# Patient Record
Sex: FEMALE | Race: BLACK OR AFRICAN AMERICAN | NOT HISPANIC OR LATINO | Employment: OTHER | URBAN - METROPOLITAN AREA
[De-identification: names, ages, dates, MRNs, and addresses within clinical notes are randomized per-mention and may not be internally consistent; named-entity substitution may affect disease eponyms.]

---

## 2017-02-08 ENCOUNTER — INITIAL CONSULT (OUTPATIENT)
Dept: OPHTHALMOLOGY | Facility: CLINIC | Age: 67
End: 2017-02-08
Payer: MEDICARE

## 2017-02-08 DIAGNOSIS — I10 ESSENTIAL HYPERTENSION: ICD-10-CM

## 2017-02-08 DIAGNOSIS — H52.7 REFRACTIVE ERROR: ICD-10-CM

## 2017-02-08 DIAGNOSIS — H26.9 CORTICAL CATARACT OF BOTH EYES: Primary | ICD-10-CM

## 2017-02-08 PROCEDURE — 99999 PR PBB SHADOW E&M-EST. PATIENT-LVL II: CPT | Mod: PBBFAC,,, | Performed by: OPHTHALMOLOGY

## 2017-02-08 PROCEDURE — 92004 COMPRE OPH EXAM NEW PT 1/>: CPT | Mod: S$GLB,,, | Performed by: OPHTHALMOLOGY

## 2017-02-08 PROCEDURE — 92136 OPHTHALMIC BIOMETRY: CPT | Mod: LT,S$GLB,, | Performed by: OPHTHALMOLOGY

## 2017-02-08 RX ORDER — GABAPENTIN 800 MG/1
800 TABLET ORAL 3 TIMES DAILY
COMMUNITY
End: 2017-07-12

## 2017-02-08 RX ORDER — HYDROXYCHLOROQUINE SULFATE 200 MG/1
200 TABLET, FILM COATED ORAL 2 TIMES DAILY
COMMUNITY
End: 2019-02-13

## 2017-02-08 RX ORDER — LOSARTAN POTASSIUM 25 MG/1
50 TABLET ORAL DAILY
Refills: 1 | COMMUNITY
Start: 2016-12-08 | End: 2017-02-20 | Stop reason: DRUGHIGH

## 2017-02-08 RX ORDER — CETIRIZINE HYDROCHLORIDE 10 MG/1
10 TABLET ORAL DAILY
COMMUNITY
End: 2017-02-20

## 2017-02-08 RX ORDER — DICYCLOMINE HYDROCHLORIDE 10 MG/1
10 CAPSULE ORAL 2 TIMES DAILY
COMMUNITY
End: 2019-01-09

## 2017-02-08 RX ORDER — ASPIRIN 81 MG/1
81 TABLET ORAL
COMMUNITY
End: 2017-02-20

## 2017-02-08 RX ORDER — CHOLECALCIFEROL (VITAMIN D3) 25 MCG
185 TABLET ORAL
COMMUNITY
End: 2017-02-20

## 2017-02-08 RX ORDER — AMITRIPTYLINE HYDROCHLORIDE 75 MG/1
75 TABLET ORAL NIGHTLY
COMMUNITY
End: 2017-07-12

## 2017-02-08 RX ORDER — DIFLUPREDNATE OPHTHALMIC 0.5 MG/ML
1 EMULSION OPHTHALMIC 4 TIMES DAILY
Qty: 5 ML | Refills: 1 | Status: SHIPPED | OUTPATIENT
Start: 2017-02-21 | End: 2017-03-23

## 2017-02-08 RX ORDER — PROMETHAZINE HYDROCHLORIDE 12.5 MG/1
12.5 TABLET ORAL
Status: ON HOLD | COMMUNITY
End: 2017-02-21 | Stop reason: CLARIF

## 2017-02-08 RX ORDER — POTASSIUM CHLORIDE 750 MG/1
10 CAPSULE, EXTENDED RELEASE ORAL EVERY MORNING
COMMUNITY
End: 2017-07-12

## 2017-02-08 RX ORDER — TRAMADOL HYDROCHLORIDE 50 MG/1
50 TABLET ORAL
COMMUNITY
End: 2017-02-20 | Stop reason: ALTCHOICE

## 2017-02-08 RX ORDER — DIPHENOXYLATE HYDROCHLORIDE AND ATROPINE SULFATE 2.5; .025 MG/1; MG/1
1 TABLET ORAL 4 TIMES DAILY PRN
Status: ON HOLD | COMMUNITY
End: 2022-03-17 | Stop reason: HOSPADM

## 2017-02-08 RX ORDER — NEPAFENAC 3 MG/ML
1 SUSPENSION/ DROPS OPHTHALMIC DAILY
Qty: 3 ML | Refills: 1 | Status: SHIPPED | OUTPATIENT
Start: 2017-02-18 | End: 2017-03-20

## 2017-02-08 RX ORDER — OMEPRAZOLE 40 MG/1
20 CAPSULE, DELAYED RELEASE ORAL EVERY MORNING
COMMUNITY
End: 2019-02-13

## 2017-02-08 RX ORDER — VENLAFAXINE 75 MG/1
75 TABLET ORAL 2 TIMES DAILY
COMMUNITY
End: 2017-07-12

## 2017-02-08 RX ORDER — FOLIC ACID 1 MG/1
1 TABLET ORAL EVERY MORNING
COMMUNITY
End: 2019-01-09

## 2017-02-08 RX ORDER — CITALOPRAM 20 MG/1
20 TABLET, FILM COATED ORAL
COMMUNITY
End: 2017-02-20 | Stop reason: ALTCHOICE

## 2017-02-08 RX ORDER — OFLOXACIN 3 MG/ML
1 SOLUTION/ DROPS OPHTHALMIC 4 TIMES DAILY
Qty: 5 ML | Refills: 1 | Status: SHIPPED | OUTPATIENT
Start: 2017-02-18 | End: 2017-02-28

## 2017-02-08 RX ORDER — IBUPROFEN 800 MG/1
800 TABLET ORAL 2 TIMES DAILY PRN
Refills: 2 | COMMUNITY
Start: 2017-01-04 | End: 2019-02-13

## 2017-02-08 NOTE — MR AVS SNAPSHOT
[place] [facility]   [facility]  [place] [address]  Phone: [phone]  Fax: [phone]                  Celine Sinclair   2017 2:30 PM   Initial consult    Description:  Female : 1950   Provider:  Saul Christianson MD   Department:  [place] [facility]           Reason for Visit     Cataract           Diagnoses this Visit        Comments    Cortical cataract of both eyes    -  Primary     Essential hypertension         Refractive error                To Do List           Future Appointments        Provider Department Dept Phone    2017 3:00 PM Saul Christianson MD [place] [facility] [phone]    3/1/2017 9:00 AM Saul Christianson MD [place] [facility] [phone]    3/8/2017 3:00 PM Saul Christianson MD [place] [facility] [phone]    3/15/2017 1:00 PM Saul Christianson MD [place] [facility] [phone]    2017 9:30 AM Saul Christianson MD [place] [facility] [phone]      Goals (5 Years of Data)     None      Follow-Up and Disposition     Return in about 2 weeks (around 2017) for CE OS..       These Medications        Disp Refills Start End    ILEVRO 0.3 % DrpS 3 mL 1 2017 3/20/2017    Place 1 drop into both eyes once daily. For 30 days - Both Eyes    Pharmacy: [facility]/pharmacy #[id number] - [place], [place] - [address] Ph #: [phone]       ofloxacin (OCUFLOX) 0.3 % ophthalmic solution 5 mL 1 2017    Place 1 drop into the left eye 4 (four) times daily. - Left Eye    Pharmacy: [facility]/pharmacy #[id number] - [place], [place] - [address] Ph #: [phone]       difluprednate (DUREZOL) 0.05 % Drop ophthalmic solution 5 mL 1 2017 3/23/2017    Place 1 drop into the left eye 4 (four) times daily. For 30 days - Left Eye    Pharmacy: [facility]/pharmacy #[id number] - [place], [place] - [address] Ph #: [phone]         [facility]      [facility] - 24/7 Assistance  Registered nurses in the [facility] provide clinical advisement, health education, appointment booking, and other advisory services.  Call for this free service at [phone].             Medications           Message regarding Medications     Verify the changes and/or additions to your medication regime listed below are the same as discussed with your clinician today.  If any of these changes or additions are incorrect, please notify your healthcare provider.        START taking these NEW medications        Refills    ILEVRO 0.3 % DrpS 1    Starting on: 2/18/2017    Sig: Place 1 drop into both eyes once daily. For 30 days    Class: Normal    Route: Both Eyes    ofloxacin (OCUFLOX) 0.3 % ophthalmic solution 1    Starting on: 2/18/2017    Sig: Place 1 drop into the left eye 4 (four) times daily.    Class: Normal    Route: Left Eye    difluprednate (DUREZOL) 0.05 % Drop ophthalmic solution 1    Starting on: 2/21/2017    Sig: Place 1 drop into the left eye 4 (four) times daily. For 30 days    Class: Normal    Route: Left Eye      STOP taking these medications     lisinopril 10 MG tablet Take 10 mg by mouth once daily.           Verify that the below list of medications is an accurate representation of the medications you are currently taking.  If none reported, the list may be blank. If incorrect, please contact your healthcare provider. Carry this list with you in case of emergency.           Current Medications     ALBUTEROL SULFATE (VENTOLIN INHL) Inhale into the lungs.    amitriptyline (ELAVIL) 75 MG tablet Take 75 mg by mouth every evening.    aspirin (ECOTRIN) 81 MG EC tablet Take 81 mg by mouth.    cetirizine (ZYRTEC) 10 MG tablet Take 10 mg by mouth once daily.    citalopram (CELEXA) 20 MG tablet Take 20 mg by mouth.    dicyclomine (BENTYL) 10 MG capsule Take 10 mg by mouth.    diphenoxylate-atropine 2.5-0.025 mg (LOMOTIL) 2.5-0.025 mg per  tablet Take 1 tablet by mouth.    folic acid (FOLVITE) 1 MG tablet Take 1 mg by mouth.    gabapentin (NEURONTIN) 800 MG tablet Take 800 mg by mouth 3 (three) times daily.    hydroxychloroquine (PLAQUENIL) 200 mg tablet Take by mouth.    ibuprofen (ADVIL,MOTRIN) 800 MG tablet Take 800 mg by mouth 2 (two) times daily.    losartan (COZAAR) 25 MG tablet Take 50 mg by mouth once daily.     methotrexate sodium 2.5 mg DsPk Take 2.5 mg by mouth.    potassium chloride (MICRO-K) 10 MEQ CpSR Take 10 mEq by mouth once daily.    psyllium (METAMUCIL) powder Take 1 packet by mouth 3 (three) times daily.    topiramate (TOPAMAX) 50 MG tablet Take 50 mg by mouth 2 (two) times daily.    venlafaxine (EFFEXOR) 75 MG tablet Take 75 mg by mouth 2 (two) times daily.    vitamin D 1000 units Tab Take 185 mg by mouth.    difluprednate (DUREZOL) 0.05 % Drop ophthalmic solution Starting on Feb 21, 2017. Place 1 drop into the left eye 4 (four) times daily. For 30 days    ILEVRO 0.3 % DrpS Starting on Feb 18, 2017. Place 1 drop into both eyes once daily. For 30 days    ofloxacin (OCUFLOX) 0.3 % ophthalmic solution Starting on Feb 18, 2017. Place 1 drop into the left eye 4 (four) times daily.    omeprazole (PRILOSEC) 40 MG capsule Take 40 mg by mouth.    promethazine (PHENERGAN) 12.5 MG Tab Take 12.5 mg by mouth.    tramadol (ULTRAM) 50 mg tablet Take 50 mg by mouth.           Clinical Reference Information           Allergies as of 2/8/2017     Sandostatin [Octreotide Acetate]    Codeine      Immunizations Administered on Date of Encounter - 2/8/2017     None      Orders Placed During Today's Visit      Normal Orders This Visit    Biometry       [email or web address] [employer]" Sign-Up     Activating your [facility] account is as easy as 1-2-3!     1) Visit [email or web address], select Sign Up Now, enter this activation code and your date of birth, then select Next.  [email or web address]  Expires: 3/25/2017  6:06 PM      2) Create a username and password to use when you visit  [facility] in the future and select a security question in case you lose your password and select Next.    3) Enter your e-mail address and click Sign Up!    Additional Information  If you have questions, please e-mail [email or web address] or call [phone] to talk to our [facility] staff. Remember, [facility] is NOT to be used for urgent needs. For medical emergencies, dial 911.         Language Assistance Services     ATTENTION: Language assistance services are available, free of charge. Please call [phone].      ATENCIÓN: Si habla español, tiene a fermin disposición servicios gratuitos de asistencia lingüística. Llame al [phone].     CHÚ Ý: N?u b?n nói Ti?ng Vi?t, có các d?ch v? h? tr? ngôn ng? mi?n phí dành cho b?n. G?i s? [phone].         [facility] [place] [facility] complies with applicable Federal civil rights laws and does not discriminate on the basis of race, color, national origin, age, disability, or sex.

## 2017-02-08 NOTE — LETTER
February 8, 2017      Cara Cordero, OD  [address]  [facility] [address]           [place] [facility]  [address] [facility]  [place] [address]  Phone: [phone]  Fax: [phone]          Patient: Celine Sinclair   MR Number: [id number]   YOB: 1950   Date of Visit: 2/8/2017       Dear Dr. Cara Cordero:    Thank you for referring Celine Sinclair to me for evaluation. Attached you will find relevant portions of my assessment and plan of care.    If you have questions, please do not hesitate to call me. I look forward to following Celine Sinclair along with you.    Sincerely,    Saul Christianson MD    Enclosure  CC:  No Recipients    If you would like to receive this communication electronically, please contact [email or web address] or [phone] to request more information on [employer] Link access.    For providers and/or their staff who would like to refer a patient to [facility], please contact us through our one-stop-shop provider referral line, [facility], at [phone].    If you feel you have received this communication in error or would no longer like to receive these types of communications, please e-mail [email or web address]

## 2017-02-09 NOTE — PROGRESS NOTES
Subjective:       Patient ID: Celine Sinclair is a 66 y.o. female.    Chief Complaint: Cataract (Cataract Eval )    HPI  Review of Systems    Objective:      Physical Exam    Assessment:       1. Cortical cataract of both eyes    2. Essential hypertension    3. Refractive error        Plan:       Visually significant cataract OU -Pt. Wants Sx.     HTN-No retinopathy OU.  RE        CE OS 2/21/17 SN60WF 20.0,        OD 3/7/17 SN60WF 20.5.  Control HTN.

## 2017-02-14 ENCOUNTER — TELEPHONE (OUTPATIENT)
Dept: OPTOMETRY | Facility: CLINIC | Age: 67
End: 2017-02-14

## 2017-02-14 DIAGNOSIS — H26.9 CORTICAL CATARACT OF LEFT EYE: Primary | ICD-10-CM

## 2017-02-16 ENCOUNTER — TELEPHONE (OUTPATIENT)
Dept: OPHTHALMOLOGY | Facility: CLINIC | Age: 67
End: 2017-02-16

## 2017-02-18 NOTE — H&P
[facility]  History & Physical    Subjective:      Chief Complaint/Reason for Admission:     Celine Sinclair is a 66 y.o. female.    Past Medical History   Diagnosis Date    Aortic atherosclerosis     Benign essential hypertension     Cataract      Senile    CHF (congestive heart failure)     Constipation     COPD (chronic obstructive pulmonary disease)     Crohn's colitis     Depression, major, recurrent, moderate     Fibromyalgia     GERD (gastroesophageal reflux disease)     Hypertensive cardiomyopathy     IBS (irritable bowel syndrome)     Migraine     Opioid abuse, in remission     Osteopenia     Paget disease of bone     Port-a-cath in place      right chest    Prolonged QT interval     RA (rheumatoid arthritis)     Respiratory infection      Lower    Sedative hypnotic or anxiolytic dependence      in remission     UTI (urinary tract infection)      Past Surgical History   Procedure Laterality Date    Sbo      Hysterectomy      Cholecystectomy      Tonsillectomy      Hemorrhoid surgery      Colostomy       x3    Colostomy reversal      Colonoscopy N/A 3/16/2016     Procedure: COLONOSCOPY;  Surgeon: Dale Trejo MD;  Location: [facility] ([address] [place] [address]);  Service: Endoscopy;  Laterality: N/A;     Family History   Problem Relation Age of Onset    Glaucoma Paternal Grandmother     Other Daughter      Diabetic Retinopathy    Retinal detachment Grandchild     Amblyopia Neg Hx     Blindness Neg Hx     Strabismus Neg Hx     Macular degeneration Neg Hx     Cataracts Neg Hx      Social History   Substance Use Topics    Smoking status: Never Smoker    Smokeless tobacco: Not on file    Alcohol use No       No prescriptions prior to admission.     Review of patient's allergies indicates:   Allergen Reactions    Sandostatin [octreotide acetate] Nausea And Vomiting    Codeine Nausea Only, Itching and Nausea And Vomiting     Pill form only, IV ok.        Review of Systems    Eyes: Positive for blurred vision.   All other systems reviewed and are negative.      Objective:      Vital Signs (Most Recent)       Vital Signs Range (Last 24H):  BP: ()/()   Arterial Line BP: ()/()     Physical Exam   Constitutional: She is oriented to person, place, and time. She appears well-developed and well-nourished.   HENT:   Head: Normocephalic.   Eyes: Conjunctivae and EOM are normal. Pupils are equal, round, and reactive to light.   Neck: Normal range of motion. Neck supple.   Cardiovascular: Normal rate, regular rhythm and normal heart sounds.    Pulmonary/Chest: Effort normal and breath sounds normal.   Abdominal: Soft. Bowel sounds are normal.   Musculoskeletal: Normal range of motion.   Neurological: She is alert and oriented to person, place, and time.   Skin: Skin is warm.   Psychiatric: She has a normal mood and affect.       Data Review:    ECG:     Assessment:      Cataract OS.    Plan:    CE OS.

## 2017-02-20 RX ORDER — LOSARTAN POTASSIUM 50 MG/1
100 TABLET ORAL EVERY MORNING
COMMUNITY
End: 2019-08-13

## 2017-02-21 ENCOUNTER — ANESTHESIA (OUTPATIENT)
Dept: SURGERY | Facility: HOSPITAL | Age: 67
End: 2017-02-21
Payer: MEDICARE

## 2017-02-21 ENCOUNTER — ANESTHESIA EVENT (OUTPATIENT)
Dept: SURGERY | Facility: HOSPITAL | Age: 67
End: 2017-02-21
Payer: MEDICARE

## 2017-02-21 ENCOUNTER — HOSPITAL ENCOUNTER (OUTPATIENT)
Facility: HOSPITAL | Age: 67
Discharge: HOME | End: 2017-02-21
Attending: OPHTHALMOLOGY | Admitting: OPHTHALMOLOGY
Payer: MEDICARE

## 2017-02-21 VITALS
TEMPERATURE: 98 F | DIASTOLIC BLOOD PRESSURE: 82 MMHG | WEIGHT: 197 LBS | RESPIRATION RATE: 22 BRPM | SYSTOLIC BLOOD PRESSURE: 131 MMHG | HEART RATE: 75 BPM | OXYGEN SATURATION: 100 % | HEIGHT: 66 IN | BODY MASS INDEX: 31.66 KG/M2

## 2017-02-21 DIAGNOSIS — H25.10 SENILE NUCLEAR SCLEROSIS: ICD-10-CM

## 2017-02-21 PROCEDURE — V2632 POST CHMBR INTRAOCULAR LENS: HCPCS | Performed by: OPHTHALMOLOGY

## 2017-02-21 PROCEDURE — 63600175 PHARM REV CODE 636 W HCPCS: Performed by: NURSE ANESTHETIST, CERTIFIED REGISTERED

## 2017-02-21 PROCEDURE — 71000015 HC POSTOP RECOV 1ST HR: Performed by: OPHTHALMOLOGY

## 2017-02-21 PROCEDURE — 63600175 PHARM REV CODE 636 W HCPCS: Performed by: OPHTHALMOLOGY

## 2017-02-21 PROCEDURE — 25000003 PHARM REV CODE 250: Performed by: ANESTHESIOLOGY

## 2017-02-21 PROCEDURE — 36000707: Performed by: OPHTHALMOLOGY

## 2017-02-21 PROCEDURE — 36000706: Performed by: OPHTHALMOLOGY

## 2017-02-21 PROCEDURE — 25000003 PHARM REV CODE 250

## 2017-02-21 PROCEDURE — 37000009 HC ANESTHESIA EA ADD 15 MINS: Performed by: OPHTHALMOLOGY

## 2017-02-21 PROCEDURE — D9220A PRA ANESTHESIA: Mod: CRNA,,, | Performed by: NURSE ANESTHETIST, CERTIFIED REGISTERED

## 2017-02-21 PROCEDURE — 25000003 PHARM REV CODE 250: Performed by: OPHTHALMOLOGY

## 2017-02-21 PROCEDURE — 71000033 HC RECOVERY, INTIAL HOUR: Performed by: OPHTHALMOLOGY

## 2017-02-21 PROCEDURE — 66984 XCAPSL CTRC RMVL W/O ECP: CPT | Mod: LT,,, | Performed by: OPHTHALMOLOGY

## 2017-02-21 PROCEDURE — D9220A PRA ANESTHESIA: Mod: ANES,,, | Performed by: ANESTHESIOLOGY

## 2017-02-21 PROCEDURE — 27201423 OPTIME MED/SURG SUP & DEVICES STERILE SUPPLY: Performed by: OPHTHALMOLOGY

## 2017-02-21 PROCEDURE — C9447 INJ, PHENYLEPHRINE KETOROLAC: HCPCS | Performed by: OPHTHALMOLOGY

## 2017-02-21 PROCEDURE — 37000008 HC ANESTHESIA 1ST 15 MINUTES: Performed by: OPHTHALMOLOGY

## 2017-02-21 DEVICE — LENS 20.5 ACRYSOF: Type: IMPLANTABLE DEVICE | Site: EYE | Status: FUNCTIONAL

## 2017-02-21 RX ORDER — FENTANYL CITRATE 50 UG/ML
INJECTION, SOLUTION INTRAMUSCULAR; INTRAVENOUS
Status: DISCONTINUED | OUTPATIENT
Start: 2017-02-21 | End: 2017-02-21

## 2017-02-21 RX ORDER — SODIUM CHLORIDE 9 MG/ML
INJECTION, SOLUTION INTRAVENOUS CONTINUOUS
Status: DISCONTINUED | OUTPATIENT
Start: 2017-02-21 | End: 2017-02-21 | Stop reason: HOSPADM

## 2017-02-21 RX ORDER — TROPICAMIDE 10 MG/ML
SOLUTION/ DROPS OPHTHALMIC
Status: COMPLETED
Start: 2017-02-21 | End: 2017-02-21

## 2017-02-21 RX ORDER — PHENYLEPHRINE HYDROCHLORIDE 25 MG/ML
SOLUTION/ DROPS OPHTHALMIC
Status: COMPLETED
Start: 2017-02-21 | End: 2017-02-21

## 2017-02-21 RX ORDER — OFLOXACIN 3 MG/ML
SOLUTION/ DROPS OPHTHALMIC
Status: COMPLETED
Start: 2017-02-21 | End: 2017-02-21

## 2017-02-21 RX ORDER — LIDOCAINE HYDROCHLORIDE 40 MG/ML
INJECTION, SOLUTION RETROBULBAR
Status: DISCONTINUED
Start: 2017-02-21 | End: 2017-02-21 | Stop reason: HOSPADM

## 2017-02-21 RX ORDER — ACETAMINOPHEN 325 MG/1
TABLET ORAL
Status: DISCONTINUED
Start: 2017-02-21 | End: 2017-02-21 | Stop reason: HOSPADM

## 2017-02-21 RX ORDER — LIDOCAINE HYDROCHLORIDE 10 MG/ML
1 INJECTION, SOLUTION EPIDURAL; INFILTRATION; INTRACAUDAL; PERINEURAL ONCE
Status: DISCONTINUED | OUTPATIENT
Start: 2017-02-21 | End: 2017-02-21 | Stop reason: HOSPADM

## 2017-02-21 RX ORDER — MIDAZOLAM HYDROCHLORIDE 1 MG/ML
INJECTION, SOLUTION INTRAMUSCULAR; INTRAVENOUS
Status: DISCONTINUED | OUTPATIENT
Start: 2017-02-21 | End: 2017-02-21

## 2017-02-21 RX ORDER — PREDNISOLONE ACETATE 10 MG/ML
SUSPENSION/ DROPS OPHTHALMIC
Status: DISCONTINUED | OUTPATIENT
Start: 2017-02-21 | End: 2017-02-21 | Stop reason: HOSPADM

## 2017-02-21 RX ORDER — LIDOCAINE HYDROCHLORIDE 10 MG/ML
1 INJECTION, SOLUTION EPIDURAL; INFILTRATION; INTRACAUDAL; PERINEURAL ONCE
Status: COMPLETED | OUTPATIENT
Start: 2017-02-21 | End: 2017-02-21

## 2017-02-21 RX ORDER — HYDROCODONE BITARTRATE AND ACETAMINOPHEN 5; 325 MG/1; MG/1
1 TABLET ORAL EVERY 4 HOURS PRN
Status: DISCONTINUED | OUTPATIENT
Start: 2017-02-21 | End: 2017-02-21 | Stop reason: HOSPADM

## 2017-02-21 RX ORDER — LIDOCAINE HYDROCHLORIDE 40 MG/ML
INJECTION, SOLUTION RETROBULBAR
Status: DISCONTINUED | OUTPATIENT
Start: 2017-02-21 | End: 2017-02-21 | Stop reason: HOSPADM

## 2017-02-21 RX ORDER — CYCLOPENTOLATE HYDROCHLORIDE 10 MG/ML
SOLUTION/ DROPS OPHTHALMIC
Status: COMPLETED
Start: 2017-02-21 | End: 2017-02-21

## 2017-02-21 RX ORDER — PROPARACAINE HYDROCHLORIDE 5 MG/ML
1 SOLUTION/ DROPS OPHTHALMIC
Status: DISCONTINUED | OUTPATIENT
Start: 2017-02-21 | End: 2017-02-21 | Stop reason: HOSPADM

## 2017-02-21 RX ORDER — OFLOXACIN 3 MG/ML
1 SOLUTION/ DROPS OPHTHALMIC
Status: COMPLETED | OUTPATIENT
Start: 2017-02-21 | End: 2017-02-21

## 2017-02-21 RX ORDER — CYCLOPENTOLATE HYDROCHLORIDE 10 MG/ML
1 SOLUTION/ DROPS OPHTHALMIC
Status: DISCONTINUED | OUTPATIENT
Start: 2017-02-21 | End: 2017-02-21 | Stop reason: HOSPADM

## 2017-02-21 RX ORDER — PREDNISOLONE ACETATE 10 MG/ML
SUSPENSION/ DROPS OPHTHALMIC
Status: DISCONTINUED
Start: 2017-02-21 | End: 2017-02-21 | Stop reason: HOSPADM

## 2017-02-21 RX ORDER — OFLOXACIN 3 MG/ML
SOLUTION/ DROPS OPHTHALMIC
Status: DISCONTINUED | OUTPATIENT
Start: 2017-02-21 | End: 2017-02-21 | Stop reason: HOSPADM

## 2017-02-21 RX ORDER — PROPARACAINE HYDROCHLORIDE 5 MG/ML
SOLUTION/ DROPS OPHTHALMIC
Status: COMPLETED
Start: 2017-02-21 | End: 2017-02-21

## 2017-02-21 RX ORDER — TROPICAMIDE 10 MG/ML
1 SOLUTION/ DROPS OPHTHALMIC
Status: DISCONTINUED | OUTPATIENT
Start: 2017-02-21 | End: 2017-02-21 | Stop reason: HOSPADM

## 2017-02-21 RX ORDER — ACETAMINOPHEN 325 MG/1
650 TABLET ORAL EVERY 4 HOURS PRN
Status: DISCONTINUED | OUTPATIENT
Start: 2017-02-21 | End: 2017-02-21 | Stop reason: HOSPADM

## 2017-02-21 RX ORDER — PHENYLEPHRINE HYDROCHLORIDE 25 MG/ML
1 SOLUTION/ DROPS OPHTHALMIC
Status: DISCONTINUED | OUTPATIENT
Start: 2017-02-21 | End: 2017-02-21 | Stop reason: HOSPADM

## 2017-02-21 RX ADMIN — TROPICAMIDE 1 DROP: 10 SOLUTION/ DROPS OPHTHALMIC at 09:02

## 2017-02-21 RX ADMIN — ACETAMINOPHEN 650 MG: 325 TABLET ORAL at 12:02

## 2017-02-21 RX ADMIN — TROPICAMIDE 1 DROP: 10 SOLUTION/ DROPS OPHTHALMIC at 10:02

## 2017-02-21 RX ADMIN — MIDAZOLAM HYDROCHLORIDE 1 MG: 1 INJECTION, SOLUTION INTRAMUSCULAR; INTRAVENOUS at 11:02

## 2017-02-21 RX ADMIN — PROPARACAINE HYDROCHLORIDE 1 DROP: 5 SOLUTION/ DROPS OPHTHALMIC at 09:02

## 2017-02-21 RX ADMIN — MIDAZOLAM HYDROCHLORIDE 2 MG: 1 INJECTION, SOLUTION INTRAMUSCULAR; INTRAVENOUS at 11:02

## 2017-02-21 RX ADMIN — CYCLOPENTOLATE HYDROCHLORIDE 1 DROP: 10 SOLUTION/ DROPS OPHTHALMIC at 10:02

## 2017-02-21 RX ADMIN — LIDOCAINE HYDROCHLORIDE 1 MG: 10 INJECTION, SOLUTION EPIDURAL; INFILTRATION; INTRACAUDAL; PERINEURAL at 10:02

## 2017-02-21 RX ADMIN — PHENYLEPHRINE HYDROCHLORIDE 1 DROP: 25 SOLUTION/ DROPS OPHTHALMIC at 10:02

## 2017-02-21 RX ADMIN — FENTANYL CITRATE 50 MCG: 50 INJECTION, SOLUTION INTRAMUSCULAR; INTRAVENOUS at 11:02

## 2017-02-21 RX ADMIN — OFLOXACIN 1 DROP: 3 SOLUTION/ DROPS OPHTHALMIC at 10:02

## 2017-02-21 RX ADMIN — PHENYLEPHRINE HYDROCHLORIDE 1 DROP: 25 SOLUTION/ DROPS OPHTHALMIC at 09:02

## 2017-02-21 RX ADMIN — PHENYLEPHRINE HYDROCHLORIDE 1 DROP: 2.5 SOLUTION/ DROPS OPHTHALMIC at 09:02

## 2017-02-21 RX ADMIN — CYCLOPENTOLATE HYDROCHLORIDE 1 DROP: 10 SOLUTION/ DROPS OPHTHALMIC at 09:02

## 2017-02-21 RX ADMIN — OFLOXACIN 1 DROP: 3 SOLUTION OPHTHALMIC at 09:02

## 2017-02-21 RX ADMIN — OFLOXACIN 1 DROP: 3 SOLUTION/ DROPS OPHTHALMIC at 09:02

## 2017-02-21 RX ADMIN — SODIUM CHLORIDE: 0.9 INJECTION, SOLUTION INTRAVENOUS at 10:02

## 2017-02-21 NOTE — DISCHARGE INSTRUCTIONS
Discharge Instructions for Cataract Surgery  A surgeon removed the cloudy lens in your eye and replaced it with a clear man-made lens. Be sure to have an adult family member or friend drive you home after surgery. Heres what you can expect following surgery and tips for a healthy recovery.  It is normal to have the following:  · Bruised or bloodshot eye for 7 days  · Itching and mild discomfort for several days  · Some fluid discharge  · Sensitivity to light  · Scratchy, sandlike feeling in the eye for 2 weeks  · Feeling tired, especially during the first 24 hours  Activity level  · Do not drive for 2 days or as instructed by your doctor.  · Do not drink alcohol for at least 24 hours.  · Avoid bending at the waist to  objects or lifting anything heavy for 2 days.  · Relax for the first 24 hours after surgery. Watching TV and reading are OK and wont harm your eye.  Eye protection  · Do not rub or press on your eye.  · Sleep on your back or on your unoperated side for 2 nights.  · If instructed, wear a bandage over your eye for 2 days and 2 nights.  · If instructed, wear a shield to protect your eye for 2 days and 2 nights.  Using eyedrops  You may be given special eyedrops or ointment. Here is one way to use eyedrops:  · Tilt your head back.  · Pull your bottom eyelid down.  · Squeeze one drop into your eye. Do not touch your eye with the bottle tip.  · Close your eyes for a few seconds.  · If you need more than one drop, wait a few minutes before adding the next one.   Call your doctor right away if you have any of the following:  · Bleeding or discharge from the eye  · Your vision suddenly becomes worse  · Pain medicine you are told to take does not ease your pain  · Nausea or vomiting  · Chills or fever over 100.4°F (39.1°C)   Date Last Reviewed: 5/30/2015  © 0465-1235 [employer]. [address] [place] [address], [place] [address]. All rights reserved. This information is not intended as a  substitute for professional medical care. Always follow your healthcare professional's instructions.

## 2017-02-21 NOTE — BRIEF OP NOTE
Operative Note     SUMMARY     Surgery Date: 2/21/2017    Surgeon(s) and Role:      Saul Christianson MD - Primary    Pre-op Diagnosis:  Nuclear sclerosis     Post-op/ Diagnosis:  Same    Final Diagnosis: Cataract    Procedure(s) (LRB):  PHACOEMULSIFICATION-ASPIRATION-CATARACT   INSERTION-INTRAOCULAR LENS (IOL)     Anesthesia: Monitored Anesthesia Care    Findings/Key Components:  Cataract    Outcome: Tolerated procedure well    Estimated Blood Loss: None         Specimens     None          Follow-up:  Tomorrow in clinic      Discharge Note      SUMMARY     Admit Date: 2/21/2017    Attending Physician: Saul Christianson MD    Discharge Physician: Saul Christianson MD    Discharge Date: 2/21/2017    Final Diagnosis: Cataract    Outcome: Tolerated procedure well    Disposition: Discharge to Home.    Medications:       Celine Sinclair   Home Medication Instructions PAULA:[id number]    Printed on:02/21/17 1203   Medication Information                      ALBUTEROL SULFATE (VENTOLIN INHL)  Inhale 1 puff into the lungs every 4 to 6 hours as needed.              amitriptyline (ELAVIL) 75 MG tablet  Take 75 mg by mouth nightly.              dicyclomine (BENTYL) 10 MG capsule  Take 10 mg by mouth 2 (two) times daily.              difluprednate (DUREZOL) 0.05 % Drop ophthalmic solution  Place 1 drop into the left eye 4 (four) times daily. For 30 days             diphenoxylate-atropine 2.5-0.025 mg (LOMOTIL) 2.5-0.025 mg per tablet  Take 1 tablet by mouth 4 (four) times daily as needed for Diarrhea.              folic acid (FOLVITE) 1 MG tablet  Take 1 mg by mouth every morning.              gabapentin (NEURONTIN) 800 MG tablet  Take 800 mg by mouth 3 (three) times daily.             hydroxychloroquine (PLAQUENIL) 200 mg tablet  Take 200 mg by mouth 2 (two) times daily.              ibuprofen (ADVIL,MOTRIN) 800 MG tablet  Take 800 mg by mouth 2 (two) times daily as needed.              ILEVRO 0.3 % DrpS  Place 1  drop into both eyes once daily. For 30 days             losartan (COZAAR) 50 MG tablet  Take 50 mg by mouth every morning.             methotrexate sodium 2.5 mg DsPk  Take 2.5 mg by mouth every morning.              ofloxacin (OCUFLOX) 0.3 % ophthalmic solution  Place 1 drop into the left eye 4 (four) times daily.             omeprazole (PRILOSEC) 40 MG capsule  Take 40 mg by mouth every morning.              potassium chloride (MICRO-K) 10 MEQ CpSR  Take 10 mEq by mouth every morning.              venlafaxine (EFFEXOR) 75 MG tablet  Take 75 mg by mouth 2 (two) times daily.                   Patient Discharge Instructions:     Keep Bob Shield over operated eye when not using drops.     DIET:  Regular    Activity: No heavy lifting or bending X 1 week.    Follow-up:  Tomorrow in clinic

## 2017-02-21 NOTE — ANESTHESIA POSTPROCEDURE EVALUATION
"Anesthesia Post Evaluation    Patient: Celine Sinclair    Procedure(s) Performed: Procedure(s) (LRB):  PHACOEMULSIFICATION-ASPIRATION-CATARACT (Left)  INSERTION-INTRAOCULAR LENS (IOL) (Left)    Final Anesthesia Type: MAC  Patient location during evaluation: PACU  Patient participation: Yes- Able to Participate  Level of consciousness: awake and alert  Post-procedure vital signs: reviewed and stable  Pain management: adequate  Airway patency: patent  PONV status at discharge: No PONV  Anesthetic complications: no      Cardiovascular status: blood pressure returned to baseline  Respiratory status: unassisted  Hydration status: euvolemic  Follow-up not needed.        Visit Vitals    /82 (BP Location: Right arm, Patient Position: Sitting, BP Method: Automatic)    Pulse 75    Temp 36.4 °C (97.5 °F) (Oral)    Resp (!) 22    Ht 5' 5.5" (1.664 m)    Wt 89.4 kg (197 lb)    SpO2 100%    Breastfeeding No    BMI 32.28 kg/m2       Pain/Yoana Score: Pain Assessment Performed: Yes (2/21/2017 12:11 PM)  Presence of Pain: complains of pain/discomfort (2/21/2017 12:11 PM)  Pain Rating Prior to Med Admin: 7 (2/21/2017 12:10 PM)  Yoana Score: 10 (2/21/2017 12:11 PM)      "

## 2017-02-21 NOTE — ANESTHESIA PREPROCEDURE EVALUATION
02/21/2017  Celine Sinclair is a 66 y.o., female.    [facility] Anesthesia Evaluation    I have reviewed the Patient Summary Reports.    I have reviewed the Nursing Notes.      Review of Systems  Anesthesia Hx:  No problems with previous Anesthesia    Hematology/Oncology:  Hematology Normal   Oncology Normal     EENT/Dental:EENT/Dental Normal   Cardiovascular:   Hypertension CHF    Pulmonary:   COPD    Renal/:  Renal/ Normal     Hepatic/GI:   GERD    Musculoskeletal:  Musculoskeletal Normal    Neurological:   Headaches    Endocrine:  Endocrine Normal    Dermatological:  Skin Normal    Psych:   Psychiatric History          Physical Exam  General:  Obesity    Airway/Jaw/Neck:  Airway Findings: Mouth Opening: Normal Tongue: Normal  General Airway Assessment: Adult  Mallampati: II  TM Distance: Normal, at least 6 cm        Eyes/Ears/Nose:  EYES/EARS/NOSE FINDINGS: Normal   Dental:  Dental Findings: Upper Dentures, Lower Dentures   Chest/Lungs:  Chest/Lungs Clear    Heart/Vascular:  Heart Findings: Normal Heart murmur: negative Vascular Findings: Normal    Abdomen:  Abdomen Findings: Normal    Musculoskeletal:  Musculoskeletal Findings: Normal   Skin:  Skin Findings: Normal    Mental Status:  Mental Status Findings: Normal        Anesthesia Plan  Type of Anesthesia, risks & benefits discussed:  Anesthesia Type:  MAC  Patient's Preference:   Intra-op Monitoring Plan:   Intra-op Monitoring Plan Comments:   Post Op Pain Control Plan:   Post Op Pain Control Plan Comments:   Induction:   IV  Beta Blocker:  Patient is not currently on a Beta-Blocker (No further documentation required).       Informed Consent: Patient understands risks and agrees with Anesthesia plan.  Questions answered. Anesthesia consent signed with patient.  ASA Score: 3     Day of Surgery Review of History & Physical:    H&P update referred to the  surgeon.         Ready For Surgery From Anesthesia Perspective.

## 2017-02-21 NOTE — IP AVS SNAPSHOT
[facility]  [address]  [facility] [address]  Phone: [phone]           Patient Discharge Instructions     Our goal is to set you up for success. This packet includes information on your condition, medications, and your home care. It will help you to care for yourself so you don't get sicker and need to go back to the hospital.     Please ask your nurse if you have any questions.        There are many details to remember when preparing to leave the hospital. Here is what you will need to do:    1. Take your medicine. If you are prescribed medications, review your Medication List in the following pages. You may have new medications to  at the pharmacy and others that you'll need to stop taking. Review the instructions for how and when to take your medications. Talk with your doctor or nurses if you are unsure of what to do.     2. Go to your follow-up appointments. Specific follow-up information is listed in the following pages. Your may be contacted by a transition nurse or clinical provider about future appointments. Be sure we have all of the phone numbers to reach you, if needed. Please contact your provider's office if you are unable to make an appointment.     3. Watch for warning signs. Your doctor or nurse will give you detailed warning signs to watch for and when to call for assistance. These instructions may also include educational information about your condition. If you experience any of warning signs to your health, call your doctor.               [facility]  Unless otherwise directed by your provider, please contact [facility], our nurse care line that is available for 24/7 assistance.     [phone] (toll-free)    Registered nurses in the [facility] provide clinical advisement, health education, appointment booking, and other advisory services.                    ** Verify the list of medication(s) below is accurate and up  to date. Carry this with you in case of emergency. If your medications have changed, please notify your healthcare provider.             Medication List      CONTINUE taking these medications        Additional Info                      amitriptyline 75 MG tablet   Commonly known as:  ELAVIL   Refills:  0   Dose:  75 mg    Instructions:  Take 75 mg by mouth nightly.     Begin Date    AM    Noon    PM    Bedtime       dicyclomine 10 MG capsule   Commonly known as:  BENTYL   Refills:  0   Dose:  10 mg    Instructions:  Take 10 mg by mouth 2 (two) times daily.     Begin Date    AM    Noon    PM    Bedtime       difluprednate 0.05 % Drop ophthalmic solution   Commonly known as:  DUREZOL   Quantity:  5 mL   Refills:  1   Dose:  1 drop    Instructions:  Place 1 drop into the left eye 4 (four) times daily. For 30 days     Begin Date    AM    Noon    PM    Bedtime       diphenoxylate-atropine 2.5-0.025 mg 2.5-0.025 mg per tablet   Commonly known as:  LOMOTIL   Refills:  0   Dose:  1 tablet    Instructions:  Take 1 tablet by mouth 4 (four) times daily as needed for Diarrhea.     Begin Date    AM    Noon    PM    Bedtime       folic acid 1 MG tablet   Commonly known as:  FOLVITE   Refills:  0   Dose:  1 mg    Instructions:  Take 1 mg by mouth every morning.     Begin Date    AM    Noon    PM    Bedtime       gabapentin 800 MG tablet   Commonly known as:  NEURONTIN   Refills:  0   Dose:  800 mg    Instructions:  Take 800 mg by mouth 3 (three) times daily.     Begin Date    AM    Noon    PM    Bedtime       hydroxychloroquine 200 mg tablet   Commonly known as:  PLAQUENIL   Refills:  0   Dose:  200 mg    Instructions:  Take 200 mg by mouth 2 (two) times daily.     Begin Date    AM    Noon    PM    Bedtime       ibuprofen 800 MG tablet   Commonly known as:  ADVIL,MOTRIN   Refills:  2   Dose:  800 mg    Instructions:  Take 800 mg by mouth 2 (two) times daily as needed.     Begin Date    AM    Noon    PM    Bedtime       ILEVRO 0.3  % Drps   Quantity:  3 mL   Refills:  1   Dose:  1 drop   Generic drug:  nepafenac    Instructions:  Place 1 drop into both eyes once daily. For 30 days     Begin Date    AM    Noon    PM    Bedtime       losartan 50 MG tablet   Commonly known as:  COZAAR   Refills:  0   Dose:  50 mg    Instructions:  Take 50 mg by mouth every morning.     Begin Date    AM    Noon    PM    Bedtime       methotrexate sodium 2.5 mg Dspk   Refills:  0   Dose:  2.5 mg    Instructions:  Take 2.5 mg by mouth every morning.     Begin Date    AM    Noon    PM    Bedtime       ofloxacin 0.3 % ophthalmic solution   Commonly known as:  OCUFLOX   Quantity:  5 mL   Refills:  1   Dose:  1 drop    Last time this was given:  1 drop on 2/21/2017 11:43 AM   Instructions:  Place 1 drop into the left eye 4 (four) times daily.     Begin Date    AM    Noon    PM    Bedtime       omeprazole 40 MG capsule   Commonly known as:  PRILOSEC   Refills:  0   Dose:  40 mg    Instructions:  Take 40 mg by mouth every morning.     Begin Date    AM    Noon    PM    Bedtime       potassium chloride 10 MEQ Cpsr   Commonly known as:  MICRO-K   Refills:  0   Dose:  10 mEq    Instructions:  Take 10 mEq by mouth every morning.     Begin Date    AM    Noon    PM    Bedtime       venlafaxine 75 MG tablet   Commonly known as:  EFFEXOR   Refills:  0   Dose:  75 mg    Instructions:  Take 75 mg by mouth 2 (two) times daily.     Begin Date    AM    Noon    PM    Bedtime       VENTOLIN INHL   Refills:  0   Dose:  1 puff    Instructions:  Inhale 1 puff into the lungs every 4 to 6 hours as needed.     Begin Date    AM    Noon    PM    Bedtime                  Please bring to all follow up appointments:    1. A copy of your discharge instructions.  2. All medicines you are currently taking in their original bottles.  3. Identification and insurance card.    Please arrive 15 minutes ahead of scheduled appointment time.    Please call 24 hours in advance if you must reschedule your  appointment and/or time.        Your Scheduled Appointments     Feb 22, 2017  3:00 PM CST   Post OP with Saul Christianson MD   [place] [facility]    [address] [facility]  [place] [id number] [phone]            Mar 01, 2017  9:00 AM CST   Post OP with Saul Christianson MD   [place] [facility]    [address] [facility]  [place] [address] [phone]            Mar 08, 2017  3:00 PM CST   Post OP with Saul Christianson MD   [place] [facility]    [address] [facility]  [place] [id number] [phone]            Mar 15, 2017  1:00 PM CDT   Post OP with Saul Christianson MD   [place] [facility]    [address] [facility]  [place] [id number] [phone]            Apr 05, 2017  9:30 AM CDT   Post OP with Saul Christianson MD   [place] [facility] ([place]    [address] [facility]  [place] [id number] [phone]              Your Future Surgeries/Procedures     Mar 07, 2017   Surgery with Saul Christianson MD   [facility] ([facility])    [address] [place] [address] [phone]                Discharge Instructions     Future Orders    Other restrictions (specify):     Comments:    No heavy lifting or bending for 1 week.        Discharge Instructions         Discharge Instructions for Cataract Surgery  A surgeon removed the cloudy lens in your eye and replaced it with a clear man-made lens. Be sure to have an adult family member or friend drive you home after surgery. Heres what you can expect following surgery and tips for a healthy recovery.  It is normal to have the following:  · Bruised or bloodshot eye for 7 days  · Itching and mild discomfort for several days  · Some fluid discharge  · Sensitivity to light  · Scratchy, sandlike feeling in the eye for 2 weeks  · Feeling tired, especially during the  first 24 hours  Activity level  · Do not drive for 2 days or as instructed by your doctor.  · Do not drink alcohol for at least 24 hours.  · Avoid bending at the waist to  objects or lifting anything heavy for 2 days.  · Relax for the first 24 hours after surgery. Watching TV and reading are OK and wont harm your eye.  Eye protection  · Do not rub or press on your eye.  · Sleep on your back or on your unoperated side for 2 nights.  · If instructed, wear a bandage over your eye for 2 days and 2 nights.  · If instructed, wear a shield to protect your eye for 2 days and 2 nights.  Using eyedrops  You may be given special eyedrops or ointment. Here is one way to use eyedrops:  · Tilt your head back.  · Pull your bottom eyelid down.  · Squeeze one drop into your eye. Do not touch your eye with the bottle tip.  · Close your eyes for a few seconds.  · If you need more than one drop, wait a few minutes before adding the next one.   Call your doctor right away if you have any of the following:  · Bleeding or discharge from the eye  · Your vision suddenly becomes worse  · Pain medicine you are told to take does not ease your pain  · Nausea or vomiting  · Chills or fever over 100.4°F (39.1°C)   Date Last Reviewed: 5/30/2015  © 1161-5109 [employer]. [address] [place] [address]. All rights reserved. This information is not intended as a substitute for professional medical care. Always follow your healthcare professional's instructions.            Primary Diagnosis     Your primary diagnosis was:  Cataract      Admission Information     Date & Time Provider Department CSN    2/21/2017  9:23 AM Saul Christianson MD [facility] [id number]      Care Providers     Provider Role Specialty Primary office phone    Saul Christianson MD Attending Provider Ophthalmology [phone]    Saul Christianson MD Surgeon  Ophthalmology [phone]      Your Vitals Were     BP  "Pulse Temp Resp Height Weight    132/84 (BP Location: Right arm, Patient Position: Sitting, BP Method: Automatic) 80 97.5 °F (36.4 °C) (Oral) 20 5' 5.5" (1.664 m) 89.4 kg (197 lb)    SpO2 BMI             100% 32.28 kg/m2         Recent Lab Values     No lab values to display.      Allergies as of 2/21/2017        Reactions    Sandostatin [Octreotide Acetate] Nausea And Vomiting    Had symptoms when she took Sandostatin with Codeine    Codeine Itching, Nausea And Vomiting    Pill form only, IV ok.,  Had symptoms when she took Codeine with Sandostatin.      Advance Directives     An advance directive is a document which, in the event you are no longer able to make decisions for yourself, tells your healthcare team what kind of treatment you do or do not want to receive, or who you would like to make those decisions for you.  If you do not currently have an advance directive, [facility] encourages you to create one.  For more information call:  [phone]), [phone] ([phone]),  or log on to [email or web address].        Language Assistance Services     ATTENTION: Language assistance services are available, free of charge. Please call [phone].      ATENCIÓN: Si habla español, tiene a fermin disposición servicios gratuitos de asistencia lingüística. Llame al [phone].     CHÚ Ý: N?u b?n nói Ti?ng Vi?t, có các d?ch v? h? tr? ngôn ng? mi?n phí dành cho b?n. G?i s? [phone].        [facility] Sign[facility]Up     Activating your [facility] account is as easy as 1-2-3!     1) Visit [email or web address], select Sign Up Now, enter this activation code and your date of birth, then select Next.  [email or web address]  Expires: 3/25/2017  6:06 PM      2) Create a username and password to use when you visit [facility] in the future and select a security question in case you lose your password and select Next.    3) Enter your e-mail address and click Sign Up!    Additional Information  If you have questions, " please e-mail [email or web address] or call [phone] to talk to our [facility] staff. Remember, [facility] is NOT to be used for urgent needs. For medical emergencies, dial 911.          [facility] complies with applicable Federal civil rights laws and does not discriminate on the basis of race, color, national origin, age, disability, or sex.

## 2017-02-21 NOTE — ANESTHESIA RELEASE NOTE
Anesthesia Release from PACU note     Patient: Celine Sinclair  Procedure(s) Performed: Procedure(s) (LRB):  PHACOEMULSIFICATION-ASPIRATION-CATARACT (Left)  INSERTION-INTRAOCULAR LENS (IOL) (Left)  Anesthesia type: MAC  Post pain: Adequate analgesia  Post assessment: no apparent anesthetic complications, tolerated procedure well and no evidence of recall  Last Vitals:   Vitals:    02/21/17 1230   BP: 131/82   Pulse: 75   Resp: (!) 22   Temp:    SpO2: 100%     Post vital signs: stable  Level of consciousness: awake, alert  and oriented  Nausea/Vomiting: no nausea/no vomiting  Complications: none  Airway Patency: patent  Respiratory: unassisted  Cardiovascular: stable and blood pressure at baseline  Hydration: euvolemic

## 2017-02-21 NOTE — PROGRESS NOTES
Called Dr. Salas for an anesthesia release. Patient stable.  Patient and  given discharge instructions.  All questions answered. Patient and  verbalized understanding of instructions.  Patient tolerated clear liquid diet well. IV discontinued. Patient getting dressed and ready for discharge.

## 2017-02-21 NOTE — PRE-PROCEDURE INSTRUCTIONS
Spoke with Patient.  NPO, medication, and pre-op instructions reviewed.  Denies previous problems with Anesthesia.  Verbalized understanding of instructions.

## 2017-02-21 NOTE — INTERVAL H&P NOTE
The patient has been examined and the H&P has been reviewed:        Anesthesia/Surgery risks, benefits and alternative options discussed and understood by patient/family.          There are no hospital problems to display for this patient.

## 2017-02-21 NOTE — TRANSFER OF CARE
"Anesthesia Transfer of Care Note    Patient: Celine Sinclair    Procedure(s) Performed: Procedure(s) (LRB):  PHACOEMULSIFICATION-ASPIRATION-CATARACT (Left)  INSERTION-INTRAOCULAR LENS (IOL) (Left)    Patient location: PACU    Anesthesia Type: general    Transport from OR: Transported from OR on room air with adequate spontaneous ventilation    Post pain: adequate analgesia    Post assessment: no apparent anesthetic complications    Post vital signs: stable    Level of consciousness: awake, alert and oriented    Nausea/Vomiting: no nausea/vomiting    Complications: none          Last vitals:   Visit Vitals    BP (!) 146/99 (BP Location: Right arm, Patient Position: Lying, BP Method: Automatic)    Pulse 78    Temp 36.9 °C (98.4 °F) (Oral)    Resp 20    Ht 5' 5.5" (1.664 m)    Wt 89.4 kg (197 lb)    SpO2 98%    Breastfeeding No    BMI 32.28 kg/m2     "

## 2017-02-22 ENCOUNTER — OFFICE VISIT (OUTPATIENT)
Dept: OPHTHALMOLOGY | Facility: CLINIC | Age: 67
End: 2017-02-22
Payer: MEDICARE

## 2017-02-22 VITALS — HEART RATE: 56 BPM | SYSTOLIC BLOOD PRESSURE: 134 MMHG | DIASTOLIC BLOOD PRESSURE: 87 MMHG

## 2017-02-22 DIAGNOSIS — Z98.890 POST-OPERATIVE STATE: Primary | ICD-10-CM

## 2017-02-22 PROCEDURE — 99024 POSTOP FOLLOW-UP VISIT: CPT | Mod: S$GLB,,, | Performed by: OPHTHALMOLOGY

## 2017-02-22 PROCEDURE — 99999 PR PBB SHADOW E&M-EST. PATIENT-LVL III: CPT | Mod: PBBFAC,,, | Performed by: OPHTHALMOLOGY

## 2017-02-22 NOTE — PROGRESS NOTES
Subjective:       Patient ID: Celine Sinclair is a 66 y.o. female.    Chief Complaint: Post-op Evaluation (1 day PO OS. CE IOL 2/21/2017 OS)    HPI  Review of Systems    Objective:      Physical Exam    Assessment:       1. Post-operative state        Plan:       S/p CE OS- Doing well.           CPM OS.  RTC 1 wk.

## 2017-02-22 NOTE — OP NOTE
DATE OF PROCEDURE:  02/21/2017    SURGEON:  Saul Christianson M.D.    PREOPERATIVE DIAGNOSIS:  Mixed senile cataract, left eye.    POSTOPERATIVE DIAGNOSIS:  Mixed senile cataract, left eye.    PROCEDURE:  Clear corneal phacoemulsification with posterior chamber intraocular   lens implant, left eye.    ANESTHESIA:  Monitored anesthesia care with 2% Xylocaine jelly topically, 1%   free lidocaine topically and intrachamberly.    PROCEDURE IN DETAIL:  After the appropriate preoperative consent was obtained,   the patient had the 2% Xylocaine jelly applied to the cornea.  The patient was   then brought to the operating room and placed into the supine position.  The   left periorbit was then prepped and draped in the usual sterile ophthalmic   fashion.  A lid speculum was then inserted into the left eye.  Several drops of   the 1% lidocaine were placed onto the left cornea.  The 1% lidocaine was also   applied to the perilimbal region with the Weck-claire sponge.  Using the 0.12-mm   forceps and the Super Sharp blade, a paracentesis site was made at the 6 o'clock   position.  Approximately 0.5 mL of the 1% lidocaine was injected into the   anterior chamber.  Next, Viscoat was injected into the anterior chamber through   the paracentesis site.  The 2.75-mm keratome and the cyclodialysis spatula were   used to create a 2.75-mm clear corneal temporal groove.  The cystitomy needle   and Utrata forceps were then used to create a continuous tear 360-degree   capsulorrhexis.  BSS in a cannula was then used for hydrodissection.    Phacoemulsification then proceeded in a stop-and-chop fashion without any   complications.  Another 0.5 mL of the 1% lidocaine was injected into the   anterior chamber.  The curved tip irrigation aspiration handpiece was then used   to remove the residual cortical material from the capsular bag.  Again, the 1%   lidocaine was applied to the perilimbal region with the Weck-claire sponge.  Parmjit MARAVILLA was  then injected into the anterior chamber and capsular bag.  An [employer] intraocular lens model SN60WF, 20.5 diopters in power and serial   #[id number] was injected into the capsular bag with the lens injector.  The   Sinskey hook was used to position the lens into its proper place.  The residual   viscoelastic material was removed from the anterior chamber and capsular bag   with the curved tip irrigation aspiration handpiece.  Both wounds were hydrated   with BSS on a cannula.  Both wounds were checked and found to be watertight.    The lid speculum was then removed from the left eye.  The patient then had 1   drop of Vigamox ophthalmic drop and 1 drop of Econopred +1% ophthalmic drop   instilled onto the left cornea.  The eye was then shielded.  The patient   tolerated the procedure well and was then brought to the recovery room in good   condition.      MAGDALENA  dd: 02/21/2017 16:52:51 (CST)  td: 02/21/2017 22:54:46 (CST)  Doc ID   #[id number]  Job ID #[id number]    CC:

## 2017-02-22 NOTE — MR AVS SNAPSHOT
[place] [facility]   [facility]  [place] [address]  Phone: [phone]  Fax: [phone]                  Celine Sinclair   2017 3:00 PM   Office Visit    Description:  Female : 1950   Provider:  Saul Christianson MD   Department:  [place] [facility]           Reason for Visit     Post-op Evaluation           Diagnoses this Visit        Comments    Post-operative state    -  Primary            To Do List           Future Appointments        Provider Department Dept Phone    3/1/2017 9:00 AM Saul Christianson MD [place] [facility] [phone]    3/8/2017 3:00 PM Saul Christianson MD [place] [facility] [phone]    3/15/2017 1:00 PM Saul Christianson MD [place] [facility] [phone]    2017 9:30 AM Saul Christianson MD [place] [facility] [phone]      Your Future Surgeries/Procedures     Mar 07, 2017   Surgery with Saul Christianson MD   [facility] ([facility])    [address] [place] [address] [phone]              Goals (5 Years of Data)     None      Follow-Up and Disposition     Return in about 1 week (around 3/1/2017) for Post-op Left eye.      [facility]     [facility] Nurse Care Line - 24/ Assistance  Registered nurses in the [facility] provide clinical advisement, health education, appointment booking, and other advisory services.  Call for this free service at [phone].             Medications           Message regarding Medications     Verify the changes and/or additions to your medication regime listed below are the same as discussed with your clinician today.  If any of these changes or additions are incorrect, please notify your healthcare provider.             Verify that the below list of medications is an accurate representation of the medications you are currently taking.  If none reported, the list may  be blank. If incorrect, please contact your healthcare provider. Carry this list with you in case of emergency.           Current Medications     ALBUTEROL SULFATE (VENTOLIN INHL) Inhale 1 puff into the lungs every 4 to 6 hours as needed.     amitriptyline (ELAVIL) 75 MG tablet Take 75 mg by mouth nightly.     dicyclomine (BENTYL) 10 MG capsule Take 10 mg by mouth 2 (two) times daily.     difluprednate (DUREZOL) 0.05 % Drop ophthalmic solution Place 1 drop into the left eye 4 (four) times daily. For 30 days    diphenoxylate-atropine 2.5-0.025 mg (LOMOTIL) 2.5-0.025 mg per tablet Take 1 tablet by mouth 4 (four) times daily as needed for Diarrhea.     folic acid (FOLVITE) 1 MG tablet Take 1 mg by mouth every morning.     gabapentin (NEURONTIN) 800 MG tablet Take 800 mg by mouth 3 (three) times daily.    hydroxychloroquine (PLAQUENIL) 200 mg tablet Take 200 mg by mouth 2 (two) times daily.     ibuprofen (ADVIL,MOTRIN) 800 MG tablet Take 800 mg by mouth 2 (two) times daily as needed.     ILEVRO 0.3 % DrpS Place 1 drop into both eyes once daily. For 30 days    losartan (COZAAR) 50 MG tablet Take 50 mg by mouth every morning.    methotrexate sodium 2.5 mg DsPk Take 2.5 mg by mouth every morning.     ofloxacin (OCUFLOX) 0.3 % ophthalmic solution Place 1 drop into the left eye 4 (four) times daily.    omeprazole (PRILOSEC) 40 MG capsule Take 40 mg by mouth every morning.     potassium chloride (MICRO-K) 10 MEQ CpSR Take 10 mEq by mouth every morning.     venlafaxine (EFFEXOR) 75 MG tablet Take 75 mg by mouth 2 (two) times daily.           Clinical Reference Information           Your Vitals Were     BP Pulse                134/87 (BP Location: Right arm, Patient Position: Sitting, BP Method: Automatic) 56          Blood Pressure          Most Recent Value    BP  134/87      Allergies as of 2/22/2017     Sandostatin [Octreotide Acetate]    Codeine      Immunizations Administered on Date of Encounter - 2/22/2017     None       [facility] Sign-Up     Activating your [facility] account is as easy as 1-2-3!     1) Visit [email or web address], select Sign Up Now, enter this activation code and your date of birth, then select Next.  [email or web address]  Expires: 3/25/2017  6:06 PM      2) Create a username and password to use when you visit [facility] in the future and select a security question in case you lose your password and select Next.    3) Enter your e-mail address and click Sign Up!    Additional Information  If you have questions, please e-mail [email or web address] or call [phone] to talk to our [facility] staff. Remember, [facility] is NOT to be used for urgent needs. For medical emergencies, dial 911.         Language Assistance Services     ATTENTION: Language assistance services are available, free of charge. Please call [phone].      ATENCIÓN: Si habla joni, tiene a fermin disposición servicios gratuitos de asistencia lingüística. Llame al [phone].     JAMAL Ý: N?u b?n nói Ti?ng Vi?t, có các d?ch v? h? tr? ngôn ng? mi?n phí dành cho b?n. G?i s? [phone].         [facility] complies with applicable Federal civil rights laws and does not discriminate on the basis of race, color, national origin, age, disability, or sex.

## 2017-02-25 NOTE — H&P
[facility]  History & Physical    Subjective:      Chief Complaint/Reason for Admission:     Celine Sinclair is a 66 y.o. female.    Past Medical History:   Diagnosis Date    Aortic atherosclerosis     Benign essential hypertension     Cataract     Senile    CHF (congestive heart failure)     Constipation     COPD (chronic obstructive pulmonary disease)     Crohn's colitis     Depression, major, recurrent, moderate     Fibromyalgia     GERD (gastroesophageal reflux disease)     Hypertensive cardiomyopathy     IBS (irritable bowel syndrome)     Migraine     Opioid abuse, in remission     Osteopenia     Paget disease of bone     Port-a-cath in place     right chest    Prolonged QT interval     RA (rheumatoid arthritis)     Respiratory infection     Lower    Sedative hypnotic or anxiolytic dependence     in remission     UTI (urinary tract infection)      Past Surgical History:   Procedure Laterality Date    CATARACT EXTRACTION W/  INTRAOCULAR LENS IMPLANT Left 02/21/2017    Dr. Christianson    CHOLECYSTECTOMY      COLONOSCOPY N/A 3/16/2016    Procedure: COLONOSCOPY;  Surgeon: Dale Trejo MD;  Location: [address]);  Service: Endoscopy;  Laterality: N/A;    COLOSTOMY      x3    Colostomy reversal      HEMORRHOID SURGERY      HYSTERECTOMY      NECK SURGERY      SBO      SMALL INTESTINE SURGERY      TONSILLECTOMY       Family History   Problem Relation Age of Onset    Glaucoma Paternal Grandmother     Other Daughter      Diabetic Retinopathy    Retinal detachment Grandchild     Amblyopia Neg Hx     Blindness Neg Hx     Strabismus Neg Hx     Macular degeneration Neg Hx     Cataracts Neg Hx      Social History   Substance Use Topics    Smoking status: Never Smoker    Smokeless tobacco: Not on file    Alcohol use No       No prescriptions prior to admission.     Review of patient's allergies indicates:   Allergen Reactions    Sandostatin [octreotide  acetate] Nausea And Vomiting     Had symptoms when she took Sandostatin with Codeine    Codeine Itching and Nausea And Vomiting     Pill form only, IV ok.,  Had symptoms when she took Codeine with Sandostatin.        Review of Systems   Eyes: Positive for blurred vision.   All other systems reviewed and are negative.      Objective:      Vital Signs (Most Recent)       Vital Signs Range (Last 24H):  BP: ()/()   Arterial Line BP: ()/()     Physical Exam   Constitutional: She is oriented to person, place, and time. She appears well-developed and well-nourished.   HENT:   Head: Normocephalic.   Eyes: Conjunctivae and EOM are normal. Pupils are equal, round, and reactive to light.   Neck: Normal range of motion. Neck supple.   Cardiovascular: Normal rate, regular rhythm and normal heart sounds.    Pulmonary/Chest: Effort normal and breath sounds normal.   Abdominal: Soft. Bowel sounds are normal.   Musculoskeletal: Normal range of motion.   Neurological: She is alert and oriented to person, place, and time.   Skin: Skin is warm.   Psychiatric: She has a normal mood and affect.       Data Review:    ECG:      Assessment:      Cataract OD.    Plan:    CE OD.

## 2017-03-01 ENCOUNTER — TELEPHONE (OUTPATIENT)
Dept: OPHTHALMOLOGY | Facility: CLINIC | Age: 67
End: 2017-03-01

## 2017-03-01 ENCOUNTER — OFFICE VISIT (OUTPATIENT)
Dept: OPHTHALMOLOGY | Facility: CLINIC | Age: 67
End: 2017-03-01
Payer: MEDICARE

## 2017-03-01 DIAGNOSIS — Z98.890 POST-OPERATIVE STATE: Primary | ICD-10-CM

## 2017-03-01 PROCEDURE — 92136 OPHTHALMIC BIOMETRY: CPT | Mod: 26,RT,S$GLB, | Performed by: OPHTHALMOLOGY

## 2017-03-01 PROCEDURE — 99999 PR PBB SHADOW E&M-EST. PATIENT-LVL II: CPT | Mod: PBBFAC,,, | Performed by: OPHTHALMOLOGY

## 2017-03-01 PROCEDURE — 99024 POSTOP FOLLOW-UP VISIT: CPT | Mod: S$GLB,,, | Performed by: OPHTHALMOLOGY

## 2017-03-01 NOTE — PROGRESS NOTES
Subjective:       Patient ID: Celine Sinclair is a 66 y.o. female.    Chief Complaint: Post-op Evaluation (1 week PO OS)    HPI  Review of Systems    Objective:      Physical Exam    Assessment:       1. Post-operative state    2. PSC (posterior subcapsular cataract), right        Plan:       S/p CE OS-Doing well.     Visually significant cataract OD-Pt. Wants Sx.        Taper gtts OS.  CE OD 3/7/17.

## 2017-03-01 NOTE — MR AVS SNAPSHOT
[place] [facility]   [facility]  [place] [address]  Phone: [phone]  Fax: [phone]                  Celine Sinclair   3/1/2017 9:00 AM   Office Visit    Description:  Female : 1950   Provider:  Saul Christianson MD   Department:  [place] [facility]           Reason for Visit     Post-op Evaluation           Diagnoses this Visit        Comments    Post-operative state    -  Primary     PSC (posterior subcapsular cataract), right                To Do List           Future Appointments        Provider Department Dept Phone    3/8/2017 3:00 PM Saul Christianson MD [place] [facility] [phone]    3/15/2017 1:00 PM Saul Christianson MD [place] [facility] [phone]    2017 9:30 AM Saul Christianson MD [place] [facility] [phone]      Your Future Surgeries/Procedures     Mar 07, 2017   Surgery with Saul Christianson MD   [facility]    [address] [place] [address] [phone]              Goals (5 Years of Data)     None      Follow-Up and Disposition     Return in about 6 days (around 3/7/2017) for CE OD..      [facility]     [facility] Nurse Care Line -  Assistance  Registered nurses in the [facility] provide clinical advisement, health education, appointment booking, and other advisory services.  Call for this free service at [phone].             Medications           Message regarding Medications     Verify the changes and/or additions to your medication regime listed below are the same as discussed with your clinician today.  If any of these changes or additions are incorrect, please notify your healthcare provider.             Verify that the below list of medications is an accurate representation of the medications you are currently taking.  If none reported, the list may be blank. If incorrect, please contact your  healthcare provider. Carry this list with you in case of emergency.           Current Medications     ALBUTEROL SULFATE (VENTOLIN INHL) Inhale 1 puff into the lungs every 4 to 6 hours as needed.     amitriptyline (ELAVIL) 75 MG tablet Take 75 mg by mouth nightly.     dicyclomine (BENTYL) 10 MG capsule Take 10 mg by mouth 2 (two) times daily.     difluprednate (DUREZOL) 0.05 % Drop ophthalmic solution Place 1 drop into the left eye 4 (four) times daily. For 30 days    diphenoxylate-atropine 2.5-0.025 mg (LOMOTIL) 2.5-0.025 mg per tablet Take 1 tablet by mouth 4 (four) times daily as needed for Diarrhea.     folic acid (FOLVITE) 1 MG tablet Take 1 mg by mouth every morning.     gabapentin (NEURONTIN) 800 MG tablet Take 800 mg by mouth 3 (three) times daily.    hydroxychloroquine (PLAQUENIL) 200 mg tablet Take 200 mg by mouth 2 (two) times daily.     ibuprofen (ADVIL,MOTRIN) 800 MG tablet Take 800 mg by mouth 2 (two) times daily as needed.     ILEVRO 0.3 % DrpS Place 1 drop into both eyes once daily. For 30 days    losartan (COZAAR) 50 MG tablet Take 50 mg by mouth every morning.    methotrexate sodium 2.5 mg DsPk Take 2.5 mg by mouth every morning.     omeprazole (PRILOSEC) 40 MG capsule Take 40 mg by mouth every morning.     potassium chloride (MICRO-K) 10 MEQ CpSR Take 10 mEq by mouth every morning.     venlafaxine (EFFEXOR) 75 MG tablet Take 75 mg by mouth 2 (two) times daily.           Clinical Reference Information           Allergies as of 3/1/2017     Sandostatin [Octreotide Acetate]    Codeine      Immunizations Administered on Date of Encounter - 3/1/2017     None      [facility] Sign-Up     Activating your [facility] account is as easy as 1-2-3!     1) Visit [email or web address], select Sign Up Now, enter this activation code and your date of birth, then select Next.  [email or web address]  Expires: 3/25/2017  6:06 PM      2) Create a username and password to use when you visit [facility] in the future and select a  security question in case you lose your password and select Next.    3) Enter your e-mail address and click Sign Up!    Additional Information  If you have questions, please e-mail [email or web address] or call [phone] to talk to our [facility] staff. Remember, [facility] is NOT to be used for urgent needs. For medical emergencies, dial 911.         Language Assistance Services     ATTENTION: Language assistance services are available, free of charge. Please call [phone].      ATENCIÓN: Si habla español, tiene a fermin disposición servicios gratuitos de asistencia lingüística. Llame al [phone].     CHÚ Ý: N?u b?n nói Ti?ng Vi?t, có các d?ch v? h? tr? ngôn ng? mi?n phí dành cho b?n. G?i s? [phone].         [facility] complies with applicable Federal civil rights laws and does not discriminate on the basis of race, color, national origin, age, disability, or sex.

## 2017-03-07 ENCOUNTER — HOSPITAL ENCOUNTER (OUTPATIENT)
Facility: HOSPITAL | Age: 67
Discharge: HOME | End: 2017-03-07
Attending: OPHTHALMOLOGY | Admitting: OPHTHALMOLOGY
Payer: MEDICARE

## 2017-03-07 ENCOUNTER — ANESTHESIA (OUTPATIENT)
Dept: SURGERY | Facility: HOSPITAL | Age: 67
End: 2017-03-07
Payer: MEDICARE

## 2017-03-07 ENCOUNTER — SURGERY (OUTPATIENT)
Age: 67
End: 2017-03-07

## 2017-03-07 ENCOUNTER — ANESTHESIA EVENT (OUTPATIENT)
Dept: SURGERY | Facility: HOSPITAL | Age: 67
End: 2017-03-07
Payer: MEDICARE

## 2017-03-07 VITALS
RESPIRATION RATE: 16 BRPM | TEMPERATURE: 98 F | WEIGHT: 197 LBS | BODY MASS INDEX: 37.19 KG/M2 | OXYGEN SATURATION: 99 % | SYSTOLIC BLOOD PRESSURE: 126 MMHG | HEIGHT: 61 IN | DIASTOLIC BLOOD PRESSURE: 73 MMHG | HEART RATE: 79 BPM

## 2017-03-07 DIAGNOSIS — H25.10 SENILE NUCLEAR SCLEROSIS: ICD-10-CM

## 2017-03-07 PROCEDURE — 66984 XCAPSL CTRC RMVL W/O ECP: CPT | Mod: 79,RT,, | Performed by: OPHTHALMOLOGY

## 2017-03-07 PROCEDURE — D9220A PRA ANESTHESIA: Mod: ANES,,, | Performed by: ANESTHESIOLOGY

## 2017-03-07 PROCEDURE — D9220A PRA ANESTHESIA: Mod: CRNA,,, | Performed by: NURSE ANESTHETIST, CERTIFIED REGISTERED

## 2017-03-07 PROCEDURE — 37000009 HC ANESTHESIA EA ADD 15 MINS: Performed by: OPHTHALMOLOGY

## 2017-03-07 PROCEDURE — 27201423 OPTIME MED/SURG SUP & DEVICES STERILE SUPPLY: Performed by: OPHTHALMOLOGY

## 2017-03-07 PROCEDURE — 37000008 HC ANESTHESIA 1ST 15 MINUTES: Performed by: OPHTHALMOLOGY

## 2017-03-07 PROCEDURE — 25000003 PHARM REV CODE 250

## 2017-03-07 PROCEDURE — C9447 INJ, PHENYLEPHRINE KETOROLAC: HCPCS | Performed by: OPHTHALMOLOGY

## 2017-03-07 PROCEDURE — V2632 POST CHMBR INTRAOCULAR LENS: HCPCS | Performed by: OPHTHALMOLOGY

## 2017-03-07 PROCEDURE — 36000706: Performed by: OPHTHALMOLOGY

## 2017-03-07 PROCEDURE — 25000003 PHARM REV CODE 250: Performed by: ANESTHESIOLOGY

## 2017-03-07 PROCEDURE — 25000003 PHARM REV CODE 250: Performed by: OPHTHALMOLOGY

## 2017-03-07 PROCEDURE — 71000015 HC POSTOP RECOV 1ST HR: Performed by: OPHTHALMOLOGY

## 2017-03-07 PROCEDURE — 63600175 PHARM REV CODE 636 W HCPCS: Performed by: OPHTHALMOLOGY

## 2017-03-07 PROCEDURE — 63600175 PHARM REV CODE 636 W HCPCS: Performed by: NURSE ANESTHETIST, CERTIFIED REGISTERED

## 2017-03-07 PROCEDURE — 36000707: Performed by: OPHTHALMOLOGY

## 2017-03-07 DEVICE — LENS 20.5 ACRYSOF: Type: IMPLANTABLE DEVICE | Site: EYE | Status: FUNCTIONAL

## 2017-03-07 RX ORDER — PREDNISOLONE ACETATE 10 MG/ML
SUSPENSION/ DROPS OPHTHALMIC
Status: DISCONTINUED
Start: 2017-03-07 | End: 2017-03-07 | Stop reason: HOSPADM

## 2017-03-07 RX ORDER — SODIUM CHLORIDE 9 MG/ML
INJECTION, SOLUTION INTRAVENOUS CONTINUOUS
Status: DISCONTINUED | OUTPATIENT
Start: 2017-03-07 | End: 2017-03-07 | Stop reason: HOSPADM

## 2017-03-07 RX ORDER — LIDOCAINE HYDROCHLORIDE 10 MG/ML
INJECTION, SOLUTION EPIDURAL; INFILTRATION; INTRACAUDAL; PERINEURAL
Status: COMPLETED
Start: 2017-03-07 | End: 2017-03-07

## 2017-03-07 RX ORDER — OFLOXACIN 3 MG/ML
1 SOLUTION/ DROPS OPHTHALMIC
Status: COMPLETED | OUTPATIENT
Start: 2017-03-07 | End: 2017-03-07

## 2017-03-07 RX ORDER — ACETAMINOPHEN 325 MG/1
650 TABLET ORAL EVERY 4 HOURS PRN
Status: DISCONTINUED | OUTPATIENT
Start: 2017-03-07 | End: 2017-03-07 | Stop reason: HOSPADM

## 2017-03-07 RX ORDER — FENTANYL CITRATE 50 UG/ML
INJECTION, SOLUTION INTRAMUSCULAR; INTRAVENOUS
Status: DISCONTINUED | OUTPATIENT
Start: 2017-03-07 | End: 2017-03-07

## 2017-03-07 RX ORDER — PREDNISOLONE ACETATE 10 MG/ML
SUSPENSION/ DROPS OPHTHALMIC
Status: DISCONTINUED | OUTPATIENT
Start: 2017-03-07 | End: 2017-03-07 | Stop reason: HOSPADM

## 2017-03-07 RX ORDER — LIDOCAINE HYDROCHLORIDE 10 MG/ML
1 INJECTION, SOLUTION EPIDURAL; INFILTRATION; INTRACAUDAL; PERINEURAL ONCE
Status: COMPLETED | OUTPATIENT
Start: 2017-03-07 | End: 2017-03-07

## 2017-03-07 RX ORDER — MIDAZOLAM HYDROCHLORIDE 1 MG/ML
INJECTION, SOLUTION INTRAMUSCULAR; INTRAVENOUS
Status: DISCONTINUED | OUTPATIENT
Start: 2017-03-07 | End: 2017-03-07

## 2017-03-07 RX ORDER — CYCLOPENTOLATE HYDROCHLORIDE 10 MG/ML
1 SOLUTION/ DROPS OPHTHALMIC
Status: COMPLETED | OUTPATIENT
Start: 2017-03-07 | End: 2017-03-07

## 2017-03-07 RX ORDER — PHENYLEPHRINE HYDROCHLORIDE 25 MG/ML
1 SOLUTION/ DROPS OPHTHALMIC
Status: DISCONTINUED | OUTPATIENT
Start: 2017-03-07 | End: 2017-03-07 | Stop reason: HOSPADM

## 2017-03-07 RX ORDER — PROPARACAINE HYDROCHLORIDE 5 MG/ML
1 SOLUTION/ DROPS OPHTHALMIC
Status: DISCONTINUED | OUTPATIENT
Start: 2017-03-07 | End: 2017-03-07 | Stop reason: HOSPADM

## 2017-03-07 RX ORDER — OFLOXACIN 3 MG/ML
SOLUTION/ DROPS OPHTHALMIC
Status: DISCONTINUED | OUTPATIENT
Start: 2017-03-07 | End: 2017-03-07 | Stop reason: HOSPADM

## 2017-03-07 RX ORDER — LIDOCAINE HYDROCHLORIDE 40 MG/ML
INJECTION, SOLUTION RETROBULBAR
Status: DISCONTINUED
Start: 2017-03-07 | End: 2017-03-07 | Stop reason: HOSPADM

## 2017-03-07 RX ORDER — HYDROCODONE BITARTRATE AND ACETAMINOPHEN 5; 325 MG/1; MG/1
TABLET ORAL
Status: DISCONTINUED
Start: 2017-03-07 | End: 2017-03-07 | Stop reason: HOSPADM

## 2017-03-07 RX ORDER — LIDOCAINE HYDROCHLORIDE 40 MG/ML
INJECTION, SOLUTION RETROBULBAR
Status: DISCONTINUED | OUTPATIENT
Start: 2017-03-07 | End: 2017-03-07 | Stop reason: HOSPADM

## 2017-03-07 RX ORDER — HYDROCODONE BITARTRATE AND ACETAMINOPHEN 5; 325 MG/1; MG/1
1 TABLET ORAL EVERY 4 HOURS PRN
Status: DISCONTINUED | OUTPATIENT
Start: 2017-03-07 | End: 2017-03-07 | Stop reason: HOSPADM

## 2017-03-07 RX ORDER — TROPICAMIDE 10 MG/ML
1 SOLUTION/ DROPS OPHTHALMIC
Status: DISCONTINUED | OUTPATIENT
Start: 2017-03-07 | End: 2017-03-07 | Stop reason: HOSPADM

## 2017-03-07 RX ADMIN — TROPICAMIDE 1 DROP: 10 SOLUTION/ DROPS OPHTHALMIC at 06:03

## 2017-03-07 RX ADMIN — LIDOCAINE HYDROCHLORIDE 1 MG: 10 INJECTION, SOLUTION EPIDURAL; INFILTRATION; INTRACAUDAL; PERINEURAL at 06:03

## 2017-03-07 RX ADMIN — OFLOXACIN 1 DROP: 3 SOLUTION/ DROPS OPHTHALMIC at 07:03

## 2017-03-07 RX ADMIN — PREDNISOLONE ACETATE 1 DROP: 10 SUSPENSION/ DROPS OPHTHALMIC at 07:03

## 2017-03-07 RX ADMIN — CYCLOPENTOLATE HYDROCHLORIDE 1 DROP: 10 SOLUTION/ DROPS OPHTHALMIC at 05:03

## 2017-03-07 RX ADMIN — PHENYLEPHRINE HYDROCHLORIDE 1 DROP: 2.5 SOLUTION/ DROPS OPHTHALMIC at 05:03

## 2017-03-07 RX ADMIN — PHENYLEPHRINE HYDROCHLORIDE 1 DROP: 2.5 SOLUTION/ DROPS OPHTHALMIC at 06:03

## 2017-03-07 RX ADMIN — OFLOXACIN 1 DROP: 3 SOLUTION OPHTHALMIC at 05:03

## 2017-03-07 RX ADMIN — SODIUM CHONDROITIN SULFATE / SODIUM HYALURONATE 1.05 ML: 0.55-0.5 INJECTION INTRAOCULAR at 07:03

## 2017-03-07 RX ADMIN — MIDAZOLAM HYDROCHLORIDE 2 MG: 1 INJECTION, SOLUTION INTRAMUSCULAR; INTRAVENOUS at 06:03

## 2017-03-07 RX ADMIN — CYCLOPENTOLATE HYDROCHLORIDE 1 DROP: 10 SOLUTION/ DROPS OPHTHALMIC at 06:03

## 2017-03-07 RX ADMIN — HYDROCODONE BITARTRATE AND ACETAMINOPHEN 1 TABLET: 5; 325 TABLET ORAL at 07:03

## 2017-03-07 RX ADMIN — TROPICAMIDE 1 DROP: 10 SOLUTION/ DROPS OPHTHALMIC at 05:03

## 2017-03-07 RX ADMIN — PHENYLEPHRINE AND KETOROLAC 1 EACH: 10.16; 2.88 INJECTION, SOLUTION, CONCENTRATE INTRAOCULAR at 07:03

## 2017-03-07 RX ADMIN — LIDOCAINE HYDROCHLORIDE 2 ML: 40 INJECTION, SOLUTION RETROBULBAR; TOPICAL at 07:03

## 2017-03-07 RX ADMIN — OFLOXACIN 1 DROP: 3 SOLUTION OPHTHALMIC at 06:03

## 2017-03-07 RX ADMIN — FENTANYL CITRATE 50 MCG: 50 INJECTION, SOLUTION INTRAMUSCULAR; INTRAVENOUS at 07:03

## 2017-03-07 RX ADMIN — SODIUM CHLORIDE: 0.9 INJECTION, SOLUTION INTRAVENOUS at 06:03

## 2017-03-07 RX ADMIN — PROPARACAINE HYDROCHLORIDE 1 DROP: 5 SOLUTION/ DROPS OPHTHALMIC at 05:03

## 2017-03-07 NOTE — ANESTHESIA PREPROCEDURE EVALUATION
03/07/2017  Celine Sinclair is a 66 y.o., female.    [facility] Anesthesia Evaluation    I have reviewed the Patient Summary Reports.    I have reviewed the Nursing Notes.   I have reviewed the Medications.     Review of Systems  Anesthesia Hx:  No problems with previous Anesthesia  History of prior surgery of interest to airway management or planning: Previous anesthesia: General Denies Family Hx of Anesthesia complications.   Denies Personal Hx of Anesthesia complications.   Social:  Non-Smoker, No Alcohol Use    Cardiovascular:   Exercise tolerance: good Hypertension CHF    Pulmonary:   COPD    Hepatic/GI:   GERD    Musculoskeletal:   Paget's disease, RA   Neurological:   Headaches    Psych:   depression          Physical Exam  General:  Well nourished    Airway/Jaw/Neck:  Airway Findings: Mouth Opening: Normal Tongue: Normal  General Airway Assessment: Adult  Mallampati: II  Improves to II with phonation.  TM Distance: Normal, at least 6 cm  Jaw/Neck Findings:  Neck ROM: Normal ROM      Dental:  Dental Findings: Upper Dentures   Chest/Lungs:  Chest/Lungs Findings: Clear to auscultation, Normal Respiratory Rate     Heart/Vascular:  Heart Findings: Rate: Normal  Rhythm: Regular Rhythm  Sounds: Normal        Mental Status:  Mental Status Findings:  Cooperative, Alert and Oriented         Anesthesia Plan  Type of Anesthesia, risks & benefits discussed:  Anesthesia Type:  general, MAC  Patient's Preference:   Intra-op Monitoring Plan: standard ASA monitors  Intra-op Monitoring Plan Comments:   Post Op Pain Control Plan:   Post Op Pain Control Plan Comments:   Induction:   IV  Beta Blocker:  Patient is not currently on a Beta-Blocker (No further documentation required).       Informed Consent: Patient understands risks and agrees with Anesthesia plan.  Questions answered. Anesthesia consent signed with patient.  ASA  Score: 3     Day of Surgery Review of History & Physical:    H&P update referred to the surgeon.         Ready For Surgery From Anesthesia Perspective.

## 2017-03-07 NOTE — TRANSFER OF CARE
"Anesthesia Transfer of Care Note    Patient: Celine Sinclair    Procedure(s) Performed: Procedure(s) (LRB):  PHACOEMULSIFICATION-ASPIRATION-CATARACT (Right)  INSERTION-INTRAOCULAR LENS (IOL) (Right)    Patient location: PACU    Anesthesia Type: MAC    Transport from OR: Transported from OR on room air with adequate spontaneous ventilation    Post pain: adequate analgesia    Post assessment: no apparent anesthetic complications    Post vital signs: stable    Level of consciousness: awake and alert    Nausea/Vomiting: no nausea/vomiting    Complications: none          Last vitals:   Visit Vitals    /64    Pulse 81    Temp 36.4 °C (97.5 °F) (Skin)    Resp 20    Ht 5' 0.5" (1.537 m)    Wt 89.4 kg (197 lb)    SpO2 100%    Breastfeeding No    BMI 37.84 kg/m2     "

## 2017-03-07 NOTE — IP AVS SNAPSHOT
[facility]  [address]  [facility] [address]  Phone: [phone]           Patient Discharge Instructions     Our goal is to set you up for success. This packet includes information on your condition, medications, and your home care. It will help you to care for yourself so you don't get sicker and need to go back to the hospital.     Please ask your nurse if you have any questions.        There are many details to remember when preparing to leave the hospital. Here is what you will need to do:    1. Take your medicine. If you are prescribed medications, review your Medication List in the following pages. You may have new medications to  at the pharmacy and others that you'll need to stop taking. Review the instructions for how and when to take your medications. Talk with your doctor or nurses if you are unsure of what to do.     2. Go to your follow-up appointments. Specific follow-up information is listed in the following pages. Your may be contacted by a transition nurse or clinical provider about future appointments. Be sure we have all of the phone numbers to reach you, if needed. Please contact your provider's office if you are unable to make an appointment.     3. Watch for warning signs. Your doctor or nurse will give you detailed warning signs to watch for and when to call for assistance. These instructions may also include educational information about your condition. If you experience any of warning signs to your health, call your doctor.               [facility]  Unless otherwise directed by your provider, please contact [facility], our nurse care line that is available for 24/7 assistance.     [phone] (toll-free)    Registered nurses in the [facility] provide clinical advisement, health education, appointment booking, and other advisory services.                    ** Verify the list of medication(s) below is accurate and up  to date. Carry this with you in case of emergency. If your medications have changed, please notify your healthcare provider.             Medication List      CONTINUE taking these medications        Additional Info                      amitriptyline 75 MG tablet   Commonly known as:  ELAVIL   Refills:  0   Dose:  75 mg    Instructions:  Take 75 mg by mouth nightly.     Begin Date    AM    Noon    PM    Bedtime       dicyclomine 10 MG capsule   Commonly known as:  BENTYL   Refills:  0   Dose:  10 mg    Instructions:  Take 10 mg by mouth 2 (two) times daily.     Begin Date    AM    Noon    PM    Bedtime       difluprednate 0.05 % Drop ophthalmic solution   Commonly known as:  DUREZOL   Quantity:  5 mL   Refills:  1   Dose:  1 drop    Instructions:  Place 1 drop into the left eye 4 (four) times daily. For 30 days     Begin Date    AM    Noon    PM    Bedtime       diphenoxylate-atropine 2.5-0.025 mg 2.5-0.025 mg per tablet   Commonly known as:  LOMOTIL   Refills:  0   Dose:  1 tablet    Instructions:  Take 1 tablet by mouth 4 (four) times daily as needed for Diarrhea.     Begin Date    AM    Noon    PM    Bedtime       folic acid 1 MG tablet   Commonly known as:  FOLVITE   Refills:  0   Dose:  1 mg    Instructions:  Take 1 mg by mouth every morning.     Begin Date    AM    Noon    PM    Bedtime       gabapentin 800 MG tablet   Commonly known as:  NEURONTIN   Refills:  0   Dose:  800 mg    Instructions:  Take 800 mg by mouth 3 (three) times daily.     Begin Date    AM    Noon    PM    Bedtime       hydroxychloroquine 200 mg tablet   Commonly known as:  PLAQUENIL   Refills:  0   Dose:  200 mg    Instructions:  Take 200 mg by mouth 2 (two) times daily.     Begin Date    AM    Noon    PM    Bedtime       ibuprofen 800 MG tablet   Commonly known as:  ADVIL,MOTRIN   Refills:  2   Dose:  800 mg    Instructions:  Take 800 mg by mouth 2 (two) times daily as needed.     Begin Date    AM    Noon    PM    Bedtime       ILEVRO 0.3  % Drps   Quantity:  3 mL   Refills:  1   Dose:  1 drop   Generic drug:  nepafenac    Instructions:  Place 1 drop into both eyes once daily. For 30 days     Begin Date    AM    Noon    PM    Bedtime       losartan 50 MG tablet   Commonly known as:  COZAAR   Refills:  0   Dose:  50 mg    Instructions:  Take 50 mg by mouth every morning.     Begin Date    AM    Noon    PM    Bedtime       methotrexate sodium 2.5 mg Dspk   Refills:  0   Dose:  2.5 mg    Instructions:  Take 2.5 mg by mouth every morning.     Begin Date    AM    Noon    PM    Bedtime       omeprazole 40 MG capsule   Commonly known as:  PRILOSEC   Refills:  0   Dose:  40 mg    Instructions:  Take 40 mg by mouth every morning.     Begin Date    AM    Noon    PM    Bedtime       potassium chloride 10 MEQ Cpsr   Commonly known as:  MICRO-K   Refills:  0   Dose:  10 mEq    Instructions:  Take 10 mEq by mouth every morning.     Begin Date    AM    Noon    PM    Bedtime       venlafaxine 75 MG tablet   Commonly known as:  EFFEXOR   Refills:  0   Dose:  75 mg    Instructions:  Take 75 mg by mouth 2 (two) times daily.     Begin Date    AM    Noon    PM    Bedtime       VENTOLIN INHL   Refills:  0   Dose:  1 puff    Instructions:  Inhale 1 puff into the lungs every 4 to 6 hours as needed.     Begin Date    AM    Noon    PM    Bedtime                  Please bring to all follow up appointments:    1. A copy of your discharge instructions.  2. All medicines you are currently taking in their original bottles.  3. Identification and insurance card.    Please arrive 15 minutes ahead of scheduled appointment time.    Please call 24 hours in advance if you must reschedule your appointment and/or time.        Your Scheduled Appointments     Mar 08, 2017  3:00 PM CST   Post OP with Saul Christianson MD   [place] [facility]    [address] [facility]  [place] [address]   [phone]            Mar 15, 2017  1:00 PM CDT   Post OP with Saul  CALVIN Christianson MD   [place] [facility]    [address] [facility]  [place] [address]   [phone]            Apr 05, 2017  9:30 AM CDT   Post OP with Saul Christianson MD   [place] [facility]    [address] [facility]  [place] [address]   [phone]                Discharge Instructions     Future Orders    Other restrictions (specify):     Comments:    No heavy lifting or bending for 1 week.        Discharge Instructions         Discharge Instructions for Cataract Surgery  A surgeon removed the cloudy lens in your eye and replaced it with a clear man-made lens. Be sure to have an adult family member or friend drive you home after surgery. Heres what you can expect following surgery and tips for a healthy recovery.  It is normal to have the following:  · Bruised or bloodshot eye for 7 days  · Itching and mild discomfort for several days  · Some fluid discharge  · Sensitivity to light  · Scratchy, sandlike feeling in the eye for 2 weeks  · Feeling tired, especially during the first 24 hours  Activity level  · Do not drive for 2 days or as instructed by your doctor.  · Do not drink alcohol for at least 24 hours.  · Avoid bending at the waist to  objects or lifting anything heavy for 2 days.  · Relax for the first 24 hours after surgery. Watching TV and reading are OK and wont harm your eye.  Eye protection  · Do not rub or press on your eye.  · Sleep on your back or on your unoperated side for 2 nights.  · If instructed, wear a bandage over your eye for 2 days and 2 nights.  · If instructed, wear a shield to protect your eye for 2 days and 2 nights.  Using eyedrops  You may be given special eyedrops or ointment. Here is one way to use eyedrops:  · Tilt your head back.  · Pull your bottom eyelid down.  · Squeeze one drop into your eye. Do not touch your eye with the bottle tip.  · Close your eyes for a few seconds.  · If you need more than one drop,  "wait a few minutes before adding the next one.   Call your doctor right away if you have any of the following:  · Bleeding or discharge from the eye  · Your vision suddenly becomes worse  · Pain medicine you are told to take does not ease your pain  · Nausea or vomiting  · Chills or fever over 100.4°F (39.1°C)   Date Last Reviewed: 5/30/2015  © 7909-6183 [employer]. [address] [place] [address], [place] [address]. All rights reserved. This information is not intended as a substitute for professional medical care. Always follow your healthcare professional's instructions.            Primary Diagnosis     Your primary diagnosis was:  Cataract      Admission Information     Date & Time Provider Department CSN    3/7/2017  5:14 AM Saul Christianson MD [facility] [id number]      Care Providers     Provider Role Specialty Primary office phone    Saul Christianson MD Attending Provider Ophthalmology [phone]    Saul Christianson MD Surgeon  Ophthalmology [phone]      Your Vitals Were     BP Pulse Temp Resp Height Weight    120/64 81 97.5 °F (36.4 °C) (Skin) 20 5' 0.5" (1.537 m) 89.4 kg (197 lb)    SpO2 BMI             100% 37.84 kg/m2         Recent Lab Values     No lab values to display.      Allergies as of 3/7/2017        Reactions    Sandostatin [Octreotide Acetate] Nausea And Vomiting    Had symptoms when she took Sandostatin with Codeine    Codeine Itching, Nausea And Vomiting    Pill form only, IV ok.,  Had symptoms when she took Codeine with Sandostatin.      Advance Directives     An advance directive is a document which, in the event you are no longer able to make decisions for yourself, tells your healthcare team what kind of treatment you do or do not want to receive, or who you would like to make those decisions for you.  If you do not currently have an advance directive, [facility] encourages you to create one.  For more information call:  [phone] " [phone]), [phone] ([phone]),  or log on to [email or web address].        Language Assistance Services     ATTENTION: Language assistance services are available, free of charge. Please call [phone].      ATENCIÓN: Si habla joni, tiene a fermin disposición servicios gratuitos de asistencia lingüística. Llame al [phone].     CHÚ Ý: N?u b?n nói Ti?ng Vi?t, có các d?ch v? h? tr? ngôn ng? mi?n phí dành cho b?n. G?i s? [phone].        [facility] Sign-Up     Activating your [facility] account is as easy as 1-2-3!     1) Visit [email or web address], select Sign Up Now, enter this activation code and your date of birth, then select Next.  [email or web address]  Expires: 3/25/2017  6:06 PM      2) Create a username and password to use when you visit [facility] in the future and select a security question in case you lose your password and select Next.    3) Enter your e-mail address and click Sign Up!    Additional Information  If you have questions, please e-mail [email or web address] or call [phone] to talk to our [facility] staff. Remember, [facility] is NOT to be used for urgent needs. For medical emergencies, dial 911.          [facility] complies with applicable Federal civil rights laws and does not discriminate on the basis of race, color, national origin, age, disability, or sex.

## 2017-03-07 NOTE — INTERVAL H&P NOTE
The patient has been examined and the H&P has been reviewed:        Anesthesia/Surgery risks, benefits and alternative options discussed and understood by patient/family.          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

## 2017-03-07 NOTE — PLAN OF CARE
Discharge instructions reviewed w/ pt and spouse, verbalized understanding. Pt in NADN. Discomfort tolerable at this time. Tolerated liquids w/ no issues. To be d/c'd home w/ family. Eye kit given.

## 2017-03-07 NOTE — BRIEF OP NOTE
Operative Note     SUMMARY     Surgery Date: 3/7/2017    Surgeon(s) and Role:      Saul Christianson MD - Primary    Pre-op Diagnosis:  Nuclear sclerosis     Post-op/ Diagnosis:  Same    Final Diagnosis: Cataract    Procedure(s) (LRB):  PHACOEMULSIFICATION-ASPIRATION-CATARACT   INSERTION-INTRAOCULAR LENS (IOL)     Anesthesia: Monitored Anesthesia Care    Findings/Key Components:  Cataract    Outcome: Tolerated procedure well    Estimated Blood Loss: None         Specimens     None          Follow-up:  Tomorrow in clinic      Discharge Note      SUMMARY     Admit Date: 3/7/2017    Attending Physician: Saul Christianson MD    Discharge Physician: Saul Christianson MD    Discharge Date: 3/7/2017    Final Diagnosis: Cataract    Outcome: Tolerated procedure well    Disposition: Discharge to Home.    Medications:       Celine Sinclair   Home Medication Instructions PAULA:[id number]    Printed on:03/07/17 0746   Medication Information                      ALBUTEROL SULFATE (VENTOLIN INHL)  Inhale 1 puff into the lungs every 4 to 6 hours as needed.              amitriptyline (ELAVIL) 75 MG tablet  Take 75 mg by mouth nightly.              dicyclomine (BENTYL) 10 MG capsule  Take 10 mg by mouth 2 (two) times daily.              difluprednate (DUREZOL) 0.05 % Drop ophthalmic solution  Place 1 drop into the left eye 4 (four) times daily. For 30 days             diphenoxylate-atropine 2.5-0.025 mg (LOMOTIL) 2.5-0.025 mg per tablet  Take 1 tablet by mouth 4 (four) times daily as needed for Diarrhea.              folic acid (FOLVITE) 1 MG tablet  Take 1 mg by mouth every morning.              gabapentin (NEURONTIN) 800 MG tablet  Take 800 mg by mouth 3 (three) times daily.             hydroxychloroquine (PLAQUENIL) 200 mg tablet  Take 200 mg by mouth 2 (two) times daily.              ibuprofen (ADVIL,MOTRIN) 800 MG tablet  Take 800 mg by mouth 2 (two) times daily as needed.              ILEVRO 0.3 % DrpS  Place 1  drop into both eyes once daily. For 30 days             losartan (COZAAR) 50 MG tablet  Take 50 mg by mouth every morning.             methotrexate sodium 2.5 mg DsPk  Take 2.5 mg by mouth every morning.              omeprazole (PRILOSEC) 40 MG capsule  Take 40 mg by mouth every morning.              potassium chloride (MICRO-K) 10 MEQ CpSR  Take 10 mEq by mouth every morning.              venlafaxine (EFFEXOR) 75 MG tablet  Take 75 mg by mouth 2 (two) times daily.                   Patient Discharge Instructions:     Keep Bob Shield over operated eye when not using drops.     DIET:  Regular    Activity: No heavy lifting or bending X 1 week.    Follow-up:  Tomorrow in clinic

## 2017-03-07 NOTE — ANESTHESIA RELEASE NOTE
"Anesthesia Release from PACU Note    Patient: Celine Sinclair    Procedure(s) Performed: Procedure(s) (LRB):  PHACOEMULSIFICATION-ASPIRATION-CATARACT (Right)  INSERTION-INTRAOCULAR LENS (IOL) (Right)    Anesthesia type: MAC    Post pain: Adequate analgesia    Post assessment: no apparent anesthetic complications, tolerated procedure well and no evidence of recall    Last Vitals:   Visit Vitals    /84    Pulse 79    Temp 36.4 °C (97.5 °F) (Skin)    Resp 16    Ht 5' 0.5" (1.537 m)    Wt 89.4 kg (197 lb)    SpO2 98%    Breastfeeding No    BMI 37.84 kg/m2       Post vital signs: stable    Level of consciousness: awake, alert  and oriented    Nausea/Vomiting: no nausea/no vomiting    Complications: none    Airway Patency: patent    Respiratory: unassisted    Cardiovascular: stable and blood pressure at baseline    Hydration: euvolemic  "

## 2017-03-07 NOTE — ANESTHESIA POSTPROCEDURE EVALUATION
"Anesthesia Post Evaluation    Patient: Celine Sinclair    Procedure(s) Performed: Procedure(s) (LRB):  PHACOEMULSIFICATION-ASPIRATION-CATARACT (Right)  INSERTION-INTRAOCULAR LENS (IOL) (Right)    Final Anesthesia Type: MAC  Patient location during evaluation: PACU  Patient participation: Yes- Able to Participate  Level of consciousness: awake and alert  Post-procedure vital signs: reviewed and stable  Pain management: adequate  Airway patency: patent  PONV status at discharge: No PONV  Anesthetic complications: no      Cardiovascular status: blood pressure returned to baseline  Respiratory status: unassisted  Hydration status: euvolemic  Follow-up not needed.        Visit Vitals    /84    Pulse 79    Temp 36.4 °C (97.5 °F) (Skin)    Resp 16    Ht 5' 0.5" (1.537 m)    Wt 89.4 kg (197 lb)    SpO2 98%    Breastfeeding No    BMI 37.84 kg/m2       Pain/Yoana Score: Pain Assessment Performed: Yes (3/7/2017  7:45 AM)  Presence of Pain: denies (3/7/2017  7:45 AM)  Pain Rating Prior to Med Admin: 5 (3/7/2017  7:54 AM)      "

## 2017-03-07 NOTE — DISCHARGE INSTRUCTIONS
Discharge Instructions for Cataract Surgery  A surgeon removed the cloudy lens in your eye and replaced it with a clear man-made lens. Be sure to have an adult family member or friend drive you home after surgery. Heres what you can expect following surgery and tips for a healthy recovery.  It is normal to have the following:  · Bruised or bloodshot eye for 7 days  · Itching and mild discomfort for several days  · Some fluid discharge  · Sensitivity to light  · Scratchy, sandlike feeling in the eye for 2 weeks  · Feeling tired, especially during the first 24 hours  Activity level  · Do not drive for 2 days or as instructed by your doctor.  · Do not drink alcohol for at least 24 hours.  · Avoid bending at the waist to  objects or lifting anything heavy for 2 days.  · Relax for the first 24 hours after surgery. Watching TV and reading are OK and wont harm your eye.  Eye protection  · Do not rub or press on your eye.  · Sleep on your back or on your unoperated side for 2 nights.  · If instructed, wear a bandage over your eye for 2 days and 2 nights.  · If instructed, wear a shield to protect your eye for 2 days and 2 nights.  Using eyedrops  You may be given special eyedrops or ointment. Here is one way to use eyedrops:  · Tilt your head back.  · Pull your bottom eyelid down.  · Squeeze one drop into your eye. Do not touch your eye with the bottle tip.  · Close your eyes for a few seconds.  · If you need more than one drop, wait a few minutes before adding the next one.   Call your doctor right away if you have any of the following:  · Bleeding or discharge from the eye  · Your vision suddenly becomes worse  · Pain medicine you are told to take does not ease your pain  · Nausea or vomiting  · Chills or fever over 100.4°F (39.1°C)   Date Last Reviewed: 5/30/2015  © 4829-2432 [employer]. [address] [place] [address], [place] [address]. All rights reserved. This information is not intended as a  substitute for professional medical care. Always follow your healthcare professional's instructions.

## 2017-03-08 ENCOUNTER — OFFICE VISIT (OUTPATIENT)
Dept: OPHTHALMOLOGY | Facility: CLINIC | Age: 67
End: 2017-03-08
Payer: MEDICARE

## 2017-03-08 VITALS — DIASTOLIC BLOOD PRESSURE: 93 MMHG | SYSTOLIC BLOOD PRESSURE: 138 MMHG | HEART RATE: 86 BPM

## 2017-03-08 DIAGNOSIS — Z98.890 POST-OPERATIVE STATE: Primary | ICD-10-CM

## 2017-03-08 PROCEDURE — 99024 POSTOP FOLLOW-UP VISIT: CPT | Mod: S$GLB,,, | Performed by: OPHTHALMOLOGY

## 2017-03-08 PROCEDURE — 99999 PR PBB SHADOW E&M-EST. PATIENT-LVL III: CPT | Mod: PBBFAC,,, | Performed by: OPHTHALMOLOGY

## 2017-03-08 NOTE — PROGRESS NOTES
Subjective:       Patient ID: Celine Sinclair is a 66 y.o. female.    Chief Complaint: Post-op Evaluation (1 day po od )    HPI  Review of Systems    Objective:      Physical Exam    Assessment:       1. Post-operative state        Plan:       S/p CE OU- Doing well.          CPM OD.  Taper gtts OS.  RTC 1 wk.

## 2017-03-08 NOTE — OP NOTE
DATE OF PROCEDURE:  03/07/2017    SURGEON:  Saul Christianson M.D.    PREOPERATIVE DIAGNOSIS:  Mixed senile cataract, right eye.    POSTOPERATIVE DIAGNOSIS:  Mixed senile cataract, right eye.    PROCEDURE:  Clear corneal phacoemulsification with posterior chamber intraocular   lens implant, right eye.    ANESTHESIA:  Monitored anesthesia care with 2% Xylocaine jelly topically, 1%   free lidocaine topically and intrachamberly.    PROCEDURE IN DETAIL:  After the appropriate preoperative consent was obtained,   the patient had the 2% Xylocaine jelly applied to the cornea.  The patient was   then brought to the operating room and placed into the supine position.  The   right periorbit was then prepped and draped in the usual sterile ophthalmic   fashion.  A lid speculum was then inserted into the right eye.  Several drops of   the 1% lidocaine were placed onto the right cornea.  The 1% lidocaine was also   applied to the perilimbal region with the Weck-claire sponge.  Using the 0.12-mm   forceps and the Super Sharp blade, a paracentesis site was made at the 12   o'clock position.  Approximately 0.5 mL of the 1% lidocaine was injected into   the anterior chamber.  Next, Viscoat was injected into the anterior chamber   through the paracentesis site.  The 2.75-mm keratome and the cyclodialysis   spatula were used to create a 2.75-mm clear corneal temporal groove.  The   cystitomy needle and Utrata forceps were then used to create a continuous tear   360-degree capsulorrhexis.  BSS in a cannula was then used for hydrodissection.    Phacoemulsification then proceeded in a stop-and-chop fashion without any   complications.  Another 0.5 mL of the 1% lidocaine was injected into the   anterior chamber.  The curved tip irrigation aspiration handpiece was then used   to remove the residual cortical material from the capsular bag.  Again, the 1%   lidocaine was applied to the perilimbal region with the Weck-claire sponge.  Parmjit    GV was then injected into the anterior chamber and capsular bag.  An [employer] intraocular lens model SN60WF, 20.5 diopters in power and serial   #[id number] was injected into the capsular bag with the lens injector.  The   Sinskey hook was used to position the lens into its proper place.  The residual   viscoelastic material was removed from the anterior chamber and capsular bag   with the curved tip irrigation aspiration handpiece.  Both wounds were hydrated   with BSS on a cannula.  Both wounds were checked and found to be watertight.    The lid speculum was then removed from the right eye.  The patient then had 1   drop of Vigamox ophthalmic drop and 1 drop of Econopred +1% ophthalmic drop   instilled onto the right cornea.  The eye was then shielded.  The patient   tolerated the procedure well and was then brought to the recovery room in good   condition.      MAGDALENA  dd: 03/07/2017 20:08:33 (CST)  td: 03/07/2017 23:05:04 (CST)  Doc ID   #[id number]  Job ID #[id number]    CC:

## 2017-03-08 NOTE — MR AVS SNAPSHOT
[place] [facility]   [facility]  [place] [address]  Phone: [phone]  Fax: [phone]                  Celine Sinclair   3/8/2017 3:00 PM   Office Visit    Description:  Female : 1950   Provider:  Saul Christianson MD   Department:  [place] [facility]           Reason for Visit     Post-op Evaluation           Diagnoses this Visit        Comments    Post-operative state    -  Primary            To Do List           Future Appointments        Provider Department Dept Phone    3/8/2017 3:00 PM Saul Christianson MD [place] [facility] [phone]    3/15/2017 1:00 PM Saul Christianson MD [place] [facility] [phone]    2017 9:30 AM Saul Christianson MD [place] [facility] [phone]      Goals (5 Years of Data)     None      Follow-Up and Disposition     Return in about 1 week (around 3/15/2017) for Post-op Both eyes.      [facility] On Call     [facility] On Call Nurse [facility] Line - / Assistance  Registered nurses in the [facility] On Call Center provide clinical advisement, health education, appointment booking, and other advisory services.  Call for this free service at [phone].             Medications           Message regarding Medications     Verify the changes and/or additions to your medication regime listed below are the same as discussed with your clinician today.  If any of these changes or additions are incorrect, please notify your healthcare provider.             Verify that the below list of medications is an accurate representation of the medications you are currently taking.  If none reported, the list may be blank. If incorrect, please contact your healthcare provider. Carry this list with you in case of emergency.           Current Medications     ALBUTEROL SULFATE (VENTOLIN INHL) Inhale 1 puff into the lungs every 4 to 6 hours as needed.     amitriptyline (ELAVIL) 75 MG tablet Take 75 mg by mouth nightly.      dicyclomine (BENTYL) 10 MG capsule Take 10 mg by mouth 2 (two) times daily.     difluprednate (DUREZOL) 0.05 % Drop ophthalmic solution Place 1 drop into the left eye 4 (four) times daily. For 30 days    diphenoxylate-atropine 2.5-0.025 mg (LOMOTIL) 2.5-0.025 mg per tablet Take 1 tablet by mouth 4 (four) times daily as needed for Diarrhea.     folic acid (FOLVITE) 1 MG tablet Take 1 mg by mouth every morning.     gabapentin (NEURONTIN) 800 MG tablet Take 800 mg by mouth 3 (three) times daily.    hydroxychloroquine (PLAQUENIL) 200 mg tablet Take 200 mg by mouth 2 (two) times daily.     ibuprofen (ADVIL,MOTRIN) 800 MG tablet Take 800 mg by mouth 2 (two) times daily as needed.     ILEVRO 0.3 % DrpS Place 1 drop into both eyes once daily. For 30 days    losartan (COZAAR) 50 MG tablet Take 50 mg by mouth every morning.    methotrexate sodium 2.5 mg DsPk Take 2.5 mg by mouth every morning.     omeprazole (PRILOSEC) 40 MG capsule Take 40 mg by mouth every morning.     potassium chloride (MICRO-K) 10 MEQ CpSR Take 10 mEq by mouth every morning.     venlafaxine (EFFEXOR) 75 MG tablet Take 75 mg by mouth 2 (two) times daily.           Clinical Reference Information           Your Vitals Were     BP Pulse                138/93 (BP Location: Right arm, Patient Position: Sitting, BP Method: Automatic) 86          Blood Pressure          Most Recent Value    BP  (!)  138/93      Allergies as of 3/8/2017     Sandostatin [Octreotide Acetate]    Codeine      Immunizations Administered on Date of Encounter - 3/8/2017     None      [facility] Sign-Up     Activating your [facility] account is as easy as 1-2-3!     1) Visit [email or web address], select Sign Up Now, enter this activation code and your date of birth, then select Next.  [email or web address]  Expires: 3/25/2017  6:06 PM      2) Create a username and password to use when you visit [facility] in the future and select a security question in case you lose your password and select  Next.    3) Enter your e-mail address and click Sign Up!    Additional Information  If you have questions, please e-mail [email or web address] or call [phone] to talk to our [facility] staff. Remember, [facility] is NOT to be used for urgent needs. For medical emergencies, dial 911.         Language Assistance Services     ATTENTION: Language assistance services are available, free of charge. Please call [phone].      ATENCIÓN: Si habla joni, tiene a fermin disposición servicios gratuitos de asistencia lingüística. Llame al [phone].     CHÚ Ý: N?u b?n nói Ti?ng Vi?t, có các d?ch v? h? tr? ngôn ng? mi?n phí dành cho b?n. G?i s? [phone].         [place] [facility] complies with applicable Federal civil rights laws and does not discriminate on the basis of race, color, national origin, age, disability, or sex.

## 2017-03-15 ENCOUNTER — OFFICE VISIT (OUTPATIENT)
Dept: OPHTHALMOLOGY | Facility: CLINIC | Age: 67
End: 2017-03-15
Payer: MEDICARE

## 2017-03-15 DIAGNOSIS — Z98.890 POST-OPERATIVE STATE: Primary | ICD-10-CM

## 2017-03-15 PROCEDURE — 99024 POSTOP FOLLOW-UP VISIT: CPT | Mod: S$GLB,,, | Performed by: OPHTHALMOLOGY

## 2017-03-15 PROCEDURE — 99999 PR PBB SHADOW E&M-EST. PATIENT-LVL II: CPT | Mod: PBBFAC,,, | Performed by: OPHTHALMOLOGY

## 2017-03-15 NOTE — MR AVS SNAPSHOT
[place] [facility]   [facility]  [place] [address]  Phone: [phone]  Fax: [phone]                  Celine Sinclair   3/15/2017 1:00 PM   Office Visit    Description:  Female : 1950   Provider:  Saul Christianson MD   Department:  [place] [facility]           Reason for Visit     Post-op Evaluation           Diagnoses this Visit        Comments    Post-operative state    -  Primary            To Do List           Future Appointments        Provider Department Dept Phone    2017 9:30 AM Saul Christianson MD [place] [facility] [phone]      Goals (5 Years of Data)     None      Follow-Up and Disposition     Return in about 3 weeks (around 2017) for Post-op Both eyes.      [facility] On Call     [facility] On Call Nurse [facility] Line -  Assistance  Registered nurses in the [facility] On Call Center provide clinical advisement, health education, appointment booking, and other advisory services.  Call for this free service at [phone].             Medications           Message regarding Medications     Verify the changes and/or additions to your medication regime listed below are the same as discussed with your clinician today.  If any of these changes or additions are incorrect, please notify your healthcare provider.             Verify that the below list of medications is an accurate representation of the medications you are currently taking.  If none reported, the list may be blank. If incorrect, please contact your healthcare provider. Carry this list with you in case of emergency.           Current Medications     ALBUTEROL SULFATE (VENTOLIN INHL) Inhale 1 puff into the lungs every 4 to 6 hours as needed.     amitriptyline (ELAVIL) 75 MG tablet Take 75 mg by mouth nightly.     dicyclomine (BENTYL) 10 MG capsule Take 10 mg by mouth 2 (two) times daily.     difluprednate (DUREZOL) 0.05 % Drop ophthalmic solution Place 1 drop into the left  eye 4 (four) times daily. For 30 days    diphenoxylate-atropine 2.5-0.025 mg (LOMOTIL) 2.5-0.025 mg per tablet Take 1 tablet by mouth 4 (four) times daily as needed for Diarrhea.     folic acid (FOLVITE) 1 MG tablet Take 1 mg by mouth every morning.     gabapentin (NEURONTIN) 800 MG tablet Take 800 mg by mouth 3 (three) times daily.    hydroxychloroquine (PLAQUENIL) 200 mg tablet Take 200 mg by mouth 2 (two) times daily.     ibuprofen (ADVIL,MOTRIN) 800 MG tablet Take 800 mg by mouth 2 (two) times daily as needed.     ILEVRO 0.3 % DrpS Place 1 drop into both eyes once daily. For 30 days    losartan (COZAAR) 50 MG tablet Take 50 mg by mouth every morning.    methotrexate sodium 2.5 mg DsPk Take 2.5 mg by mouth every morning.     omeprazole (PRILOSEC) 40 MG capsule Take 40 mg by mouth every morning.     potassium chloride (MICRO-K) 10 MEQ CpSR Take 10 mEq by mouth every morning.     venlafaxine (EFFEXOR) 75 MG tablet Take 75 mg by mouth 2 (two) times daily.           Clinical Reference Information           Allergies as of 3/15/2017     Sandostatin [Octreotide Acetate]    Codeine      Immunizations Administered on Date of Encounter - 3/15/2017     None      [facility] Sign-Up     Activating your [facility] account is as easy as 1-2-3!     1) Visit [email or web address], select Sign Up Now, enter this activation code and your date of birth, then select Next.  [email or web address]  Expires: 3/25/2017  7:06 PM      2) Create a username and password to use when you visit [facility] in the future and select a security question in case you lose your password and select Next.    3) Enter your e-mail address and click Sign Up!    Additional Information  If you have questions, please e-mail [email or web address] or call [phone] to talk to our [facility] staff. Remember, [facility] is NOT to be used for urgent needs. For medical emergencies, dial 911.         Language Assistance Services     ATTENTION: Language assistance services are  available, free of charge. Please call [phone].      ATENCIÓN: Si habla joni, tiene a fermin disposición servicios gratuitos de asistencia lingüística. Llame al [phone].     CHÚ Ý: N?u b?n nói Ti?ng Vi?t, có các d?ch v? h? tr? ngôn ng? mi?n phí dành cho b?n. G?i s? [phone].         [place] [facility] complies with applicable Federal civil rights laws and does not discriminate on the basis of race, color, national origin, age, disability, or sex.

## 2017-03-15 NOTE — PROGRESS NOTES
Subjective:       Patient ID: Celine Sinclair is a 66 y.o. female.    Chief Complaint: Post-op Evaluation (1 week po od)    HPI  Review of Systems    Objective:      Physical Exam    Assessment:       1. Post-operative state        Plan:       S/p CE OU- Doing well.          Taper gtts OU.  AT's.  RTC 3 wks.

## 2017-05-08 DIAGNOSIS — A88.1 EPIDEMIC VERTIGO: Primary | ICD-10-CM

## 2017-06-14 DIAGNOSIS — R42 DIZZINESS AND GIDDINESS: Primary | ICD-10-CM

## 2017-06-14 DIAGNOSIS — R06.00 DYSPNEA: Primary | ICD-10-CM

## 2017-06-21 ENCOUNTER — HOSPITAL ENCOUNTER (OUTPATIENT)
Dept: RADIOLOGY | Facility: HOSPITAL | Age: 67
Discharge: HOME | End: 2017-06-21
Attending: INTERNAL MEDICINE
Payer: MEDICARE

## 2017-06-21 DIAGNOSIS — R42 DIZZINESS AND GIDDINESS: ICD-10-CM

## 2017-06-21 DIAGNOSIS — R06.00 DYSPNEA: ICD-10-CM

## 2017-06-21 LAB
CREAT SERPL-MCNC: 0.7 MG/DL (ref 0.5–1.4)
SAMPLE: NORMAL

## 2017-06-21 PROCEDURE — 70470 CT HEAD/BRAIN W/O & W/DYE: CPT | Mod: 26,,, | Performed by: RADIOLOGY

## 2017-06-21 PROCEDURE — 71250 CT THORAX DX C-: CPT | Mod: 26,,, | Performed by: RADIOLOGY

## 2017-06-21 PROCEDURE — 70470 CT HEAD/BRAIN W/O & W/DYE: CPT | Mod: TC

## 2017-06-21 PROCEDURE — 25500020 PHARM REV CODE 255: Performed by: OPHTHALMOLOGY

## 2017-06-21 PROCEDURE — 71250 CT THORAX DX C-: CPT | Mod: TC

## 2017-06-21 RX ADMIN — IOHEXOL 75 ML: 350 INJECTION, SOLUTION INTRAVENOUS at 04:06

## 2017-06-30 ENCOUNTER — TELEPHONE (OUTPATIENT)
Dept: OPHTHALMOLOGY | Facility: CLINIC | Age: 67
End: 2017-06-30

## 2017-07-10 ENCOUNTER — OFFICE VISIT (OUTPATIENT)
Dept: OPHTHALMOLOGY | Facility: CLINIC | Age: 67
End: 2017-07-10
Payer: MEDICARE

## 2017-07-10 DIAGNOSIS — H52.7 REFRACTIVE ERROR: ICD-10-CM

## 2017-07-10 DIAGNOSIS — Z98.890 POST-OPERATIVE STATE: Primary | ICD-10-CM

## 2017-07-10 PROCEDURE — 99999 PR PBB SHADOW E&M-EST. PATIENT-LVL II: CPT | Mod: PBBFAC,,, | Performed by: OPHTHALMOLOGY

## 2017-07-10 PROCEDURE — 99024 POSTOP FOLLOW-UP VISIT: CPT | Mod: S$GLB,,, | Performed by: OPHTHALMOLOGY

## 2017-07-10 NOTE — PROGRESS NOTES
Subjective:       Patient ID: Celine Sinclair is a 66 y.o. female.    Chief Complaint: Post-op Evaluation (3 weeks po ou)    HPI  Review of Systems    Objective:      Physical Exam    Assessment:       1. Post-operative state    2. Refractive error        Plan:       S/p CE OU- Doing well.  RE-Pt wants MRx & SCL eval.      Give MRx.  Refer for SCL eval OU.

## 2017-07-12 ENCOUNTER — OFFICE VISIT (OUTPATIENT)
Dept: OPTOMETRY | Facility: CLINIC | Age: 67
End: 2017-07-12
Payer: MEDICARE

## 2017-07-12 DIAGNOSIS — Z46.0 ENCOUNTER FOR FITTING OR ADJUSTMENT OF SPECTACLES OR CONTACT LENSES: Primary | ICD-10-CM

## 2017-07-12 PROCEDURE — 99499 UNLISTED E&M SERVICE: CPT | Mod: S$GLB,,, | Performed by: OPTOMETRIST

## 2017-07-12 PROCEDURE — 92310 CONTACT LENS FITTING OU: CPT | Mod: ,,, | Performed by: OPTOMETRIST

## 2017-07-12 RX ORDER — TRAZODONE HYDROCHLORIDE 50 MG/1
TABLET ORAL NIGHTLY
COMMUNITY
Start: 2017-06-05 | End: 2019-02-13

## 2017-07-12 RX ORDER — AZITHROMYCIN 250 MG/1
TABLET, FILM COATED ORAL
COMMUNITY
Start: 2017-07-06 | End: 2019-01-09 | Stop reason: ALTCHOICE

## 2017-07-12 RX ORDER — FUROSEMIDE 20 MG/1
40 TABLET ORAL DAILY
Status: ON HOLD | COMMUNITY
Start: 2017-06-06 | End: 2019-02-13 | Stop reason: CLARIF

## 2017-07-12 RX ORDER — METOPROLOL TARTRATE 25 MG/1
TABLET, FILM COATED ORAL 2 TIMES DAILY
Status: ON HOLD | COMMUNITY
Start: 2017-05-03 | End: 2019-02-13 | Stop reason: CLARIF

## 2017-07-12 RX ORDER — POTASSIUM CHLORIDE 750 MG/1
20 TABLET, EXTENDED RELEASE ORAL DAILY
Status: ON HOLD | COMMUNITY
Start: 2017-05-18 | End: 2019-02-13 | Stop reason: CLARIF

## 2017-07-12 RX ORDER — TRAMADOL HYDROCHLORIDE 50 MG/1
TABLET ORAL
COMMUNITY
Start: 2017-05-08 | End: 2019-01-09

## 2017-07-12 RX ORDER — MECLIZINE HYDROCHLORIDE 25 MG/1
TABLET ORAL
Refills: 0 | COMMUNITY
Start: 2017-04-24 | End: 2019-02-13

## 2017-07-12 RX ORDER — AMIODARONE HYDROCHLORIDE 200 MG/1
200 TABLET ORAL DAILY
Refills: 0 | Status: ON HOLD | COMMUNITY
Start: 2017-05-14 | End: 2019-02-17 | Stop reason: HOSPADM

## 2017-07-12 RX ORDER — GABAPENTIN 400 MG/1
CAPSULE ORAL
COMMUNITY
Start: 2017-06-05 | End: 2019-02-13

## 2017-07-12 RX ORDER — METOPROLOL TARTRATE 100 MG/1
TABLET ORAL
COMMUNITY
Start: 2017-05-15 | End: 2017-07-12

## 2017-07-12 NOTE — LETTER
July 12, 2017      Saul Christianson MD  [address] [facility] [address]           [place] [facility]  [address] [facility] [place] [address]  Phone: [phone]  Fax: [phone]          Patient: Celine Sinclair   MR Number: [id number]   YOB: 1950   Date of Visit: 7/12/2017       Dear Dr. Saul Christianson:    Thank you for referring Celine Sinclair to me for evaluation. Attached you will find relevant portions of my assessment and plan of care.    If you have questions, please do not hesitate to call me. I look forward to following Celine Sinclair along with you.    Sincerely,    Lewis Esquivel, OD    Enclosure  CC:  No Recipients    If you would like to receive this communication electronically, please contact [email or web address] or [phone] to request more information on [employer] access.    For providers and/or their staff who would like to refer a patient to [facility], please contact us through our one-stop-shop provider referral line, [facility] , at [phone].    If you feel you have received this communication in error or would no longer like to receive these types of communications, please e-mail [email or web address]

## 2017-07-12 NOTE — PROGRESS NOTES
"HPI     DLS: 7/10/2017 with Dr. Christianson  Pt here today for CLFIT  Pt states she has not worn CL in years. States va is stable no noticeable   changes.   Pt states she is interested in wear mono va CL. Pt was given a MRx for   glasses 7/10/2017.  Denies f/f    No gtts     Last edited by Janna Sandoval on 7/12/2017  9:22 AM. (History)            Assessment /Plan     For exam results, see Encounter Report.    Encounter for fitting or adjustment of spectacles or contact lenses      Sp cat surgery OU w Dr Su--he did full exam 2 days ago.  Pt wishes to try monovision (wore CLs in the past--years ago).  Just needs "near" CL OS--good fit/VA w [facility]    PLAN:    DISP TRIAL CL  Reviewed insertion/removal and cleaning  Build up wearing time-4 hours first day, add one hour per day, not to exceed 12 hours per day wear  DW only, clean and disinfect nightly, exchange monthly  Pt advised to use refresh ATs prn for dryness  Discussed adaptation w monovision--cautioned about driving  rtc 1 week CLFU                 "

## 2017-07-19 ENCOUNTER — OFFICE VISIT (OUTPATIENT)
Dept: OPTOMETRY | Facility: CLINIC | Age: 67
End: 2017-07-19
Payer: MEDICARE

## 2017-07-19 DIAGNOSIS — Z46.0 ENCOUNTER FOR FITTING OR ADJUSTMENT OF SPECTACLES OR CONTACT LENSES: Primary | ICD-10-CM

## 2017-07-19 PROCEDURE — 92310 CONTACT LENS FITTING OU: CPT | Mod: ,,, | Performed by: OPTOMETRIST

## 2017-07-19 PROCEDURE — 99499 UNLISTED E&M SERVICE: CPT | Mod: S$GLB,,, | Performed by: OPTOMETRIST

## 2017-07-19 RX ORDER — ALBUTEROL SULFATE 90 UG/1
2 AEROSOL, METERED RESPIRATORY (INHALATION) EVERY 4 HOURS PRN
COMMUNITY
Start: 2017-07-13 | End: 2019-02-13

## 2017-07-19 RX ORDER — SPIRONOLACTONE 25 MG/1
TABLET ORAL
COMMUNITY
Start: 2017-07-13 | End: 2019-02-13

## 2017-07-19 NOTE — PROGRESS NOTES
HPI     DLS: 7/12/2017  Pt states CL are comfortable. States near va is great with CL.  [place] CL--OS only for near      Last edited by Lewis Esquivel, OD on 7/19/2017 10:44 AM. (History)        ROS     Negative for: Constitutional, Gastrointestinal, Neurological, Skin,   Genitourinary, Musculoskeletal, HENT, Endocrine, Cardiovascular, Eyes (cat   surgery OU), Respiratory, Psychiatric, Allergic/Imm, Heme/Lymph    Last edited by Lewis Esquivel, OD on 7/19/2017 10:44 AM. (History)        Assessment /Plan     For exam results, see Encounter Report.    Encounter for fitting or adjustment of spectacles or contact lenses      1. Good fit/VA w [employer] CL OS for near (no CL OD for dist).   Pt happy  2. Sp pciol OU    PLAN:    1. Wrote CLRx  2. Continue Daily Wear schedule.  NO SLEEPING IN CONTACT LENSES. Clean and disinfect nightly.  exchange monthly.    3. rtc 1 yr

## 2018-07-12 ENCOUNTER — OFFICE VISIT (OUTPATIENT)
Dept: OPTOMETRY | Facility: CLINIC | Age: 68
End: 2018-07-12
Payer: MEDICARE

## 2018-07-12 DIAGNOSIS — Z13.5 GLAUCOMA SCREENING: ICD-10-CM

## 2018-07-12 DIAGNOSIS — Z46.0 FITTING AND ADJUSTMENT OF SPECTACLES AND CONTACT LENSES: Primary | ICD-10-CM

## 2018-07-12 DIAGNOSIS — H52.4 ASTIGMATISM OF BOTH EYES WITH PRESBYOPIA: ICD-10-CM

## 2018-07-12 DIAGNOSIS — H04.123 DRY EYES, BILATERAL: Primary | ICD-10-CM

## 2018-07-12 DIAGNOSIS — Z96.1 PSEUDOPHAKIA OF BOTH EYES: ICD-10-CM

## 2018-07-12 DIAGNOSIS — H52.203 ASTIGMATISM OF BOTH EYES WITH PRESBYOPIA: ICD-10-CM

## 2018-07-12 PROCEDURE — 92310 CONTACT LENS FITTING OU: CPT | Mod: ,,, | Performed by: OPTOMETRIST

## 2018-07-12 PROCEDURE — 92014 COMPRE OPH EXAM EST PT 1/>: CPT | Mod: S$GLB,,, | Performed by: OPTOMETRIST

## 2018-07-12 PROCEDURE — 92015 DETERMINE REFRACTIVE STATE: CPT | Mod: S$GLB,,, | Performed by: OPTOMETRIST

## 2018-07-12 PROCEDURE — 99999 PR PBB SHADOW E&M-EST. PATIENT-LVL II: CPT | Mod: PBBFAC,,, | Performed by: OPTOMETRIST

## 2018-07-12 RX ORDER — MELOXICAM 15 MG/1
7.5 TABLET ORAL EVERY 12 HOURS PRN
COMMUNITY
Start: 2018-06-04 | End: 2019-02-13

## 2018-07-12 RX ORDER — DILTIAZEM HYDROCHLORIDE 120 MG/1
CAPSULE, COATED, EXTENDED RELEASE ORAL
Refills: 3 | Status: ON HOLD | COMMUNITY
Start: 2018-05-01 | End: 2019-02-13

## 2018-07-12 RX ORDER — FLUTICASONE PROPIONATE 50 MCG
2 SPRAY, SUSPENSION (ML) NASAL DAILY
COMMUNITY
Start: 2018-06-04 | End: 2019-02-13

## 2018-07-12 NOTE — PROGRESS NOTES
HPI     DLS: 7/19/2017  Pt states with and without CL vision is blurry. States glasses Rx is older   and va is also blurry. Pt states she has not been able to wear CL due to   red irritated eyes.   +Eye allergies   +Floaters  Denies flashes       No gtts     Sp PC IOL OU       Last edited by Janna Sandoval on 7/12/2018  9:50 AM. (History)        ROS     Positive for: Eyes (cat surgery OU)    Negative for: Constitutional, Gastrointestinal, Neurological, Skin,   Genitourinary, Musculoskeletal, HENT, Endocrine, Cardiovascular,   Respiratory, Psychiatric, Allergic/Imm, Heme/Lymph    Last edited by Lewis Esquivel, OD on 7/12/2018 10:08 AM. (History)        Assessment /Plan     For exam results, see Encounter Report.    Dry eyes, bilateral    Pseudophakia of both eyes    Glaucoma screening    Astigmatism of both eyes with presbyopia      1. Mild pco sp pciol OU--not ready for YAG.  Pt wishes new Rx  2. Pt wore OASYS CL OS for near, but gave up due to dryness issues. w as NOT using ATs.  Will try switching brands to see if helps--good fit/VA w AIR OPTIX HYDRAGLIDE OS (near)    PLAN:    1. DISP trial CL  2. Continue Daily Wear schedule.  NO SLEEPING IN CONTACT LENSES. Clean and disinfect nightly.  exchange monthly.    3. REFRESH PLUS ATs QID  4. Pt just wearing OS CL for near--if she feels OD distance VA not improved w ATs she will call and we will try dist CL OD.  Otherwise if no problems will call for CLRx, rtc 1 yr

## 2019-01-02 ENCOUNTER — TELEPHONE (OUTPATIENT)
Dept: OPHTHALMOLOGY | Facility: CLINIC | Age: 69
End: 2019-01-02

## 2019-01-02 NOTE — TELEPHONE ENCOUNTER
----- Message from Iraida Montgomery sent at 1/2/2019  2:23 PM CST -----  Contact: Pt  Patient Requesting Appointment.     Reason for appt.: Eval appt for implant  Communication Preference: # [phone]  Additional Information:

## 2019-01-08 ENCOUNTER — OFFICE VISIT (OUTPATIENT)
Dept: OPTOMETRY | Facility: CLINIC | Age: 69
End: 2019-01-08
Payer: MEDICARE

## 2019-01-08 DIAGNOSIS — H04.123 DRY EYES, BILATERAL: Primary | ICD-10-CM

## 2019-01-08 PROCEDURE — 99999 PR PBB SHADOW E&M-EST. PATIENT-LVL II: ICD-10-PCS | Mod: PBBFAC,,, | Performed by: OPTOMETRIST

## 2019-01-08 PROCEDURE — 99499 UNLISTED E&M SERVICE: CPT | Mod: S$GLB,,, | Performed by: OPTOMETRIST

## 2019-01-08 PROCEDURE — 99499 NO LOS: ICD-10-PCS | Mod: S$GLB,,, | Performed by: OPTOMETRIST

## 2019-01-08 PROCEDURE — 99999 PR PBB SHADOW E&M-EST. PATIENT-LVL II: CPT | Mod: PBBFAC,,, | Performed by: OPTOMETRIST

## 2019-01-08 NOTE — PROGRESS NOTES
HPI     DLs:7/12/18  PC IOL OD- 3/7/17  CE IOL 2/21/2017 OS    Pt states she is having blurry VA in both eyes but mainly the left eye.   States even with contacts in. States she would like to talk about getting   the implant in her left eye. Pt rip she is out of contacts.  Flouters in left eye sometimes both, no flashes. States she is feeling   pressure in both eys  Clear eye gtts     Last edited by Rupinder Robison MA on 1/8/2019  7:36 AM. (History)        ROS     Positive for: Eyes (cat surgery OU)    Negative for: Constitutional, Gastrointestinal, Neurological, Skin,   Genitourinary, Musculoskeletal, HENT, Endocrine, Cardiovascular,   Respiratory, Psychiatric, Allergic/Imm, Heme/Lymph    Last edited by Lewis Esquivel, OD on 1/8/2019  9:07 AM. (History)        Assessment /Plan     For exam results, see Encounter Report.    Dry eyes, bilateral      See notes from 6 mos ago:  1. Mild pco sp pciol OU--not ready for YAG.  Pt wishes new Rx  2. Pt wore OASYS CL OS for near, but gave up due to dryness issues. w as NOT using ATs.  Will try switching brands to see if helps--good fit/VA w AIR OPTIX HYDRAGLIDE OS (near)    TODAY presents because she states she wants her cataract removed and an implant put in OS.  I explained to patient that she ALREADY had cat surgery/implant in the left eye---but she is certain that Dr Su told her otherwise.  She is sure he told her she needed surgery OS.  Maybe he offered IOL exchange for a reading correction OS????    PLAN:    1. I set pt up to see Dr Su tomorrow to discuss.  MAUREEN charge for my exam today  2. Pt ALSO having dry eye sx, even w frequent ATs.  This is what is causing her redness/CL intolerance.  Discussed w pt Dr PINTO may offer RESTASIS or XIIDRA if she feels otc ATs not helping

## 2019-01-09 ENCOUNTER — OFFICE VISIT (OUTPATIENT)
Dept: OPHTHALMOLOGY | Facility: CLINIC | Age: 69
End: 2019-01-09
Payer: MEDICARE

## 2019-01-09 DIAGNOSIS — H10.13 ALLERGIC CONJUNCTIVITIS OF BOTH EYES: ICD-10-CM

## 2019-01-09 DIAGNOSIS — H04.123 DRY EYE SYNDROME OF BOTH EYES: Primary | ICD-10-CM

## 2019-01-09 DIAGNOSIS — Z96.1 PSEUDOPHAKIA: ICD-10-CM

## 2019-01-09 DIAGNOSIS — H52.7 REFRACTIVE ERROR: ICD-10-CM

## 2019-01-09 PROCEDURE — 92012 INTRM OPH EXAM EST PATIENT: CPT | Mod: S$GLB,,, | Performed by: OPHTHALMOLOGY

## 2019-01-09 PROCEDURE — 92012 PR EYE EXAM, EST PATIENT,INTERMED: ICD-10-PCS | Mod: S$GLB,,, | Performed by: OPHTHALMOLOGY

## 2019-01-09 PROCEDURE — 99999 PR PBB SHADOW E&M-EST. PATIENT-LVL II: ICD-10-PCS | Mod: PBBFAC,,, | Performed by: OPHTHALMOLOGY

## 2019-01-09 PROCEDURE — 99999 PR PBB SHADOW E&M-EST. PATIENT-LVL II: CPT | Mod: PBBFAC,,, | Performed by: OPHTHALMOLOGY

## 2019-01-09 RX ORDER — CHOLECALCIFEROL (VITAMIN D3) 25 MCG
185 TABLET ORAL
COMMUNITY
End: 2019-06-19

## 2019-01-09 RX ORDER — AMITRIPTYLINE HYDROCHLORIDE 25 MG/1
25 TABLET, FILM COATED ORAL
COMMUNITY
End: 2019-02-13

## 2019-01-09 RX ORDER — FLECAINIDE ACETATE 50 MG/1
TABLET ORAL
Refills: 3 | Status: ON HOLD | COMMUNITY
Start: 2018-12-17 | End: 2019-02-13 | Stop reason: CLARIF

## 2019-01-09 RX ORDER — ESTRADIOL 0.1 MG/G
CREAM VAGINAL
Refills: 11 | COMMUNITY
Start: 2018-10-10 | End: 2019-01-09 | Stop reason: SDUPTHER

## 2019-01-09 RX ORDER — AMLODIPINE BESYLATE 10 MG/1
5 TABLET ORAL DAILY
Status: ON HOLD | COMMUNITY
End: 2019-02-17 | Stop reason: HOSPADM

## 2019-01-09 RX ORDER — SOTALOL HYDROCHLORIDE 80 MG/1
TABLET ORAL
Refills: 3 | COMMUNITY
Start: 2019-01-02 | End: 2019-02-13

## 2019-01-09 RX ORDER — BUSPIRONE HYDROCHLORIDE 5 MG/1
5 TABLET ORAL EVERY 8 HOURS PRN
Refills: 0 | Status: ON HOLD | COMMUNITY
Start: 2018-12-02 | End: 2019-02-13

## 2019-01-09 RX ORDER — LANOLIN ALCOHOL/MO/W.PET/CERES
1 CREAM (GRAM) TOPICAL DAILY
Refills: 11 | Status: ON HOLD | COMMUNITY
Start: 2018-12-01 | End: 2019-02-13 | Stop reason: CLARIF

## 2019-01-09 RX ORDER — TIZANIDINE 2 MG/1
TABLET ORAL
Refills: 1 | COMMUNITY
Start: 2018-12-21 | End: 2019-02-13

## 2019-01-09 RX ORDER — PROMETHAZINE HYDROCHLORIDE 12.5 MG/1
12.5 TABLET ORAL EVERY 8 HOURS PRN
COMMUNITY
End: 2019-02-13

## 2019-01-09 RX ORDER — LORATADINE 10 MG/1
10 TABLET ORAL DAILY PRN
COMMUNITY
End: 2019-02-13

## 2019-01-09 RX ORDER — LOPERAMIDE HYDROCHLORIDE 2 MG/1
CAPSULE ORAL
Refills: 11 | COMMUNITY
Start: 2018-10-09 | End: 2019-06-19

## 2019-01-09 RX ORDER — CITALOPRAM 20 MG/1
20 TABLET, FILM COATED ORAL DAILY
COMMUNITY
End: 2019-02-13

## 2019-01-09 RX ORDER — PROPRANOLOL HYDROCHLORIDE 80 MG/1
80 TABLET ORAL EVERY 12 HOURS
Refills: 0 | Status: ON HOLD | COMMUNITY
Start: 2018-11-02 | End: 2019-02-13

## 2019-01-09 RX ORDER — BENAZEPRIL HYDROCHLORIDE 10 MG/1
10 TABLET ORAL DAILY
COMMUNITY
End: 2019-02-13

## 2019-01-09 RX ORDER — ACETAMINOPHEN 500 MG
500 TABLET ORAL EVERY 6 HOURS PRN
Status: ON HOLD | COMMUNITY
End: 2019-02-13

## 2019-01-09 RX ORDER — VENLAFAXINE HYDROCHLORIDE 225 MG/1
112.5 TABLET, EXTENDED RELEASE ORAL DAILY
Status: ON HOLD | COMMUNITY
Start: 2019-02-08 | End: 2019-02-17 | Stop reason: HOSPADM

## 2019-01-09 RX ORDER — PREDNISONE 10 MG/1
10 TABLET ORAL
COMMUNITY
End: 2019-02-13

## 2019-01-09 NOTE — PROGRESS NOTES
Subjective:       Patient ID: Celine Sinclair is a 68 y.o. female.    Chief Complaint: After Cataract and Dry Eye    HPI     After Cataract, bilateral. Pt only recall having Cataract Sx right eye.   Blurred vision left eye more than right eye with glasses and CTL. Eyes   itch, burn and tear when putting CTL and w/o. Do have trouble with glare.   Denies eye pain on today. When eyes become really red and irritated pt c/o   eye pain. Pt states that 4 months ago she past out and hit her head, she   did not seek any medical attention.     Eye Meds: No gtt      Last edited by CLEVELAND Bone on 1/9/2019 10:12 AM. (History)             Assessment:       1. Dry eye syndrome of both eyes    2. Allergic conjunctivitis of both eyes    3. Refractive error    4. Pseudophakia        Plan:       SONIA-Needs AT's.  Allergic conj OU-Pt wants to try Pazeo.  RE-Pt wants MRx.        Start Systane Complete OU.  Start Pazeo prn.  Give MRx.  RTC Dr Esquivel in 1 yr.

## 2019-02-13 ENCOUNTER — HOSPITAL ENCOUNTER (INPATIENT)
Facility: OTHER | Age: 69
LOS: 4 days | Discharge: HOME HEALTH | DRG: 287 | End: 2019-02-17
Attending: EMERGENCY MEDICINE | Admitting: HOSPITALIST
Payer: MEDICARE

## 2019-02-13 DIAGNOSIS — I10 ESSENTIAL HYPERTENSION: ICD-10-CM

## 2019-02-13 DIAGNOSIS — R26.81 GAIT INSTABILITY: ICD-10-CM

## 2019-02-13 DIAGNOSIS — I47.20 VENTRICULAR TACHYCARDIA: ICD-10-CM

## 2019-02-13 DIAGNOSIS — R07.9 CHEST PAIN: ICD-10-CM

## 2019-02-13 DIAGNOSIS — I49.1 PREMATURE ATRIAL CONTRACTIONS: ICD-10-CM

## 2019-02-13 DIAGNOSIS — R00.2 PALPITATIONS: ICD-10-CM

## 2019-02-13 DIAGNOSIS — I47.29 POLYMORPHIC VENTRICULAR TACHYCARDIA: Primary | ICD-10-CM

## 2019-02-13 DIAGNOSIS — I49.3 FREQUENT PVCS: ICD-10-CM

## 2019-02-13 LAB
ABO + RH BLD: NORMAL
ALBUMIN SERPL BCP-MCNC: 4.1 G/DL
ALP SERPL-CCNC: 102 U/L
ALT SERPL W/O P-5'-P-CCNC: 7 U/L
ANION GAP SERPL CALC-SCNC: 7 MMOL/L
AST SERPL-CCNC: 14 U/L
BASOPHILS # BLD AUTO: 0.04 K/UL
BASOPHILS NFR BLD: 0.5 %
BILIRUB SERPL-MCNC: 2.2 MG/DL
BLD GP AB SCN CELLS X3 SERPL QL: NORMAL
BNP SERPL-MCNC: 90 PG/ML
BUN SERPL-MCNC: 10 MG/DL
CALCIUM SERPL-MCNC: 9.4 MG/DL
CHLORIDE SERPL-SCNC: 107 MMOL/L
CO2 SERPL-SCNC: 28 MMOL/L
CREAT SERPL-MCNC: 0.6 MG/DL
DIFFERENTIAL METHOD: NORMAL
EOSINOPHIL # BLD AUTO: 0.1 K/UL
EOSINOPHIL NFR BLD: 1.1 %
ERYTHROCYTE [DISTWIDTH] IN BLOOD BY AUTOMATED COUNT: 13.1 %
EST. GFR  (AFRICAN AMERICAN): >60 ML/MIN/1.73 M^2
EST. GFR  (NON AFRICAN AMERICAN): >60 ML/MIN/1.73 M^2
GLUCOSE SERPL-MCNC: 85 MG/DL
HCT VFR BLD AUTO: 40.8 %
HGB BLD-MCNC: 13.4 G/DL
INR PPP: 1
LYMPHOCYTES # BLD AUTO: 2.4 K/UL
LYMPHOCYTES NFR BLD: 27.4 %
MAGNESIUM SERPL-MCNC: 2.1 MG/DL
MCH RBC QN AUTO: 27.2 PG
MCHC RBC AUTO-ENTMCNC: 32.8 G/DL
MCV RBC AUTO: 83 FL
MONOCYTES # BLD AUTO: 0.7 K/UL
MONOCYTES NFR BLD: 8.4 %
NEUTROPHILS # BLD AUTO: 5.5 K/UL
NEUTROPHILS NFR BLD: 62.4 %
PLATELET # BLD AUTO: 237 K/UL
PMV BLD AUTO: 11.1 FL
POTASSIUM SERPL-SCNC: 3.6 MMOL/L
PROT SERPL-MCNC: 7.6 G/DL
PROTHROMBIN TIME: 10.7 SEC
RBC # BLD AUTO: 4.92 M/UL
SODIUM SERPL-SCNC: 142 MMOL/L
TROPONIN I SERPL DL<=0.01 NG/ML-MCNC: <0.006 NG/ML
TROPONIN I SERPL DL<=0.01 NG/ML-MCNC: <0.006 NG/ML
TSH SERPL DL<=0.005 MIU/L-ACNC: 1.37 UIU/ML
WBC # BLD AUTO: 8.73 K/UL

## 2019-02-13 PROCEDURE — 93010 EKG 12-LEAD: ICD-10-PCS | Mod: ,,, | Performed by: INTERNAL MEDICINE

## 2019-02-13 PROCEDURE — 85025 COMPLETE CBC W/AUTO DIFF WBC: CPT

## 2019-02-13 PROCEDURE — 84443 ASSAY THYROID STIM HORMONE: CPT

## 2019-02-13 PROCEDURE — 99223 1ST HOSP IP/OBS HIGH 75: CPT | Mod: AI,,, | Performed by: HOSPITALIST

## 2019-02-13 PROCEDURE — 99223 PR INITIAL HOSPITAL CARE,LEVL III: ICD-10-PCS | Mod: AI,,, | Performed by: HOSPITALIST

## 2019-02-13 PROCEDURE — 85610 PROTHROMBIN TIME: CPT

## 2019-02-13 PROCEDURE — 84484 ASSAY OF TROPONIN QUANT: CPT | Mod: 91

## 2019-02-13 PROCEDURE — 83735 ASSAY OF MAGNESIUM: CPT

## 2019-02-13 PROCEDURE — 99285 EMERGENCY DEPT VISIT HI MDM: CPT | Mod: 25

## 2019-02-13 PROCEDURE — 25000003 PHARM REV CODE 250: Performed by: EMERGENCY MEDICINE

## 2019-02-13 PROCEDURE — 11000001 HC ACUTE MED/SURG PRIVATE ROOM

## 2019-02-13 PROCEDURE — 25000003 PHARM REV CODE 250: Performed by: HOSPITALIST

## 2019-02-13 PROCEDURE — 80053 COMPREHEN METABOLIC PANEL: CPT

## 2019-02-13 PROCEDURE — 94761 N-INVAS EAR/PLS OXIMETRY MLT: CPT

## 2019-02-13 PROCEDURE — 86850 RBC ANTIBODY SCREEN: CPT

## 2019-02-13 PROCEDURE — 93010 ELECTROCARDIOGRAM REPORT: CPT | Mod: ,,, | Performed by: INTERNAL MEDICINE

## 2019-02-13 PROCEDURE — 93005 ELECTROCARDIOGRAM TRACING: CPT

## 2019-02-13 PROCEDURE — 36415 COLL VENOUS BLD VENIPUNCTURE: CPT

## 2019-02-13 PROCEDURE — 63600175 PHARM REV CODE 636 W HCPCS: Performed by: HOSPITALIST

## 2019-02-13 PROCEDURE — 83880 ASSAY OF NATRIURETIC PEPTIDE: CPT

## 2019-02-13 RX ORDER — AMLODIPINE BESYLATE 5 MG/1
10 TABLET ORAL
Status: COMPLETED | OUTPATIENT
Start: 2019-02-13 | End: 2019-02-13

## 2019-02-13 RX ORDER — ASPIRIN 325 MG
325 TABLET ORAL
Status: DISCONTINUED | OUTPATIENT
Start: 2019-02-13 | End: 2019-02-13

## 2019-02-13 RX ORDER — LOSARTAN POTASSIUM 50 MG/1
50 TABLET ORAL ONCE
Status: COMPLETED | OUTPATIENT
Start: 2019-02-13 | End: 2019-02-13

## 2019-02-13 RX ORDER — POLYETHYLENE GLYCOL 3350 17 G/17G
17 POWDER, FOR SOLUTION ORAL 2 TIMES DAILY PRN
Status: DISCONTINUED | OUTPATIENT
Start: 2019-02-13 | End: 2019-02-17 | Stop reason: HOSPADM

## 2019-02-13 RX ORDER — LOPERAMIDE HYDROCHLORIDE 2 MG/1
2 CAPSULE ORAL 4 TIMES DAILY PRN
Status: DISCONTINUED | OUTPATIENT
Start: 2019-02-13 | End: 2019-02-17 | Stop reason: HOSPADM

## 2019-02-13 RX ORDER — SODIUM CHLORIDE 0.9 % (FLUSH) 0.9 %
5 SYRINGE (ML) INJECTION
Status: DISCONTINUED | OUTPATIENT
Start: 2019-02-13 | End: 2019-02-17 | Stop reason: HOSPADM

## 2019-02-13 RX ORDER — RAMELTEON 8 MG/1
8 TABLET ORAL NIGHTLY PRN
Status: DISCONTINUED | OUTPATIENT
Start: 2019-02-13 | End: 2019-02-17 | Stop reason: HOSPADM

## 2019-02-13 RX ORDER — ENOXAPARIN SODIUM 100 MG/ML
40 INJECTION SUBCUTANEOUS EVERY 24 HOURS
Status: COMPLETED | OUTPATIENT
Start: 2019-02-13 | End: 2019-02-14

## 2019-02-13 RX ORDER — AMLODIPINE BESYLATE 5 MG/1
5 TABLET ORAL DAILY
Status: DISCONTINUED | OUTPATIENT
Start: 2019-02-14 | End: 2019-02-17

## 2019-02-13 RX ORDER — SOTALOL HYDROCHLORIDE 80 MG/1
80 TABLET ORAL EVERY 12 HOURS
Status: DISCONTINUED | OUTPATIENT
Start: 2019-02-13 | End: 2019-02-13

## 2019-02-13 RX ORDER — LOSARTAN POTASSIUM 50 MG/1
100 TABLET ORAL EVERY MORNING
Status: DISCONTINUED | OUTPATIENT
Start: 2019-02-14 | End: 2019-02-17 | Stop reason: HOSPADM

## 2019-02-13 RX ORDER — SOTALOL HYDROCHLORIDE 120 MG/1
80 TABLET ORAL EVERY 12 HOURS
Status: ON HOLD | COMMUNITY
End: 2019-02-17 | Stop reason: HOSPADM

## 2019-02-13 RX ORDER — ACETAMINOPHEN 325 MG/1
650 TABLET ORAL EVERY 4 HOURS PRN
Status: DISCONTINUED | OUTPATIENT
Start: 2019-02-13 | End: 2019-02-17 | Stop reason: HOSPADM

## 2019-02-13 RX ORDER — ONDANSETRON 2 MG/ML
4 INJECTION INTRAMUSCULAR; INTRAVENOUS EVERY 8 HOURS PRN
Status: DISCONTINUED | OUTPATIENT
Start: 2019-02-13 | End: 2019-02-17 | Stop reason: HOSPADM

## 2019-02-13 RX ORDER — VENLAFAXINE HYDROCHLORIDE 75 MG/1
75 CAPSULE, EXTENDED RELEASE ORAL DAILY
Status: DISCONTINUED | OUTPATIENT
Start: 2019-02-14 | End: 2019-02-13

## 2019-02-13 RX ADMIN — SODIUM CHLORIDE 1000 ML: 0.9 INJECTION, SOLUTION INTRAVENOUS at 03:02

## 2019-02-13 RX ADMIN — SOTALOL HYDROCHLORIDE 80 MG: 80 TABLET ORAL at 08:02

## 2019-02-13 RX ADMIN — ENOXAPARIN SODIUM 40 MG: 100 INJECTION SUBCUTANEOUS at 08:02

## 2019-02-13 RX ADMIN — AMLODIPINE BESYLATE 10 MG: 5 TABLET ORAL at 03:02

## 2019-02-13 RX ADMIN — ACETAMINOPHEN 650 MG: 325 TABLET, FILM COATED ORAL at 08:02

## 2019-02-13 RX ADMIN — LOSARTAN POTASSIUM 50 MG: 50 TABLET, FILM COATED ORAL at 03:02

## 2019-02-13 NOTE — ED PROVIDER NOTES
"Encounter Date: 2/13/2019    SCRIBE #1 NOTE: I, Chepe Norman, am scribing for, and in the presence of, Dr. Walker.       History     Chief Complaint   Patient presents with    Abnormal ECG     Pt reports sent from [employer] w/ note stating "polymorphic ventricular tachycardia" on recorder. Pt denies chest pains currently, + increased SOB and WOb x 1 week upon exertion. + intermittent dizziness      Time seen by provider: 1:45 PM    This is a 68 y.o. female who presents with complaint of abnormal EKG that occurred prior to arrival. The patient reports that he was at her primary care's office and was giving a urine sample and her cardiologist recommended that she come to the ED. The patient reports that she has been experiencing worsening palpitations "for a while" and has an implanted loop defibrillator.  Previously on flecainide and currently on sotalol , plan was to start taking amiodarone, but hasn't picked it up yet. The patient reports that for the last few days she had "food poisoning" and was experiencing diarrhea with abdominal pain and nausea. This happened She reports that her symptoms have since resolved, she believes that she it caused by eating [employer]. She denies fever, sore throat, chest pain, shortness of breath, nausea, and dysuria. The patient does note some dyspnea on exertion and increased work of breathing at times.  Recent loop recorder down months are shown several episodes of PVCs and PACs.  The patient has also had an episode of polymorphic ventricular tachycardia which was approximately 30 beats in length.  The patient was asymptomatic at that time.  The patient's most recent echocardiogram showed EF 61% in April 2018 at [facility].  Nuclear cardiac scan in January 2018 negative for ischemia.       The history is provided by the patient.     Review of patient's allergies indicates:   Allergen Reactions    Sandostatin [octreotide acetate] Nausea And Vomiting     Had symptoms when she took " Sandostatin with Codeine    Codeine Itching and Nausea And Vomiting     Pill form only, IV ok.,  Had symptoms when she took Codeine with Sandostatin.     Past Medical History:   Diagnosis Date    Aortic atherosclerosis     Benign essential hypertension     Cataract     Senile    Crohn's colitis     Depression, major, recurrent, moderate     Fibromyalgia     GERD (gastroesophageal reflux disease)     Hypertensive cardiomyopathy     IBS (irritable bowel syndrome)     Migraine     Opioid abuse, in remission     Osteopenia     Paget disease of bone     Port-A-Cath in place     right chest    Prolonged QT interval     RA (rheumatoid arthritis)     Sedative hypnotic or anxiolytic dependence     in remission      Past Surgical History:   Procedure Laterality Date    CATARACT EXTRACTION W/  INTRAOCULAR LENS IMPLANT Left 02/21/2017    Dr. Christianson    CATARACT EXTRACTION W/  INTRAOCULAR LENS IMPLANT Right 03/07/2017    Dr. Christianson    CHOLECYSTECTOMY      COLON SURGERY      COLONOSCOPY N/A 3/16/2016    Performed by Dale [facility] at [facility] ENDO (4TH FLR)    COLOSTOMY      x3    Colostomy reversal      HEMORRHOID SURGERY      HYSTERECTOMY      INSERTION-INTRAOCULAR LENS (IOL) Right 3/7/2017    Performed by Saul [facility]on [facility] at [facility] OR 1ST FLR    INSERTION-INTRAOCULAR LENS (IOL) Left 2/21/2017    [facility] Saul [facility] at [facility] OR 1ST FLR    NECK SURGERY      PHACOEMULSIFICATION-ASPIRATION-CATARACT Right 3/7/2017    [facility]formed [facility] Saul [facility] at [facility] OR 1ST FLR    PHACOEMULSIFICATION-ASPIRATION-CATARACT Left 2/21/2017    [facility] at [facility] OR 1ST FLR    SBO      SMALL INTESTINE SURGERY      TONSILLECTOMY       Family History   Problem Relation Age of Onset    Glaucoma Paternal Grandmother     Other Daughter         Diabetic Retinopathy    Breast cancer Daughter 40    Retinal detachment Grandchild     Colon cancer Maternal  Grandmother     Cancer Mother         Unknown type    Emphysema Father 67    Lung cancer Sister     Breast cancer Daughter 47    Breast cancer Sister     Amblyopia Neg Hx     Blindness Neg Hx     Strabismus Neg Hx     Macular degeneration Neg Hx     Cataracts Neg Hx      Social History     Tobacco Use    Smoking status: Never Smoker   Substance Use Topics    Alcohol use: No    Drug use: No     Review of Systems   Constitutional: Negative for fever.   HENT: Negative for sore throat.    Respiratory: Positive for shortness of breath.    Cardiovascular: Positive for palpitations. Negative for chest pain.   Gastrointestinal: Positive for diarrhea. Negative for nausea.   Genitourinary: Negative for dysuria.   Musculoskeletal: Negative for back pain.   Skin: Negative for rash.   Neurological: Negative for weakness.   Hematological: Does not bruise/bleed easily.   All other systems reviewed and are negative.      Physical Exam     Initial Vitals [02/13/19 1330]   BP Pulse Resp Temp SpO2   (!) 187/94 64 18 97.6 °F (36.4 °C) 99 %      MAP       --         Physical Exam    Nursing note and vitals reviewed.  Constitutional: She appears well-developed and well-nourished. She is not diaphoretic. No distress.   HENT:   Head: Normocephalic and atraumatic.   Right Ear: External ear normal.   Left Ear: External ear normal.   Mouth/Throat: Mucous membranes are dry.   Eyes: EOM are normal. Pupils are equal, round, and reactive to light. Right eye exhibits no discharge. Left eye exhibits no discharge.   Neck: Normal range of motion.   Cardiovascular: Normal rate and normal heart sounds. An irregular rhythm present.  Exam reveals no gallop and no friction rub.    No murmur heard.  Frequent irregular beats.    Pulmonary/Chest: Breath sounds normal. No respiratory distress. She has no wheezes. She has no rhonchi. She has no rales.   Abdominal: Soft. There is no tenderness. There is no rebound and no guarding.    Musculoskeletal: Normal range of motion. She exhibits no edema or tenderness.   Neurological: She is alert and oriented to person, place, and time.   Skin: Skin is warm and dry. No rash and no abscess noted. No erythema. No pallor.   Psychiatric: She has a normal mood and affect. Her behavior is normal. Judgment and thought content normal.         ED Course   Procedures  Labs Reviewed   COMPREHENSIVE METABOLIC PANEL - Abnormal; Notable for the following components:       Result Value    Total Bilirubin 2.2 (*)     ALT 7 (*)     Anion Gap 7 (*)     All other components within normal limits   CBC W/ AUTO DIFFERENTIAL   TROPONIN I   B-TYPE NATRIURETIC PEPTIDE   MAGNESIUM   TSH   PROTIME-INR   TSH   PROTIME-INR   TYPE & SCREEN          Imaging Results          X-Ray Chest AP Portable (Final result)  Result time 02/13/19 14:26:32    Final result by Alyssa Leos MD (02/13/19 14:26:32)                 Impression:      No acute disease identified.  The source of the patient's chest pain is not established on this study.      Electronically signed by: Alyssa Leos MD  Date:    02/13/2019  Time:    14:26             Narrative:    EXAMINATION:  XR CHEST AP PORTABLE    CLINICAL HISTORY:  Chest Pain;    TECHNIQUE:  Single frontal view of the chest was performed.    COMPARISON:  07/11/2007.    FINDINGS:  Anterior fusion hardware is present at the cervicothoracic junction of the spine, new since 07/11/2007.  Port present on that earlier study has been discontinued.  Scattered pellets of shot overlie current and prior images.  Loop recorder projects over the cardiac silhouette on this single bedside radiograph.  Extrinsic EKG leads are present.    Mediastinal structures are midline. Cardiac silhouette and pulmonary vascular distribution are normal.    Lung volumes are normal and symmetric. I detect no pulmonary disease, pleural fluid, lymph node enlargement, cardiac decompensation, pneumothorax, pneumomediastinum,  pneumoperitoneum or significant osseous abnormality.                              X-Rays:   Independently Interpreted Readings:   Chest X-Ray: Normal heart size.  No infiltrates.  No acute abnormalities.     Medical Decision Making:   Initial Assessment:   67 y/o F with history of prior arrhythmia on sotalol presents following GI illness recently and episode of polymorphic ventricular tachycardia noted on loop recorder.  Plan labs, monitoring, IV fluids and admit for further cardiology eval   Differential Diagnosis:   SVT, atrial fibrillation, atrial flutter, ventricular arrhythmia, heart block, MI, orthostatic hypotension, sinus tachycardia, sensitive carotid sinus, stimulant abuse or side effect, electrolyte abnormalities, hyperthyroidism, anxiety.    Clinical Tests:   Lab Tests: Ordered and Reviewed  Radiological Study: Ordered and Reviewed  Medical Tests: Ordered and Reviewed  ED Management:  I discussed the patient's presentation, findings and response to treatment in the ED with the hospitalist on call who will admit for further treatment and evaluation.                Scribe Attestation:   Scribe #1: I performed the above scribed service and the documentation accurately describes the services I performed. I attest to the accuracy of the note.    Attending Attestation:           Physician Attestation for Scribe:  Physician Attestation Statement for Scribe #1: I, Dr. Walker, reviewed documentation, as scribed by Chepe Norman in my presence, and it is both accurate and complete.                    Clinical Impression:     1. Polymorphic ventricular tachycardia    2. Palpitations    3. Chest pain    4. Frequent PVCs    5. Premature atrial contractions    6. Gait instability    7. Ventricular tachycardia    8. Essential hypertension            Disposition:   Disposition: Placed in Observation  Condition: Stable                        Kasey Walker MD  02/18/19 0981

## 2019-02-13 NOTE — ED NOTES
Patient identifiers for Celine Sinclair checked and correct.  LOC: Patient is awake, alert, and aware of environment with an appropriate affect. Patient is oriented x 3 and speaking appropriately.  APPEARANCE: Patient resting comfortably and in no acute distress. Patient is clean and well groomed, patient's clothing is properly fastened.  SKIN: The skin is warm and dry. Patient has normal skin turgor and moist mucus membrances. Skin is intact; no bruising or breakdown noted.  MUSKULOSKELETAL: Patient is moving all extremities well, no obvious swelling or deformities noted. Pulses intact.   RESPIRATORY: Airway is open and patent. Respirations are spontaneous, even and unlabored. Normal effort and rate noted.  CARDIAC: Multiple PVCs noted on EKG. Pt reports continued palpitations but admits chronic palpitations that have been worsening lately. SOB and increased WOB upon exertion. No chest pain at this time.  No peripheral edema noted. Capillary refill < 3 seconds.   ABDOMEN: No distention noted. Bowel sounds active in all 4 quadrants. Soft and non-tender upon palpation. Pt reports some abd discomfort. Dark stool noted yesterday, pt admits noticing dark stools for approx 1 week.   NEUROLOGICAL: PERRL. Facial expression is symmetrical. Hand grasps are equal bilaterally. Normal sensation in all extremities when touched with finger. Following commands appropriately.   Allergies reported:   Review of patient's allergies indicates:   Allergen Reactions    Sandostatin [octreotide acetate] Nausea And Vomiting     Had symptoms when she took Sandostatin with Codeine    Codeine Itching and Nausea And Vomiting     Pill form only, IV ok.,  Had symptoms when she took Codeine with Sandostatin.

## 2019-02-13 NOTE — ED TRIAGE NOTES
Pt reports to ED via POV for abnormal cardiac activity recorded on loop recorder. Pt was sent to ED from [facility] after being evaluated by cardiologist. Pt c/o intermittent palpitations with SOB upon exertion, intermittent dizziness and chest tightness x 1 week.  Pt also reports episode of dark stools. No swelling noted to lower extremities. Denies vision changes or numbness/tingling to extremities.     Pt placed on cardiac monitor with BP cycling. Call light within reach. Family at bedside

## 2019-02-13 NOTE — Clinical Note
The left ventricle was injected and visualized. Multiple views of the injected vessel were taken. Rate = 12 mL/sec. Total volume = 25 mL.

## 2019-02-13 NOTE — Clinical Note
Catheter is inserted into the left ventricle. The vessel(s) were injected and visualized in multiple views.

## 2019-02-14 LAB
ALBUMIN SERPL BCP-MCNC: 3.5 G/DL
ALP SERPL-CCNC: 88 U/L
ALT SERPL W/O P-5'-P-CCNC: 7 U/L
ANION GAP SERPL CALC-SCNC: 10 MMOL/L
AST SERPL-CCNC: 13 U/L
BASOPHILS # BLD AUTO: 0.03 K/UL
BASOPHILS NFR BLD: 0.4 %
BILIRUB SERPL-MCNC: 2.2 MG/DL
BUN SERPL-MCNC: 18 MG/DL
CALCIUM SERPL-MCNC: 9.3 MG/DL
CHLORIDE SERPL-SCNC: 108 MMOL/L
CO2 SERPL-SCNC: 21 MMOL/L
CREAT SERPL-MCNC: 0.7 MG/DL
DIFFERENTIAL METHOD: NORMAL
EOSINOPHIL # BLD AUTO: 0.1 K/UL
EOSINOPHIL NFR BLD: 1.6 %
ERYTHROCYTE [DISTWIDTH] IN BLOOD BY AUTOMATED COUNT: 12.9 %
EST. GFR  (AFRICAN AMERICAN): >60 ML/MIN/1.73 M^2
EST. GFR  (NON AFRICAN AMERICAN): >60 ML/MIN/1.73 M^2
GLUCOSE SERPL-MCNC: 97 MG/DL
HCT VFR BLD AUTO: 39.5 %
HGB BLD-MCNC: 13.1 G/DL
LYMPHOCYTES # BLD AUTO: 2.1 K/UL
LYMPHOCYTES NFR BLD: 24.7 %
MCH RBC QN AUTO: 27.5 PG
MCHC RBC AUTO-ENTMCNC: 33.2 G/DL
MCV RBC AUTO: 83 FL
MONOCYTES # BLD AUTO: 0.9 K/UL
MONOCYTES NFR BLD: 10.6 %
NEUTROPHILS # BLD AUTO: 5.2 K/UL
NEUTROPHILS NFR BLD: 62.5 %
PLATELET # BLD AUTO: 219 K/UL
PMV BLD AUTO: 11.8 FL
POTASSIUM SERPL-SCNC: 3.6 MMOL/L
PROT SERPL-MCNC: 6.9 G/DL
RBC # BLD AUTO: 4.77 M/UL
SODIUM SERPL-SCNC: 139 MMOL/L
TROPONIN I SERPL DL<=0.01 NG/ML-MCNC: <0.006 NG/ML
WBC # BLD AUTO: 8.38 K/UL

## 2019-02-14 PROCEDURE — 99233 SBSQ HOSP IP/OBS HIGH 50: CPT | Mod: ,,, | Performed by: HOSPITALIST

## 2019-02-14 PROCEDURE — 25000003 PHARM REV CODE 250: Performed by: HOSPITALIST

## 2019-02-14 PROCEDURE — 36415 COLL VENOUS BLD VENIPUNCTURE: CPT

## 2019-02-14 PROCEDURE — 63600175 PHARM REV CODE 636 W HCPCS: Performed by: INTERNAL MEDICINE

## 2019-02-14 PROCEDURE — 94761 N-INVAS EAR/PLS OXIMETRY MLT: CPT

## 2019-02-14 PROCEDURE — 11000001 HC ACUTE MED/SURG PRIVATE ROOM

## 2019-02-14 PROCEDURE — 80053 COMPREHEN METABOLIC PANEL: CPT

## 2019-02-14 PROCEDURE — 85025 COMPLETE CBC W/AUTO DIFF WBC: CPT

## 2019-02-14 PROCEDURE — 99233 PR SUBSEQUENT HOSPITAL CARE,LEVL III: ICD-10-PCS | Mod: ,,, | Performed by: HOSPITALIST

## 2019-02-14 PROCEDURE — 84484 ASSAY OF TROPONIN QUANT: CPT

## 2019-02-14 RX ORDER — SODIUM CHLORIDE 9 MG/ML
INJECTION, SOLUTION INTRAVENOUS CONTINUOUS
Status: DISCONTINUED | OUTPATIENT
Start: 2019-02-15 | End: 2019-02-16

## 2019-02-14 RX ORDER — TRAMADOL HYDROCHLORIDE 50 MG/1
50 TABLET ORAL EVERY 6 HOURS PRN
Status: DISCONTINUED | OUTPATIENT
Start: 2019-02-14 | End: 2019-02-17 | Stop reason: HOSPADM

## 2019-02-14 RX ORDER — DIPHENHYDRAMINE HCL 25 MG
25 CAPSULE ORAL
Status: DISCONTINUED | OUTPATIENT
Start: 2019-02-14 | End: 2019-02-15 | Stop reason: HOSPADM

## 2019-02-14 RX ADMIN — AMLODIPINE BESYLATE 5 MG: 5 TABLET ORAL at 08:02

## 2019-02-14 RX ADMIN — TRAMADOL HYDROCHLORIDE 50 MG: 50 TABLET, FILM COATED ORAL at 11:02

## 2019-02-14 RX ADMIN — LOSARTAN POTASSIUM 100 MG: 50 TABLET, FILM COATED ORAL at 06:02

## 2019-02-14 RX ADMIN — TRAMADOL HYDROCHLORIDE 50 MG: 50 TABLET, FILM COATED ORAL at 05:02

## 2019-02-14 RX ADMIN — ENOXAPARIN SODIUM 40 MG: 100 INJECTION SUBCUTANEOUS at 05:02

## 2019-02-14 RX ADMIN — TRAMADOL HYDROCHLORIDE 50 MG: 50 TABLET, FILM COATED ORAL at 09:02

## 2019-02-14 RX ADMIN — ACETAMINOPHEN 650 MG: 325 TABLET, FILM COATED ORAL at 06:02

## 2019-02-14 NOTE — CONSULTS
[facility]  Cardiology  Consult Note    Patient Name: Celine Sinclair  MRN: [id number]  Admission Date: 2/13/2019  Hospital Length of Stay: 0 days  Code Status: Full Code   Attending Provider: Debora Ibarra MD   Consulting Provider: Miky Keita MD  Primary Care Physician: Lety Jaquez MD  Principal Problem:Polymorphic ventricular tachycardia    Patient information was obtained from patient and past medical records.     Inpatient consult to Cardiology  Consult performed by: Miky Keita MD  Consult ordered by: Debora Ibarra MD  Reason for consult: VT        Subjective:     Chief Complaint:  Arrhythmia.    HPI:     Celine Sinclair is a 68 y.o.female with hypertension. She has had palpitations and unexplained syncope. She had a loop recorder implanted on 9/28/2018. The look recorder is monitored by Dr. Aguilar at [facility]. According to notes in the chart polymorphic VT was seen on 2/9/2019 and again on 2/12/2012. She has been on sotalol. She was advised to go to the ER for admission.         Past Medical History:   Diagnosis Date    Aortic atherosclerosis     Benign essential hypertension     Cataract     Senile    Crohn's colitis     Depression, major, recurrent, moderate     Fibromyalgia     GERD (gastroesophageal reflux disease)     Hypertensive cardiomyopathy     IBS (irritable bowel syndrome)     Migraine     Opioid abuse, in remission     Osteopenia     Paget disease of bone     Port-A-Cath in place     right chest    Prolonged QT interval     RA (rheumatoid arthritis)     Sedative hypnotic or anxiolytic dependence     in remission        Past Surgical History:   Procedure Laterality Date    CATARACT EXTRACTION W/  INTRAOCULAR LENS IMPLANT Left 02/21/2017    Dr. Christianson    CATARACT EXTRACTION W/  INTRAOCULAR LENS IMPLANT Right 03/07/2017    Dr. Christianson    CHOLECYSTECTOMY      COLON SURGERY      COLONOSCOPY N/A 3/16/2016    Performed by Dale Trejo MD at  [facility] (4TH FLR)    COLOSTOMY      x3    Colostomy reversal      HEMORRHOID SURGERY      HYSTERECTOMY      INSERTION-INTRAOCULAR LENS (IOL) Right 3/7/2017    Performed by Saul [facility]on MD at [facility] OR 1ST FLR    INSERTION-INTRAOCULAR LENS (IOL) Left 2/21/2017    Performed by Saul [facility]on MD at [facility] OR 1ST FLR    NECK SURGERY      PHACOEMULSIFICATION-ASPIRATION-CATARACT Right 3/7/2017    Performed by Saul [facility]on MD at [facility] OR 1ST FLR    PHACOEMULSIFICATION-ASPIRATION-CATARACT Left 2/21/2017    Performed by Saul Christianson MD at [facility] OR 1ST FLR    SBO      SMALL INTESTINE SURGERY      TONSILLECTOMY         Review of patient's allergies indicates:   Allergen Reactions    Sandostatin [octreotide acetate] Nausea And Vomiting     Had symptoms when she took Sandostatin with Codeine    Codeine Itching and Nausea And Vomiting     Pill form only, IV ok.,  Had symptoms when she took Codeine with Sandostatin.       No current facility-administered medications on file prior to encounter.      Current Outpatient Medications on File Prior to Encounter   Medication Sig    amLODIPine (NORVASC) 10 MG tablet Take 5 mg by mouth once daily.     loperamide (IMODIUM) 2 mg capsule     losartan (COZAAR) 50 MG tablet Take 100 mg by mouth every morning.     sotalol (BETAPACE) 120 MG Tab Take 80 mg by mouth every 12 (twelve) hours.    venlafaxine 225 mg TR24 Take 112.5 mg by mouth once daily.     [DISCONTINUED] acetaminophen (TYLENOL) 500 MG tablet Take 500 mg by mouth every 6 (six) hours as needed for Pain.    [DISCONTINUED] busPIRone (BUSPAR) 5 MG Tab Take 5 mg by mouth every 8 (eight) hours as needed.    [DISCONTINUED] furosemide (LASIX) 20 MG tablet 40 mg once daily.     [DISCONTINUED] metoprolol tartrate (LOPRESSOR) 25 MG tablet 2 (two) times daily.     [DISCONTINUED] potassium chloride SA (K-DUR,KLOR-CON) 10 MEQ tablet 20 mEq once daily.     ALBUTEROL SULFATE (VENTOLIN INHL)  Inhale 1 puff into the lungs every 4 to 6 hours as needed.     amiodarone (PACERONE) 200 MG Tab Take 200 mg by mouth once daily.     diabetic supplies,miscell (MEDTRONIC REMOTE CONTROL MISC) by Misc.(Non-Drug; Combo Route) route.    diphenoxylate-atropine 2.5-0.025 mg (LOMOTIL) 2.5-0.025 mg per tablet Take 1 tablet by mouth 4 (four) times daily as needed for Diarrhea.     INCONTINENCE PAD,LINER,DISP (BLADDER CONTROL PADS EX ABSORB MISC)     vitamin D (VITAMIN D3) 1000 units Tab Take 185 mg by mouth.    [DISCONTINUED] amitriptyline (ELAVIL) 25 MG tablet Take 25 mg by mouth.    [DISCONTINUED] benazepril (LOTENSIN) 10 MG tablet Take 10 mg by mouth once daily.     [DISCONTINUED] citalopram (CELEXA) 20 MG tablet Take 20 mg by mouth once daily.     [DISCONTINUED] diltiaZEM (CARDIZEM CD) 120 MG Cp24 TAKE ONE CAPSULE BY MOUTH EVERY DAY FOR HEART    [DISCONTINUED] estrogens, conjugated, (PREMARIN) 0.625 MG tablet Take 0.625 mg by mouth as needed (Apply a small amount to vaginal area 2-3x a week as needed.).    [DISCONTINUED] flecainide (TAMBOCOR) 50 MG Tab TK 1 T PO BID    [DISCONTINUED] fluticasone (FLONASE) 50 mcg/actuation nasal spray 2 sprays once daily.     [DISCONTINUED] gabapentin (NEURONTIN) 400 MG capsule     [DISCONTINUED] hydroxychloroquine (PLAQUENIL) 200 mg tablet Take 200 mg by mouth 2 (two) times daily.     [DISCONTINUED] ibuprofen (ADVIL,MOTRIN) 800 MG tablet Take 800 mg by mouth 2 (two) times daily as needed.     [DISCONTINUED] loratadine (CLARITIN) 10 mg tablet Take 10 mg by mouth daily as needed for Allergies.    [DISCONTINUED] magnesium oxide (MAG-OX) 400 mg (241.3 mg magnesium) tablet Take 1 tablet by mouth once daily.    [DISCONTINUED] meclizine (ANTIVERT) 25 mg tablet TAKE 1 TABLET BY MOUTH EVERY 8 TO 12 HOURS AS NEEDED DIZZINESS    [DISCONTINUED] meloxicam (MOBIC) 15 MG tablet 7.5 mg every 12 (twelve) hours as needed (use as directed. can take up to 1 tablet up to every 12 hours as  needed only take up to 3-4 days then stop for breast pain.).     [DISCONTINUED] omeprazole (PRILOSEC) 40 MG capsule Take 20 mg by mouth every morning.     [DISCONTINUED] predniSONE (DELTASONE) 10 MG tablet Take 10 mg by mouth.    [DISCONTINUED] promethazine (PHENERGAN) 12.5 MG Tab Take 12.5 mg by mouth every 8 (eight) hours as needed.    [DISCONTINUED] propranolol (INDERAL) 80 MG tablet Take 80 mg by mouth every 12 (twelve) hours.    [DISCONTINUED] sotalol (BETAPACE) 80 MG tablet TAKE 1 TABLET BY MOUTH TWICE A DAY, DISCONTINUE FLECAINIDE    [DISCONTINUED] spironolactone (ALDACTONE) 25 MG tablet     [DISCONTINUED] tiZANidine (ZANAFLEX) 2 MG tablet TAKE 1 TABLET BY MOUTH TID AS NEEDED FOR MUSCLE SPASMS    [DISCONTINUED] trazodone (DESYREL) 50 MG tablet every evening.     [DISCONTINUED] VENTOLIN HFA 90 mcg/actuation inhaler 2 puffs every 4 (four) hours as needed.      Family History     Problem Relation (Age of Onset)    Breast cancer Daughter (40), Daughter (47), Sister    Cancer Mother    Colon cancer Maternal Grandmother    Emphysema Father (67)    Glaucoma Paternal Grandmother    Lung cancer Sister    Other Daughter    Retinal detachment Grandchild        Tobacco Use    Smoking status: Never Smoker    Smokeless tobacco: Never Used   Substance and Sexual Activity    Alcohol use: No    Drug use: No    Sexual activity: Not on file     Review of Systems   Constitution: Negative for chills, fever, weakness and malaise/fatigue.   HENT: Negative for nosebleeds.    Eyes: Negative for double vision, vision loss in left eye and vision loss in right eye.   Cardiovascular: Positive for irregular heartbeat, palpitations and syncope. Negative for chest pain, claudication, dyspnea on exertion, leg swelling, near-syncope, orthopnea and paroxysmal nocturnal dyspnea.   Respiratory: Negative for cough, hemoptysis, shortness of breath and wheezing.    Endocrine: Negative for cold intolerance and heat intolerance.    Hematologic/Lymphatic: Negative for bleeding problem. Does not bruise/bleed easily.   Skin: Negative for color change and rash.   Musculoskeletal: Negative for back pain, falls, muscle weakness and myalgias.   Gastrointestinal: Negative for heartburn, hematemesis, hematochezia, hemorrhoids, jaundice, melena, nausea and vomiting.   Genitourinary: Negative for dysuria and hematuria.   Neurological: Negative for dizziness, focal weakness, headaches, light-headedness, loss of balance, numbness and vertigo.   Psychiatric/Behavioral: Negative for altered mental status, depression and memory loss. The patient is not nervous/anxious.    Allergic/Immunologic: Negative for hives and persistent infections.     Objective:     Vital Signs (Most Recent):  Temp: 97.6 °F (36.4 °C) (02/13/19 2009)  Pulse: 70 (02/13/19 2009)  Resp: 18 (02/13/19 2009)  BP: (!) 164/90 (02/13/19 2009)  SpO2: 96 % (02/13/19 2009) Vital Signs (24h Range):  Temp:  [97.6 °F (36.4 °C)-98 °F (36.7 °C)] 97.6 °F (36.4 °C)  Pulse:  [60-77] 70  Resp:  [16-18] 18  SpO2:  [95 %-99 %] 96 %  BP: (139-204)/(90-94) 164/90     Weight: 85.7 kg (188 lb 15 oz)  Body mass index is 31.44 kg/m².    SpO2: 96 %  O2 Device (Oxygen Therapy): room air    No intake or output data in the 24 hours ending 02/13/19 2110    Lines/Drains/Airways     Central Venous Catheter Line                 Port A Cath Single Lumen right subclavian -- days                Physical Exam   Constitutional: She is oriented to person, place, and time. She appears well-developed and well-nourished.  Non-toxic appearance. No distress.   HENT:   Head: Normocephalic and atraumatic.   Nose: Nose normal.   Eyes: Right eye exhibits no discharge. Left eye exhibits no discharge. Right conjunctiva is not injected. Left conjunctiva is not injected. Right pupil is round. Left pupil is round. Pupils are equal.   Neck: Neck supple. No JVD present. Carotid bruit is not present. No thyromegaly present.   Cardiovascular:  Normal rate, regular rhythm, S1 normal and S2 normal.  No extrasystoles are present. PMI is not displaced. Exam reveals gallop and S4. Exam reveals no S3.   No murmur heard.  Pulses:       Radial pulses are 2+ on the right side, and 2+ on the left side.        Femoral pulses are 2+ on the right side, and 2+ on the left side.       Dorsalis pedis pulses are 2+ on the right side, and 2+ on the left side.        Posterior tibial pulses are 2+ on the right side, and 2+ on the left side.   Pulmonary/Chest: Effort normal and breath sounds normal.   Abdominal: Soft. Normal appearance. There is no hepatosplenomegaly. There is no tenderness.   Musculoskeletal:        Right ankle: She exhibits no swelling, no ecchymosis and no deformity.        Left ankle: She exhibits no swelling, no ecchymosis and no deformity.   Lymphadenopathy:        Head (right side): No submandibular adenopathy present.        Head (left side): No submandibular adenopathy present.     She has no cervical adenopathy.   Neurological: She is alert and oriented to person, place, and time. She is not disoriented. No cranial nerve deficit.   Skin: Skin is warm, dry and intact. No rash noted. She is not diaphoretic. No cyanosis. Nails show no clubbing.   Psychiatric: She has a normal mood and affect. Her speech is normal and behavior is normal. Judgment and thought content normal. Cognition and memory are normal.     Current Medications:     [START ON 2/14/2019] amLODIPine  5 mg Oral Daily    enoxaparin  40 mg Subcutaneous Daily    [START ON 2/14/2019] losartan  100 mg Oral QAM    sotalol  80 mg Oral Q12H    [START ON 2/14/2019] venlafaxine  75 mg Oral Daily     Current Laboratory Results:    Recent Results (from the past 24 hour(s))   CBC auto differential    Collection Time: 02/13/19  2:02 PM   Result Value Ref Range    WBC 8.73 3.90 - 12.70 K/uL    RBC 4.92 4.00 - 5.40 M/uL    Hemoglobin 13.4 12.0 - 16.0 g/dL    Hematocrit 40.8 37.0 - 48.5 %    MCV 83  82 - 98 fL    MCH 27.2 27.0 - 31.0 pg    MCHC 32.8 32.0 - 36.0 g/dL    RDW 13.1 11.5 - 14.5 %    Platelets 237 150 - 350 K/uL    MPV 11.1 9.2 - 12.9 fL    Gran # (ANC) 5.5 1.8 - 7.7 K/uL    Lymph # 2.4 1.0 - 4.8 K/uL    Mono # 0.7 0.3 - 1.0 K/uL    Eos # 0.1 0.0 - 0.5 K/uL    Baso # 0.04 0.00 - 0.20 K/uL    Gran% 62.4 38.0 - 73.0 %    Lymph% 27.4 18.0 - 48.0 %    Mono% 8.4 4.0 - 15.0 %    Eosinophil% 1.1 0.0 - 8.0 %    Basophil% 0.5 0.0 - 1.9 %    Differential Method Automated    Comprehensive metabolic panel    Collection Time: 02/13/19  2:02 PM   Result Value Ref Range    Sodium 142 136 - 145 mmol/L    Potassium 3.6 3.5 - 5.1 mmol/L    Chloride 107 95 - 110 mmol/L    CO2 28 23 - 29 mmol/L    Glucose 85 70 - 110 mg/dL    BUN, Bld 10 8 - 23 mg/dL    Creatinine 0.6 0.5 - 1.4 mg/dL    Calcium 9.4 8.7 - 10.5 mg/dL    Total Protein 7.6 6.0 - 8.4 g/dL    Albumin 4.1 3.5 - 5.2 g/dL    Total Bilirubin 2.2 (H) 0.1 - 1.0 mg/dL    Alkaline Phosphatase 102 55 - 135 U/L    AST 14 10 - 40 U/L    ALT 7 (L) 10 - 44 U/L    Anion Gap 7 (L) 8 - 16 mmol/L    eGFR if African American >60 >60 mL/min/1.73 m^2    eGFR if non African American >60 >60 mL/min/1.73 m^2   Troponin I #1    Collection Time: 02/13/19  2:02 PM   Result Value Ref Range    Troponin I <0.006 0.000 - 0.026 ng/mL   B-Type natriuretic peptide (BNP)    Collection Time: 02/13/19  2:02 PM   Result Value Ref Range    BNP 90 0 - 99 pg/mL   Magnesium    Collection Time: 02/13/19  2:02 PM   Result Value Ref Range    Magnesium 2.1 1.6 - 2.6 mg/dL   TSH    Collection Time: 02/13/19  2:02 PM   Result Value Ref Range    TSH 1.368 0.400 - 4.000 uIU/mL   Protime-INR    Collection Time: 02/13/19  2:02 PM   Result Value Ref Range    Prothrombin Time 10.7 9.0 - 12.5 sec    INR 1.0 0.8 - 1.2   Type & Screen    Collection Time: 02/13/19  2:06 PM   Result Value Ref Range    Group & Rh O POS     Indirect Henrry NEG    Troponin I    Collection Time: 02/13/19  6:30 PM   Result Value Ref  Range    Troponin I <0.006 0.000 - 0.026 ng/mL     Current Imaging Results:    X-Ray Chest AP Portable   Final Result      No acute disease identified.  The source of the patient's chest pain is not established on this study.         Electronically signed by: Alyssa Leos MD   Date:    02/13/2019   Time:    14:26          Assessment and Plan:     Active Diagnoses:    Diagnosis Date Noted POA    PRINCIPAL PROBLEM:  Polymorphic ventricular tachycardia [I47.2] 02/13/2019 Yes    Essential hypertension [I10] 02/08/2017 Yes      Problems Resolved During this Admission:     Assessment and Plan:    1. Polymorphic Ventricular Tachycardia   Suspect related to sotalol.   Stop sotalol.   Consult [facility] EP.      VTE Risk Mitigation (From admission, onward)        Ordered     enoxaparin injection 40 mg  Daily      02/13/19 1834     IP VTE HIGH RISK PATIENT  Once      02/13/19 1834          Thank you for your consult.    I will follow-up with patient. Please contact us if you have any additional questions.    Miky Keita MD  Cardiology   [facility]

## 2019-02-14 NOTE — PLAN OF CARE
Discharge Planning:  Patient admitted on 2-13-19  LOS-day 1  Chart reviewed, Care plan discussed  Discussed care plan with treatment team,  attending Dr Ibarra  Consults following are: case mgt., cardiology, [facility] cardiolgy  PCP updated in [facility]: C[facility]  Pharmacy: [facility]  DME at home: cane  Current dispo: home soon, cardiac rehab ?

## 2019-02-14 NOTE — SUBJECTIVE & OBJECTIVE
Past Medical History:   Diagnosis Date    Aortic atherosclerosis     Benign essential hypertension     Cataract     Senile    Crohn's colitis     Depression, major, recurrent, moderate     Fibromyalgia     GERD (gastroesophageal reflux disease)     Hypertensive cardiomyopathy     IBS (irritable bowel syndrome)     Migraine     Opioid abuse, in remission     Osteopenia     Paget disease of bone     Port-A-Cath in place     right chest    Prolonged QT interval     RA (rheumatoid arthritis)     Sedative hypnotic or anxiolytic dependence     in remission        Past Surgical History:   Procedure Laterality Date    CATARACT EXTRACTION W/  INTRAOCULAR LENS IMPLANT Left 02/21/2017    Dr. Christianson    CATARACT EXTRACTION W/  INTRAOCULAR LENS IMPLANT Right 03/07/2017    Dr. Christianson    CHOLECYSTECTOMY      COLON SURGERY      COLONOSCOPY N/A 3/16/2016    Performed by Dale Trejo MD at [facility] ENDO (4TH FLR)    COLOSTOMY      x3    Colostomy reversal      HEMORRHOID SURGERY      HYSTERECTOMY      INSERTION-INTRAOCULAR LENS (IOL) Right 3/7/2017    Performed by Saul Christianson MD at [facility] OR 1ST FLR    INSERTION-INTRAOCULAR LENS (IOL) Left 2/21/2017    Performed by Saul Christianson MD at [facility] OR 1ST FLR    NECK SURGERY      PHACOEMULSIFICATION-ASPIRATION-CATARACT Right 3/7/2017    Performed by Saul Christianson MD at [facility] OR 1ST FLR    PHACOEMULSIFICATION-ASPIRATION-CATARACT Left 2/21/2017    Performed by Saul [facility]on MD at [facility] OR 1ST FLR    SBO      SMALL INTESTINE SURGERY      TONSILLECTOMY         Review of patient's allergies indicates:   Allergen Reactions    Sandostatin [octreotide acetate] Nausea And Vomiting     Had symptoms when she took Sandostatin with Codeine    Codeine Itching and Nausea And Vomiting     Pill form only, IV ok.,  Had symptoms when she took Codeine with Sandostatin.       No current facility-administered medications on file prior to  encounter.      Current Outpatient Medications on File Prior to Encounter   Medication Sig    amLODIPine (NORVASC) 10 MG tablet Take 5 mg by mouth once daily.     loperamide (IMODIUM) 2 mg capsule     losartan (COZAAR) 50 MG tablet Take 100 mg by mouth every morning.     sotalol (BETAPACE) 120 MG Tab Take 80 mg by mouth every 12 (twelve) hours.    venlafaxine 225 mg TR24 Take 112.5 mg by mouth once daily.     [DISCONTINUED] acetaminophen (TYLENOL) 500 MG tablet Take 500 mg by mouth every 6 (six) hours as needed for Pain.    [DISCONTINUED] busPIRone (BUSPAR) 5 MG Tab Take 5 mg by mouth every 8 (eight) hours as needed.    [DISCONTINUED] furosemide (LASIX) 20 MG tablet 40 mg once daily.     [DISCONTINUED] metoprolol tartrate (LOPRESSOR) 25 MG tablet 2 (two) times daily.     [DISCONTINUED] potassium chloride SA (K-DUR,KLOR-CON) 10 MEQ tablet 20 mEq once daily.     ALBUTEROL SULFATE (VENTOLIN INHL) Inhale 1 puff into the lungs every 4 to 6 hours as needed.     amiodarone (PACERONE) 200 MG Tab Take 200 mg by mouth once daily.     diabetic supplies,miscell (MEDTRONIC REMOTE CONTROL MISC) by Misc.(Non-Drug; Combo Route) route.    diphenoxylate-atropine 2.5-0.025 mg (LOMOTIL) 2.5-0.025 mg per tablet Take 1 tablet by mouth 4 (four) times daily as needed for Diarrhea.     INCONTINENCE PAD,LINER,DISP (BLADDER CONTROL PADS EX ABSORB MISC)     vitamin D (VITAMIN D3) 1000 units Tab Take 185 mg by mouth.    [DISCONTINUED] amitriptyline (ELAVIL) 25 MG tablet Take 25 mg by mouth.    [DISCONTINUED] benazepril (LOTENSIN) 10 MG tablet Take 10 mg by mouth once daily.     [DISCONTINUED] citalopram (CELEXA) 20 MG tablet Take 20 mg by mouth once daily.     [DISCONTINUED] diltiaZEM (CARDIZEM CD) 120 MG Cp24 TAKE ONE CAPSULE BY MOUTH EVERY DAY FOR HEART    [DISCONTINUED] estrogens, conjugated, (PREMARIN) 0.625 MG tablet Take 0.625 mg by mouth as needed (Apply a small amount to vaginal area 2-3x a week as needed.).     [DISCONTINUED] flecainide (TAMBOCOR) 50 MG Tab TK 1 T PO BID    [DISCONTINUED] fluticasone (FLONASE) 50 mcg/actuation nasal spray 2 sprays once daily.     [DISCONTINUED] gabapentin (NEURONTIN) 400 MG capsule     [DISCONTINUED] hydroxychloroquine (PLAQUENIL) 200 mg tablet Take 200 mg by mouth 2 (two) times daily.     [DISCONTINUED] ibuprofen (ADVIL,MOTRIN) 800 MG tablet Take 800 mg by mouth 2 (two) times daily as needed.     [DISCONTINUED] loratadine (CLARITIN) 10 mg tablet Take 10 mg by mouth daily as needed for Allergies.    [DISCONTINUED] magnesium oxide (MAG-OX) 400 mg (241.3 mg magnesium) tablet Take 1 tablet by mouth once daily.    [DISCONTINUED] meclizine (ANTIVERT) 25 mg tablet TAKE 1 TABLET BY MOUTH EVERY 8 TO 12 HOURS AS NEEDED DIZZINESS    [DISCONTINUED] meloxicam (MOBIC) 15 MG tablet 7.5 mg every 12 (twelve) hours as needed (use as directed. can take up to 1 tablet up to every 12 hours as needed only take up to 3-4 days then stop for breast pain.).     [DISCONTINUED] omeprazole (PRILOSEC) 40 MG capsule Take 20 mg by mouth every morning.     [DISCONTINUED] predniSONE (DELTASONE) 10 MG tablet Take 10 mg by mouth.    [DISCONTINUED] promethazine (PHENERGAN) 12.5 MG Tab Take 12.5 mg by mouth every 8 (eight) hours as needed.    [DISCONTINUED] propranolol (INDERAL) 80 MG tablet Take 80 mg by mouth every 12 (twelve) hours.    [DISCONTINUED] sotalol (BETAPACE) 80 MG tablet TAKE 1 TABLET BY MOUTH TWICE A DAY, DISCONTINUE FLECAINIDE    [DISCONTINUED] spironolactone (ALDACTONE) 25 MG tablet     [DISCONTINUED] tiZANidine (ZANAFLEX) 2 MG tablet TAKE 1 TABLET BY MOUTH TID AS NEEDED FOR MUSCLE SPASMS    [DISCONTINUED] trazodone (DESYREL) 50 MG tablet every evening.     [DISCONTINUED] VENTOLIN HFA 90 mcg/actuation inhaler 2 puffs every 4 (four) hours as needed.      Family History     Problem Relation (Age of Onset)    Breast cancer Daughter (40), Daughter (47), Sister    Cancer Mother    Colon cancer  Maternal Grandmother    Emphysema Father (67)    Glaucoma Paternal Grandmother    Lung cancer Sister    Other Daughter    Retinal detachment Grandchild        Tobacco Use    Smoking status: Never Smoker    Smokeless tobacco: Never Used   Substance and Sexual Activity    Alcohol use: No    Drug use: No    Sexual activity: Not on file     Review of Systems   Constitutional: Negative for chills and fever.   HENT: Negative for rhinorrhea and sore throat.    Eyes: Negative for photophobia and visual disturbance.   Respiratory: Positive for shortness of breath. Negative for cough.    Cardiovascular: Positive for palpitations. Negative for chest pain.   Gastrointestinal: Positive for constipation and diarrhea. Negative for nausea and vomiting.   Endocrine: Negative for polydipsia and polyuria.   Genitourinary: Negative for dysuria and frequency.   Musculoskeletal: Negative for back pain and gait problem.   Neurological: Positive for dizziness, syncope and light-headedness. Negative for weakness and headaches.   Psychiatric/Behavioral: Positive for dysphoric mood. Negative for confusion.     Objective:     Vital Signs (Most Recent):  Temp: 98 °F (36.7 °C) (02/13/19 1747)  Pulse: 68 (02/13/19 1800)  Resp: 16 (02/13/19 1747)  BP: (!) 143/93 (02/13/19 1747)  SpO2: 99 % (02/13/19 1747) Vital Signs (24h Range):  Temp:  [97.6 °F (36.4 °C)-98 °F (36.7 °C)] 98 °F (36.7 °C)  Pulse:  [60-77] 68  Resp:  [16-18] 16  SpO2:  [95 %-99 %] 99 %  BP: (139-204)/(90-94) 143/93     Weight: 85.7 kg (188 lb 15 oz)  Body mass index is 31.44 kg/m².    Physical Exam   Constitutional: She is oriented to person, place, and time. She appears well-developed and well-nourished.   Ill-appearing.   HENT:   Head: Normocephalic.   Eyes: Conjunctivae are normal. Pupils are equal, round, and reactive to light.   Neck: Neck supple. No thyromegaly present.   Cardiovascular: Normal rate, regular rhythm and intact distal pulses. Exam reveals no gallop and no  friction rub.   Murmur heard.  S1 S2 with ectopy.   Pulmonary/Chest: Effort normal and breath sounds normal. No respiratory distress.   Abdominal: Soft. Bowel sounds are normal. She exhibits no distension. There is no tenderness.   Musculoskeletal: Normal range of motion. She exhibits no edema.   Lymphadenopathy:     She has no cervical adenopathy.   Neurological: She is alert and oriented to person, place, and time.   Strength equal and symmetric   Skin: Skin is warm and dry. No rash noted.   Psychiatric: She has a normal mood and affect. Her behavior is normal. Thought content normal.         CRANIAL NERVES     CN III, IV, VI   Pupils are equal, round, and reactive to light.       Significant Labs: All pertinent labs within the past 24 hours have been reviewed.    Significant Imaging: I have reviewed all pertinent imaging results/findings within the past 24 hours.

## 2019-02-14 NOTE — PROGRESS NOTES
[facility]  Cardiology  Progress Note    Patient Name: Celine Sinclair  MRN: [id number]  Admission Date: 2/13/2019  Hospital Length of Stay: 1 days  Code Status: Full Code   Attending Physician: Debora Ibarra MD   Primary Care Physician: Lety Jaquez MD  Expected Discharge Date:   Principal Problem:Polymorphic ventricular tachycardia    Subjective:     Brief HPI:    Celine Sinclair is a 68 y.o.female with hypertension. She has had palpitations and unexplained syncope. She had a loop recorder implanted on 9/28/2018. The look recorder is monitored by Dr. Aguilar at [facility]. According to notes in the chart polymorphic VT was seen on 2/9/2019 and again on 2/12/2012. She has been on sotalol. She was advised to go to the ER for admission.      Hospital Course:     Sotalol was discontinued.    Interval History:     No further arrhythmias. No CP or SOB.    Review of Systems   Constitution: Negative for chills, fever, weakness and malaise/fatigue.   HENT: Negative for nosebleeds.    Eyes: Negative for double vision, vision loss in left eye and vision loss in right eye.   Cardiovascular: Negative for chest pain, claudication, dyspnea on exertion, irregular heartbeat, leg swelling, near-syncope, orthopnea, palpitations, paroxysmal nocturnal dyspnea and syncope.   Respiratory: Negative for cough, hemoptysis, shortness of breath and wheezing.    Endocrine: Negative for cold intolerance and heat intolerance.   Hematologic/Lymphatic: Negative for bleeding problem. Does not bruise/bleed easily.   Skin: Negative for color change and rash.   Musculoskeletal: Negative for back pain, falls, muscle weakness and myalgias.   Gastrointestinal: Negative for heartburn, hematemesis, hematochezia, hemorrhoids, jaundice, melena, nausea and vomiting.   Genitourinary: Negative for dysuria and hematuria.   Neurological: Negative for dizziness, focal weakness, headaches, light-headedness, loss of balance, numbness and vertigo.    Psychiatric/Behavioral: Negative for altered mental status, depression and memory loss. The patient is not nervous/anxious.    Allergic/Immunologic: Negative for hives and persistent infections.     Objective:     Vital Signs (Most Recent):  Temp: 97.8 °F (36.6 °C) (02/14/19 1112)  Pulse: 69 (02/14/19 1600)  Resp: 18 (02/14/19 1403)  BP: 126/62 (02/14/19 1112)  SpO2: 98 % (02/14/19 1403) Vital Signs (24h Range):  Temp:  [97.5 °F (36.4 °C)-98 °F (36.7 °C)] 97.8 °F (36.6 °C)  Pulse:  [55-82] 69  Resp:  [16-18] 18  SpO2:  [93 %-99 %] 98 %  BP: (126-164)/(62-93) 126/62     Weight: 85.7 kg (188 lb 15 oz)  Body mass index is 31.44 kg/m².    SpO2: 98 %  O2 Device (Oxygen Therapy): room air      Intake/Output Summary (Last 24 hours) at 2/14/2019 1738  Last data filed at 2/14/2019 1500  Gross per 24 hour   Intake 600 ml   Output --   Net 600 ml       Lines/Drains/Airways     Central Venous Catheter Line                 Port A Cath Single Lumen right subclavian -- days          Peripheral Intravenous Line                 Peripheral IV - Single Lumen 02/13/19 2247 Posterior;Right Hand less than 1 day                Physical Exam   Constitutional: She is oriented to person, place, and time. She appears well-developed and well-nourished.  Non-toxic appearance. No distress.   HENT:   Head: Normocephalic and atraumatic.   Nose: Nose normal.   Eyes: Right eye exhibits no discharge. Left eye exhibits no discharge. Right conjunctiva is not injected. Left conjunctiva is not injected. Right pupil is round. Left pupil is round. Pupils are equal.   Neck: Neck supple. No JVD present. Carotid bruit is not present. No thyromegaly present.   Cardiovascular: Normal rate, regular rhythm, S1 normal and S2 normal.  No extrasystoles are present. PMI is not displaced. Exam reveals gallop and S4.   Pulses:       Radial pulses are 2+ on the right side, and 2+ on the left side.        Femoral pulses are 2+ on the right side, and 2+ on the left  side.       Dorsalis pedis pulses are 2+ on the right side, and 2+ on the left side.        Posterior tibial pulses are 2+ on the right side, and 2+ on the left side.   Pulmonary/Chest: Effort normal and breath sounds normal.   Abdominal: Soft. Normal appearance. There is no hepatosplenomegaly. There is no tenderness.   Musculoskeletal:        Right ankle: She exhibits no swelling, no ecchymosis and no deformity.        Left ankle: She exhibits no swelling, no ecchymosis and no deformity.   Lymphadenopathy:        Head (right side): No submandibular adenopathy present.        Head (left side): No submandibular adenopathy present.     She has no cervical adenopathy.   Neurological: She is alert and oriented to person, place, and time. She is not disoriented. No cranial nerve deficit.   Skin: Skin is warm, dry and intact. No rash noted. She is not diaphoretic. No cyanosis. Nails show no clubbing.   Psychiatric: She has a normal mood and affect. Her speech is normal and behavior is normal. Judgment and thought content normal. Cognition and memory are normal.     Current Medications:     amLODIPine  5 mg Oral Daily    losartan  100 mg Oral QAM     Current Laboratory Results:    Recent Results (from the past 24 hour(s))   Troponin I    Collection Time: 02/13/19  6:30 PM   Result Value Ref Range    Troponin I <0.006 0.000 - 0.026 ng/mL   Troponin I    Collection Time: 02/14/19 12:40 AM   Result Value Ref Range    Troponin I <0.006 0.000 - 0.026 ng/mL   CBC auto differential    Collection Time: 02/14/19  4:29 AM   Result Value Ref Range    WBC 8.38 3.90 - 12.70 K/uL    RBC 4.77 4.00 - 5.40 M/uL    Hemoglobin 13.1 12.0 - 16.0 g/dL    Hematocrit 39.5 37.0 - 48.5 %    MCV 83 82 - 98 fL    MCH 27.5 27.0 - 31.0 pg    MCHC 33.2 32.0 - 36.0 g/dL    RDW 12.9 11.5 - 14.5 %    Platelets 219 150 - 350 K/uL    MPV 11.8 9.2 - 12.9 fL    Gran # (ANC) 5.2 1.8 - 7.7 K/uL    Lymph # 2.1 1.0 - 4.8 K/uL    Mono # 0.9 0.3 - 1.0 K/uL    Eos  # 0.1 0.0 - 0.5 K/uL    Baso # 0.03 0.00 - 0.20 K/uL    Gran% 62.5 38.0 - 73.0 %    Lymph% 24.7 18.0 - 48.0 %    Mono% 10.6 4.0 - 15.0 %    Eosinophil% 1.6 0.0 - 8.0 %    Basophil% 0.4 0.0 - 1.9 %    Differential Method Automated    Comprehensive metabolic panel    Collection Time: 02/14/19  4:29 AM   Result Value Ref Range    Sodium 139 136 - 145 mmol/L    Potassium 3.6 3.5 - 5.1 mmol/L    Chloride 108 95 - 110 mmol/L    CO2 21 (L) 23 - 29 mmol/L    Glucose 97 70 - 110 mg/dL    BUN, Bld 18 8 - 23 mg/dL    Creatinine 0.7 0.5 - 1.4 mg/dL    Calcium 9.3 8.7 - 10.5 mg/dL    Total Protein 6.9 6.0 - 8.4 g/dL    Albumin 3.5 3.5 - 5.2 g/dL    Total Bilirubin 2.2 (H) 0.1 - 1.0 mg/dL    Alkaline Phosphatase 88 55 - 135 U/L    AST 13 10 - 40 U/L    ALT 7 (L) 10 - 44 U/L    Anion Gap 10 8 - 16 mmol/L    eGFR if African American >60 >60 mL/min/1.73 m^2    eGFR if non African American >60 >60 mL/min/1.73 m^2     Current Imaging Results:    X-Ray Chest AP Portable   Final Result      No acute disease identified.  The source of the patient's chest pain is not established on this study.         Electronically signed by: Alyssa Leos MD   Date:    02/13/2019   Time:    14:26          Assessment and Plan:     Problem List:    Active Diagnoses:    Diagnosis Date Noted POA    PRINCIPAL PROBLEM:  Polymorphic ventricular tachycardia [I47.2] 02/13/2019 Yes    Essential hypertension [I10] 02/08/2017 Yes      Problems Resolved During this Admission:     Assessment and Plan:     1. Polymorphic Ventricular Tachycardia              Suspect related to sotalol and sotalol was stopped.              2/14/2019: [facility] EP consult noted.   2/15/2019: Plan coronary angiography to r/o obstructive CAD and ischemia as cause.    The planned procedure was discussed in detail with the patient and the family members present. Risk, benefit and alternatives were reviewed. All questions answered. Consent was then signed.    If further questions or concerns  arise the patient was encouraged to contact me prior to the planned procedure.       VTE Risk Mitigation (From admission, onward)        Ordered     IP VTE HIGH RISK PATIENT  Once      02/13/19 7546          Miky Keita MD  Cardiology  [facility]

## 2019-02-14 NOTE — NURSING
Plan of care reviewed with Pt, purposeful hourly rounding performed. VSS on RA. NAD during shift. Tele monitor maintained. No c/o at this time. Bed locked and lowered, call light in reach. Will continue to monitor.

## 2019-02-14 NOTE — H&P
"[facility] Hospital Medicine  History & Physical    Patient Name: Celine Sinclair  MRN: [id number]  Admission Date: 2/13/2019  Attending Physician: Debora Ibarra MD   Primary Care Provider: Lety Jaquez MD         Patient information was obtained from patient, past medical records and ER records.     Subjective:     Principal Problem:Polymorphic ventricular tachycardia    Chief Complaint:   Chief Complaint   Patient presents with    Abnormal ECG     Pt reports sent from [employer] w/ note stating "polymorphic ventricular tachycardia" on recorder. Pt denies chest pains currently, + increased SOB and WOb x 1 week upon exertion. + intermittent dizziness         HPI: Ms. Sinclair is a 68 year old woman who presented with a report of polymorphic V tach picked up on her implanted loop recorder.  Patient was at her PCP's office to do some lab work when she was contacted by her cardiologist to report to the hospital because she had an episode of PVT lasting about 30 beats while she was asleep the previous night.  Patient has been having palpitations for many years, but over the last few months she has been having episodes more frequently and she is constantly short of breath.  She has had multiple episodes of dizziness and passing out.  Two weeks ago she became ill after eating out and had about a week of vomiting and diarrhea.  Since then she has been winded with minimal exertion.  Her medications have been revised recently with multiple medications discontinued including sotalol, with the addition of amiodarone, but she has not picked it up yet and is still taking sotalol.  On presentation she had /94 and HR 64.  EKG showed frequent PVCs and PACs.  Labs were normal except for total bilirubin 2.2.    Medical history includes Crohn's disease.  She has had multiple small bowel obstructions that required surgery with resections and colostomies in 2001, 2002 and 2003.  The colostomies were all " reversed.  She has been on MTX and Remicade but now only uses symptomatic treatment for constipation or diarrhea.  She had bilateral cataract surgery.  [facility] record shows 2D echo from 4/2018 with EF 61%, and a nuclear cardiac scan in 1/2018 was negative for ischemia.         Past Medical History:   Diagnosis Date    Aortic atherosclerosis     Benign essential hypertension     Cataract     Senile    Crohn's colitis     Depression, major, recurrent, moderate     Fibromyalgia     GERD (gastroesophageal reflux disease)     Hypertensive cardiomyopathy     IBS (irritable bowel syndrome)     Migraine     Opioid abuse, in remission     Osteopenia     Paget disease of bone     Port-A-Cath in place     right chest    Prolonged QT interval     RA (rheumatoid arthritis)     Sedative hypnotic or anxiolytic dependence     in remission        Past Surgical History:   Procedure Laterality Date    CATARACT EXTRACTION W/  INTRAOCULAR LENS IMPLANT Left 02/21/2017    Dr. Christianson    CATARACT EXTRACTION W/  INTRAOCULAR LENS IMPLANT Right 03/07/2017    Dr. Christianson    CHOLECYSTECTOMY      COLON SURGERY      COLONOSCOPY N/A 3/16/2016    Performed by Dale Trejo [facility] at [facility] (4TH FLR)    COLOSTOMY      x3    Colostomy reversal      HEMORRHOID SURGERY      HYSTERECTOMY      INSERTION-INTRAOCULAR LENS (IOL) Right 3/7/2017    Performed by Saul [facility]on MD at [facility] OR 1ST FLR    INSERTION-INTRAOCULAR LENS (IOL) Left 2/21/2017    Performed by Saul [facility] at [facility] OR 1ST FLR    NECK SURGERY      PHACOEMULSIFICATION-ASPIRATION-CATARACT Right 3/7/2017    Performed by Saul [facility] at [facility] OR 1ST FLR    PHACOEMULSIFICATION-ASPIRATION-CATARACT Left 2/21/2017    Performed by Saul [facility] at [facility] OR 1ST FLR    SBO      SMALL INTESTINE SURGERY      TONSILLECTOMY         Review of patient's allergies indicates:   Allergen Reactions    Sandostatin [octreotide acetate]  Nausea And Vomiting     Had symptoms when she took Sandostatin with Codeine    Codeine Itching and Nausea And Vomiting     Pill form only, IV ok.,  Had symptoms when she took Codeine with Sandostatin.       No current facility-administered medications on file prior to encounter.      Current Outpatient Medications on File Prior to Encounter   Medication Sig    amLODIPine (NORVASC) 10 MG tablet Take 5 mg by mouth once daily.     loperamide (IMODIUM) 2 mg capsule     losartan (COZAAR) 50 MG tablet Take 100 mg by mouth every morning.     sotalol (BETAPACE) 120 MG Tab Take 80 mg by mouth every 12 (twelve) hours.    venlafaxine 225 mg TR24 Take 112.5 mg by mouth once daily.     [DISCONTINUED] acetaminophen (TYLENOL) 500 MG tablet Take 500 mg by mouth every 6 (six) hours as needed for Pain.    [DISCONTINUED] busPIRone (BUSPAR) 5 MG Tab Take 5 mg by mouth every 8 (eight) hours as needed.    [DISCONTINUED] furosemide (LASIX) 20 MG tablet 40 mg once daily.     [DISCONTINUED] metoprolol tartrate (LOPRESSOR) 25 MG tablet 2 (two) times daily.     [DISCONTINUED] potassium chloride SA (K-DUR,KLOR-CON) 10 MEQ tablet 20 mEq once daily.     ALBUTEROL SULFATE (VENTOLIN INHL) Inhale 1 puff into the lungs every 4 to 6 hours as needed.     amiodarone (PACERONE) 200 MG Tab Take 200 mg by mouth once daily.     diabetic supplies,miscell (MEDTRONIC REMOTE CONTROL MISC) by Misc.(Non-Drug; Combo Route) route.    diphenoxylate-atropine 2.5-0.025 mg (LOMOTIL) 2.5-0.025 mg per tablet Take 1 tablet by mouth 4 (four) times daily as needed for Diarrhea.     INCONTINENCE PAD,LINER,DISP (BLADDER CONTROL PADS EX ABSORB MISC)     vitamin D (VITAMIN D3) 1000 units Tab Take 185 mg by mouth.    [DISCONTINUED] amitriptyline (ELAVIL) 25 MG tablet Take 25 mg by mouth.    [DISCONTINUED] benazepril (LOTENSIN) 10 MG tablet Take 10 mg by mouth once daily.     [DISCONTINUED] citalopram (CELEXA) 20 MG tablet Take 20 mg by mouth once daily.      [DISCONTINUED] diltiaZEM (CARDIZEM CD) 120 MG Cp24 TAKE ONE CAPSULE BY MOUTH EVERY DAY FOR HEART    [DISCONTINUED] estrogens, conjugated, (PREMARIN) 0.625 MG tablet Take 0.625 mg by mouth as needed (Apply a small amount to vaginal area 2-3x a week as needed.).    [DISCONTINUED] flecainide (TAMBOCOR) 50 MG Tab TK 1 T PO BID    [DISCONTINUED] fluticasone (FLONASE) 50 mcg/actuation nasal spray 2 sprays once daily.     [DISCONTINUED] gabapentin (NEURONTIN) 400 MG capsule     [DISCONTINUED] hydroxychloroquine (PLAQUENIL) 200 mg tablet Take 200 mg by mouth 2 (two) times daily.     [DISCONTINUED] ibuprofen (ADVIL,MOTRIN) 800 MG tablet Take 800 mg by mouth 2 (two) times daily as needed.     [DISCONTINUED] loratadine (CLARITIN) 10 mg tablet Take 10 mg by mouth daily as needed for Allergies.    [DISCONTINUED] magnesium oxide (MAG-OX) 400 mg (241.3 mg magnesium) tablet Take 1 tablet by mouth once daily.    [DISCONTINUED] meclizine (ANTIVERT) 25 mg tablet TAKE 1 TABLET BY MOUTH EVERY 8 TO 12 HOURS AS NEEDED DIZZINESS    [DISCONTINUED] meloxicam (MOBIC) 15 MG tablet 7.5 mg every 12 (twelve) hours as needed (use as directed. can take up to 1 tablet up to every 12 hours as needed only take up to 3-4 days then stop for breast pain.).     [DISCONTINUED] omeprazole (PRILOSEC) 40 MG capsule Take 20 mg by mouth every morning.     [DISCONTINUED] predniSONE (DELTASONE) 10 MG tablet Take 10 mg by mouth.    [DISCONTINUED] promethazine (PHENERGAN) 12.5 MG Tab Take 12.5 mg by mouth every 8 (eight) hours as needed.    [DISCONTINUED] propranolol (INDERAL) 80 MG tablet Take 80 mg by mouth every 12 (twelve) hours.    [DISCONTINUED] sotalol (BETAPACE) 80 MG tablet TAKE 1 TABLET BY MOUTH TWICE A DAY, DISCONTINUE FLECAINIDE    [DISCONTINUED] spironolactone (ALDACTONE) 25 MG tablet     [DISCONTINUED] tiZANidine (ZANAFLEX) 2 MG tablet TAKE 1 TABLET BY MOUTH TID AS NEEDED FOR MUSCLE SPASMS    [DISCONTINUED] trazodone (DESYREL) 50 MG  tablet every evening.     [DISCONTINUED] VENTOLIN HFA 90 mcg/actuation inhaler 2 puffs every 4 (four) hours as needed.      Family History     Problem Relation (Age of Onset)    Breast cancer Daughter (40), Daughter (47), Sister    Cancer Mother    Colon cancer Maternal Grandmother    Emphysema Father (67)    Glaucoma Paternal Grandmother    Lung cancer Sister    Other Daughter    Retinal detachment Grandchild        Tobacco Use    Smoking status: Never Smoker    Smokeless tobacco: Never Used   Substance and Sexual Activity    Alcohol use: No    Drug use: No    Sexual activity: Not on file     Review of Systems   Constitutional: Negative for chills and fever.   HENT: Negative for rhinorrhea and sore throat.    Eyes: Negative for photophobia and visual disturbance.   Respiratory: Positive for shortness of breath. Negative for cough.    Cardiovascular: Positive for palpitations. Negative for chest pain.   Gastrointestinal: Positive for constipation and diarrhea. Negative for nausea and vomiting.   Endocrine: Negative for polydipsia and polyuria.   Genitourinary: Negative for dysuria and frequency.   Musculoskeletal: Negative for back pain and gait problem.   Neurological: Positive for dizziness, syncope and light-headedness. Negative for weakness and headaches.   Psychiatric/Behavioral: Positive for dysphoric mood. Negative for confusion.     Objective:     Vital Signs (Most Recent):  Temp: 98 °F (36.7 °C) (02/13/19 1747)  Pulse: 68 (02/13/19 1800)  Resp: 16 (02/13/19 1747)  BP: (!) 143/93 (02/13/19 1747)  SpO2: 99 % (02/13/19 1747) Vital Signs (24h Range):  Temp:  [97.6 °F (36.4 °C)-98 °F (36.7 °C)] 98 °F (36.7 °C)  Pulse:  [60-77] 68  Resp:  [16-18] 16  SpO2:  [95 %-99 %] 99 %  BP: (139-204)/(90-94) 143/93     Weight: 85.7 kg (188 lb 15 oz)  Body mass index is 31.44 kg/m².    Physical Exam   Constitutional: She is oriented to person, place, and time. She appears well-developed and well-nourished.    Ill-appearing.   HENT:   Head: Normocephalic.   Eyes: Conjunctivae are normal. Pupils are equal, round, and reactive to light.   Neck: Neck supple. No thyromegaly present.   Cardiovascular: Normal rate, regular rhythm and intact distal pulses. Exam reveals no gallop and no friction rub.   Murmur heard.  S1 S2 with ectopy.   Pulmonary/Chest: Effort normal and breath sounds normal. No respiratory distress.   Abdominal: Soft. Bowel sounds are normal. She exhibits no distension. There is no tenderness.   Musculoskeletal: Normal range of motion. She exhibits no edema.   Lymphadenopathy:     She has no cervical adenopathy.   Neurological: She is alert and oriented to person, place, and time.   Strength equal and symmetric   Skin: Skin is warm and dry. No rash noted.   Psychiatric: She has a normal mood and affect. Her behavior is normal. Thought content normal.         CRANIAL NERVES     CN III, IV, VI   Pupils are equal, round, and reactive to light.       Significant Labs: All pertinent labs within the past 24 hours have been reviewed.    Significant Imaging: I have reviewed all pertinent imaging results/findings within the past 24 hours.    Assessment/Plan:     * Polymorphic ventricular tachycardia    - Seen on loop recorder while patient was asleep.  - Many months long history of dizzy symptoms and syncopal episodes.  Recorder implanted 9/2018 by Dr. Humberto Vallejo.  - Cardiologist consulted.  For now will continue her sotalol as she has not started amiodarone yet.  Defer to cardiologist on this.  - Patient followed by cardiologist Dr. Palomares at [facility].   - Continue to wean off Effexor.  She has a couple of days of the half dose to go.  BuSpar already discontinued.       Essential hypertension    - Continue amlodipine, losartan at home doses.         VTE Risk Mitigation (From admission, onward)        Ordered     enoxaparin injection 40 mg  Daily      02/13/19 1834     IP VTE HIGH RISK PATIENT  Once      02/13/19  1834             Debora Greco MD  Department of Hospital Medicine   [facility]

## 2019-02-14 NOTE — PLAN OF CARE
Problem: Adult Inpatient Plan of Care  Goal: Plan of Care Review  Outcome: Ongoing (interventions implemented as appropriate)  Pt remained free of falls and injuries. IV flushed and saline locked. No complaints of pain. Ambulates with stand by assistance. VSS on room air. Bed low and locked, call light within reach, side rails up X2. Will continue to monitor.

## 2019-02-14 NOTE — PLAN OF CARE
Problem: Adult Inpatient Plan of Care  Goal: Plan of Care Review  Outcome: Ongoing (interventions implemented as appropriate)  Pt remained free of falls and injuries. AAOx3. IV flushed and saline locked. Managed pain with PRN medications. VSS on room air. Ambulates with a cane without assistance. Continent x2. Bed low and locked, call light within reach, side rails up X2, family at bedside. Will continue to monitor.

## 2019-02-14 NOTE — PLAN OF CARE
02/14/19 1635   Discharge Assessment   Assessment Type Discharge Planning Assessment   Confirmed/corrected address and phone number on facesheet? Yes   Assessment information obtained from? Patient;Caregiver;Medical Record   Communicated expected length of stay with patient/caregiver yes   Prior to hospitilization cognitive status: Alert/Oriented   Prior to hospitalization functional status: Assistive Equipment   Current cognitive status: Alert/Oriented   Current Functional Status: Assistive Equipment   Lives With spouse   Equipment Currently Used at Home cane, straight   Discharge Plan A Home   Discharge Plan B Other  (cardiiac rehab ?)

## 2019-02-14 NOTE — ASSESSMENT & PLAN NOTE
- Seen on loop recorder while patient was asleep.  - Many months long history of dizzy symptoms and syncopal episodes.  Recorder implanted 9/2018 by Dr. Humberto Vallejo.  - Cardiologist consulted.  For now will continue her sotalol as she has not started amiodarone yet.  Defer to cardiologist on this.  - Patient followed by cardiologist Dr. Palomares at [facility].   - Continue to wean off Effexor.  She has a couple of days of the half dose to go.  BuSpar already discontinued.

## 2019-02-14 NOTE — HPI
Ms. Sinclair is a 68 year old woman who presented with a report of polymorphic V tach picked up on her implanted loop recorder.  Patient was at her PCP's office to do some lab work when she was contacted by her cardiologist to report to the hospital because she had an episode of PVT lasting about 30 beats while she was asleep the previous night.  Patient has been having palpitations for many years, but over the last few months she has been having episodes more frequently and she is constantly short of breath.  She has had multiple episodes of dizziness and passing out.  Two weeks ago she became ill after eating out and had about a week of vomiting and diarrhea.  Since then she has been winded with minimal exertion.  Her medications have been revised recently with multiple medications discontinued including sotalol, with the addition of amiodarone, but she has not picked it up yet and is still taking sotalol.  On presentation she had /94 and HR 64.  EKG showed frequent PVCs and PACs.  Labs were normal except for total bilirubin 2.2.    Medical history includes Crohn's disease.  She has had multiple small bowel obstructions that required surgery with resections and colostomies in 2001, 2002 and 2003.  The colostomies were all reversed.  She has been on MTX and Remicade but now only uses symptomatic treatment for constipation or diarrhea.  She had bilateral cataract surgery.  [facility] record shows 2D echo from 4/2018 with EF 61%, and a nuclear cardiac scan in 1/2018 was negative for ischemia.

## 2019-02-15 LAB
ALBUMIN SERPL BCP-MCNC: 3.6 G/DL
ALP SERPL-CCNC: 93 U/L
ALT SERPL W/O P-5'-P-CCNC: 7 U/L
ANION GAP SERPL CALC-SCNC: 11 MMOL/L
AST SERPL-CCNC: 14 U/L
BASOPHILS # BLD AUTO: 0.03 K/UL
BASOPHILS NFR BLD: 0.4 %
BILIRUB SERPL-MCNC: 1.8 MG/DL
BUN SERPL-MCNC: 18 MG/DL
CALCIUM SERPL-MCNC: 9.1 MG/DL
CATH EF ESTIMATED: 35 %
CHLORIDE SERPL-SCNC: 108 MMOL/L
CO2 SERPL-SCNC: 20 MMOL/L
CREAT SERPL-MCNC: 0.6 MG/DL
DIFFERENTIAL METHOD: NORMAL
EOSINOPHIL # BLD AUTO: 0.1 K/UL
EOSINOPHIL NFR BLD: 1.6 %
ERYTHROCYTE [DISTWIDTH] IN BLOOD BY AUTOMATED COUNT: 12.9 %
EST. GFR  (AFRICAN AMERICAN): >60 ML/MIN/1.73 M^2
EST. GFR  (NON AFRICAN AMERICAN): >60 ML/MIN/1.73 M^2
GLUCOSE SERPL-MCNC: 101 MG/DL
HCT VFR BLD AUTO: 39.2 %
HGB BLD-MCNC: 13 G/DL
LYMPHOCYTES # BLD AUTO: 2.6 K/UL
LYMPHOCYTES NFR BLD: 32 %
MCH RBC QN AUTO: 27.5 PG
MCHC RBC AUTO-ENTMCNC: 33.2 G/DL
MCV RBC AUTO: 83 FL
MONOCYTES # BLD AUTO: 0.8 K/UL
MONOCYTES NFR BLD: 9.8 %
NEUTROPHILS # BLD AUTO: 4.5 K/UL
NEUTROPHILS NFR BLD: 55.9 %
OHS CV CATH LVEDP POST: 17
OHS CV CATH LVEDP PRE: 16
PLATELET # BLD AUTO: 196 K/UL
PMV BLD AUTO: 11.7 FL
POTASSIUM SERPL-SCNC: 3.8 MMOL/L
PROT SERPL-MCNC: 6.9 G/DL
RBC # BLD AUTO: 4.73 M/UL
SODIUM SERPL-SCNC: 139 MMOL/L
WBC # BLD AUTO: 8 K/UL

## 2019-02-15 PROCEDURE — 63600175 PHARM REV CODE 636 W HCPCS: Performed by: INTERNAL MEDICINE

## 2019-02-15 PROCEDURE — 80053 COMPREHEN METABOLIC PANEL: CPT

## 2019-02-15 PROCEDURE — 25000003 PHARM REV CODE 250: Performed by: INTERNAL MEDICINE

## 2019-02-15 PROCEDURE — 99233 SBSQ HOSP IP/OBS HIGH 50: CPT | Mod: ,,, | Performed by: HOSPITALIST

## 2019-02-15 PROCEDURE — C1894 INTRO/SHEATH, NON-LASER: HCPCS | Performed by: INTERNAL MEDICINE

## 2019-02-15 PROCEDURE — C1769 GUIDE WIRE: HCPCS | Performed by: INTERNAL MEDICINE

## 2019-02-15 PROCEDURE — 11000001 HC ACUTE MED/SURG PRIVATE ROOM

## 2019-02-15 PROCEDURE — 99233 PR SUBSEQUENT HOSPITAL CARE,LEVL III: ICD-10-PCS | Mod: ,,, | Performed by: HOSPITALIST

## 2019-02-15 PROCEDURE — 25500020 PHARM REV CODE 255: Performed by: INTERNAL MEDICINE

## 2019-02-15 PROCEDURE — 85025 COMPLETE CBC W/AUTO DIFF WBC: CPT | Mod: 91

## 2019-02-15 PROCEDURE — 94761 N-INVAS EAR/PLS OXIMETRY MLT: CPT

## 2019-02-15 PROCEDURE — 93458 L HRT ARTERY/VENTRICLE ANGIO: CPT | Performed by: INTERNAL MEDICINE

## 2019-02-15 PROCEDURE — 25000003 PHARM REV CODE 250: Performed by: HOSPITALIST

## 2019-02-15 PROCEDURE — 36415 COLL VENOUS BLD VENIPUNCTURE: CPT

## 2019-02-15 RX ORDER — HEPARIN SOD,PORCINE/0.9 % NACL 1000/500ML
INTRAVENOUS SOLUTION INTRAVENOUS
Status: DISCONTINUED | OUTPATIENT
Start: 2019-02-15 | End: 2019-02-15 | Stop reason: HOSPADM

## 2019-02-15 RX ORDER — FENTANYL CITRATE 50 UG/ML
INJECTION, SOLUTION INTRAMUSCULAR; INTRAVENOUS
Status: DISCONTINUED | OUTPATIENT
Start: 2019-02-15 | End: 2019-02-15 | Stop reason: HOSPADM

## 2019-02-15 RX ORDER — METOPROLOL SUCCINATE 50 MG/1
50 TABLET, EXTENDED RELEASE ORAL DAILY
Status: CANCELLED | OUTPATIENT
Start: 2019-02-15

## 2019-02-15 RX ORDER — MIDAZOLAM HYDROCHLORIDE 1 MG/ML
INJECTION, SOLUTION INTRAMUSCULAR; INTRAVENOUS
Status: DISCONTINUED | OUTPATIENT
Start: 2019-02-15 | End: 2019-02-15 | Stop reason: HOSPADM

## 2019-02-15 RX ORDER — HYDROCODONE BITARTRATE AND ACETAMINOPHEN 5; 325 MG/1; MG/1
1 TABLET ORAL EVERY 4 HOURS PRN
Status: DISCONTINUED | OUTPATIENT
Start: 2019-02-15 | End: 2019-02-17 | Stop reason: HOSPADM

## 2019-02-15 RX ORDER — ACETAMINOPHEN 325 MG/1
650 TABLET ORAL EVERY 4 HOURS PRN
Status: DISCONTINUED | OUTPATIENT
Start: 2019-02-15 | End: 2019-02-16

## 2019-02-15 RX ORDER — LIDOCAINE HYDROCHLORIDE 10 MG/ML
INJECTION INFILTRATION; PERINEURAL
Status: DISCONTINUED | OUTPATIENT
Start: 2019-02-15 | End: 2019-02-15 | Stop reason: HOSPADM

## 2019-02-15 RX ORDER — SODIUM CHLORIDE 9 MG/ML
INJECTION, SOLUTION INTRAVENOUS CONTINUOUS
Status: DISCONTINUED | OUTPATIENT
Start: 2019-02-15 | End: 2019-02-16

## 2019-02-15 RX ADMIN — TRAMADOL HYDROCHLORIDE 50 MG: 50 TABLET, FILM COATED ORAL at 07:02

## 2019-02-15 RX ADMIN — SODIUM CHLORIDE: 0.9 INJECTION, SOLUTION INTRAVENOUS at 06:02

## 2019-02-15 RX ADMIN — ACETAMINOPHEN 650 MG: 325 TABLET, FILM COATED ORAL at 10:02

## 2019-02-15 RX ADMIN — HYDROCODONE BITARTRATE AND ACETAMINOPHEN 1 TABLET: 5; 325 TABLET ORAL at 06:02

## 2019-02-15 RX ADMIN — AMLODIPINE BESYLATE 5 MG: 5 TABLET ORAL at 09:02

## 2019-02-15 NOTE — ASSESSMENT & PLAN NOTE
- Seen on loop recorder while patient was asleep.  - Many months long history of dizzy symptoms and syncopal episodes.  Recorder implanted 9/2018 by Dr. Humberto Vallejo.  - Patient followed by cardiologist Dr. Palomares at [facility].   - Weaned off Effexor while here.  BuSpar already discontinued.  - For left heart cath today.  EP study with possible PVC ablation as outpatient.

## 2019-02-15 NOTE — PLAN OF CARE
[facility]    HOME HEALTH ORDERS  FACE TO FACE ENCOUNTER    Patient Name: Celine Sinclair  YOB: 1950    PCP: Lety Jaquez MD   PCP Address: [address] [facility] [address]  PCP Phone Number: [phone]  PCP Fax: [phone]    Encounter Date: 02/15/2019    Admit to Home Health    Diagnoses:  Active Hospital Problems    Diagnosis  POA    *Polymorphic ventricular tachycardia [I47.2]  Yes    Essential hypertension [I10]  Yes      Resolved Hospital Problems   No resolved problems to display.       No future appointments.  Follow-up Information     Lety Jaquez MD.    Specialty:  Internal Medicine  Contact information:  [address]  [facility] [address] [phone]                     I have seen and examined this patient face to face today. My clinical findings that support the need for the home health skilled services and home bound status are the following:  Weakness/numbness causing balance and gait disturbance due to cardiac dysrhythmia making it taxing to leave home.    Allergies:  Review of patient's allergies indicates:   Allergen Reactions    Sandostatin [octreotide acetate] Nausea And Vomiting     Had symptoms when she took Sandostatin with Codeine    Codeine Itching and Nausea And Vomiting     Pill form only, IV ok.,  Had symptoms when she took Codeine with Sandostatin.       Diet: cardiac diet    Activities: activity as tolerated    Nursing:   SN to complete comprehensive assessment including routine vital signs. Instruct on disease process and s/s of complications to report to MD. Review/verify medication list sent home with the patient at time of discharge  and instruct patient/caregiver as needed. Frequency may be adjusted depending on start of care date.    Notify MD if SBP > 160 or < 90; DBP > 90 or < 50; HR > 120 or < 50; Temp > 101;     CONSULTS:    Physical Therapy to evaluate and treat. Evaluate for home  safety and equipment needs; Establish/upgrade home exercise program. Perform / instruct on therapeutic exercises, gait training, transfer training, and Range of Motion.  Occupational Therapy to evaluate and treat. Evaluate home environment for safety and equipment needs. Perform/Instruct on transfers, ADL training, ROM, and therapeutic exercises.  Aide to provide assistance with personal care, ADLs, and vital signs.    Medications: Review discharge medications with patient and family and provide education.     Celine Sinclair   Home Medication Instructions PAULA:[id number]    Printed on:02/17/19 5854   Medication Information                      ALBUTEROL SULFATE (VENTOLIN INHL)  Inhale 1 puff into the lungs every 4 to 6 hours as needed.              diabetic supplies,miscell (MEDTRONIC REMOTE CONTROL MISC)  by Misc.(Non-Drug; Combo Route) route.             diphenoxylate-atropine 2.5-0.025 mg (LOMOTIL) 2.5-0.025 mg per tablet  Take 1 tablet by mouth 4 (four) times daily as needed for Diarrhea.              INCONTINENCE PAD,LINER,DISP (BLADDER CONTROL PADS EX ABSORB MISC)               loperamide (IMODIUM) 2 mg capsule               losartan (COZAAR) 50 MG tablet  Take 100 mg by mouth every morning.              metoprolol succinate (TOPROL-XL) 50 MG 24 hr tablet  Take 1 tablet (50 mg total) by mouth once daily.             vitamin D (VITAMIN D3) 1000 units Tab  Take 185 mg by mouth.                     I certify that this patient is confined to her home and needs intermittent skilled nursing care, physical therapy and occupational therapy.

## 2019-02-15 NOTE — PLAN OF CARE
Spoke with pt and  at bedside.      States she has a Rolator at home but request a RW and Home health.  MD notified.     Orders placed for Kade NARVAEZ at [facility] requested auth for CM to deliver to bedside.  Template shared for HH and referral sent to [facility] per [facility] recommendations.     CM to continue to follow.       02/15/19 1345   Discharge Reassessment   Assessment Type Discharge Planning Reassessment   Provided patient/caregiver education on the expected discharge date and the discharge plan Yes   Do you have any problems affording any of your prescribed medications? No   Discharge Plan A Home Health   Discharge Plan B Home   DME Needed Upon Discharge  walker, rolling   Anticipated Discharge Disposition Home-Health   Can the patient answer the patient profile reliably? Yes, cognitively intact   How does the patient rate their overall health at the present time? Fair   Describe the patient's ability to walk at the present time. Walks with the help of equipment   How often would a person be available to care for the patient? Whenever needed   During the past month, has the patient often been bothered by feeling down, depressed or hopeless? No   During the past month, has the patient often been bothered by little interest or pleasure in doing things? No   Post-Acute Status   Post-Acute Authorization Home Health/Hospice   Home Health/Hospice Status Referrals Sent

## 2019-02-15 NOTE — PLAN OF CARE
[facility]  Completed in [facility].    Anticipated DC this weekend.      Rese at [facility] auth to pull the RW from closet.  Art, SSC to deliver today.    Information added to AVS.

## 2019-02-15 NOTE — PROGRESS NOTES
[facility]  Cardiology  Progress Note    Patient Name: Celine Sinclair  MRN: [id number]  Admission Date: 2/13/2019  Hospital Length of Stay: 2 days  Code Status: Full Code   Attending Physician: Debora Ibarra MD   Primary Care Physician: Lety Jaquez MD  Expected Discharge Date:   Principal Problem:Polymorphic ventricular tachycardia    Subjective:     Brief HPI:    Celine Sinclair is a 68 y.o.female with hypertension. She has had palpitations and unexplained syncope. She had a loop recorder implanted on 9/28/2018. The look recorder is monitored by Dr. Aguilar at [facility]. According to notes in the chart polymorphic VT was seen on 2/9/2019 and again on 2/12/2012. She has been on sotalol. She was advised to go to the ER for admission.      Hospital Course:     Sotalol was discontinued.    2/15/2019: [facility]: Cath: Normal coronaries. Mid LAD intramyocardial. EF 35%.    Interval History:     No further arrhythmias. No CP or SOB.    Review of Systems   Constitution: Negative for chills, fever, weakness and malaise/fatigue.   HENT: Negative for nosebleeds.    Eyes: Negative for double vision, vision loss in left eye and vision loss in right eye.   Cardiovascular: Negative for chest pain, claudication, dyspnea on exertion, irregular heartbeat, leg swelling, near-syncope, orthopnea, palpitations, paroxysmal nocturnal dyspnea and syncope.   Respiratory: Negative for cough, hemoptysis, shortness of breath and wheezing.    Endocrine: Negative for cold intolerance and heat intolerance.   Hematologic/Lymphatic: Negative for bleeding problem. Does not bruise/bleed easily.   Skin: Negative for color change and rash.   Musculoskeletal: Negative for back pain, falls, muscle weakness and myalgias.   Gastrointestinal: Negative for heartburn, hematemesis, hematochezia, hemorrhoids, jaundice, melena, nausea and vomiting.   Genitourinary: Negative for dysuria and hematuria.   Neurological: Negative for dizziness,  focal weakness, headaches, light-headedness, loss of balance, numbness and vertigo.   Psychiatric/Behavioral: Negative for altered mental status, depression and memory loss. The patient is not nervous/anxious.    Allergic/Immunologic: Negative for hives and persistent infections.     Objective:     Vital Signs (Most Recent):  Temp: 97.7 °F (36.5 °C) (02/15/19 1037)  Pulse: 78 (02/15/19 1400)  Resp: 17 (02/15/19 1037)  BP: (!) 159/89 (02/15/19 1037)  SpO2: 98 % (02/15/19 1037) Vital Signs (24h Range):  Temp:  [97.5 °F (36.4 °C)-97.7 °F (36.5 °C)] 97.7 °F (36.5 °C)  Pulse:  [62-78] 78  Resp:  [16-17] 17  SpO2:  [95 %-100 %] 98 %  BP: (126-159)/(75-89) 159/89     Weight: 85.7 kg (188 lb 15 oz)  Body mass index is 31.44 kg/m².    SpO2: 98 %  O2 Device (Oxygen Therapy): nasal cannula    No intake or output data in the 24 hours ending 02/15/19 1534    Lines/Drains/Airways     Central Venous Catheter Line                 Port A Cath Single Lumen right subclavian -- days          Peripheral Intravenous Line                 Peripheral IV - Single Lumen 02/13/19 2247 Posterior;Right Hand 1 day                Physical Exam   Constitutional: She is oriented to person, place, and time. She appears well-developed and well-nourished.  Non-toxic appearance. No distress.   HENT:   Head: Normocephalic and atraumatic.   Nose: Nose normal.   Eyes: Right eye exhibits no discharge. Left eye exhibits no discharge. Right conjunctiva is not injected. Left conjunctiva is not injected. Right pupil is round. Left pupil is round. Pupils are equal.   Neck: Neck supple. No JVD present. Carotid bruit is not present. No thyromegaly present.   Cardiovascular: Normal rate, regular rhythm, S1 normal and S2 normal.  No extrasystoles are present. PMI is not displaced. Exam reveals gallop and S4.   Pulses:       Radial pulses are 2+ on the right side, and 2+ on the left side.        Femoral pulses are 2+ on the right side, and 2+ on the left side.        Dorsalis pedis pulses are 2+ on the right side, and 2+ on the left side.        Posterior tibial pulses are 2+ on the right side, and 2+ on the left side.   Pulmonary/Chest: Effort normal and breath sounds normal.   Abdominal: Soft. Normal appearance. There is no hepatosplenomegaly. There is no tenderness.   Musculoskeletal:        Right ankle: She exhibits no swelling, no ecchymosis and no deformity.        Left ankle: She exhibits no swelling, no ecchymosis and no deformity.   Lymphadenopathy:        Head (right side): No submandibular adenopathy present.        Head (left side): No submandibular adenopathy present.     She has no cervical adenopathy.   Neurological: She is alert and oriented to person, place, and time. She is not disoriented. No cranial nerve deficit.   Skin: Skin is warm, dry and intact. No rash noted. She is not diaphoretic. No cyanosis. Nails show no clubbing.   Psychiatric: She has a normal mood and affect. Her speech is normal and behavior is normal. Judgment and thought content normal. Cognition and memory are normal.     Current Medications:     amLODIPine  5 mg Oral Daily    losartan  100 mg Oral QAM     Current Laboratory Results:    Recent Results (from the past 24 hour(s))   CBC auto differential    Collection Time: 02/15/19  5:21 AM   Result Value Ref Range    WBC 8.00 3.90 - 12.70 K/uL    RBC 4.73 4.00 - 5.40 M/uL    Hemoglobin 13.0 12.0 - 16.0 g/dL    Hematocrit 39.2 37.0 - 48.5 %    MCV 83 82 - 98 fL    MCH 27.5 27.0 - 31.0 pg    MCHC 33.2 32.0 - 36.0 g/dL    RDW 12.9 11.5 - 14.5 %    Platelets 196 150 - 350 K/uL    MPV 11.7 9.2 - 12.9 fL    Gran # (ANC) 4.5 1.8 - 7.7 K/uL    Lymph # 2.6 1.0 - 4.8 K/uL    Mono # 0.8 0.3 - 1.0 K/uL    Eos # 0.1 0.0 - 0.5 K/uL    Baso # 0.03 0.00 - 0.20 K/uL    Gran% 55.9 38.0 - 73.0 %    Lymph% 32.0 18.0 - 48.0 %    Mono% 9.8 4.0 - 15.0 %    Eosinophil% 1.6 0.0 - 8.0 %    Basophil% 0.4 0.0 - 1.9 %    Differential Method Automated    Comprehensive  metabolic panel    Collection Time: 02/15/19  5:21 AM   Result Value Ref Range    Sodium 139 136 - 145 mmol/L    Potassium 3.8 3.5 - 5.1 mmol/L    Chloride 108 95 - 110 mmol/L    CO2 20 (L) 23 - 29 mmol/L    Glucose 101 70 - 110 mg/dL    BUN, Bld 18 8 - 23 mg/dL    Creatinine 0.6 0.5 - 1.4 mg/dL    Calcium 9.1 8.7 - 10.5 mg/dL    Total Protein 6.9 6.0 - 8.4 g/dL    Albumin 3.6 3.5 - 5.2 g/dL    Total Bilirubin 1.8 (H) 0.1 - 1.0 mg/dL    Alkaline Phosphatase 93 55 - 135 U/L    AST 14 10 - 40 U/L    ALT 7 (L) 10 - 44 U/L    Anion Gap 11 8 - 16 mmol/L    eGFR if African American >60 >60 mL/min/1.73 m^2    eGFR if non African American >60 >60 mL/min/1.73 m^2     Current Imaging Results:    X-Ray Chest AP Portable   Final Result      No acute disease identified.  The source of the patient's chest pain is not established on this study.         Electronically signed by: Alyssa Leos MD   Date:    02/13/2019   Time:    14:26          Assessment and Plan:     Problem List:    Active Diagnoses:    Diagnosis Date Noted POA    PRINCIPAL PROBLEM:  Polymorphic ventricular tachycardia [I47.2] 02/13/2019 Yes    Essential hypertension [I10] 02/08/2017 Yes      Problems Resolved During this Admission:     Assessment and Plan:     1. Polymorphic Ventricular Tachycardia              Suspect related to sotalol and sotalol was stopped.              2/14/2019: [facility] EP consult noted.   2/15/2019: [facility]: Cath: Normal coronaries. Mid LAD intramyocardial. EF 35%.   On losartan 100 mg Q24.   Will add metoprolol 50 mg Q24.      VTE Risk Mitigation (From admission, onward)        Ordered     heparin infusion 1,000 units/500 ml in 0.9% NaCl (on sterile field)  As needed (PRN)      02/15/19 1531     IP VTE HIGH RISK PATIENT  Once      02/13/19 4414          Miky Keita MD  Cardiology  [facility]

## 2019-02-15 NOTE — SUBJECTIVE & OBJECTIVE
Interval History:  States she doesn't feel well, still has palpitations.  She had a lot of PVCs yesterday but rhythm sounds more regular today.    Review of Systems   Constitutional: Negative for chills and fever.   Respiratory: Negative for cough and shortness of breath.    Cardiovascular: Positive for palpitations. Negative for chest pain.     Objective:     Vital Signs (Most Recent):  Temp: 97.8 °F (36.6 °C) (02/14/19 1112)  Pulse: 76 (02/14/19 1800)  Resp: 18 (02/14/19 1403)  BP: 126/62 (02/14/19 1112)  SpO2: 98 % (02/14/19 1403) Vital Signs (24h Range):  Temp:  [97.5 °F (36.4 °C)-97.8 °F (36.6 °C)] 97.8 °F (36.6 °C)  Pulse:  [55-82] 76  Resp:  [16-18] 18  SpO2:  [93 %-99 %] 98 %  BP: (126-164)/(62-91) 126/62     Weight: 85.7 kg (188 lb 15 oz)  Body mass index is 31.44 kg/m².    Intake/Output Summary (Last 24 hours) at 2/14/2019 1829  Last data filed at 2/14/2019 1500  Gross per 24 hour   Intake 600 ml   Output --   Net 600 ml      Physical Exam   Constitutional: She is oriented to person, place, and time. She appears well-developed and well-nourished.   HENT:   Head: Normocephalic.   Eyes: Conjunctivae are normal. Pupils are equal, round, and reactive to light.   Neck: Neck supple. No thyromegaly present.   Cardiovascular: Normal rate, regular rhythm and intact distal pulses. Exam reveals no gallop and no friction rub.   Murmur heard.  S1 S2 with ectopy.   Pulmonary/Chest: Effort normal and breath sounds normal. No respiratory distress.   Abdominal: Soft. Bowel sounds are normal. She exhibits no distension. There is no tenderness.   Musculoskeletal: Normal range of motion. She exhibits no edema.   Lymphadenopathy:     She has no cervical adenopathy.   Neurological: She is alert and oriented to person, place, and time.   Strength equal and symmetric   Skin: Skin is warm and dry. No rash noted.   Psychiatric: She has a normal mood and affect. Her behavior is normal. Thought content normal.       Significant Labs:    BMP:   Recent Labs   Lab 02/13/19  1402 02/14/19  0429   GLU 85 97    139   K 3.6 3.6    108   CO2 28 21*   BUN 10 18   CREATININE 0.6 0.7   CALCIUM 9.4 9.3   MG 2.1  --      CBC:   Recent Labs   Lab 02/13/19  1402 02/14/19  0429   WBC 8.73 8.38   HGB 13.4 13.1   HCT 40.8 39.5    219       Significant Imaging: I have reviewed all pertinent imaging results/findings within the past 24 hours.

## 2019-02-15 NOTE — SUBJECTIVE & OBJECTIVE
Interval History:  No new complaints.  Her left heart cath is scheduled for noon.    Review of Systems   Constitutional: Negative for chills and fever.   Respiratory: Negative for cough and shortness of breath.    Cardiovascular: Positive for palpitations. Negative for chest pain.     Objective:     Vital Signs (Most Recent):  Temp: 97.5 °F (36.4 °C) (02/15/19 0415)  Pulse: 64 (02/15/19 0600)  Resp: 17 (02/15/19 0415)  BP: 131/75 (02/15/19 0415)  SpO2: 95 % (02/15/19 0415) Vital Signs (24h Range):  Temp:  [97.5 °F (36.4 °C)-97.8 °F (36.6 °C)] 97.5 °F (36.4 °C)  Pulse:  [55-76] 64  Resp:  [16-18] 17  SpO2:  [93 %-100 %] 95 %  BP: (126-142)/(62-81) 131/75     Weight: 85.7 kg (188 lb 15 oz)  Body mass index is 31.44 kg/m².    Intake/Output Summary (Last 24 hours) at 2/15/2019 0804  Last data filed at 2/14/2019 1500  Gross per 24 hour   Intake 480 ml   Output --   Net 480 ml      Physical Exam   Constitutional: She is oriented to person, place, and time. She appears well-developed and well-nourished.   HENT:   Head: Normocephalic.   Eyes: Conjunctivae are normal. Pupils are equal, round, and reactive to light.   Neck: Neck supple. No thyromegaly present.   Cardiovascular: Normal rate, regular rhythm and intact distal pulses. Exam reveals no gallop and no friction rub.   Murmur heard.  S1 S2 with ectopy.   Pulmonary/Chest: Effort normal and breath sounds normal. No respiratory distress.   Abdominal: Soft. Bowel sounds are normal. She exhibits no distension. There is no tenderness.   Musculoskeletal: Normal range of motion. She exhibits no edema.   Lymphadenopathy:     She has no cervical adenopathy.   Neurological: She is alert and oriented to person, place, and time.   Strength equal and symmetric   Skin: Skin is warm and dry. No rash noted.   Psychiatric: She has a normal mood and affect. Her behavior is normal. Thought content normal.       Significant Labs: All pertinent labs within the past 24 hours have been  reviewed.    Significant Imaging: I have reviewed all pertinent imaging results/findings within the past 24 hours.

## 2019-02-15 NOTE — PROGRESS NOTES
[facility] Hospital Medicine  Progress Note    Patient Name: Celine Sinclair  MRN: [id number]  Patient Class: IP- Inpatient   Admission Date: 2/13/2019  Length of Stay: 2 days  Attending Physician: Debora Ibarra MD  Primary Care Provider: Lety Jaquez MD        Subjective:     Principal Problem:Polymorphic ventricular tachycardia    HPI:  Ms. Sinclair is a 68 year old woman who presented with a report of polymorphic V tach picked up on her implanted loop recorder.  Patient was at her PCP's office to do some lab work when she was contacted by her cardiologist to report to the hospital because she had an episode of PVT lasting about 30 beats while she was asleep the previous night.  Patient has been having palpitations for many years, but over the last few months she has been having episodes more frequently and she is constantly short of breath.  She has had multiple episodes of dizziness and passing out.  Two weeks ago she became ill after eating out and had about a week of vomiting and diarrhea.  Since then she has been winded with minimal exertion.  Her medications have been revised recently with multiple medications discontinued including sotalol, with the addition of amiodarone, but she has not picked it up yet and is still taking sotalol.  On presentation she had /94 and HR 64.  EKG showed frequent PVCs and PACs.  Labs were normal except for total bilirubin 2.2.    Medical history includes Crohn's disease.  She has had multiple small bowel obstructions that required surgery with resections and colostomies in 2001, 2002 and 2003.  The colostomies were all reversed.  She has been on MTX and Remicade but now only uses symptomatic treatment for constipation or diarrhea.  She had bilateral cataract surgery.  [facility] record shows 2D echo from 4/2018 with EF 61%, and a nuclear cardiac scan in 1/2018 was negative for ischemia.         Hospital Course:  Patient was admitted on a telemetry  monitor and was seen by cardiology and EP.  Plan was made for left heart cath.  Sotalol was discontinued due to its probable contribution to the V-tach.  She was scheduled for left heart cath while here.  The rest of her workup was deferred to outpatient, when she will get a cardiac MRI and an EP study with PVC ablation.    Interval History:  No new complaints.  Her left heart cath is scheduled for noon.    Review of Systems   Constitutional: Negative for chills and fever.   Respiratory: Negative for cough and shortness of breath.    Cardiovascular: Positive for palpitations. Negative for chest pain.     Objective:     Vital Signs (Most Recent):  Temp: 97.5 °F (36.4 °C) (02/15/19 0415)  Pulse: 64 (02/15/19 0600)  Resp: 17 (02/15/19 0415)  BP: 131/75 (02/15/19 0415)  SpO2: 95 % (02/15/19 0415) Vital Signs (24h Range):  Temp:  [97.5 °F (36.4 °C)-97.8 °F (36.6 °C)] 97.5 °F (36.4 °C)  Pulse:  [55-76] 64  Resp:  [16-18] 17  SpO2:  [93 %-100 %] 95 %  BP: (126-142)/(62-81) 131/75     Weight: 85.7 kg (188 lb 15 oz)  Body mass index is 31.44 kg/m².    Intake/Output Summary (Last 24 hours) at 2/15/2019 0804  Last data filed at 2/14/2019 1500  Gross per 24 hour   Intake 480 ml   Output --   Net 480 ml      Physical Exam   Constitutional: She is oriented to person, place, and time. She appears well-developed and well-nourished.   HENT:   Head: Normocephalic.   Eyes: Conjunctivae are normal. Pupils are equal, round, and reactive to light.   Neck: Neck supple. No thyromegaly present.   Cardiovascular: Normal rate, regular rhythm and intact distal pulses. Exam reveals no gallop and no friction rub.   Murmur heard.  S1 S2 with ectopy.   Pulmonary/Chest: Effort normal and breath sounds normal. No respiratory distress.   Abdominal: Soft. Bowel sounds are normal. She exhibits no distension. There is no tenderness.   Musculoskeletal: Normal range of motion. She exhibits no edema.   Lymphadenopathy:     She has no cervical adenopathy.    Neurological: She is alert and oriented to person, place, and time.   Strength equal and symmetric   Skin: Skin is warm and dry. No rash noted.   Psychiatric: She has a normal mood and affect. Her behavior is normal. Thought content normal.       Significant Labs: All pertinent labs within the past 24 hours have been reviewed.    Significant Imaging: I have reviewed all pertinent imaging results/findings within the past 24 hours.    Assessment/Plan:      * Polymorphic ventricular tachycardia    - Seen on loop recorder while patient was asleep.  - Many months long history of dizzy symptoms and syncopal episodes.  Recorder implanted 9/2018 by Dr. Humberto Vallejo.  - Patient followed by cardiologist Dr. Palomares at [facility].   - Weaned off Effexor while here.  BuSpar already discontinued.  - For left heart cath today.  EP study with possible PVC ablation as outpatient.     Essential hypertension    - Continue amlodipine, losartan at home doses.  Well controlled.         VTE Risk Mitigation (From admission, onward)        Ordered     IP VTE HIGH RISK PATIENT  Once      02/13/19 0622              Debora Greco MD  Department of Hospital Medicine   [facility]

## 2019-02-15 NOTE — ASSESSMENT & PLAN NOTE
- Seen on loop recorder while patient was asleep.  - Many months long history of dizzy symptoms and syncopal episodes.  Recorder implanted 9/2018 by Dr. Humberto Vallejo.  - Patient followed by cardiologist Dr. Palomares at [facility].   - Continue to wean off Effexor.  She has one day of the half dose to go.  BuSpar already discontinued.  - EP recommends left heart cath while here and EP study with possible ablation as outpatient.

## 2019-02-15 NOTE — PLAN OF CARE
Problem: Adult Inpatient Plan of Care  Goal: Patient-Specific Goal (Individualization)  Outcome: Ongoing (interventions implemented as appropriate)  Pt free of falls/trauma/injuries this shift.VSS.  Medication administered as perscribed. PRN tramadol  administered for generalized body aches. Pt NPO at midnight for a LHC in the morning.  Patient tolerating POC well. Pt verbalizes understanding of care. Pt denies any chest pain, SOB, or any other discomforts at this time. Will continue to monitor.

## 2019-02-15 NOTE — HOSPITAL COURSE
Patient was admitted on a telemetry monitor and was seen by cardiology and EP.  Sotalol was discontinued due to its proarrhythmic effects as a likely cause of the V-tach.  She was scheduled for left heart cath while here.  The rest of her workup was deferred to outpatient, when she will get a cardiac MRI to rule out infiltrative disease, and an EP study with possible PVC ablation.  She underwent left heart cath on 2/15 with Dr. Keita.  This showed normal coronaries but EF was 35%.  She will be seen in follow up at the [facility] on Thursday 2/21 at 2 PM.

## 2019-02-15 NOTE — PROGRESS NOTES
[facility] Hospital Medicine  Progress Note    Patient Name: Celine Sinclair  MRN: [id number]  Patient Class: IP- Inpatient   Admission Date: 2/13/2019  Length of Stay: 1 days  Attending Physician: Debora Ibarra MD  Primary Care Provider: Lety Jaquez MD        Subjective:     Principal Problem:Polymorphic ventricular tachycardia    HPI:  Ms. Sinclair is a 68 year old woman who presented with a report of polymorphic V tach picked up on her implanted loop recorder.  Patient was at her PCP's office to do some lab work when she was contacted by her cardiologist to report to the hospital because she had an episode of PVT lasting about 30 beats while she was asleep the previous night.  Patient has been having palpitations for many years, but over the last few months she has been having episodes more frequently and she is constantly short of breath.  She has had multiple episodes of dizziness and passing out.  Two weeks ago she became ill after eating out and had about a week of vomiting and diarrhea.  Since then she has been winded with minimal exertion.  Her medications have been revised recently with multiple medications discontinued including sotalol, with the addition of amiodarone, but she has not picked it up yet and is still taking sotalol.  On presentation she had /94 and HR 64.  EKG showed frequent PVCs and PACs.  Labs were normal except for total bilirubin 2.2.    Medical history includes Crohn's disease.  She has had multiple small bowel obstructions that required surgery with resections and colostomies in 2001, 2002 and 2003.  The colostomies were all reversed.  She has been on MTX and Remicade but now only uses symptomatic treatment for constipation or diarrhea.  She had bilateral cataract surgery.  [facility] record shows 2D echo from 4/2018 with EF 61%, and a nuclear cardiac scan in 1/2018 was negative for ischemia.         Hospital Course:  Patient was admitted on a telemetry  monitor and was seen by cardiology and EP.  Plan was made for left heart cath.  Sotalol was discontinued due to its probable contribution to the V-tach.  The rest of her workup was deferred to outpatient, when she will get a cardiac MRI and an EP study with PVC ablation.    Interval History:  States she doesn't feel well, still has palpitations.  She had a lot of PVCs yesterday but rhythm sounds more regular today.    Review of Systems   Constitutional: Negative for chills and fever.   Respiratory: Negative for cough and shortness of breath.    Cardiovascular: Positive for palpitations. Negative for chest pain.     Objective:     Vital Signs (Most Recent):  Temp: 97.8 °F (36.6 °C) (02/14/19 1112)  Pulse: 76 (02/14/19 1800)  Resp: 18 (02/14/19 1403)  BP: 126/62 (02/14/19 1112)  SpO2: 98 % (02/14/19 1403) Vital Signs (24h Range):  Temp:  [97.5 °F (36.4 °C)-97.8 °F (36.6 °C)] 97.8 °F (36.6 °C)  Pulse:  [55-82] 76  Resp:  [16-18] 18  SpO2:  [93 %-99 %] 98 %  BP: (126-164)/(62-91) 126/62     Weight: 85.7 kg (188 lb 15 oz)  Body mass index is 31.44 kg/m².    Intake/Output Summary (Last 24 hours) at 2/14/2019 1829  Last data filed at 2/14/2019 1500  Gross per 24 hour   Intake 600 ml   Output --   Net 600 ml      Physical Exam   Constitutional: She is oriented to person, place, and time. She appears well-developed and well-nourished.   HENT:   Head: Normocephalic.   Eyes: Conjunctivae are normal. Pupils are equal, round, and reactive to light.   Neck: Neck supple. No thyromegaly present.   Cardiovascular: Normal rate, regular rhythm and intact distal pulses. Exam reveals no gallop and no friction rub.   Murmur heard.  S1 S2 with ectopy.   Pulmonary/Chest: Effort normal and breath sounds normal. No respiratory distress.   Abdominal: Soft. Bowel sounds are normal. She exhibits no distension. There is no tenderness.   Musculoskeletal: Normal range of motion. She exhibits no edema.   Lymphadenopathy:     She has no cervical  adenopathy.   Neurological: She is alert and oriented to person, place, and time.   Strength equal and symmetric   Skin: Skin is warm and dry. No rash noted.   Psychiatric: She has a normal mood and affect. Her behavior is normal. Thought content normal.       Significant Labs:   BMP:   Recent Labs   Lab 02/13/19  1402 02/14/19  0429   GLU 85 97    139   K 3.6 3.6    108   CO2 28 21*   BUN 10 18   CREATININE 0.6 0.7   CALCIUM 9.4 9.3   MG 2.1  --      CBC:   Recent Labs   Lab 02/13/19  1402 02/14/19  0429   WBC 8.73 8.38   HGB 13.4 13.1   HCT 40.8 39.5    219       Significant Imaging: I have reviewed all pertinent imaging results/findings within the past 24 hours.    Assessment/Plan:      * Polymorphic ventricular tachycardia    - Seen on loop recorder while patient was asleep.  - Many months long history of dizzy symptoms and syncopal episodes.  Recorder implanted 9/2018 by Dr. Humberto Vallejo.  - Patient followed by cardiologist Dr. Palomares at [facility].   - Continue to wean off Effexor.  She has one day of the half dose to go.  BuSpar already discontinued.  - EP recommends left heart cath while here and EP study with possible ablation as outpatient.     Essential hypertension    - Continue amlodipine, losartan at home doses.  Well controlled.         VTE Risk Mitigation (From admission, onward)        Ordered     IP VTE HIGH RISK PATIENT  Once      02/13/19 1784              Debora Greco MD  Department of Hospital Medicine   [facility]

## 2019-02-16 LAB
ALBUMIN SERPL BCP-MCNC: 3.1 G/DL
ALP SERPL-CCNC: 86 U/L
ALT SERPL W/O P-5'-P-CCNC: 8 U/L
ANION GAP SERPL CALC-SCNC: 10 MMOL/L
AST SERPL-CCNC: 14 U/L
BASOPHILS # BLD AUTO: 0.02 K/UL
BASOPHILS # BLD AUTO: 0.03 K/UL
BASOPHILS NFR BLD: 0.3 %
BASOPHILS NFR BLD: 0.4 %
BILIRUB SERPL-MCNC: 2 MG/DL
BUN SERPL-MCNC: 14 MG/DL
CALCIUM SERPL-MCNC: 8.7 MG/DL
CHLORIDE SERPL-SCNC: 109 MMOL/L
CO2 SERPL-SCNC: 22 MMOL/L
CREAT SERPL-MCNC: 0.6 MG/DL
DIFFERENTIAL METHOD: NORMAL
DIFFERENTIAL METHOD: NORMAL
EOSINOPHIL # BLD AUTO: 0.1 K/UL
EOSINOPHIL # BLD AUTO: 0.1 K/UL
EOSINOPHIL NFR BLD: 1.4 %
EOSINOPHIL NFR BLD: 1.6 %
ERYTHROCYTE [DISTWIDTH] IN BLOOD BY AUTOMATED COUNT: 13 %
ERYTHROCYTE [DISTWIDTH] IN BLOOD BY AUTOMATED COUNT: 13.1 %
EST. GFR  (AFRICAN AMERICAN): >60 ML/MIN/1.73 M^2
EST. GFR  (NON AFRICAN AMERICAN): >60 ML/MIN/1.73 M^2
GLUCOSE SERPL-MCNC: 93 MG/DL
HCT VFR BLD AUTO: 37.8 %
HCT VFR BLD AUTO: 38.1 %
HGB BLD-MCNC: 12.5 G/DL
HGB BLD-MCNC: 12.6 G/DL
LYMPHOCYTES # BLD AUTO: 2 K/UL
LYMPHOCYTES # BLD AUTO: 2.1 K/UL
LYMPHOCYTES NFR BLD: 26.9 %
LYMPHOCYTES NFR BLD: 27.3 %
MCH RBC QN AUTO: 27.4 PG
MCH RBC QN AUTO: 27.6 PG
MCHC RBC AUTO-ENTMCNC: 32.8 G/DL
MCHC RBC AUTO-ENTMCNC: 33.3 G/DL
MCV RBC AUTO: 83 FL
MCV RBC AUTO: 83 FL
MONOCYTES # BLD AUTO: 0.5 K/UL
MONOCYTES # BLD AUTO: 0.7 K/UL
MONOCYTES NFR BLD: 6.9 %
MONOCYTES NFR BLD: 9.8 %
NEUTROPHILS # BLD AUTO: 4.4 K/UL
NEUTROPHILS # BLD AUTO: 5.1 K/UL
NEUTROPHILS NFR BLD: 60.8 %
NEUTROPHILS NFR BLD: 64 %
PLATELET # BLD AUTO: 197 K/UL
PLATELET # BLD AUTO: 200 K/UL
PMV BLD AUTO: 11 FL
PMV BLD AUTO: 11.9 FL
POTASSIUM SERPL-SCNC: 3.6 MMOL/L
PROT SERPL-MCNC: 6.5 G/DL
RBC # BLD AUTO: 4.57 M/UL
RBC # BLD AUTO: 4.57 M/UL
SODIUM SERPL-SCNC: 141 MMOL/L
WBC # BLD AUTO: 7.22 K/UL
WBC # BLD AUTO: 7.88 K/UL

## 2019-02-16 PROCEDURE — 80053 COMPREHEN METABOLIC PANEL: CPT

## 2019-02-16 PROCEDURE — 85025 COMPLETE CBC W/AUTO DIFF WBC: CPT

## 2019-02-16 PROCEDURE — 36415 COLL VENOUS BLD VENIPUNCTURE: CPT

## 2019-02-16 PROCEDURE — 94761 N-INVAS EAR/PLS OXIMETRY MLT: CPT

## 2019-02-16 PROCEDURE — 25000003 PHARM REV CODE 250: Performed by: HOSPITALIST

## 2019-02-16 PROCEDURE — 99232 PR SUBSEQUENT HOSPITAL CARE,LEVL II: ICD-10-PCS | Mod: ,,, | Performed by: HOSPITALIST

## 2019-02-16 PROCEDURE — 99232 SBSQ HOSP IP/OBS MODERATE 35: CPT | Mod: ,,, | Performed by: HOSPITALIST

## 2019-02-16 PROCEDURE — 11000001 HC ACUTE MED/SURG PRIVATE ROOM

## 2019-02-16 PROCEDURE — 25000003 PHARM REV CODE 250: Performed by: INTERNAL MEDICINE

## 2019-02-16 RX ADMIN — TRAMADOL HYDROCHLORIDE 50 MG: 50 TABLET, FILM COATED ORAL at 08:02

## 2019-02-16 RX ADMIN — AMLODIPINE BESYLATE 5 MG: 5 TABLET ORAL at 08:02

## 2019-02-16 RX ADMIN — ACETAMINOPHEN 650 MG: 325 TABLET, FILM COATED ORAL at 12:02

## 2019-02-16 RX ADMIN — LOSARTAN POTASSIUM 100 MG: 50 TABLET, FILM COATED ORAL at 08:02

## 2019-02-16 RX ADMIN — TRAMADOL HYDROCHLORIDE 50 MG: 50 TABLET, FILM COATED ORAL at 05:02

## 2019-02-16 RX ADMIN — TRAMADOL HYDROCHLORIDE 50 MG: 50 TABLET, FILM COATED ORAL at 12:02

## 2019-02-16 RX ADMIN — HYDROCODONE BITARTRATE AND ACETAMINOPHEN 1 TABLET: 5; 325 TABLET ORAL at 07:02

## 2019-02-16 NOTE — ASSESSMENT & PLAN NOTE
- Seen on loop recorder while patient was asleep.  - Many months long history of dizzy symptoms and syncopal episodes.  Recorder implanted 9/2018 by Dr. Humberto Vallejo.  - Patient followed by cardiologist Dr. Palomares at [facility].   - Weaned off Effexor while here.  BuSpar already discontinued.  - EP recommended left heart cath to rule out CAD as cause of symptoms.  She had clean coronaries but EF only 35%.  - EP study with possible PVC ablation to be done as outpatient with follow up appointment scheduled next Thursday.

## 2019-02-16 NOTE — PROGRESS NOTES
[facility]  Cardiology  Progress Note    Patient Name: Celine Sinclair  MRN: [id number]  Admission Date: 2/13/2019  Hospital Length of Stay: 3 days  Code Status: Full Code   Attending Physician: Debora Ibarra MD   Primary Care Physician: Lety Jaquez MD  Expected Discharge Date:   Principal Problem:Polymorphic ventricular tachycardia    Subjective:     Brief HPI:    Celine Sinclair is a 68 y.o.female with hypertension. She has had palpitations and unexplained syncope. She had a loop recorder implanted on 9/28/2018. The look recorder is monitored by Dr. Aguilar at [facility]. According to notes in the chart polymorphic VT was seen on 2/9/2019 and again on 2/12/2012. She has been on sotalol. She was advised to go to the ER for admission.      Hospital Course:     Sotalol was discontinued.    2/15/2019: [facility]: Cath: Normal coronaries. Mid LAD intramyocardial. EF 35%.    Interval History:     No further arrhythmias. No CP or SOB. Begun on metoprolol.    Review of Systems   Constitution: Negative for chills, fever, weakness and malaise/fatigue.   HENT: Negative for nosebleeds.    Eyes: Negative for double vision, vision loss in left eye and vision loss in right eye.   Cardiovascular: Negative for chest pain, claudication, dyspnea on exertion, irregular heartbeat, leg swelling, near-syncope, orthopnea, palpitations, paroxysmal nocturnal dyspnea and syncope.   Respiratory: Negative for cough, hemoptysis, shortness of breath and wheezing.    Endocrine: Negative for cold intolerance and heat intolerance.   Hematologic/Lymphatic: Negative for bleeding problem. Does not bruise/bleed easily.   Skin: Negative for color change and rash.   Musculoskeletal: Negative for back pain, falls, muscle weakness and myalgias.   Gastrointestinal: Negative for heartburn, hematemesis, hematochezia, hemorrhoids, jaundice, melena, nausea and vomiting.   Genitourinary: Negative for dysuria and hematuria.   Neurological:  Negative for dizziness, focal weakness, headaches, light-headedness, loss of balance, numbness and vertigo.   Psychiatric/Behavioral: Negative for altered mental status, depression and memory loss. The patient is not nervous/anxious.    Allergic/Immunologic: Negative for hives and persistent infections.     Objective:     Vital Signs (Most Recent):  Temp: 97.5 °F (36.4 °C) (02/16/19 1158)  Pulse: 73 (02/16/19 1200)  Resp: 18 (02/16/19 1158)  BP: (!) 149/75 (02/16/19 1158)  SpO2: 99 % (02/16/19 1158) Vital Signs (24h Range):  Temp:  [97.3 °F (36.3 °C)-98.5 °F (36.9 °C)] 97.5 °F (36.4 °C)  Pulse:  [60-89] 73  Resp:  [17-18] 18  SpO2:  [96 %-100 %] 99 %  BP: (116-166)/(63-84) 149/75     Weight: 85.7 kg (188 lb 15 oz)  Body mass index is 31.44 kg/m².    SpO2: 99 %  O2 Device (Oxygen Therapy): room air      Intake/Output Summary (Last 24 hours) at 2/16/2019 1348  Last data filed at 2/16/2019 0500  Gross per 24 hour   Intake 840 ml   Output --   Net 840 ml       Lines/Drains/Airways     Central Venous Catheter Line                 Port A Cath Single Lumen right subclavian -- days          Peripheral Intravenous Line                 Peripheral IV - Single Lumen Left;Posterior Hand -- days                Physical Exam   Constitutional: She is oriented to person, place, and time. She appears well-developed and well-nourished.  Non-toxic appearance. No distress.   HENT:   Head: Normocephalic and atraumatic.   Nose: Nose normal.   Eyes: Right eye exhibits no discharge. Left eye exhibits no discharge. Right conjunctiva is not injected. Left conjunctiva is not injected. Right pupil is round. Left pupil is round. Pupils are equal.   Neck: Neck supple. No JVD present. Carotid bruit is not present. No thyromegaly present.   Cardiovascular: Normal rate, regular rhythm, S1 normal and S2 normal.  No extrasystoles are present. PMI is not displaced. Exam reveals gallop and S4.   Pulses:       Radial pulses are 2+ on the right side, and  2+ on the left side.        Femoral pulses are 2+ on the right side, and 2+ on the left side.       Dorsalis pedis pulses are 2+ on the right side, and 2+ on the left side.        Posterior tibial pulses are 2+ on the right side, and 2+ on the left side.   Pulmonary/Chest: Effort normal and breath sounds normal.   Abdominal: Soft. Normal appearance. There is no hepatosplenomegaly. There is no tenderness.   Musculoskeletal:        Right ankle: She exhibits no swelling, no ecchymosis and no deformity.        Left ankle: She exhibits no swelling, no ecchymosis and no deformity.   Lymphadenopathy:        Head (right side): No submandibular adenopathy present.        Head (left side): No submandibular adenopathy present.     She has no cervical adenopathy.   Neurological: She is alert and oriented to person, place, and time. She is not disoriented. No cranial nerve deficit.   Skin: Skin is warm, dry and intact. No rash noted. She is not diaphoretic. No cyanosis. Nails show no clubbing.   Psychiatric: She has a normal mood and affect. Her speech is normal and behavior is normal. Judgment and thought content normal. Cognition and memory are normal.     Current Medications:     amLODIPine  5 mg Oral Daily    losartan  100 mg Oral QAM     Current Laboratory Results:    Recent Results (from the past 24 hour(s))   Cardiac catheterization    Collection Time: 02/15/19  3:15 PM   Result Value Ref Range    LVEDP Pre 16     LVEDP Post 17     Cath EF Estimated 35 %   CBC auto differential    Collection Time: 02/15/19 11:56 PM   Result Value Ref Range    WBC 7.88 3.90 - 12.70 K/uL    RBC 4.57 4.00 - 5.40 M/uL    Hemoglobin 12.6 12.0 - 16.0 g/dL    Hematocrit 37.8 37.0 - 48.5 %    MCV 83 82 - 98 fL    MCH 27.6 27.0 - 31.0 pg    MCHC 33.3 32.0 - 36.0 g/dL    RDW 13.0 11.5 - 14.5 %    Platelets 197 150 - 350 K/uL    MPV 11.0 9.2 - 12.9 fL    Gran # (ANC) 5.1 1.8 - 7.7 K/uL    Lymph # 2.1 1.0 - 4.8 K/uL    Mono # 0.5 0.3 - 1.0 K/uL     Eos # 0.1 0.0 - 0.5 K/uL    Baso # 0.02 0.00 - 0.20 K/uL    Gran% 64.0 38.0 - 73.0 %    Lymph% 26.9 18.0 - 48.0 %    Mono% 6.9 4.0 - 15.0 %    Eosinophil% 1.6 0.0 - 8.0 %    Basophil% 0.3 0.0 - 1.9 %    Differential Method Automated    CBC auto differential    Collection Time: 02/16/19  4:46 AM   Result Value Ref Range    WBC 7.22 3.90 - 12.70 K/uL    RBC 4.57 4.00 - 5.40 M/uL    Hemoglobin 12.5 12.0 - 16.0 g/dL    Hematocrit 38.1 37.0 - 48.5 %    MCV 83 82 - 98 fL    MCH 27.4 27.0 - 31.0 pg    MCHC 32.8 32.0 - 36.0 g/dL    RDW 13.1 11.5 - 14.5 %    Platelets 200 150 - 350 K/uL    MPV 11.9 9.2 - 12.9 fL    Gran # (ANC) 4.4 1.8 - 7.7 K/uL    Lymph # 2.0 1.0 - 4.8 K/uL    Mono # 0.7 0.3 - 1.0 K/uL    Eos # 0.1 0.0 - 0.5 K/uL    Baso # 0.03 0.00 - 0.20 K/uL    Gran% 60.8 38.0 - 73.0 %    Lymph% 27.3 18.0 - 48.0 %    Mono% 9.8 4.0 - 15.0 %    Eosinophil% 1.4 0.0 - 8.0 %    Basophil% 0.4 0.0 - 1.9 %    Differential Method Automated    Comprehensive metabolic panel    Collection Time: 02/16/19  4:46 AM   Result Value Ref Range    Sodium 141 136 - 145 mmol/L    Potassium 3.6 3.5 - 5.1 mmol/L    Chloride 109 95 - 110 mmol/L    CO2 22 (L) 23 - 29 mmol/L    Glucose 93 70 - 110 mg/dL    BUN, Bld 14 8 - 23 mg/dL    Creatinine 0.6 0.5 - 1.4 mg/dL    Calcium 8.7 8.7 - 10.5 mg/dL    Total Protein 6.5 6.0 - 8.4 g/dL    Albumin 3.1 (L) 3.5 - 5.2 g/dL    Total Bilirubin 2.0 (H) 0.1 - 1.0 mg/dL    Alkaline Phosphatase 86 55 - 135 U/L    AST 14 10 - 40 U/L    ALT 8 (L) 10 - 44 U/L    Anion Gap 10 8 - 16 mmol/L    eGFR if African American >60 >60 mL/min/1.73 m^2    eGFR if non African American >60 >60 mL/min/1.73 m^2     Current Imaging Results:    X-Ray Chest AP Portable   Final Result      No acute disease identified.  The source of the patient's chest pain is not established on this study.         Electronically signed by: Alyssa Leos MD   Date:    02/13/2019   Time:    14:26          Assessment and Plan:     Problem  List:    Active Diagnoses:    Diagnosis Date Noted POA    PRINCIPAL PROBLEM:  Polymorphic ventricular tachycardia [I47.2] 02/13/2019 Yes    Essential hypertension [I10] 02/08/2017 Yes      Problems Resolved During this Admission:     Assessment and Plan:     1. Polymorphic Ventricular Tachycardia              Suspect related to sotalol and sotalol was stopped.              2/14/2019: [facility] EP consult noted.   2/15/2019: [facility]: Cath: Normal coronaries. Mid LAD intramyocardial. EF 35%.   On losartan 100 mg Q24 and metoprolol 50 mg Q24.   Discussed with [facility] EP team.   She has close follow up arranged with [facility] EP.      VTE Risk Mitigation (From admission, onward)        Ordered     IP VTE HIGH RISK PATIENT  Once      02/13/19 4914          Miky Keita MD  Cardiology  [facility]

## 2019-02-16 NOTE — PROGRESS NOTES
Summary of plan:    Ms. Sinclair is a 68 year old female patient with hypertension, palpitations, syncope, Chrohn's disease known to [facility] EP after consultation for recurrent syncope and subsequent ILR implantation September 2018. She is under the care of Dr. Sonny Palomares at [facility]. She was prompted to present to ER for admission after ILR revealed two episodes of polymorphic ventricular tachycardia while asleep (tracings personally reviewed); initiation of event not captured. She continues to report frequent palpitations and continued recurrent near syncope and syncope. Was being treated with sotalol in an attempt to decrease her PVC burden (last dose 2/13 2010).      Review of prior work up includes:  Echo July 2017 with EF 40%     Echo 4/2018: normal systolic function, EF >55%     Nuclear stress test 1/2018: negative for ischemia    She underwent LHC yesterday with normal coronary anatomy and EF per LV Gram 35%.    Polymorphic ventricular tachycardia: etiology differentials included ischemia, sotalol proarrhythmia/ long QT, infiltrative disease.    -Ischemic disease excluded per normal LHC yesterday  -Likely sotalol related. No further ventricular arrhythmia since admission and discontinuation of sotalol  -Fluctuant EF, possibly infiltrative disease but also with very high PVC burden. Plan for outpatient Cardiac MRI and EPS/PVC ablation    Patient will be seen in our clinic Thursday Feb 21 at 2pm. [address] [facility] 5th floor. Above plan per Dr. Humberto Vallejo and in communication with Dr. Sonny Palomares (patient's continuity cardiologist).    Cleared to discharge home.

## 2019-02-16 NOTE — SUBJECTIVE & OBJECTIVE
Interval History:  Still has palpitations.  In NSR overnight on telemetry.      Review of Systems   Constitutional: Negative for chills and fever.   Respiratory: Negative for cough and shortness of breath.    Cardiovascular: Positive for palpitations. Negative for chest pain.     Objective:     Vital Signs (Most Recent):  Temp: 97.3 °F (36.3 °C) (02/16/19 0800)  Pulse: 89 (02/16/19 1000)  Resp: 17 (02/16/19 0800)  BP: 128/67 (02/16/19 0800)  SpO2: 98 % (02/16/19 0800) Vital Signs (24h Range):  Temp:  [97.3 °F (36.3 °C)-98.5 °F (36.9 °C)] 97.3 °F (36.3 °C)  Pulse:  [60-89] 89  Resp:  [17-18] 17  SpO2:  [96 %-100 %] 98 %  BP: (116-166)/(63-84) 128/67     Weight: 85.7 kg (188 lb 15 oz)  Body mass index is 31.44 kg/m².    Intake/Output Summary (Last 24 hours) at 2/16/2019 1146  Last data filed at 2/16/2019 0500  Gross per 24 hour   Intake 840 ml   Output --   Net 840 ml      Physical Exam   Constitutional: She is oriented to person, place, and time. She appears well-developed and well-nourished.   HENT:   Head: Normocephalic.   Eyes: Conjunctivae are normal. Pupils are equal, round, and reactive to light.   Neck: Neck supple. No thyromegaly present.   Cardiovascular: Normal rate, regular rhythm and intact distal pulses. Exam reveals no gallop and no friction rub.   Murmur heard.  S1 S2 with ectopy.   Pulmonary/Chest: Effort normal and breath sounds normal. No respiratory distress.   Abdominal: Soft. Bowel sounds are normal. She exhibits no distension. There is no tenderness.   Musculoskeletal: Normal range of motion. She exhibits no edema.   Lymphadenopathy:     She has no cervical adenopathy.   Neurological: She is alert and oriented to person, place, and time.   Strength equal and symmetric   Skin: Skin is warm and dry. No rash noted.   Psychiatric: She has a normal mood and affect. Her behavior is normal. Thought content normal.       Significant Labs: All pertinent labs within the past 24 hours have been  reviewed.    Significant Imaging: I have reviewed all pertinent imaging results/findings within the past 24 hours.

## 2019-02-16 NOTE — NURSING
Plan of care reviewed with Pt, purposeful hourly rounding performed. VSS on RA. NAD during shift. R groin site C/D/I. PRN pain medication given. Tele monitor maintained. No c/o at this time. Bed locked and lowered, call light in reach. Will continue to monitor.

## 2019-02-16 NOTE — PROGRESS NOTES
[facility] Hospital Medicine  Progress Note    Patient Name: Celine Sinclair  MRN: [id number]  Patient Class: IP- Inpatient   Admission Date: 2/13/2019  Length of Stay: 3 days  Attending Physician: Debora Ibarra MD  Primary Care Provider: Lety Jaquez MD        Subjective:     Principal Problem:Polymorphic ventricular tachycardia    HPI:  Ms. Sinclair is a 68 year old woman who presented with a report of polymorphic V tach picked up on her implanted loop recorder.  Patient was at her PCP's office to do some lab work when she was contacted by her cardiologist to report to the hospital because she had an episode of PVT lasting about 30 beats while she was asleep the previous night.  Patient has been having palpitations for many years, but over the last few months she has been having episodes more frequently and she is constantly short of breath.  She has had multiple episodes of dizziness and passing out.  Two weeks ago she became ill after eating out and had about a week of vomiting and diarrhea.  Since then she has been winded with minimal exertion.  Her medications have been revised recently with multiple medications discontinued including sotalol, with the addition of amiodarone, but she has not picked it up yet and is still taking sotalol.  On presentation she had /94 and HR 64.  EKG showed frequent PVCs and PACs.  Labs were normal except for total bilirubin 2.2.    Medical history includes Crohn's disease.  She has had multiple small bowel obstructions that required surgery with resections and colostomies in 2001, 2002 and 2003.  The colostomies were all reversed.  She has been on MTX and Remicade but now only uses symptomatic treatment for constipation or diarrhea.  She had bilateral cataract surgery.  [facility] record shows 2D echo from 4/2018 with EF 61%, and a nuclear cardiac scan in 1/2018 was negative for ischemia.         Hospital Course:  Patient was admitted on a telemetry  monitor and was seen by cardiology and EP.  Plan was made for left heart cath.  Sotalol was discontinued due to its probable contribution to the V-tach.  She was scheduled for left heart cath while here.  The rest of her workup was deferred to outpatient, when she will get a cardiac MRI and an EP study with PVC ablation.    Interval History:  Still has palpitations.  In NSR overnight on telemetry.      Review of Systems   Constitutional: Negative for chills and fever.   Respiratory: Negative for cough and shortness of breath.    Cardiovascular: Positive for palpitations. Negative for chest pain.     Objective:     Vital Signs (Most Recent):  Temp: 97.3 °F (36.3 °C) (02/16/19 0800)  Pulse: 89 (02/16/19 1000)  Resp: 17 (02/16/19 0800)  BP: 128/67 (02/16/19 0800)  SpO2: 98 % (02/16/19 0800) Vital Signs (24h Range):  Temp:  [97.3 °F (36.3 °C)-98.5 °F (36.9 °C)] 97.3 °F (36.3 °C)  Pulse:  [60-89] 89  Resp:  [17-18] 17  SpO2:  [96 %-100 %] 98 %  BP: (116-166)/(63-84) 128/67     Weight: 85.7 kg (188 lb 15 oz)  Body mass index is 31.44 kg/m².    Intake/Output Summary (Last 24 hours) at 2/16/2019 1146  Last data filed at 2/16/2019 0500  Gross per 24 hour   Intake 840 ml   Output --   Net 840 ml      Physical Exam   Constitutional: She is oriented to person, place, and time. She appears well-developed and well-nourished.   HENT:   Head: Normocephalic.   Eyes: Conjunctivae are normal. Pupils are equal, round, and reactive to light.   Neck: Neck supple. No thyromegaly present.   Cardiovascular: Normal rate, regular rhythm and intact distal pulses. Exam reveals no gallop and no friction rub.   Murmur heard.  S1 S2 with ectopy.   Pulmonary/Chest: Effort normal and breath sounds normal. No respiratory distress.   Abdominal: Soft. Bowel sounds are normal. She exhibits no distension. There is no tenderness.   Musculoskeletal: Normal range of motion. She exhibits no edema.   Lymphadenopathy:     She has no cervical adenopathy.    Neurological: She is alert and oriented to person, place, and time.   Strength equal and symmetric   Skin: Skin is warm and dry. No rash noted.   Psychiatric: She has a normal mood and affect. Her behavior is normal. Thought content normal.       Significant Labs: All pertinent labs within the past 24 hours have been reviewed.    Significant Imaging: I have reviewed all pertinent imaging results/findings within the past 24 hours.    Assessment/Plan:      * Polymorphic ventricular tachycardia    - Seen on loop recorder while patient was asleep.  - Many months long history of dizzy symptoms and syncopal episodes.  Recorder implanted 9/2018 by Dr. Humberto Vallejo.  - Patient followed by cardiologist Dr. Palomares at [facility].   - Weaned off Effexor while here.  BuSpar already discontinued.  - EP recommended left heart cath to rule out CAD as cause of symptoms.  She had clean coronaries but EF only 35%.  - EP study with possible PVC ablation to be done as outpatient with follow up appointment scheduled next Thursday.     Essential hypertension    - Continue amlodipine, losartan at home doses.  Well controlled.         VTE Risk Mitigation (From admission, onward)        Ordered     IP VTE HIGH RISK PATIENT  Once      02/13/19 7113              Debora Greco MD  Department of Hospital Medicine   [facility]

## 2019-02-17 VITALS
OXYGEN SATURATION: 93 % | BODY MASS INDEX: 31.48 KG/M2 | RESPIRATION RATE: 20 BRPM | HEIGHT: 65 IN | HEART RATE: 71 BPM | WEIGHT: 188.94 LBS | DIASTOLIC BLOOD PRESSURE: 74 MMHG | SYSTOLIC BLOOD PRESSURE: 155 MMHG | TEMPERATURE: 98 F

## 2019-02-17 PROBLEM — I49.3 FREQUENT PVCS: Status: ACTIVE | Noted: 2019-02-17

## 2019-02-17 PROCEDURE — 99238 HOSP IP/OBS DSCHRG MGMT 30/<: CPT | Mod: ,,, | Performed by: HOSPITALIST

## 2019-02-17 PROCEDURE — 25000003 PHARM REV CODE 250: Performed by: INTERNAL MEDICINE

## 2019-02-17 PROCEDURE — 25000003 PHARM REV CODE 250: Performed by: HOSPITALIST

## 2019-02-17 PROCEDURE — 99238 PR HOSPITAL DISCHARGE DAY,<30 MIN: ICD-10-PCS | Mod: ,,, | Performed by: HOSPITALIST

## 2019-02-17 PROCEDURE — 94761 N-INVAS EAR/PLS OXIMETRY MLT: CPT

## 2019-02-17 RX ORDER — METOPROLOL SUCCINATE 50 MG/1
50 TABLET, EXTENDED RELEASE ORAL DAILY
Status: DISCONTINUED | OUTPATIENT
Start: 2019-02-17 | End: 2019-02-17 | Stop reason: HOSPADM

## 2019-02-17 RX ORDER — METOPROLOL SUCCINATE 50 MG/1
50 TABLET, EXTENDED RELEASE ORAL DAILY
Qty: 90 TABLET | Refills: 0 | Status: SHIPPED | OUTPATIENT
Start: 2019-02-17 | End: 2019-09-18

## 2019-02-17 RX ORDER — BUTALBITAL, ACETAMINOPHEN AND CAFFEINE 50; 325; 40 MG/1; MG/1; MG/1
1 TABLET ORAL ONCE
Status: COMPLETED | OUTPATIENT
Start: 2019-02-17 | End: 2019-02-17

## 2019-02-17 RX ADMIN — TRAMADOL HYDROCHLORIDE 50 MG: 50 TABLET, FILM COATED ORAL at 08:02

## 2019-02-17 RX ADMIN — AMLODIPINE BESYLATE 5 MG: 5 TABLET ORAL at 08:02

## 2019-02-17 RX ADMIN — HYDROCODONE BITARTRATE AND ACETAMINOPHEN 1 TABLET: 5; 325 TABLET ORAL at 04:02

## 2019-02-17 RX ADMIN — BUTALBITAL, ACETAMINOPHEN AND CAFFEINE 1 TABLET: 50; 325; 40 TABLET ORAL at 09:02

## 2019-02-17 RX ADMIN — LOSARTAN POTASSIUM 100 MG: 50 TABLET, FILM COATED ORAL at 08:02

## 2019-02-17 RX ADMIN — TRAMADOL HYDROCHLORIDE 50 MG: 50 TABLET, FILM COATED ORAL at 12:02

## 2019-02-17 RX ADMIN — METOPROLOL SUCCINATE 50 MG: 50 TABLET, EXTENDED RELEASE ORAL at 10:02

## 2019-02-17 NOTE — DISCHARGE INSTRUCTIONS
Thank you for choosing [facility] as your Health Care Provider. [facility] strives to provide the best healthcare available to you. In the next few days you may receive a Survey, either by mail or email,  asking you to rate our care that was provided to you during your stay.  Please return the survey to us, as your feedback is important. We aim to meet your expectations of safe, quality health care.    From your [facility] Team.          Metoprolol extended-release tablets  What is this medicine?  METOPROLOL (me TOE proe lole) is a beta-blocker. Beta-blockers reduce the workload on the heart and help it to beat more regularly. This medicine is used to treat high blood pressure and to prevent chest pain. It is also used to after a heart attack and to prevent an additional heart attack from occurring.  How should I use this medicine?  Take this medicine by mouth with a glass of water. Follow the directions on the prescription label. Do not crush or chew. Take this medicine with or immediately after meals. Take your doses at regular intervals. Do not take more medicine than directed. Do not stop taking this medicine suddenly. This could lead to serious heart-related effects.  Talk to your pediatrician regarding the use of this medicine in children. While this drug may be prescribed for children as young as 6 years for selected conditions, precautions do apply.  What side effects may I notice from receiving this medicine?  Side effects that you should report to your doctor or health care professional as soon as possible:  · allergic reactions like skin rash, itching or hives  · cold or numb hands or feet  · depression  · difficulty breathing  · faint  · fever with sore throat  · irregular heartbeat, chest pain  · rapid weight gain  · swollen legs or ankles  Side effects that usually do not require medical attention (report to your doctor or health care professional if they continue or are  bothersome):  · anxiety or nervousness  · change in sex drive or performance  · dry skin  · headache  · nightmares or trouble sleeping  · short term memory loss  · stomach upset or diarrhea  · unusually tired  What may interact with this medicine?  This medicine may interact with the following medications:  · certain medicines for blood pressure, heart disease, irregular heart beat  · certain medicines for depression, like monoamine oxidase (MAO) inhibitors, fluoxetine, or paroxetine  · clonidine  · dobutamine  · epinephrine  · isoproterenol  · reserpine  What if I miss a dose?  If you miss a dose, take it as soon as you can. If it is almost time for your next dose, take only that dose. Do not take double or extra doses.  Where should I keep my medicine?  Keep out of the reach of children.  Store at room temperature between 15 and 30 degrees C (59 and 86 degrees F). Throw away any unused medicine after the expiration date.  What should I tell my health care provider before I take this medicine?  They need to know if you have any of these conditions:  · diabetes  · heart or vessel disease like slow heart rate, worsening heart failure, heart block, sick sinus syndrome or Raynaud's disease  · kidney disease  · liver disease  · lung or breathing disease, like asthma or emphysema  · pheochromocytoma  · thyroid disease  · an unusual or allergic reaction to metoprolol, other beta-blockers, medicines, foods, dyes, or preservatives  · pregnant or trying to get pregnant  · breast-feeding  What should I watch for while using this medicine?  Visit your doctor or health care professional for regular check ups. Contact your doctor right away if your symptoms worsen. Check your blood pressure and pulse rate regularly. Ask your health care professional what your blood pressure and pulse rate should be, and when you should contact them.  You may get drowsy or dizzy. Do not drive, use machinery, or do anything that needs mental  alertness until you know how this medicine affects you. Do not sit or stand up quickly, especially if you are an older patient. This reduces the risk of dizzy or fainting spells. Contact your doctor if these symptoms continue. Alcohol may interfere with the effect of this medicine. Avoid alcoholic drinks.  NOTE:This sheet is a summary. It may not cover all possible information. If you have questions about this medicine, talk to your doctor, pharmacist, or health care provider. Copyright© 2017 [employer]

## 2019-02-17 NOTE — NURSING
Discharge instructions given, questions answered, pt and  acknowledged understanding. IV removed with catheter intact. Belongings packed and ready. Awaiting  for transportation home. Will continue to monitor.

## 2019-02-17 NOTE — PLAN OF CARE
02/17/19 1012   Final Note   Assessment Type Final Discharge Note  (DME RW)   Anticipated Discharge Disposition Home-Health   Hospital Follow Up  Appt(s) scheduled? Yes   Discharge plans and expectations educations in teach back method with documentation complete? Yes   Right Care Referral Info   Post Acute Recommendation Home-care

## 2019-02-17 NOTE — DISCHARGE SUMMARY
[facility] Hospital Medicine  Discharge Summary      Patient Name: Celine Sinclair  MRN: [id number]  Admission Date: 2/13/2019  Hospital Length of Stay: 4 days  Discharge Date and Time:  02/17/2019 2:46 PM  Attending Physician: Debora Ibarra MD   Discharging Provider: Debora Ibarra MD  Primary Care Provider: Lety Jaquez MD      HPI:   Ms. Sinclair is a 68 year old woman who presented with a report of polymorphic V tach picked up on her implanted loop recorder.  Patient was at her PCP's office to do some lab work when she was contacted by her cardiologist to report to the hospital because she had an episode of PVT lasting about 30 beats while she was asleep the previous night.  Patient has been having palpitations for many years, but over the last few months she has been having episodes more frequently and she is constantly short of breath.  She has had multiple episodes of dizziness and passing out.  Two weeks ago she became ill after eating out and had about a week of vomiting and diarrhea.  Since then she has been winded with minimal exertion.  Her medications have been revised recently with multiple medications discontinued including sotalol, with the addition of amiodarone, but she has not picked it up yet and is still taking sotalol.  On presentation she had /94 and HR 64.  EKG showed frequent PVCs and PACs.  Labs were normal except for total bilirubin 2.2.    Medical history includes Crohn's disease.  She has had multiple small bowel obstructions that required surgery with resections and colostomies in 2001, 2002 and 2003.  The colostomies were all reversed.  She has been on MTX and Remicade but now only uses symptomatic treatment for constipation or diarrhea.  She had bilateral cataract surgery.  [facility] record shows 2D echo from 4/2018 with EF 61%, and a nuclear cardiac scan in 1/2018 was negative for ischemia.         Procedure(s) (LRB):  HEART CATH-LEFT (N/A)       Hospital Course:   Patient was admitted on a telemetry monitor and was seen by cardiology and EP.  Sotalol was discontinued due to its proarrhythmic effects as a likely cause of the V-tach.  She was scheduled for left heart cath while here.  The rest of her workup was deferred to outpatient, when she will get a cardiac MRI to rule out infiltrative disease, and an EP study with possible PVC ablation.  She underwent left heart cath on 2/15 with Dr. Haider.  This showed normal coronaries but EF was 35%.  She will be seen in follow up at the [facility] EP clinic on Thursday 2/21 at 2 PM.       Consults:   Consults (From admission, onward)        Status Ordering Provider     Inpatient consult to Cardiology  Once     Provider:  Miky Haider MD    Completed CESARIO MONTGOMERY     Inpatient consult to Electrophysiology  Once     Provider:  Adrian Angeles MD    Completed MIKY HAIDER            Final Active Diagnoses:    Diagnosis Date Noted POA    PRINCIPAL PROBLEM:  Polymorphic ventricular tachycardia [I47.2] 02/13/2019 Yes    Frequent PVCs [I49.3] 02/17/2019 Yes    Essential hypertension [I10] 02/08/2017 Yes      Problems Resolved During this Admission:       Discharged Condition: stable    Disposition: Home or Self Care    Follow Up:  Follow-up Information     Lety Jaqeuz MD.    Specialty:  Internal Medicine  Why:  Follow up in 1-2 weeks  Contact information:  [address]  [place]  [facility] [address]  [phone]             [facility]    Specialty:  DME Provider  Why:  With questions regarding medical equipment - walker  Contact information:  [address]  [facility]  [facility] [address]  [phone]             [facility]  [place].    Specialty:  Home Health Services  Why:  they will call you to schedule first appointment for home health  Contact information:  [address]  [address]  [place] [address]  [phone]             [facility].    Why:  As scheduled  "Thursday 2/21/19 at 2 PM.  Contact information:  [address]  [address]               Patient Instructions:      WALKER FOR HOME USE     Order Specific Question Answer Comments   Type of Walker: Adult (5'4"-6'6")    With wheels? Yes    Height: 5' 5" (1.651 m)    Weight: 85.7 kg (188 lb 15 oz)    Length of need (1-99 months): 99    Does patient have medical equipment at home? cane, straight    Please check all that apply: Patient's condition impairs ambulation.    Please check all that apply: Patient is unable to safely ambulate without equipment.    Please check all that apply: Walker will be used for gait training.    Vendor: [facility]    Expected Date of Delivery: 2/15/2019      Diet Cardiac     Activity as tolerated       Medications:  Reconciled Home Medications:      Medication List      START taking these medications    metoprolol succinate 50 MG 24 hr tablet  Commonly known as:  TOPROL-XL  Take 1 tablet (50 mg total) by mouth once daily.        CONTINUE taking these medications    BLADDER CONTROL PADS EX ABSORB MISC     diphenoxylate-atropine 2.5-0.025 mg 2.5-0.025 mg per tablet  Commonly known as:  LOMOTIL  Take 1 tablet by mouth 4 (four) times daily as needed for Diarrhea.     loperamide 2 mg capsule  Commonly known as:  IMODIUM     losartan 50 MG tablet  Commonly known as:  COZAAR  Take 100 mg by mouth every morning.     Attainia REMOTE CONTROL MISC  by Misc.(Non-Drug; Combo Route) route.     VENTOLIN INHL  Inhale 1 puff into the lungs every 4 to 6 hours as needed.     vitamin D 1000 units Tab  Commonly known as:  VITAMIN D3  Take 185 mg by mouth.        STOP taking these medications    acetaminophen 500 MG tablet  Commonly known as:  TYLENOL     amiodarone 200 MG Tab  Commonly known as:  PACERONE     amLODIPine 10 MG tablet  Commonly known as:  NORVASC     busPIRone 5 MG Tab  Commonly known as:  BUSPAR     diltiaZEM 120 MG Cp24  Commonly known as:  CARDIZEM CD     propranolol 80 MG " tablet  Commonly known as:  INDERAL     sotalol 120 MG Tab  Commonly known as:  BETAPACE     venlafaxine 225 mg Tr24          Time spent on the discharge of patient: <30 minutes  Patient was seen and examined on the date of discharge and determined to be suitable for discharge.         Debora Greco MD  Department of Hospital Medicine  [facility]

## 2019-02-17 NOTE — NURSING
Plan of care reviewed with Pt, purposeful hourly rounding performed. VSS on RA. NAD during shift. PRN pain medication given. Tele monitor maintained, NSR. No c/o at this time. Ambulating independently. Bed locked and lowered, call light in reach. Will continue to monitor.

## 2019-02-19 ENCOUNTER — PATIENT OUTREACH (OUTPATIENT)
Dept: ADMINISTRATIVE | Facility: CLINIC | Age: 69
End: 2019-02-19

## 2019-02-19 NOTE — PROGRESS NOTES
TCC Script completed with patient. Reports anxiety regarding moving too much due to VT, emotional support provided.

## 2019-02-19 NOTE — PATIENT INSTRUCTIONS
Ventricular Arrhythmia  The heart has its own electrical system to regulate the heartbeat. An abnormal change in the rate or pattern of the heartbeat is called an arrhythmia. It can cause the heart to move blood less efficiently. Four chambers hold blood as it moves through the heart. The 2 lower chambers of the heart are called ventricles. Problems with the signals in the heart can make the ventricles beat faster than normal.    Ventricular tachycardia (VT)  Rapid electrical from the ventricles can result in a fast heart rhythm called ventricular tachycardia or VT.  · With VT, abnormal electrical pathways or circuits form in the ventricles. This can be caused by any disease that damages the heart muscle. It's most commonly seen as a result of a heart attack or coronary artery disease.  · Electrical signals enter the abnormal circuit and loop around. With each loop, the signal tells the ventricles to contract. This makes the heart beat very fast. The heart may be beating too fast to pump blood. This can cause you to pass out. It can also cause cardiac arrest, leading to sudden death.   · In some cases, VT develops into ventricular fibrillation. This is the most serious type of arrhythmia as it is fatal if not promptly treated.    Ventricular fibrillation (VF)  At times, electrical signals may be sent so fast and unevenly that the heart muscle quivers and doesnt beat at all. This is called fibrillation:  · VF can occur if the ventricles contain several abnormal circuits, or if the heart muscle is acutely injured such as from a heart attack, and becomes electrically unstable.  · Signals caught in the circuits make the ventricles beat very quickly and unevenly. This keeps the heart muscle from pumping properly.  · The heart can get to the point that it cant pump at all. This is called cardiac arrest. Death will occur if emergency treatment isnt given to return the heart rhythm to normal.  Date Last Reviewed:  4/20/2016  © 9975-3685 [employer]. [address] [place] [address], [place] [address]. All rights reserved. This information is not intended as a substitute for professional medical care. Always follow your healthcare professional's instructions.     Discharge Instructions for Cardiac Catheterization  Cardiac catheterization is a procedure to look for blocked areas in the blood vessels that send blood to the heart. A thin, flexible tube (catheter) is put in a blood vessel in your groin or arm. The healthcare provider injects contrast fluid into your blood, which then flows to your heart. X-rays pictures are taken of your heart. Your provider will review the results with you. Be sure to ask any questions you have before you leave. This sheet will help you take care of yourself at home.  Home care  · Only do light and easy activities for the next 2 to 3 days. Ask for help with chores and errands while you recover. Have someone drive you to your appointments.  · Don't lift anything heavy for a while. Your healthcare team will tell you when it's safe to lift again.  · Ask your healthcare team when you can expect to return to work. Unless your job involves lifting, you may be able to return to your normal activities within a couple of days.  · Take your medicines as directed. Don't skip doses.  · Drink 6 to 8 glasses of water a day. This is to help flush the contrast dye out of your body. Call your healthcare team if your urine has any change in color.  · Take your temperature each day for 7 days. If you feel cold and clammy or start sweating, take your temperature right away and call your healthcare team.  · Check your incisions every day for signs of infection. These include redness, swelling, and drainage. It is normal to have a small bruise or bump where the catheter was inserted. A bruise that is getting larger is not normal and should be reported to your healthcare team. If you see blood forming in the  incision, call your healthcare team. Go to the emergency department if you have uncontrolled bleeding from the artery site. This is especially true if you take medicines that make it difficult for your blood to clot. Examples are aspirin, clopidogrel, and warfarin.  · Eat a healthy diet. Make sure it is low in fat, salt, and cholesterol. Ask your healthcare team for diet information.  · Stop smoking. Enroll in a stop-smoking program or ask your healthcare team for help. Stop-smoking programs can be life saving.  · Exercise as your healthcare team tells you to. Your healthcare team may recommend you start a cardiac rehabilitation program. Cardiac rehab is an exercise program in which trained healthcare staff watch your progress and stress on your heart while you exercise. Ask your team how to enroll.  · Don't swim or take baths until your healthcare team says its OK. You can shower the day after the procedure. Keep the site clean and dry. This keeps the incision from getting wet and infected until the skin and artery can heal.  Follow-up care  · Make a follow-up appointment as advised by our staff. It's common to have a follow-up appointment 2 to 4 weeks after an angioplasty or coronary stent procedure.  · Make a yearly appointment, too. This is to make sure you are still doing well and not having any new symptoms.  · Don't wait for a follow-up appointment if your medicines aren't working or you are having heart-related symptoms.  When to seek medical care  Call your healthcare provider right away if you have any of the following:  · Chest pain  · Constant or increasing pain or numbness in your leg  · Fever of 100.4°F (38.0°C) or higher, or as directed by your healthcare provider  · Symptoms of infection. These include redness, swelling, drainage, or warmth at the incision site.  · Shortness of breath  · A leg that feels cold or appears blue  · Bleeding, bruising, or a lot of swelling where the catheter was  inserted  · Blood in your urine  · Black or tarry stools  · Any unusual bleeding   Date Last Reviewed: 10/1/2016  © 3332-3911 [employer]. [address] [place] [address], [place] [address]. All rights reserved. This information is not intended as a substitute for professional medical care. Always follow your healthcare professional's instructions.        Bacteremia, Suspected (Adult)  Your fever today is probably due to a viral illness. However, sometimes fever can be an early sign of a more serious bacterial infection. Bacteremia is a bacterial infection that has spread to the bloodstream. This is serious because it can spread to other organs, including the kidneys, brain, and lungs.  Your healthcare provider will perform tests (cultures) to check for bacteremia. Until the test results are known you should watch for the signs listed below.  Causes  Bacteremia usually starts with a typical infection, but it then spreads to the blood. Almost any type of infection can cause bacteremia, including a urinary tract infection, skin infection, gastrointestinal problem, surgical complication, or pneumonia.  Symptoms  At first symptoms may seem like any typical infection or illness, but then they worsen. Symptoms of bacteremia can include:  · Fever and chills  · Loss of appetite  · Nausea or vomiting  · Trouble breathing or fast breathing  · Fast heart rate  · Feeling lightheaded or faint  · Skin rashes or blotches  Home care  Follow these guidelines when caring for yourself at home.  · Rest at home for the first 2 to 3 days. When resuming activity, don't let yourself become overly tired.  · You can take acetaminophen or ibuprofen for pain, unless you were given a different pain medicine to use. (Note: If you have chronic liver or kidney disease or have ever had a stomach ulcer or gastrointestinal bleeding, talk with your healthcare provider before using these medicines. Also talk to your provider if you are taking  medicine to prevent blood clots.) Aspirin should never be given to anyone younger than 18 years of age who is ill with a viral infection or fever. It may cause severe liver or brain damage.  · If you were given antibiotics, take them until they are used up, or your healthcare provider tells you to stop. It is important to finish the antibiotics even though you feel better. This is to make sure the infection has cleared.  · Your appetite may be poor, so a light diet is fine. Avoid dehydration by drinking 6 to 8 glasses of fluid per day (such as water, soft drinks, sports drinks, juices, tea, or soup).  Follow-up care  Follow up with your healthcare provider, or as advised.  · If a culture was done, you will be notified if your treatment needs to be changed. You can call as directed for the results.  · If X-rays, a CT, or an ultrasound were done, a specialist will review them. You will be notified of any findings that may affect your care.  Call 911  Contact emergency services right away if any of these occur:  · Trouble breathing or swallowing, or wheezing  · Chest pain  · Confusion or sudden change in behavior  · Extreme drowsiness or trouble awakening  · Fainting or loss of consciousness  · Rapid heart rate  · Low blood pressure  · Vomiting blood, or large amounts of blood in stool  · Seizure  When to seek medical advice  Call your healthcare provider right away if any of these occur:  · Cough with lots of colored sputum (mucus), or blood in your sputum  · Severe headache  · Severe face, neck, throat, or ear pain  · Pain in the right lower abdomen  · Weakness, dizziness, repeated vomiting, or diarrhea  · Joint pain or a new rash  · Burning when urinating  · Fever of 100.4°F (38°C) or higher  Date Last Reviewed: 7/30/2015  © 0234-1180 [employer]. [address] [place] [address], [place] [address]. All rights reserved. This information is not intended as a substitute for professional medical care. Always follow  your healthcare professional's instructions.

## 2019-02-22 DIAGNOSIS — R06.00 DYSPNEA: Primary | ICD-10-CM

## 2019-02-25 DIAGNOSIS — I42.8 NON-ISCHEMIC CARDIOMYOPATHY: Primary | ICD-10-CM

## 2019-02-25 DIAGNOSIS — I49.3 FREQUENT PVCS: ICD-10-CM

## 2019-02-25 DIAGNOSIS — R06.00 DYSPNEA: Primary | ICD-10-CM

## 2019-02-25 DIAGNOSIS — I47.29 VENTRICULAR TACHYCARDIA, POLYMORPHIC: ICD-10-CM

## 2019-02-26 ENCOUNTER — HOSPITAL ENCOUNTER (OUTPATIENT)
Dept: RADIOLOGY | Facility: OTHER | Age: 69
Discharge: HOME | End: 2019-02-26
Attending: INTERNAL MEDICINE
Payer: MEDICARE

## 2019-02-26 DIAGNOSIS — R06.00 DYSPNEA: ICD-10-CM

## 2019-02-26 PROCEDURE — 71275 CT ANGIOGRAPHY CHEST: CPT | Mod: TC

## 2019-02-26 PROCEDURE — 71275 CT ANGIOGRAPHY CHEST: CPT | Mod: 26,,, | Performed by: RADIOLOGY

## 2019-02-26 PROCEDURE — 25500020 PHARM REV CODE 255

## 2019-02-26 PROCEDURE — 71275 CTA CHEST NON CORONARY: ICD-10-PCS | Mod: 26,,, | Performed by: RADIOLOGY

## 2019-02-26 RX ADMIN — IOHEXOL 75 ML: 350 INJECTION, SOLUTION INTRAVENOUS at 03:02

## 2019-03-12 ENCOUNTER — HOSPITAL ENCOUNTER (OUTPATIENT)
Dept: RADIOLOGY | Facility: HOSPITAL | Age: 69
Discharge: HOME | End: 2019-03-12
Attending: INTERNAL MEDICINE
Payer: MEDICARE

## 2019-03-12 DIAGNOSIS — I42.8 NON-ISCHEMIC CARDIOMYOPATHY: ICD-10-CM

## 2019-03-12 DIAGNOSIS — I47.29 VENTRICULAR TACHYCARDIA, POLYMORPHIC: ICD-10-CM

## 2019-03-12 DIAGNOSIS — I49.3 FREQUENT PVCS: ICD-10-CM

## 2019-03-12 PROCEDURE — 75557 CARDIAC MRI FOR MORPH: CPT | Mod: TC

## 2019-03-12 PROCEDURE — 75557 CARDIAC MRI FOR MORPH: CPT | Mod: 26,,, | Performed by: RADIOLOGY

## 2019-03-12 PROCEDURE — 75557 MRI CARDIAC WO CONTRAST: ICD-10-PCS | Mod: 26,,, | Performed by: RADIOLOGY

## 2019-05-24 ENCOUNTER — TELEPHONE (OUTPATIENT)
Dept: ELECTROPHYSIOLOGY | Facility: CLINIC | Age: 69
End: 2019-05-24

## 2019-05-28 ENCOUNTER — TELEPHONE (OUTPATIENT)
Dept: ELECTROPHYSIOLOGY | Facility: CLINIC | Age: 69
End: 2019-05-28

## 2019-05-28 DIAGNOSIS — I49.3 PVC (PREMATURE VENTRICULAR CONTRACTION): Primary | ICD-10-CM

## 2019-05-28 NOTE — TELEPHONE ENCOUNTER
Spoke with patient and coordinated her appointments with , pt gave us the information on her device and cardiologist, we have faxed over request to retrieve her outside records.   ----- Message from Bel Williamson sent at 5/28/2019  8:38 AM CDT -----  Contact: Self  .Patient Returning Call from [facility]    Who Left Message for Patient: Margarita  Communication Preference: [phone]  Additional Information: Schedule referral. Thanks

## 2019-06-11 DIAGNOSIS — I48.0 PAF (PAROXYSMAL ATRIAL FIBRILLATION): Primary | ICD-10-CM

## 2019-06-18 PROBLEM — I42.8 CARDIOMYOPATHY, NONISCHEMIC: Status: ACTIVE | Noted: 2019-06-18

## 2019-06-18 PROBLEM — M79.7 FIBROMYALGIA: Status: ACTIVE | Noted: 2019-06-18
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
## 2019-06-19 ENCOUNTER — HOSPITAL ENCOUNTER (OUTPATIENT)
Dept: CARDIOLOGY | Facility: CLINIC | Age: 69
Discharge: HOME | End: 2019-06-19
Payer: MEDICARE

## 2019-06-19 ENCOUNTER — CLINICAL SUPPORT (OUTPATIENT)
Dept: CARDIOLOGY | Facility: HOSPITAL | Age: 69
End: 2019-06-19
Attending: INTERNAL MEDICINE
Payer: MEDICARE

## 2019-06-19 ENCOUNTER — INITIAL CONSULT (OUTPATIENT)
Dept: ELECTROPHYSIOLOGY | Facility: CLINIC | Age: 69
End: 2019-06-19
Payer: MEDICARE

## 2019-06-19 VITALS
BODY MASS INDEX: 29.94 KG/M2 | HEIGHT: 65 IN | DIASTOLIC BLOOD PRESSURE: 80 MMHG | HEART RATE: 61 BPM | SYSTOLIC BLOOD PRESSURE: 140 MMHG | WEIGHT: 179.69 LBS

## 2019-06-19 DIAGNOSIS — I42.8 CARDIOMYOPATHY, NONISCHEMIC: ICD-10-CM

## 2019-06-19 DIAGNOSIS — I49.3 PVC (PREMATURE VENTRICULAR CONTRACTION): ICD-10-CM

## 2019-06-19 DIAGNOSIS — I10 ESSENTIAL HYPERTENSION: ICD-10-CM

## 2019-06-19 DIAGNOSIS — M79.7 FIBROMYALGIA: ICD-10-CM

## 2019-06-19 DIAGNOSIS — I47.29 POLYMORPHIC VENTRICULAR TACHYCARDIA: ICD-10-CM

## 2019-06-19 DIAGNOSIS — I48.0 PAF (PAROXYSMAL ATRIAL FIBRILLATION): ICD-10-CM

## 2019-06-19 DIAGNOSIS — I49.3 FREQUENT PVCS: Primary | ICD-10-CM

## 2019-06-19 PROCEDURE — 93291 INTERROG DEV EVAL SCRMS IP: CPT

## 2019-06-19 PROCEDURE — 3288F FALL RISK ASSESSMENT DOCD: CPT | Mod: CPTII,S$GLB,, | Performed by: INTERNAL MEDICINE

## 2019-06-19 PROCEDURE — 99204 OFFICE O/P NEW MOD 45 MIN: CPT | Mod: S$GLB,,, | Performed by: INTERNAL MEDICINE

## 2019-06-19 PROCEDURE — 1100F PR PT FALLS ASSESS DOC 2+ FALLS/FALL W/INJURY/YR: ICD-10-PCS | Mod: CPTII,S$GLB,, | Performed by: INTERNAL MEDICINE

## 2019-06-19 PROCEDURE — 93010 ELECTROCARDIOGRAM REPORT: CPT | Mod: S$GLB,,, | Performed by: INTERNAL MEDICINE

## 2019-06-19 PROCEDURE — 3288F PR FALLS RISK ASSESSMENT DOCUMENTED: ICD-10-PCS | Mod: CPTII,S$GLB,, | Performed by: INTERNAL MEDICINE

## 2019-06-19 PROCEDURE — 3077F PR MOST RECENT SYSTOLIC BLOOD PRESSURE >= 140 MM HG: ICD-10-PCS | Mod: CPTII,S$GLB,, | Performed by: INTERNAL MEDICINE

## 2019-06-19 PROCEDURE — 3079F DIAST BP 80-89 MM HG: CPT | Mod: CPTII,S$GLB,, | Performed by: INTERNAL MEDICINE

## 2019-06-19 PROCEDURE — 93010 RHYTHM STRIP: ICD-10-PCS | Mod: S$GLB,,, | Performed by: INTERNAL MEDICINE

## 2019-06-19 PROCEDURE — 99999 PR PBB SHADOW E&M-EST. PATIENT-LVL III: CPT | Mod: PBBFAC,,, | Performed by: INTERNAL MEDICINE

## 2019-06-19 PROCEDURE — 3079F PR MOST RECENT DIASTOLIC BLOOD PRESSURE 80-89 MM HG: ICD-10-PCS | Mod: CPTII,S$GLB,, | Performed by: INTERNAL MEDICINE

## 2019-06-19 PROCEDURE — 3077F SYST BP >= 140 MM HG: CPT | Mod: CPTII,S$GLB,, | Performed by: INTERNAL MEDICINE

## 2019-06-19 PROCEDURE — 93005 RHYTHM STRIP: ICD-10-PCS | Mod: S$GLB,,, | Performed by: INTERNAL MEDICINE

## 2019-06-19 PROCEDURE — 99999 PR PBB SHADOW E&M-EST. PATIENT-LVL III: ICD-10-PCS | Mod: PBBFAC,,, | Performed by: INTERNAL MEDICINE

## 2019-06-19 PROCEDURE — 93005 ELECTROCARDIOGRAM TRACING: CPT | Mod: S$GLB,,, | Performed by: INTERNAL MEDICINE

## 2019-06-19 PROCEDURE — 1100F PTFALLS ASSESS-DOCD GE2>/YR: CPT | Mod: CPTII,S$GLB,, | Performed by: INTERNAL MEDICINE

## 2019-06-19 PROCEDURE — 99204 PR OFFICE/OUTPT VISIT, NEW, LEVL IV, 45-59 MIN: ICD-10-PCS | Mod: S$GLB,,, | Performed by: INTERNAL MEDICINE

## 2019-06-19 RX ORDER — FUROSEMIDE 20 MG/1
20 TABLET ORAL
COMMUNITY
Start: 2017-06-06 | End: 2020-06-09

## 2019-06-19 RX ORDER — ACETAMINOPHEN 500 MG
500 TABLET ORAL DAILY PRN
COMMUNITY
End: 2021-01-05 | Stop reason: SDUPTHER

## 2019-06-19 RX ORDER — POTASSIUM CHLORIDE 750 MG/1
TABLET, EXTENDED RELEASE ORAL
COMMUNITY
Start: 2017-05-18 | End: 2019-08-13

## 2019-06-19 RX ORDER — TRAMADOL HYDROCHLORIDE 50 MG/1
50 TABLET ORAL EVERY 12 HOURS PRN
Refills: 5 | COMMUNITY
Start: 2019-06-10 | End: 2020-06-09

## 2019-06-19 RX ORDER — TRAZODONE HYDROCHLORIDE 50 MG/1
50 TABLET ORAL NIGHTLY PRN
Refills: 5 | COMMUNITY
Start: 2019-06-10 | End: 2020-06-09 | Stop reason: SDUPTHER

## 2019-06-19 RX ORDER — AMIODARONE HYDROCHLORIDE 200 MG/1
200 TABLET ORAL DAILY
Refills: 3 | COMMUNITY
Start: 2019-03-22 | End: 2021-02-09 | Stop reason: SDUPTHER

## 2019-06-19 NOTE — LETTER
June 19, 2019      Lety Jaquez MD  [address]  [facility] [address]           [facility]  [address]  [facility] [address]  Phone: [phone]  Fax: [phone]          Patient: Celine Sinclair   MR Number: [id number]   YOB: 1950   Date of Visit: 6/19/2019       Dear Dr. Lety Jaquez:    Thank you for referring Celine Sinclair to me for evaluation. Attached you will find relevant portions of my assessment and plan of care.    If you have questions, please do not hesitate to call me. I look forward to following Celine Sinclair along with you.    Sincerely,    Levi Moreno MD    Enclosure  CC:  No Recipients    If you would like to receive this communication electronically, please contact [email or web address] or [phone] to request more information on [employer] Link access.    For providers and/or their staff who would like to refer a patient to [facility], please contact us through our one-stop-shop provider referral line, [facility], at [phone].    If you feel you have received this communication in error or would no longer like to receive these types of communications, please e-mail [email or web address]

## 2019-07-10 ENCOUNTER — TELEPHONE (OUTPATIENT)
Dept: CARDIOLOGY | Facility: HOSPITAL | Age: 69
End: 2019-07-10

## 2019-07-10 NOTE — TELEPHONE ENCOUNTER
Remote device transmission received;  ILR alert for symptom + tachy:  Irregular rhythm w/ some WCT (irregular as well), c/w probable AF/AFl w/ aberrancy.  Spoke with patient by phone, reports headache night of 7/8/19 but no syncope.  Not currently on OAC.  Another physician dc'ed her amiodarone in order for her to take a muscle spasm med.    Palpitations or increased HR:  Yes  Loss of consciousness, passing out or near passing out:  No    Chest pain:  No     Worsening Fatigue:  No  Are you taking your medications as prescribed:   Pt reports she is not currently taking amiodarone, does take toprol as prescribed    The device alert information will be reviewed with your physician and any changes to your current medical plan will be communicated to you.

## 2019-07-10 NOTE — TELEPHONE ENCOUNTER
Contacted patient again to send a manual transmission to see her current HR and rhythm.  Carelink monitor not working, error code 3248 noted by patient.  Carelink pt support cannot make calls directly out to patient nor can they send a new monitor without trouble shooting with her directly, will request she call [phone] for support.

## 2019-07-11 ENCOUNTER — TELEPHONE (OUTPATIENT)
Dept: CARDIOLOGY | Facility: HOSPITAL | Age: 69
End: 2019-07-11

## 2019-07-11 NOTE — TELEPHONE ENCOUNTER
Called patient to provide further recommendations from M.MADAY.  Dr. Christie reviewed and believes the rhythms provided by [place] from 7/8/19 (ID#[id number]) could be c/w 1:1 flutter.  Pt denied syncope.  He strongly recommended she restart her amiodarone at the previous dose.  Phone patient to inform but had to leave a voicemail.

## 2019-07-25 ENCOUNTER — DOCUMENTATION ONLY (OUTPATIENT)
Dept: ELECTROPHYSIOLOGY | Facility: CLINIC | Age: 69
End: 2019-07-25

## 2019-07-25 NOTE — PROGRESS NOTES
Called and s/w patient today to follow up on if patient has re-started her Amiodarone medication yet. Patient stated she started Amiodarone the day after voicemail was left on patient's phone to restart it. AF alert received this morning via [employer]. Reviewed EGM w/ Dr. Sifuentes. Per Dr. Sifuentes, EGM c/w atrial tachycardia w/ PAC's, but not AF. Orders to continue to monitor.

## 2019-07-30 ENCOUNTER — TELEPHONE (OUTPATIENT)
Dept: ELECTROPHYSIOLOGY | Facility: CLINIC | Age: 69
End: 2019-07-30

## 2019-07-30 NOTE — TELEPHONE ENCOUNTER
"Remote device transmission reviewed following call from [employer].  Pt sent two symptom events evening of 7/29/19 demonstrating SR/SB w/ PVC's and ventricular bigeminy.      Contacted patient for follow-up and reviewed for the following:    Palpitations or increased HR:  No  Loss of consciousness, passing out or near passing out:  No    Chest pain:  Yes   If yes, please describe: heaviness, "tight"  Worsening Fatigue:  No  Are you taking your medications as prescribed:   Yes, presently on amiodarone 200mg BID.    Pt states this "heaviness" is the same that she's been feeling for a while now.  Feeling just goes away on it's own without intervention on her behalf.    Pt was advised that the events would be reviewed with her M.D.   Pt was advised to seek care in the ED if pain worsens.          "

## 2019-08-08 ENCOUNTER — TELEPHONE (OUTPATIENT)
Dept: ELECTROPHYSIOLOGY | Facility: CLINIC | Age: 69
End: 2019-08-08

## 2019-08-08 ENCOUNTER — DOCUMENTATION ONLY (OUTPATIENT)
Dept: ELECTROPHYSIOLOGY | Facility: CLINIC | Age: 69
End: 2019-08-08

## 2019-08-08 NOTE — TELEPHONE ENCOUNTER
"Remote device transmission received; Symptom initiated episode, EGM showing SB.    Contacted patient for follow-up and reviewed for the following:    Palpitations or increased HR:  Yes,palpitations. How frequently? "Feels like everyday "    Loss of consciousness, passing out or near passing out:  No      Chest pain:  Yes  If yes, please describe: Chest tightness    Worsening Fatigue:  Yes    Are you taking your medications as prescribed:   Yes. Amiodarone 200mg BID and Lopressor 50mg QD.      Called and spoke with patient. Patient stated she felt chest tightness and her heart racing. Patient states she feels out of breath as if she's running, but she's at rest. Advised patient to call clinic if she has any additional concerns and to go to the ED if symptoms worsen.     Advised patient that the device alert information will be reviewed with Dr. Moreno and any changes to her current medical plan will be communicated to her. Patient verbalized understanding and appreciated call.     "

## 2019-08-08 NOTE — PROGRESS NOTES
Per Dr. Moreno, symptoms do not correlate with rhythm on loop recorder. Recommended patient follow up with cardiology with these symptoms. Patient stated she doesn't have a cardiologist because her cardiologist moved from [facility] to [place]. Appt made with Dr. Mendoza for 8/13 @ 9am. Notified patient of appt time. Patient appreciated call.
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
## 2019-08-13 ENCOUNTER — TELEPHONE (OUTPATIENT)
Dept: CARDIOLOGY | Facility: CLINIC | Age: 69
End: 2019-08-13

## 2019-08-13 ENCOUNTER — OFFICE VISIT (OUTPATIENT)
Dept: CARDIOLOGY | Facility: CLINIC | Age: 69
End: 2019-08-13
Payer: MEDICARE

## 2019-08-13 VITALS
WEIGHT: 175.94 LBS | SYSTOLIC BLOOD PRESSURE: 178 MMHG | DIASTOLIC BLOOD PRESSURE: 85 MMHG | HEIGHT: 66 IN | BODY MASS INDEX: 28.27 KG/M2 | HEART RATE: 47 BPM

## 2019-08-13 DIAGNOSIS — I10 ESSENTIAL HYPERTENSION: ICD-10-CM

## 2019-08-13 DIAGNOSIS — R07.89 CHEST DISCOMFORT: ICD-10-CM

## 2019-08-13 DIAGNOSIS — R07.9 CHEST PAIN, UNSPECIFIED TYPE: ICD-10-CM

## 2019-08-13 DIAGNOSIS — R07.9 CHEST PAIN, UNSPECIFIED TYPE: Primary | ICD-10-CM

## 2019-08-13 DIAGNOSIS — Q24.5 MYOCARDIAL BRIDGE: ICD-10-CM

## 2019-08-13 DIAGNOSIS — I42.8 NICM (NONISCHEMIC CARDIOMYOPATHY): Primary | ICD-10-CM

## 2019-08-13 DIAGNOSIS — I42.8 CARDIOMYOPATHY, NONISCHEMIC: ICD-10-CM

## 2019-08-13 PROCEDURE — 99214 PR OFFICE/OUTPT VISIT, EST, LEVL IV, 30-39 MIN: ICD-10-PCS | Mod: S$GLB,,, | Performed by: INTERNAL MEDICINE

## 2019-08-13 PROCEDURE — 3079F PR MOST RECENT DIASTOLIC BLOOD PRESSURE 80-89 MM HG: ICD-10-PCS | Mod: CPTII,S$GLB,, | Performed by: INTERNAL MEDICINE

## 2019-08-13 PROCEDURE — 99999 PR PBB SHADOW E&M-EST. PATIENT-LVL IV: ICD-10-PCS | Mod: PBBFAC,,, | Performed by: INTERNAL MEDICINE

## 2019-08-13 PROCEDURE — 3077F SYST BP >= 140 MM HG: CPT | Mod: CPTII,S$GLB,, | Performed by: INTERNAL MEDICINE

## 2019-08-13 PROCEDURE — 93000 ELECTROCARDIOGRAM COMPLETE: CPT | Mod: S$GLB,,, | Performed by: INTERNAL MEDICINE

## 2019-08-13 PROCEDURE — 3079F DIAST BP 80-89 MM HG: CPT | Mod: CPTII,S$GLB,, | Performed by: INTERNAL MEDICINE

## 2019-08-13 PROCEDURE — 3077F PR MOST RECENT SYSTOLIC BLOOD PRESSURE >= 140 MM HG: ICD-10-PCS | Mod: CPTII,S$GLB,, | Performed by: INTERNAL MEDICINE

## 2019-08-13 PROCEDURE — 1100F PR PT FALLS ASSESS DOC 2+ FALLS/FALL W/INJURY/YR: ICD-10-PCS | Mod: CPTII,S$GLB,, | Performed by: INTERNAL MEDICINE

## 2019-08-13 PROCEDURE — 99214 OFFICE O/P EST MOD 30 MIN: CPT | Mod: S$GLB,,, | Performed by: INTERNAL MEDICINE

## 2019-08-13 PROCEDURE — 93000 EKG 12-LEAD: ICD-10-PCS | Mod: S$GLB,,, | Performed by: INTERNAL MEDICINE

## 2019-08-13 PROCEDURE — 1100F PTFALLS ASSESS-DOCD GE2>/YR: CPT | Mod: CPTII,S$GLB,, | Performed by: INTERNAL MEDICINE

## 2019-08-13 PROCEDURE — 3288F FALL RISK ASSESSMENT DOCD: CPT | Mod: CPTII,S$GLB,, | Performed by: INTERNAL MEDICINE

## 2019-08-13 PROCEDURE — 3288F PR FALLS RISK ASSESSMENT DOCUMENTED: ICD-10-PCS | Mod: CPTII,S$GLB,, | Performed by: INTERNAL MEDICINE

## 2019-08-13 PROCEDURE — 99999 PR PBB SHADOW E&M-EST. PATIENT-LVL IV: CPT | Mod: PBBFAC,,, | Performed by: INTERNAL MEDICINE

## 2019-08-13 RX ORDER — LOSARTAN POTASSIUM 25 MG/1
50 TABLET ORAL DAILY
COMMUNITY
End: 2019-08-20 | Stop reason: SDUPTHER

## 2019-08-13 RX ORDER — POTASSIUM CHLORIDE 20 MEQ/1
TABLET, EXTENDED RELEASE ORAL
Refills: 3 | COMMUNITY
Start: 2019-07-28 | End: 2021-02-09

## 2019-08-13 NOTE — LETTER
August 13, 2019      Levi Moreno MD  [address] [facility] [address]           [facility]  [address]  [facility] [address]  Phone: [phone]          Patient: Celine Sinclair   MR Number: [id number]   YOB: 1950   Date of Visit: 8/13/2019       Dear Dr. Levi Moreno:    Thank you for referring Celine Sinclair to me for evaluation. Attached you will find relevant portions of my assessment and plan of care.    If you have questions, please do not hesitate to call me. I look forward to following Celine Sinclair along with you.    Sincerely,    Tony Mendoza III, MD    Enclosure  CC:  No Recipients    If you would like to receive this communication electronically, please contact [email or web address] or [phone] to request more information on [employer] access.    For providers and/or their staff who would like to refer a patient to [facility], please contact us through our one-stop-shop provider referral line, [facility], at [phone].    If you feel you have received this communication in error or would no longer like to receive these types of communications, please e-mail [email or web address]

## 2019-08-15 ENCOUNTER — CLINICAL SUPPORT (OUTPATIENT)
Dept: CARDIOLOGY | Facility: HOSPITAL | Age: 69
End: 2019-08-15
Attending: INTERNAL MEDICINE
Payer: MEDICARE

## 2019-08-15 DIAGNOSIS — I49.3 FREQUENT PVCS: ICD-10-CM

## 2019-08-15 PROCEDURE — 93299 CARDIAC DEVICE CHECK - REMOTE: CPT

## 2019-08-19 ENCOUNTER — HOSPITAL ENCOUNTER (OUTPATIENT)
Dept: CARDIOLOGY | Facility: CLINIC | Age: 69
Discharge: HOME | End: 2019-08-19
Attending: INTERNAL MEDICINE
Payer: MEDICARE

## 2019-08-19 VITALS
SYSTOLIC BLOOD PRESSURE: 184 MMHG | DIASTOLIC BLOOD PRESSURE: 89 MMHG | HEART RATE: 68 BPM | BODY MASS INDEX: 29.16 KG/M2 | HEIGHT: 65 IN | WEIGHT: 175 LBS

## 2019-08-19 DIAGNOSIS — I42.8 NICM (NONISCHEMIC CARDIOMYOPATHY): ICD-10-CM

## 2019-08-19 LAB
ASCENDING AORTA: 3.55 CM
AV INDEX (PROSTH): 0.37
AV MEAN GRADIENT: 6 MMHG
AV PEAK GRADIENT: 11 MMHG
AV VALVE AREA: 1.19 CM2
AV VELOCITY RATIO: 0.36
BSA FOR ECHO PROCEDURE: 1.91 M2
CV ECHO LV RWT: 0.36 CM
DOP CALC AO PEAK VEL: 1.63 M/S
DOP CALC AO VTI: 33.66 CM
DOP CALC LVOT AREA: 3.3 CM2
DOP CALC LVOT DIAMETER: 2.04 CM
DOP CALC LVOT PEAK VEL: 0.58 M/S
DOP CALC LVOT STROKE VOLUME: 40.21 CM3
DOP CALCLVOT PEAK VEL VTI: 12.31 CM
E WAVE DECELERATION TIME: 275.62 MSEC
E/A RATIO: 1.3
E/E' RATIO: 10.77 M/S
ECHO LV POSTERIOR WALL: 1.02 CM (ref 0.6–1.1)
FRACTIONAL SHORTENING: 15 % (ref 28–44)
INTERVENTRICULAR SEPTUM: 0.78 CM (ref 0.6–1.1)
IVRT: 0.11 MSEC
LA MAJOR: 5.81 CM
LA MINOR: 5.77 CM
LA WIDTH: 4.23 CM
LEFT ATRIUM SIZE: 3.98 CM
LEFT ATRIUM VOLUME INDEX: 44.3 ML/M2
LEFT ATRIUM VOLUME: 82.85 CM3
LEFT INTERNAL DIMENSION IN SYSTOLE: 4.77 CM (ref 2.1–4)
LEFT VENTRICLE DIASTOLIC VOLUME INDEX: 81.97 ML/M2
LEFT VENTRICLE DIASTOLIC VOLUME: 153.2 ML
LEFT VENTRICLE MASS INDEX: 102 G/M2
LEFT VENTRICLE SYSTOLIC VOLUME INDEX: 56.7 ML/M2
LEFT VENTRICLE SYSTOLIC VOLUME: 105.98 ML
LEFT VENTRICULAR INTERNAL DIMENSION IN DIASTOLE: 5.59 CM (ref 3.5–6)
LEFT VENTRICULAR MASS: 191.05 G
LV LATERAL E/E' RATIO: 8.75 M/S
LV SEPTAL E/E' RATIO: 14 M/S
MV PEAK A VEL: 0.54 M/S
MV PEAK E VEL: 0.7 M/S
PISA TR MAX VEL: 2.43 M/S
PULM VEIN S/D RATIO: 1.29
PV PEAK D VEL: 0.41 M/S
PV PEAK S VEL: 0.53 M/S
RA MAJOR: 4.68 CM
RA PRESSURE: 3 MMHG
RA WIDTH: 3.14 CM
RIGHT VENTRICULAR END-DIASTOLIC DIMENSION: 2.29 CM
RV TISSUE DOPPLER FREE WALL SYSTOLIC VELOCITY 1 (APICAL 4 CHAMBER VIEW): 14.92 CM/S
SINUS: 2.88 CM
STJ: 3.22 CM
TDI LATERAL: 0.08 M/S
TDI SEPTAL: 0.05 M/S
TDI: 0.07 M/S
TR MAX PG: 24 MMHG
TRICUSPID ANNULAR PLANE SYSTOLIC EXCURSION: 2.19 CM
TV REST PULMONARY ARTERY PRESSURE: 27 MMHG

## 2019-08-19 PROCEDURE — 93306 TTE W/DOPPLER COMPLETE: CPT | Mod: 26,,, | Performed by: INTERNAL MEDICINE

## 2019-08-19 PROCEDURE — 93306 TTE W/DOPPLER COMPLETE: CPT

## 2019-08-19 PROCEDURE — 93306 TRANSTHORACIC ECHO (TTE) COMPLETE (CUPID ONLY): ICD-10-PCS | Mod: 26,,, | Performed by: INTERNAL MEDICINE

## 2019-08-20 ENCOUNTER — TELEPHONE (OUTPATIENT)
Dept: ELECTROPHYSIOLOGY | Facility: CLINIC | Age: 69
End: 2019-08-20

## 2019-08-20 ENCOUNTER — TELEPHONE (OUTPATIENT)
Dept: CARDIOLOGY | Facility: CLINIC | Age: 69
End: 2019-08-20

## 2019-08-20 RX ORDER — AMLODIPINE BESYLATE 5 MG/1
5 TABLET ORAL DAILY
Qty: 30 TABLET | Refills: 11 | Status: SHIPPED | OUTPATIENT
Start: 2019-08-20 | End: 2020-01-29

## 2019-08-20 RX ORDER — LOSARTAN POTASSIUM 50 MG/1
50 TABLET ORAL DAILY
Qty: 90 TABLET | Refills: 3
Start: 2019-08-20 | End: 2020-10-29

## 2019-08-20 NOTE — TELEPHONE ENCOUNTER
"Received alert from [employer] for 8/14/19@ 0910 SB and long QT with pt symptom episode noted. Contacted pt who reported she felt "chest tightness with low and increased heart rates". egram c/w SB mean heart rate 47 bpm. Educated pt that the symptom button does not show if pt is having MI, angina etc. Go to ER if chest tightness persists or worsens. Pt verbalizes understanding.    Discussed recent loop events for SB, hx of prolonged QT per EKG with .  Pt had a hospital admission where her amiodarone was stopped.   would like to see pt in clinic and would like records from [facility].    Called pt who states she was at [facility] July 2019. Dr. Palomares stopped her amiodarone so she take a muscle spasm pill. She stopped it for about 5 days until [facility] arrhythmia contacted her regarding Loop alerts and she was told to resume her amiodarone.   Message sent to Rajesh SANTOS to arrange records from [facility] and an appt with .  "

## 2019-08-20 NOTE — TELEPHONE ENCOUNTER
BP log    8/15  162/96  157/94    8/16  147/111   169/121    8/17  143/97  158/97    8/18  188/95  158/97    8/19  179/111  159/106    8/20  147/91    Plan:  -increase losartan from 25 to 50mg daily  -start amlodipine 5mg daily

## 2019-08-21 ENCOUNTER — TELEPHONE (OUTPATIENT)
Dept: ELECTROPHYSIOLOGY | Facility: CLINIC | Age: 69
End: 2019-08-21

## 2019-08-21 NOTE — TELEPHONE ENCOUNTER
Spoke with patient and coordinated appointments with Zina Manzanares. We have also faxed release of records to [facility] .       ----- Message from Nilsa King RN sent at 8/20/2019  4:47 PM CDT -----  Regarding: appt with  and records from [facility]  Dear Yamila,  Pt was admitted at [facility] July 2019.  stopped her amiodarone at that time. Dr. Moreno would like to see pt in clinic but he will need the records from [facility].    She has a MDT loop recorder.    Thanks,  Nilsa
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
## 2019-08-28 ENCOUNTER — OFFICE VISIT (OUTPATIENT)
Dept: ELECTROPHYSIOLOGY | Facility: CLINIC | Age: 69
End: 2019-08-28
Payer: MEDICARE

## 2019-08-28 ENCOUNTER — HOSPITAL ENCOUNTER (OUTPATIENT)
Dept: CARDIOLOGY | Facility: CLINIC | Age: 69
Discharge: HOME | End: 2019-08-28
Payer: MEDICARE

## 2019-08-28 VITALS
SYSTOLIC BLOOD PRESSURE: 140 MMHG | DIASTOLIC BLOOD PRESSURE: 90 MMHG | HEART RATE: 70 BPM | WEIGHT: 177.69 LBS | BODY MASS INDEX: 29.61 KG/M2 | HEIGHT: 65 IN

## 2019-08-28 DIAGNOSIS — I49.3 PVC (PREMATURE VENTRICULAR CONTRACTION): ICD-10-CM

## 2019-08-28 DIAGNOSIS — Z79.899 LONG TERM CURRENT USE OF AMIODARONE: Primary | ICD-10-CM

## 2019-08-28 DIAGNOSIS — I10 ESSENTIAL HYPERTENSION: ICD-10-CM

## 2019-08-28 DIAGNOSIS — Q24.5 MYOCARDIAL BRIDGE: ICD-10-CM

## 2019-08-28 DIAGNOSIS — I42.8 CARDIOMYOPATHY, NONISCHEMIC: ICD-10-CM

## 2019-08-28 DIAGNOSIS — I47.29 POLYMORPHIC VENTRICULAR TACHYCARDIA: ICD-10-CM

## 2019-08-28 PROCEDURE — 3077F PR MOST RECENT SYSTOLIC BLOOD PRESSURE >= 140 MM HG: ICD-10-PCS | Mod: CPTII,S$GLB,, | Performed by: NURSE PRACTITIONER

## 2019-08-28 PROCEDURE — 99214 OFFICE O/P EST MOD 30 MIN: CPT | Mod: S$GLB,,, | Performed by: NURSE PRACTITIONER

## 2019-08-28 PROCEDURE — 99214 PR OFFICE/OUTPT VISIT, EST, LEVL IV, 30-39 MIN: ICD-10-PCS | Mod: S$GLB,,, | Performed by: NURSE PRACTITIONER

## 2019-08-28 PROCEDURE — 1101F PT FALLS ASSESS-DOCD LE1/YR: CPT | Mod: CPTII,S$GLB,, | Performed by: NURSE PRACTITIONER

## 2019-08-28 PROCEDURE — 3077F SYST BP >= 140 MM HG: CPT | Mod: CPTII,S$GLB,, | Performed by: NURSE PRACTITIONER

## 2019-08-28 PROCEDURE — 93010 RHYTHM STRIP: ICD-10-PCS | Mod: S$GLB,,, | Performed by: INTERNAL MEDICINE

## 2019-08-28 PROCEDURE — 99999 PR PBB SHADOW E&M-EST. PATIENT-LVL III: ICD-10-PCS | Mod: PBBFAC,,, | Performed by: NURSE PRACTITIONER

## 2019-08-28 PROCEDURE — 93005 RHYTHM STRIP: ICD-10-PCS | Mod: S$GLB,,, | Performed by: INTERNAL MEDICINE

## 2019-08-28 PROCEDURE — 1101F PR PT FALLS ASSESS DOC 0-1 FALLS W/OUT INJ PAST YR: ICD-10-PCS | Mod: CPTII,S$GLB,, | Performed by: NURSE PRACTITIONER

## 2019-08-28 PROCEDURE — 3080F DIAST BP >= 90 MM HG: CPT | Mod: CPTII,S$GLB,, | Performed by: NURSE PRACTITIONER

## 2019-08-28 PROCEDURE — 93005 ELECTROCARDIOGRAM TRACING: CPT | Mod: S$GLB,,, | Performed by: INTERNAL MEDICINE

## 2019-08-28 PROCEDURE — 93010 ELECTROCARDIOGRAM REPORT: CPT | Mod: S$GLB,,, | Performed by: INTERNAL MEDICINE

## 2019-08-28 PROCEDURE — 99999 PR PBB SHADOW E&M-EST. PATIENT-LVL III: CPT | Mod: PBBFAC,,, | Performed by: NURSE PRACTITIONER

## 2019-08-28 PROCEDURE — 3080F PR MOST RECENT DIASTOLIC BLOOD PRESSURE >= 90 MM HG: ICD-10-PCS | Mod: CPTII,S$GLB,, | Performed by: NURSE PRACTITIONER

## 2019-08-28 NOTE — LETTER
August 28, 2019      Levi Moreno MD  [address]  [facility] [address]           [facility]rhythmia  [address]  [facility] [address]  Phone: [phone]  Fax: [phone]          Patient: Celine Sinclair   MR Number: [id number]   YOB: 1950   Date of Visit: 8/28/2019       Dear Dr. Levi Moreno:    Thank you for referring Celine Sinclair to me for evaluation. Attached you will find relevant portions of my assessment and plan of care.    If you have questions, please do not hesitate to call me. I look forward to following Celine Sinclair along with you.    Sincerely,    Zina Manzanares, NP    Enclosure  CC:  No Recipients    If you would like to receive this communication electronically, please contact [email or web address] or [phone] to request more information on [employer] Link access.    For providers and/or their staff who would like to refer a patient to [facility], please contact us through our one-stop-shop provider referral line, [facility], at [phone].    If you feel you have received this communication in error or would no longer like to receive these types of communications, please e-mail [email or web address]

## 2019-09-04 ENCOUNTER — CLINICAL SUPPORT (OUTPATIENT)
Dept: CARDIOLOGY | Facility: HOSPITAL | Age: 69
End: 2019-09-04
Payer: MEDICARE

## 2019-09-04 PROCEDURE — 93298 CARDIAC DEVICE CHECK - REMOTE: ICD-10-PCS | Mod: ,,, | Performed by: INTERNAL MEDICINE

## 2019-09-04 PROCEDURE — 93299 CARDIAC DEVICE CHECK - REMOTE: CPT | Performed by: INTERNAL MEDICINE

## 2019-09-04 PROCEDURE — 93298 REM INTERROG DEV EVAL SCRMS: CPT | Mod: ,,, | Performed by: INTERNAL MEDICINE

## 2019-09-09 ENCOUNTER — HOSPITAL ENCOUNTER (OUTPATIENT)
Dept: PULMONOLOGY | Facility: CLINIC | Age: 69
Discharge: HOME | End: 2019-09-09
Payer: MEDICARE

## 2019-09-09 ENCOUNTER — CLINICAL SUPPORT (OUTPATIENT)
Dept: CARDIOLOGY | Facility: HOSPITAL | Age: 69
End: 2019-09-09
Attending: NURSE PRACTITIONER
Payer: MEDICARE

## 2019-09-09 DIAGNOSIS — Z79.899 LONG TERM CURRENT USE OF AMIODARONE: ICD-10-CM

## 2019-09-09 DIAGNOSIS — I49.3 PVC (PREMATURE VENTRICULAR CONTRACTION): ICD-10-CM

## 2019-09-09 PROCEDURE — 93226 XTRNL ECG REC<48 HR SCAN A/R: CPT

## 2019-09-09 PROCEDURE — 93227 HOLTER MONITOR - 24 HOUR (CUPID ONLY): ICD-10-PCS | Mod: ,,, | Performed by: INTERNAL MEDICINE

## 2019-09-09 PROCEDURE — 94729 PR C02/MEMBANE DIFFUSE CAPACITY: ICD-10-PCS | Mod: S$GLB,,, | Performed by: INTERNAL MEDICINE

## 2019-09-09 PROCEDURE — 94010 BREATHING CAPACITY TEST: ICD-10-PCS | Mod: S$GLB,,, | Performed by: INTERNAL MEDICINE

## 2019-09-09 PROCEDURE — 94729 DIFFUSING CAPACITY: CPT | Mod: S$GLB,,, | Performed by: INTERNAL MEDICINE

## 2019-09-09 PROCEDURE — 93227 XTRNL ECG REC<48 HR R&I: CPT | Mod: ,,, | Performed by: INTERNAL MEDICINE

## 2019-09-09 PROCEDURE — 94010 BREATHING CAPACITY TEST: CPT | Mod: S$GLB,,, | Performed by: INTERNAL MEDICINE

## 2019-09-11 LAB
DLCO ADJ PRE: 15.33 ML/(MIN*MMHG) (ref 16.19–27.66)
DLCO SINGLE BREATH LLN: 16.19
DLCO SINGLE BREATH PRE REF: 69.9 %
DLCO SINGLE BREATH REF: 21.93
DLCOC SBVA LLN: 2.93
DLCOC SBVA PRE REF: 99.1 %
DLCOC SBVA REF: 4.36
DLCOC SINGLE BREATH LLN: 16.19
DLCOC SINGLE BREATH PRE REF: 69.9 %
DLCOC SINGLE BREATH REF: 21.93
DLCOCSBVAULN: 5.78
DLCOCSINGLEBREATHULN: 27.66
DLCOSINGLEBREATHULN: 27.66
DLCOVA LLN: 2.93
DLCOVA PRE REF: 99.1 %
DLCOVA PRE: 4.32 ML/(MIN*MMHG*L) (ref 2.93–5.78)
DLCOVA REF: 4.36
DLCOVAULN: 5.78
DLVAADJ PRE: 4.32 ML/(MIN*MMHG*L) (ref 2.93–5.78)
FEF 25 75 LLN: 0.66
FEF 25 75 PRE REF: 89 %
FEF 25 75 REF: 1.71
FEV05 LLN: 0.9
FEV05 REF: 1.76
FEV1 FVC LLN: 66
FEV1 FVC PRE REF: 96.4 %
FEV1 FVC REF: 79
FEV1 LLN: 1.38
FEV1 PRE REF: 93.4 %
FEV1 REF: 1.97
FVC LLN: 1.79
FVC PRE REF: 96.3 %
FVC REF: 2.52
IVC PRE: 2.19 L (ref 1.79–3.25)
IVC SINGLE BREATH LLN: 1.79
IVC SINGLE BREATH PRE REF: 86.9 %
IVC SINGLE BREATH REF: 2.52
IVCSINGLEBREATHULN: 3.25
MVV LLN: 74
MVV REF: 87
PEF LLN: 3.07
PEF PRE REF: 114.2 %
PEF REF: 5.11
PRE DLCO: 15.33 ML/(MIN*MMHG) (ref 16.19–27.66)
PRE FEF 25 75: 1.52 L/S (ref 0.66–2.76)
PRE FET 100: 7.25 SEC
PRE FEV05 REF: 86.3 %
PRE FEV1 FVC: 75.8 % (ref 65.88–91.37)
PRE FEV1: 1.84 L (ref 1.38–2.56)
PRE FEV5: 1.52 L (ref 0.9–2.61)
PRE FVC: 2.43 L (ref 1.79–3.25)
PRE PEF: 5.83 L/S (ref 3.07–7.15)
VA PRE: 3.55 L (ref 4.88–4.88)
VA SINGLE BREATH LLN: 4.88
VA SINGLE BREATH PRE REF: 72.7 %
VA SINGLE BREATH REF: 4.88
VASINGLEBREATHULN: 4.88

## 2019-09-12 LAB
OHS CV EVENT MONITOR DAY: 1
OHS CV HOLTER LENGTH DECIMAL HOURS: 48
OHS CV HOLTER LENGTH HOURS: 24
OHS CV HOLTER LENGTH MINUTES: 0

## 2019-09-16 ENCOUNTER — DOCUMENTATION ONLY (OUTPATIENT)
Dept: ELECTROPHYSIOLOGY | Facility: CLINIC | Age: 69
End: 2019-09-16

## 2019-09-16 NOTE — PROGRESS NOTES
Pharmacy Development alert received for patient activated symptom on ILR monitor. Reviewed EGM from 9/14/19 @ 21:15 with Dr. Moreno. Per Dr. Moreno, EGM looks c/w AF. Pt has no history of AF. Verbal orders for patient to come into clinic to discuss findings. Will call patient and schedule in clinic visit.

## 2019-09-16 NOTE — PROGRESS NOTES
S/w patient and scheduled appts per Dr. Moreno's orders. Appt made for 9/18/19, EKG @ 1:00 and Zina Manzanares NP @ 1:30. Patient verbalized understanding and appreciated call.
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
## 2019-09-18 ENCOUNTER — HOSPITAL ENCOUNTER (OUTPATIENT)
Dept: CARDIOLOGY | Facility: CLINIC | Age: 69
Discharge: HOME | End: 2019-09-18
Payer: MEDICARE

## 2019-09-18 ENCOUNTER — OFFICE VISIT (OUTPATIENT)
Dept: ELECTROPHYSIOLOGY | Facility: CLINIC | Age: 69
End: 2019-09-18
Payer: MEDICARE

## 2019-09-18 VITALS
HEIGHT: 65 IN | WEIGHT: 177.69 LBS | DIASTOLIC BLOOD PRESSURE: 82 MMHG | BODY MASS INDEX: 29.61 KG/M2 | SYSTOLIC BLOOD PRESSURE: 150 MMHG | HEART RATE: 64 BPM

## 2019-09-18 DIAGNOSIS — I48.92 ATRIAL FLUTTER, UNSPECIFIED TYPE: ICD-10-CM

## 2019-09-18 DIAGNOSIS — I42.8 CARDIOMYOPATHY, NONISCHEMIC: ICD-10-CM

## 2019-09-18 DIAGNOSIS — I47.29 POLYMORPHIC VENTRICULAR TACHYCARDIA: ICD-10-CM

## 2019-09-18 DIAGNOSIS — I10 ESSENTIAL HYPERTENSION: Primary | ICD-10-CM

## 2019-09-18 DIAGNOSIS — I49.3 FREQUENT PVCS: ICD-10-CM

## 2019-09-18 DIAGNOSIS — I49.3 PVC (PREMATURE VENTRICULAR CONTRACTION): ICD-10-CM

## 2019-09-18 DIAGNOSIS — Z79.899 LONG TERM CURRENT USE OF AMIODARONE: ICD-10-CM

## 2019-09-18 PROCEDURE — 93005 RHYTHM STRIP: ICD-10-PCS | Mod: S$GLB,,, | Performed by: INTERNAL MEDICINE

## 2019-09-18 PROCEDURE — 1100F PR PT FALLS ASSESS DOC 2+ FALLS/FALL W/INJURY/YR: ICD-10-PCS | Mod: CPTII,S$GLB,, | Performed by: NURSE PRACTITIONER

## 2019-09-18 PROCEDURE — 99214 OFFICE O/P EST MOD 30 MIN: CPT | Mod: S$GLB,,, | Performed by: NURSE PRACTITIONER

## 2019-09-18 PROCEDURE — 3077F SYST BP >= 140 MM HG: CPT | Mod: CPTII,S$GLB,, | Performed by: NURSE PRACTITIONER

## 2019-09-18 PROCEDURE — 93010 ELECTROCARDIOGRAM REPORT: CPT | Mod: S$GLB,,, | Performed by: INTERNAL MEDICINE

## 2019-09-18 PROCEDURE — 1100F PTFALLS ASSESS-DOCD GE2>/YR: CPT | Mod: CPTII,S$GLB,, | Performed by: NURSE PRACTITIONER

## 2019-09-18 PROCEDURE — 3079F DIAST BP 80-89 MM HG: CPT | Mod: CPTII,S$GLB,, | Performed by: NURSE PRACTITIONER

## 2019-09-18 PROCEDURE — 99214 PR OFFICE/OUTPT VISIT, EST, LEVL IV, 30-39 MIN: ICD-10-PCS | Mod: S$GLB,,, | Performed by: NURSE PRACTITIONER

## 2019-09-18 PROCEDURE — 93005 ELECTROCARDIOGRAM TRACING: CPT | Mod: S$GLB,,, | Performed by: INTERNAL MEDICINE

## 2019-09-18 PROCEDURE — 3288F PR FALLS RISK ASSESSMENT DOCUMENTED: ICD-10-PCS | Mod: CPTII,S$GLB,, | Performed by: NURSE PRACTITIONER

## 2019-09-18 PROCEDURE — 3079F PR MOST RECENT DIASTOLIC BLOOD PRESSURE 80-89 MM HG: ICD-10-PCS | Mod: CPTII,S$GLB,, | Performed by: NURSE PRACTITIONER

## 2019-09-18 PROCEDURE — 99999 PR PBB SHADOW E&M-EST. PATIENT-LVL III: ICD-10-PCS | Mod: PBBFAC,,, | Performed by: NURSE PRACTITIONER

## 2019-09-18 PROCEDURE — 93010 RHYTHM STRIP: ICD-10-PCS | Mod: S$GLB,,, | Performed by: INTERNAL MEDICINE

## 2019-09-18 PROCEDURE — 3288F FALL RISK ASSESSMENT DOCD: CPT | Mod: CPTII,S$GLB,, | Performed by: NURSE PRACTITIONER

## 2019-09-18 PROCEDURE — 3077F PR MOST RECENT SYSTOLIC BLOOD PRESSURE >= 140 MM HG: ICD-10-PCS | Mod: CPTII,S$GLB,, | Performed by: NURSE PRACTITIONER

## 2019-09-18 PROCEDURE — 99999 PR PBB SHADOW E&M-EST. PATIENT-LVL III: CPT | Mod: PBBFAC,,, | Performed by: NURSE PRACTITIONER

## 2019-09-18 RX ORDER — METOPROLOL SUCCINATE 50 MG/1
TABLET, EXTENDED RELEASE ORAL
Qty: 90 TABLET | Refills: 3 | Status: SHIPPED | OUTPATIENT
Start: 2019-09-18 | End: 2021-03-19

## 2019-09-18 NOTE — Clinical Note
Patient very symptomatic with palpitations. Per ILR, symptoms mostly associated with APCs, although low overall burden per recent holter (3.6%). She is having episodes of possible AF/AFL with intermittent aberrancy that are also symptomatic (saw these on ILR reports from 3/2019 and 7/2019 as well). Started eliquis. She is already on amiodarone - questionable whether amio is helping much. Do you think she would benefit from EP study? Not sure what else to do for her at this point. thx

## 2019-09-24 DIAGNOSIS — Z78.0 ASYMPTOMATIC MENOPAUSAL STATE: Primary | ICD-10-CM

## 2019-10-01 ENCOUNTER — CLINICAL SUPPORT (OUTPATIENT)
Dept: CARDIOLOGY | Facility: HOSPITAL | Age: 69
End: 2019-10-01
Payer: MEDICARE

## 2019-10-01 DIAGNOSIS — Z95.818 PRESENCE OF OTHER CARDIAC IMPLANTS AND GRAFTS: ICD-10-CM

## 2019-10-01 PROCEDURE — 93298 CARDIAC DEVICE CHECK - REMOTE: ICD-10-PCS | Mod: ,,, | Performed by: INTERNAL MEDICINE

## 2019-10-01 PROCEDURE — 93298 REM INTERROG DEV EVAL SCRMS: CPT | Mod: ,,, | Performed by: INTERNAL MEDICINE
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
## 2019-10-30 ENCOUNTER — HOSPITAL ENCOUNTER (OUTPATIENT)
Dept: RADIOLOGY | Facility: CLINIC | Age: 69
Discharge: HOME | End: 2019-10-30
Attending: FAMILY MEDICINE
Payer: MEDICARE

## 2019-10-30 ENCOUNTER — HOSPITAL ENCOUNTER (OUTPATIENT)
Dept: CARDIOLOGY | Facility: CLINIC | Age: 69
Discharge: HOME | End: 2019-10-30
Payer: MEDICARE

## 2019-10-30 ENCOUNTER — OFFICE VISIT (OUTPATIENT)
Dept: ELECTROPHYSIOLOGY | Facility: CLINIC | Age: 69
End: 2019-10-30
Payer: MEDICARE

## 2019-10-30 VITALS
HEART RATE: 52 BPM | HEIGHT: 66 IN | DIASTOLIC BLOOD PRESSURE: 76 MMHG | WEIGHT: 179.44 LBS | SYSTOLIC BLOOD PRESSURE: 120 MMHG | BODY MASS INDEX: 28.84 KG/M2

## 2019-10-30 DIAGNOSIS — I49.1 APC (ATRIAL PREMATURE CONTRACTIONS): ICD-10-CM

## 2019-10-30 DIAGNOSIS — Z78.0 ASYMPTOMATIC MENOPAUSAL STATE: ICD-10-CM

## 2019-10-30 DIAGNOSIS — I49.3 FREQUENT PVCS: ICD-10-CM

## 2019-10-30 DIAGNOSIS — I10 ESSENTIAL HYPERTENSION: ICD-10-CM

## 2019-10-30 DIAGNOSIS — I42.8 CARDIOMYOPATHY, NONISCHEMIC: Primary | ICD-10-CM

## 2019-10-30 DIAGNOSIS — I47.29 POLYMORPHIC VENTRICULAR TACHYCARDIA: ICD-10-CM

## 2019-10-30 DIAGNOSIS — I49.3 PVC (PREMATURE VENTRICULAR CONTRACTION): ICD-10-CM

## 2019-10-30 DIAGNOSIS — I48.0 PAROXYSMAL ATRIAL FIBRILLATION: ICD-10-CM

## 2019-10-30 PROCEDURE — 1101F PT FALLS ASSESS-DOCD LE1/YR: CPT | Mod: CPTII,S$GLB,, | Performed by: NURSE PRACTITIONER

## 2019-10-30 PROCEDURE — 99214 PR OFFICE/OUTPT VISIT, EST, LEVL IV, 30-39 MIN: ICD-10-PCS | Mod: S$GLB,,, | Performed by: NURSE PRACTITIONER

## 2019-10-30 PROCEDURE — 77080 DXA BONE DENSITY AXIAL: CPT | Mod: TC

## 2019-10-30 PROCEDURE — 93010 ELECTROCARDIOGRAM REPORT: CPT | Mod: S$GLB,,, | Performed by: INTERNAL MEDICINE

## 2019-10-30 PROCEDURE — 3074F SYST BP LT 130 MM HG: CPT | Mod: CPTII,S$GLB,, | Performed by: NURSE PRACTITIONER

## 2019-10-30 PROCEDURE — 77080 DXA BONE DENSITY AXIAL: CPT | Mod: 26,,, | Performed by: INTERNAL MEDICINE

## 2019-10-30 PROCEDURE — 1101F PR PT FALLS ASSESS DOC 0-1 FALLS W/OUT INJ PAST YR: ICD-10-PCS | Mod: CPTII,S$GLB,, | Performed by: NURSE PRACTITIONER

## 2019-10-30 PROCEDURE — 3074F PR MOST RECENT SYSTOLIC BLOOD PRESSURE < 130 MM HG: ICD-10-PCS | Mod: CPTII,S$GLB,, | Performed by: NURSE PRACTITIONER

## 2019-10-30 PROCEDURE — 77080 DEXA BONE DENSITY SPINE HIP: ICD-10-PCS | Mod: 26,,, | Performed by: INTERNAL MEDICINE

## 2019-10-30 PROCEDURE — 99999 PR PBB SHADOW E&M-EST. PATIENT-LVL III: ICD-10-PCS | Mod: PBBFAC,,, | Performed by: NURSE PRACTITIONER

## 2019-10-30 PROCEDURE — 93005 RHYTHM STRIP: ICD-10-PCS | Mod: S$GLB,,, | Performed by: INTERNAL MEDICINE

## 2019-10-30 PROCEDURE — 93005 ELECTROCARDIOGRAM TRACING: CPT | Mod: S$GLB,,, | Performed by: INTERNAL MEDICINE

## 2019-10-30 PROCEDURE — 99999 PR PBB SHADOW E&M-EST. PATIENT-LVL III: CPT | Mod: PBBFAC,,, | Performed by: NURSE PRACTITIONER

## 2019-10-30 PROCEDURE — 3078F DIAST BP <80 MM HG: CPT | Mod: CPTII,S$GLB,, | Performed by: NURSE PRACTITIONER

## 2019-10-30 PROCEDURE — 93010 RHYTHM STRIP: ICD-10-PCS | Mod: S$GLB,,, | Performed by: INTERNAL MEDICINE

## 2019-10-30 PROCEDURE — 3078F PR MOST RECENT DIASTOLIC BLOOD PRESSURE < 80 MM HG: ICD-10-PCS | Mod: CPTII,S$GLB,, | Performed by: NURSE PRACTITIONER

## 2019-10-30 PROCEDURE — 99214 OFFICE O/P EST MOD 30 MIN: CPT | Mod: S$GLB,,, | Performed by: NURSE PRACTITIONER
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
## 2019-10-31 ENCOUNTER — CLINICAL SUPPORT (OUTPATIENT)
Dept: CARDIOLOGY | Facility: HOSPITAL | Age: 69
End: 2019-10-31
Payer: MEDICARE

## 2019-10-31 DIAGNOSIS — Z95.818 PRESENCE OF OTHER CARDIAC IMPLANTS AND GRAFTS: ICD-10-CM

## 2019-10-31 PROCEDURE — 93298 CARDIAC DEVICE CHECK - REMOTE: ICD-10-PCS | Mod: ,,, | Performed by: INTERNAL MEDICINE

## 2019-10-31 PROCEDURE — 93298 REM INTERROG DEV EVAL SCRMS: CPT | Mod: ,,, | Performed by: INTERNAL MEDICINE

## 2019-11-30 ENCOUNTER — CLINICAL SUPPORT (OUTPATIENT)
Dept: CARDIOLOGY | Facility: HOSPITAL | Age: 69
End: 2019-11-30
Payer: MEDICARE

## 2019-11-30 DIAGNOSIS — Z95.818 PRESENCE OF OTHER CARDIAC IMPLANTS AND GRAFTS: ICD-10-CM

## 2019-11-30 PROCEDURE — 93298 CARDIAC DEVICE CHECK - REMOTE: ICD-10-PCS | Mod: ,,, | Performed by: INTERNAL MEDICINE

## 2019-11-30 PROCEDURE — 93298 REM INTERROG DEV EVAL SCRMS: CPT | Mod: ,,, | Performed by: INTERNAL MEDICINE

## 2019-12-30 DIAGNOSIS — Z12.31 SCREENING MAMMOGRAM, ENCOUNTER FOR: Primary | ICD-10-CM

## 2020-01-05 ENCOUNTER — CLINICAL SUPPORT (OUTPATIENT)
Dept: CARDIOLOGY | Facility: HOSPITAL | Age: 70
End: 2020-01-05
Payer: MEDICARE

## 2020-01-05 DIAGNOSIS — Z95.818 PRESENCE OF OTHER CARDIAC IMPLANTS AND GRAFTS: ICD-10-CM

## 2020-01-05 PROCEDURE — 93298 REM INTERROG DEV EVAL SCRMS: CPT | Mod: ,,, | Performed by: INTERNAL MEDICINE

## 2020-01-05 PROCEDURE — G2066 INTER DEVC REMOTE 30D: HCPCS | Performed by: INTERNAL MEDICINE

## 2020-01-05 PROCEDURE — 93298 CARDIAC DEVICE CHECK - REMOTE: ICD-10-PCS | Mod: ,,, | Performed by: INTERNAL MEDICINE

## 2020-01-09 ENCOUNTER — HOSPITAL ENCOUNTER (OUTPATIENT)
Dept: RADIOLOGY | Facility: HOSPITAL | Age: 70
Discharge: HOME | End: 2020-01-09
Attending: FAMILY MEDICINE
Payer: MEDICARE

## 2020-01-09 VITALS — BODY MASS INDEX: 28.84 KG/M2 | HEIGHT: 66 IN | WEIGHT: 179.44 LBS

## 2020-01-09 DIAGNOSIS — Z12.31 SCREENING MAMMOGRAM, ENCOUNTER FOR: ICD-10-CM

## 2020-01-09 PROCEDURE — 77067 SCR MAMMO BI INCL CAD: CPT | Mod: TC

## 2020-01-09 PROCEDURE — 77063 BREAST TOMOSYNTHESIS BI: CPT | Mod: 26,,, | Performed by: RADIOLOGY

## 2020-01-09 PROCEDURE — 77063 MAMMO DIGITAL SCREENING BILAT WITH TOMOSYNTHESIS_CAD: ICD-10-PCS | Mod: 26,,, | Performed by: RADIOLOGY

## 2020-01-09 PROCEDURE — 77067 MAMMO DIGITAL SCREENING BILAT WITH TOMOSYNTHESIS_CAD: ICD-10-PCS | Mod: 26,,, | Performed by: RADIOLOGY

## 2020-01-09 PROCEDURE — 77067 SCR MAMMO BI INCL CAD: CPT | Mod: 26,,, | Performed by: RADIOLOGY

## 2020-01-17 DIAGNOSIS — R92.2 INCONCLUSIVE MAMMOGRAM: Primary | ICD-10-CM

## 2020-01-17 DIAGNOSIS — R92.0 MAMMOGRAPHIC MICROCALCIFICATION FOUND ON DIAGNOSTIC IMAGING OF BREAST: Primary | ICD-10-CM
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
## 2020-01-29 ENCOUNTER — OFFICE VISIT (OUTPATIENT)
Dept: CARDIOLOGY | Facility: CLINIC | Age: 70
End: 2020-01-29
Payer: MEDICARE

## 2020-01-29 VITALS
BODY MASS INDEX: 29.31 KG/M2 | DIASTOLIC BLOOD PRESSURE: 100 MMHG | WEIGHT: 175.94 LBS | HEART RATE: 52 BPM | HEIGHT: 65 IN | OXYGEN SATURATION: 98 % | SYSTOLIC BLOOD PRESSURE: 170 MMHG

## 2020-01-29 DIAGNOSIS — I49.3 FREQUENT PVCS: ICD-10-CM

## 2020-01-29 DIAGNOSIS — R00.2 PALPITATIONS: ICD-10-CM

## 2020-01-29 DIAGNOSIS — I70.0 AORTIC ATHEROSCLEROSIS: ICD-10-CM

## 2020-01-29 DIAGNOSIS — I48.0 PAROXYSMAL ATRIAL FIBRILLATION: Primary | ICD-10-CM

## 2020-01-29 DIAGNOSIS — I42.8 CARDIOMYOPATHY, NONISCHEMIC: ICD-10-CM

## 2020-01-29 DIAGNOSIS — I10 ESSENTIAL HYPERTENSION: ICD-10-CM

## 2020-01-29 DIAGNOSIS — Z79.899 LONG TERM CURRENT USE OF AMIODARONE: ICD-10-CM

## 2020-01-29 DIAGNOSIS — I49.1 APC (ATRIAL PREMATURE CONTRACTIONS): ICD-10-CM

## 2020-01-29 DIAGNOSIS — M06.9 RHEUMATOID ARTHRITIS, INVOLVING UNSPECIFIED SITE, UNSPECIFIED RHEUMATOID FACTOR PRESENCE: ICD-10-CM

## 2020-01-29 DIAGNOSIS — Q24.5 MYOCARDIAL BRIDGE: ICD-10-CM

## 2020-01-29 PROCEDURE — 1125F PR PAIN SEVERITY QUANTIFIED, PAIN PRESENT: ICD-10-PCS | Mod: S$GLB,,, | Performed by: INTERNAL MEDICINE

## 2020-01-29 PROCEDURE — 1101F PT FALLS ASSESS-DOCD LE1/YR: CPT | Mod: CPTII,S$GLB,, | Performed by: INTERNAL MEDICINE

## 2020-01-29 PROCEDURE — 3077F SYST BP >= 140 MM HG: CPT | Mod: CPTII,S$GLB,, | Performed by: INTERNAL MEDICINE

## 2020-01-29 PROCEDURE — 1159F MED LIST DOCD IN RCRD: CPT | Mod: S$GLB,,, | Performed by: INTERNAL MEDICINE

## 2020-01-29 PROCEDURE — 99999 PR PBB SHADOW E&M-EST. PATIENT-LVL IV: CPT | Mod: PBBFAC,,, | Performed by: INTERNAL MEDICINE

## 2020-01-29 PROCEDURE — 3077F PR MOST RECENT SYSTOLIC BLOOD PRESSURE >= 140 MM HG: ICD-10-PCS | Mod: CPTII,S$GLB,, | Performed by: INTERNAL MEDICINE

## 2020-01-29 PROCEDURE — 1125F AMNT PAIN NOTED PAIN PRSNT: CPT | Mod: S$GLB,,, | Performed by: INTERNAL MEDICINE

## 2020-01-29 PROCEDURE — 93000 EKG 12-LEAD: ICD-10-PCS | Mod: S$GLB,,, | Performed by: INTERNAL MEDICINE

## 2020-01-29 PROCEDURE — 93000 ELECTROCARDIOGRAM COMPLETE: CPT | Mod: S$GLB,,, | Performed by: INTERNAL MEDICINE

## 2020-01-29 PROCEDURE — 1101F PR PT FALLS ASSESS DOC 0-1 FALLS W/OUT INJ PAST YR: ICD-10-PCS | Mod: CPTII,S$GLB,, | Performed by: INTERNAL MEDICINE

## 2020-01-29 PROCEDURE — 99999 PR PBB SHADOW E&M-EST. PATIENT-LVL IV: ICD-10-PCS | Mod: PBBFAC,,, | Performed by: INTERNAL MEDICINE

## 2020-01-29 PROCEDURE — 3080F PR MOST RECENT DIASTOLIC BLOOD PRESSURE >= 90 MM HG: ICD-10-PCS | Mod: CPTII,S$GLB,, | Performed by: INTERNAL MEDICINE

## 2020-01-29 PROCEDURE — 99214 PR OFFICE/OUTPT VISIT, EST, LEVL IV, 30-39 MIN: ICD-10-PCS | Mod: S$GLB,,, | Performed by: INTERNAL MEDICINE

## 2020-01-29 PROCEDURE — 3080F DIAST BP >= 90 MM HG: CPT | Mod: CPTII,S$GLB,, | Performed by: INTERNAL MEDICINE

## 2020-01-29 PROCEDURE — 99214 OFFICE O/P EST MOD 30 MIN: CPT | Mod: S$GLB,,, | Performed by: INTERNAL MEDICINE

## 2020-01-29 PROCEDURE — 1159F PR MEDICATION LIST DOCUMENTED IN MEDICAL RECORD: ICD-10-PCS | Mod: S$GLB,,, | Performed by: INTERNAL MEDICINE

## 2020-01-29 RX ORDER — AMLODIPINE BESYLATE 10 MG/1
10 TABLET ORAL DAILY
Qty: 90 TABLET | Refills: 3 | Status: SHIPPED | OUTPATIENT
Start: 2020-01-29 | End: 2020-07-27

## 2020-01-29 NOTE — LETTER
January 29, 2020      Levi Moreno MD  [address] [facility] [address]           [facility]  [address]  [facility] [address]  Phone: [phone]          Patient: Celine Sinclair   MR Number: [id number]   YOB: 1950   Date of Visit: 1/29/2020       Dear Dr. Levi Moreno:    Thank you for referring Celine Sinclair to me for evaluation. Attached you will find relevant portions of my assessment and plan of care.    If you have questions, please do not hesitate to call me. I look forward to following Celine Sinclair along with you.    Sincerely,    Tony Mendoza III, MD    Enclosure  CC:  No Recipients    If you would like to receive this communication electronically, please contact [email or web address] or [phone] to request more information on [employer] access.    For providers and/or their staff who would like to refer a patient to [facility], please contact us through our one-stop-shop provider referral line, Niecy Maddox, at [phone].    If you feel you have received this communication in error or would no longer like to receive these types of communications, please e-mail [email or web address]

## 2020-01-30 ENCOUNTER — TELEPHONE (OUTPATIENT)
Dept: CARDIOLOGY | Facility: CLINIC | Age: 70
End: 2020-01-30

## 2020-01-30 DIAGNOSIS — R07.9 CHEST PAIN, UNSPECIFIED TYPE: ICD-10-CM

## 2020-01-30 DIAGNOSIS — R00.2 PALPITATIONS: Primary | ICD-10-CM

## 2020-01-30 DIAGNOSIS — R07.9 CHEST PAIN: ICD-10-CM

## 2020-01-30 NOTE — TELEPHONE ENCOUNTER
----- Message from Emma Alves RN sent at 1/30/2020 10:46 AM CST -----  Regarding: FW: EKG ORDER      ----- Message -----  From: Davida Gutierres  Sent: 1/30/2020   9:31 AM CST  To: [facility] Cardiology Clinical Support Staff  Subject: EKG ORDER                                        Please place and link EKG order to the EKG or Doctor appointment scheduled on 01/29/20 thanks. Davida [id number]

## 2020-02-14 ENCOUNTER — CLINICAL SUPPORT (OUTPATIENT)
Dept: CARDIOLOGY | Facility: HOSPITAL | Age: 70
End: 2020-02-14
Payer: MEDICARE

## 2020-02-14 DIAGNOSIS — Z95.818 PRESENCE OF OTHER CARDIAC IMPLANTS AND GRAFTS: ICD-10-CM

## 2020-02-14 PROCEDURE — 93298 CARDIAC DEVICE CHECK - REMOTE: ICD-10-PCS | Mod: ,,, | Performed by: INTERNAL MEDICINE

## 2020-02-14 PROCEDURE — G2066 INTER DEVC REMOTE 30D: HCPCS | Performed by: INTERNAL MEDICINE

## 2020-02-14 PROCEDURE — 93298 REM INTERROG DEV EVAL SCRMS: CPT | Mod: ,,, | Performed by: INTERNAL MEDICINE

## 2020-03-02 DIAGNOSIS — I49.3 FREQUENT PVCS: Primary | ICD-10-CM

## 2020-03-15 ENCOUNTER — CLINICAL SUPPORT (OUTPATIENT)
Dept: CARDIOLOGY | Facility: HOSPITAL | Age: 70
End: 2020-03-15
Payer: MEDICARE

## 2020-03-15 DIAGNOSIS — Z95.818 PRESENCE OF OTHER CARDIAC IMPLANTS AND GRAFTS: ICD-10-CM

## 2020-03-15 PROCEDURE — G2066 INTER DEVC REMOTE 30D: HCPCS | Performed by: INTERNAL MEDICINE

## 2020-03-15 PROCEDURE — 93298 REM INTERROG DEV EVAL SCRMS: CPT | Mod: ,,, | Performed by: INTERNAL MEDICINE

## 2020-03-15 PROCEDURE — 93298 CARDIAC DEVICE CHECK - REMOTE: ICD-10-PCS | Mod: ,,, | Performed by: INTERNAL MEDICINE

## 2020-04-13 ENCOUNTER — DOCUMENTATION ONLY (OUTPATIENT)
Dept: ELECTROPHYSIOLOGY | Facility: CLINIC | Age: 70
End: 2020-04-13

## 2020-04-13 NOTE — PROGRESS NOTES
Alert received from [employer] for symptom button pressed on loop recorder 3/17/20.  Called patient, L/M for a return call to assess event.

## 2020-04-14 ENCOUNTER — CLINICAL SUPPORT (OUTPATIENT)
Dept: CARDIOLOGY | Facility: HOSPITAL | Age: 70
End: 2020-04-14
Payer: MEDICAID

## 2020-04-14 DIAGNOSIS — Z95.818 PRESENCE OF OTHER CARDIAC IMPLANTS AND GRAFTS: ICD-10-CM

## 2020-04-14 PROCEDURE — 93298 REM INTERROG DEV EVAL SCRMS: CPT | Mod: ,,, | Performed by: INTERNAL MEDICINE

## 2020-04-14 PROCEDURE — 93298 CARDIAC DEVICE CHECK - REMOTE: ICD-10-PCS | Mod: ,,, | Performed by: INTERNAL MEDICINE

## 2020-05-14 ENCOUNTER — CLINICAL SUPPORT (OUTPATIENT)
Dept: CARDIOLOGY | Facility: HOSPITAL | Age: 70
End: 2020-05-14
Payer: MEDICARE

## 2020-05-14 DIAGNOSIS — Z95.818 PRESENCE OF OTHER CARDIAC IMPLANTS AND GRAFTS: ICD-10-CM

## 2020-05-14 PROCEDURE — G2066 INTER DEVC REMOTE 30D: HCPCS | Mod: HCNC | Performed by: INTERNAL MEDICINE

## 2020-05-14 PROCEDURE — 93298 CARDIAC DEVICE CHECK - REMOTE: ICD-10-PCS | Mod: HCNC,,, | Performed by: INTERNAL MEDICINE

## 2020-05-14 PROCEDURE — 93298 REM INTERROG DEV EVAL SCRMS: CPT | Mod: HCNC,,, | Performed by: INTERNAL MEDICINE

## 2020-06-09 ENCOUNTER — OFFICE VISIT (OUTPATIENT)
Dept: INTERNAL MEDICINE | Facility: CLINIC | Age: 70
End: 2020-06-09
Payer: MEDICARE

## 2020-06-09 VITALS
BODY MASS INDEX: 29.9 KG/M2 | TEMPERATURE: 98 F | RESPIRATION RATE: 20 BRPM | DIASTOLIC BLOOD PRESSURE: 82 MMHG | WEIGHT: 179.44 LBS | HEART RATE: 65 BPM | OXYGEN SATURATION: 99 % | SYSTOLIC BLOOD PRESSURE: 130 MMHG | HEIGHT: 65 IN

## 2020-06-09 DIAGNOSIS — M06.9 RHEUMATOID ARTHRITIS, INVOLVING UNSPECIFIED SITE, UNSPECIFIED RHEUMATOID FACTOR PRESENCE: ICD-10-CM

## 2020-06-09 DIAGNOSIS — K52.9 CHRONIC DIARRHEA: ICD-10-CM

## 2020-06-09 DIAGNOSIS — I42.8 CARDIOMYOPATHY, NONISCHEMIC: ICD-10-CM

## 2020-06-09 DIAGNOSIS — I10 ESSENTIAL HYPERTENSION: ICD-10-CM

## 2020-06-09 DIAGNOSIS — Z76.89 ENCOUNTER TO ESTABLISH CARE: Primary | ICD-10-CM

## 2020-06-09 DIAGNOSIS — Z13.220 LIPID SCREENING: ICD-10-CM

## 2020-06-09 DIAGNOSIS — I48.91 ATRIAL FIBRILLATION AND FLUTTER: ICD-10-CM

## 2020-06-09 DIAGNOSIS — M79.7 FIBROMYALGIA: ICD-10-CM

## 2020-06-09 DIAGNOSIS — G47.00 INSOMNIA, UNSPECIFIED TYPE: ICD-10-CM

## 2020-06-09 DIAGNOSIS — I48.92 ATRIAL FIBRILLATION AND FLUTTER: ICD-10-CM

## 2020-06-09 DIAGNOSIS — K92.1 BLOOD IN STOOL: ICD-10-CM

## 2020-06-09 DIAGNOSIS — I70.0 AORTIC ATHEROSCLEROSIS: ICD-10-CM

## 2020-06-09 PROBLEM — Z98.890 POST-OPERATIVE STATE: Status: RESOLVED | Noted: 2017-02-22 | Resolved: 2020-06-09

## 2020-06-09 PROBLEM — R07.89 CHEST DISCOMFORT: Status: RESOLVED | Noted: 2019-08-13 | Resolved: 2020-06-09

## 2020-06-09 PROCEDURE — 99499 UNLISTED E&M SERVICE: CPT | Mod: HCNC,S$GLB,, | Performed by: INTERNAL MEDICINE

## 2020-06-09 PROCEDURE — 99999 PR PBB SHADOW E&M-EST. PATIENT-LVL IV: CPT | Mod: PBBFAC,HCNC,, | Performed by: INTERNAL MEDICINE

## 2020-06-09 PROCEDURE — 99204 OFFICE O/P NEW MOD 45 MIN: CPT | Mod: HCNC,S$GLB,, | Performed by: INTERNAL MEDICINE

## 2020-06-09 PROCEDURE — 3075F PR MOST RECENT SYSTOLIC BLOOD PRESS GE 130-139MM HG: ICD-10-PCS | Mod: HCNC,CPTII,S$GLB, | Performed by: INTERNAL MEDICINE

## 2020-06-09 PROCEDURE — 99204 PR OFFICE/OUTPT VISIT, NEW, LEVL IV, 45-59 MIN: ICD-10-PCS | Mod: HCNC,S$GLB,, | Performed by: INTERNAL MEDICINE

## 2020-06-09 PROCEDURE — 99999 PR PBB SHADOW E&M-EST. PATIENT-LVL IV: ICD-10-PCS | Mod: PBBFAC,HCNC,, | Performed by: INTERNAL MEDICINE

## 2020-06-09 PROCEDURE — 1125F AMNT PAIN NOTED PAIN PRSNT: CPT | Mod: HCNC,S$GLB,, | Performed by: INTERNAL MEDICINE

## 2020-06-09 PROCEDURE — 1101F PR PT FALLS ASSESS DOC 0-1 FALLS W/OUT INJ PAST YR: ICD-10-PCS | Mod: HCNC,CPTII,S$GLB, | Performed by: INTERNAL MEDICINE

## 2020-06-09 PROCEDURE — 1159F PR MEDICATION LIST DOCUMENTED IN MEDICAL RECORD: ICD-10-PCS | Mod: HCNC,S$GLB,, | Performed by: INTERNAL MEDICINE

## 2020-06-09 PROCEDURE — 3075F SYST BP GE 130 - 139MM HG: CPT | Mod: HCNC,CPTII,S$GLB, | Performed by: INTERNAL MEDICINE

## 2020-06-09 PROCEDURE — 3079F PR MOST RECENT DIASTOLIC BLOOD PRESSURE 80-89 MM HG: ICD-10-PCS | Mod: HCNC,CPTII,S$GLB, | Performed by: INTERNAL MEDICINE

## 2020-06-09 PROCEDURE — 1159F MED LIST DOCD IN RCRD: CPT | Mod: HCNC,S$GLB,, | Performed by: INTERNAL MEDICINE

## 2020-06-09 PROCEDURE — 1101F PT FALLS ASSESS-DOCD LE1/YR: CPT | Mod: HCNC,CPTII,S$GLB, | Performed by: INTERNAL MEDICINE

## 2020-06-09 PROCEDURE — 99499 RISK ADDL DX/OHS AUDIT: ICD-10-PCS | Mod: HCNC,S$GLB,, | Performed by: INTERNAL MEDICINE

## 2020-06-09 PROCEDURE — 3079F DIAST BP 80-89 MM HG: CPT | Mod: HCNC,CPTII,S$GLB, | Performed by: INTERNAL MEDICINE

## 2020-06-09 PROCEDURE — 1125F PR PAIN SEVERITY QUANTIFIED, PAIN PRESENT: ICD-10-PCS | Mod: HCNC,S$GLB,, | Performed by: INTERNAL MEDICINE

## 2020-06-09 RX ORDER — ALBUTEROL SULFATE 90 UG/1
AEROSOL, METERED RESPIRATORY (INHALATION)
COMMUNITY
Start: 2020-05-20 | End: 2020-06-09

## 2020-06-09 RX ORDER — DULOXETIN HYDROCHLORIDE 30 MG/1
30 CAPSULE, DELAYED RELEASE ORAL DAILY
COMMUNITY
Start: 2020-05-20 | End: 2020-11-06

## 2020-06-09 RX ORDER — TRAZODONE HYDROCHLORIDE 50 MG/1
50 TABLET ORAL NIGHTLY PRN
Qty: 30 TABLET | Refills: 5 | Status: SHIPPED | OUTPATIENT
Start: 2020-06-09 | End: 2022-03-17

## 2020-06-09 RX ORDER — LOPERAMIDE HYDROCHLORIDE 2 MG/1
2 CAPSULE ORAL 4 TIMES DAILY PRN
COMMUNITY
End: 2021-02-09

## 2020-06-09 NOTE — PROGRESS NOTES
"Subjective:       Patient ID: Celine Sinclair is a 69 y.o. female.    Chief Complaint: Establish Care and irregular heartbeat      HPI:  Patient is new to clinic and presents to establish care. She has extensive history including paroxysmal atrial fibrillation and atrial flutter, HTN, PVCs, PACs, NICM (EF 40% with recovery to 50%), LAD myocardia bridge, aortic atherosclerosis, RA, Crohn's disease, irritable bowel with diarrhea and fibromyalgia. She was following with PCP in [place] but recently moved to [facility] and would like to be seen closer to home.     She follows with cardiology and reports currently has loop recorder. She has h/o a-fib/flutter. She feels like her heart is beating very fast and this is constant. Has had these sx for several months but feels it is worsening over last few weeks. Associated with SOB---"feels like I've been running" she is taking amiodarone 200mg BID and eliquis. Denies abnormal bleeding. HR is 65 today. She is prescribed albuterol inhaler but no known diagnosis of asthma or COPD--states was given for SOB. States she had PFTs but I can't find her results; will continue to try to obtain. No h/o smoking but 2nd hand smoke exposure reported.    HTN: on amlodipine, losartan, metoprolol. BP is controlled today. Reports home BP < 140/90. Denies chest pains but + sob and palpitations as noted above. Denies LE edema, vision changes, chronic headaches.     Chronic diarrhea: on lomotil with some relief of sx. She does still have intermittent watery stools. She has frequent stools---she is having BM after almost every meal. Sx are intermittent and diagnosed with irritable bowl and Crohns. Reports had C-scope 2016. Has h/o bowel resection with possible removal of small bowel, ileostomy and now repair.----states this was all done at [facility] and unsure we can get those records and doesn't remember why she had surgery. She does report intermittent BRBPR, never fills the toilet. " "    c-scope 2016: "Impression:           - Patent surgical anastomosis, characterized by                         healthy appearing mucosa.                        - The entire examined colon is normal on direct and                         retroflexion views.                        - No specimens collected."    Fibromylgia, rheumatoid arthirits: she is on cymbalta and tramadol. Diagnosed by her prior PCP. Will get records. Unsure why no DMARD therapy if truly has RA. She is not currently following with rheumatology. She has pain "all over my body" that is chronic. Unsure her current medications are helpful.    Insomnia: on trazodone 50mg. Working "OK". Has mostly difficulty falling asleep; once she is asleep she tends to stay asleep. Somenights this works well other nights it does not but can't find a pattern to this.     Tobacco use: never smoker  EtOH use: once a week  Illicit drugs: denies        Past Medical History:   Diagnosis Date    Aortic atherosclerosis     Benign essential hypertension     Cataract     Senile    Crohn's colitis     Depression, major, recurrent, moderate     Fibromyalgia     GERD (gastroesophageal reflux disease)     Hypertensive cardiomyopathy     IBS (irritable bowel syndrome)     Migraine     Opioid abuse, in remission     Osteopenia     Paget disease of bone     Port-A-Cath in place     right chest    Prolonged QT interval     RA (rheumatoid arthritis)     Sedative hypnotic or anxiolytic dependence     in remission        Family History   Problem Relation Age of Onset    Glaucoma Paternal Grandmother     Other Daughter         Diabetic Retinopathy    Breast cancer Daughter 40    Retinal detachment Grandchild     Colon cancer Maternal Grandmother     Heart attack Maternal Grandmother     Cancer Mother         Unknown type    Emphysema Father 67    Heart disease Father     Lung cancer Sister     Heart attack Sister     Breast cancer Daughter 47    Breast " cancer Sister     Amblyopia Neg Hx     Blindness Neg Hx     Strabismus Neg Hx     Macular degeneration Neg Hx     Cataracts Neg Hx        Social History     Socioeconomic History    Marital status:      Spouse name: Not on file    Number of children: 12    Years of education: Not on file    Highest education level: Not on file   Occupational History    Occupation: [employer]     Comment: Retired/disabled   Social Needs    Financial resource strain: Not on file    Food insecurity:     Worry: Not on file     Inability: Not on file    Transportation needs:     Medical: Not on file     Non-medical: Not on file   Tobacco Use    Smoking status: Never Smoker    Smokeless tobacco: Never Used   Substance and Sexual Activity    Alcohol use: No    Drug use: No    Sexual activity: Not on file   Lifestyle    Physical activity:     Days per week: Not on file     Minutes per session: Not on file    Stress: Not on file   Relationships    Social connections:     Talks on phone: Not on file     Gets together: Not on file     Attends Jain service: Not on file     Active member of club or organization: Not on file     Attends meetings of clubs or organizations: Not on file     Relationship status: Not on file   Other Topics Concern    Not on file   Social History Narrative    Patient gave birth to 7 children, adopted 2 and has 3 stepchildren with her .       Review of Systems   Constitutional: Positive for fatigue. Negative for activity change, fever and unexpected weight change.   HENT: Negative for congestion, ear pain, hearing loss, rhinorrhea and sore throat.    Eyes: Negative for pain, redness and visual disturbance.   Respiratory: Positive for shortness of breath. Negative for cough and wheezing.    Cardiovascular: Positive for palpitations. Negative for chest pain and leg swelling.   Gastrointestinal: Positive for blood in stool and diarrhea. Negative for abdominal pain, constipation,  nausea and vomiting.   Genitourinary: Negative for dysuria, frequency and urgency.   Musculoskeletal: Positive for arthralgias, back pain and myalgias. Negative for joint swelling and neck pain.   Skin: Negative for color change, rash and wound.   Neurological: Negative for dizziness, tremors, weakness, light-headedness and headaches.   Psychiatric/Behavioral: Positive for sleep disturbance.         Objective:      Physical Exam   Constitutional: She is oriented to person, place, and time. She appears well-developed and well-nourished. No distress.   HENT:   Head: Normocephalic and atraumatic.   Right Ear: External ear normal.   Left Ear: External ear normal.   Eyes: Pupils are equal, round, and reactive to light. Conjunctivae and EOM are normal. Right eye exhibits no discharge. Left eye exhibits no discharge.   Neck: Neck supple. No tracheal deviation present.   Cardiovascular: Normal rate. Exam reveals no gallop and no friction rub.   No murmur heard.  Irregular, HR 65 bpm   Pulmonary/Chest: Effort normal and breath sounds normal. No respiratory distress. She has no wheezes. She has no rales.   Abdominal: Soft. Bowel sounds are normal. She exhibits no distension. There is no tenderness.   Musculoskeletal: She exhibits no deformity.   Neurological: She is alert and oriented to person, place, and time. No cranial nerve deficit.   Skin: Skin is warm and dry.   Psychiatric: She has a normal mood and affect. Her behavior is normal.   Vitals reviewed.      Assessment:       1. Encounter to establish care    2. Chronic diarrhea    3. Blood in stool    4. Insomnia, unspecified type    5. Essential hypertension    6. Aortic atherosclerosis    7. Lipid screening    8. Fibromyalgia    9. Rheumatoid arthritis, involving unspecified site, unspecified rheumatoid factor presence    10. Atrial fibrillation and flutter    11. Cardiomyopathy, nonischemic        Plan:       Ada was seen today for establish care and irregular  heartbeat.    Diagnoses and all orders for this visit:    Encounter to establish care  meds reconciled  Problem list reviewed and updated  Cardiology notes reviewed  Prior PCP records requested    Chronic diarrhea  -     Ambulatory referral/consult to Gastroenterology; Future  -     CBC auto differential; Future  -     Comprehensive metabolic panel; Future  -     TSH; Future  -     Lipid Panel; Future  -     T4, free; Future  -     Magnesium; Future  -     PHOSPHORUS; Future  -     Sedimentation rate; Future  -     C-reactive protein; Future  ? Irritable vs Crohns--she reports h/o both  c-scope 2016 does not make mention of inflammatory bowel changes. She is not on therapy for Crohn's  She does report blood in stool therefore would like GI opinion on this  Cont lomotil for now though not very helpful  Diet modifications discussed  Added ESR/CRP--may be elevated if truly Crohns  Check electroltyes with frequent watery stools    Blood in stool  To GI for further eval though I did review C-scope 2016    Insomnia, unspecified type  -     traZODone (DESYREL) 50 MG tablet; Take 1 tablet (50 mg total) by mouth nightly as needed.  Chronic uncontrolled  Increase dose to 100mg QHS    Essential hypertension  -     CBC auto differential; Future  -     Comprehensive metabolic panel; Future  -     TSH; Future  -     Lipid Panel; Future  -     T4, free; Future  -     Magnesium; Future  -     PHOSPHORUS; Future  Chronic controlled  Continue medications at same dose  Low Na diet  Exercise, weight loss  Check BP and keep log for next visit    Aortic atherosclerosis  -     CBC auto differential; Future  -     Comprehensive metabolic panel; Future  -     TSH; Future  -     Lipid Panel; Future  -     T4, free; Future  -     Magnesium; Future  -     PHOSPHORUS; Future  Noted on imaging  Cont good BP control  Check lipid panel, decide need for statin    Lipid screening  -     CBC auto differential; Future  -     Comprehensive metabolic  panel; Future  -     TSH; Future  -     Lipid Panel; Future  -     T4, free; Future  -     Magnesium; Future  -     PHOSPHORUS; Future  Will carrie lwith labs  Diet, exercise and weigh tloss    Fibromyalgia  -     Sedimentation rate; Future  -     C-reactive protein; Future    Rheumatoid arthritis, involving unspecified site, unspecified rheumatoid factor presence  -     CBC auto differential; Future  -     Comprehensive metabolic panel; Future  -     TSH; Future  -     Lipid Panel; Future  -     T4, free; Future  -     Magnesium; Future  -     PHOSPHORUS; Future  -     Rheumatoid factor; Future  -     CYCLIC CITRUL PEPTIDE ANTIBODY, IGG; Future  -     Sedimentation rate; Future  -     C-reactive protein; Future    Reports h/o RA but I see no joint deformities or synovitis  Check labs  Get prior PCP notes to review as well  Not current on DMARD theray  Diffuse pain, if we r/o RA would increase cymbala for fibromyalgia pain. Consider gabapentin pending renal fxn    Atrial fibrillation and flutter  -     CBC auto differential; Future  -     Comprehensive metabolic panel; Future  -     TSH; Future  -     Lipid Panel; Future  -     T4, free; Future  -     Magnesium; Future  -     PHOSPHORUS; Future  Sees cardiology  Current has loop recorder  Cont amiodarone and metoprolol  Reassured HR is 60s today (not racing) and cant' increase meds further due to mild bradycardia  No angina sx  ? Tachy-padmini given the way she describes her sx. Told her I agree with loop and will see what her results show  Keep cards follow up    Cardiomyopathy, nonischemic  -     CBC auto differential; Future  -     Comprehensive metabolic panel; Future  -     TSH; Future  -     Lipid Panel; Future  -     T4, free; Future  -     Magnesium; Future  -     PHOSPHORUS; Future  -     Brain Natriuretic Peptide; Future  Chronic stable  Last EF 50%  No signs of volume overload today  Follows with cardiology  Cont meds same dose    RTC 3 months and PRN pending  fasting labs this week

## 2020-06-13 ENCOUNTER — CLINICAL SUPPORT (OUTPATIENT)
Dept: CARDIOLOGY | Facility: HOSPITAL | Age: 70
End: 2020-06-13
Payer: MEDICARE

## 2020-06-13 DIAGNOSIS — Z95.818 PRESENCE OF OTHER CARDIAC IMPLANTS AND GRAFTS: ICD-10-CM

## 2020-06-13 PROCEDURE — G2066 INTER DEVC REMOTE 30D: HCPCS | Mod: HCNC | Performed by: INTERNAL MEDICINE

## 2020-06-13 PROCEDURE — 93298 REM INTERROG DEV EVAL SCRMS: CPT | Mod: HCNC,,, | Performed by: INTERNAL MEDICINE

## 2020-06-13 PROCEDURE — 93298 CARDIAC DEVICE CHECK - REMOTE: ICD-10-PCS | Mod: HCNC,,, | Performed by: INTERNAL MEDICINE

## 2020-06-18 ENCOUNTER — LAB VISIT (OUTPATIENT)
Dept: LAB | Facility: HOSPITAL | Age: 70
End: 2020-06-18
Attending: INTERNAL MEDICINE
Payer: MEDICARE

## 2020-06-18 DIAGNOSIS — I42.8 CARDIOMYOPATHY, NONISCHEMIC: ICD-10-CM

## 2020-06-18 DIAGNOSIS — M06.9 RHEUMATOID ARTHRITIS, INVOLVING UNSPECIFIED SITE, UNSPECIFIED RHEUMATOID FACTOR PRESENCE: ICD-10-CM

## 2020-06-18 DIAGNOSIS — K52.9 CHRONIC DIARRHEA: ICD-10-CM

## 2020-06-18 DIAGNOSIS — I70.0 AORTIC ATHEROSCLEROSIS: ICD-10-CM

## 2020-06-18 DIAGNOSIS — I10 ESSENTIAL HYPERTENSION: ICD-10-CM

## 2020-06-18 DIAGNOSIS — M79.7 FIBROMYALGIA: ICD-10-CM

## 2020-06-18 DIAGNOSIS — I48.91 ATRIAL FIBRILLATION AND FLUTTER: ICD-10-CM

## 2020-06-18 DIAGNOSIS — Z13.220 LIPID SCREENING: ICD-10-CM

## 2020-06-18 DIAGNOSIS — I48.92 ATRIAL FIBRILLATION AND FLUTTER: ICD-10-CM

## 2020-06-18 LAB
ALBUMIN SERPL BCP-MCNC: 4.1 G/DL (ref 3.5–5.2)
ALP SERPL-CCNC: 70 U/L (ref 55–135)
ALT SERPL W/O P-5'-P-CCNC: 13 U/L (ref 10–44)
ANION GAP SERPL CALC-SCNC: 10 MMOL/L (ref 8–16)
AST SERPL-CCNC: 20 U/L (ref 10–40)
BASOPHILS # BLD AUTO: 0.05 K/UL (ref 0–0.2)
BASOPHILS NFR BLD: 0.6 % (ref 0–1.9)
BILIRUB SERPL-MCNC: 1.6 MG/DL (ref 0.1–1)
BNP SERPL-MCNC: 38 PG/ML (ref 0–99)
BUN SERPL-MCNC: 19 MG/DL (ref 8–23)
CALCIUM SERPL-MCNC: 9.1 MG/DL (ref 8.7–10.5)
CCP AB SER IA-ACNC: <0.5 U/ML
CHLORIDE SERPL-SCNC: 107 MMOL/L (ref 95–110)
CHOLEST SERPL-MCNC: 197 MG/DL (ref 120–199)
CHOLEST/HDLC SERPL: 2.9 {RATIO} (ref 2–5)
CO2 SERPL-SCNC: 25 MMOL/L (ref 23–29)
CREAT SERPL-MCNC: 0.7 MG/DL (ref 0.5–1.4)
CRP SERPL-MCNC: 7.4 MG/L (ref 0–8.2)
DIFFERENTIAL METHOD: NORMAL
EOSINOPHIL # BLD AUTO: 0.1 K/UL (ref 0–0.5)
EOSINOPHIL NFR BLD: 1.1 % (ref 0–8)
ERYTHROCYTE [DISTWIDTH] IN BLOOD BY AUTOMATED COUNT: 13.2 % (ref 11.5–14.5)
ERYTHROCYTE [SEDIMENTATION RATE] IN BLOOD BY WESTERGREN METHOD: 8 MM/HR (ref 0–20)
EST. GFR  (AFRICAN AMERICAN): >60 ML/MIN/1.73 M^2
EST. GFR  (NON AFRICAN AMERICAN): >60 ML/MIN/1.73 M^2
GLUCOSE SERPL-MCNC: 83 MG/DL (ref 70–110)
HCT VFR BLD AUTO: 43.2 % (ref 37–48.5)
HDLC SERPL-MCNC: 69 MG/DL (ref 40–75)
HDLC SERPL: 35 % (ref 20–50)
HGB BLD-MCNC: 13.9 G/DL (ref 12–16)
IMM GRANULOCYTES # BLD AUTO: 0.02 K/UL (ref 0–0.04)
IMM GRANULOCYTES NFR BLD AUTO: 0.2 % (ref 0–0.5)
LDLC SERPL CALC-MCNC: 110.8 MG/DL (ref 63–159)
LYMPHOCYTES # BLD AUTO: 1.9 K/UL (ref 1–4.8)
LYMPHOCYTES NFR BLD: 22.6 % (ref 18–48)
MAGNESIUM SERPL-MCNC: 1.8 MG/DL (ref 1.6–2.6)
MCH RBC QN AUTO: 28 PG (ref 27–31)
MCHC RBC AUTO-ENTMCNC: 32.2 G/DL (ref 32–36)
MCV RBC AUTO: 87 FL (ref 82–98)
MONOCYTES # BLD AUTO: 0.6 K/UL (ref 0.3–1)
MONOCYTES NFR BLD: 7.3 % (ref 4–15)
NEUTROPHILS # BLD AUTO: 5.7 K/UL (ref 1.8–7.7)
NEUTROPHILS NFR BLD: 68.2 % (ref 38–73)
NONHDLC SERPL-MCNC: 128 MG/DL
NRBC BLD-RTO: 0 /100 WBC
PHOSPHATE SERPL-MCNC: 3.3 MG/DL (ref 2.7–4.5)
PLATELET # BLD AUTO: 204 K/UL (ref 150–350)
PMV BLD AUTO: 10.8 FL (ref 9.2–12.9)
POTASSIUM SERPL-SCNC: 3.6 MMOL/L (ref 3.5–5.1)
PROT SERPL-MCNC: 7.6 G/DL (ref 6–8.4)
RBC # BLD AUTO: 4.97 M/UL (ref 4–5.4)
RHEUMATOID FACT SERPL-ACNC: 41 IU/ML (ref 0–15)
SODIUM SERPL-SCNC: 142 MMOL/L (ref 136–145)
T4 FREE SERPL-MCNC: 0.85 NG/DL (ref 0.71–1.51)
TRIGL SERPL-MCNC: 86 MG/DL (ref 30–150)
TSH SERPL DL<=0.005 MIU/L-ACNC: 1.68 UIU/ML (ref 0.4–4)
WBC # BLD AUTO: 8.32 K/UL (ref 3.9–12.7)

## 2020-06-18 PROCEDURE — 84439 ASSAY OF FREE THYROXINE: CPT | Mod: HCNC

## 2020-06-18 PROCEDURE — 84443 ASSAY THYROID STIM HORMONE: CPT | Mod: HCNC

## 2020-06-18 PROCEDURE — 86140 C-REACTIVE PROTEIN: CPT | Mod: HCNC

## 2020-06-18 PROCEDURE — 36415 COLL VENOUS BLD VENIPUNCTURE: CPT | Mod: HCNC

## 2020-06-18 PROCEDURE — 83735 ASSAY OF MAGNESIUM: CPT | Mod: HCNC

## 2020-06-18 PROCEDURE — 86431 RHEUMATOID FACTOR QUANT: CPT | Mod: HCNC

## 2020-06-18 PROCEDURE — 85651 RBC SED RATE NONAUTOMATED: CPT | Mod: HCNC

## 2020-06-18 PROCEDURE — 80053 COMPREHEN METABOLIC PANEL: CPT | Mod: HCNC

## 2020-06-18 PROCEDURE — 80061 LIPID PANEL: CPT | Mod: HCNC

## 2020-06-18 PROCEDURE — 85025 COMPLETE CBC W/AUTO DIFF WBC: CPT | Mod: HCNC

## 2020-06-18 PROCEDURE — 84100 ASSAY OF PHOSPHORUS: CPT | Mod: HCNC

## 2020-06-18 PROCEDURE — 83880 ASSAY OF NATRIURETIC PEPTIDE: CPT | Mod: HCNC

## 2020-06-18 PROCEDURE — 86200 CCP ANTIBODY: CPT | Mod: HCNC

## 2020-06-29 DIAGNOSIS — M06.9 RHEUMATOID ARTHRITIS, INVOLVING UNSPECIFIED SITE, UNSPECIFIED RHEUMATOID FACTOR PRESENCE: Primary | ICD-10-CM

## 2020-07-13 ENCOUNTER — CLINICAL SUPPORT (OUTPATIENT)
Dept: CARDIOLOGY | Facility: HOSPITAL | Age: 70
End: 2020-07-13
Attending: INTERNAL MEDICINE
Payer: MEDICARE

## 2020-07-13 DIAGNOSIS — Z95.818 PRESENCE OF OTHER CARDIAC IMPLANTS AND GRAFTS: ICD-10-CM

## 2020-07-13 PROCEDURE — 93298 CARDIAC DEVICE CHECK - REMOTE: ICD-10-PCS | Mod: HCNC,,, | Performed by: INTERNAL MEDICINE

## 2020-07-13 PROCEDURE — G2066 INTER DEVC REMOTE 30D: HCPCS | Mod: HCNC | Performed by: INTERNAL MEDICINE

## 2020-07-13 PROCEDURE — 93298 REM INTERROG DEV EVAL SCRMS: CPT | Mod: HCNC,,, | Performed by: INTERNAL MEDICINE
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
## 2020-07-27 ENCOUNTER — OFFICE VISIT (OUTPATIENT)
Dept: CARDIOLOGY | Facility: CLINIC | Age: 70
End: 2020-07-27
Payer: MEDICARE

## 2020-07-27 VITALS
DIASTOLIC BLOOD PRESSURE: 82 MMHG | BODY MASS INDEX: 30.12 KG/M2 | HEIGHT: 65 IN | HEART RATE: 71 BPM | WEIGHT: 180.75 LBS | SYSTOLIC BLOOD PRESSURE: 144 MMHG

## 2020-07-27 DIAGNOSIS — I70.0 AORTIC ATHEROSCLEROSIS: ICD-10-CM

## 2020-07-27 DIAGNOSIS — R07.89 CHEST TIGHTNESS: ICD-10-CM

## 2020-07-27 DIAGNOSIS — I10 ESSENTIAL HYPERTENSION: Primary | ICD-10-CM

## 2020-07-27 DIAGNOSIS — M06.9 RHEUMATOID ARTHRITIS, INVOLVING UNSPECIFIED SITE, UNSPECIFIED RHEUMATOID FACTOR PRESENCE: ICD-10-CM

## 2020-07-27 DIAGNOSIS — I48.0 PAROXYSMAL ATRIAL FIBRILLATION: ICD-10-CM

## 2020-07-27 DIAGNOSIS — Z79.899 LONG TERM CURRENT USE OF AMIODARONE: ICD-10-CM

## 2020-07-27 DIAGNOSIS — I47.29 POLYMORPHIC VENTRICULAR TACHYCARDIA: ICD-10-CM

## 2020-07-27 DIAGNOSIS — Q24.5 MYOCARDIAL BRIDGE: ICD-10-CM

## 2020-07-27 PROCEDURE — 1126F AMNT PAIN NOTED NONE PRSNT: CPT | Mod: HCNC,S$GLB,, | Performed by: INTERNAL MEDICINE

## 2020-07-27 PROCEDURE — 99214 PR OFFICE/OUTPT VISIT, EST, LEVL IV, 30-39 MIN: ICD-10-PCS | Mod: HCNC,S$GLB,, | Performed by: INTERNAL MEDICINE

## 2020-07-27 PROCEDURE — 1101F PT FALLS ASSESS-DOCD LE1/YR: CPT | Mod: HCNC,CPTII,S$GLB, | Performed by: INTERNAL MEDICINE

## 2020-07-27 PROCEDURE — 3079F DIAST BP 80-89 MM HG: CPT | Mod: HCNC,CPTII,S$GLB, | Performed by: INTERNAL MEDICINE

## 2020-07-27 PROCEDURE — 1159F MED LIST DOCD IN RCRD: CPT | Mod: HCNC,S$GLB,, | Performed by: INTERNAL MEDICINE

## 2020-07-27 PROCEDURE — 99214 OFFICE O/P EST MOD 30 MIN: CPT | Mod: HCNC,S$GLB,, | Performed by: INTERNAL MEDICINE

## 2020-07-27 PROCEDURE — 3077F SYST BP >= 140 MM HG: CPT | Mod: HCNC,CPTII,S$GLB, | Performed by: INTERNAL MEDICINE

## 2020-07-27 PROCEDURE — 3079F PR MOST RECENT DIASTOLIC BLOOD PRESSURE 80-89 MM HG: ICD-10-PCS | Mod: HCNC,CPTII,S$GLB, | Performed by: INTERNAL MEDICINE

## 2020-07-27 PROCEDURE — 1126F PR PAIN SEVERITY QUANTIFIED, NO PAIN PRESENT: ICD-10-PCS | Mod: HCNC,S$GLB,, | Performed by: INTERNAL MEDICINE

## 2020-07-27 PROCEDURE — 3008F BODY MASS INDEX DOCD: CPT | Mod: HCNC,CPTII,S$GLB, | Performed by: INTERNAL MEDICINE

## 2020-07-27 PROCEDURE — 99999 PR PBB SHADOW E&M-EST. PATIENT-LVL IV: CPT | Mod: PBBFAC,HCNC,, | Performed by: INTERNAL MEDICINE

## 2020-07-27 PROCEDURE — 3077F PR MOST RECENT SYSTOLIC BLOOD PRESSURE >= 140 MM HG: ICD-10-PCS | Mod: HCNC,CPTII,S$GLB, | Performed by: INTERNAL MEDICINE

## 2020-07-27 PROCEDURE — 99499 RISK ADDL DX/OHS AUDIT: ICD-10-PCS | Mod: HCNC,S$GLB,, | Performed by: INTERNAL MEDICINE

## 2020-07-27 PROCEDURE — 1159F PR MEDICATION LIST DOCUMENTED IN MEDICAL RECORD: ICD-10-PCS | Mod: HCNC,S$GLB,, | Performed by: INTERNAL MEDICINE

## 2020-07-27 PROCEDURE — 99999 PR PBB SHADOW E&M-EST. PATIENT-LVL IV: ICD-10-PCS | Mod: PBBFAC,HCNC,, | Performed by: INTERNAL MEDICINE

## 2020-07-27 PROCEDURE — 1101F PR PT FALLS ASSESS DOC 0-1 FALLS W/OUT INJ PAST YR: ICD-10-PCS | Mod: HCNC,CPTII,S$GLB, | Performed by: INTERNAL MEDICINE

## 2020-07-27 PROCEDURE — 3008F PR BODY MASS INDEX (BMI) DOCUMENTED: ICD-10-PCS | Mod: HCNC,CPTII,S$GLB, | Performed by: INTERNAL MEDICINE

## 2020-07-27 PROCEDURE — 99499 UNLISTED E&M SERVICE: CPT | Mod: HCNC,S$GLB,, | Performed by: INTERNAL MEDICINE

## 2020-07-27 RX ORDER — AMLODIPINE BESYLATE 5 MG/1
5 TABLET ORAL DAILY
Qty: 90 TABLET | Refills: 3
Start: 2020-07-27 | End: 2020-11-06

## 2020-07-27 RX ORDER — ISOSORBIDE MONONITRATE 30 MG/1
30 TABLET, EXTENDED RELEASE ORAL DAILY
Qty: 30 TABLET | Refills: 11 | Status: SHIPPED | OUTPATIENT
Start: 2020-07-27 | End: 2021-07-19

## 2020-08-12 ENCOUNTER — CLINICAL SUPPORT (OUTPATIENT)
Dept: CARDIOLOGY | Facility: HOSPITAL | Age: 70
End: 2020-08-12
Payer: MEDICARE

## 2020-08-12 DIAGNOSIS — Z95.818 PRESENCE OF OTHER CARDIAC IMPLANTS AND GRAFTS: ICD-10-CM

## 2020-08-12 PROCEDURE — G2066 INTER DEVC REMOTE 30D: HCPCS | Performed by: INTERNAL MEDICINE

## 2020-08-12 PROCEDURE — 93298 REM INTERROG DEV EVAL SCRMS: CPT | Mod: ,,, | Performed by: INTERNAL MEDICINE

## 2020-08-12 PROCEDURE — 93298 CARDIAC DEVICE CHECK - REMOTE: ICD-10-PCS | Mod: ,,, | Performed by: INTERNAL MEDICINE

## 2020-09-09 ENCOUNTER — OFFICE VISIT (OUTPATIENT)
Dept: INTERNAL MEDICINE | Facility: CLINIC | Age: 70
End: 2020-09-09
Payer: MEDICARE

## 2020-09-09 ENCOUNTER — TELEPHONE (OUTPATIENT)
Dept: INTERNAL MEDICINE | Facility: CLINIC | Age: 70
End: 2020-09-09

## 2020-09-09 VITALS
HEIGHT: 65 IN | DIASTOLIC BLOOD PRESSURE: 86 MMHG | HEART RATE: 70 BPM | BODY MASS INDEX: 30.16 KG/M2 | RESPIRATION RATE: 18 BRPM | OXYGEN SATURATION: 98 % | SYSTOLIC BLOOD PRESSURE: 130 MMHG | WEIGHT: 181 LBS | TEMPERATURE: 98 F

## 2020-09-09 DIAGNOSIS — I48.0 PAROXYSMAL ATRIAL FIBRILLATION: ICD-10-CM

## 2020-09-09 DIAGNOSIS — M06.9 RHEUMATOID ARTHRITIS, INVOLVING UNSPECIFIED SITE, UNSPECIFIED RHEUMATOID FACTOR PRESENCE: Primary | ICD-10-CM

## 2020-09-09 DIAGNOSIS — I70.0 AORTIC ATHEROSCLEROSIS: ICD-10-CM

## 2020-09-09 DIAGNOSIS — I10 ESSENTIAL HYPERTENSION: ICD-10-CM

## 2020-09-09 DIAGNOSIS — K50.919 CROHN'S DISEASE WITH COMPLICATION, UNSPECIFIED GASTROINTESTINAL TRACT LOCATION: ICD-10-CM

## 2020-09-09 PROCEDURE — 99214 PR OFFICE/OUTPT VISIT, EST, LEVL IV, 30-39 MIN: ICD-10-PCS | Mod: HCNC,S$GLB,, | Performed by: INTERNAL MEDICINE

## 2020-09-09 PROCEDURE — 1125F AMNT PAIN NOTED PAIN PRSNT: CPT | Mod: HCNC,S$GLB,, | Performed by: INTERNAL MEDICINE

## 2020-09-09 PROCEDURE — 99999 PR PBB SHADOW E&M-EST. PATIENT-LVL V: ICD-10-PCS | Mod: PBBFAC,HCNC,, | Performed by: INTERNAL MEDICINE

## 2020-09-09 PROCEDURE — 3079F DIAST BP 80-89 MM HG: CPT | Mod: HCNC,CPTII,S$GLB, | Performed by: INTERNAL MEDICINE

## 2020-09-09 PROCEDURE — 1159F PR MEDICATION LIST DOCUMENTED IN MEDICAL RECORD: ICD-10-PCS | Mod: HCNC,S$GLB,, | Performed by: INTERNAL MEDICINE

## 2020-09-09 PROCEDURE — 3079F PR MOST RECENT DIASTOLIC BLOOD PRESSURE 80-89 MM HG: ICD-10-PCS | Mod: HCNC,CPTII,S$GLB, | Performed by: INTERNAL MEDICINE

## 2020-09-09 PROCEDURE — 99999 PR PBB SHADOW E&M-EST. PATIENT-LVL V: CPT | Mod: PBBFAC,HCNC,, | Performed by: INTERNAL MEDICINE

## 2020-09-09 PROCEDURE — 3075F PR MOST RECENT SYSTOLIC BLOOD PRESS GE 130-139MM HG: ICD-10-PCS | Mod: HCNC,CPTII,S$GLB, | Performed by: INTERNAL MEDICINE

## 2020-09-09 PROCEDURE — 1125F PR PAIN SEVERITY QUANTIFIED, PAIN PRESENT: ICD-10-PCS | Mod: HCNC,S$GLB,, | Performed by: INTERNAL MEDICINE

## 2020-09-09 PROCEDURE — 3008F BODY MASS INDEX DOCD: CPT | Mod: HCNC,CPTII,S$GLB, | Performed by: INTERNAL MEDICINE

## 2020-09-09 PROCEDURE — 1101F PR PT FALLS ASSESS DOC 0-1 FALLS W/OUT INJ PAST YR: ICD-10-PCS | Mod: HCNC,CPTII,S$GLB, | Performed by: INTERNAL MEDICINE

## 2020-09-09 PROCEDURE — 1101F PT FALLS ASSESS-DOCD LE1/YR: CPT | Mod: HCNC,CPTII,S$GLB, | Performed by: INTERNAL MEDICINE

## 2020-09-09 PROCEDURE — 99214 OFFICE O/P EST MOD 30 MIN: CPT | Mod: HCNC,S$GLB,, | Performed by: INTERNAL MEDICINE

## 2020-09-09 PROCEDURE — 3008F PR BODY MASS INDEX (BMI) DOCUMENTED: ICD-10-PCS | Mod: HCNC,CPTII,S$GLB, | Performed by: INTERNAL MEDICINE

## 2020-09-09 PROCEDURE — 1159F MED LIST DOCD IN RCRD: CPT | Mod: HCNC,S$GLB,, | Performed by: INTERNAL MEDICINE

## 2020-09-09 PROCEDURE — 3075F SYST BP GE 130 - 139MM HG: CPT | Mod: HCNC,CPTII,S$GLB, | Performed by: INTERNAL MEDICINE

## 2020-09-09 RX ORDER — TRAMADOL HYDROCHLORIDE 50 MG/1
50 TABLET ORAL EVERY 8 HOURS PRN
Qty: 60 TABLET | Refills: 0 | Status: ON HOLD | OUTPATIENT
Start: 2020-09-09 | End: 2022-02-02 | Stop reason: HOSPADM

## 2020-09-09 NOTE — PROGRESS NOTES
"Subjective:       Patient ID: Celine Sinclair is a 69 y.o. female.    Chief Complaint: Follow-up (3 mo)      HPI:    Patient is known to me and presents for follow up chronic medical issues including paroxysmal atrial fibrillation and atrial flutter, HTN, PVCs, PACs, NICM (EF 40% with recovery to 50%), LAD myocardial bridge, aortic atherosclerosis, RA, Crohn's disease, irritable bowel with diarrhea and fibromyalgia.      A-fib: She follows with cardiology. She is taking amiodarone 200mg BID and eliquis. Denies abnormal bleeding. HR is 70 today. she has intermittent SOB for which she is prescribed albuterol inhaler but no known diagnosis of asthma or COPD--states was given for SOB. States she had PFTs but I can't find her results; will continue to try to obtain. No h/o smoking but 2nd hand smoke exposure reported.     HTN: on Imdur, amlodipine, losartan, metoprolol. BP is controlled today. Reports home BP < 140/90. Denies chest pains but + sob and palpitations as noted above. Denies LE edema, vision changes, chronic headaches.      Chronic diarrhea: on lomotil with some relief of sx. She does still have intermittent watery stools. She has frequent stools---she is having BM after almost every meal an some bowel incontinence. Sx are intermittent and diagnosed with irritable bowl and Crohns. Reports had C-scope 2016. Has h/o bowel resection with possible removal of small bowel, ileostomy and now repair.----states this was all done at [facility] and unsure we can get those records and doesn't remember why she had surgery. She does report intermittent BRBPR, never fills the toilet with blood.      c-scope 2016: "Impression:           - Patent surgical anastomosis, characterized by                         healthy appearing mucosa.                        - The entire examined colon is normal on direct and                         retroflexion views.                        - No specimens collected."     Fibromylgia, rheumatoid " "arthirits: she is on cymbalta and tramadol for pain control. Diagnosed by her prior PCP.  Unsure why no DMARD therapy if truly has RA but anti CCP and RF were positive so sent to Dr. Porter Ramos. She reports he "ran some more tests"---I will get records to review--but he has not started DMARD therapy.   She has pain "all over my body" that is chronic.     Insomnia: on trazodone 50mg. Working "OK". Has mostly difficulty falling asleep; once she is asleep she tends to stay asleep. Somenights this works well other nights it does not but can't find a pattern to this.      Tobacco use: never smoker  EtOH use: once a week  Illicit drugs: denies          Past Medical History:   Diagnosis Date    Aortic atherosclerosis     Benign essential hypertension     Cataract     Senile    Crohn's colitis     Depression, major, recurrent, moderate     Fibromyalgia     GERD (gastroesophageal reflux disease)     Hypertensive cardiomyopathy     IBS (irritable bowel syndrome)     Migraine     Opioid abuse, in remission     Osteopenia     Paget disease of bone     Port-A-Cath in place     right chest    Prolonged QT interval     RA (rheumatoid arthritis)     Sedative hypnotic or anxiolytic dependence     in remission        Family History   Problem Relation Age of Onset    Glaucoma Paternal Grandmother     Other Daughter         Diabetic Retinopathy    Breast cancer Daughter 40    Retinal detachment Grandchild     Colon cancer Maternal Grandmother     Heart attack Maternal Grandmother     Cancer Mother         Unknown type    Emphysema Father 67    Heart disease Father     Lung cancer Sister     Heart attack Sister     Breast cancer Daughter 47    Breast cancer Sister     Amblyopia Neg Hx     Blindness Neg Hx     Strabismus Neg Hx     Macular degeneration Neg Hx     Cataracts Neg Hx        Social History     Socioeconomic History    Marital status:      Spouse name: Not on file    Number of " children: 12    Years of education: Not on file    Highest education level: Not on file   Occupational History    Occupation: [employer]     Comment: Retired/disabled   Social Needs    Financial resource strain: Not on file    Food insecurity     Worry: Not on file     Inability: Not on file    Transportation needs     Medical: Not on file     Non-medical: Not on file   Tobacco Use    Smoking status: Never Smoker    Smokeless tobacco: Never Used   Substance and Sexual Activity    Alcohol use: No    Drug use: No    Sexual activity: Not on file   Lifestyle    Physical activity     Days per week: Not on file     Minutes per session: Not on file    Stress: Not on file   Relationships    Social connections     Talks on phone: Not on file     Gets together: Not on file     Attends Quaker service: Not on file     Active member of club or organization: Not on file     Attends meetings of clubs or organizations: Not on file     Relationship status: Not on file   Other Topics Concern    Not on file   Social History Narrative    Patient gave birth to 7 children, adopted 2 and has 3 stepchildren with her .       Review of Systems   Constitutional: Negative for activity change, fatigue, fever and unexpected weight change.   HENT: Negative for congestion, ear pain, hearing loss, rhinorrhea and sore throat.    Eyes: Negative for redness and visual disturbance.   Respiratory: Negative for cough, shortness of breath and wheezing.    Cardiovascular: Negative for chest pain, palpitations and leg swelling.   Gastrointestinal: Positive for abdominal pain, blood in stool and diarrhea. Negative for constipation, nausea and vomiting.   Genitourinary: Negative for dysuria, frequency and urgency.   Musculoskeletal: Positive for arthralgias. Negative for back pain, joint swelling and neck pain.   Skin: Negative for color change, rash and wound.   Neurological: Negative for dizziness, tremors, weakness,  light-headedness and headaches.         Objective:      Physical Exam  Vitals signs reviewed.   Constitutional:       General: She is not in acute distress.     Appearance: She is well-developed.   HENT:      Head: Normocephalic and atraumatic.      Right Ear: External ear normal.      Left Ear: External ear normal.      Nose: Nose normal.      Mouth/Throat:      Mouth: Mucous membranes are moist.      Pharynx: Oropharynx is clear.   Eyes:      General:         Right eye: No discharge.         Left eye: No discharge.      Extraocular Movements: Extraocular movements intact.      Conjunctiva/sclera: Conjunctivae normal.      Pupils: Pupils are equal, round, and reactive to light.   Neck:      Musculoskeletal: Neck supple.      Thyroid: No thyromegaly.   Cardiovascular:      Rate and Rhythm: Normal rate and regular rhythm.      Heart sounds: No murmur. No friction rub. No gallop.    Pulmonary:      Effort: Pulmonary effort is normal. No respiratory distress.      Breath sounds: Normal breath sounds. No wheezing or rales.   Abdominal:      General: Bowel sounds are normal. There is no distension.      Palpations: Abdomen is soft.      Tenderness: There is no abdominal tenderness.   Lymphadenopathy:      Cervical: No cervical adenopathy.   Skin:     General: Skin is warm and dry.   Neurological:      Mental Status: She is alert and oriented to person, place, and time.      Cranial Nerves: No cranial nerve deficit.   Psychiatric:         Behavior: Behavior normal.         Assessment:       1. Rheumatoid arthritis, involving unspecified site, unspecified rheumatoid factor presence    2. Crohn's disease with complication, unspecified gastrointestinal tract location    3. Essential hypertension    4. Aortic atherosclerosis    5. Paroxysmal atrial fibrillation        Plan:       Ada was seen today for follow-up.    Diagnoses and all orders for this visit:    Rheumatoid arthritis, involving unspecified site, unspecified  rheumatoid factor presence  -     traMADoL (ULTRAM) 50 mg tablet; Take 1 tablet (50 mg total) by mouth every 8 (eight) hours as needed for Pain.  Chronic uncontrolled  She has established with Dr. Porter Ramos but awaiting further testing  I will refill her tramadol for now while awaiting final disposition from rheumatology  I suspect she will need some sort of DMARD therapy and ideally would not want to continue tramadol long term  ? Coordinate treatment with Crohn's disease treatment???? Will leave to the specialists to decide if this is appropriate    Crohn's disease with complication, unspecified gastrointestinal tract location  -     Ambulatory referral/consult to Gastroenterology; Future  Chronic uncontrolled  Very complex history with h/o surgery, intestine removal and no records to review  Having active diarrhea and BRBPR  Needs GI eval ASAP---tried referral last visit so will try again  Weight stable    Essential hypertension  -     CBC auto differential; Future  -     Comprehensive metabolic panel; Future  -     TSH; Future  -     Lipid Panel; Future  Chronic controlled  Continue medications at same dose  Low Na diet  Exercise, weight loss  Check BP and keep log for next visit    Aortic atherosclerosis  -     CBC auto differential; Future  -     Comprehensive metabolic panel; Future  -     TSH; Future  -     Lipid Panel; Future  Noted on imaging  No ASA with likely GI bleeding/Crohn's for now    Paroxysmal atrial fibrillation  -     CBC auto differential; Future  -     Comprehensive metabolic panel; Future  -     TSH; Future  -     Lipid Panel; Future  Chronic stable  Cont meds same dose  On eliquis but risks of bleeding/Crohn's vs risk of CVA so will continue for now. No ASA added  Managed by cardiology    RTC 6 months with labs and PRN. Needs GI and rheumatology eval ASAP so call me if this doesn't happen before her next visit!

## 2020-09-09 NOTE — TELEPHONE ENCOUNTER
----- Message from Martha Sams sent at 2020  2:04 PM CDT -----  Regarding: Speak to a nurse  Contact: self  Celine Sinclair  MRN: [id number]  : 1950  PCP: Neeru Hinkle  Home Phone      [phone]  Work Phone      [phone]  Mobile          [phone]      MESSAGE:    Request to speak to a nurse regarding questions about PCP appointment - 20, and Rx - Tramadol  (dose unknown)    Phone # [phone]    Pharmacy - [facility]/pharmacy #[id number] - [place], [place] - [address]

## 2020-09-11 ENCOUNTER — CLINICAL SUPPORT (OUTPATIENT)
Dept: CARDIOLOGY | Facility: HOSPITAL | Age: 70
End: 2020-09-11
Payer: MEDICARE

## 2020-09-11 DIAGNOSIS — Z95.818 PRESENCE OF OTHER CARDIAC IMPLANTS AND GRAFTS: ICD-10-CM

## 2020-09-11 PROCEDURE — G2066 INTER DEVC REMOTE 30D: HCPCS | Mod: HCNC | Performed by: INTERNAL MEDICINE

## 2020-09-11 PROCEDURE — 93298 REM INTERROG DEV EVAL SCRMS: CPT | Mod: HCNC,,, | Performed by: INTERNAL MEDICINE

## 2020-09-11 PROCEDURE — 93298 CARDIAC DEVICE CHECK - REMOTE: ICD-10-PCS | Mod: HCNC,,, | Performed by: INTERNAL MEDICINE

## 2020-09-25 ENCOUNTER — OFFICE VISIT (OUTPATIENT)
Dept: GASTROENTEROLOGY | Facility: HOSPITAL | Age: 70
End: 2020-09-25
Attending: INTERNAL MEDICINE
Payer: MEDICARE

## 2020-09-25 ENCOUNTER — LAB VISIT (OUTPATIENT)
Dept: LAB | Facility: HOSPITAL | Age: 70
End: 2020-09-25
Attending: INTERNAL MEDICINE
Payer: MEDICARE

## 2020-09-25 VITALS
HEART RATE: 84 BPM | TEMPERATURE: 98 F | BODY MASS INDEX: 29.53 KG/M2 | DIASTOLIC BLOOD PRESSURE: 112 MMHG | WEIGHT: 177.25 LBS | SYSTOLIC BLOOD PRESSURE: 178 MMHG | OXYGEN SATURATION: 98 % | HEIGHT: 65 IN | RESPIRATION RATE: 14 BRPM

## 2020-09-25 DIAGNOSIS — R19.7 DIARRHEA, UNSPECIFIED TYPE: ICD-10-CM

## 2020-09-25 DIAGNOSIS — R11.2 NON-INTRACTABLE VOMITING WITH NAUSEA, UNSPECIFIED VOMITING TYPE: ICD-10-CM

## 2020-09-25 DIAGNOSIS — K50.919 CROHN'S DISEASE WITH COMPLICATION, UNSPECIFIED GASTROINTESTINAL TRACT LOCATION: ICD-10-CM

## 2020-09-25 DIAGNOSIS — K50.919 CROHN'S DISEASE WITH COMPLICATION, UNSPECIFIED GASTROINTESTINAL TRACT LOCATION: Primary | ICD-10-CM

## 2020-09-25 DIAGNOSIS — R07.9 CHEST PAIN, UNSPECIFIED TYPE: ICD-10-CM

## 2020-09-25 LAB
25(OH)D3+25(OH)D2 SERPL-MCNC: 17 NG/ML (ref 30–96)
ALBUMIN SERPL BCP-MCNC: 4.1 G/DL (ref 3.5–5.2)
ALP SERPL-CCNC: 75 U/L (ref 55–135)
ALT SERPL W/O P-5'-P-CCNC: 8 U/L (ref 10–44)
ANION GAP SERPL CALC-SCNC: 11 MMOL/L (ref 8–16)
AST SERPL-CCNC: 17 U/L (ref 10–40)
BASOPHILS # BLD AUTO: 0.04 K/UL (ref 0–0.2)
BASOPHILS NFR BLD: 0.5 % (ref 0–1.9)
BILIRUB SERPL-MCNC: 2 MG/DL (ref 0.1–1)
BUN SERPL-MCNC: 10 MG/DL (ref 8–23)
CALCIUM SERPL-MCNC: 9.1 MG/DL (ref 8.7–10.5)
CHLORIDE SERPL-SCNC: 103 MMOL/L (ref 95–110)
CO2 SERPL-SCNC: 26 MMOL/L (ref 23–29)
CREAT SERPL-MCNC: 0.6 MG/DL (ref 0.5–1.4)
CRP SERPL-MCNC: 10.5 MG/L (ref 0–8.2)
DIFFERENTIAL METHOD: ABNORMAL
EOSINOPHIL # BLD AUTO: 0.1 K/UL (ref 0–0.5)
EOSINOPHIL NFR BLD: 0.9 % (ref 0–8)
ERYTHROCYTE [DISTWIDTH] IN BLOOD BY AUTOMATED COUNT: 13.2 % (ref 11.5–14.5)
EST. GFR  (AFRICAN AMERICAN): >60 ML/MIN/1.73 M^2
EST. GFR  (NON AFRICAN AMERICAN): >60 ML/MIN/1.73 M^2
GLUCOSE SERPL-MCNC: 77 MG/DL (ref 70–110)
HCT VFR BLD AUTO: 41.8 % (ref 37–48.5)
HGB BLD-MCNC: 13.3 G/DL (ref 12–16)
IMM GRANULOCYTES # BLD AUTO: 0.02 K/UL (ref 0–0.04)
IMM GRANULOCYTES NFR BLD AUTO: 0.3 % (ref 0–0.5)
LYMPHOCYTES # BLD AUTO: 1.7 K/UL (ref 1–4.8)
LYMPHOCYTES NFR BLD: 22.5 % (ref 18–48)
MCH RBC QN AUTO: 27.9 PG (ref 27–31)
MCHC RBC AUTO-ENTMCNC: 31.8 G/DL (ref 32–36)
MCV RBC AUTO: 88 FL (ref 82–98)
MONOCYTES # BLD AUTO: 0.6 K/UL (ref 0.3–1)
MONOCYTES NFR BLD: 7.1 % (ref 4–15)
NEUTROPHILS # BLD AUTO: 5.3 K/UL (ref 1.8–7.7)
NEUTROPHILS NFR BLD: 68.7 % (ref 38–73)
NRBC BLD-RTO: 0 /100 WBC
PLATELET # BLD AUTO: 211 K/UL (ref 150–350)
PMV BLD AUTO: 11.2 FL (ref 9.2–12.9)
POTASSIUM SERPL-SCNC: 3.3 MMOL/L (ref 3.5–5.1)
PROT SERPL-MCNC: 7.5 G/DL (ref 6–8.4)
RBC # BLD AUTO: 4.77 M/UL (ref 4–5.4)
SODIUM SERPL-SCNC: 140 MMOL/L (ref 136–145)
TSH SERPL DL<=0.005 MIU/L-ACNC: 0.52 UIU/ML (ref 0.4–4)
VIT B12 SERPL-MCNC: 171 PG/ML (ref 210–950)
WBC # BLD AUTO: 7.7 K/UL (ref 3.9–12.7)

## 2020-09-25 PROCEDURE — 82306 VITAMIN D 25 HYDROXY: CPT | Mod: HCNC

## 2020-09-25 PROCEDURE — 3008F BODY MASS INDEX DOCD: CPT | Mod: HCNC,CPTII,, | Performed by: INTERNAL MEDICINE

## 2020-09-25 PROCEDURE — 99499 UNLISTED E&M SERVICE: CPT | Mod: HCNC,,, | Performed by: INTERNAL MEDICINE

## 2020-09-25 PROCEDURE — 86140 C-REACTIVE PROTEIN: CPT | Mod: HCNC

## 2020-09-25 PROCEDURE — 99358 PROLONG SERVICE W/O CONTACT: CPT | Mod: HCNC,,, | Performed by: INTERNAL MEDICINE

## 2020-09-25 PROCEDURE — 1101F PR PT FALLS ASSESS DOC 0-1 FALLS W/OUT INJ PAST YR: ICD-10-PCS | Mod: HCNC,CPTII,, | Performed by: INTERNAL MEDICINE

## 2020-09-25 PROCEDURE — 87340 HEPATITIS B SURFACE AG IA: CPT | Mod: HCNC

## 2020-09-25 PROCEDURE — 99204 PR OFFICE/OUTPT VISIT, NEW, LEVL IV, 45-59 MIN: ICD-10-PCS | Mod: HCNC,,, | Performed by: INTERNAL MEDICINE

## 2020-09-25 PROCEDURE — 1101F PT FALLS ASSESS-DOCD LE1/YR: CPT | Mod: HCNC,CPTII,, | Performed by: INTERNAL MEDICINE

## 2020-09-25 PROCEDURE — 85025 COMPLETE CBC W/AUTO DIFF WBC: CPT | Mod: HCNC

## 2020-09-25 PROCEDURE — 84443 ASSAY THYROID STIM HORMONE: CPT | Mod: HCNC

## 2020-09-25 PROCEDURE — 82607 VITAMIN B-12: CPT | Mod: HCNC

## 2020-09-25 PROCEDURE — 1125F AMNT PAIN NOTED PAIN PRSNT: CPT | Mod: HCNC,,, | Performed by: INTERNAL MEDICINE

## 2020-09-25 PROCEDURE — 82657 ENZYME CELL ACTIVITY: CPT | Mod: HCNC

## 2020-09-25 PROCEDURE — 86787 VARICELLA-ZOSTER ANTIBODY: CPT | Mod: HCNC

## 2020-09-25 PROCEDURE — 1159F MED LIST DOCD IN RCRD: CPT | Mod: HCNC,,, | Performed by: INTERNAL MEDICINE

## 2020-09-25 PROCEDURE — 99499 RISK ADDL DX/OHS AUDIT: ICD-10-PCS | Mod: HCNC,,, | Performed by: INTERNAL MEDICINE

## 2020-09-25 PROCEDURE — 86790 VIRUS ANTIBODY NOS: CPT | Mod: HCNC

## 2020-09-25 PROCEDURE — 1159F PR MEDICATION LIST DOCUMENTED IN MEDICAL RECORD: ICD-10-PCS | Mod: HCNC,,, | Performed by: INTERNAL MEDICINE

## 2020-09-25 PROCEDURE — 3077F PR MOST RECENT SYSTOLIC BLOOD PRESSURE >= 140 MM HG: ICD-10-PCS | Mod: HCNC,CPTII,, | Performed by: INTERNAL MEDICINE

## 2020-09-25 PROCEDURE — 99358 PR PROLONGED SERV,NO CONTACT,1ST HR: ICD-10-PCS | Mod: HCNC,,, | Performed by: INTERNAL MEDICINE

## 2020-09-25 PROCEDURE — 86704 HEP B CORE ANTIBODY TOTAL: CPT | Mod: HCNC

## 2020-09-25 PROCEDURE — 99204 OFFICE O/P NEW MOD 45 MIN: CPT | Mod: HCNC,,, | Performed by: INTERNAL MEDICINE

## 2020-09-25 PROCEDURE — 3080F PR MOST RECENT DIASTOLIC BLOOD PRESSURE >= 90 MM HG: ICD-10-PCS | Mod: HCNC,CPTII,, | Performed by: INTERNAL MEDICINE

## 2020-09-25 PROCEDURE — 86703 HIV-1/HIV-2 1 RESULT ANTBDY: CPT | Mod: HCNC

## 2020-09-25 PROCEDURE — 86803 HEPATITIS C AB TEST: CPT | Mod: HCNC

## 2020-09-25 PROCEDURE — 3077F SYST BP >= 140 MM HG: CPT | Mod: HCNC,CPTII,, | Performed by: INTERNAL MEDICINE

## 2020-09-25 PROCEDURE — 3080F DIAST BP >= 90 MM HG: CPT | Mod: HCNC,CPTII,, | Performed by: INTERNAL MEDICINE

## 2020-09-25 PROCEDURE — 86706 HEP B SURFACE ANTIBODY: CPT | Mod: HCNC

## 2020-09-25 PROCEDURE — 3008F PR BODY MASS INDEX (BMI) DOCUMENTED: ICD-10-PCS | Mod: HCNC,CPTII,, | Performed by: INTERNAL MEDICINE

## 2020-09-25 PROCEDURE — 1125F PR PAIN SEVERITY QUANTIFIED, PAIN PRESENT: ICD-10-PCS | Mod: HCNC,,, | Performed by: INTERNAL MEDICINE

## 2020-09-25 PROCEDURE — 80053 COMPREHEN METABOLIC PANEL: CPT | Mod: HCNC

## 2020-09-25 NOTE — PROGRESS NOTES
[facility]          Inflammatory Bowel Disease New Patient Consultation Note         TODAY'S VISIT DATE:  9/25/2020    Reason for Consult:    Chief Complaint   Patient presents with    Crohn's Disease       PCP: Neeru Hinkle      Referring MD:   No ref. provider found    History of Present Illness:  Celine Sinclair who is a 69 y.o. female is being seen today at the [facility] on 09/25/2020 for inflammatory bowel disease- Crohn's disease.  She has a baseline level of diarrhea that consisted of about 3-4 applesauce consistency bowel movements per day without abdominal pain, nausea, or vomiting.  About a week ago she began having more watery diarrhea.  She is still only having about 3 bowel movements a day.  She had 1 episode of emesis 4 days ago and another the following day.  She has not had any emesis for the last 3 days.  She is tolerating p.o..  She denies any significant weight loss.  She denies any fevers or chills.  She denies any sick contacts.  She does report having antibiotics a few weeks ago because of a urinary tract infection.  She also reports intermittent issues with pleuritic type chest pain it has been going on for several weeks.    IBD History:  Diagnosed with Crohn's disease in the 1990s.  The details of her diagnosis in history are unclear and records from back then are not available.  She thinks she was diagnosed around 1990 or possibly even in the late 1980s.  She was having severe abdominal pain, body pains, nausea, vomiting, and diarrhea.  She was diagnosed with Crohn's disease and reports undergoing surgery for a bowel obstruction shortly after the initial diagnosis.  After surgery she was treated with Remicade for an un clear amount of time.  She was not having any symptoms at the time so she thinks the Remicade may have worked.  She does not recall why it was stopped.  In 2002 she underwent a hysterectomy that was  complicated by an enterotomy that was repaired during surgery.  Lysis of extensive adhesions led to multiple enterotomies that required repair.  During that hospitalization she developed an abscess because of a leak from 1 of the enterotomies.  She eventually had an ileostomy that was subsequently taken down about 6 months later.  She has had chronic diarrhea since the time of her initial surgery.  She reports having about 3-4 bowel movements every day that are the consistency of applesauce.  She typically only has bowel movements after meals.  She denies any baseline abdominal pain, nausea, or vomiting.  She denies any recent weight loss.  She had a capsule endoscopy in 2013 that was normal.  A colonoscopy from March of 2016 revealed an ileocolonic anastomosis but no evidence of inflammatory disease.  She had an EGD and colonoscopy in December of 2017 but I do not have the reports from these.    IBD Details:  Dx Date:  Early 1990s  Disease type/distribution:  Crohn's disease/unclear distribution  Current Treatment:  None  Start Date:  Response:   Optimized:   Adverse reactions:   Prior surgeries:  Ileocolonic resection  CRP Elevation:  Unknown  Disease Complications:  Stricture formation requiring surgery  Extraintestinal manifestations:  Possible joint pain  Prior treatments:   Steroids:  Unclear  5ASA:  Unclear  IMM:  None  TNF Inh:  Treated with Remicade in the past, unclear response   Anti-Integrin:  None   IL 12/23:  None  IJEOMA Inh:  None    Previous Clinical Trials:  None    Last Colonoscopy:  2017-report not available    Other Endoscopies:  Colonoscopy in 2016 with an ileocolonic anastomosis, no active inflammation.  Capsule endoscopy from 2013 with no small-bowel inflammation    Imaging:   MRE:  None   CT:  No recent CT   Other:  None    Pertinent Labs:  Lab Results   Component Value Date    SEDRATE 8 06/18/2020    CRP 7.4 06/18/2020     No results found for: TTGIGA, IGA  Lab Results   Component Value Date     TSH 1.681 06/18/2020    FREET4 0.85 06/18/2020     No results found for: BTWVPJLC86NC, CUKUVWIT54  No results found for: HEPBSAG, HEPBCAB, HEPCAB  No results found for: UZW25DBAV  No results found for: NIL, TBAG, TBAGNIL, MITOGENNIL, TBGOLD, TSPOTSCREN  No results found for: TPTMINTERP, TPMTRESULT  No results found for: STOOLCULTURE, XRMOBVVEEP4G, WJRRZEOWOY3O, CDIFFICILEAN, CDIFFTOX, CDIFFICILEBY  No results found for: CALPROTECTIN    Therapeutic Drug Monitoring Labs:  No results found for: PROMETH  No results found for: ANSADAINIT, INFLIXIMAB, INFLIXINTERP    Vaccinations:  No results found for: HEPBSAB  No results found for: HEPAIGG  No results found for: VARICELLAZOS, VARICELLAINT  Immunization History   Administered Date(s) Administered    Pneumococcal Conjugate - 13 Valent 10/03/2019         Review of Systems  Review of Systems   Constitutional: Negative for chills, fever and weight loss.   Eyes: Negative for pain and redness.   Respiratory: Positive for shortness of breath. Negative for cough.    Cardiovascular: Negative for chest pain.   Gastrointestinal: Positive for abdominal pain, nausea and vomiting. Negative for heartburn.   Genitourinary: Negative for dysuria and hematuria.   Musculoskeletal: Positive for back pain.   Skin: Negative for rash.   Psychiatric/Behavioral: Negative for depression. The patient is nervous/anxious.        Medical History:   Past Medical History:   Diagnosis Date    Aortic atherosclerosis     Benign essential hypertension     Cataract     Senile    Crohn's colitis     Depression, major, recurrent, moderate     Fibromyalgia     GERD (gastroesophageal reflux disease)     Hypertensive cardiomyopathy     IBS (irritable bowel syndrome)     Migraine     Opioid abuse, in remission     Osteopenia     Paget disease of bone     Port-A-Cath in place     right chest    Prolonged QT interval     RA (rheumatoid arthritis)     Sedative hypnotic or anxiolytic dependence      in remission        Surgical History:  Past Surgical History:   Procedure Laterality Date    CATARACT EXTRACTION W/  INTRAOCULAR LENS IMPLANT Left 02/21/2017    Dr. Christianson    CATARACT EXTRACTION W/  INTRAOCULAR LENS IMPLANT Right 03/07/2017    Dr. Christianson    CHOLECYSTECTOMY      COLON SURGERY      COLONOSCOPY N/A 3/16/2016    Procedure: COLONOSCOPY;  Surgeon: Dale Trejo MD;  Location: [facility] ENDO (4TH FLR);  Service: Endoscopy;  Laterality: N/A;    Colostomy reversal      HEMORRHOID SURGERY      HYSTERECTOMY      ILEOSTOMY      ILEOSTOMY CLOSURE      LEFT HEART CATHETERIZATION N/A 2/15/2019    Procedure: HEART CATH-LEFT;  Surgeon: Miky Keita MD;  Location: [facility] CATH LAB;  Service: Cardiology;  Laterality: N/A;    NECK SURGERY      OOPHORECTOMY Bilateral     SBO      SMALL INTESTINE SURGERY      TONSILLECTOMY         Family History:   Family History   Problem Relation Age of Onset    Glaucoma Paternal Grandmother     Other Daughter         Diabetic Retinopathy    Breast cancer Daughter 40    Retinal detachment Grandchild     Colon cancer Maternal Grandmother     Heart attack Maternal Grandmother     Cancer Mother         Unknown type    Emphysema Father 67    Heart disease Father     Lung cancer Sister     Heart attack Sister     Breast cancer Daughter 47    Breast cancer Sister     Amblyopia Neg Hx     Blindness Neg Hx     Strabismus Neg Hx     Macular degeneration Neg Hx     Cataracts Neg Hx        Social History:   Social History     Tobacco Use    Smoking status: Never Smoker    Smokeless tobacco: Never Used   Substance Use Topics    Alcohol use: No    Drug use: No       Allergies: Reviewed    Home Medications:   Medication List with Changes/Refills   Current Medications    ACETAMINOPHEN (TYLENOL) 500 MG TABLET    Take 500 mg by mouth daily as needed.     ALBUTEROL SULFATE (VENTOLIN INHL)    Inhale 1 puff into the lungs every 4 to 6 hours as needed.      "AMIODARONE (PACERONE) 200 MG TAB    Take 200 mg by mouth 2 (two) times daily.    AMLODIPINE (NORVASC) 5 MG TABLET    Take 1 tablet (5 mg total) by mouth once daily.    APIXABAN (ELIQUIS) 5 MG TAB    Take 1 tablet (5 mg total) by mouth 2 (two) times daily.    DIPHENOXYLATE-ATROPINE 2.5-0.025 MG (LOMOTIL) 2.5-0.025 MG PER TABLET    Take 1 tablet by mouth 4 (four) times daily as needed for Diarrhea. Takes 2 tablets twice a day when needed    DULOXETINE (CYMBALTA) 30 MG CAPSULE    Take 30 mg by mouth once daily.     INCONTINENCE PAD,LINER,DISP (BLADDER CONTROL PADS EX ABSORB MISC)        ISOSORBIDE MONONITRATE (IMDUR) 30 MG 24 HR TABLET    Take 1 tablet (30 mg total) by mouth once daily.    LOPERAMIDE (IMODIUM) 2 MG CAPSULE    Take 2 mg by mouth 4 (four) times daily as needed for Diarrhea.    LOSARTAN (COZAAR) 50 MG TABLET    Take 1 tablet (50 mg total) by mouth once daily.    METOPROLOL SUCCINATE (TOPROL-XL) 50 MG 24 HR TABLET    Take 1.5 tablets (75mg) daily.    POTASSIUM CHLORIDE SA (K-DUR,KLOR-CON) 20 MEQ TABLET    TK 1 T PO  ONCE D    TRAMADOL (ULTRAM) 50 MG TABLET    Take 1 tablet (50 mg total) by mouth every 8 (eight) hours as needed for Pain.    TRAZODONE (DESYREL) 50 MG TABLET    Take 1 tablet (50 mg total) by mouth nightly as needed.       Physical Exam:  Vital Signs:  BP (!) 178/112 (BP Location: Left arm, Patient Position: Sitting)   Pulse 84   Temp 97.9 °F (36.6 °C)   Resp 14   Ht 5' 5" (1.651 m)   Wt 80.4 kg (177 lb 4 oz)   SpO2 98%   BMI 29.50 kg/m²   Body mass index is 29.5 kg/m².    Physical Exam   Constitutional: She is oriented to person, place, and time. She appears well-developed and well-nourished.   HENT:   Mouth/Throat: Oropharynx is clear and moist.   Eyes: Pupils are equal, round, and reactive to light. Conjunctivae are normal.   Neck: No thyromegaly present.   Cardiovascular: Normal rate, regular rhythm and normal heart sounds.   No murmur heard.  Pulmonary/Chest: Effort normal and " breath sounds normal. She has no wheezes. She has no rales.   Abdominal: Soft. Bowel sounds are normal. She exhibits no distension and no mass. There is no hepatosplenomegaly. There is generalized abdominal tenderness. There is no rigidity, no rebound and no guarding.   Musculoskeletal: Normal range of motion.         General: No tenderness or edema.   Lymphadenopathy:     She has no cervical adenopathy.   Neurological: She is alert and oriented to person, place, and time.   Skin: No rash noted. No erythema.   Psychiatric: She has a normal mood and affect. Her behavior is normal.       Labs: reviewed and pertinent noted above    Assessment/Plan:  Celine Sinclair is a 69 y.o. female with a history of Crohn's disease status post surgical resection many years ago who has not had any clear evidence of active disease but now having 1 week of worsening diarrhea, abdominal pain, nausea, and vomiting. The following issues were addresssed:    1. Crohn's disease with complication, unspecified gastrointestinal tract location    2. Diarrhea, unspecified type    3. Non-intractable vomiting with nausea, unspecified vomiting type    4. Chest pain, unspecified type      1.  Diarrhea:  She has a baseline level of loose bowel movements but over the last week has had significantly worsening watery stools.  Her the stool frequency is unchanged.  She denies any blood in the stools.  She was initially having more abdominal pain and had a few episodes of vomiting.  On exam there is not any suggestion of a bowel obstruction and she is tolerating p.o. without difficulty.  She did recently have antibiotics.  She denies any sick contacts but does care for her 9-year-old granddaughter who is in school.  The acute nature of the diarrhea suggest an infectious etiology.  Today I recommend that we check stool studies including stool cultures, C diff.  We will also plan to assess for inflammatory causes of diarrhea such as Crohn's disease given her  history.  If the stool studies are unrevealing we will plan to schedule colonoscopy in the near future.    2.  Crohn's disease:  She has a remote history of Crohn's disease status post surgical resection.  Her most recent endoscopic evaluation that is available has not demonstrated any evidence of active disease.  I have requested her colonoscopy report from 2017.  We will likely need to proceed with a colonoscopy and some abdominal imaging in the near future but I would like to address the acute diarrhea first.    3.  Chest pain:  She has been having intermittent pleuritic type chest pain for several weeks.  She is planning on contacting her primary care provider and cardiologist to discuss this.     # IBD specific health maintenance:  Colon cancer surveillance:  Up-to-date    Annual:  - Eye exam:  Not applicable  - Skin exam (if on IMM/TNF):  Not applicable  - reminded pt to use sunblock/hats/sunprotective clothing  - PAP (if immunosuppressed):  Status post hysterectomy    DEXA:  Done previous    Vitamin D:  Ordered today    Vaccines:    Influenza:  Will order the future   Pneumovax:     PCV13:  Completed October 2019    PSV23:  Not done   HAV:  Check serology   HBV:  Check serology   Tdap:  Review in the future   MMR:  Review in the future   VZV:  Check serology   HZV:  Received 1st dose at some point   HPV:  Not applicable   Meningococcus:  Not applicable    Follow up: Follow up based on the above evaluation.      Thank you again for sending Celine Sinclair to see Dr. Ziyad Urena today at the [facility]. Please don't hesitate to contact Dr. Urena if there are any questions regarding this evaluation, or if you have any other patients with inflammatory bowel disease for whom you would like a consultation. You can reach Dr. Urena at [phone] or by email at [email or web address]    Cali Urena MD  Department of Gastroenterology  Inflammatory Bowel  Disease          Answers for HPI/ROS submitted by the patient on 9/25/2020   mouth sores: No  trouble swallowing: No  Joint pain? : Yes

## 2020-09-25 NOTE — H&P (VIEW-ONLY)
[facility]          Inflammatory Bowel Disease New Patient Consultation Note         TODAY'S VISIT DATE:  9/25/2020    Reason for Consult:    Chief Complaint   Patient presents with    Crohn's Disease       PCP: Neeru Hinkle      Referring MD:   No ref. provider found    History of Present Illness:  Celine Sinclair who is a 69 y.o. female is being seen today at the [facility] on 09/25/2020 for inflammatory bowel disease- Crohn's disease.  She has a baseline level of diarrhea that consisted of about 3-4 applesauce consistency bowel movements per day without abdominal pain, nausea, or vomiting.  About a week ago she began having more watery diarrhea.  She is still only having about 3 bowel movements a day.  She had 1 episode of emesis 4 days ago and another the following day.  She has not had any emesis for the last 3 days.  She is tolerating p.o..  She denies any significant weight loss.  She denies any fevers or chills.  She denies any sick contacts.  She does report having antibiotics a few weeks ago because of a urinary tract infection.  She also reports intermittent issues with pleuritic type chest pain it has been going on for several weeks.    IBD History:  Diagnosed with Crohn's disease in the 1990s.  The details of her diagnosis in history are unclear and records from back then are not available.  She thinks she was diagnosed around 1990 or possibly even in the late 1980s.  She was having severe abdominal pain, body pains, nausea, vomiting, and diarrhea.  She was diagnosed with Crohn's disease and reports undergoing surgery for a bowel obstruction shortly after the initial diagnosis.  After surgery she was treated with Remicade for an un clear amount of time.  She was not having any symptoms at the time so she thinks the Remicade may have worked.  She does not recall why it was stopped.  In 2002 she underwent a hysterectomy that was  complicated by an enterotomy that was repaired during surgery.  Lysis of extensive adhesions led to multiple enterotomies that required repair.  During that hospitalization she developed an abscess because of a leak from 1 of the enterotomies.  She eventually had an ileostomy that was subsequently taken down about 6 months later.  She has had chronic diarrhea since the time of her initial surgery.  She reports having about 3-4 bowel movements every day that are the consistency of applesauce.  She typically only has bowel movements after meals.  She denies any baseline abdominal pain, nausea, or vomiting.  She denies any recent weight loss.  She had a capsule endoscopy in 2013 that was normal.  A colonoscopy from March of 2016 revealed an ileocolonic anastomosis but no evidence of inflammatory disease.  She had an EGD and colonoscopy in December of 2017 but I do not have the reports from these.    IBD Details:  Dx Date:  Early 1990s  Disease type/distribution:  Crohn's disease/unclear distribution  Current Treatment:  None  Start Date:  Response:   Optimized:   Adverse reactions:   Prior surgeries:  Ileocolonic resection  CRP Elevation:  Unknown  Disease Complications:  Stricture formation requiring surgery  Extraintestinal manifestations:  Possible joint pain  Prior treatments:   Steroids:  Unclear  5ASA:  Unclear  IMM:  None  TNF Inh:  Treated with Remicade in the past, unclear response   Anti-Integrin:  None   IL 12/23:  None  IJEOMA Inh:  None    Previous Clinical Trials:  None    Last Colonoscopy:  2017-report not available    Other Endoscopies:  Colonoscopy in 2016 with an ileocolonic anastomosis, no active inflammation.  Capsule endoscopy from 2013 with no small-bowel inflammation    Imaging:   MRE:  None   CT:  No recent CT   Other:  None    Pertinent Labs:  Lab Results   Component Value Date    SEDRATE 8 06/18/2020    CRP 7.4 06/18/2020     No results found for: TTGIGA, IGA  Lab Results   Component Value Date     TSH 1.681 06/18/2020    FREET4 0.85 06/18/2020     No results found for: KAFEGBNC12LQ, CFVQVRQI66  No results found for: HEPBSAG, HEPBCAB, HEPCAB  No results found for: MKG51EJRG  No results found for: NIL, TBAG, TBAGNIL, MITOGENNIL, TBGOLD, TSPOTSCREN  No results found for: TPTMINTERP, TPMTRESULT  No results found for: STOOLCULTURE, NIMZAYGMXG5U, DUIGJNTIVY3R, CDIFFICILEAN, CDIFFTOX, CDIFFICILEBY  No results found for: CALPROTECTIN    Therapeutic Drug Monitoring Labs:  No results found for: PROMETH  No results found for: ANSADAINIT, INFLIXIMAB, INFLIXINTERP    Vaccinations:  No results found for: HEPBSAB  No results found for: HEPAIGG  No results found for: VARICELLAZOS, VARICELLAINT  Immunization History   Administered Date(s) Administered    Pneumococcal Conjugate - 13 Valent 10/03/2019         Review of Systems  Review of Systems   Constitutional: Negative for chills, fever and weight loss.   Eyes: Negative for pain and redness.   Respiratory: Positive for shortness of breath. Negative for cough.    Cardiovascular: Negative for chest pain.   Gastrointestinal: Positive for abdominal pain, nausea and vomiting. Negative for heartburn.   Genitourinary: Negative for dysuria and hematuria.   Musculoskeletal: Positive for back pain.   Skin: Negative for rash.   Psychiatric/Behavioral: Negative for depression. The patient is nervous/anxious.        Medical History:   Past Medical History:   Diagnosis Date    Aortic atherosclerosis     Benign essential hypertension     Cataract     Senile    Crohn's colitis     Depression, major, recurrent, moderate     Fibromyalgia     GERD (gastroesophageal reflux disease)     Hypertensive cardiomyopathy     IBS (irritable bowel syndrome)     Migraine     Opioid abuse, in remission     Osteopenia     Paget disease of bone     Port-A-Cath in place     right chest    Prolonged QT interval     RA (rheumatoid arthritis)     Sedative hypnotic or anxiolytic dependence      in remission        Surgical History:  Past Surgical History:   Procedure Laterality Date    CATARACT EXTRACTION W/  INTRAOCULAR LENS IMPLANT Left 02/21/2017    Dr. Christianson    CATARACT EXTRACTION W/  INTRAOCULAR LENS IMPLANT Right 03/07/2017    Dr. Christianson    CHOLECYSTECTOMY      COLON SURGERY      COLONOSCOPY N/A 3/16/2016    Procedure: COLONOSCOPY;  Surgeon: Dale Trejo MD;  Location: [facility] ENDO (4TH FLR);  Service: Endoscopy;  Laterality: N/A;    Colostomy reversal      HEMORRHOID SURGERY      HYSTERECTOMY      ILEOSTOMY      ILEOSTOMY CLOSURE      LEFT HEART CATHETERIZATION N/A 2/15/2019    Procedure: HEART CATH-LEFT;  Surgeon: Miky Keita MD;  Location: [facility] [place] CATH LAB;  Service: Cardiology;  Laterality: N/A;    NECK SURGERY      OOPHORECTOMY Bilateral     SBO      SMALL INTESTINE SURGERY      TONSILLECTOMY         Family History:   Family History   Problem Relation Age of Onset    Glaucoma Paternal Grandmother     Other Daughter         Diabetic Retinopathy    Breast cancer Daughter 40    Retinal detachment Grandchild     Colon cancer Maternal Grandmother     Heart attack Maternal Grandmother     Cancer Mother         Unknown type    Emphysema Father 67    Heart disease Father     Lung cancer Sister     Heart attack Sister     Breast cancer Daughter 47    Breast cancer Sister     Amblyopia Neg Hx     Blindness Neg Hx     Strabismus Neg Hx     Macular degeneration Neg Hx     Cataracts Neg Hx        Social History:   Social History     Tobacco Use    Smoking status: Never Smoker    Smokeless tobacco: Never Used   Substance Use Topics    Alcohol use: No    Drug use: No       Allergies: Reviewed    Home Medications:   Medication List with Changes/Refills   Current Medications    ACETAMINOPHEN (TYLENOL) 500 MG TABLET    Take 500 mg by mouth daily as needed.     ALBUTEROL SULFATE (VENTOLIN INHL)    Inhale 1 puff into the lungs every 4 to 6 hours as needed.      "AMIODARONE (PACERONE) 200 MG TAB    Take 200 mg by mouth 2 (two) times daily.    AMLODIPINE (NORVASC) 5 MG TABLET    Take 1 tablet (5 mg total) by mouth once daily.    APIXABAN (ELIQUIS) 5 MG TAB    Take 1 tablet (5 mg total) by mouth 2 (two) times daily.    DIPHENOXYLATE-ATROPINE 2.5-0.025 MG (LOMOTIL) 2.5-0.025 MG PER TABLET    Take 1 tablet by mouth 4 (four) times daily as needed for Diarrhea. Takes 2 tablets twice a day when needed    DULOXETINE (CYMBALTA) 30 MG CAPSULE    Take 30 mg by mouth once daily.     INCONTINENCE PAD,LINER,DISP (BLADDER CONTROL PADS EX ABSORB MISC)        ISOSORBIDE MONONITRATE (IMDUR) 30 MG 24 HR TABLET    Take 1 tablet (30 mg total) by mouth once daily.    LOPERAMIDE (IMODIUM) 2 MG CAPSULE    Take 2 mg by mouth 4 (four) times daily as needed for Diarrhea.    LOSARTAN (COZAAR) 50 MG TABLET    Take 1 tablet (50 mg total) by mouth once daily.    METOPROLOL SUCCINATE (TOPROL-XL) 50 MG 24 HR TABLET    Take 1.5 tablets (75mg) daily.    POTASSIUM CHLORIDE SA (K-DUR,KLOR-CON) 20 MEQ TABLET    TK 1 T PO  ONCE D    TRAMADOL (ULTRAM) 50 MG TABLET    Take 1 tablet (50 mg total) by mouth every 8 (eight) hours as needed for Pain.    TRAZODONE (DESYREL) 50 MG TABLET    Take 1 tablet (50 mg total) by mouth nightly as needed.       Physical Exam:  Vital Signs:  BP (!) 178/112 (BP Location: Left arm, Patient Position: Sitting)   Pulse 84   Temp 97.9 °F (36.6 °C)   Resp 14   Ht 5' 5" (1.651 m)   Wt 80.4 kg (177 lb 4 oz)   SpO2 98%   BMI 29.50 kg/m²   Body mass index is 29.5 kg/m².    Physical Exam   Constitutional: She is oriented to person, place, and time. She appears well-developed and well-nourished.   HENT:   Mouth/Throat: Oropharynx is clear and moist.   Eyes: Pupils are equal, round, and reactive to light. Conjunctivae are normal.   Neck: No thyromegaly present.   Cardiovascular: Normal rate, regular rhythm and normal heart sounds.   No murmur heard.  Pulmonary/Chest: Effort normal and " breath sounds normal. She has no wheezes. She has no rales.   Abdominal: Soft. Bowel sounds are normal. She exhibits no distension and no mass. There is no hepatosplenomegaly. There is generalized abdominal tenderness. There is no rigidity, no rebound and no guarding.   Musculoskeletal: Normal range of motion.         General: No tenderness or edema.   Lymphadenopathy:     She has no cervical adenopathy.   Neurological: She is alert and oriented to person, place, and time.   Skin: No rash noted. No erythema.   Psychiatric: She has a normal mood and affect. Her behavior is normal.       Labs: reviewed and pertinent noted above    Assessment/Plan:  Celine Sinclair is a 69 y.o. female with a history of Crohn's disease status post surgical resection many years ago who has not had any clear evidence of active disease but now having 1 week of worsening diarrhea, abdominal pain, nausea, and vomiting. The following issues were addresssed:    1. Crohn's disease with complication, unspecified gastrointestinal tract location    2. Diarrhea, unspecified type    3. Non-intractable vomiting with nausea, unspecified vomiting type    4. Chest pain, unspecified type      1.  Diarrhea:  She has a baseline level of loose bowel movements but over the last week has had significantly worsening watery stools.  Her the stool frequency is unchanged.  She denies any blood in the stools.  She was initially having more abdominal pain and had a few episodes of vomiting.  On exam there is not any suggestion of a bowel obstruction and she is tolerating p.o. without difficulty.  She did recently have antibiotics.  She denies any sick contacts but does care for her 9-year-old granddaughter who is in school.  The acute nature of the diarrhea suggest an infectious etiology.  Today I recommend that we check stool studies including stool cultures, C diff.  We will also plan to assess for inflammatory causes of diarrhea such as Crohn's disease given her  history.  If the stool studies are unrevealing we will plan to schedule colonoscopy in the near future.    2.  Crohn's disease:  She has a remote history of Crohn's disease status post surgical resection.  Her most recent endoscopic evaluation that is available has not demonstrated any evidence of active disease.  I have requested her colonoscopy report from 2017.  We will likely need to proceed with a colonoscopy and some abdominal imaging in the near future but I would like to address the acute diarrhea first.    3.  Chest pain:  She has been having intermittent pleuritic type chest pain for several weeks.  She is planning on contacting her primary care provider and cardiologist to discuss this.     # IBD specific health maintenance:  Colon cancer surveillance:  Up-to-date    Annual:  - Eye exam:  Not applicable  - Skin exam (if on IMM/TNF):  Not applicable  - reminded pt to use sunblock/hats/sunprotective clothing  - PAP (if immunosuppressed):  Status post hysterectomy    DEXA:  Done previous    Vitamin D:  Ordered today    Vaccines:    Influenza:  Will order the future   Pneumovax:     PCV13:  Completed October 2019    PSV23:  Not done   HAV:  Check serology   HBV:  Check serology   Tdap:  Review in the future   MMR:  Review in the future   VZV:  Check serology   HZV:  Received 1st dose at some point   HPV:  Not applicable   Meningococcus:  Not applicable    Follow up: Follow up based on the above evaluation.      Thank you again for sending Celine Sinclair to see Dr. Ziyad Urena today at the [facility]. Please don't hesitate to contact Dr. Urena if there are any questions regarding this evaluation, or if you have any other patients with inflammatory bowel disease for whom you would like a consultation. You can reach Dr. Urena at [phone] or by email at [email or web address]    Cali Urena MD  Department of Gastroenterology  Inflammatory Bowel  Disease          Answers for HPI/ROS submitted by the patient on 9/25/2020   mouth sores: No  trouble swallowing: No  Joint pain? : Yes

## 2020-09-25 NOTE — PROGRESS NOTES
I spent 45 minutes on 9/24/20 reviewing Celine Sinclair medical records prior to clinic visit on 9/25/20.

## 2020-09-28 ENCOUNTER — TELEPHONE (OUTPATIENT)
Dept: CARDIOLOGY | Facility: CLINIC | Age: 70
End: 2020-09-28

## 2020-09-28 ENCOUNTER — LAB VISIT (OUTPATIENT)
Dept: LAB | Facility: HOSPITAL | Age: 70
End: 2020-09-28
Attending: INTERNAL MEDICINE
Payer: MEDICARE

## 2020-09-28 DIAGNOSIS — R19.7 DIARRHEA, UNSPECIFIED TYPE: ICD-10-CM

## 2020-09-28 DIAGNOSIS — K50.919 CROHN'S DISEASE WITH COMPLICATION, UNSPECIFIED GASTROINTESTINAL TRACT LOCATION: ICD-10-CM

## 2020-09-28 DIAGNOSIS — I48.0 PAROXYSMAL ATRIAL FIBRILLATION: Primary | ICD-10-CM

## 2020-09-28 DIAGNOSIS — D68.61 ANTIPHOSPHOLIPID SYNDROME: ICD-10-CM

## 2020-09-28 LAB
C DIFF GDH STL QL: NEGATIVE
C DIFF TOX A+B STL QL IA: NEGATIVE
HBV CORE AB SERPL QL IA: NEGATIVE
HBV SURFACE AB SER-ACNC: NEGATIVE M[IU]/ML
HBV SURFACE AG SERPL QL IA: NEGATIVE
HCV AB SERPL QL IA: NEGATIVE
HEPATITIS A ANTIBODY, IGG: POSITIVE
HIV 1+2 AB+HIV1 P24 AG SERPL QL IA: NEGATIVE
VARICELLA INTERPRETATION: NEGATIVE
VARICELLA ZOSTER IGG: 0.76 ISR (ref 0–0.9)

## 2020-09-28 PROCEDURE — 87507 IADNA-DNA/RNA PROBE TQ 12-25: CPT | Mod: HCNC

## 2020-09-28 PROCEDURE — 83993 ASSAY FOR CALPROTECTIN FECAL: CPT | Mod: HCNC

## 2020-09-28 PROCEDURE — 87449 NOS EACH ORGANISM AG IA: CPT | Mod: HCNC

## 2020-09-28 PROCEDURE — 87324 CLOSTRIDIUM AG IA: CPT | Mod: HCNC,59

## 2020-09-28 NOTE — TELEPHONE ENCOUNTER
----- Message from Chula Rush sent at 9/28/2020  2:07 PM CDT -----  Regarding: coumadin  Contact: Shania from Dr. Christian Ramos Office  Shania calling to find out if Dr. Mendoza will prescribe coumadin for pt. Please call Shania @ [phone].  Lv 7/27/20 Dr. Mendoza.Thank you.

## 2020-09-28 NOTE — TELEPHONE ENCOUNTER
9/24- Shania calling back states left message for Jaqui. Per rheumatology pt diagnosed with antiphospholipid syndrome and Dr. Ramos  coumadin would work better for this patient for this case. They would like Dr. Mendoza to order and monitor this. Routed to Dr. Mendoza for review.

## 2020-09-30 PROBLEM — D68.61 ANTIPHOSPHOLIPID SYNDROME: Status: ACTIVE | Noted: 2020-09-30

## 2020-10-01 ENCOUNTER — TELEPHONE (OUTPATIENT)
Dept: GASTROENTEROLOGY | Facility: HOSPITAL | Age: 70
End: 2020-10-01

## 2020-10-01 ENCOUNTER — ANTI-COAG VISIT (OUTPATIENT)
Dept: CARDIOLOGY | Facility: CLINIC | Age: 70
End: 2020-10-01

## 2020-10-01 DIAGNOSIS — Z79.01 LONG TERM (CURRENT) USE OF ANTICOAGULANTS: Primary | ICD-10-CM

## 2020-10-01 DIAGNOSIS — I48.0 PAROXYSMAL ATRIAL FIBRILLATION: ICD-10-CM

## 2020-10-01 DIAGNOSIS — D68.61 ANTIPHOSPHOLIPID SYNDROME: ICD-10-CM

## 2020-10-01 LAB
6-METHYLMERCAPTOPURINE RIBOSIDE: 5.05 NMOL/ML/H (ref 5.04–9.57)
6-METHYLMERCAPTOPURINE: 1.92 NMOL/ML/H (ref 3–6.66)
6-METHYLTHIOGUANINE RIBOSIDE: 3.87 NMOL/ML/H (ref 2.7–5.84)
TPMT INTERPRETATION: ABNORMAL
TPMT REVIEWED BY: ABNORMAL

## 2020-10-01 RX ORDER — WARFARIN SODIUM 5 MG/1
5 TABLET ORAL DAILY
Qty: 30 TABLET | Refills: 11 | Status: SHIPPED | OUTPATIENT
Start: 2020-10-01 | End: 2021-06-09 | Stop reason: DRUGHIGH

## 2020-10-01 NOTE — TELEPHONE ENCOUNTER
Pt verbalized understanding of labs looking ok except CRP, inflammatory marker, is elevated and that we are still waiting on the stool test results to come back

## 2020-10-01 NOTE — PROGRESS NOTES
Spoke with [place] Dottie. Patient education provided regarding when to call the coumadin clinic as well as diet while taking coumadin. Patient will avoid greens but does has occasional wine at dinner. I advised her on the importance of maintaining consistency as well as risks associated with use of alcohol while on anticoag therapy. She is currently transitioning from Eliquis to warfarin and will overlap X 3 days. First INR on 10/5 at [facility]. Patient verbalized understanding of information provided today.

## 2020-10-01 NOTE — TELEPHONE ENCOUNTER
"----- Message from Cali Urena MD sent at 10/1/2020 12:13 PM CDT -----  Let her know that the labs look pretty good other than the elevated CRP. I"m still waiting on the stool tests to come back.  "

## 2020-10-01 NOTE — PROGRESS NOTES
68 y/o female previously on OAC with DOAC, now being transitioned to warfarin 2/2 APS. Other PMH includes HTN, cardiomyopathy, atrial fibrillation, RA, Crohn's disease.     Will overlap warfarin and apixaban x3 days and will assess INR 10/5.

## 2020-10-01 NOTE — PROGRESS NOTES
I was contacted by an [facility] provider regarding diagnosis of anti-phospholipid syndrome and request to change from DOAC to warfarin. Warfarin dose started at 5mg daily. Referred to anticoagulation clinic for management.

## 2020-10-02 LAB
GPP - ADENOVIRUS 40/41: NOT DETECTED
GPP - CAMPYLOBACTER: NOT DETECTED
GPP - CLOSTRIDIUM DIFFICILE TOXIN A/B: NOT DETECTED
GPP - CRYPTOSPORIDIUM: NOT DETECTED
GPP - E COLI O157: NOT DETECTED
GPP - ENTAMOEBA HISTOLYTICA: NOT DETECTED
GPP - ENTEROTOXIGENIC E COLI (ETEC): NOT DETECTED
GPP - GIARDIA LAMBLIA: NOT DETECTED
GPP - NOROVIRUS GI/GII: NOT DETECTED
GPP - ROTAVIRUS A: NOT DETECTED
GPP - SALMONELLA: NOT DETECTED
GPP - SHIGELLA: NOT DETECTED
GPP - VIBRIO CHOLERA: NOT DETECTED
GPP - YERSINIA ENTEROCOLITICA: NOT DETECTED
LACTATE PLASV-SCNC: NOT DETECTED MMOL/L

## 2020-10-05 ENCOUNTER — TELEPHONE (OUTPATIENT)
Dept: GASTROENTEROLOGY | Facility: HOSPITAL | Age: 70
End: 2020-10-05

## 2020-10-05 ENCOUNTER — ANTI-COAG VISIT (OUTPATIENT)
Dept: CARDIOLOGY | Facility: CLINIC | Age: 70
End: 2020-10-05
Payer: MEDICARE

## 2020-10-05 ENCOUNTER — TELEPHONE (OUTPATIENT)
Dept: ENDOSCOPY | Facility: HOSPITAL | Age: 70
End: 2020-10-05

## 2020-10-05 DIAGNOSIS — I48.0 PAROXYSMAL ATRIAL FIBRILLATION: ICD-10-CM

## 2020-10-05 DIAGNOSIS — Z12.11 SPECIAL SCREENING FOR MALIGNANT NEOPLASMS, COLON: Primary | ICD-10-CM

## 2020-10-05 DIAGNOSIS — K50.919 CROHN'S DISEASE WITH COMPLICATION, UNSPECIFIED GASTROINTESTINAL TRACT LOCATION: Primary | ICD-10-CM

## 2020-10-05 DIAGNOSIS — Z79.01 LONG TERM (CURRENT) USE OF ANTICOAGULANTS: ICD-10-CM

## 2020-10-05 DIAGNOSIS — D68.61 ANTIPHOSPHOLIPID SYNDROME: ICD-10-CM

## 2020-10-05 DIAGNOSIS — Z01.818 PRE-OP TESTING: ICD-10-CM

## 2020-10-05 PROCEDURE — 93793 ANTICOAG MGMT PT WARFARIN: CPT | Mod: S$GLB,,,

## 2020-10-05 PROCEDURE — 93793 PR ANTICOAGULANT MGMT FOR PT TAKING WARFARIN: ICD-10-PCS | Mod: S$GLB,,,

## 2020-10-05 RX ORDER — POLYETHYLENE GLYCOL 3350, SODIUM SULFATE ANHYDROUS, SODIUM BICARBONATE, SODIUM CHLORIDE, POTASSIUM CHLORIDE 236; 22.74; 6.74; 5.86; 2.97 G/4L; G/4L; G/4L; G/4L; G/4L
4 POWDER, FOR SOLUTION ORAL ONCE
Qty: 4000 ML | Refills: 0 | Status: SHIPPED | OUTPATIENT
Start: 2020-10-05 | End: 2020-10-05

## 2020-10-05 NOTE — TELEPHONE ENCOUNTER
Pt is scheduled for a colonoscopy on 10/12.  Pt is currently taking Coumadin.  Please advise pt on holding instructions. Thanks

## 2020-10-05 NOTE — PROGRESS NOTES
Please advise pt to eat greens 10/5 and advise to take coumadin after 5pm in the future. Hold coumadin until INR 10/7 received with new instructions

## 2020-10-05 NOTE — PROGRESS NOTES
10/5/20 called to advise pt on dose instructions and pt has already taken 5 mg today and last Eliquis dose was 10/3

## 2020-10-05 NOTE — PROGRESS NOTES
INR high. Patient is a new start and was overlapped with Eliquis for 3 days. INR may be high from eliquis interaction but due to how high INR is it seems warfarin dose is also too high. Will adjust dose and need to watch closely. Repeat INR in 2 days    Also pt reports she is planning to have cscope on 10/12. Patient is very new to us and we do not have much history. I will check with Dr. Mendoza on how to plan for cscope. We should have plan in place Wed with new INR to be able to give instructions to patient at that time -- *Update: per Dr. Mendoza no lovenox bridge needed. Ok to hold 5 days.*

## 2020-10-05 NOTE — TELEPHONE ENCOUNTER
Please let her know that the stool tests are negative. As we discussed at her visit, we will need to get her set up for a colonoscopy to assess her disease activity.

## 2020-10-05 NOTE — TELEPHONE ENCOUNTER
Spoke to pt  - pt verbalized understanding of negative stool test and to schedule colonoscopy  - transferred pt to Endo Schedulers

## 2020-10-06 ENCOUNTER — TELEPHONE (OUTPATIENT)
Dept: ENDOSCOPY | Facility: HOSPITAL | Age: 70
End: 2020-10-06

## 2020-10-06 LAB — CALPROTECTIN STL-MCNT: 40 MCG/G

## 2020-10-06 NOTE — TELEPHONE ENCOUNTER
Ana Maria Elliott PharmD  to Tony Mendoza III, MD          10/5/20 4:20 PM  I know, right!? It was only planned today it seems. We will give clearance to hold 5 days which is the norm. Thanks for getting back with me quickly!   Tony Mendoza III, MD  to Ana Maria Elliott PharmD          10/5/20 4:16 PM  Great timing... Sorry, I did not know of that upcoming plan or I would have waited to change.   I also do not know of any clotting history. I agree with no bridging.     Thanks,   Angelo Elliott PharmD  to Tony Mendoza III, MD          10/5/20 4:07 PM  Dr. Mendoza,   As you know this is a new patient for us. She only had her first INR today. GI is requesting clearance for colonscopy next week and to hold coumadin. Can she hold coumadin at this time? Do you think she needs lovenox bridge? My first thought is no lovenox but I am uncertain of any clotting history. Please advise. Thanks - Ana Maria Elliott PharmD  to Me          10/5/20 4:03 PM  She only had her first INR with us today. I will have to confer with Dr. Mendoza on how to proceed. Will let you know when I hear back from him            10/5/20 3:54 PM  Ana Maria Elliott PharmD routed this conversation to Ana Maria Elliott PharmD             10/5/20 3:12 PM  You routed this conversation to [facility] Coumad Provider    Me         10/5/20 3:12 PM  Note     Pt is scheduled for a colonoscopy on 10/12.  Pt is currently taking Coumadin.  Please advise pt on holding instructions. Thanks

## 2020-10-08 ENCOUNTER — ANTI-COAG VISIT (OUTPATIENT)
Dept: CARDIOLOGY | Facility: CLINIC | Age: 70
End: 2020-10-08
Payer: MEDICARE

## 2020-10-08 DIAGNOSIS — D68.61 ANTIPHOSPHOLIPID SYNDROME: ICD-10-CM

## 2020-10-08 DIAGNOSIS — I48.0 PAROXYSMAL ATRIAL FIBRILLATION: ICD-10-CM

## 2020-10-08 DIAGNOSIS — Z79.01 LONG TERM (CURRENT) USE OF ANTICOAGULANTS: ICD-10-CM

## 2020-10-08 PROCEDURE — 93793 ANTICOAG MGMT PT WARFARIN: CPT | Mod: S$GLB,,,

## 2020-10-08 PROCEDURE — 93793 PR ANTICOAGULANT MGMT FOR PT TAKING WARFARIN: ICD-10-PCS | Mod: S$GLB,,,

## 2020-10-09 ENCOUNTER — LAB VISIT (OUTPATIENT)
Dept: URGENT CARE | Facility: CLINIC | Age: 70
End: 2020-10-09
Payer: MEDICARE

## 2020-10-09 DIAGNOSIS — Z01.818 PRE-OP TESTING: ICD-10-CM

## 2020-10-09 PROCEDURE — U0003 INFECTIOUS AGENT DETECTION BY NUCLEIC ACID (DNA OR RNA); SEVERE ACUTE RESPIRATORY SYNDROME CORONAVIRUS 2 (SARS-COV-2) (CORONAVIRUS DISEASE [COVID-19]), AMPLIFIED PROBE TECHNIQUE, MAKING USE OF HIGH THROUGHPUT TECHNOLOGIES AS DESCRIBED BY CMS-2020-01-R: HCPCS | Mod: HCNC

## 2020-10-09 NOTE — PROGRESS NOTES
INR still high. Patient held as planned and held dose 10/8. She has cscope on Monday. We will continue holding coumadin until after procedure. We will advise to resume coumadin on day of EGD/colonoscopy unless directed otherwise by procedural MD. Coumadin may not be resumed if polyps removed or biopsies taken. Advise pt to call if coumadin not resumed as planned or any signs/symptoms of bleeding post procedure.

## 2020-10-10 LAB — SARS-COV-2 RNA RESP QL NAA+PROBE: NOT DETECTED

## 2020-10-11 ENCOUNTER — CLINICAL SUPPORT (OUTPATIENT)
Dept: CARDIOLOGY | Facility: HOSPITAL | Age: 70
End: 2020-10-11
Attending: INTERNAL MEDICINE
Payer: MEDICARE

## 2020-10-12 ENCOUNTER — HOSPITAL ENCOUNTER (OUTPATIENT)
Facility: HOSPITAL | Age: 70
Discharge: HOME | End: 2020-10-12
Attending: INTERNAL MEDICINE | Admitting: INTERNAL MEDICINE
Payer: MEDICARE

## 2020-10-12 ENCOUNTER — ANESTHESIA (OUTPATIENT)
Dept: ENDOSCOPY | Facility: HOSPITAL | Age: 70
End: 2020-10-12
Payer: MEDICARE

## 2020-10-12 ENCOUNTER — ANESTHESIA EVENT (OUTPATIENT)
Dept: ENDOSCOPY | Facility: HOSPITAL | Age: 70
End: 2020-10-12
Payer: MEDICARE

## 2020-10-12 VITALS
BODY MASS INDEX: 29.82 KG/M2 | SYSTOLIC BLOOD PRESSURE: 147 MMHG | DIASTOLIC BLOOD PRESSURE: 83 MMHG | HEART RATE: 62 BPM | TEMPERATURE: 98 F | HEIGHT: 65 IN | OXYGEN SATURATION: 98 % | RESPIRATION RATE: 16 BRPM | WEIGHT: 179 LBS

## 2020-10-12 DIAGNOSIS — K50.90 CROHN'S DISEASE: ICD-10-CM

## 2020-10-12 PROCEDURE — E9220 PRA ENDO ANESTHESIA: ICD-10-PCS | Mod: HCNC,,, | Performed by: NURSE ANESTHETIST, CERTIFIED REGISTERED

## 2020-10-12 PROCEDURE — E9220 PRA ENDO ANESTHESIA: HCPCS | Mod: HCNC,,, | Performed by: NURSE ANESTHETIST, CERTIFIED REGISTERED

## 2020-10-12 PROCEDURE — 00811 ANES LWR INTST NDSC NOS: CPT | Mod: HCNC | Performed by: INTERNAL MEDICINE

## 2020-10-12 PROCEDURE — 25000003 PHARM REV CODE 250: Mod: HCNC | Performed by: NURSE ANESTHETIST, CERTIFIED REGISTERED

## 2020-10-12 PROCEDURE — 88305 TISSUE EXAM BY PATHOLOGIST: CPT | Mod: 26,HCNC,, | Performed by: PATHOLOGY

## 2020-10-12 PROCEDURE — 37000009 HC ANESTHESIA EA ADD 15 MINS: Mod: HCNC | Performed by: INTERNAL MEDICINE

## 2020-10-12 PROCEDURE — 45380 COLONOSCOPY AND BIOPSY: CPT | Mod: HCNC | Performed by: INTERNAL MEDICINE

## 2020-10-12 PROCEDURE — 88305 TISSUE EXAM BY PATHOLOGIST: ICD-10-PCS | Mod: 26,HCNC,, | Performed by: PATHOLOGY

## 2020-10-12 PROCEDURE — 45380 COLONOSCOPY AND BIOPSY: CPT | Mod: HCNC,,, | Performed by: INTERNAL MEDICINE

## 2020-10-12 PROCEDURE — 45380 PR COLONOSCOPY,BIOPSY: ICD-10-PCS | Mod: HCNC,,, | Performed by: INTERNAL MEDICINE

## 2020-10-12 PROCEDURE — 63600175 PHARM REV CODE 636 W HCPCS: Mod: HCNC | Performed by: NURSE ANESTHETIST, CERTIFIED REGISTERED

## 2020-10-12 PROCEDURE — 25000003 PHARM REV CODE 250: Mod: HCNC | Performed by: INTERNAL MEDICINE

## 2020-10-12 PROCEDURE — 27201012 HC FORCEPS, HOT/COLD, DISP: Mod: HCNC | Performed by: INTERNAL MEDICINE

## 2020-10-12 PROCEDURE — 37000008 HC ANESTHESIA 1ST 15 MINUTES: Mod: HCNC | Performed by: INTERNAL MEDICINE

## 2020-10-12 PROCEDURE — 88305 TISSUE EXAM BY PATHOLOGIST: CPT | Mod: HCNC | Performed by: PATHOLOGY

## 2020-10-12 RX ORDER — LIDOCAINE HYDROCHLORIDE 20 MG/ML
INJECTION INTRAVENOUS
Status: DISCONTINUED | OUTPATIENT
Start: 2020-10-12 | End: 2020-10-12

## 2020-10-12 RX ORDER — SODIUM CHLORIDE 9 MG/ML
INJECTION, SOLUTION INTRAVENOUS CONTINUOUS
Status: DISCONTINUED | OUTPATIENT
Start: 2020-10-12 | End: 2020-10-12 | Stop reason: HOSPADM

## 2020-10-12 RX ORDER — PROPOFOL 10 MG/ML
VIAL (ML) INTRAVENOUS CONTINUOUS PRN
Status: DISCONTINUED | OUTPATIENT
Start: 2020-10-12 | End: 2020-10-12

## 2020-10-12 RX ORDER — PROPOFOL 10 MG/ML
VIAL (ML) INTRAVENOUS
Status: DISCONTINUED | OUTPATIENT
Start: 2020-10-12 | End: 2020-10-12

## 2020-10-12 RX ADMIN — LIDOCAINE HYDROCHLORIDE 100 MG: 20 INJECTION, SOLUTION INTRAVENOUS at 12:10

## 2020-10-12 RX ADMIN — PROPOFOL 50 MG: 10 INJECTION, EMULSION INTRAVENOUS at 12:10

## 2020-10-12 RX ADMIN — SODIUM CHLORIDE: 0.9 INJECTION, SOLUTION INTRAVENOUS at 12:10

## 2020-10-12 RX ADMIN — PROPOFOL 175 MCG/KG/MIN: 10 INJECTION, EMULSION INTRAVENOUS at 12:10

## 2020-10-12 NOTE — TRANSFER OF CARE
"Anesthesia Transfer of Care Note    Patient: Celine Sinclair    Procedure(s) Performed: Procedure(s) (LRB):  COLONOSCOPY (N/A)    Patient location: PACU    Anesthesia Type: general    Transport from OR: Transported from OR on 2-3 L/min O2 by NC with adequate spontaneous ventilation    Post pain: adequate analgesia    Post assessment: no apparent anesthetic complications and tolerated procedure well    Post vital signs: stable    Level of consciousness: awake, alert and oriented    Nausea/Vomiting: no nausea/vomiting    Complications: none    Transfer of care protocol was followed      Last vitals:   Visit Vitals  BP (!) 105/55   Pulse (!) 56   Temp 36.5 °C (97.7 °F)   Resp 16   Ht 5' 5" (1.651 m)   Wt 81.2 kg (179 lb)   SpO2 100%   Breastfeeding No   BMI 29.79 kg/m²     "

## 2020-10-12 NOTE — ANESTHESIA PREPROCEDURE EVALUATION
10/12/2020  Celine Sinclair is a 69 y.o., female.    Anesthesia Evaluation    I have reviewed the Patient Summary Reports.    I have reviewed the Nursing Notes. I have reviewed the NPO Status.   I have reviewed the Medications.     Review of Systems  Anesthesia Hx:  No problems with previous Anesthesia  History of prior surgery of interest to airway management or planning: Previous anesthesia: General Denies Family Hx of Anesthesia complications.   Denies Personal Hx of Anesthesia complications.   Social:  Non-Smoker, No Alcohol Use    Cardiovascular:   Exercise tolerance: good Hypertension, well controlled CHF ECG has been reviewed.    Pulmonary:   COPD, mild    Hepatic/GI:   GERD, well controlled    Musculoskeletal:   Paget's disease, RA   Neurological:   Headaches    Psych:   depression          Physical Exam  General:  Well nourished    Airway/Jaw/Neck:  Airway Findings: Mouth Opening: Normal Tongue: Normal  General Airway Assessment: Adult  Mallampati: II  Improves to II with phonation.  TM Distance: Normal, at least 6 cm  Jaw/Neck Findings:  Neck ROM: Normal ROM      Dental:  Dental Findings: Upper Dentures   Chest/Lungs:  Chest/Lungs Findings: Clear to auscultation, Normal Respiratory Rate     Heart/Vascular:  Heart Findings: Rate: Normal  Rhythm: Regular Rhythm  Sounds: Normal        Mental Status:  Mental Status Findings:  Cooperative, Alert and Oriented         Anesthesia Plan  Type of Anesthesia, risks & benefits discussed:  Anesthesia Type:  general  Patient's Preference:   Intra-op Monitoring Plan: standard ASA monitors  Intra-op Monitoring Plan Comments:   Post Op Pain Control Plan: IV/PO Opioids PRN and multimodal analgesia  Post Op Pain Control Plan Comments:   Induction:   IV  Beta Blocker:  Patient is not currently on a Beta-Blocker (No further documentation required).       Informed Consent:  Patient understands risks and agrees with Anesthesia plan.  Questions answered. Anesthesia consent signed with patient.  ASA Score: 3     Day of Surgery Review of History & Physical:    H&P update referred to the surgeon.         Ready For Surgery From Anesthesia Perspective.

## 2020-10-12 NOTE — [employer] PATIENT INSTRUCTIONS
Discharge Summary/Instructions after an Endoscopic Procedure  Patient Name: Celine Sinclair  Patient MRN: [id number]  Patient YOB: 1950  Monday, October 12, 2020  Cali Urena MD  RESTRICTIONS:  During your procedure today, you received medications for sedation.  These   medications may affect your judgment, balance and coordination.  Therefore,   for 24 hours, you have the following restrictions:   - DO NOT drive a car, operate machinery, make legal/financial decisions,   sign important papers or drink alcohol.    ACTIVITY:  Today: no heavy lifting, straining or running due to procedural   sedation/anesthesia.  The following day: return to full activity including work.  DIET:  Eat and drink normally unless instructed otherwise.     TREATMENT FOR COMMON SIDE EFFECTS:  - Mild abdominal pain, nausea, belching, bloating or excessive gas:  rest,   eat lightly and use a heating pad.  - Sore Throat: treat with throat lozenges and/or gargle with warm salt   water.  - Because air was used during the procedure, expelling large amounts of air   from your rectum or belching is normal.  - If a bowel prep was taken, you may not have a bowel movement for 1-3 days.    This is normal.  SYMPTOMS TO WATCH FOR AND REPORT TO YOUR PHYSICIAN:  1. Abdominal pain or bloating, other than gas cramps.  2. Chest pain.  3. Back pain.  4. Signs of infection such as: chills or fever occurring within 24 hours   after the procedure.  5. Rectal bleeding, which would show as bright red, maroon, or black stools.   (A tablespoon of blood from the rectum is not serious, especially if   hemorrhoids are present.)  6. Vomiting.  7. Weakness or dizziness.  GO DIRECTLY TO THE NEAREST EMERGENCY ROOM IF YOU HAVE ANY OF THE FOLLOWING:      Difficulty breathing              Chills and/or fever over 101 F   Persistent vomiting and/or vomiting blood   Severe abdominal pain   Severe chest pain   Black, tarry stools   Bleeding- more than one  tablespoon   Any other symptom or condition that you feel may need urgent attention  Your doctor recommends these additional instructions:  If any biopsies were taken, your doctors clinic will contact you in 1 to 2   weeks with any results.  - Discharge patient to home.   - Resume previous diet today.   - Continue present medications.   - Use fiber, for example Citrucel, Fibercon, Konsyl or Metamucil.   - Await pathology results.   - Repeat colonoscopy date to be determined after pending pathology results   are reviewed for surveillance based on pathology results.   - There was no clear evidence of Crohns disease on today's exam. The   location of the anastomosis is also unusual in the setting of Crohns   disease. Will try to obtain other records to confirm the diagnosis. If   diagnosis is confirmed would repeat colonoscopy in 2 years if the colon was   involved. If not, repeat colonoscopy in 5 years due to fair prep.  - Patient has a contact number available for emergencies.  The signs and   symptoms of potential delayed complications were discussed with the   patient.  Return to normal activities tomorrow.  Written discharge   instructions were provided to the patient.  For questions, problems or results please call your physician - Cali Urena MD at Work:  [phone].  [facility], EMERGENCY ROOM PHONE NUMBER: [phone]  IF A COMPLICATION OR EMERGENCY SITUATION ARISES AND YOU ARE UNABLE TO REACH   YOUR PHYSICIAN - GO DIRECTLY TO THE EMERGENCY ROOM.  Cali Urena MD  10/12/2020 1:14:10 PM  This report has been verified and signed electronically.  [employer]

## 2020-10-12 NOTE — ANESTHESIA POSTPROCEDURE EVALUATION
Anesthesia Post Evaluation    Patient: Celine Sinclair    Procedure(s) Performed: Procedure(s) (LRB):  COLONOSCOPY (N/A)    Final Anesthesia Type: general    Patient location during evaluation: GI PACU  Patient participation: Yes- Able to Participate  Level of consciousness: awake and alert, awake and oriented  Post-procedure vital signs: reviewed and stable  Pain management: adequate  Airway patency: patent    PONV status at discharge: No PONV  Anesthetic complications: no      Cardiovascular status: blood pressure returned to baseline, stable and hemodynamically stable  Respiratory status: unassisted, spontaneous ventilation and room air  Hydration status: euvolemic  Follow-up not needed.          Vitals Value Taken Time   /83 10/12/20 1345   Temp 36.5 °C (97.7 °F) 10/12/20 1315   Pulse 62 10/12/20 1345   Resp 16 10/12/20 1345   SpO2 98 % 10/12/20 1345         Event Time   Out of Recovery 13:50:58         Pain/Yoana Score: Yoana Score: 10 (10/12/2020  1:45 PM)

## 2020-10-15 ENCOUNTER — ANTI-COAG VISIT (OUTPATIENT)
Dept: CARDIOLOGY | Facility: CLINIC | Age: 70
End: 2020-10-15
Payer: MEDICARE

## 2020-10-15 DIAGNOSIS — D68.61 ANTIPHOSPHOLIPID SYNDROME: ICD-10-CM

## 2020-10-15 DIAGNOSIS — Z79.01 LONG TERM (CURRENT) USE OF ANTICOAGULANTS: ICD-10-CM

## 2020-10-15 DIAGNOSIS — I48.0 PAROXYSMAL ATRIAL FIBRILLATION: ICD-10-CM

## 2020-10-15 LAB
FINAL PATHOLOGIC DIAGNOSIS: NORMAL
GROSS: NORMAL
Lab: NORMAL

## 2020-10-15 PROCEDURE — 93793 ANTICOAG MGMT PT WARFARIN: CPT | Mod: S$GLB,,,

## 2020-10-15 PROCEDURE — 93793 PR ANTICOAGULANT MGMT FOR PT TAKING WARFARIN: ICD-10-PCS | Mod: S$GLB,,,

## 2020-10-15 NOTE — PROGRESS NOTES
INR moving well after 3 doses. Maintain current dose plan of 2.5mg daily and continue close follow up until stable. Repeat INR Monday

## 2020-10-16 ENCOUNTER — PATIENT MESSAGE (OUTPATIENT)
Dept: GASTROENTEROLOGY | Facility: CLINIC | Age: 70
End: 2020-10-16

## 2020-10-19 ENCOUNTER — ANTI-COAG VISIT (OUTPATIENT)
Dept: CARDIOLOGY | Facility: CLINIC | Age: 70
End: 2020-10-19
Payer: MEDICARE

## 2020-10-19 DIAGNOSIS — D68.61 ANTIPHOSPHOLIPID SYNDROME: ICD-10-CM

## 2020-10-19 DIAGNOSIS — Z79.01 LONG TERM (CURRENT) USE OF ANTICOAGULANTS: ICD-10-CM

## 2020-10-19 DIAGNOSIS — I48.0 PAROXYSMAL ATRIAL FIBRILLATION: ICD-10-CM

## 2020-10-19 PROCEDURE — 93793 ANTICOAG MGMT PT WARFARIN: CPT | Mod: S$GLB,,,

## 2020-10-19 PROCEDURE — 93793 PR ANTICOAGULANT MGMT FOR PT TAKING WARFARIN: ICD-10-PCS | Mod: S$GLB,,,

## 2020-10-19 NOTE — PROGRESS NOTES
INR low but improving. Will adjust dose slightly. Recheck INR in 1 week. Pt needs to report any new aches/pains to PCP or cardiology

## 2020-10-26 ENCOUNTER — ANTI-COAG VISIT (OUTPATIENT)
Dept: CARDIOLOGY | Facility: CLINIC | Age: 70
End: 2020-10-26
Payer: MEDICARE

## 2020-10-26 ENCOUNTER — OFFICE VISIT (OUTPATIENT)
Dept: INTERNAL MEDICINE | Facility: CLINIC | Age: 70
End: 2020-10-26
Payer: MEDICARE

## 2020-10-26 ENCOUNTER — HOSPITAL ENCOUNTER (EMERGENCY)
Facility: HOSPITAL | Age: 70
Discharge: HOME | End: 2020-10-26
Attending: SURGERY
Payer: MEDICARE

## 2020-10-26 VITALS
HEIGHT: 65 IN | SYSTOLIC BLOOD PRESSURE: 154 MMHG | HEART RATE: 57 BPM | WEIGHT: 174.63 LBS | OXYGEN SATURATION: 97 % | RESPIRATION RATE: 19 BRPM | BODY MASS INDEX: 29.09 KG/M2 | TEMPERATURE: 98 F | DIASTOLIC BLOOD PRESSURE: 92 MMHG

## 2020-10-26 VITALS
WEIGHT: 170 LBS | BODY MASS INDEX: 28.29 KG/M2 | SYSTOLIC BLOOD PRESSURE: 160 MMHG | OXYGEN SATURATION: 97 % | HEART RATE: 58 BPM | TEMPERATURE: 98 F | DIASTOLIC BLOOD PRESSURE: 78 MMHG | RESPIRATION RATE: 16 BRPM

## 2020-10-26 DIAGNOSIS — I48.0 PAROXYSMAL ATRIAL FIBRILLATION: ICD-10-CM

## 2020-10-26 DIAGNOSIS — R79.1 SUBTHERAPEUTIC INTERNATIONAL NORMALIZED RATIO (INR): ICD-10-CM

## 2020-10-26 DIAGNOSIS — Z79.01 LONG TERM (CURRENT) USE OF ANTICOAGULANTS: ICD-10-CM

## 2020-10-26 DIAGNOSIS — D68.61 ANTIPHOSPHOLIPID SYNDROME: ICD-10-CM

## 2020-10-26 DIAGNOSIS — R07.89 CHEST WALL PAIN: Primary | ICD-10-CM

## 2020-10-26 DIAGNOSIS — R07.9 CHEST PAIN, UNSPECIFIED TYPE: Primary | ICD-10-CM

## 2020-10-26 LAB
ALBUMIN SERPL BCP-MCNC: 4 G/DL (ref 3.5–5.2)
ALP SERPL-CCNC: 77 U/L (ref 55–135)
ALT SERPL W/O P-5'-P-CCNC: 14 U/L (ref 10–44)
ANION GAP SERPL CALC-SCNC: 10 MMOL/L (ref 8–16)
APTT BLDCRRT: 38.8 SEC (ref 21–32)
AST SERPL-CCNC: 20 U/L (ref 10–40)
BASOPHILS # BLD AUTO: 0.05 K/UL (ref 0–0.2)
BASOPHILS NFR BLD: 0.5 % (ref 0–1.9)
BILIRUB SERPL-MCNC: 2.2 MG/DL (ref 0.1–1)
BNP SERPL-MCNC: 127 PG/ML (ref 0–99)
BUN SERPL-MCNC: 13 MG/DL (ref 8–23)
CALCIUM SERPL-MCNC: 8.8 MG/DL (ref 8.7–10.5)
CHLORIDE SERPL-SCNC: 111 MMOL/L (ref 95–110)
CK MB SERPL-MCNC: 0.7 NG/ML (ref 0.1–6.5)
CK MB SERPL-MCNC: 0.8 NG/ML (ref 0.1–6.5)
CK MB SERPL-RTO: 1.6 % (ref 0–5)
CK MB SERPL-RTO: 1.7 % (ref 0–5)
CK SERPL-CCNC: 44 U/L (ref 20–180)
CK SERPL-CCNC: 44 U/L (ref 20–180)
CK SERPL-CCNC: 48 U/L (ref 20–180)
CK SERPL-CCNC: 48 U/L (ref 20–180)
CO2 SERPL-SCNC: 22 MMOL/L (ref 23–29)
CREAT SERPL-MCNC: 0.7 MG/DL (ref 0.5–1.4)
D DIMER PPP IA.FEU-MCNC: 0.19 MG/L FEU
DIFFERENTIAL METHOD: ABNORMAL
EOSINOPHIL # BLD AUTO: 0.1 K/UL (ref 0–0.5)
EOSINOPHIL NFR BLD: 0.9 % (ref 0–8)
ERYTHROCYTE [DISTWIDTH] IN BLOOD BY AUTOMATED COUNT: 13.6 % (ref 11.5–14.5)
EST. GFR  (AFRICAN AMERICAN): >60 ML/MIN/1.73 M^2
EST. GFR  (NON AFRICAN AMERICAN): >60 ML/MIN/1.73 M^2
GLUCOSE SERPL-MCNC: 84 MG/DL (ref 70–110)
HCT VFR BLD AUTO: 39.3 % (ref 37–48.5)
HGB BLD-MCNC: 12.5 G/DL (ref 12–16)
IMM GRANULOCYTES # BLD AUTO: 0.04 K/UL (ref 0–0.04)
IMM GRANULOCYTES NFR BLD AUTO: 0.4 % (ref 0–0.5)
INR PPP: 2.4 (ref 0.8–1.2)
LYMPHOCYTES # BLD AUTO: 2.3 K/UL (ref 1–4.8)
LYMPHOCYTES NFR BLD: 22.8 % (ref 18–48)
MCH RBC QN AUTO: 27 PG (ref 27–31)
MCHC RBC AUTO-ENTMCNC: 31.8 G/DL (ref 32–36)
MCV RBC AUTO: 85 FL (ref 82–98)
MONOCYTES # BLD AUTO: 0.8 K/UL (ref 0.3–1)
MONOCYTES NFR BLD: 7.6 % (ref 4–15)
NEUTROPHILS # BLD AUTO: 6.8 K/UL (ref 1.8–7.7)
NEUTROPHILS NFR BLD: 67.8 % (ref 38–73)
NRBC BLD-RTO: 0 /100 WBC
PLATELET # BLD AUTO: 189 K/UL (ref 150–350)
PMV BLD AUTO: 11.5 FL (ref 9.2–12.9)
POTASSIUM SERPL-SCNC: 4 MMOL/L (ref 3.5–5.1)
PROT SERPL-MCNC: 7.2 G/DL (ref 6–8.4)
PROTHROMBIN TIME: 24.5 SEC (ref 9–12.5)
RBC # BLD AUTO: 4.63 M/UL (ref 4–5.4)
SODIUM SERPL-SCNC: 143 MMOL/L (ref 136–145)
TROPONIN I SERPL DL<=0.01 NG/ML-MCNC: 0.03 NG/ML (ref 0–0.03)
TROPONIN I SERPL DL<=0.01 NG/ML-MCNC: <0.006 NG/ML (ref 0–0.03)
WBC # BLD AUTO: 10.07 K/UL (ref 3.9–12.7)

## 2020-10-26 PROCEDURE — 85025 COMPLETE CBC W/AUTO DIFF WBC: CPT | Mod: HCNC

## 2020-10-26 PROCEDURE — 93793 ANTICOAG MGMT PT WARFARIN: CPT | Mod: S$GLB,,,

## 2020-10-26 PROCEDURE — 80053 COMPREHEN METABOLIC PANEL: CPT | Mod: HCNC

## 2020-10-26 PROCEDURE — 99999 PR PBB SHADOW E&M-EST. PATIENT-LVL IV: CPT | Mod: PBBFAC,HCNC,, | Performed by: INTERNAL MEDICINE

## 2020-10-26 PROCEDURE — 83880 ASSAY OF NATRIURETIC PEPTIDE: CPT | Mod: HCNC

## 2020-10-26 PROCEDURE — 36415 COLL VENOUS BLD VENIPUNCTURE: CPT | Mod: HCNC

## 2020-10-26 PROCEDURE — 3077F SYST BP >= 140 MM HG: CPT | Mod: HCNC,CPTII,S$GLB, | Performed by: INTERNAL MEDICINE

## 2020-10-26 PROCEDURE — 99499 UNLISTED E&M SERVICE: CPT | Mod: S$GLB,,, | Performed by: INTERNAL MEDICINE

## 2020-10-26 PROCEDURE — 99214 OFFICE O/P EST MOD 30 MIN: CPT | Mod: HCNC,S$GLB,, | Performed by: INTERNAL MEDICINE

## 2020-10-26 PROCEDURE — 96375 TX/PRO/DX INJ NEW DRUG ADDON: CPT | Mod: HCNC

## 2020-10-26 PROCEDURE — 1125F PR PAIN SEVERITY QUANTIFIED, PAIN PRESENT: ICD-10-PCS | Mod: HCNC,S$GLB,, | Performed by: INTERNAL MEDICINE

## 2020-10-26 PROCEDURE — 3008F BODY MASS INDEX DOCD: CPT | Mod: HCNC,CPTII,S$GLB, | Performed by: INTERNAL MEDICINE

## 2020-10-26 PROCEDURE — 3077F PR MOST RECENT SYSTOLIC BLOOD PRESSURE >= 140 MM HG: ICD-10-PCS | Mod: HCNC,CPTII,S$GLB, | Performed by: INTERNAL MEDICINE

## 2020-10-26 PROCEDURE — 99999 PR PBB SHADOW E&M-EST. PATIENT-LVL IV: ICD-10-PCS | Mod: PBBFAC,HCNC,, | Performed by: INTERNAL MEDICINE

## 2020-10-26 PROCEDURE — 99214 PR OFFICE/OUTPT VISIT, EST, LEVL IV, 30-39 MIN: ICD-10-PCS | Mod: HCNC,S$GLB,, | Performed by: INTERNAL MEDICINE

## 2020-10-26 PROCEDURE — 93010 EKG 12-LEAD: ICD-10-PCS | Mod: HCNC,,, | Performed by: INTERNAL MEDICINE

## 2020-10-26 PROCEDURE — 25500020 PHARM REV CODE 255: Mod: HCNC | Performed by: SURGERY

## 2020-10-26 PROCEDURE — 82553 CREATINE MB FRACTION: CPT | Mod: 91,HCNC

## 2020-10-26 PROCEDURE — 93793 PR ANTICOAGULANT MGMT FOR PT TAKING WARFARIN: ICD-10-PCS | Mod: S$GLB,,,

## 2020-10-26 PROCEDURE — 93005 ELECTROCARDIOGRAM TRACING: CPT | Mod: HCNC

## 2020-10-26 PROCEDURE — 85379 FIBRIN DEGRADATION QUANT: CPT | Mod: HCNC

## 2020-10-26 PROCEDURE — 85730 THROMBOPLASTIN TIME PARTIAL: CPT | Mod: HCNC

## 2020-10-26 PROCEDURE — 99285 EMERGENCY DEPT VISIT HI MDM: CPT | Mod: 25,HCNC

## 2020-10-26 PROCEDURE — 93010 ELECTROCARDIOGRAM REPORT: CPT | Mod: HCNC,,, | Performed by: INTERNAL MEDICINE

## 2020-10-26 PROCEDURE — 1159F PR MEDICATION LIST DOCUMENTED IN MEDICAL RECORD: ICD-10-PCS | Mod: HCNC,S$GLB,, | Performed by: INTERNAL MEDICINE

## 2020-10-26 PROCEDURE — 63600175 PHARM REV CODE 636 W HCPCS: Mod: HCNC | Performed by: SURGERY

## 2020-10-26 PROCEDURE — 3080F DIAST BP >= 90 MM HG: CPT | Mod: HCNC,CPTII,S$GLB, | Performed by: INTERNAL MEDICINE

## 2020-10-26 PROCEDURE — 1125F AMNT PAIN NOTED PAIN PRSNT: CPT | Mod: HCNC,S$GLB,, | Performed by: INTERNAL MEDICINE

## 2020-10-26 PROCEDURE — 84484 ASSAY OF TROPONIN QUANT: CPT | Mod: HCNC

## 2020-10-26 PROCEDURE — 99499 RISK ADDL DX/OHS AUDIT: ICD-10-PCS | Mod: S$GLB,,, | Performed by: INTERNAL MEDICINE

## 2020-10-26 PROCEDURE — 82550 ASSAY OF CK (CPK): CPT | Mod: 91,HCNC

## 2020-10-26 PROCEDURE — 85610 PROTHROMBIN TIME: CPT | Mod: HCNC

## 2020-10-26 PROCEDURE — 1101F PR PT FALLS ASSESS DOC 0-1 FALLS W/OUT INJ PAST YR: ICD-10-PCS | Mod: HCNC,CPTII,S$GLB, | Performed by: INTERNAL MEDICINE

## 2020-10-26 PROCEDURE — 1159F MED LIST DOCD IN RCRD: CPT | Mod: HCNC,S$GLB,, | Performed by: INTERNAL MEDICINE

## 2020-10-26 PROCEDURE — 1101F PT FALLS ASSESS-DOCD LE1/YR: CPT | Mod: HCNC,CPTII,S$GLB, | Performed by: INTERNAL MEDICINE

## 2020-10-26 PROCEDURE — 3080F PR MOST RECENT DIASTOLIC BLOOD PRESSURE >= 90 MM HG: ICD-10-PCS | Mod: HCNC,CPTII,S$GLB, | Performed by: INTERNAL MEDICINE

## 2020-10-26 PROCEDURE — 96374 THER/PROPH/DIAG INJ IV PUSH: CPT | Mod: HCNC,59

## 2020-10-26 PROCEDURE — 3008F PR BODY MASS INDEX (BMI) DOCUMENTED: ICD-10-PCS | Mod: HCNC,CPTII,S$GLB, | Performed by: INTERNAL MEDICINE

## 2020-10-26 RX ORDER — ONDANSETRON 2 MG/ML
4 INJECTION INTRAMUSCULAR; INTRAVENOUS
Status: COMPLETED | OUTPATIENT
Start: 2020-10-26 | End: 2020-10-26

## 2020-10-26 RX ORDER — IBUPROFEN 800 MG/1
800 TABLET ORAL EVERY 6 HOURS PRN
Qty: 20 TABLET | Refills: 0 | Status: SHIPPED | OUTPATIENT
Start: 2020-10-26 | End: 2020-10-26 | Stop reason: ALTCHOICE

## 2020-10-26 RX ORDER — MEPERIDINE HYDROCHLORIDE 25 MG/ML
25 INJECTION INTRAMUSCULAR; INTRAVENOUS; SUBCUTANEOUS
Status: COMPLETED | OUTPATIENT
Start: 2020-10-26 | End: 2020-10-26

## 2020-10-26 RX ORDER — CYCLOBENZAPRINE HCL 10 MG
10 TABLET ORAL 3 TIMES DAILY PRN
Qty: 10 TABLET | Refills: 0 | Status: SHIPPED | OUTPATIENT
Start: 2020-10-26 | End: 2020-10-31

## 2020-10-26 RX ADMIN — IOHEXOL 75 ML: 350 INJECTION, SOLUTION INTRAVENOUS at 01:10

## 2020-10-26 RX ADMIN — MEPERIDINE HYDROCHLORIDE 25 MG: 25 INJECTION INTRAMUSCULAR; INTRAVENOUS; SUBCUTANEOUS at 03:10

## 2020-10-26 RX ADMIN — ONDANSETRON 4 MG: 2 INJECTION INTRAMUSCULAR; INTRAVENOUS at 03:10

## 2020-10-26 NOTE — PROGRESS NOTES
"Subjective:       Patient ID: Celine Sinclair is a 70 y.o. female.    Chief Complaint: Headache and Back Pain (radiates to her chest)      HPI:  Patient is known to me and presents with chest pain--she is having active pain today in clinic. Sx started "on and off a few months ago and then stopped" but for last 3 weeks she is having daily chest. Pain described as stabbing. Worse with deep breath. Also worse with exertion: for example, walking to mailbox. Associated with SOB.  she is having pain radiating from back to front and to her left shoulder. She is feeling nauseated. She has h/o fibromyalgia but this "is different than my normal pain." No cough. No fevers. She follows with cardiology for a-fib and has loop recorder in place right now; She is having episodes of "my heart is racing" associated with her pain. She has h/o antiphospholipid and last INR subtherapeutic; dose adjusted however.     Past Medical History:   Diagnosis Date    Aortic atherosclerosis     Benign essential hypertension     Cataract     Senile    Crohn's colitis     Depression, major, recurrent, moderate     Fibromyalgia     GERD (gastroesophageal reflux disease)     Hypertensive cardiomyopathy     IBS (irritable bowel syndrome)     Migraine     Opioid abuse, in remission     Osteopenia     Paget disease of bone     Port-A-Cath in place     right chest    Prolonged QT interval     RA (rheumatoid arthritis)     Sedative hypnotic or anxiolytic dependence     in remission        Family History   Problem Relation Age of Onset    Glaucoma Paternal Grandmother     Other Daughter         Diabetic Retinopathy    Breast cancer Daughter 40    Retinal detachment Grandchild     Heart attack Maternal Grandmother     Colon cancer Maternal Grandmother     Cancer Mother         Unknown type    Emphysema Father 67    Heart disease Father     Lung cancer Sister     Heart attack Sister     Breast cancer Daughter 47    Breast cancer " "Sister     Amblyopia Neg Hx     Blindness Neg Hx     Strabismus Neg Hx     Macular degeneration Neg Hx     Cataracts Neg Hx        Social History     Socioeconomic History    Marital status:      Spouse name: Not on file    Number of children: 12    Years of education: Not on file    Highest education level: Not on file   Occupational History    Occupation: [employer]     Comment: Retired/disabled   Social Needs    Financial resource strain: Not on file    Food insecurity     Worry: Not on file     Inability: Not on file    Transportation needs     Medical: Not on file     Non-medical: Not on file   Tobacco Use    Smoking status: Never Smoker    Smokeless tobacco: Never Used   Substance and Sexual Activity    Alcohol use: Yes     Comment: ocassional     Drug use: No    Sexual activity: Not on file   Lifestyle    Physical activity     Days per week: Not on file     Minutes per session: Not on file    Stress: Not on file   Relationships    Social connections     Talks on phone: Not on file     Gets together: Not on file     Attends Buddhist service: Not on file     Active member of club or organization: Not on file     Attends meetings of clubs or organizations: Not on file     Relationship status: Not on file   Other Topics Concern    Not on file   Social History Narrative    Patient gave birth to 7 children, adopted 2 and has 3 stepchildren with her .       Review of Systems   Constitutional: Positive for fatigue. Negative for activity change, fever and unexpected weight change.   HENT: Negative for congestion, ear pain, hearing loss, rhinorrhea and sore throat.    Eyes: Negative for pain, redness and visual disturbance.   Respiratory: Positive for chest tightness and shortness of breath. Negative for cough and wheezing.    Cardiovascular: Positive for chest pain and palpitations ("racing"). Negative for leg swelling.   Gastrointestinal: Negative for abdominal pain, " constipation, diarrhea, nausea and vomiting.   Genitourinary: Negative for dysuria, frequency and urgency.   Musculoskeletal: Negative for back pain, joint swelling and neck pain.   Skin: Negative for color change, rash and wound.   Neurological: Negative for dizziness, tremors, weakness, light-headedness and headaches.         Objective:      Physical Exam  Vitals signs reviewed.   Constitutional:       General: She is not in acute distress.     Appearance: She is well-developed.   HENT:      Head: Normocephalic and atraumatic.      Right Ear: External ear normal.      Left Ear: External ear normal.      Nose: Nose normal.      Mouth/Throat:      Mouth: Mucous membranes are moist.      Pharynx: Oropharynx is clear.   Eyes:      General:         Right eye: No discharge.         Left eye: No discharge.      Extraocular Movements: Extraocular movements intact.      Conjunctiva/sclera: Conjunctivae normal.      Pupils: Pupils are equal, round, and reactive to light.   Neck:      Musculoskeletal: Neck supple.      Thyroid: No thyromegaly.   Cardiovascular:      Rate and Rhythm: Normal rate and regular rhythm.      Heart sounds: No murmur.   Pulmonary:      Effort: Pulmonary effort is normal. No respiratory distress.      Breath sounds: Normal breath sounds. No wheezing or rales.   Chest:      Chest wall: Tenderness present.   Abdominal:      General: Bowel sounds are normal. There is no distension.      Palpations: Abdomen is soft.      Tenderness: There is no abdominal tenderness.   Lymphadenopathy:      Cervical: No cervical adenopathy.   Skin:     General: Skin is warm and dry.   Neurological:      Mental Status: She is alert and oriented to person, place, and time.      Cranial Nerves: No cranial nerve deficit.   Psychiatric:         Behavior: Behavior normal.         Assessment:       1. Chest pain, unspecified type    2. Antiphospholipid syndrome    3. Subtherapeutic international normalized ratio (INR)    4.  Paroxysmal atrial fibrillation        Plan:       Ada was seen today for headache and back pain.    Diagnoses and all orders for this visit:    Chest pain, unspecified type    Antiphospholipid syndrome    Subtherapeutic international normalized ratio (INR)    Paroxysmal atrial fibrillation      Patient does have reproducible chest wall tenderness on exam. Very likely this is a costochondritis/fibromyalgia type pain. Can be managed with short course tramadol (no NSAIDs on coumadin) and Tylenol.  However, with low INR last week, pain and SOB on exertion to ER for PE study eval. Need to rule out  Check CE, EKG, CXR as well  Discussed with ER MD. If work up negative can treated as MSK etiology and follow up with me next week

## 2020-10-26 NOTE — ED PROVIDER NOTES
[facility]                                                 Chief Complaint  70 y.o. female with Chest Pain      History of Present Illness  Celine Sinclair presents to the emergency room with left chest wall pain  Patient had left chest wall pain, went to the internal medicine clinic today  Internal medicine clinic physician sent to the ER for evaluation this a.m.  She had left chest wall pain radiating to the back, history of fibromyalgia  Patient has no history of coronary artery disease, no cardiac disease HX    The history is provided by the patient   device was not used during this ER visit    Past Medical History   -- Aortic atherosclerosis    -- Benign essential hypertension    -- Cataract    -- Crohn's colitis    -- Depression, major, recurrent, moderate    -- Fibromyalgia    -- GERD (gastroesophageal reflux disease)    -- Hypertensive cardiomyopathy    -- IBS (irritable bowel syndrome)    -- Migraine    -- Opioid abuse, in remission    -- Osteopenia    -- Paget disease of bone    -- Port-A-Cath in place    -- Prolonged QT interval    -- RA (rheumatoid arthritis)    -- Sedative hypnotic or anxiolytic dependence      Past Surgical History   -- CATARACT EXTRACTION W/  INTRAOCULAR LENS IMPLANT     -- CATARACT EXTRACTION W/  INTRAOCULAR LENS IMPLANT     -- CHOLECYSTECTOMY     -- COLON SURGERY     -- COLONOSCOPY     -- COLONOSCOPY     -- Colostomy reversal     -- HEMORRHOID SURGERY     -- HYSTERECTOMY     -- ILEOSTOMY     -- ILEOSTOMY CLOSURE     -- LEFT HEART CATHETERIZATION     -- NECK SURGERY     -- OOPHORECTOMY     -- SBO     -- SMALL INTESTINE SURGERY     -- TONSILLECTOMY     -- TUBAL LIGATION        Review of patient's allergies   -- Octreotide acetate    -- Codeine    -- Morphine       I have reviewed all of this patient's past medical, surgical, family, and social   histories as well as active allergies and medications documented in the  electronic medical  record    Review of Systems and Physical Exam      Review of Systems  -- Constitution - no fever, denies fatigue, no weakness, no chills  -- Eyes - no tearing or redness, no visual disturbance  -- Ear, Nose - no tinnitus or earache, no nasal congestion or discharge  -- Mouth,Throat - no sore throat, no toothache, normal voice, normal swallowing  -- Respiratory - denies cough and congestion, no shortness of breath, no IQBAL  -- Cardiovascular - chest pain, no palpitations, denies claudication  -- Gastrointestinal - denies abdominal pain, nausea, vomiting, or diarrhea  -- Genitourinary - no dysuria, denies flank pain, no hematuria, no STD risk  -- Musculoskeletal - denies back pain, negative for trauma or injury  -- Neurological - no headache, denies weakness or seizure; no LOC  -- Skin - denies pallor, rash, or changes in skin. no hives or welts noted  -- Psychiatric - Denies SI or HI, no psychosis or fractured thought noted     Vital Signs  Her temperature is 98 °F (36.7 °C).   Her blood pressure is 146/90 and her pulse is 54   Her respiration is 16 and oxygen saturation is 97%.     Physical Exam  -- Nursing note and vitals reviewed  -- Constitutional: Appears well-developed and well-nourished  -- Head: Atraumatic. Normocephalic. No obvious abnormality  -- Eyes: Pupils are equal and reactive to light. Normal conjunctiva and lids  -- Cardiac: Normal rate, regular rhythm and normal heart sounds  -- Pulmonary: Normal respiratory effort, breath sounds clear to auscultation  -- Abdominal: Soft, no tenderness. Normal bowel sounds. Normal liver edge  -- Musculoskeletal: Normal range of motion, no effusions. Joints stable   -- Neurological: No focal deficits. Showed good interaction with staff  -- Vascular: Posterior tibial, dorsalis pedis and radial pulses 2+ bilaterally      Emergency Room Course      Lab Results     K 4.0    (H)   CO2 22 (L)   BUN 13   CREATININE 0.7   GLU 84   ALKPHOS 77   AST 20   ALT 14    BILITOT 2.2 (H)   ALBUMIN 4.0   PROT 7.2   WBC 10.07   HGB 12.5   HCT 39.3      CPK 44   CPK 44   CPKMB 0.7   TROPONINI 0.025   INR 2.4 (H)    (H)   DDIMER 0.19     EKG  -- The EKG findings today were without concerning findings from baseline     Radiology  -- Chest x-ray showed no infiltrate and showed no acute pathology  -- The PE CT was negative for pulmonary embolism or aortic dissection     Medications Given  iohexoL (OMNIPAQUE 350) injection 75 mL (75 mLs Intravenous Given 10/26/20 1335)   meperidine (PF) injection 25 mg (25 mg Intravenous Given 10/26/20 1522)   ondansetron injection 4 mg (4 mg Intravenous Given 10/26/20 1524)     ED Physician Management  -- Diagnosis management comments: 70 y.o. female with chest wall pain  -- patient with chest wall pain, negative workup in the ER this afternoon  -- patient with negative troponins, negative CT PE study, stable CXR  -- IV Demerol given the ER completely dissipated her pain in the ER now  -- patient has no evidence of cardiac issue, musculoskeletal on exam  -- patient has longstanding issues with chronic pain and fibromyalgia  -- patient now pain free/asymptomatic, will follow up with PCP outpatient  -- return to the ER with any concerning symptoms after discharge today    Diagnosis  [R07.89] Chest wall pain (Primary)    Disposition and Plan  -- Disposition: home  -- Condition: stable  -- Follow-up: Patient to follow up with Neeru Hinkle MD in 1-2 days.  -- I advised the patient that we have found no life threatening condition today  -- At this time, I believe the patient is clinically stable for discharge.   -- The patient acknowledges that close follow up with a MD is required   -- Patient agrees to comply with all instruction and direction given in the ER    This note is dictated on M*Modal word recognition program.  There are word recognition mistakes that are occasionally missed on review.         Louis Starks MD  10/26/20  [id number]

## 2020-10-29 ENCOUNTER — OFFICE VISIT (OUTPATIENT)
Dept: INTERNAL MEDICINE | Facility: CLINIC | Age: 70
End: 2020-10-29
Payer: MEDICARE

## 2020-10-29 VITALS
SYSTOLIC BLOOD PRESSURE: 150 MMHG | DIASTOLIC BLOOD PRESSURE: 102 MMHG | HEART RATE: 60 BPM | HEIGHT: 65 IN | WEIGHT: 176.56 LBS | TEMPERATURE: 99 F | RESPIRATION RATE: 16 BRPM | BODY MASS INDEX: 29.42 KG/M2 | OXYGEN SATURATION: 98 %

## 2020-10-29 DIAGNOSIS — I70.0 AORTIC ATHEROSCLEROSIS: ICD-10-CM

## 2020-10-29 DIAGNOSIS — Z79.01 LONG TERM (CURRENT) USE OF ANTICOAGULANTS: ICD-10-CM

## 2020-10-29 DIAGNOSIS — I48.0 PAROXYSMAL ATRIAL FIBRILLATION: ICD-10-CM

## 2020-10-29 DIAGNOSIS — I10 ESSENTIAL HYPERTENSION: ICD-10-CM

## 2020-10-29 DIAGNOSIS — D68.61 ANTIPHOSPHOLIPID SYNDROME: ICD-10-CM

## 2020-10-29 DIAGNOSIS — Q24.5 MYOCARDIAL BRIDGE: ICD-10-CM

## 2020-10-29 DIAGNOSIS — I42.8 CARDIOMYOPATHY, NONISCHEMIC: ICD-10-CM

## 2020-10-29 DIAGNOSIS — G44.52 NEW DAILY PERSISTENT HEADACHE: Primary | ICD-10-CM

## 2020-10-29 PROCEDURE — 99214 PR OFFICE/OUTPT VISIT, EST, LEVL IV, 30-39 MIN: ICD-10-PCS | Mod: HCNC,25,S$GLB, | Performed by: NURSE PRACTITIONER

## 2020-10-29 PROCEDURE — 1159F MED LIST DOCD IN RCRD: CPT | Mod: HCNC,S$GLB,, | Performed by: NURSE PRACTITIONER

## 2020-10-29 PROCEDURE — 96372 PR INJECTION,THERAP/PROPH/DIAG2ST, IM OR SUBCUT: ICD-10-PCS | Mod: HCNC,S$GLB,, | Performed by: NURSE PRACTITIONER

## 2020-10-29 PROCEDURE — 3077F SYST BP >= 140 MM HG: CPT | Mod: HCNC,CPTII,S$GLB, | Performed by: NURSE PRACTITIONER

## 2020-10-29 PROCEDURE — 3008F BODY MASS INDEX DOCD: CPT | Mod: HCNC,CPTII,S$GLB, | Performed by: NURSE PRACTITIONER

## 2020-10-29 PROCEDURE — 99999 PR PBB SHADOW E&M-EST. PATIENT-LVL V: CPT | Mod: PBBFAC,HCNC,, | Performed by: NURSE PRACTITIONER

## 2020-10-29 PROCEDURE — 99214 OFFICE O/P EST MOD 30 MIN: CPT | Mod: HCNC,25,S$GLB, | Performed by: NURSE PRACTITIONER

## 2020-10-29 PROCEDURE — 1125F PR PAIN SEVERITY QUANTIFIED, PAIN PRESENT: ICD-10-PCS | Mod: HCNC,S$GLB,, | Performed by: NURSE PRACTITIONER

## 2020-10-29 PROCEDURE — 3080F DIAST BP >= 90 MM HG: CPT | Mod: HCNC,CPTII,S$GLB, | Performed by: NURSE PRACTITIONER

## 2020-10-29 PROCEDURE — 3008F PR BODY MASS INDEX (BMI) DOCUMENTED: ICD-10-PCS | Mod: HCNC,CPTII,S$GLB, | Performed by: NURSE PRACTITIONER

## 2020-10-29 PROCEDURE — 3080F PR MOST RECENT DIASTOLIC BLOOD PRESSURE >= 90 MM HG: ICD-10-PCS | Mod: HCNC,CPTII,S$GLB, | Performed by: NURSE PRACTITIONER

## 2020-10-29 PROCEDURE — 3077F PR MOST RECENT SYSTOLIC BLOOD PRESSURE >= 140 MM HG: ICD-10-PCS | Mod: HCNC,CPTII,S$GLB, | Performed by: NURSE PRACTITIONER

## 2020-10-29 PROCEDURE — 1125F AMNT PAIN NOTED PAIN PRSNT: CPT | Mod: HCNC,S$GLB,, | Performed by: NURSE PRACTITIONER

## 2020-10-29 PROCEDURE — 1101F PT FALLS ASSESS-DOCD LE1/YR: CPT | Mod: HCNC,CPTII,S$GLB, | Performed by: NURSE PRACTITIONER

## 2020-10-29 PROCEDURE — 96372 THER/PROPH/DIAG INJ SC/IM: CPT | Mod: HCNC,S$GLB,, | Performed by: NURSE PRACTITIONER

## 2020-10-29 PROCEDURE — 1101F PR PT FALLS ASSESS DOC 0-1 FALLS W/OUT INJ PAST YR: ICD-10-PCS | Mod: HCNC,CPTII,S$GLB, | Performed by: NURSE PRACTITIONER

## 2020-10-29 PROCEDURE — 1159F PR MEDICATION LIST DOCUMENTED IN MEDICAL RECORD: ICD-10-PCS | Mod: HCNC,S$GLB,, | Performed by: NURSE PRACTITIONER

## 2020-10-29 PROCEDURE — 99999 PR PBB SHADOW E&M-EST. PATIENT-LVL V: ICD-10-PCS | Mod: PBBFAC,HCNC,, | Performed by: NURSE PRACTITIONER

## 2020-10-29 RX ORDER — BUTALBITAL, ACETAMINOPHEN AND CAFFEINE 50; 325; 40 MG/1; MG/1; MG/1
1 TABLET ORAL EVERY 6 HOURS PRN
Qty: 28 TABLET | Refills: 0 | Status: SHIPPED | OUTPATIENT
Start: 2020-10-29 | End: 2020-11-06

## 2020-10-29 RX ORDER — IBUPROFEN 800 MG/1
TABLET ORAL
COMMUNITY
Start: 2020-10-26 | End: 2021-02-09

## 2020-10-29 RX ORDER — PREDNISONE 10 MG/1
TABLET ORAL
Qty: 15 TABLET | Refills: 0 | Status: SHIPPED | OUTPATIENT
Start: 2020-10-29 | End: 2020-11-06

## 2020-10-29 RX ORDER — ONDANSETRON 2 MG/ML
4 INJECTION INTRAMUSCULAR; INTRAVENOUS ONCE
Status: COMPLETED | OUTPATIENT
Start: 2020-10-29 | End: 2020-10-29

## 2020-10-29 RX ORDER — METHYLPREDNISOLONE ACETATE 40 MG/ML
40 INJECTION, SUSPENSION INTRA-ARTICULAR; INTRALESIONAL; INTRAMUSCULAR; SOFT TISSUE
Status: COMPLETED | OUTPATIENT
Start: 2020-10-29 | End: 2020-10-29

## 2020-10-29 RX ORDER — NORTRIPTYLINE HYDROCHLORIDE 10 MG/1
10 CAPSULE ORAL NIGHTLY
Qty: 30 CAPSULE | Refills: 0 | Status: SHIPPED | OUTPATIENT
Start: 2020-10-29 | End: 2020-12-02

## 2020-10-29 RX ORDER — LOSARTAN POTASSIUM 50 MG/1
100 TABLET ORAL DAILY
Qty: 90 TABLET | Refills: 0
Start: 2020-10-29 | End: 2021-02-09

## 2020-10-29 RX ADMIN — METHYLPREDNISOLONE ACETATE 40 MG: 40 INJECTION, SUSPENSION INTRA-ARTICULAR; INTRALESIONAL; INTRAMUSCULAR; SOFT TISSUE at 04:10

## 2020-10-29 RX ADMIN — ONDANSETRON 4 MG: 2 INJECTION INTRAMUSCULAR; INTRAVENOUS at 04:10

## 2020-10-29 NOTE — PROGRESS NOTES
Subjective:           Patient ID: Celine Sinclair is a 70 y.o. female.    Chief Complaint: Headache and Generalized Body Aches    Ms. Celine Sinclair is a 70 y.o. female, new to me, known to fellow providers. PMHx HTN, chest pain, aortic atherosclerosis, antiphospholid syndrome, paroxysmal afib, cataracts, myocardial bridge, long-term use of anticoagulants, and Crohn's.  Here today for c/o headaches x3 days  Wrap around entire head, worse with movement, naomi rapid movement  + photosensitivity and sound sensitivity. Looks miserable. Lights turned off after exam. Denies nausea at present   Hx migraines - took ibuprofen and tylenol with no relief      BP today 150/102  Takes BP at home, when not in pain usually SBP runs around 130-140.  On Norvasc, losartan, and imdur.    152/92 on recheck      Seen by Dr. Starks in ED for chest pain 10/26 - has not had ER follow-up yet.  70 y.o. female with chest wall pain  -- patient with chest wall pain, negative workup in the ER   -- patient with negative troponins, negative CT PE study, stable CXR  -- IV Demerol given the ER completely dissipated her pain in the ER  -- patient has no evidence of cardiac issue, musculoskeletal on exam  -- patient has longstanding issues with chronic pain and fibromyalgia  -- patient now pain free/asymptomatic, will follow up with PCP outpatient  -- return to the ER with any concerning symptoms after discharge today  12 lead EKG -   Sinus bradycardia with Premature supraventricular complexes   Nonspecific ST and T wave abnormality   Prolonged QT   Abnormal ECG   Today c/o continue chest pain associated with headache.       Headache   This is a new problem. The current episode started in the past 7 days. The problem occurs constantly. The problem has been gradually worsening. The pain is located in the bilateral region. The pain radiates to the left neck, upper back and left shoulder. The pain quality is similar to prior headaches. The quality of the  pain is described as stabbing, throbbing, pulsating and band-like. The pain is at a severity of 10/10. The pain is severe. Associated symptoms include back pain, blurred vision, dizziness, insomnia, nausea, neck pain, phonophobia, photophobia, scalp tenderness, sinus pressure, a sore throat, tinnitus, a visual change and weakness. Pertinent negatives include no abdominal pain, coughing, ear pain, facial sweating, fever, hearing loss, loss of balance, numbness, seizures, tingling or vomiting. The symptoms are aggravated by activity, coughing, bright light, exposure to cold air, emotional stress, fatigue, sneezing, weather changes, noise and work. She has tried NSAIDs and injectable narcotics (demerol inj in ED 10/26) for the symptoms. The treatment provided moderate (with demerol) relief. Her past medical history is significant for hypertension and migraine headaches. There is no history of cancer, recent head traumas or sinus disease.     Review of Systems   Constitutional: Negative for chills, diaphoresis, fatigue and fever.   HENT: Positive for sinus pressure, sinus pain, sore throat and tinnitus. Negative for congestion, ear discharge, ear pain, facial swelling and hearing loss.    Eyes: Positive for blurred vision and photophobia.   Respiratory: Negative for cough.    Cardiovascular: Positive for chest pain. Negative for palpitations and leg swelling.   Gastrointestinal: Positive for nausea. Negative for abdominal pain and vomiting.   Musculoskeletal: Positive for back pain and neck pain.   Neurological: Positive for dizziness, weakness and headaches. Negative for tingling, seizures, facial asymmetry, speech difficulty, numbness and loss of balance.   Psychiatric/Behavioral: Negative for confusion. The patient has insomnia.        Objective:      Physical Exam  Vitals signs reviewed.   Constitutional:       General: She is not in acute distress.     Appearance: She is well-developed.   HENT:      Head:  Normocephalic and atraumatic.      Right Ear: External ear normal.      Left Ear: External ear normal.      Nose: Nose normal.      Mouth/Throat:      Mouth: Mucous membranes are moist.      Pharynx: Oropharynx is clear.   Eyes:      General:         Right eye: No discharge.         Left eye: No discharge.      Extraocular Movements: Extraocular movements intact.      Conjunctiva/sclera: Conjunctivae normal.      Pupils: Pupils are equal, round, and reactive to light.   Neck:      Musculoskeletal: Neck supple.      Thyroid: No thyromegaly.   Cardiovascular:      Rate and Rhythm: Normal rate and regular rhythm.      Heart sounds: No murmur.   Pulmonary:      Effort: Pulmonary effort is normal. No respiratory distress.      Breath sounds: Normal breath sounds. No wheezing or rales.   Chest:      Chest wall: Tenderness present.   Abdominal:      General: Bowel sounds are normal. There is no distension.      Palpations: Abdomen is soft.      Tenderness: There is no abdominal tenderness.   Lymphadenopathy:      Cervical: No cervical adenopathy.   Skin:     General: Skin is warm and dry.   Neurological:      Mental Status: She is alert and oriented to person, place, and time.      Cranial Nerves: No cranial nerve deficit.   Psychiatric:         Behavior: Behavior normal.         Assessment:       1. New daily persistent headache    2. Essential hypertension    3. Antiphospholipid syndrome    4. Paroxysmal atrial fibrillation    5. Long term (current) use of anticoagulants    6. Aortic atherosclerosis    7. Cardiomyopathy, nonischemic    8. Myocardial bridge        Plan:   Ada was seen today for headache and generalized body aches.    Diagnoses and all orders for this visit:    New daily persistent headache    Essential hypertension    Antiphospholipid syndrome    Paroxysmal atrial fibrillation    Long term (current) use of anticoagulants    Aortic atherosclerosis    Cardiomyopathy, nonischemic    Myocardial  bridge      Problem List Items Addressed This Visit        Cardiac/Vascular    Essential hypertension    Cardiomyopathy, nonischemic    Myocardial bridge    Paroxysmal atrial fibrillation    Aortic atherosclerosis       Hematology    Antiphospholipid syndrome    Long term (current) use of anticoagulants      Other Visit Diagnoses     New daily persistent headache    -  Primary      Headache hygiene reviewed; need to eat at least 3 meals per day at regular intervals, including breakfast and lunch.   Encourage at least 8 hours of uninterrupted sleep.   Turn electronics off 1-2 hours before bedtime   Limit caffeinated beverages   Take Fioricet at onset of headache  Nortriptyline nightly for prevention  Depomedrol inj and zofran inj today in clinic.  Prednisone taper for home.  Move cards appnt sooner; ? HTN casing headaches or headache causing ^BP   Will increase losartan to 100mg for now; chose not to increase BB as HR 60  F/u 1 week with Dr Hinkle.  To ER for worsening headache, SBP >170, worsening chest pain

## 2020-10-29 NOTE — PATIENT INSTRUCTIONS

## 2020-11-03 ENCOUNTER — TELEPHONE (OUTPATIENT)
Dept: CARDIOLOGY | Facility: CLINIC | Age: 70
End: 2020-11-03

## 2020-11-03 ENCOUNTER — PATIENT OUTREACH (OUTPATIENT)
Dept: ADMINISTRATIVE | Facility: OTHER | Age: 70
End: 2020-11-03

## 2020-11-03 ENCOUNTER — ANTI-COAG VISIT (OUTPATIENT)
Dept: CARDIOLOGY | Facility: CLINIC | Age: 70
End: 2020-11-03
Payer: MEDICARE

## 2020-11-03 DIAGNOSIS — R00.2 PALPITATION: Primary | ICD-10-CM

## 2020-11-03 DIAGNOSIS — D68.61 ANTIPHOSPHOLIPID SYNDROME: ICD-10-CM

## 2020-11-03 DIAGNOSIS — Z79.01 LONG TERM (CURRENT) USE OF ANTICOAGULANTS: ICD-10-CM

## 2020-11-03 DIAGNOSIS — I48.0 PAROXYSMAL ATRIAL FIBRILLATION: ICD-10-CM

## 2020-11-03 PROCEDURE — 93793 PR ANTICOAGULANT MGMT FOR PT TAKING WARFARIN: ICD-10-PCS | Mod: S$GLB,,,

## 2020-11-03 PROCEDURE — 93793 ANTICOAG MGMT PT WARFARIN: CPT | Mod: S$GLB,,,

## 2020-11-03 NOTE — PROGRESS NOTES
Requested updates within Care Everywhere.  Patient's chart was reviewed for overdue PAUL topics.  Immunizations reconciled.

## 2020-11-03 NOTE — PROGRESS NOTES
INR very high. Pt reports 'ER on 10/26/20 for migraines and was told to take her coumadin as 5 mg on that Mon, 10 mg on Tues, 15 mg on Wed, 20 mg on Thurs, 5 mg on Fri and pt missed Saturday and Sunday doses. Pt was given Rxs of prednisone, ibuprofen, and Fioricet.'    She took a very high dose of coumadin. I am wondering if she got her coumadin dosing confused with prednisone load. Nevertheless, we will hold coumadin and plan for repeat INR tomorrow. Patient needs to know to call us in the future if ANYONE else adjusts her coumadin dose and to call with changes in meds.

## 2020-11-04 ENCOUNTER — HOSPITAL ENCOUNTER (OUTPATIENT)
Dept: CARDIOLOGY | Facility: CLINIC | Age: 70
Discharge: HOME | End: 2020-11-04
Payer: MEDICARE

## 2020-11-04 ENCOUNTER — OFFICE VISIT (OUTPATIENT)
Dept: CARDIOLOGY | Facility: CLINIC | Age: 70
End: 2020-11-04
Payer: MEDICARE

## 2020-11-04 ENCOUNTER — ANTI-COAG VISIT (OUTPATIENT)
Dept: CARDIOLOGY | Facility: CLINIC | Age: 70
End: 2020-11-04

## 2020-11-04 VITALS
HEIGHT: 65 IN | OXYGEN SATURATION: 98 % | SYSTOLIC BLOOD PRESSURE: 174 MMHG | WEIGHT: 178.13 LBS | BODY MASS INDEX: 29.68 KG/M2 | DIASTOLIC BLOOD PRESSURE: 90 MMHG | HEART RATE: 53 BPM

## 2020-11-04 DIAGNOSIS — Z79.01 LONG TERM (CURRENT) USE OF ANTICOAGULANTS: ICD-10-CM

## 2020-11-04 DIAGNOSIS — D68.61 ANTIPHOSPHOLIPID SYNDROME: ICD-10-CM

## 2020-11-04 DIAGNOSIS — I27.20 HYPERTENSIVE PULMONARY VASCULAR DISEASE: ICD-10-CM

## 2020-11-04 DIAGNOSIS — R07.89 CHEST TIGHTNESS: ICD-10-CM

## 2020-11-04 DIAGNOSIS — I70.0 AORTIC ATHEROSCLEROSIS: ICD-10-CM

## 2020-11-04 DIAGNOSIS — I49.1 APC (ATRIAL PREMATURE CONTRACTIONS): ICD-10-CM

## 2020-11-04 DIAGNOSIS — R07.89 NON-CARDIAC CHEST PAIN: Primary | ICD-10-CM

## 2020-11-04 DIAGNOSIS — Q24.5 MYOCARDIAL BRIDGE: ICD-10-CM

## 2020-11-04 DIAGNOSIS — I49.3 FREQUENT PVCS: ICD-10-CM

## 2020-11-04 DIAGNOSIS — Z79.899 LONG TERM CURRENT USE OF AMIODARONE: ICD-10-CM

## 2020-11-04 DIAGNOSIS — Z95.818 STATUS POST PLACEMENT OF IMPLANTABLE LOOP RECORDER: ICD-10-CM

## 2020-11-04 DIAGNOSIS — R00.2 PALPITATIONS: ICD-10-CM

## 2020-11-04 DIAGNOSIS — I48.0 PAROXYSMAL ATRIAL FIBRILLATION: ICD-10-CM

## 2020-11-04 DIAGNOSIS — I47.29 POLYMORPHIC VENTRICULAR TACHYCARDIA: ICD-10-CM

## 2020-11-04 DIAGNOSIS — I10 ESSENTIAL HYPERTENSION: ICD-10-CM

## 2020-11-04 DIAGNOSIS — R00.2 PALPITATION: ICD-10-CM

## 2020-11-04 PROCEDURE — 1101F PT FALLS ASSESS-DOCD LE1/YR: CPT | Mod: HCNC,CPTII,S$GLB, | Performed by: INTERNAL MEDICINE

## 2020-11-04 PROCEDURE — 99999 PR PBB SHADOW E&M-EST. PATIENT-LVL III: ICD-10-PCS | Mod: PBBFAC,HCNC,, | Performed by: INTERNAL MEDICINE

## 2020-11-04 PROCEDURE — 93000 ELECTROCARDIOGRAM COMPLETE: CPT | Mod: HCNC,S$GLB,, | Performed by: INTERNAL MEDICINE

## 2020-11-04 PROCEDURE — 3080F PR MOST RECENT DIASTOLIC BLOOD PRESSURE >= 90 MM HG: ICD-10-PCS | Mod: HCNC,CPTII,S$GLB, | Performed by: INTERNAL MEDICINE

## 2020-11-04 PROCEDURE — 99214 PR OFFICE/OUTPT VISIT, EST, LEVL IV, 30-39 MIN: ICD-10-PCS | Mod: HCNC,S$GLB,, | Performed by: INTERNAL MEDICINE

## 2020-11-04 PROCEDURE — 99214 OFFICE O/P EST MOD 30 MIN: CPT | Mod: HCNC,S$GLB,, | Performed by: INTERNAL MEDICINE

## 2020-11-04 PROCEDURE — 3008F BODY MASS INDEX DOCD: CPT | Mod: HCNC,CPTII,S$GLB, | Performed by: INTERNAL MEDICINE

## 2020-11-04 PROCEDURE — 1126F AMNT PAIN NOTED NONE PRSNT: CPT | Mod: HCNC,S$GLB,, | Performed by: INTERNAL MEDICINE

## 2020-11-04 PROCEDURE — 99999 PR PBB SHADOW E&M-EST. PATIENT-LVL III: CPT | Mod: PBBFAC,HCNC,, | Performed by: INTERNAL MEDICINE

## 2020-11-04 PROCEDURE — 93000 EKG 12-LEAD: ICD-10-PCS | Mod: HCNC,S$GLB,, | Performed by: INTERNAL MEDICINE

## 2020-11-04 PROCEDURE — 1159F PR MEDICATION LIST DOCUMENTED IN MEDICAL RECORD: ICD-10-PCS | Mod: HCNC,S$GLB,, | Performed by: INTERNAL MEDICINE

## 2020-11-04 PROCEDURE — 3080F DIAST BP >= 90 MM HG: CPT | Mod: HCNC,CPTII,S$GLB, | Performed by: INTERNAL MEDICINE

## 2020-11-04 PROCEDURE — 3077F PR MOST RECENT SYSTOLIC BLOOD PRESSURE >= 140 MM HG: ICD-10-PCS | Mod: HCNC,CPTII,S$GLB, | Performed by: INTERNAL MEDICINE

## 2020-11-04 PROCEDURE — 1159F MED LIST DOCD IN RCRD: CPT | Mod: HCNC,S$GLB,, | Performed by: INTERNAL MEDICINE

## 2020-11-04 PROCEDURE — 1126F PR PAIN SEVERITY QUANTIFIED, NO PAIN PRESENT: ICD-10-PCS | Mod: HCNC,S$GLB,, | Performed by: INTERNAL MEDICINE

## 2020-11-04 PROCEDURE — 1101F PR PT FALLS ASSESS DOC 0-1 FALLS W/OUT INJ PAST YR: ICD-10-PCS | Mod: HCNC,CPTII,S$GLB, | Performed by: INTERNAL MEDICINE

## 2020-11-04 PROCEDURE — 3008F PR BODY MASS INDEX (BMI) DOCUMENTED: ICD-10-PCS | Mod: HCNC,CPTII,S$GLB, | Performed by: INTERNAL MEDICINE

## 2020-11-04 PROCEDURE — 3077F SYST BP >= 140 MM HG: CPT | Mod: HCNC,CPTII,S$GLB, | Performed by: INTERNAL MEDICINE

## 2020-11-04 NOTE — PROGRESS NOTES
Subjective:    Patient ID:  Celine Sinclair is a 70 y.o. female who presents for follow-up of Chest Pain, Shortness of Breath, and Dizziness      HPI   The patient has been under the care of Dr Mendoza with paroxysmal atrial fibrillation and atrial flutter, HTN, PVCs, PACs, NICM (EF 40% with recovery to 50%), LAD myocardia bridge, aortic atherosclerosis, Paget's, RA, Crohn's , fibromyalgia and antiphospholipid disorder.She presents with daily palpitations and sharp chest pains. She has a ILR placed 2 years ago at [facility] but has not been interrogated recently , She was seen in the ED 10/26/20 and EKG was sinus as it was today. She point to her left lower para sternal as location of pain,      FINDINGS:  no pulmonary embolus is identified. There is no evidence for aortic aneurysm.  The thyroid appears normal.  The main central airways are patent.  Esophagus appears normal.  Heart is normal in size.  Calcifications of the aortic valve and mitral annulus.  Calcified atheromatous disease affects the aorta and its branch vessels.  No pericardial effusion.  No mediastinal, hilar or axillary adenopathy is seen.     There is bibasilar subsegmental atelectasis.  No basilar consolidation or pleural effusions.  Calcified pleural plaque is seen on the left.     Limited evaluation of the upper abdominal structures show no gross abnormality.     Age-appropriate degenerative changes affect the skeleton.     Impression:     No pulmonary embolus is identified.     Left clear lungs.  Calcified left pleural plaques, similar to before.        Electronically signed by: Vanessa Sol MD  Date:                                            10/26/2020  Time:              Summary       · LVEDP (Pre): 16  · The ejection fraction is 30-40% by visual estimate.  · LVEDP (Post): 17  · No mitral valve regurgitation.  · There is moderate left ventricular systolic dysfunction.  · Estimated blood loss: none  · Nortmal coronaries.  · Moderate left  ventricular dysfunction.     I certify that I was present for catheter insertion, catheter manipulation, angiography, and angiographic interpretation of this patient.     Procedure Log documented by No documenter listed and verified by Miky Keita.     Date: 2/15/2019  Time: 3:40 PM     .  Lab Results   Component Value Date     10/26/2020    K 4.0 10/26/2020     (H) 10/26/2020    CO2 22 (L) 10/26/2020    BUN 13 10/26/2020    CREATININE 0.7 10/26/2020    GLU 84 10/26/2020    MG 1.8 06/18/2020    AST 20 10/26/2020    ALT 14 10/26/2020    ALBUMIN 4.0 10/26/2020    PROT 7.2 10/26/2020    BILITOT 2.2 (H) 10/26/2020    WBC 10.07 10/26/2020    HGB 12.5 10/26/2020    HCT 39.3 10/26/2020    MCV 85 10/26/2020     10/26/2020    INR 4.9 (H) 11/04/2020    TSH 0.522 09/25/2020         Lab Results   Component Value Date    CHOL 197 06/18/2020    HDL 69 06/18/2020    TRIG 86 06/18/2020       Lab Results   Component Value Date    LDLCALC 110.8 06/18/2020       Past Medical History:   Diagnosis Date    Aortic atherosclerosis     Benign essential hypertension     Cataract     Senile    Crohn's colitis     Depression, major, recurrent, moderate     Fibromyalgia     GERD (gastroesophageal reflux disease)     Hypertensive cardiomyopathy     IBS (irritable bowel syndrome)     Migraine     Opioid abuse, in remission     Osteopenia     Paget disease of bone     Port-A-Cath in place     right chest    Prolonged QT interval     RA (rheumatoid arthritis)     Sedative hypnotic or anxiolytic dependence     in remission        Current Outpatient Medications:     acetaminophen (TYLENOL) 500 MG tablet, Take 500 mg by mouth daily as needed. , Disp: , Rfl:     amiodarone (PACERONE) 200 MG Tab, Take 200 mg by mouth 2 (two) times daily., Disp: , Rfl: 3    amLODIPine (NORVASC) 5 MG tablet, Take 1 tablet (5 mg total) by mouth once daily., Disp: 90 tablet, Rfl: 3    butalbital-acetaminophen-caffeine -40 mg  (FIORICET, ESGIC) -40 mg per tablet, Take 1 tablet by mouth every 6 (six) hours as needed for Headaches (migraine headaches)., Disp: 28 tablet, Rfl: 0    diphenoxylate-atropine 2.5-0.025 mg (LOMOTIL) 2.5-0.025 mg per tablet, Take 1 tablet by mouth 4 (four) times daily as needed for Diarrhea. Takes 2 tablets twice a day when needed, Disp: , Rfl:     DULoxetine (CYMBALTA) 30 MG capsule, Take 30 mg by mouth once daily. , Disp: , Rfl:     ibuprofen (ADVIL,MOTRIN) 800 MG tablet, , Disp: , Rfl:     INCONTINENCE PAD,LINER,DISP (BLADDER CONTROL PADS EX ABSORB MISC), , Disp: , Rfl:     isosorbide mononitrate (IMDUR) 30 MG 24 hr tablet, Take 1 tablet (30 mg total) by mouth once daily., Disp: 30 tablet, Rfl: 11    loperamide (IMODIUM) 2 mg capsule, Take 2 mg by mouth 4 (four) times daily as needed for Diarrhea., Disp: , Rfl:     losartan (COZAAR) 50 MG tablet, Take 2 tablets (100 mg total) by mouth once daily., Disp: 90 tablet, Rfl: 0    metoprolol succinate (TOPROL-XL) 50 MG 24 hr tablet, Take 1.5 tablets (75mg) daily., Disp: 90 tablet, Rfl: 3    nortriptyline (PAMELOR) 10 MG capsule, Take 1 capsule (10 mg total) by mouth every evening., Disp: 30 capsule, Rfl: 0    potassium chloride SA (K-DUR,KLOR-CON) 20 MEQ tablet, TK 1 T PO  ONCE D, Disp: , Rfl: 3    predniSONE (DELTASONE) 10 MG tablet, Start tomorrow Take by oral route 5 tablets on day 1, then 4 tabs day 2, then 3 tablets day 3, , 2 tablets day4, then 1 tablet day 5, Disp: 15 tablet, Rfl: 0    traMADoL (ULTRAM) 50 mg tablet, Take 1 tablet (50 mg total) by mouth every 8 (eight) hours as needed for Pain., Disp: 60 tablet, Rfl: 0    traZODone (DESYREL) 50 MG tablet, Take 1 tablet (50 mg total) by mouth nightly as needed., Disp: 30 tablet, Rfl: 5    warfarin (COUMADIN) 5 MG tablet, Take 1 tablet (5 mg total) by mouth Daily., Disp: 30 tablet, Rfl: 11          Review of Systems   Constitution: Positive for malaise/fatigue. Negative for decreased appetite,  "diaphoresis, fever, weight gain and weight loss.   HENT: Negative for congestion, ear discharge, ear pain and nosebleeds.    Eyes: Negative for blurred vision, double vision and visual disturbance.   Cardiovascular: Positive for chest pain, dyspnea on exertion and palpitations. Negative for claudication, cyanosis, irregular heartbeat, leg swelling, near-syncope, orthopnea, paroxysmal nocturnal dyspnea and syncope.   Respiratory: Positive for shortness of breath. Negative for cough, hemoptysis, sleep disturbances due to breathing, snoring, sputum production and wheezing.    Endocrine: Negative for polydipsia, polyphagia and polyuria.   Hematologic/Lymphatic: Negative for adenopathy and bleeding problem. Does not bruise/bleed easily.   Skin: Negative for color change, nail changes, poor wound healing and rash.   Musculoskeletal: Positive for arthritis and joint pain. Negative for muscle cramps and muscle weakness.   Gastrointestinal: Negative for abdominal pain, anorexia, change in bowel habit, hematochezia, nausea and vomiting.   Genitourinary: Negative for dysuria, frequency and hematuria.   Neurological: Negative for brief paralysis, difficulty with concentration, excessive daytime sleepiness, dizziness, focal weakness, headaches, light-headedness, seizures, vertigo and weakness.   Psychiatric/Behavioral: Negative for altered mental status and depression.   Allergic/Immunologic: Negative for persistent infections.        Objective:BP (!) 174/90 (BP Location: Left arm, Patient Position: Sitting, BP Method: Large (Automatic))   Pulse (!) 53   Ht 5' 5" (1.651 m)   Wt 80.8 kg (178 lb 2.1 oz)   SpO2 98%   BMI 29.64 kg/m²             Physical Exam   Constitutional: She is oriented to person, place, and time. She appears well-developed and well-nourished.   HENT:   Head: Normocephalic.   Right Ear: External ear normal.   Left Ear: External ear normal.   Nose: Nose normal.   Inspection of lips, teeth and gums normal "   Eyes: Pupils are equal, round, and reactive to light. Conjunctivae and EOM are normal. No scleral icterus.   Neck: Normal range of motion. No JVD present. No tracheal deviation present. No thyromegaly present.   Cardiovascular: Normal rate, regular rhythm and intact distal pulses. Exam reveals no gallop and no friction rub.   Murmur heard.   Blowing midsystolic murmur is present with a grade of 2/6 at the upper right sternal border and upper left sternal border.  Pulses:       Dorsalis pedis pulses are 2+ on the right side and 2+ on the left side.        Posterior tibial pulses are 2+ on the right side and 2+ on the left side.   Pulmonary/Chest: Effort normal and breath sounds normal. No respiratory distress. She has no wheezes. She has no rales. She exhibits no tenderness.       Abdominal: Soft. Bowel sounds are normal. She exhibits no distension. There is no hepatosplenomegaly. There is no abdominal tenderness. There is no guarding.   Musculoskeletal: Normal range of motion.         General: No tenderness or edema.   Lymphadenopathy:   Palpation of lymph nodes of neck and groin normal   Neurological: She is oriented to person, place, and time. No cranial nerve deficit. She exhibits normal muscle tone. Coordination normal.   Skin: Skin is warm and dry. No rash noted. No erythema. No pallor.   Palpation of skin normal   Psychiatric: She has a normal mood and affect. Her behavior is normal. Judgment and thought content normal.         Assessment:       1. Non-cardiac chest pain    2. Paroxysmal atrial fibrillation    3. Essential hypertension    4. Polymorphic ventricular tachycardia    5. Frequent PVCs    6. Long term current use of amiodarone    7. Chest tightness    8. Aortic atherosclerosis    9. Antiphospholipid syndrome    10. Long term (current) use of anticoagulants    11. Hypertensive pulmonary vascular disease    12. APC (atrial premature contractions)    13. Myocardial bridge    14. Palpitations    15.  Status post placement of implantable loop recorder         Plan:       Ada was seen today for chest pain, shortness of breath and dizziness.    Diagnoses and all orders for this visit:    Non-cardiac chest pain    Paroxysmal atrial fibrillation  -     Ambulatory referral/consult to Electrophysiology; Future; Expected date: 11/11/2020    Essential hypertension    Polymorphic ventricular tachycardia    Frequent PVCs    Long term current use of amiodarone    Chest tightness    Aortic atherosclerosis    Antiphospholipid syndrome    Long term (current) use of anticoagulants    Hypertensive pulmonary vascular disease    APC (atrial premature contractions)    Myocardial bridge    Palpitations  -     Ambulatory referral/consult to Electrophysiology; Future; Expected date: 11/11/2020    Status post placement of implantable loop recorder  -     Ambulatory referral/consult to Electrophysiology; Future; Expected date: 11/11/2020

## 2020-11-05 ENCOUNTER — TELEPHONE (OUTPATIENT)
Dept: ELECTROPHYSIOLOGY | Facility: CLINIC | Age: 70
End: 2020-11-05

## 2020-11-06 ENCOUNTER — OFFICE VISIT (OUTPATIENT)
Dept: INTERNAL MEDICINE | Facility: CLINIC | Age: 70
End: 2020-11-06
Payer: MEDICARE

## 2020-11-06 ENCOUNTER — ANTI-COAG VISIT (OUTPATIENT)
Dept: CARDIOLOGY | Facility: CLINIC | Age: 70
End: 2020-11-06
Payer: MEDICARE

## 2020-11-06 ENCOUNTER — LAB VISIT (OUTPATIENT)
Dept: LAB | Facility: HOSPITAL | Age: 70
End: 2020-11-06
Attending: SURGERY
Payer: MEDICARE

## 2020-11-06 VITALS
OXYGEN SATURATION: 98 % | BODY MASS INDEX: 29.34 KG/M2 | RESPIRATION RATE: 17 BRPM | DIASTOLIC BLOOD PRESSURE: 92 MMHG | SYSTOLIC BLOOD PRESSURE: 152 MMHG | HEART RATE: 60 BPM | WEIGHT: 176.13 LBS | HEIGHT: 65 IN | TEMPERATURE: 97 F

## 2020-11-06 DIAGNOSIS — D68.61 ANTIPHOSPHOLIPID SYNDROME: ICD-10-CM

## 2020-11-06 DIAGNOSIS — Z79.01 LONG TERM (CURRENT) USE OF ANTICOAGULANTS: ICD-10-CM

## 2020-11-06 DIAGNOSIS — M79.7 FIBROMYALGIA: Primary | ICD-10-CM

## 2020-11-06 DIAGNOSIS — I10 BENIGN ESSENTIAL HTN: ICD-10-CM

## 2020-11-06 DIAGNOSIS — Z23 NEED FOR VACCINATION: ICD-10-CM

## 2020-11-06 DIAGNOSIS — I48.0 PAROXYSMAL ATRIAL FIBRILLATION: ICD-10-CM

## 2020-11-06 LAB
INR PPP: 3.5 (ref 0.8–1.2)
PROTHROMBIN TIME: 35.1 SEC (ref 9–12.5)

## 2020-11-06 PROCEDURE — 1159F MED LIST DOCD IN RCRD: CPT | Mod: HCNC,S$GLB,, | Performed by: INTERNAL MEDICINE

## 2020-11-06 PROCEDURE — 3008F BODY MASS INDEX DOCD: CPT | Mod: HCNC,CPTII,S$GLB, | Performed by: INTERNAL MEDICINE

## 2020-11-06 PROCEDURE — 1101F PT FALLS ASSESS-DOCD LE1/YR: CPT | Mod: HCNC,CPTII,S$GLB, | Performed by: INTERNAL MEDICINE

## 2020-11-06 PROCEDURE — 3008F PR BODY MASS INDEX (BMI) DOCUMENTED: ICD-10-PCS | Mod: HCNC,CPTII,S$GLB, | Performed by: INTERNAL MEDICINE

## 2020-11-06 PROCEDURE — 3080F PR MOST RECENT DIASTOLIC BLOOD PRESSURE >= 90 MM HG: ICD-10-PCS | Mod: HCNC,CPTII,S$GLB, | Performed by: INTERNAL MEDICINE

## 2020-11-06 PROCEDURE — 93793 PR ANTICOAGULANT MGMT FOR PT TAKING WARFARIN: ICD-10-PCS | Mod: S$GLB,,,

## 2020-11-06 PROCEDURE — 99999 PR PBB SHADOW E&M-EST. PATIENT-LVL V: ICD-10-PCS | Mod: PBBFAC,HCNC,, | Performed by: INTERNAL MEDICINE

## 2020-11-06 PROCEDURE — 3077F SYST BP >= 140 MM HG: CPT | Mod: HCNC,CPTII,S$GLB, | Performed by: INTERNAL MEDICINE

## 2020-11-06 PROCEDURE — 1101F PR PT FALLS ASSESS DOC 0-1 FALLS W/OUT INJ PAST YR: ICD-10-PCS | Mod: HCNC,CPTII,S$GLB, | Performed by: INTERNAL MEDICINE

## 2020-11-06 PROCEDURE — 3077F PR MOST RECENT SYSTOLIC BLOOD PRESSURE >= 140 MM HG: ICD-10-PCS | Mod: HCNC,CPTII,S$GLB, | Performed by: INTERNAL MEDICINE

## 2020-11-06 PROCEDURE — G0009 PNEUMOCOCCAL POLYSACCHARIDE VACCINE 23-VALENT =>2YO SQ IM: ICD-10-PCS | Mod: HCNC,S$GLB,, | Performed by: INTERNAL MEDICINE

## 2020-11-06 PROCEDURE — 1159F PR MEDICATION LIST DOCUMENTED IN MEDICAL RECORD: ICD-10-PCS | Mod: HCNC,S$GLB,, | Performed by: INTERNAL MEDICINE

## 2020-11-06 PROCEDURE — 90732 PPSV23 VACC 2 YRS+ SUBQ/IM: CPT | Mod: HCNC,S$GLB,, | Performed by: INTERNAL MEDICINE

## 2020-11-06 PROCEDURE — G0009 ADMIN PNEUMOCOCCAL VACCINE: HCPCS | Mod: HCNC,S$GLB,, | Performed by: INTERNAL MEDICINE

## 2020-11-06 PROCEDURE — 99999 PR PBB SHADOW E&M-EST. PATIENT-LVL V: CPT | Mod: PBBFAC,HCNC,, | Performed by: INTERNAL MEDICINE

## 2020-11-06 PROCEDURE — 1125F AMNT PAIN NOTED PAIN PRSNT: CPT | Mod: HCNC,S$GLB,, | Performed by: INTERNAL MEDICINE

## 2020-11-06 PROCEDURE — 99214 OFFICE O/P EST MOD 30 MIN: CPT | Mod: HCNC,25,S$GLB, | Performed by: INTERNAL MEDICINE

## 2020-11-06 PROCEDURE — 93793 ANTICOAG MGMT PT WARFARIN: CPT | Mod: S$GLB,,,

## 2020-11-06 PROCEDURE — 1125F PR PAIN SEVERITY QUANTIFIED, PAIN PRESENT: ICD-10-PCS | Mod: HCNC,S$GLB,, | Performed by: INTERNAL MEDICINE

## 2020-11-06 PROCEDURE — 99214 PR OFFICE/OUTPT VISIT, EST, LEVL IV, 30-39 MIN: ICD-10-PCS | Mod: HCNC,25,S$GLB, | Performed by: INTERNAL MEDICINE

## 2020-11-06 PROCEDURE — 3080F DIAST BP >= 90 MM HG: CPT | Mod: HCNC,CPTII,S$GLB, | Performed by: INTERNAL MEDICINE

## 2020-11-06 PROCEDURE — 90732 PNEUMOCOCCAL POLYSACCHARIDE VACCINE 23-VALENT =>2YO SQ IM: ICD-10-PCS | Mod: HCNC,S$GLB,, | Performed by: INTERNAL MEDICINE

## 2020-11-06 PROCEDURE — 85610 PROTHROMBIN TIME: CPT | Mod: HCNC

## 2020-11-06 PROCEDURE — 36415 COLL VENOUS BLD VENIPUNCTURE: CPT | Mod: HCNC

## 2020-11-06 RX ORDER — FUROSEMIDE 20 MG/1
20 TABLET ORAL
Status: ON HOLD | COMMUNITY
Start: 2020-07-30 | End: 2022-02-02 | Stop reason: HOSPADM

## 2020-11-06 RX ORDER — DULOXETIN HYDROCHLORIDE 60 MG/1
60 CAPSULE, DELAYED RELEASE ORAL DAILY
Qty: 30 CAPSULE | Refills: 11 | Status: SHIPPED | OUTPATIENT
Start: 2020-11-06 | End: 2021-08-27

## 2020-11-06 RX ORDER — AMLODIPINE BESYLATE 10 MG/1
10 TABLET ORAL DAILY
Qty: 30 TABLET | Refills: 11 | Status: SHIPPED | OUTPATIENT
Start: 2020-11-06 | End: 2021-10-26

## 2020-11-06 NOTE — PROGRESS NOTES
"Subjective:       Patient ID: Celine Sinclair is a 70 y.o. female.    Chief Complaint: Follow-up (1 wk)      HPI:  Patient is known to me and presents for follow up chest pains and headaches.     I saw her 10/26/2020 with chest pain. Sent to ER for PE r/o as h/o antiphospholipid syndrome with mild subtherapeutic INR, though her chest pain was reproducible. PE study negative. Trp negative. Did follow up with cardiology as well. She again seen by NP 10/29/2020 with headaches and diagnosed as migraine. Started on PRN fioricet and pamelor at night. She presents today for follow up. She reports her chest pain is resolved. She is still having intermittent headaches. Global and throbbing pain. She is unsure if the new medications have made any difference yet. 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       Past Medical History:   Diagnosis Date    Aortic atherosclerosis     Benign essential hypertension     Cataract     Senile    Crohn's colitis     Depression, major, recurrent, moderate     Fibromyalgia     GERD (gastroesophageal reflux disease)     Hypertensive cardiomyopathy     IBS (irritable bowel syndrome)     Migraine     Opioid abuse, in remission     Osteopenia     Paget disease of bone     Port-A-Cath in place     right chest    Prolonged QT interval     RA (rheumatoid arthritis)     Sedative hypnotic or anxiolytic dependence     in remission        Family History   Problem Relation Age of Onset    Glaucoma Paternal Grandmother     Other Daughter         Diabetic Retinopathy    Breast cancer Daughter 40    Retinal detachment Grandchild     Heart attack Maternal Grandmother     Colon cancer Maternal Grandmother     Cancer Mother         Unknown type    Emphysema Father 67    Heart disease Father     Lung cancer Sister     Heart " attack Sister     Breast cancer Daughter 47    Breast cancer Sister     Amblyopia Neg Hx     Blindness Neg Hx     Strabismus Neg Hx     Macular degeneration Neg Hx     Cataracts Neg Hx        Social History     Socioeconomic History    Marital status:      Spouse name: Not on file    Number of children: 12    Years of education: Not on file    Highest education level: Not on file   Occupational History    Occupation: [employer]     Comment: Retired/disabled   Social Needs    Financial resource strain: Not on file    Food insecurity     Worry: Not on file     Inability: Not on file    Transportation needs     Medical: Not on file     Non-medical: Not on file   Tobacco Use    Smoking status: Never Smoker    Smokeless tobacco: Never Used   Substance and Sexual Activity    Alcohol use: Yes     Comment: ocassional     Drug use: No    Sexual activity: Not on file   Lifestyle    Physical activity     Days per week: Not on file     Minutes per session: Not on file    Stress: Not on file   Relationships    Social connections     Talks on phone: Not on file     Gets together: Not on file     Attends Temple service: Not on file     Active member of club or organization: Not on file     Attends meetings of clubs or organizations: Not on file     Relationship status: Not on file   Other Topics Concern    Not on file   Social History Narrative    Patient gave birth to 7 children, adopted 2 and has 3 stepchildren with her .       Review of Systems   Constitutional: Negative for activity change, fatigue, fever and unexpected weight change.   HENT: Negative for congestion, ear pain, hearing loss, rhinorrhea and sore throat.    Eyes: Negative for pain, redness and visual disturbance.   Respiratory: Negative for cough, shortness of breath and wheezing.    Cardiovascular: Negative for chest pain, palpitations and leg swelling.   Gastrointestinal: Negative for abdominal pain, constipation,  diarrhea, nausea and vomiting.   Genitourinary: Negative for dysuria, frequency, pelvic pain and urgency.   Musculoskeletal: Negative for back pain, joint swelling and neck pain.   Skin: Negative for color change, rash and wound.   Neurological: Positive for headaches. Negative for dizziness, tremors, weakness and light-headedness.   Psychiatric/Behavioral: The patient is nervous/anxious.          Objective:      Physical Exam  Vitals signs reviewed.   Constitutional:       General: She is not in acute distress.     Appearance: She is well-developed.   HENT:      Head: Normocephalic and atraumatic.      Right Ear: External ear normal.      Left Ear: External ear normal.      Nose: Nose normal.   Eyes:      General:         Right eye: No discharge.         Left eye: No discharge.      Conjunctiva/sclera: Conjunctivae normal.      Pupils: Pupils are equal, round, and reactive to light.   Neck:      Musculoskeletal: Neck supple.      Thyroid: No thyromegaly.   Cardiovascular:      Rate and Rhythm: Normal rate and regular rhythm.      Heart sounds: No murmur.   Pulmonary:      Effort: Pulmonary effort is normal. No respiratory distress.      Breath sounds: Normal breath sounds. No wheezing or rales.   Abdominal:      General: Bowel sounds are normal. There is no distension.      Palpations: Abdomen is soft.      Tenderness: There is no abdominal tenderness.   Lymphadenopathy:      Cervical: No cervical adenopathy.   Skin:     General: Skin is warm and dry.   Neurological:      Mental Status: She is alert and oriented to person, place, and time.      Cranial Nerves: No cranial nerve deficit.   Psychiatric:         Behavior: Behavior normal.         Assessment:       1. Fibromyalgia    2. Benign essential HTN    3. Need for vaccination        Plan:       Ada was seen today for follow-up.    Diagnoses and all orders for this visit:    Fibromyalgia  -     DULoxetine (CYMBALTA) 60 MG capsule; Take 1 capsule (60 mg total) by  mouth once daily.  Uncontrolled  Chest pain likely MSK/fibro related  She is also dealing with more stress/anxiety sx  Trial of increasing cymblata 30mg to 60mg  Side effects of SSRI/SNRI discussed today  understands may take 4-8 weeks for full effect  Call me if any side effects    Benign essential HTN  -     amLODIPine (NORVASC) 10 MG tablet; Take 1 tablet (10 mg total) by mouth once daily.  Chronic uncontrolled  Cont losartan 100mg  Increase amlodipine to 10mg  Low Na diet  Exercise, weight loss  Check BP and keep log for next visit    Migraine vs HTN causing headaches. Treat HTN first    Need for vaccination  -     (In Office Administered) Pneumococcal Polysaccharide Vaccine (23 Valent) (SQ/IM)      RTC 8 weeks with BP log and PRN

## 2020-11-06 NOTE — PROGRESS NOTES
INR decreasing after overdose last week. Will hold another dose today then resume correct dose. Follow closely until stable

## 2020-11-10 ENCOUNTER — CLINICAL SUPPORT (OUTPATIENT)
Dept: CARDIOLOGY | Facility: HOSPITAL | Age: 70
End: 2020-11-10
Payer: MEDICARE

## 2020-11-10 DIAGNOSIS — Z95.818 PRESENCE OF OTHER CARDIAC IMPLANTS AND GRAFTS: ICD-10-CM

## 2020-11-10 PROCEDURE — 93298 CARDIAC DEVICE CHECK - REMOTE: ICD-10-PCS | Mod: HCNC,,, | Performed by: INTERNAL MEDICINE

## 2020-11-10 PROCEDURE — 93298 REM INTERROG DEV EVAL SCRMS: CPT | Mod: HCNC,,, | Performed by: INTERNAL MEDICINE

## 2020-11-10 PROCEDURE — G2066 INTER DEVC REMOTE 30D: HCPCS | Mod: HCNC | Performed by: INTERNAL MEDICINE

## 2020-11-13 ENCOUNTER — TELEPHONE (OUTPATIENT)
Dept: ELECTROPHYSIOLOGY | Facility: CLINIC | Age: 70
End: 2020-11-13
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
## 2020-11-16 ENCOUNTER — ANTI-COAG VISIT (OUTPATIENT)
Dept: CARDIOLOGY | Facility: CLINIC | Age: 70
End: 2020-11-16
Payer: MEDICARE

## 2020-11-16 ENCOUNTER — LAB VISIT (OUTPATIENT)
Dept: LAB | Facility: HOSPITAL | Age: 70
End: 2020-11-16
Payer: MEDICARE

## 2020-11-16 ENCOUNTER — OFFICE VISIT (OUTPATIENT)
Dept: ELECTROPHYSIOLOGY | Facility: CLINIC | Age: 70
End: 2020-11-16
Payer: MEDICARE

## 2020-11-16 VITALS
WEIGHT: 173.75 LBS | HEIGHT: 65 IN | SYSTOLIC BLOOD PRESSURE: 128 MMHG | DIASTOLIC BLOOD PRESSURE: 80 MMHG | HEART RATE: 54 BPM | BODY MASS INDEX: 28.95 KG/M2

## 2020-11-16 DIAGNOSIS — D68.61 ANTIPHOSPHOLIPID SYNDROME: ICD-10-CM

## 2020-11-16 DIAGNOSIS — R07.9 CHEST PAIN, UNSPECIFIED TYPE: ICD-10-CM

## 2020-11-16 DIAGNOSIS — I47.29 POLYMORPHIC VENTRICULAR TACHYCARDIA: ICD-10-CM

## 2020-11-16 DIAGNOSIS — I49.3 FREQUENT PVCS: ICD-10-CM

## 2020-11-16 DIAGNOSIS — I42.8 CARDIOMYOPATHY, NONISCHEMIC: Primary | ICD-10-CM

## 2020-11-16 DIAGNOSIS — Z79.01 LONG TERM (CURRENT) USE OF ANTICOAGULANTS: ICD-10-CM

## 2020-11-16 DIAGNOSIS — R00.2 PALPITATIONS: ICD-10-CM

## 2020-11-16 DIAGNOSIS — R07.9 CHEST PAIN: ICD-10-CM

## 2020-11-16 DIAGNOSIS — I48.0 PAROXYSMAL ATRIAL FIBRILLATION: ICD-10-CM

## 2020-11-16 DIAGNOSIS — Z95.818 STATUS POST PLACEMENT OF IMPLANTABLE LOOP RECORDER: ICD-10-CM

## 2020-11-16 DIAGNOSIS — I10 ESSENTIAL HYPERTENSION: ICD-10-CM

## 2020-11-16 LAB
INR PPP: 1.8 (ref 0.8–1.2)
PROTHROMBIN TIME: 19.8 SEC (ref 9–12.5)

## 2020-11-16 PROCEDURE — 93000 EKG 12-LEAD: ICD-10-PCS | Mod: HCNC,S$GLB,, | Performed by: INTERNAL MEDICINE

## 2020-11-16 PROCEDURE — 93793 ANTICOAG MGMT PT WARFARIN: CPT | Mod: S$GLB,,,

## 2020-11-16 PROCEDURE — 3288F PR FALLS RISK ASSESSMENT DOCUMENTED: ICD-10-PCS | Mod: HCNC,CPTII,S$GLB, | Performed by: NURSE PRACTITIONER

## 2020-11-16 PROCEDURE — 99999 PR PBB SHADOW E&M-EST. PATIENT-LVL V: CPT | Mod: PBBFAC,HCNC,, | Performed by: NURSE PRACTITIONER

## 2020-11-16 PROCEDURE — 93793 PR ANTICOAGULANT MGMT FOR PT TAKING WARFARIN: ICD-10-PCS | Mod: S$GLB,,,

## 2020-11-16 PROCEDURE — 1101F PT FALLS ASSESS-DOCD LE1/YR: CPT | Mod: HCNC,CPTII,S$GLB, | Performed by: NURSE PRACTITIONER

## 2020-11-16 PROCEDURE — 1159F MED LIST DOCD IN RCRD: CPT | Mod: HCNC,S$GLB,, | Performed by: NURSE PRACTITIONER

## 2020-11-16 PROCEDURE — 1125F PR PAIN SEVERITY QUANTIFIED, PAIN PRESENT: ICD-10-PCS | Mod: HCNC,S$GLB,, | Performed by: NURSE PRACTITIONER

## 2020-11-16 PROCEDURE — 1101F PR PT FALLS ASSESS DOC 0-1 FALLS W/OUT INJ PAST YR: ICD-10-PCS | Mod: HCNC,CPTII,S$GLB, | Performed by: NURSE PRACTITIONER

## 2020-11-16 PROCEDURE — 36415 COLL VENOUS BLD VENIPUNCTURE: CPT | Mod: HCNC

## 2020-11-16 PROCEDURE — 3074F SYST BP LT 130 MM HG: CPT | Mod: HCNC,CPTII,S$GLB, | Performed by: NURSE PRACTITIONER

## 2020-11-16 PROCEDURE — 3008F BODY MASS INDEX DOCD: CPT | Mod: HCNC,CPTII,S$GLB, | Performed by: NURSE PRACTITIONER

## 2020-11-16 PROCEDURE — 1159F PR MEDICATION LIST DOCUMENTED IN MEDICAL RECORD: ICD-10-PCS | Mod: HCNC,S$GLB,, | Performed by: NURSE PRACTITIONER

## 2020-11-16 PROCEDURE — 1125F AMNT PAIN NOTED PAIN PRSNT: CPT | Mod: HCNC,S$GLB,, | Performed by: NURSE PRACTITIONER

## 2020-11-16 PROCEDURE — 99214 PR OFFICE/OUTPT VISIT, EST, LEVL IV, 30-39 MIN: ICD-10-PCS | Mod: HCNC,S$GLB,, | Performed by: NURSE PRACTITIONER

## 2020-11-16 PROCEDURE — 99214 OFFICE O/P EST MOD 30 MIN: CPT | Mod: HCNC,S$GLB,, | Performed by: NURSE PRACTITIONER

## 2020-11-16 PROCEDURE — 3288F FALL RISK ASSESSMENT DOCD: CPT | Mod: HCNC,CPTII,S$GLB, | Performed by: NURSE PRACTITIONER

## 2020-11-16 PROCEDURE — 3079F DIAST BP 80-89 MM HG: CPT | Mod: HCNC,CPTII,S$GLB, | Performed by: NURSE PRACTITIONER

## 2020-11-16 PROCEDURE — 93000 ELECTROCARDIOGRAM COMPLETE: CPT | Mod: HCNC,S$GLB,, | Performed by: INTERNAL MEDICINE

## 2020-11-16 PROCEDURE — 3074F PR MOST RECENT SYSTOLIC BLOOD PRESSURE < 130 MM HG: ICD-10-PCS | Mod: HCNC,CPTII,S$GLB, | Performed by: NURSE PRACTITIONER

## 2020-11-16 PROCEDURE — 3008F PR BODY MASS INDEX (BMI) DOCUMENTED: ICD-10-PCS | Mod: HCNC,CPTII,S$GLB, | Performed by: NURSE PRACTITIONER

## 2020-11-16 PROCEDURE — 99999 PR PBB SHADOW E&M-EST. PATIENT-LVL V: ICD-10-PCS | Mod: PBBFAC,HCNC,, | Performed by: NURSE PRACTITIONER

## 2020-11-16 PROCEDURE — 3079F PR MOST RECENT DIASTOLIC BLOOD PRESSURE 80-89 MM HG: ICD-10-PCS | Mod: HCNC,CPTII,S$GLB, | Performed by: NURSE PRACTITIONER

## 2020-11-16 PROCEDURE — 85610 PROTHROMBIN TIME: CPT | Mod: HCNC

## 2020-11-16 NOTE — PROGRESS NOTES
INR not at goal. Medications, chart, and patient findings reviewed. See calendar for adjustments to dose and follow up plan.    Pt reports amiodarone decreased to 200mg daily today. Noted dose was 200mg bid in EP note from today. Will watch for effect on INR. INR already low so increase dose. Recheck INR in 1 week

## 2020-11-16 NOTE — LETTER
November 16, 2020      Ra Kenyon MD  [address]  [facility] [address]           [id number]  [address]  [facility] [address]  Phone: [phone]  Fax: [phone]          Patient: Celine Sinclair   MR Number: [id number]   YOB: 1950   Date of Visit: 11/16/2020       Dear Dr. Ra Kenyon:    Thank you for referring Celine Sinclair to me for evaluation. Attached you will find relevant portions of my assessment and plan of care.    If you have questions, please do not hesitate to call me. I look forward to following Celine Sinclair along with you.    Sincerely,    Zina Manzanares NP    Enclosure  CC:  No Recipients    If you would like to receive this communication electronically, please contact [email or web address] or [phone] to request more information on [employer] access.    For providers and/or their staff who would like to refer a patient to [facility], please contact us through our one-stop-shop provider referral line, [facility], at [phone].    If you feel you have received this communication in error or would no longer like to receive these types of communications, please e-mail [email or web address]

## 2020-11-16 NOTE — PATIENT INSTRUCTIONS
Reduce amiodarone to 200mg once daily.  Schedule echo and Holter in 3 months, followed by clinic visit with Dr. Moreno.

## 2020-11-23 ENCOUNTER — ANTI-COAG VISIT (OUTPATIENT)
Dept: CARDIOLOGY | Facility: CLINIC | Age: 70
End: 2020-11-23
Payer: MEDICARE

## 2020-11-23 DIAGNOSIS — I48.0 PAROXYSMAL ATRIAL FIBRILLATION: ICD-10-CM

## 2020-11-23 DIAGNOSIS — Z79.01 LONG TERM (CURRENT) USE OF ANTICOAGULANTS: ICD-10-CM

## 2020-11-23 DIAGNOSIS — D68.61 ANTIPHOSPHOLIPID SYNDROME: ICD-10-CM

## 2020-11-23 PROCEDURE — 93793 ANTICOAG MGMT PT WARFARIN: CPT | Mod: S$GLB,,,

## 2020-11-23 PROCEDURE — 93793 PR ANTICOAGULANT MGMT FOR PT TAKING WARFARIN: ICD-10-PCS | Mod: S$GLB,,,

## 2020-11-23 NOTE — PROGRESS NOTES
INR low. Patient ate erik and mustard greens this past week. She now wants to have greens 3x/week. We are also watching change from amiodarone dose decrease last week. Increase dose. Recheck INR in 1 week. Advise to keep diet consistent.

## 2020-11-30 ENCOUNTER — ANTI-COAG VISIT (OUTPATIENT)
Dept: CARDIOLOGY | Facility: CLINIC | Age: 70
End: 2020-11-30
Payer: MEDICARE

## 2020-11-30 DIAGNOSIS — D68.61 ANTIPHOSPHOLIPID SYNDROME: ICD-10-CM

## 2020-11-30 DIAGNOSIS — I48.0 PAROXYSMAL ATRIAL FIBRILLATION: ICD-10-CM

## 2020-11-30 DIAGNOSIS — Z79.01 LONG TERM (CURRENT) USE OF ANTICOAGULANTS: ICD-10-CM

## 2020-11-30 PROCEDURE — 93793 ANTICOAG MGMT PT WARFARIN: CPT | Mod: S$GLB,,,

## 2020-11-30 PROCEDURE — 93793 PR ANTICOAGULANT MGMT FOR PT TAKING WARFARIN: ICD-10-PCS | Mod: S$GLB,,,

## 2020-11-30 NOTE — PROGRESS NOTES
INR not much better despite dose increase. She reports only 2 servings of greens this past week. Plan is for 3 servings of greens per week which patient intends to do this week. Will bolus dose and increase further. Recheck INR in 1 week

## 2020-12-07 ENCOUNTER — ANTI-COAG VISIT (OUTPATIENT)
Dept: CARDIOLOGY | Facility: CLINIC | Age: 70
End: 2020-12-07
Payer: MEDICARE

## 2020-12-07 DIAGNOSIS — D68.61 ANTIPHOSPHOLIPID SYNDROME: ICD-10-CM

## 2020-12-07 DIAGNOSIS — Z79.01 LONG TERM (CURRENT) USE OF ANTICOAGULANTS: ICD-10-CM

## 2020-12-07 DIAGNOSIS — I48.0 PAROXYSMAL ATRIAL FIBRILLATION: ICD-10-CM

## 2020-12-07 PROCEDURE — 93793 ANTICOAG MGMT PT WARFARIN: CPT | Mod: S$GLB,,,

## 2020-12-07 PROCEDURE — 93793 PR ANTICOAGULANT MGMT FOR PT TAKING WARFARIN: ICD-10-PCS | Mod: S$GLB,,,

## 2020-12-10 ENCOUNTER — CLINICAL SUPPORT (OUTPATIENT)
Dept: CARDIOLOGY | Facility: HOSPITAL | Age: 70
End: 2020-12-10
Payer: MEDICARE

## 2020-12-10 DIAGNOSIS — Z95.818 PRESENCE OF OTHER CARDIAC IMPLANTS AND GRAFTS: ICD-10-CM

## 2020-12-10 PROCEDURE — 93298 CARDIAC DEVICE CHECK - REMOTE: ICD-10-PCS | Mod: HCNC,,, | Performed by: INTERNAL MEDICINE

## 2020-12-10 PROCEDURE — 93298 REM INTERROG DEV EVAL SCRMS: CPT | Mod: HCNC,,, | Performed by: INTERNAL MEDICINE

## 2020-12-10 PROCEDURE — G2066 INTER DEVC REMOTE 30D: HCPCS | Mod: HCNC | Performed by: INTERNAL MEDICINE

## 2020-12-11 ENCOUNTER — PATIENT MESSAGE (OUTPATIENT)
Dept: OTHER | Facility: OTHER | Age: 70
End: 2020-12-11

## 2020-12-14 ENCOUNTER — ANTI-COAG VISIT (OUTPATIENT)
Dept: CARDIOLOGY | Facility: CLINIC | Age: 70
End: 2020-12-14
Payer: MEDICARE

## 2020-12-14 DIAGNOSIS — I48.0 PAROXYSMAL ATRIAL FIBRILLATION: ICD-10-CM

## 2020-12-14 DIAGNOSIS — D68.61 ANTIPHOSPHOLIPID SYNDROME: ICD-10-CM

## 2020-12-14 DIAGNOSIS — Z79.01 LONG TERM (CURRENT) USE OF ANTICOAGULANTS: ICD-10-CM

## 2020-12-14 PROCEDURE — 93793 ANTICOAG MGMT PT WARFARIN: CPT | Mod: S$GLB,,,

## 2020-12-14 PROCEDURE — 93793 PR ANTICOAGULANT MGMT FOR PT TAKING WARFARIN: ICD-10-PCS | Mod: S$GLB,,,

## 2020-12-30 ENCOUNTER — ANTI-COAG VISIT (OUTPATIENT)
Dept: CARDIOLOGY | Facility: CLINIC | Age: 70
End: 2020-12-30
Payer: MEDICARE

## 2020-12-30 DIAGNOSIS — I48.0 PAROXYSMAL ATRIAL FIBRILLATION: ICD-10-CM

## 2020-12-30 DIAGNOSIS — D68.61 ANTIPHOSPHOLIPID SYNDROME: ICD-10-CM

## 2020-12-30 DIAGNOSIS — Z79.01 LONG TERM (CURRENT) USE OF ANTICOAGULANTS: ICD-10-CM

## 2020-12-30 PROCEDURE — 93793 PR ANTICOAGULANT MGMT FOR PT TAKING WARFARIN: ICD-10-PCS | Mod: S$GLB,,,

## 2020-12-30 PROCEDURE — 93793 ANTICOAG MGMT PT WARFARIN: CPT | Mod: S$GLB,,,

## 2021-01-05 ENCOUNTER — OFFICE VISIT (OUTPATIENT)
Dept: INTERNAL MEDICINE | Facility: CLINIC | Age: 71
End: 2021-01-05
Payer: MEDICARE

## 2021-01-05 VITALS
TEMPERATURE: 99 F | DIASTOLIC BLOOD PRESSURE: 80 MMHG | OXYGEN SATURATION: 98 % | SYSTOLIC BLOOD PRESSURE: 123 MMHG | WEIGHT: 177.25 LBS | BODY MASS INDEX: 29.53 KG/M2 | HEART RATE: 69 BPM | HEIGHT: 65 IN | RESPIRATION RATE: 17 BRPM

## 2021-01-05 DIAGNOSIS — J06.9 VIRAL URI: ICD-10-CM

## 2021-01-05 DIAGNOSIS — M79.7 FIBROMYALGIA: ICD-10-CM

## 2021-01-05 DIAGNOSIS — I10 ESSENTIAL HYPERTENSION: Primary | ICD-10-CM

## 2021-01-05 PROCEDURE — 3288F PR FALLS RISK ASSESSMENT DOCUMENTED: ICD-10-PCS | Mod: HCNC,CPTII,S$GLB, | Performed by: INTERNAL MEDICINE

## 2021-01-05 PROCEDURE — 3008F BODY MASS INDEX DOCD: CPT | Mod: HCNC,CPTII,S$GLB, | Performed by: INTERNAL MEDICINE

## 2021-01-05 PROCEDURE — 3008F PR BODY MASS INDEX (BMI) DOCUMENTED: ICD-10-PCS | Mod: HCNC,CPTII,S$GLB, | Performed by: INTERNAL MEDICINE

## 2021-01-05 PROCEDURE — 1159F MED LIST DOCD IN RCRD: CPT | Mod: HCNC,S$GLB,, | Performed by: INTERNAL MEDICINE

## 2021-01-05 PROCEDURE — 3288F FALL RISK ASSESSMENT DOCD: CPT | Mod: HCNC,CPTII,S$GLB, | Performed by: INTERNAL MEDICINE

## 2021-01-05 PROCEDURE — 1101F PT FALLS ASSESS-DOCD LE1/YR: CPT | Mod: HCNC,CPTII,S$GLB, | Performed by: INTERNAL MEDICINE

## 2021-01-05 PROCEDURE — 1101F PR PT FALLS ASSESS DOC 0-1 FALLS W/OUT INJ PAST YR: ICD-10-PCS | Mod: HCNC,CPTII,S$GLB, | Performed by: INTERNAL MEDICINE

## 2021-01-05 PROCEDURE — 3079F PR MOST RECENT DIASTOLIC BLOOD PRESSURE 80-89 MM HG: ICD-10-PCS | Mod: HCNC,CPTII,S$GLB, | Performed by: INTERNAL MEDICINE

## 2021-01-05 PROCEDURE — 3074F SYST BP LT 130 MM HG: CPT | Mod: HCNC,CPTII,S$GLB, | Performed by: INTERNAL MEDICINE

## 2021-01-05 PROCEDURE — 99214 OFFICE O/P EST MOD 30 MIN: CPT | Mod: HCNC,S$GLB,, | Performed by: INTERNAL MEDICINE

## 2021-01-05 PROCEDURE — 3079F DIAST BP 80-89 MM HG: CPT | Mod: HCNC,CPTII,S$GLB, | Performed by: INTERNAL MEDICINE

## 2021-01-05 PROCEDURE — 1159F PR MEDICATION LIST DOCUMENTED IN MEDICAL RECORD: ICD-10-PCS | Mod: HCNC,S$GLB,, | Performed by: INTERNAL MEDICINE

## 2021-01-05 PROCEDURE — 99999 PR PBB SHADOW E&M-EST. PATIENT-LVL V: ICD-10-PCS | Mod: PBBFAC,HCNC,, | Performed by: INTERNAL MEDICINE

## 2021-01-05 PROCEDURE — 3074F PR MOST RECENT SYSTOLIC BLOOD PRESSURE < 130 MM HG: ICD-10-PCS | Mod: HCNC,CPTII,S$GLB, | Performed by: INTERNAL MEDICINE

## 2021-01-05 PROCEDURE — 1126F PR PAIN SEVERITY QUANTIFIED, NO PAIN PRESENT: ICD-10-PCS | Mod: HCNC,S$GLB,, | Performed by: INTERNAL MEDICINE

## 2021-01-05 PROCEDURE — 99999 PR PBB SHADOW E&M-EST. PATIENT-LVL V: CPT | Mod: PBBFAC,HCNC,, | Performed by: INTERNAL MEDICINE

## 2021-01-05 PROCEDURE — 99214 PR OFFICE/OUTPT VISIT, EST, LEVL IV, 30-39 MIN: ICD-10-PCS | Mod: HCNC,S$GLB,, | Performed by: INTERNAL MEDICINE

## 2021-01-05 PROCEDURE — 1126F AMNT PAIN NOTED NONE PRSNT: CPT | Mod: HCNC,S$GLB,, | Performed by: INTERNAL MEDICINE

## 2021-01-05 RX ORDER — ACETAMINOPHEN 500 MG
500 TABLET ORAL DAILY PRN
Qty: 30 TABLET | Refills: 5 | Status: SHIPPED | OUTPATIENT
Start: 2021-01-05 | End: 2023-06-12

## 2021-01-09 ENCOUNTER — CLINICAL SUPPORT (OUTPATIENT)
Dept: CARDIOLOGY | Facility: HOSPITAL | Age: 71
End: 2021-01-09
Payer: MEDICARE

## 2021-01-09 DIAGNOSIS — Z95.818 PRESENCE OF OTHER CARDIAC IMPLANTS AND GRAFTS: ICD-10-CM

## 2021-01-09 PROCEDURE — 93298 REM INTERROG DEV EVAL SCRMS: CPT | Mod: ,,, | Performed by: INTERNAL MEDICINE

## 2021-01-09 PROCEDURE — 93298 CARDIAC DEVICE CHECK - REMOTE: ICD-10-PCS | Mod: ,,, | Performed by: INTERNAL MEDICINE

## 2021-01-09 PROCEDURE — G2066 INTER DEVC REMOTE 30D: HCPCS | Performed by: INTERNAL MEDICINE

## 2021-01-19 ENCOUNTER — DOCUMENTATION ONLY (OUTPATIENT)
Dept: VASCULAR SURGERY | Facility: CLINIC | Age: 71
End: 2021-01-19

## 2021-01-19 ENCOUNTER — ANTI-COAG VISIT (OUTPATIENT)
Dept: CARDIOLOGY | Facility: CLINIC | Age: 71
End: 2021-01-19
Payer: MEDICARE

## 2021-01-19 ENCOUNTER — TELEPHONE (OUTPATIENT)
Dept: VASCULAR SURGERY | Facility: CLINIC | Age: 71
End: 2021-01-19

## 2021-01-19 DIAGNOSIS — Z79.01 LONG TERM (CURRENT) USE OF ANTICOAGULANTS: ICD-10-CM

## 2021-01-19 DIAGNOSIS — I48.0 PAROXYSMAL ATRIAL FIBRILLATION: ICD-10-CM

## 2021-01-19 DIAGNOSIS — D68.61 ANTIPHOSPHOLIPID SYNDROME: ICD-10-CM

## 2021-01-19 PROCEDURE — 93793 PR ANTICOAGULANT MGMT FOR PT TAKING WARFARIN: ICD-10-PCS | Mod: S$GLB,,,

## 2021-01-19 PROCEDURE — 93793 ANTICOAG MGMT PT WARFARIN: CPT | Mod: S$GLB,,,

## 2021-01-21 ENCOUNTER — ANTI-COAG VISIT (OUTPATIENT)
Dept: CARDIOLOGY | Facility: CLINIC | Age: 71
End: 2021-01-21
Payer: MEDICARE

## 2021-01-21 DIAGNOSIS — Z79.01 LONG TERM (CURRENT) USE OF ANTICOAGULANTS: ICD-10-CM

## 2021-01-21 DIAGNOSIS — D68.61 ANTIPHOSPHOLIPID SYNDROME: ICD-10-CM

## 2021-01-21 DIAGNOSIS — I48.0 PAROXYSMAL ATRIAL FIBRILLATION: ICD-10-CM

## 2021-01-21 PROCEDURE — 93793 PR ANTICOAGULANT MGMT FOR PT TAKING WARFARIN: ICD-10-PCS | Mod: S$GLB,,,

## 2021-01-21 PROCEDURE — 93793 ANTICOAG MGMT PT WARFARIN: CPT | Mod: S$GLB,,,

## 2021-01-22 ENCOUNTER — PATIENT MESSAGE (OUTPATIENT)
Dept: ADMINISTRATIVE | Facility: OTHER | Age: 71
End: 2021-01-22

## 2021-01-25 ENCOUNTER — ANTI-COAG VISIT (OUTPATIENT)
Dept: CARDIOLOGY | Facility: CLINIC | Age: 71
End: 2021-01-25
Payer: MEDICARE

## 2021-01-25 DIAGNOSIS — Z79.01 LONG TERM (CURRENT) USE OF ANTICOAGULANTS: ICD-10-CM

## 2021-01-25 DIAGNOSIS — I48.0 PAROXYSMAL ATRIAL FIBRILLATION: ICD-10-CM

## 2021-01-25 DIAGNOSIS — D68.61 ANTIPHOSPHOLIPID SYNDROME: Primary | ICD-10-CM

## 2021-01-25 PROCEDURE — 93793 ANTICOAG MGMT PT WARFARIN: CPT | Mod: S$GLB,,,

## 2021-01-25 PROCEDURE — 93793 PR ANTICOAGULANT MGMT FOR PT TAKING WARFARIN: ICD-10-PCS | Mod: S$GLB,,,

## 2021-02-01 ENCOUNTER — ANTI-COAG VISIT (OUTPATIENT)
Dept: CARDIOLOGY | Facility: CLINIC | Age: 71
End: 2021-02-01
Payer: MEDICARE

## 2021-02-01 DIAGNOSIS — I48.0 PAROXYSMAL ATRIAL FIBRILLATION: ICD-10-CM

## 2021-02-01 DIAGNOSIS — D68.61 ANTIPHOSPHOLIPID SYNDROME: Primary | ICD-10-CM

## 2021-02-01 DIAGNOSIS — Z79.01 LONG TERM (CURRENT) USE OF ANTICOAGULANTS: ICD-10-CM

## 2021-02-01 PROCEDURE — 93793 PR ANTICOAGULANT MGMT FOR PT TAKING WARFARIN: ICD-10-PCS | Mod: S$GLB,,,

## 2021-02-01 PROCEDURE — 93793 ANTICOAG MGMT PT WARFARIN: CPT | Mod: S$GLB,,,

## 2021-02-04 ENCOUNTER — ANTI-COAG VISIT (OUTPATIENT)
Dept: CARDIOLOGY | Facility: CLINIC | Age: 71
End: 2021-02-04
Payer: MEDICARE

## 2021-02-04 DIAGNOSIS — I48.0 PAROXYSMAL ATRIAL FIBRILLATION: ICD-10-CM

## 2021-02-04 DIAGNOSIS — D68.61 ANTIPHOSPHOLIPID SYNDROME: Primary | ICD-10-CM

## 2021-02-04 DIAGNOSIS — Z79.01 LONG TERM (CURRENT) USE OF ANTICOAGULANTS: ICD-10-CM

## 2021-02-04 PROCEDURE — 93793 ANTICOAG MGMT PT WARFARIN: CPT | Mod: S$GLB,,,

## 2021-02-04 PROCEDURE — 93793 PR ANTICOAGULANT MGMT FOR PT TAKING WARFARIN: ICD-10-PCS | Mod: S$GLB,,,

## 2021-02-08 ENCOUNTER — CLINICAL SUPPORT (OUTPATIENT)
Dept: CARDIOLOGY | Facility: HOSPITAL | Age: 71
End: 2021-02-08
Payer: MEDICARE

## 2021-02-08 ENCOUNTER — DOCUMENTATION ONLY (OUTPATIENT)
Dept: VASCULAR SURGERY | Facility: CLINIC | Age: 71
End: 2021-02-08

## 2021-02-08 ENCOUNTER — TELEPHONE (OUTPATIENT)
Dept: INTERNAL MEDICINE | Facility: CLINIC | Age: 71
End: 2021-02-08

## 2021-02-08 ENCOUNTER — ANTI-COAG VISIT (OUTPATIENT)
Dept: CARDIOLOGY | Facility: CLINIC | Age: 71
End: 2021-02-08

## 2021-02-08 DIAGNOSIS — D68.61 ANTIPHOSPHOLIPID SYNDROME: Primary | ICD-10-CM

## 2021-02-08 DIAGNOSIS — Z79.01 LONG TERM (CURRENT) USE OF ANTICOAGULANTS: ICD-10-CM

## 2021-02-08 DIAGNOSIS — Z79.01 LONG TERM (CURRENT) USE OF ANTICOAGULANTS: Primary | ICD-10-CM

## 2021-02-08 DIAGNOSIS — I48.0 PAROXYSMAL ATRIAL FIBRILLATION: ICD-10-CM

## 2021-02-08 DIAGNOSIS — Z95.818 PRESENCE OF OTHER CARDIAC IMPLANTS AND GRAFTS: ICD-10-CM

## 2021-02-08 PROCEDURE — G2066 INTER DEVC REMOTE 30D: HCPCS | Performed by: INTERNAL MEDICINE

## 2021-02-08 PROCEDURE — 93298 CARDIAC DEVICE CHECK - REMOTE: ICD-10-PCS | Mod: ,,, | Performed by: INTERNAL MEDICINE

## 2021-02-08 PROCEDURE — 93298 REM INTERROG DEV EVAL SCRMS: CPT | Mod: ,,, | Performed by: INTERNAL MEDICINE

## 2021-02-09 ENCOUNTER — LAB VISIT (OUTPATIENT)
Dept: LAB | Facility: HOSPITAL | Age: 71
End: 2021-02-09
Attending: INTERNAL MEDICINE
Payer: MEDICARE

## 2021-02-09 ENCOUNTER — OFFICE VISIT (OUTPATIENT)
Dept: INTERNAL MEDICINE | Facility: CLINIC | Age: 71
End: 2021-02-09
Payer: MEDICARE

## 2021-02-09 ENCOUNTER — ANTI-COAG VISIT (OUTPATIENT)
Dept: CARDIOLOGY | Facility: CLINIC | Age: 71
End: 2021-02-09
Payer: MEDICARE

## 2021-02-09 VITALS
BODY MASS INDEX: 30.64 KG/M2 | RESPIRATION RATE: 19 BRPM | HEIGHT: 65 IN | TEMPERATURE: 97 F | DIASTOLIC BLOOD PRESSURE: 98 MMHG | OXYGEN SATURATION: 98 % | SYSTOLIC BLOOD PRESSURE: 134 MMHG | HEART RATE: 72 BPM | WEIGHT: 183.88 LBS

## 2021-02-09 DIAGNOSIS — R79.1 SUPRATHERAPEUTIC INR: ICD-10-CM

## 2021-02-09 DIAGNOSIS — I48.0 PAROXYSMAL ATRIAL FIBRILLATION: ICD-10-CM

## 2021-02-09 DIAGNOSIS — R00.2 PALPITATIONS: ICD-10-CM

## 2021-02-09 DIAGNOSIS — Z79.01 LONG TERM (CURRENT) USE OF ANTICOAGULANTS: ICD-10-CM

## 2021-02-09 DIAGNOSIS — D68.61 ANTIPHOSPHOLIPID SYNDROME: Primary | ICD-10-CM

## 2021-02-09 DIAGNOSIS — F41.9 ANXIETY: ICD-10-CM

## 2021-02-09 DIAGNOSIS — R06.02 SOB (SHORTNESS OF BREATH): ICD-10-CM

## 2021-02-09 DIAGNOSIS — R79.1 SUPRATHERAPEUTIC INR: Primary | ICD-10-CM

## 2021-02-09 DIAGNOSIS — Z76.0 MEDICATION REFILL: ICD-10-CM

## 2021-02-09 LAB
ALBUMIN SERPL BCP-MCNC: 3.8 G/DL (ref 3.5–5.2)
ALP SERPL-CCNC: 83 U/L (ref 55–135)
ALT SERPL W/O P-5'-P-CCNC: 15 U/L (ref 10–44)
ANION GAP SERPL CALC-SCNC: 10 MMOL/L (ref 8–16)
AST SERPL-CCNC: 20 U/L (ref 10–40)
BASOPHILS # BLD AUTO: 0.04 K/UL (ref 0–0.2)
BASOPHILS NFR BLD: 0.4 % (ref 0–1.9)
BILIRUB SERPL-MCNC: 1.5 MG/DL (ref 0.1–1)
BUN SERPL-MCNC: 13 MG/DL (ref 8–23)
CALCIUM SERPL-MCNC: 8.2 MG/DL (ref 8.7–10.5)
CHLORIDE SERPL-SCNC: 110 MMOL/L (ref 95–110)
CO2 SERPL-SCNC: 24 MMOL/L (ref 23–29)
CREAT SERPL-MCNC: 0.6 MG/DL (ref 0.5–1.4)
DIFFERENTIAL METHOD: ABNORMAL
EOSINOPHIL # BLD AUTO: 0.1 K/UL (ref 0–0.5)
EOSINOPHIL NFR BLD: 1.2 % (ref 0–8)
ERYTHROCYTE [DISTWIDTH] IN BLOOD BY AUTOMATED COUNT: 14.9 % (ref 11.5–14.5)
EST. GFR  (AFRICAN AMERICAN): >60 ML/MIN/1.73 M^2
EST. GFR  (NON AFRICAN AMERICAN): >60 ML/MIN/1.73 M^2
GLUCOSE SERPL-MCNC: 92 MG/DL (ref 70–110)
HCT VFR BLD AUTO: 38.8 % (ref 37–48.5)
HGB BLD-MCNC: 12.7 G/DL (ref 12–16)
IMM GRANULOCYTES # BLD AUTO: 0.03 K/UL (ref 0–0.04)
IMM GRANULOCYTES NFR BLD AUTO: 0.3 % (ref 0–0.5)
INR PPP: 3.3 (ref 0.8–1.2)
LYMPHOCYTES # BLD AUTO: 2.2 K/UL (ref 1–4.8)
LYMPHOCYTES NFR BLD: 23.5 % (ref 18–48)
MCH RBC QN AUTO: 28.2 PG (ref 27–31)
MCHC RBC AUTO-ENTMCNC: 32.7 G/DL (ref 32–36)
MCV RBC AUTO: 86 FL (ref 82–98)
MONOCYTES # BLD AUTO: 0.7 K/UL (ref 0.3–1)
MONOCYTES NFR BLD: 7.2 % (ref 4–15)
NEUTROPHILS # BLD AUTO: 6.4 K/UL (ref 1.8–7.7)
NEUTROPHILS NFR BLD: 67.4 % (ref 38–73)
NRBC BLD-RTO: 0 /100 WBC
PLATELET # BLD AUTO: 217 K/UL (ref 150–350)
PMV BLD AUTO: 9.8 FL (ref 9.2–12.9)
POTASSIUM SERPL-SCNC: 3.1 MMOL/L (ref 3.5–5.1)
PROT SERPL-MCNC: 7.2 G/DL (ref 6–8.4)
PROTHROMBIN TIME: 32.7 SEC (ref 9–12.5)
RBC # BLD AUTO: 4.5 M/UL (ref 4–5.4)
SODIUM SERPL-SCNC: 144 MMOL/L (ref 136–145)
TSH SERPL DL<=0.005 MIU/L-ACNC: 1.23 UIU/ML (ref 0.4–4)
WBC # BLD AUTO: 9.47 K/UL (ref 3.9–12.7)

## 2021-02-09 PROCEDURE — 99999 PR PBB SHADOW E&M-EST. PATIENT-LVL IV: ICD-10-PCS | Mod: PBBFAC,,, | Performed by: INTERNAL MEDICINE

## 2021-02-09 PROCEDURE — 99999 PR PBB SHADOW E&M-EST. PATIENT-LVL IV: CPT | Mod: PBBFAC,,, | Performed by: INTERNAL MEDICINE

## 2021-02-09 PROCEDURE — 1125F PR PAIN SEVERITY QUANTIFIED, PAIN PRESENT: ICD-10-PCS | Mod: S$GLB,,, | Performed by: INTERNAL MEDICINE

## 2021-02-09 PROCEDURE — 80053 COMPREHEN METABOLIC PANEL: CPT

## 2021-02-09 PROCEDURE — 84443 ASSAY THYROID STIM HORMONE: CPT

## 2021-02-09 PROCEDURE — 93793 ANTICOAG MGMT PT WARFARIN: CPT | Mod: S$GLB,,,

## 2021-02-09 PROCEDURE — 3075F SYST BP GE 130 - 139MM HG: CPT | Mod: CPTII,S$GLB,, | Performed by: INTERNAL MEDICINE

## 2021-02-09 PROCEDURE — 3008F BODY MASS INDEX DOCD: CPT | Mod: CPTII,S$GLB,, | Performed by: INTERNAL MEDICINE

## 2021-02-09 PROCEDURE — 36415 COLL VENOUS BLD VENIPUNCTURE: CPT

## 2021-02-09 PROCEDURE — 3080F DIAST BP >= 90 MM HG: CPT | Mod: CPTII,S$GLB,, | Performed by: INTERNAL MEDICINE

## 2021-02-09 PROCEDURE — 93793 PR ANTICOAGULANT MGMT FOR PT TAKING WARFARIN: ICD-10-PCS | Mod: S$GLB,,,

## 2021-02-09 PROCEDURE — 1101F PT FALLS ASSESS-DOCD LE1/YR: CPT | Mod: CPTII,S$GLB,, | Performed by: INTERNAL MEDICINE

## 2021-02-09 PROCEDURE — 85610 PROTHROMBIN TIME: CPT

## 2021-02-09 PROCEDURE — 99214 PR OFFICE/OUTPT VISIT, EST, LEVL IV, 30-39 MIN: ICD-10-PCS | Mod: S$GLB,,, | Performed by: INTERNAL MEDICINE

## 2021-02-09 PROCEDURE — 99214 OFFICE O/P EST MOD 30 MIN: CPT | Mod: S$GLB,,, | Performed by: INTERNAL MEDICINE

## 2021-02-09 PROCEDURE — 1159F MED LIST DOCD IN RCRD: CPT | Mod: S$GLB,,, | Performed by: INTERNAL MEDICINE

## 2021-02-09 PROCEDURE — 3288F FALL RISK ASSESSMENT DOCD: CPT | Mod: CPTII,S$GLB,, | Performed by: INTERNAL MEDICINE

## 2021-02-09 PROCEDURE — 1101F PR PT FALLS ASSESS DOC 0-1 FALLS W/OUT INJ PAST YR: ICD-10-PCS | Mod: CPTII,S$GLB,, | Performed by: INTERNAL MEDICINE

## 2021-02-09 PROCEDURE — 3075F PR MOST RECENT SYSTOLIC BLOOD PRESS GE 130-139MM HG: ICD-10-PCS | Mod: CPTII,S$GLB,, | Performed by: INTERNAL MEDICINE

## 2021-02-09 PROCEDURE — 85025 COMPLETE CBC W/AUTO DIFF WBC: CPT

## 2021-02-09 PROCEDURE — 1159F PR MEDICATION LIST DOCUMENTED IN MEDICAL RECORD: ICD-10-PCS | Mod: S$GLB,,, | Performed by: INTERNAL MEDICINE

## 2021-02-09 PROCEDURE — 3288F PR FALLS RISK ASSESSMENT DOCUMENTED: ICD-10-PCS | Mod: CPTII,S$GLB,, | Performed by: INTERNAL MEDICINE

## 2021-02-09 PROCEDURE — 1125F AMNT PAIN NOTED PAIN PRSNT: CPT | Mod: S$GLB,,, | Performed by: INTERNAL MEDICINE

## 2021-02-09 PROCEDURE — 3080F PR MOST RECENT DIASTOLIC BLOOD PRESSURE >= 90 MM HG: ICD-10-PCS | Mod: CPTII,S$GLB,, | Performed by: INTERNAL MEDICINE

## 2021-02-09 PROCEDURE — 3008F PR BODY MASS INDEX (BMI) DOCUMENTED: ICD-10-PCS | Mod: CPTII,S$GLB,, | Performed by: INTERNAL MEDICINE

## 2021-02-09 RX ORDER — FUROSEMIDE 20 MG/1
20 TABLET ORAL
Status: CANCELLED | OUTPATIENT
Start: 2021-02-09

## 2021-02-09 RX ORDER — DIPHENOXYLATE HYDROCHLORIDE AND ATROPINE SULFATE 2.5; .025 MG/1; MG/1
1 TABLET ORAL 4 TIMES DAILY PRN
Status: CANCELLED | OUTPATIENT
Start: 2021-02-09

## 2021-02-09 RX ORDER — AMIODARONE HYDROCHLORIDE 200 MG/1
200 TABLET ORAL DAILY
Qty: 90 TABLET | Refills: 0 | Status: SHIPPED | OUTPATIENT
Start: 2021-02-09 | End: 2021-03-03

## 2021-02-11 ENCOUNTER — ANTI-COAG VISIT (OUTPATIENT)
Dept: CARDIOLOGY | Facility: CLINIC | Age: 71
End: 2021-02-11
Payer: MEDICARE

## 2021-02-11 ENCOUNTER — LAB VISIT (OUTPATIENT)
Dept: LAB | Facility: HOSPITAL | Age: 71
End: 2021-02-11
Attending: SURGERY
Payer: MEDICARE

## 2021-02-11 ENCOUNTER — IMMUNIZATION (OUTPATIENT)
Dept: FAMILY MEDICINE | Facility: CLINIC | Age: 71
End: 2021-02-11
Payer: MEDICARE

## 2021-02-11 ENCOUNTER — TELEPHONE (OUTPATIENT)
Dept: INTERNAL MEDICINE | Facility: CLINIC | Age: 71
End: 2021-02-11

## 2021-02-11 DIAGNOSIS — I48.0 PAROXYSMAL ATRIAL FIBRILLATION: ICD-10-CM

## 2021-02-11 DIAGNOSIS — Z79.01 LONG TERM (CURRENT) USE OF ANTICOAGULANTS: ICD-10-CM

## 2021-02-11 DIAGNOSIS — D68.61 ANTIPHOSPHOLIPID SYNDROME: ICD-10-CM

## 2021-02-11 DIAGNOSIS — Z23 NEED FOR VACCINATION: Primary | ICD-10-CM

## 2021-02-11 DIAGNOSIS — D68.61 ANTIPHOSPHOLIPID SYNDROME: Primary | ICD-10-CM

## 2021-02-11 LAB
INR PPP: 1.4 (ref 0.8–1.2)
PROTHROMBIN TIME: 14.4 SEC (ref 9–12.5)

## 2021-02-11 PROCEDURE — 93793 PR ANTICOAGULANT MGMT FOR PT TAKING WARFARIN: ICD-10-PCS | Mod: S$GLB,,,

## 2021-02-11 PROCEDURE — 93793 ANTICOAG MGMT PT WARFARIN: CPT | Mod: S$GLB,,,

## 2021-02-11 PROCEDURE — 36415 COLL VENOUS BLD VENIPUNCTURE: CPT

## 2021-02-11 PROCEDURE — 91300 COVID-19, MRNA, LNP-S, PF, 30 MCG/0.3 ML DOSE VACCINE: CPT | Mod: PBBFAC | Performed by: FAMILY MEDICINE

## 2021-02-11 PROCEDURE — 85610 PROTHROMBIN TIME: CPT

## 2021-02-23 ENCOUNTER — LAB VISIT (OUTPATIENT)
Dept: LAB | Facility: HOSPITAL | Age: 71
End: 2021-02-23
Attending: SURGERY
Payer: MEDICARE

## 2021-02-23 ENCOUNTER — TELEPHONE (OUTPATIENT)
Dept: INTERNAL MEDICINE | Facility: CLINIC | Age: 71
End: 2021-02-23

## 2021-02-23 ENCOUNTER — ANTI-COAG VISIT (OUTPATIENT)
Dept: CARDIOLOGY | Facility: CLINIC | Age: 71
End: 2021-02-23
Payer: MEDICARE

## 2021-02-23 ENCOUNTER — CLINICAL SUPPORT (OUTPATIENT)
Dept: INTERNAL MEDICINE | Facility: CLINIC | Age: 71
End: 2021-02-23
Payer: MEDICARE

## 2021-02-23 VITALS — DIASTOLIC BLOOD PRESSURE: 82 MMHG | HEART RATE: 72 BPM | OXYGEN SATURATION: 99 % | SYSTOLIC BLOOD PRESSURE: 124 MMHG

## 2021-02-23 DIAGNOSIS — D68.61 ANTIPHOSPHOLIPID SYNDROME: Primary | ICD-10-CM

## 2021-02-23 DIAGNOSIS — Z79.01 LONG TERM (CURRENT) USE OF ANTICOAGULANTS: ICD-10-CM

## 2021-02-23 DIAGNOSIS — I48.0 PAROXYSMAL ATRIAL FIBRILLATION: ICD-10-CM

## 2021-02-23 DIAGNOSIS — D68.61 ANTIPHOSPHOLIPID SYNDROME: ICD-10-CM

## 2021-02-23 LAB
INR PPP: 4.2 (ref 0.8–1.2)
PROTHROMBIN TIME: 40.8 SEC (ref 9–12.5)

## 2021-02-23 PROCEDURE — 36415 COLL VENOUS BLD VENIPUNCTURE: CPT

## 2021-02-23 PROCEDURE — 99999 PR PBB SHADOW E&M-EST. PATIENT-LVL I: ICD-10-PCS | Mod: PBBFAC,,,

## 2021-02-23 PROCEDURE — 93793 PR ANTICOAGULANT MGMT FOR PT TAKING WARFARIN: ICD-10-PCS | Mod: S$GLB,,,

## 2021-02-23 PROCEDURE — 85610 PROTHROMBIN TIME: CPT

## 2021-02-23 PROCEDURE — 93793 ANTICOAG MGMT PT WARFARIN: CPT | Mod: S$GLB,,,

## 2021-02-23 PROCEDURE — 99999 PR PBB SHADOW E&M-EST. PATIENT-LVL I: CPT | Mod: PBBFAC,,,

## 2021-02-24 ENCOUNTER — RESEARCH ENCOUNTER (OUTPATIENT)
Dept: RESEARCH | Facility: HOSPITAL | Age: 71
End: 2021-02-24

## 2021-02-25 ENCOUNTER — ANTI-COAG VISIT (OUTPATIENT)
Dept: CARDIOLOGY | Facility: CLINIC | Age: 71
End: 2021-02-25
Payer: MEDICARE

## 2021-02-25 ENCOUNTER — LAB VISIT (OUTPATIENT)
Dept: LAB | Facility: HOSPITAL | Age: 71
End: 2021-02-25
Attending: SURGERY
Payer: MEDICARE

## 2021-02-25 DIAGNOSIS — I48.0 PAROXYSMAL ATRIAL FIBRILLATION: ICD-10-CM

## 2021-02-25 DIAGNOSIS — D68.61 ANTIPHOSPHOLIPID SYNDROME: Primary | ICD-10-CM

## 2021-02-25 DIAGNOSIS — D68.61 ANTIPHOSPHOLIPID SYNDROME: ICD-10-CM

## 2021-02-25 DIAGNOSIS — Z79.01 LONG TERM (CURRENT) USE OF ANTICOAGULANTS: ICD-10-CM

## 2021-02-25 LAB
INR PPP: 3.5 (ref 0.8–1.2)
PROTHROMBIN TIME: 34.5 SEC (ref 9–12.5)

## 2021-02-25 PROCEDURE — 36415 COLL VENOUS BLD VENIPUNCTURE: CPT

## 2021-02-25 PROCEDURE — 93793 PR ANTICOAGULANT MGMT FOR PT TAKING WARFARIN: ICD-10-PCS | Mod: S$GLB,,,

## 2021-02-25 PROCEDURE — 85610 PROTHROMBIN TIME: CPT

## 2021-02-25 PROCEDURE — 93793 ANTICOAG MGMT PT WARFARIN: CPT | Mod: S$GLB,,,

## 2021-02-26 ENCOUNTER — TELEPHONE (OUTPATIENT)
Dept: ELECTROPHYSIOLOGY | Facility: CLINIC | Age: 71
End: 2021-02-26

## 2021-03-01 ENCOUNTER — PATIENT OUTREACH (OUTPATIENT)
Dept: ADMINISTRATIVE | Facility: OTHER | Age: 71
End: 2021-03-01

## 2021-03-01 ENCOUNTER — TELEPHONE (OUTPATIENT)
Dept: ELECTROPHYSIOLOGY | Facility: CLINIC | Age: 71
End: 2021-03-01

## 2021-03-03 ENCOUNTER — OFFICE VISIT (OUTPATIENT)
Dept: ELECTROPHYSIOLOGY | Facility: CLINIC | Age: 71
End: 2021-03-03
Payer: MEDICARE

## 2021-03-03 ENCOUNTER — RESEARCH ENCOUNTER (OUTPATIENT)
Dept: RESEARCH | Facility: HOSPITAL | Age: 71
End: 2021-03-03

## 2021-03-03 ENCOUNTER — HOSPITAL ENCOUNTER (OUTPATIENT)
Dept: CARDIOLOGY | Facility: CLINIC | Age: 71
Discharge: HOME | End: 2021-03-03
Payer: MEDICARE

## 2021-03-03 VITALS
HEIGHT: 65 IN | HEART RATE: 82 BPM | BODY MASS INDEX: 31.07 KG/M2 | WEIGHT: 186.5 LBS | DIASTOLIC BLOOD PRESSURE: 88 MMHG | SYSTOLIC BLOOD PRESSURE: 122 MMHG

## 2021-03-03 DIAGNOSIS — K50.919 CROHN'S DISEASE WITH COMPLICATION, UNSPECIFIED GASTROINTESTINAL TRACT LOCATION: ICD-10-CM

## 2021-03-03 DIAGNOSIS — M79.7 FIBROMYALGIA: ICD-10-CM

## 2021-03-03 DIAGNOSIS — I49.3 FREQUENT PVCS: ICD-10-CM

## 2021-03-03 DIAGNOSIS — I49.3 PVC (PREMATURE VENTRICULAR CONTRACTION): ICD-10-CM

## 2021-03-03 DIAGNOSIS — I42.8 CARDIOMYOPATHY, NONISCHEMIC: ICD-10-CM

## 2021-03-03 DIAGNOSIS — I48.0 PAROXYSMAL ATRIAL FIBRILLATION: Primary | ICD-10-CM

## 2021-03-03 DIAGNOSIS — D68.61 ANTIPHOSPHOLIPID SYNDROME: ICD-10-CM

## 2021-03-03 DIAGNOSIS — F33.1 DEPRESSION, MAJOR, RECURRENT, MODERATE: ICD-10-CM

## 2021-03-03 DIAGNOSIS — Q24.5 MYOCARDIAL BRIDGE: ICD-10-CM

## 2021-03-03 DIAGNOSIS — I47.29 POLYMORPHIC VENTRICULAR TACHYCARDIA: ICD-10-CM

## 2021-03-03 PROCEDURE — 1159F PR MEDICATION LIST DOCUMENTED IN MEDICAL RECORD: ICD-10-PCS | Mod: S$GLB,,, | Performed by: INTERNAL MEDICINE

## 2021-03-03 PROCEDURE — 99499 RISK ADDL DX/OHS AUDIT: ICD-10-PCS | Mod: S$GLB,,, | Performed by: INTERNAL MEDICINE

## 2021-03-03 PROCEDURE — 1125F AMNT PAIN NOTED PAIN PRSNT: CPT | Mod: S$GLB,,, | Performed by: INTERNAL MEDICINE

## 2021-03-03 PROCEDURE — 3008F PR BODY MASS INDEX (BMI) DOCUMENTED: ICD-10-PCS | Mod: CPTII,S$GLB,, | Performed by: INTERNAL MEDICINE

## 2021-03-03 PROCEDURE — 93010 RHYTHM STRIP: ICD-10-PCS | Mod: S$GLB,,, | Performed by: INTERNAL MEDICINE

## 2021-03-03 PROCEDURE — 1100F PR PT FALLS ASSESS DOC 2+ FALLS/FALL W/INJURY/YR: ICD-10-PCS | Mod: CPTII,S$GLB,, | Performed by: INTERNAL MEDICINE

## 2021-03-03 PROCEDURE — 1159F MED LIST DOCD IN RCRD: CPT | Mod: S$GLB,,, | Performed by: INTERNAL MEDICINE

## 2021-03-03 PROCEDURE — 3288F FALL RISK ASSESSMENT DOCD: CPT | Mod: CPTII,S$GLB,, | Performed by: INTERNAL MEDICINE

## 2021-03-03 PROCEDURE — 93005 ELECTROCARDIOGRAM TRACING: CPT | Mod: S$GLB,,, | Performed by: INTERNAL MEDICINE

## 2021-03-03 PROCEDURE — 3079F PR MOST RECENT DIASTOLIC BLOOD PRESSURE 80-89 MM HG: ICD-10-PCS | Mod: CPTII,S$GLB,, | Performed by: INTERNAL MEDICINE

## 2021-03-03 PROCEDURE — 99214 OFFICE O/P EST MOD 30 MIN: CPT | Mod: S$GLB,,, | Performed by: INTERNAL MEDICINE

## 2021-03-03 PROCEDURE — 99999 PR PBB SHADOW E&M-EST. PATIENT-LVL III: ICD-10-PCS | Mod: PBBFAC,,, | Performed by: INTERNAL MEDICINE

## 2021-03-03 PROCEDURE — 99499 UNLISTED E&M SERVICE: CPT | Mod: S$GLB,,, | Performed by: INTERNAL MEDICINE

## 2021-03-03 PROCEDURE — 3074F SYST BP LT 130 MM HG: CPT | Mod: CPTII,S$GLB,, | Performed by: INTERNAL MEDICINE

## 2021-03-03 PROCEDURE — 1125F PR PAIN SEVERITY QUANTIFIED, PAIN PRESENT: ICD-10-PCS | Mod: S$GLB,,, | Performed by: INTERNAL MEDICINE

## 2021-03-03 PROCEDURE — 3288F PR FALLS RISK ASSESSMENT DOCUMENTED: ICD-10-PCS | Mod: CPTII,S$GLB,, | Performed by: INTERNAL MEDICINE

## 2021-03-03 PROCEDURE — 3074F PR MOST RECENT SYSTOLIC BLOOD PRESSURE < 130 MM HG: ICD-10-PCS | Mod: CPTII,S$GLB,, | Performed by: INTERNAL MEDICINE

## 2021-03-03 PROCEDURE — 99999 PR PBB SHADOW E&M-EST. PATIENT-LVL III: CPT | Mod: PBBFAC,,, | Performed by: INTERNAL MEDICINE

## 2021-03-03 PROCEDURE — 93005 RHYTHM STRIP: ICD-10-PCS | Mod: S$GLB,,, | Performed by: INTERNAL MEDICINE

## 2021-03-03 PROCEDURE — 3008F BODY MASS INDEX DOCD: CPT | Mod: CPTII,S$GLB,, | Performed by: INTERNAL MEDICINE

## 2021-03-03 PROCEDURE — 1100F PTFALLS ASSESS-DOCD GE2>/YR: CPT | Mod: CPTII,S$GLB,, | Performed by: INTERNAL MEDICINE

## 2021-03-03 PROCEDURE — 3079F DIAST BP 80-89 MM HG: CPT | Mod: CPTII,S$GLB,, | Performed by: INTERNAL MEDICINE

## 2021-03-03 PROCEDURE — 99214 PR OFFICE/OUTPT VISIT, EST, LEVL IV, 30-39 MIN: ICD-10-PCS | Mod: S$GLB,,, | Performed by: INTERNAL MEDICINE

## 2021-03-03 PROCEDURE — 93010 ELECTROCARDIOGRAM REPORT: CPT | Mod: S$GLB,,, | Performed by: INTERNAL MEDICINE

## 2021-03-04 ENCOUNTER — IMMUNIZATION (OUTPATIENT)
Dept: FAMILY MEDICINE | Facility: CLINIC | Age: 71
End: 2021-03-04
Payer: MEDICARE

## 2021-03-04 DIAGNOSIS — Z23 NEED FOR VACCINATION: Primary | ICD-10-CM

## 2021-03-04 PROCEDURE — 91300 COVID-19, MRNA, LNP-S, PF, 30 MCG/0.3 ML DOSE VACCINE: CPT | Mod: PBBFAC | Performed by: FAMILY MEDICINE

## 2021-03-04 PROCEDURE — 0002A COVID-19, MRNA, LNP-S, PF, 30 MCG/0.3 ML DOSE VACCINE: CPT | Mod: PBBFAC | Performed by: FAMILY MEDICINE

## 2021-03-10 ENCOUNTER — CLINICAL SUPPORT (OUTPATIENT)
Dept: CARDIOLOGY | Facility: HOSPITAL | Age: 71
End: 2021-03-10
Payer: MEDICARE

## 2021-03-10 DIAGNOSIS — Z95.818 PRESENCE OF OTHER CARDIAC IMPLANTS AND GRAFTS: ICD-10-CM

## 2021-03-10 PROCEDURE — 93298 CARDIAC DEVICE CHECK - REMOTE: ICD-10-PCS | Mod: ,,, | Performed by: INTERNAL MEDICINE

## 2021-03-10 PROCEDURE — G2066 INTER DEVC REMOTE 30D: HCPCS | Performed by: INTERNAL MEDICINE

## 2021-03-10 PROCEDURE — 93298 REM INTERROG DEV EVAL SCRMS: CPT | Mod: ,,, | Performed by: INTERNAL MEDICINE

## 2021-03-16 ENCOUNTER — LAB VISIT (OUTPATIENT)
Dept: LAB | Facility: HOSPITAL | Age: 71
End: 2021-03-16
Attending: SURGERY
Payer: MEDICARE

## 2021-03-16 ENCOUNTER — ANTI-COAG VISIT (OUTPATIENT)
Dept: CARDIOLOGY | Facility: CLINIC | Age: 71
End: 2021-03-16
Payer: MEDICARE

## 2021-03-16 DIAGNOSIS — D68.61 ANTIPHOSPHOLIPID SYNDROME: Primary | ICD-10-CM

## 2021-03-16 DIAGNOSIS — Z79.01 LONG TERM (CURRENT) USE OF ANTICOAGULANTS: ICD-10-CM

## 2021-03-16 DIAGNOSIS — I48.0 PAROXYSMAL ATRIAL FIBRILLATION: ICD-10-CM

## 2021-03-16 DIAGNOSIS — D68.61 ANTIPHOSPHOLIPID SYNDROME: ICD-10-CM

## 2021-03-16 LAB
INR PPP: 6.5
INR PPP: 6.5 (ref 0.8–1.2)
PROTHROMBIN TIME: 61.4 SEC (ref 9–12.5)

## 2021-03-16 PROCEDURE — 93793 ANTICOAG MGMT PT WARFARIN: CPT | Mod: S$GLB,,,

## 2021-03-16 PROCEDURE — 36415 COLL VENOUS BLD VENIPUNCTURE: CPT | Performed by: SURGERY

## 2021-03-16 PROCEDURE — 85610 PROTHROMBIN TIME: CPT | Performed by: SURGERY

## 2021-03-16 PROCEDURE — 93793 PR ANTICOAGULANT MGMT FOR PT TAKING WARFARIN: ICD-10-PCS | Mod: S$GLB,,,

## 2021-03-18 ENCOUNTER — DOCUMENTATION ONLY (OUTPATIENT)
Dept: VASCULAR SURGERY | Facility: CLINIC | Age: 71
End: 2021-03-18

## 2021-03-18 ENCOUNTER — ANTI-COAG VISIT (OUTPATIENT)
Dept: CARDIOLOGY | Facility: CLINIC | Age: 71
End: 2021-03-18
Payer: MEDICARE

## 2021-03-18 ENCOUNTER — LAB VISIT (OUTPATIENT)
Dept: LAB | Facility: HOSPITAL | Age: 71
End: 2021-03-18
Attending: SURGERY
Payer: MEDICARE

## 2021-03-18 DIAGNOSIS — D68.61 ANTIPHOSPHOLIPID SYNDROME: ICD-10-CM

## 2021-03-18 DIAGNOSIS — Z79.01 LONG TERM (CURRENT) USE OF ANTICOAGULANTS: Primary | ICD-10-CM

## 2021-03-18 DIAGNOSIS — I48.0 PAROXYSMAL ATRIAL FIBRILLATION: ICD-10-CM

## 2021-03-18 DIAGNOSIS — Z79.01 LONG TERM (CURRENT) USE OF ANTICOAGULANTS: ICD-10-CM

## 2021-03-18 LAB
INR PPP: 10 (ref 0.8–1.2)
PROTHROMBIN TIME: 91.7 SEC (ref 9–12.5)

## 2021-03-18 PROCEDURE — 36415 COLL VENOUS BLD VENIPUNCTURE: CPT | Performed by: SURGERY

## 2021-03-18 PROCEDURE — 93793 PR ANTICOAGULANT MGMT FOR PT TAKING WARFARIN: ICD-10-PCS | Mod: S$GLB,,,

## 2021-03-18 PROCEDURE — 93793 ANTICOAG MGMT PT WARFARIN: CPT | Mod: S$GLB,,,

## 2021-03-18 PROCEDURE — 85610 PROTHROMBIN TIME: CPT | Performed by: SURGERY

## 2021-03-19 ENCOUNTER — TELEPHONE (OUTPATIENT)
Dept: CARDIOLOGY | Facility: HOSPITAL | Age: 71
End: 2021-03-19

## 2021-03-19 RX ORDER — METOPROLOL SUCCINATE 50 MG/1
TABLET, EXTENDED RELEASE ORAL
Qty: 45 TABLET | Refills: 11 | Status: ON HOLD | OUTPATIENT
Start: 2021-03-19 | End: 2022-02-02 | Stop reason: HOSPADM

## 2021-03-22 ENCOUNTER — ANTI-COAG VISIT (OUTPATIENT)
Dept: CARDIOLOGY | Facility: CLINIC | Age: 71
End: 2021-03-22
Payer: MEDICARE

## 2021-03-22 ENCOUNTER — LAB VISIT (OUTPATIENT)
Dept: LAB | Facility: HOSPITAL | Age: 71
End: 2021-03-22
Attending: SURGERY
Payer: MEDICARE

## 2021-03-22 DIAGNOSIS — I48.0 PAROXYSMAL ATRIAL FIBRILLATION: ICD-10-CM

## 2021-03-22 DIAGNOSIS — Z79.01 LONG TERM (CURRENT) USE OF ANTICOAGULANTS: ICD-10-CM

## 2021-03-22 DIAGNOSIS — D68.61 ANTIPHOSPHOLIPID SYNDROME: Primary | ICD-10-CM

## 2021-03-22 DIAGNOSIS — D68.61 ANTIPHOSPHOLIPID SYNDROME: ICD-10-CM

## 2021-03-22 LAB
INR PPP: 6.5 (ref 0.8–1.2)
INR PPP: 6.51
PROTHROMBIN TIME: 61.6 SEC (ref 9–12.5)

## 2021-03-22 PROCEDURE — 93793 PR ANTICOAGULANT MGMT FOR PT TAKING WARFARIN: ICD-10-PCS | Mod: S$GLB,,,

## 2021-03-22 PROCEDURE — 36415 COLL VENOUS BLD VENIPUNCTURE: CPT | Performed by: INTERNAL MEDICINE

## 2021-03-22 PROCEDURE — 93793 ANTICOAG MGMT PT WARFARIN: CPT | Mod: S$GLB,,,

## 2021-03-22 PROCEDURE — 85610 PROTHROMBIN TIME: CPT | Performed by: INTERNAL MEDICINE

## 2021-03-25 ENCOUNTER — LAB VISIT (OUTPATIENT)
Dept: LAB | Facility: HOSPITAL | Age: 71
End: 2021-03-25
Attending: INTERNAL MEDICINE
Payer: MEDICARE

## 2021-03-25 ENCOUNTER — ANTI-COAG VISIT (OUTPATIENT)
Dept: CARDIOLOGY | Facility: CLINIC | Age: 71
End: 2021-03-25
Payer: MEDICARE

## 2021-03-25 DIAGNOSIS — I48.0 PAROXYSMAL ATRIAL FIBRILLATION: ICD-10-CM

## 2021-03-25 DIAGNOSIS — Z79.01 LONG TERM (CURRENT) USE OF ANTICOAGULANTS: ICD-10-CM

## 2021-03-25 DIAGNOSIS — D68.61 ANTIPHOSPHOLIPID SYNDROME: ICD-10-CM

## 2021-03-25 DIAGNOSIS — D68.61 ANTIPHOSPHOLIPID SYNDROME: Primary | ICD-10-CM

## 2021-03-25 LAB
INR PPP: 2 (ref 0.8–1.2)
PROTHROMBIN TIME: 20.7 SEC (ref 9–12.5)

## 2021-03-25 PROCEDURE — 85610 PROTHROMBIN TIME: CPT | Performed by: INTERNAL MEDICINE

## 2021-03-25 PROCEDURE — 93793 ANTICOAG MGMT PT WARFARIN: CPT | Mod: S$GLB,,,

## 2021-03-25 PROCEDURE — 93793 PR ANTICOAGULANT MGMT FOR PT TAKING WARFARIN: ICD-10-PCS | Mod: S$GLB,,,

## 2021-03-25 PROCEDURE — 36415 COLL VENOUS BLD VENIPUNCTURE: CPT | Performed by: INTERNAL MEDICINE

## 2021-03-26 ENCOUNTER — RESEARCH ENCOUNTER (OUTPATIENT)
Dept: RESEARCH | Facility: HOSPITAL | Age: 71
End: 2021-03-26

## 2021-03-29 ENCOUNTER — LAB VISIT (OUTPATIENT)
Dept: LAB | Facility: HOSPITAL | Age: 71
End: 2021-03-29
Attending: INTERNAL MEDICINE
Payer: MEDICARE

## 2021-03-29 ENCOUNTER — ANTI-COAG VISIT (OUTPATIENT)
Dept: CARDIOLOGY | Facility: CLINIC | Age: 71
End: 2021-03-29
Payer: MEDICARE

## 2021-03-29 DIAGNOSIS — Z79.01 LONG TERM (CURRENT) USE OF ANTICOAGULANTS: ICD-10-CM

## 2021-03-29 DIAGNOSIS — D68.61 ANTIPHOSPHOLIPID SYNDROME: ICD-10-CM

## 2021-03-29 DIAGNOSIS — I48.0 PAROXYSMAL ATRIAL FIBRILLATION: ICD-10-CM

## 2021-03-29 DIAGNOSIS — D68.61 ANTIPHOSPHOLIPID SYNDROME: Primary | ICD-10-CM

## 2021-03-29 LAB
INR PPP: 3.3 (ref 0.8–1.2)
PROTHROMBIN TIME: 32.2 SEC (ref 9–12.5)

## 2021-03-29 PROCEDURE — 93793 PR ANTICOAGULANT MGMT FOR PT TAKING WARFARIN: ICD-10-PCS | Mod: S$GLB,,,

## 2021-03-29 PROCEDURE — 85610 PROTHROMBIN TIME: CPT | Performed by: INTERNAL MEDICINE

## 2021-03-29 PROCEDURE — 36415 COLL VENOUS BLD VENIPUNCTURE: CPT | Performed by: INTERNAL MEDICINE

## 2021-03-29 PROCEDURE — 93793 ANTICOAG MGMT PT WARFARIN: CPT | Mod: S$GLB,,,

## 2021-04-01 ENCOUNTER — LAB VISIT (OUTPATIENT)
Dept: LAB | Facility: HOSPITAL | Age: 71
End: 2021-04-01
Attending: INTERNAL MEDICINE
Payer: MEDICARE

## 2021-04-01 ENCOUNTER — ANTI-COAG VISIT (OUTPATIENT)
Dept: CARDIOLOGY | Facility: CLINIC | Age: 71
End: 2021-04-01
Payer: MEDICARE

## 2021-04-01 DIAGNOSIS — I48.0 PAROXYSMAL ATRIAL FIBRILLATION: ICD-10-CM

## 2021-04-01 DIAGNOSIS — D68.61 ANTIPHOSPHOLIPID SYNDROME: ICD-10-CM

## 2021-04-01 DIAGNOSIS — Z79.01 LONG TERM (CURRENT) USE OF ANTICOAGULANTS: ICD-10-CM

## 2021-04-01 DIAGNOSIS — D68.61 ANTIPHOSPHOLIPID SYNDROME: Primary | ICD-10-CM

## 2021-04-01 LAB
INR PPP: 4 (ref 0.8–1.2)
PROTHROMBIN TIME: 38.6 SEC (ref 9–12.5)

## 2021-04-01 PROCEDURE — 85610 PROTHROMBIN TIME: CPT | Performed by: INTERNAL MEDICINE

## 2021-04-01 PROCEDURE — 93793 ANTICOAG MGMT PT WARFARIN: CPT | Mod: S$GLB,,,

## 2021-04-01 PROCEDURE — 36415 COLL VENOUS BLD VENIPUNCTURE: CPT | Performed by: INTERNAL MEDICINE

## 2021-04-01 PROCEDURE — 93793 PR ANTICOAGULANT MGMT FOR PT TAKING WARFARIN: ICD-10-PCS | Mod: S$GLB,,,

## 2021-04-05 ENCOUNTER — ANTI-COAG VISIT (OUTPATIENT)
Dept: CARDIOLOGY | Facility: CLINIC | Age: 71
End: 2021-04-05
Payer: MEDICARE

## 2021-04-05 ENCOUNTER — LAB VISIT (OUTPATIENT)
Dept: LAB | Facility: HOSPITAL | Age: 71
End: 2021-04-05
Attending: INTERNAL MEDICINE
Payer: MEDICARE

## 2021-04-05 DIAGNOSIS — D68.61 ANTIPHOSPHOLIPID SYNDROME: Primary | ICD-10-CM

## 2021-04-05 DIAGNOSIS — Z79.01 LONG TERM (CURRENT) USE OF ANTICOAGULANTS: ICD-10-CM

## 2021-04-05 DIAGNOSIS — I48.0 PAROXYSMAL ATRIAL FIBRILLATION: ICD-10-CM

## 2021-04-05 DIAGNOSIS — D68.61 ANTIPHOSPHOLIPID SYNDROME: ICD-10-CM

## 2021-04-05 LAB
INR PPP: 3.1 (ref 0.8–1.2)
PROTHROMBIN TIME: 30.4 SEC (ref 9–12.5)

## 2021-04-05 PROCEDURE — 93793 ANTICOAG MGMT PT WARFARIN: CPT | Mod: S$GLB,,,

## 2021-04-05 PROCEDURE — 85610 PROTHROMBIN TIME: CPT | Performed by: INTERNAL MEDICINE

## 2021-04-05 PROCEDURE — 93793 PR ANTICOAGULANT MGMT FOR PT TAKING WARFARIN: ICD-10-PCS | Mod: S$GLB,,,

## 2021-04-05 PROCEDURE — 36415 COLL VENOUS BLD VENIPUNCTURE: CPT | Performed by: INTERNAL MEDICINE

## 2021-04-08 ENCOUNTER — ANTI-COAG VISIT (OUTPATIENT)
Dept: CARDIOLOGY | Facility: CLINIC | Age: 71
End: 2021-04-08
Payer: MEDICARE

## 2021-04-08 ENCOUNTER — LAB VISIT (OUTPATIENT)
Dept: LAB | Facility: HOSPITAL | Age: 71
End: 2021-04-08
Attending: INTERNAL MEDICINE
Payer: MEDICARE

## 2021-04-08 DIAGNOSIS — Z79.01 LONG TERM (CURRENT) USE OF ANTICOAGULANTS: ICD-10-CM

## 2021-04-08 DIAGNOSIS — I48.0 PAROXYSMAL ATRIAL FIBRILLATION: ICD-10-CM

## 2021-04-08 DIAGNOSIS — D68.61 ANTIPHOSPHOLIPID SYNDROME: Primary | ICD-10-CM

## 2021-04-08 DIAGNOSIS — D68.61 ANTIPHOSPHOLIPID SYNDROME: ICD-10-CM

## 2021-04-08 LAB
INR PPP: 2.2 (ref 0.8–1.2)
PROTHROMBIN TIME: 22.6 SEC (ref 9–12.5)

## 2021-04-08 PROCEDURE — 85610 PROTHROMBIN TIME: CPT | Performed by: INTERNAL MEDICINE

## 2021-04-08 PROCEDURE — 93793 PR ANTICOAGULANT MGMT FOR PT TAKING WARFARIN: ICD-10-PCS | Mod: S$GLB,,,

## 2021-04-08 PROCEDURE — 36415 COLL VENOUS BLD VENIPUNCTURE: CPT | Performed by: INTERNAL MEDICINE

## 2021-04-08 PROCEDURE — 93793 ANTICOAG MGMT PT WARFARIN: CPT | Mod: S$GLB,,,

## 2021-04-09 ENCOUNTER — CLINICAL SUPPORT (OUTPATIENT)
Dept: CARDIOLOGY | Facility: HOSPITAL | Age: 71
End: 2021-04-09
Payer: MEDICARE

## 2021-04-09 DIAGNOSIS — Z95.818 PRESENCE OF OTHER CARDIAC IMPLANTS AND GRAFTS: ICD-10-CM

## 2021-04-09 PROCEDURE — 93298 REM INTERROG DEV EVAL SCRMS: CPT | Mod: ,,, | Performed by: INTERNAL MEDICINE

## 2021-04-09 PROCEDURE — 93298 CARDIAC DEVICE CHECK - REMOTE: ICD-10-PCS | Mod: ,,, | Performed by: INTERNAL MEDICINE

## 2021-04-09 PROCEDURE — G2066 INTER DEVC REMOTE 30D: HCPCS | Performed by: INTERNAL MEDICINE

## 2021-04-12 ENCOUNTER — LAB VISIT (OUTPATIENT)
Dept: LAB | Facility: HOSPITAL | Age: 71
End: 2021-04-12
Attending: INTERNAL MEDICINE
Payer: MEDICARE

## 2021-04-12 ENCOUNTER — ANTI-COAG VISIT (OUTPATIENT)
Dept: CARDIOLOGY | Facility: CLINIC | Age: 71
End: 2021-04-12
Payer: MEDICARE

## 2021-04-12 DIAGNOSIS — D68.61 ANTIPHOSPHOLIPID SYNDROME: Primary | ICD-10-CM

## 2021-04-12 DIAGNOSIS — I48.0 PAROXYSMAL ATRIAL FIBRILLATION: ICD-10-CM

## 2021-04-12 DIAGNOSIS — Z79.01 LONG TERM (CURRENT) USE OF ANTICOAGULANTS: ICD-10-CM

## 2021-04-12 DIAGNOSIS — D68.61 ANTIPHOSPHOLIPID SYNDROME: ICD-10-CM

## 2021-04-12 LAB
INR PPP: 4.2 (ref 0.8–1.2)
PROTHROMBIN TIME: 41.3 SEC (ref 9–12.5)

## 2021-04-12 PROCEDURE — 85610 PROTHROMBIN TIME: CPT | Performed by: INTERNAL MEDICINE

## 2021-04-12 PROCEDURE — 93793 ANTICOAG MGMT PT WARFARIN: CPT | Mod: S$GLB,,,

## 2021-04-12 PROCEDURE — 36415 COLL VENOUS BLD VENIPUNCTURE: CPT | Performed by: INTERNAL MEDICINE

## 2021-04-12 PROCEDURE — 93793 PR ANTICOAGULANT MGMT FOR PT TAKING WARFARIN: ICD-10-PCS | Mod: S$GLB,,,

## 2021-04-15 ENCOUNTER — ANTI-COAG VISIT (OUTPATIENT)
Dept: CARDIOLOGY | Facility: CLINIC | Age: 71
End: 2021-04-15
Payer: MEDICARE

## 2021-04-15 ENCOUNTER — LAB VISIT (OUTPATIENT)
Dept: LAB | Facility: HOSPITAL | Age: 71
End: 2021-04-15
Attending: INTERNAL MEDICINE
Payer: MEDICARE

## 2021-04-15 DIAGNOSIS — I48.0 PAROXYSMAL ATRIAL FIBRILLATION: ICD-10-CM

## 2021-04-15 DIAGNOSIS — Z79.01 LONG TERM (CURRENT) USE OF ANTICOAGULANTS: ICD-10-CM

## 2021-04-15 DIAGNOSIS — D68.61 ANTIPHOSPHOLIPID SYNDROME: ICD-10-CM

## 2021-04-15 DIAGNOSIS — D68.61 ANTIPHOSPHOLIPID SYNDROME: Primary | ICD-10-CM

## 2021-04-15 LAB
INR PPP: 3.3 (ref 0.8–1.2)
PROTHROMBIN TIME: 32.3 SEC (ref 9–12.5)

## 2021-04-15 PROCEDURE — 93793 ANTICOAG MGMT PT WARFARIN: CPT | Mod: S$GLB,,,

## 2021-04-15 PROCEDURE — 85610 PROTHROMBIN TIME: CPT | Performed by: INTERNAL MEDICINE

## 2021-04-15 PROCEDURE — 93793 PR ANTICOAGULANT MGMT FOR PT TAKING WARFARIN: ICD-10-PCS | Mod: S$GLB,,,

## 2021-04-15 PROCEDURE — 36415 COLL VENOUS BLD VENIPUNCTURE: CPT | Performed by: INTERNAL MEDICINE

## 2021-04-22 ENCOUNTER — ANTI-COAG VISIT (OUTPATIENT)
Dept: CARDIOLOGY | Facility: CLINIC | Age: 71
End: 2021-04-22
Payer: MEDICARE

## 2021-04-22 ENCOUNTER — LAB VISIT (OUTPATIENT)
Dept: LAB | Facility: HOSPITAL | Age: 71
End: 2021-04-22
Attending: INTERNAL MEDICINE
Payer: MEDICARE

## 2021-04-22 DIAGNOSIS — Z79.01 LONG TERM (CURRENT) USE OF ANTICOAGULANTS: ICD-10-CM

## 2021-04-22 DIAGNOSIS — D68.61 ANTIPHOSPHOLIPID SYNDROME: Primary | ICD-10-CM

## 2021-04-22 DIAGNOSIS — I48.0 PAROXYSMAL ATRIAL FIBRILLATION: ICD-10-CM

## 2021-04-22 DIAGNOSIS — D68.61 ANTIPHOSPHOLIPID SYNDROME: ICD-10-CM

## 2021-04-22 LAB
INR PPP: 2 (ref 0.8–1.2)
PROTHROMBIN TIME: 20.5 SEC (ref 9–12.5)

## 2021-04-22 PROCEDURE — 93793 ANTICOAG MGMT PT WARFARIN: CPT | Mod: S$GLB,,,

## 2021-04-22 PROCEDURE — 36415 COLL VENOUS BLD VENIPUNCTURE: CPT | Performed by: INTERNAL MEDICINE

## 2021-04-22 PROCEDURE — 93793 PR ANTICOAGULANT MGMT FOR PT TAKING WARFARIN: ICD-10-PCS | Mod: S$GLB,,,

## 2021-04-22 PROCEDURE — 85610 PROTHROMBIN TIME: CPT | Performed by: INTERNAL MEDICINE

## 2021-05-06 ENCOUNTER — ANTI-COAG VISIT (OUTPATIENT)
Dept: CARDIOLOGY | Facility: CLINIC | Age: 71
End: 2021-05-06
Payer: MEDICARE

## 2021-05-06 ENCOUNTER — LAB VISIT (OUTPATIENT)
Dept: LAB | Facility: HOSPITAL | Age: 71
End: 2021-05-06
Attending: INTERNAL MEDICINE
Payer: MEDICARE

## 2021-05-06 DIAGNOSIS — I48.0 PAROXYSMAL ATRIAL FIBRILLATION: ICD-10-CM

## 2021-05-06 DIAGNOSIS — Z79.01 LONG TERM (CURRENT) USE OF ANTICOAGULANTS: ICD-10-CM

## 2021-05-06 DIAGNOSIS — D68.61 ANTIPHOSPHOLIPID SYNDROME: Primary | ICD-10-CM

## 2021-05-06 DIAGNOSIS — D68.61 ANTIPHOSPHOLIPID SYNDROME: ICD-10-CM

## 2021-05-06 LAB
INR PPP: >10
INR PPP: >10 (ref 0.8–1.2)
PROTHROMBIN TIME: >100 SEC (ref 9–12.5)

## 2021-05-06 PROCEDURE — 36415 COLL VENOUS BLD VENIPUNCTURE: CPT | Performed by: INTERNAL MEDICINE

## 2021-05-06 PROCEDURE — 93793 ANTICOAG MGMT PT WARFARIN: CPT | Mod: S$GLB,,,

## 2021-05-06 PROCEDURE — 93793 PR ANTICOAGULANT MGMT FOR PT TAKING WARFARIN: ICD-10-PCS | Mod: S$GLB,,,

## 2021-05-06 PROCEDURE — 85610 PROTHROMBIN TIME: CPT | Performed by: INTERNAL MEDICINE

## 2021-05-07 ENCOUNTER — OFFICE VISIT (OUTPATIENT)
Dept: INTERNAL MEDICINE | Facility: CLINIC | Age: 71
End: 2021-05-07
Payer: MEDICARE

## 2021-05-07 VITALS
WEIGHT: 187.38 LBS | OXYGEN SATURATION: 99 % | BODY MASS INDEX: 31.22 KG/M2 | SYSTOLIC BLOOD PRESSURE: 118 MMHG | HEART RATE: 64 BPM | DIASTOLIC BLOOD PRESSURE: 80 MMHG | HEIGHT: 65 IN | RESPIRATION RATE: 18 BRPM

## 2021-05-07 DIAGNOSIS — R79.1 SUPRATHERAPEUTIC INR: Primary | ICD-10-CM

## 2021-05-07 PROCEDURE — 99213 OFFICE O/P EST LOW 20 MIN: CPT | Mod: S$GLB,,, | Performed by: INTERNAL MEDICINE

## 2021-05-07 PROCEDURE — 3008F BODY MASS INDEX DOCD: CPT | Mod: CPTII,S$GLB,, | Performed by: INTERNAL MEDICINE

## 2021-05-07 PROCEDURE — 1125F PR PAIN SEVERITY QUANTIFIED, PAIN PRESENT: ICD-10-PCS | Mod: S$GLB,,, | Performed by: INTERNAL MEDICINE

## 2021-05-07 PROCEDURE — 99213 PR OFFICE/OUTPT VISIT, EST, LEVL III, 20-29 MIN: ICD-10-PCS | Mod: S$GLB,,, | Performed by: INTERNAL MEDICINE

## 2021-05-07 PROCEDURE — 1101F PR PT FALLS ASSESS DOC 0-1 FALLS W/OUT INJ PAST YR: ICD-10-PCS | Mod: CPTII,S$GLB,, | Performed by: INTERNAL MEDICINE

## 2021-05-07 PROCEDURE — 1159F PR MEDICATION LIST DOCUMENTED IN MEDICAL RECORD: ICD-10-PCS | Mod: S$GLB,,, | Performed by: INTERNAL MEDICINE

## 2021-05-07 PROCEDURE — 3288F PR FALLS RISK ASSESSMENT DOCUMENTED: ICD-10-PCS | Mod: CPTII,S$GLB,, | Performed by: INTERNAL MEDICINE

## 2021-05-07 PROCEDURE — 3008F PR BODY MASS INDEX (BMI) DOCUMENTED: ICD-10-PCS | Mod: CPTII,S$GLB,, | Performed by: INTERNAL MEDICINE

## 2021-05-07 PROCEDURE — 3288F FALL RISK ASSESSMENT DOCD: CPT | Mod: CPTII,S$GLB,, | Performed by: INTERNAL MEDICINE

## 2021-05-07 PROCEDURE — 99999 PR PBB SHADOW E&M-EST. PATIENT-LVL IV: ICD-10-PCS | Mod: PBBFAC,,, | Performed by: INTERNAL MEDICINE

## 2021-05-07 PROCEDURE — 99999 PR PBB SHADOW E&M-EST. PATIENT-LVL IV: CPT | Mod: PBBFAC,,, | Performed by: INTERNAL MEDICINE

## 2021-05-07 PROCEDURE — 1125F AMNT PAIN NOTED PAIN PRSNT: CPT | Mod: S$GLB,,, | Performed by: INTERNAL MEDICINE

## 2021-05-07 PROCEDURE — 1159F MED LIST DOCD IN RCRD: CPT | Mod: S$GLB,,, | Performed by: INTERNAL MEDICINE

## 2021-05-07 PROCEDURE — 1101F PT FALLS ASSESS-DOCD LE1/YR: CPT | Mod: CPTII,S$GLB,, | Performed by: INTERNAL MEDICINE

## 2021-05-09 ENCOUNTER — CLINICAL SUPPORT (OUTPATIENT)
Dept: CARDIOLOGY | Facility: HOSPITAL | Age: 71
End: 2021-05-09
Payer: MEDICARE

## 2021-05-09 DIAGNOSIS — Z95.818 PRESENCE OF OTHER CARDIAC IMPLANTS AND GRAFTS: ICD-10-CM

## 2021-05-09 PROCEDURE — 93298 REM INTERROG DEV EVAL SCRMS: CPT | Mod: ,,, | Performed by: INTERNAL MEDICINE

## 2021-05-09 PROCEDURE — 93298 CARDIAC DEVICE CHECK - REMOTE: ICD-10-PCS | Mod: ,,, | Performed by: INTERNAL MEDICINE

## 2021-05-09 PROCEDURE — G2066 INTER DEVC REMOTE 30D: HCPCS | Performed by: INTERNAL MEDICINE

## 2021-05-11 ENCOUNTER — ANTI-COAG VISIT (OUTPATIENT)
Dept: CARDIOLOGY | Facility: CLINIC | Age: 71
End: 2021-05-11
Payer: MEDICARE

## 2021-05-11 ENCOUNTER — LAB VISIT (OUTPATIENT)
Dept: LAB | Facility: HOSPITAL | Age: 71
End: 2021-05-11
Attending: INTERNAL MEDICINE
Payer: MEDICARE

## 2021-05-11 DIAGNOSIS — I48.0 PAROXYSMAL ATRIAL FIBRILLATION: ICD-10-CM

## 2021-05-11 DIAGNOSIS — Z79.01 LONG TERM (CURRENT) USE OF ANTICOAGULANTS: ICD-10-CM

## 2021-05-11 DIAGNOSIS — D68.61 ANTIPHOSPHOLIPID SYNDROME: ICD-10-CM

## 2021-05-11 DIAGNOSIS — D68.61 ANTIPHOSPHOLIPID SYNDROME: Primary | ICD-10-CM

## 2021-05-11 LAB
INR PPP: 2.6 (ref 0.8–1.2)
PROTHROMBIN TIME: 26.1 SEC (ref 9–12.5)

## 2021-05-11 PROCEDURE — 36415 COLL VENOUS BLD VENIPUNCTURE: CPT | Performed by: INTERNAL MEDICINE

## 2021-05-11 PROCEDURE — 93793 ANTICOAG MGMT PT WARFARIN: CPT | Mod: S$GLB,,,

## 2021-05-11 PROCEDURE — 93793 PR ANTICOAGULANT MGMT FOR PT TAKING WARFARIN: ICD-10-PCS | Mod: S$GLB,,,

## 2021-05-11 PROCEDURE — 85610 PROTHROMBIN TIME: CPT | Performed by: INTERNAL MEDICINE

## 2021-05-17 ENCOUNTER — LAB VISIT (OUTPATIENT)
Dept: LAB | Facility: HOSPITAL | Age: 71
End: 2021-05-17
Attending: INTERNAL MEDICINE
Payer: MEDICARE

## 2021-05-17 ENCOUNTER — ANTI-COAG VISIT (OUTPATIENT)
Dept: CARDIOLOGY | Facility: CLINIC | Age: 71
End: 2021-05-17
Payer: MEDICARE

## 2021-05-17 DIAGNOSIS — Z79.01 LONG TERM (CURRENT) USE OF ANTICOAGULANTS: ICD-10-CM

## 2021-05-17 DIAGNOSIS — D68.61 ANTIPHOSPHOLIPID SYNDROME: ICD-10-CM

## 2021-05-17 DIAGNOSIS — D68.61 ANTIPHOSPHOLIPID SYNDROME: Primary | ICD-10-CM

## 2021-05-17 DIAGNOSIS — I48.0 PAROXYSMAL ATRIAL FIBRILLATION: ICD-10-CM

## 2021-05-17 LAB
INR PPP: 1.2 (ref 0.8–1.2)
PROTHROMBIN TIME: 12.3 SEC (ref 9–12.5)

## 2021-05-17 PROCEDURE — 36415 COLL VENOUS BLD VENIPUNCTURE: CPT | Performed by: INTERNAL MEDICINE

## 2021-05-17 PROCEDURE — 93793 PR ANTICOAGULANT MGMT FOR PT TAKING WARFARIN: ICD-10-PCS | Mod: S$GLB,,,

## 2021-05-17 PROCEDURE — 85610 PROTHROMBIN TIME: CPT | Performed by: INTERNAL MEDICINE

## 2021-05-17 PROCEDURE — 93793 ANTICOAG MGMT PT WARFARIN: CPT | Mod: S$GLB,,,

## 2021-05-25 ENCOUNTER — ANTI-COAG VISIT (OUTPATIENT)
Dept: CARDIOLOGY | Facility: CLINIC | Age: 71
End: 2021-05-25
Payer: MEDICARE

## 2021-05-25 ENCOUNTER — LAB VISIT (OUTPATIENT)
Dept: LAB | Facility: HOSPITAL | Age: 71
End: 2021-05-25
Attending: INTERNAL MEDICINE
Payer: MEDICARE

## 2021-05-25 DIAGNOSIS — I48.0 PAROXYSMAL ATRIAL FIBRILLATION: ICD-10-CM

## 2021-05-25 DIAGNOSIS — D68.61 ANTIPHOSPHOLIPID SYNDROME: Primary | ICD-10-CM

## 2021-05-25 DIAGNOSIS — Z79.01 LONG TERM (CURRENT) USE OF ANTICOAGULANTS: ICD-10-CM

## 2021-05-25 DIAGNOSIS — D68.61 ANTIPHOSPHOLIPID SYNDROME: ICD-10-CM

## 2021-05-25 LAB
INR PPP: 4.4 (ref 0.8–1.2)
PROTHROMBIN TIME: 42.7 SEC (ref 9–12.5)

## 2021-05-25 PROCEDURE — 93793 PR ANTICOAGULANT MGMT FOR PT TAKING WARFARIN: ICD-10-PCS | Mod: S$GLB,,,

## 2021-05-25 PROCEDURE — 85610 PROTHROMBIN TIME: CPT | Performed by: INTERNAL MEDICINE

## 2021-05-25 PROCEDURE — 93793 ANTICOAG MGMT PT WARFARIN: CPT | Mod: S$GLB,,,

## 2021-05-25 PROCEDURE — 36415 COLL VENOUS BLD VENIPUNCTURE: CPT | Performed by: INTERNAL MEDICINE

## 2021-06-01 ENCOUNTER — ANTI-COAG VISIT (OUTPATIENT)
Dept: CARDIOLOGY | Facility: CLINIC | Age: 71
End: 2021-06-01
Payer: MEDICARE

## 2021-06-01 ENCOUNTER — LAB VISIT (OUTPATIENT)
Dept: LAB | Facility: HOSPITAL | Age: 71
End: 2021-06-01
Attending: INTERNAL MEDICINE
Payer: MEDICARE

## 2021-06-01 DIAGNOSIS — D68.61 ANTIPHOSPHOLIPID SYNDROME: Primary | ICD-10-CM

## 2021-06-01 DIAGNOSIS — Z79.01 LONG TERM (CURRENT) USE OF ANTICOAGULANTS: ICD-10-CM

## 2021-06-01 DIAGNOSIS — I48.0 PAROXYSMAL ATRIAL FIBRILLATION: ICD-10-CM

## 2021-06-01 DIAGNOSIS — D68.61 ANTIPHOSPHOLIPID SYNDROME: ICD-10-CM

## 2021-06-01 LAB
INR PPP: >10 (ref 0.8–1.2)
PROTHROMBIN TIME: >100 SEC (ref 9–12.5)

## 2021-06-01 PROCEDURE — 93793 PR ANTICOAGULANT MGMT FOR PT TAKING WARFARIN: ICD-10-PCS | Mod: S$GLB,,,

## 2021-06-01 PROCEDURE — 85610 PROTHROMBIN TIME: CPT | Performed by: INTERNAL MEDICINE

## 2021-06-01 PROCEDURE — 93793 ANTICOAG MGMT PT WARFARIN: CPT | Mod: S$GLB,,,

## 2021-06-01 PROCEDURE — 36415 COLL VENOUS BLD VENIPUNCTURE: CPT | Performed by: INTERNAL MEDICINE

## 2021-06-03 ENCOUNTER — LAB VISIT (OUTPATIENT)
Dept: LAB | Facility: HOSPITAL | Age: 71
End: 2021-06-03
Attending: INTERNAL MEDICINE
Payer: MEDICARE

## 2021-06-03 ENCOUNTER — ANTI-COAG VISIT (OUTPATIENT)
Dept: CARDIOLOGY | Facility: CLINIC | Age: 71
End: 2021-06-03

## 2021-06-03 DIAGNOSIS — D68.61 ANTIPHOSPHOLIPID SYNDROME: Primary | ICD-10-CM

## 2021-06-03 DIAGNOSIS — Z79.01 LONG TERM (CURRENT) USE OF ANTICOAGULANTS: ICD-10-CM

## 2021-06-03 DIAGNOSIS — I48.0 PAROXYSMAL ATRIAL FIBRILLATION: ICD-10-CM

## 2021-06-03 DIAGNOSIS — D68.61 ANTIPHOSPHOLIPID SYNDROME: ICD-10-CM

## 2021-06-03 LAB
INR PPP: 8.7
INR PPP: 8.7 (ref 0.8–1.2)
PROTHROMBIN TIME: 81.1 SEC (ref 9–12.5)

## 2021-06-03 PROCEDURE — 85610 PROTHROMBIN TIME: CPT | Performed by: INTERNAL MEDICINE

## 2021-06-03 PROCEDURE — 36415 COLL VENOUS BLD VENIPUNCTURE: CPT | Performed by: INTERNAL MEDICINE

## 2021-06-08 ENCOUNTER — CLINICAL SUPPORT (OUTPATIENT)
Dept: CARDIOLOGY | Facility: HOSPITAL | Age: 71
End: 2021-06-08
Payer: MEDICARE

## 2021-06-08 DIAGNOSIS — Z95.818 PRESENCE OF OTHER CARDIAC IMPLANTS AND GRAFTS: ICD-10-CM

## 2021-06-08 PROCEDURE — 93298 REM INTERROG DEV EVAL SCRMS: CPT | Mod: ,,, | Performed by: INTERNAL MEDICINE

## 2021-06-08 PROCEDURE — 93298 CARDIAC DEVICE CHECK - REMOTE: ICD-10-PCS | Mod: ,,, | Performed by: INTERNAL MEDICINE

## 2021-06-08 PROCEDURE — G2066 INTER DEVC REMOTE 30D: HCPCS | Performed by: INTERNAL MEDICINE

## 2021-06-09 ENCOUNTER — LAB VISIT (OUTPATIENT)
Dept: LAB | Facility: HOSPITAL | Age: 71
End: 2021-06-09
Attending: INTERNAL MEDICINE
Payer: MEDICARE

## 2021-06-09 ENCOUNTER — ANTI-COAG VISIT (OUTPATIENT)
Dept: CARDIOLOGY | Facility: CLINIC | Age: 71
End: 2021-06-09
Payer: MEDICARE

## 2021-06-09 DIAGNOSIS — Z79.01 LONG TERM (CURRENT) USE OF ANTICOAGULANTS: Primary | ICD-10-CM

## 2021-06-09 DIAGNOSIS — Z79.01 LONG TERM (CURRENT) USE OF ANTICOAGULANTS: ICD-10-CM

## 2021-06-09 DIAGNOSIS — D68.61 ANTIPHOSPHOLIPID SYNDROME: ICD-10-CM

## 2021-06-09 DIAGNOSIS — I48.0 PAROXYSMAL ATRIAL FIBRILLATION: ICD-10-CM

## 2021-06-09 LAB
INR PPP: 2 (ref 0.8–1.2)
PROTHROMBIN TIME: 20 SEC (ref 9–12.5)

## 2021-06-09 PROCEDURE — 93793 ANTICOAG MGMT PT WARFARIN: CPT | Mod: S$GLB,,,

## 2021-06-09 PROCEDURE — 93793 PR ANTICOAGULANT MGMT FOR PT TAKING WARFARIN: ICD-10-PCS | Mod: S$GLB,,,

## 2021-06-09 PROCEDURE — 85610 PROTHROMBIN TIME: CPT | Performed by: INTERNAL MEDICINE

## 2021-06-09 PROCEDURE — 36415 COLL VENOUS BLD VENIPUNCTURE: CPT | Performed by: INTERNAL MEDICINE

## 2021-06-09 RX ORDER — WARFARIN 1 MG/1
TABLET ORAL
Qty: 30 TABLET | Refills: 5 | Status: ON HOLD | OUTPATIENT
Start: 2021-06-09 | End: 2022-02-02 | Stop reason: HOSPADM

## 2021-06-15 ENCOUNTER — LAB VISIT (OUTPATIENT)
Dept: LAB | Facility: HOSPITAL | Age: 71
End: 2021-06-15
Attending: INTERNAL MEDICINE
Payer: MEDICARE

## 2021-06-15 ENCOUNTER — ANTI-COAG VISIT (OUTPATIENT)
Dept: CARDIOLOGY | Facility: CLINIC | Age: 71
End: 2021-06-15
Payer: MEDICARE

## 2021-06-15 ENCOUNTER — OFFICE VISIT (OUTPATIENT)
Dept: INTERNAL MEDICINE | Facility: CLINIC | Age: 71
End: 2021-06-15
Payer: MEDICARE

## 2021-06-15 VITALS
WEIGHT: 188.94 LBS | HEART RATE: 69 BPM | DIASTOLIC BLOOD PRESSURE: 84 MMHG | BODY MASS INDEX: 31.48 KG/M2 | OXYGEN SATURATION: 96 % | SYSTOLIC BLOOD PRESSURE: 138 MMHG | HEIGHT: 65 IN | RESPIRATION RATE: 19 BRPM

## 2021-06-15 DIAGNOSIS — I48.0 PAROXYSMAL ATRIAL FIBRILLATION: ICD-10-CM

## 2021-06-15 DIAGNOSIS — K50.90 CROHN'S DISEASE WITHOUT COMPLICATION, UNSPECIFIED GASTROINTESTINAL TRACT LOCATION: ICD-10-CM

## 2021-06-15 DIAGNOSIS — D68.61 ANTIPHOSPHOLIPID SYNDROME: ICD-10-CM

## 2021-06-15 DIAGNOSIS — I10 ESSENTIAL HYPERTENSION: ICD-10-CM

## 2021-06-15 DIAGNOSIS — I70.0 AORTIC ATHEROSCLEROSIS: ICD-10-CM

## 2021-06-15 DIAGNOSIS — F33.1 DEPRESSION, MAJOR, RECURRENT, MODERATE: ICD-10-CM

## 2021-06-15 DIAGNOSIS — Z79.01 LONG TERM (CURRENT) USE OF ANTICOAGULANTS: ICD-10-CM

## 2021-06-15 DIAGNOSIS — M79.7 FIBROMYALGIA: Primary | ICD-10-CM

## 2021-06-15 DIAGNOSIS — D68.61 ANTIPHOSPHOLIPID SYNDROME: Primary | ICD-10-CM

## 2021-06-15 LAB
INR PPP: 1.1 (ref 0.8–1.2)
PROTHROMBIN TIME: 11.2 SEC (ref 9–12.5)

## 2021-06-15 PROCEDURE — 36415 COLL VENOUS BLD VENIPUNCTURE: CPT | Performed by: INTERNAL MEDICINE

## 2021-06-15 PROCEDURE — 93793 ANTICOAG MGMT PT WARFARIN: CPT | Mod: S$GLB,,,

## 2021-06-15 PROCEDURE — 99999 PR PBB SHADOW E&M-EST. PATIENT-LVL IV: CPT | Mod: PBBFAC,,, | Performed by: INTERNAL MEDICINE

## 2021-06-15 PROCEDURE — 3008F PR BODY MASS INDEX (BMI) DOCUMENTED: ICD-10-PCS | Mod: CPTII,S$GLB,, | Performed by: INTERNAL MEDICINE

## 2021-06-15 PROCEDURE — 3008F BODY MASS INDEX DOCD: CPT | Mod: CPTII,S$GLB,, | Performed by: INTERNAL MEDICINE

## 2021-06-15 PROCEDURE — 93793 PR ANTICOAGULANT MGMT FOR PT TAKING WARFARIN: ICD-10-PCS | Mod: S$GLB,,,

## 2021-06-15 PROCEDURE — 1125F PR PAIN SEVERITY QUANTIFIED, PAIN PRESENT: ICD-10-PCS | Mod: S$GLB,,, | Performed by: INTERNAL MEDICINE

## 2021-06-15 PROCEDURE — 1159F MED LIST DOCD IN RCRD: CPT | Mod: S$GLB,,, | Performed by: INTERNAL MEDICINE

## 2021-06-15 PROCEDURE — 1125F AMNT PAIN NOTED PAIN PRSNT: CPT | Mod: S$GLB,,, | Performed by: INTERNAL MEDICINE

## 2021-06-15 PROCEDURE — 99214 OFFICE O/P EST MOD 30 MIN: CPT | Mod: S$GLB,,, | Performed by: INTERNAL MEDICINE

## 2021-06-15 PROCEDURE — 1159F PR MEDICATION LIST DOCUMENTED IN MEDICAL RECORD: ICD-10-PCS | Mod: S$GLB,,, | Performed by: INTERNAL MEDICINE

## 2021-06-15 PROCEDURE — 3288F PR FALLS RISK ASSESSMENT DOCUMENTED: ICD-10-PCS | Mod: CPTII,S$GLB,, | Performed by: INTERNAL MEDICINE

## 2021-06-15 PROCEDURE — 1101F PR PT FALLS ASSESS DOC 0-1 FALLS W/OUT INJ PAST YR: ICD-10-PCS | Mod: CPTII,S$GLB,, | Performed by: INTERNAL MEDICINE

## 2021-06-15 PROCEDURE — 99499 UNLISTED E&M SERVICE: CPT | Mod: S$GLB,,, | Performed by: INTERNAL MEDICINE

## 2021-06-15 PROCEDURE — 3288F FALL RISK ASSESSMENT DOCD: CPT | Mod: CPTII,S$GLB,, | Performed by: INTERNAL MEDICINE

## 2021-06-15 PROCEDURE — 1101F PT FALLS ASSESS-DOCD LE1/YR: CPT | Mod: CPTII,S$GLB,, | Performed by: INTERNAL MEDICINE

## 2021-06-15 PROCEDURE — 99999 PR PBB SHADOW E&M-EST. PATIENT-LVL IV: ICD-10-PCS | Mod: PBBFAC,,, | Performed by: INTERNAL MEDICINE

## 2021-06-15 PROCEDURE — 99214 PR OFFICE/OUTPT VISIT, EST, LEVL IV, 30-39 MIN: ICD-10-PCS | Mod: S$GLB,,, | Performed by: INTERNAL MEDICINE

## 2021-06-15 PROCEDURE — 85610 PROTHROMBIN TIME: CPT | Performed by: INTERNAL MEDICINE

## 2021-06-15 PROCEDURE — 99499 RISK ADDL DX/OHS AUDIT: ICD-10-PCS | Mod: S$GLB,,, | Performed by: INTERNAL MEDICINE

## 2021-06-15 RX ORDER — DULOXETIN HYDROCHLORIDE 30 MG/1
30 CAPSULE, DELAYED RELEASE ORAL DAILY
Qty: 30 CAPSULE | Refills: 11 | Status: SHIPPED | OUTPATIENT
Start: 2021-06-15 | End: 2021-08-27

## 2021-06-22 ENCOUNTER — ANTI-COAG VISIT (OUTPATIENT)
Dept: CARDIOLOGY | Facility: CLINIC | Age: 71
End: 2021-06-22
Payer: MEDICARE

## 2021-06-22 ENCOUNTER — LAB VISIT (OUTPATIENT)
Dept: LAB | Facility: HOSPITAL | Age: 71
End: 2021-06-22
Attending: PEDIATRICS
Payer: MEDICAID

## 2021-06-22 DIAGNOSIS — I48.0 PAROXYSMAL ATRIAL FIBRILLATION: ICD-10-CM

## 2021-06-22 DIAGNOSIS — Z79.01 LONG TERM (CURRENT) USE OF ANTICOAGULANTS: ICD-10-CM

## 2021-06-22 DIAGNOSIS — D68.61 ANTIPHOSPHOLIPID SYNDROME: Primary | ICD-10-CM

## 2021-06-22 DIAGNOSIS — D68.61 ANTIPHOSPHOLIPID SYNDROME: ICD-10-CM

## 2021-06-22 LAB
INR PPP: 1 (ref 0.8–1.2)
PROTHROMBIN TIME: 10.9 SEC (ref 9–12.5)

## 2021-06-22 PROCEDURE — 93793 PR ANTICOAGULANT MGMT FOR PT TAKING WARFARIN: ICD-10-PCS | Mod: S$GLB,,,

## 2021-06-22 PROCEDURE — 36415 COLL VENOUS BLD VENIPUNCTURE: CPT | Performed by: INTERNAL MEDICINE

## 2021-06-22 PROCEDURE — 93793 ANTICOAG MGMT PT WARFARIN: CPT | Mod: S$GLB,,,

## 2021-06-22 PROCEDURE — 85610 PROTHROMBIN TIME: CPT | Performed by: INTERNAL MEDICINE

## 2021-07-01 ENCOUNTER — LAB VISIT (OUTPATIENT)
Dept: LAB | Facility: HOSPITAL | Age: 71
End: 2021-07-01
Attending: INTERNAL MEDICINE
Payer: MEDICARE

## 2021-07-01 ENCOUNTER — ANTI-COAG VISIT (OUTPATIENT)
Dept: CARDIOLOGY | Facility: CLINIC | Age: 71
End: 2021-07-01
Payer: MEDICARE

## 2021-07-01 DIAGNOSIS — I48.0 PAROXYSMAL ATRIAL FIBRILLATION: ICD-10-CM

## 2021-07-01 DIAGNOSIS — Z79.01 LONG TERM (CURRENT) USE OF ANTICOAGULANTS: ICD-10-CM

## 2021-07-01 DIAGNOSIS — D68.61 ANTIPHOSPHOLIPID SYNDROME: Primary | ICD-10-CM

## 2021-07-01 DIAGNOSIS — D68.61 ANTIPHOSPHOLIPID SYNDROME: ICD-10-CM

## 2021-07-01 LAB
INR PPP: 1.1 (ref 0.8–1.2)
PROTHROMBIN TIME: 11.4 SEC (ref 9–12.5)

## 2021-07-01 PROCEDURE — 36415 COLL VENOUS BLD VENIPUNCTURE: CPT | Performed by: INTERNAL MEDICINE

## 2021-07-01 PROCEDURE — 93793 ANTICOAG MGMT PT WARFARIN: CPT | Mod: S$GLB,,,

## 2021-07-01 PROCEDURE — 85610 PROTHROMBIN TIME: CPT | Performed by: INTERNAL MEDICINE

## 2021-07-01 PROCEDURE — 93793 PR ANTICOAGULANT MGMT FOR PT TAKING WARFARIN: ICD-10-PCS | Mod: S$GLB,,,

## 2021-07-08 ENCOUNTER — LAB VISIT (OUTPATIENT)
Dept: LAB | Facility: HOSPITAL | Age: 71
End: 2021-07-08
Attending: INTERNAL MEDICINE
Payer: MEDICAID

## 2021-07-08 ENCOUNTER — CLINICAL SUPPORT (OUTPATIENT)
Dept: CARDIOLOGY | Facility: HOSPITAL | Age: 71
End: 2021-07-08
Payer: MEDICARE

## 2021-07-08 ENCOUNTER — ANTI-COAG VISIT (OUTPATIENT)
Dept: CARDIOLOGY | Facility: CLINIC | Age: 71
End: 2021-07-08
Payer: MEDICARE

## 2021-07-08 DIAGNOSIS — Z95.818 PRESENCE OF OTHER CARDIAC IMPLANTS AND GRAFTS: ICD-10-CM

## 2021-07-08 DIAGNOSIS — Z79.01 LONG TERM (CURRENT) USE OF ANTICOAGULANTS: ICD-10-CM

## 2021-07-08 DIAGNOSIS — D68.61 ANTIPHOSPHOLIPID SYNDROME: Primary | ICD-10-CM

## 2021-07-08 DIAGNOSIS — I48.0 PAROXYSMAL ATRIAL FIBRILLATION: ICD-10-CM

## 2021-07-08 DIAGNOSIS — D68.61 ANTIPHOSPHOLIPID SYNDROME: ICD-10-CM

## 2021-07-08 LAB
INR PPP: 1.1 (ref 0.8–1.2)
PROTHROMBIN TIME: 11.5 SEC (ref 9–12.5)

## 2021-07-08 PROCEDURE — 85610 PROTHROMBIN TIME: CPT | Performed by: INTERNAL MEDICINE

## 2021-07-08 PROCEDURE — 93298 CARDIAC DEVICE CHECK - REMOTE: ICD-10-PCS | Mod: ,,, | Performed by: INTERNAL MEDICINE

## 2021-07-08 PROCEDURE — 36415 COLL VENOUS BLD VENIPUNCTURE: CPT | Performed by: INTERNAL MEDICINE

## 2021-07-08 PROCEDURE — 93793 ANTICOAG MGMT PT WARFARIN: CPT | Mod: S$GLB,,,

## 2021-07-08 PROCEDURE — 93298 REM INTERROG DEV EVAL SCRMS: CPT | Mod: ,,, | Performed by: INTERNAL MEDICINE

## 2021-07-08 PROCEDURE — 93793 PR ANTICOAGULANT MGMT FOR PT TAKING WARFARIN: ICD-10-PCS | Mod: S$GLB,,,

## 2021-07-08 PROCEDURE — G2066 INTER DEVC REMOTE 30D: HCPCS | Performed by: INTERNAL MEDICINE

## 2021-07-16 ENCOUNTER — LAB VISIT (OUTPATIENT)
Dept: LAB | Facility: HOSPITAL | Age: 71
End: 2021-07-16
Attending: INTERNAL MEDICINE
Payer: MEDICARE

## 2021-07-16 ENCOUNTER — ANTI-COAG VISIT (OUTPATIENT)
Dept: CARDIOLOGY | Facility: CLINIC | Age: 71
End: 2021-07-16
Payer: MEDICARE

## 2021-07-16 DIAGNOSIS — Z79.01 LONG TERM (CURRENT) USE OF ANTICOAGULANTS: ICD-10-CM

## 2021-07-16 DIAGNOSIS — D68.61 ANTIPHOSPHOLIPID SYNDROME: ICD-10-CM

## 2021-07-16 DIAGNOSIS — I48.0 PAROXYSMAL ATRIAL FIBRILLATION: ICD-10-CM

## 2021-07-16 DIAGNOSIS — D68.61 ANTIPHOSPHOLIPID SYNDROME: Primary | ICD-10-CM

## 2021-07-16 LAB
INR PPP: 3.1 (ref 0.8–1.2)
PROTHROMBIN TIME: 30.3 SEC (ref 9–12.5)

## 2021-07-16 PROCEDURE — 85610 PROTHROMBIN TIME: CPT | Performed by: INTERNAL MEDICINE

## 2021-07-16 PROCEDURE — 93793 PR ANTICOAGULANT MGMT FOR PT TAKING WARFARIN: ICD-10-PCS | Mod: S$GLB,,,

## 2021-07-16 PROCEDURE — 93793 ANTICOAG MGMT PT WARFARIN: CPT | Mod: S$GLB,,,

## 2021-07-16 PROCEDURE — 36415 COLL VENOUS BLD VENIPUNCTURE: CPT | Performed by: INTERNAL MEDICINE

## 2021-07-22 ENCOUNTER — LAB VISIT (OUTPATIENT)
Dept: LAB | Facility: HOSPITAL | Age: 71
End: 2021-07-22
Attending: INTERNAL MEDICINE
Payer: MEDICAID

## 2021-07-22 DIAGNOSIS — D68.61 ANTIPHOSPHOLIPID SYNDROME: ICD-10-CM

## 2021-07-22 DIAGNOSIS — M79.7 FIBROMYALGIA: ICD-10-CM

## 2021-07-22 DIAGNOSIS — Z79.01 LONG TERM (CURRENT) USE OF ANTICOAGULANTS: ICD-10-CM

## 2021-07-22 DIAGNOSIS — I10 ESSENTIAL HYPERTENSION: ICD-10-CM

## 2021-07-22 DIAGNOSIS — I48.0 PAROXYSMAL ATRIAL FIBRILLATION: ICD-10-CM

## 2021-07-22 LAB
ALBUMIN SERPL BCP-MCNC: 3.7 G/DL (ref 3.5–5.2)
ALP SERPL-CCNC: 77 U/L (ref 55–135)
ALT SERPL W/O P-5'-P-CCNC: 14 U/L (ref 10–44)
ANION GAP SERPL CALC-SCNC: 11 MMOL/L (ref 8–16)
AST SERPL-CCNC: 17 U/L (ref 10–40)
BASOPHILS # BLD AUTO: 0.05 K/UL (ref 0–0.2)
BASOPHILS NFR BLD: 0.5 % (ref 0–1.9)
BILIRUB SERPL-MCNC: 1.4 MG/DL (ref 0.1–1)
BUN SERPL-MCNC: 15 MG/DL (ref 8–23)
CALCIUM SERPL-MCNC: 9.4 MG/DL (ref 8.7–10.5)
CHLORIDE SERPL-SCNC: 105 MMOL/L (ref 95–110)
CHOLEST SERPL-MCNC: 190 MG/DL (ref 120–199)
CHOLEST/HDLC SERPL: 3.4 {RATIO} (ref 2–5)
CO2 SERPL-SCNC: 26 MMOL/L (ref 23–29)
CREAT SERPL-MCNC: 0.7 MG/DL (ref 0.5–1.4)
DIFFERENTIAL METHOD: ABNORMAL
EOSINOPHIL # BLD AUTO: 0.1 K/UL (ref 0–0.5)
EOSINOPHIL NFR BLD: 1.2 % (ref 0–8)
ERYTHROCYTE [DISTWIDTH] IN BLOOD BY AUTOMATED COUNT: 14.6 % (ref 11.5–14.5)
EST. GFR  (AFRICAN AMERICAN): >60 ML/MIN/1.73 M^2
EST. GFR  (NON AFRICAN AMERICAN): >60 ML/MIN/1.73 M^2
GLUCOSE SERPL-MCNC: 100 MG/DL (ref 70–110)
HCT VFR BLD AUTO: 39.9 % (ref 37–48.5)
HDLC SERPL-MCNC: 56 MG/DL (ref 40–75)
HDLC SERPL: 29.5 % (ref 20–50)
HGB BLD-MCNC: 13.3 G/DL (ref 12–16)
IMM GRANULOCYTES # BLD AUTO: 0.02 K/UL (ref 0–0.04)
IMM GRANULOCYTES NFR BLD AUTO: 0.2 % (ref 0–0.5)
LDLC SERPL CALC-MCNC: 113 MG/DL (ref 63–159)
LYMPHOCYTES # BLD AUTO: 2.4 K/UL (ref 1–4.8)
LYMPHOCYTES NFR BLD: 26.1 % (ref 18–48)
MCH RBC QN AUTO: 28.4 PG (ref 27–31)
MCHC RBC AUTO-ENTMCNC: 33.3 G/DL (ref 32–36)
MCV RBC AUTO: 85 FL (ref 82–98)
MONOCYTES # BLD AUTO: 0.7 K/UL (ref 0.3–1)
MONOCYTES NFR BLD: 8 % (ref 4–15)
NEUTROPHILS # BLD AUTO: 5.9 K/UL (ref 1.8–7.7)
NEUTROPHILS NFR BLD: 64 % (ref 38–73)
NONHDLC SERPL-MCNC: 134 MG/DL
NRBC BLD-RTO: 0 /100 WBC
PLATELET # BLD AUTO: 228 K/UL (ref 150–450)
PMV BLD AUTO: 10.3 FL (ref 9.2–12.9)
POTASSIUM SERPL-SCNC: 3.3 MMOL/L (ref 3.5–5.1)
PROT SERPL-MCNC: 7.1 G/DL (ref 6–8.4)
RBC # BLD AUTO: 4.69 M/UL (ref 4–5.4)
SODIUM SERPL-SCNC: 142 MMOL/L (ref 136–145)
TRIGL SERPL-MCNC: 105 MG/DL (ref 30–150)
TSH SERPL DL<=0.005 MIU/L-ACNC: 1.32 UIU/ML (ref 0.4–4)
WBC # BLD AUTO: 9.16 K/UL (ref 3.9–12.7)

## 2021-07-22 PROCEDURE — 80053 COMPREHEN METABOLIC PANEL: CPT | Performed by: INTERNAL MEDICINE

## 2021-07-22 PROCEDURE — 85025 COMPLETE CBC W/AUTO DIFF WBC: CPT | Performed by: INTERNAL MEDICINE

## 2021-07-22 PROCEDURE — 36415 COLL VENOUS BLD VENIPUNCTURE: CPT | Performed by: INTERNAL MEDICINE

## 2021-07-22 PROCEDURE — 84443 ASSAY THYROID STIM HORMONE: CPT | Performed by: INTERNAL MEDICINE

## 2021-07-22 PROCEDURE — 80061 LIPID PANEL: CPT | Performed by: INTERNAL MEDICINE

## 2021-07-26 ENCOUNTER — LAB VISIT (OUTPATIENT)
Dept: LAB | Facility: HOSPITAL | Age: 71
End: 2021-07-26
Attending: INTERNAL MEDICINE
Payer: MEDICARE

## 2021-07-26 ENCOUNTER — ANTI-COAG VISIT (OUTPATIENT)
Dept: CARDIOLOGY | Facility: CLINIC | Age: 71
End: 2021-07-26
Payer: MEDICARE

## 2021-07-26 DIAGNOSIS — Z79.01 LONG TERM (CURRENT) USE OF ANTICOAGULANTS: ICD-10-CM

## 2021-07-26 DIAGNOSIS — I48.0 PAROXYSMAL ATRIAL FIBRILLATION: ICD-10-CM

## 2021-07-26 DIAGNOSIS — D68.61 ANTIPHOSPHOLIPID SYNDROME: ICD-10-CM

## 2021-07-26 DIAGNOSIS — D68.61 ANTIPHOSPHOLIPID SYNDROME: Primary | ICD-10-CM

## 2021-07-26 LAB
INR PPP: 6.9 (ref 0.8–1.2)
PROTHROMBIN TIME: 65.4 SEC (ref 9–12.5)

## 2021-07-26 PROCEDURE — 93793 PR ANTICOAGULANT MGMT FOR PT TAKING WARFARIN: ICD-10-PCS | Mod: S$GLB,,,

## 2021-07-26 PROCEDURE — 93793 ANTICOAG MGMT PT WARFARIN: CPT | Mod: S$GLB,,,

## 2021-07-26 PROCEDURE — 85610 PROTHROMBIN TIME: CPT | Performed by: INTERNAL MEDICINE

## 2021-07-26 PROCEDURE — 36415 COLL VENOUS BLD VENIPUNCTURE: CPT | Performed by: INTERNAL MEDICINE

## 2021-07-28 ENCOUNTER — LAB VISIT (OUTPATIENT)
Dept: LAB | Facility: HOSPITAL | Age: 71
End: 2021-07-28
Attending: INTERNAL MEDICINE
Payer: MEDICARE

## 2021-07-28 ENCOUNTER — ANTI-COAG VISIT (OUTPATIENT)
Dept: CARDIOLOGY | Facility: CLINIC | Age: 71
End: 2021-07-28
Payer: MEDICARE

## 2021-07-28 DIAGNOSIS — Z79.01 LONG TERM (CURRENT) USE OF ANTICOAGULANTS: ICD-10-CM

## 2021-07-28 DIAGNOSIS — I48.0 PAROXYSMAL ATRIAL FIBRILLATION: ICD-10-CM

## 2021-07-28 DIAGNOSIS — D68.61 ANTIPHOSPHOLIPID SYNDROME: ICD-10-CM

## 2021-07-28 DIAGNOSIS — D68.61 ANTIPHOSPHOLIPID SYNDROME: Primary | ICD-10-CM

## 2021-07-28 LAB
INR PPP: 5.5 (ref 0.8–1.2)
PROTHROMBIN TIME: 52.2 SEC (ref 9–12.5)

## 2021-07-28 PROCEDURE — 93793 ANTICOAG MGMT PT WARFARIN: CPT | Mod: S$GLB,,,

## 2021-07-28 PROCEDURE — 93793 PR ANTICOAGULANT MGMT FOR PT TAKING WARFARIN: ICD-10-PCS | Mod: S$GLB,,,

## 2021-07-28 PROCEDURE — 85610 PROTHROMBIN TIME: CPT | Performed by: INTERNAL MEDICINE

## 2021-07-28 PROCEDURE — 36415 COLL VENOUS BLD VENIPUNCTURE: CPT | Performed by: INTERNAL MEDICINE

## 2021-07-30 ENCOUNTER — LAB VISIT (OUTPATIENT)
Dept: LAB | Facility: HOSPITAL | Age: 71
End: 2021-07-30
Attending: INTERNAL MEDICINE
Payer: MEDICAID

## 2021-07-30 ENCOUNTER — ANTI-COAG VISIT (OUTPATIENT)
Dept: CARDIOLOGY | Facility: CLINIC | Age: 71
End: 2021-07-30
Payer: MEDICARE

## 2021-07-30 DIAGNOSIS — Z79.01 LONG TERM (CURRENT) USE OF ANTICOAGULANTS: ICD-10-CM

## 2021-07-30 DIAGNOSIS — I48.0 PAROXYSMAL ATRIAL FIBRILLATION: ICD-10-CM

## 2021-07-30 DIAGNOSIS — D68.61 ANTIPHOSPHOLIPID SYNDROME: Primary | ICD-10-CM

## 2021-07-30 DIAGNOSIS — D68.61 ANTIPHOSPHOLIPID SYNDROME: ICD-10-CM

## 2021-07-30 LAB
INR PPP: 2.4 (ref 0.8–1.2)
PROTHROMBIN TIME: 23.9 SEC (ref 9–12.5)

## 2021-07-30 PROCEDURE — 36415 COLL VENOUS BLD VENIPUNCTURE: CPT | Performed by: INTERNAL MEDICINE

## 2021-07-30 PROCEDURE — 93793 ANTICOAG MGMT PT WARFARIN: CPT | Mod: S$GLB,,,

## 2021-07-30 PROCEDURE — 93793 PR ANTICOAGULANT MGMT FOR PT TAKING WARFARIN: ICD-10-PCS | Mod: S$GLB,,,

## 2021-07-30 PROCEDURE — 85610 PROTHROMBIN TIME: CPT | Performed by: INTERNAL MEDICINE

## 2021-08-04 ENCOUNTER — LAB VISIT (OUTPATIENT)
Dept: LAB | Facility: HOSPITAL | Age: 71
End: 2021-08-04
Attending: INTERNAL MEDICINE
Payer: COMMERCIAL

## 2021-08-04 ENCOUNTER — ANTI-COAG VISIT (OUTPATIENT)
Dept: CARDIOLOGY | Facility: CLINIC | Age: 71
End: 2021-08-04
Payer: COMMERCIAL

## 2021-08-04 DIAGNOSIS — I48.0 PAROXYSMAL ATRIAL FIBRILLATION: ICD-10-CM

## 2021-08-04 DIAGNOSIS — Z79.01 LONG TERM (CURRENT) USE OF ANTICOAGULANTS: ICD-10-CM

## 2021-08-04 DIAGNOSIS — D68.61 ANTIPHOSPHOLIPID SYNDROME: Primary | ICD-10-CM

## 2021-08-04 DIAGNOSIS — D68.61 ANTIPHOSPHOLIPID SYNDROME: ICD-10-CM

## 2021-08-04 LAB
INR PPP: 3.9 (ref 0.8–1.2)
PROTHROMBIN TIME: 37.9 SEC (ref 9–12.5)

## 2021-08-04 PROCEDURE — 93793 ANTICOAG MGMT PT WARFARIN: CPT | Mod: S$GLB,,,

## 2021-08-04 PROCEDURE — 93793 PR ANTICOAGULANT MGMT FOR PT TAKING WARFARIN: ICD-10-PCS | Mod: S$GLB,,,

## 2021-08-04 PROCEDURE — 85610 PROTHROMBIN TIME: CPT | Performed by: INTERNAL MEDICINE

## 2021-08-04 PROCEDURE — 36415 COLL VENOUS BLD VENIPUNCTURE: CPT | Performed by: INTERNAL MEDICINE

## 2021-08-07 ENCOUNTER — CLINICAL SUPPORT (OUTPATIENT)
Dept: CARDIOLOGY | Facility: HOSPITAL | Age: 71
End: 2021-08-07
Payer: COMMERCIAL

## 2021-08-07 DIAGNOSIS — Z95.818 PRESENCE OF OTHER CARDIAC IMPLANTS AND GRAFTS: ICD-10-CM

## 2021-08-07 PROCEDURE — 93298 CARDIAC DEVICE CHECK - REMOTE: ICD-10-PCS | Mod: ,,, | Performed by: INTERNAL MEDICINE

## 2021-08-07 PROCEDURE — 93298 REM INTERROG DEV EVAL SCRMS: CPT | Mod: ,,, | Performed by: INTERNAL MEDICINE

## 2021-08-07 PROCEDURE — G2066 INTER DEVC REMOTE 30D: HCPCS | Performed by: INTERNAL MEDICINE

## 2021-08-11 ENCOUNTER — RESEARCH ENCOUNTER (OUTPATIENT)
Dept: RESEARCH | Facility: HOSPITAL | Age: 71
End: 2021-08-11

## 2021-08-12 ENCOUNTER — ANTI-COAG VISIT (OUTPATIENT)
Dept: CARDIOLOGY | Facility: CLINIC | Age: 71
End: 2021-08-12
Payer: COMMERCIAL

## 2021-08-12 ENCOUNTER — LAB VISIT (OUTPATIENT)
Dept: LAB | Facility: HOSPITAL | Age: 71
End: 2021-08-12
Attending: INTERNAL MEDICINE
Payer: COMMERCIAL

## 2021-08-12 DIAGNOSIS — I48.0 PAROXYSMAL ATRIAL FIBRILLATION: ICD-10-CM

## 2021-08-12 DIAGNOSIS — D68.61 ANTIPHOSPHOLIPID SYNDROME: ICD-10-CM

## 2021-08-12 DIAGNOSIS — Z79.01 LONG TERM (CURRENT) USE OF ANTICOAGULANTS: ICD-10-CM

## 2021-08-12 DIAGNOSIS — D68.61 ANTIPHOSPHOLIPID SYNDROME: Primary | ICD-10-CM

## 2021-08-12 LAB
INR PPP: 1.1 (ref 0.8–1.2)
PROTHROMBIN TIME: 11.9 SEC (ref 9–12.5)

## 2021-08-12 PROCEDURE — 85610 PROTHROMBIN TIME: CPT | Performed by: INTERNAL MEDICINE

## 2021-08-12 PROCEDURE — 93793 ANTICOAG MGMT PT WARFARIN: CPT | Mod: S$GLB,,,

## 2021-08-12 PROCEDURE — 36415 COLL VENOUS BLD VENIPUNCTURE: CPT | Performed by: INTERNAL MEDICINE

## 2021-08-12 PROCEDURE — 93793 PR ANTICOAGULANT MGMT FOR PT TAKING WARFARIN: ICD-10-PCS | Mod: S$GLB,,,

## 2021-08-20 ENCOUNTER — LAB VISIT (OUTPATIENT)
Dept: LAB | Facility: HOSPITAL | Age: 71
End: 2021-08-20
Attending: INTERNAL MEDICINE
Payer: COMMERCIAL

## 2021-08-20 ENCOUNTER — ANTI-COAG VISIT (OUTPATIENT)
Dept: CARDIOLOGY | Facility: CLINIC | Age: 71
End: 2021-08-20
Payer: COMMERCIAL

## 2021-08-20 DIAGNOSIS — I48.0 PAROXYSMAL ATRIAL FIBRILLATION: ICD-10-CM

## 2021-08-20 DIAGNOSIS — Z79.01 LONG TERM (CURRENT) USE OF ANTICOAGULANTS: ICD-10-CM

## 2021-08-20 DIAGNOSIS — D68.61 ANTIPHOSPHOLIPID SYNDROME: Primary | ICD-10-CM

## 2021-08-20 DIAGNOSIS — D68.61 ANTIPHOSPHOLIPID SYNDROME: ICD-10-CM

## 2021-08-20 LAB
INR PPP: 1.4 (ref 0.8–1.2)
PROTHROMBIN TIME: 14.4 SEC (ref 9–12.5)

## 2021-08-20 PROCEDURE — 36415 COLL VENOUS BLD VENIPUNCTURE: CPT | Performed by: INTERNAL MEDICINE

## 2021-08-20 PROCEDURE — 93793 PR ANTICOAGULANT MGMT FOR PT TAKING WARFARIN: ICD-10-PCS | Mod: S$GLB,,,

## 2021-08-20 PROCEDURE — 85610 PROTHROMBIN TIME: CPT | Performed by: INTERNAL MEDICINE

## 2021-08-20 PROCEDURE — 93793 ANTICOAG MGMT PT WARFARIN: CPT | Mod: S$GLB,,,

## 2021-08-26 ENCOUNTER — LAB VISIT (OUTPATIENT)
Dept: LAB | Facility: HOSPITAL | Age: 71
End: 2021-08-26
Attending: INTERNAL MEDICINE
Payer: COMMERCIAL

## 2021-08-26 DIAGNOSIS — I48.0 PAROXYSMAL ATRIAL FIBRILLATION: ICD-10-CM

## 2021-08-26 DIAGNOSIS — Z79.01 LONG TERM (CURRENT) USE OF ANTICOAGULANTS: ICD-10-CM

## 2021-08-26 DIAGNOSIS — D68.61 ANTIPHOSPHOLIPID SYNDROME: ICD-10-CM

## 2021-08-26 LAB
INR PPP: 3.5 (ref 0.8–1.2)
PROTHROMBIN TIME: 34.2 SEC (ref 9–12.5)

## 2021-08-26 PROCEDURE — 85610 PROTHROMBIN TIME: CPT | Performed by: INTERNAL MEDICINE

## 2021-08-26 PROCEDURE — 36415 COLL VENOUS BLD VENIPUNCTURE: CPT | Performed by: INTERNAL MEDICINE

## 2021-08-27 ENCOUNTER — CLINICAL SUPPORT (OUTPATIENT)
Dept: FAMILY MEDICINE | Facility: CLINIC | Age: 71
End: 2021-08-27
Payer: COMMERCIAL

## 2021-08-27 ENCOUNTER — OFFICE VISIT (OUTPATIENT)
Dept: FAMILY MEDICINE | Facility: CLINIC | Age: 71
End: 2021-08-27
Payer: COMMERCIAL

## 2021-08-27 ENCOUNTER — ANTI-COAG VISIT (OUTPATIENT)
Dept: CARDIOLOGY | Facility: CLINIC | Age: 71
End: 2021-08-27
Payer: COMMERCIAL

## 2021-08-27 VITALS
DIASTOLIC BLOOD PRESSURE: 94 MMHG | HEART RATE: 80 BPM | WEIGHT: 189.63 LBS | HEIGHT: 65 IN | TEMPERATURE: 99 F | RESPIRATION RATE: 12 BRPM | SYSTOLIC BLOOD PRESSURE: 130 MMHG | BODY MASS INDEX: 31.59 KG/M2

## 2021-08-27 DIAGNOSIS — R05.9 COUGH: Primary | ICD-10-CM

## 2021-08-27 DIAGNOSIS — H65.03 BILATERAL ACUTE SEROUS OTITIS MEDIA, RECURRENCE NOT SPECIFIED: ICD-10-CM

## 2021-08-27 DIAGNOSIS — D68.61 ANTIPHOSPHOLIPID SYNDROME: Primary | ICD-10-CM

## 2021-08-27 DIAGNOSIS — H00.012 HORDEOLUM EXTERNUM OF RIGHT LOWER EYELID: ICD-10-CM

## 2021-08-27 DIAGNOSIS — R05.9 COUGH: ICD-10-CM

## 2021-08-27 DIAGNOSIS — R09.81 CONGESTION OF NASAL SINUS: ICD-10-CM

## 2021-08-27 DIAGNOSIS — I48.0 PAROXYSMAL ATRIAL FIBRILLATION: ICD-10-CM

## 2021-08-27 DIAGNOSIS — Z79.01 LONG TERM (CURRENT) USE OF ANTICOAGULANTS: ICD-10-CM

## 2021-08-27 LAB
CTP QC/QA: YES
SARS-COV-2 RDRP RESP QL NAA+PROBE: NEGATIVE

## 2021-08-27 PROCEDURE — 99213 PR OFFICE/OUTPT VISIT, EST, LEVL III, 20-29 MIN: ICD-10-PCS | Mod: S$GLB,,, | Performed by: NURSE PRACTITIONER

## 2021-08-27 PROCEDURE — 93793 ANTICOAG MGMT PT WARFARIN: CPT | Mod: ,,,

## 2021-08-27 PROCEDURE — 99999 PR PBB SHADOW E&M-EST. PATIENT-LVL IV: CPT | Mod: PBBFAC,,, | Performed by: NURSE PRACTITIONER

## 2021-08-27 PROCEDURE — 93793 PR ANTICOAGULANT MGMT FOR PT TAKING WARFARIN: ICD-10-PCS | Mod: ,,,

## 2021-08-27 PROCEDURE — 96372 THER/PROPH/DIAG INJ SC/IM: CPT | Mod: PBBFAC

## 2021-08-27 PROCEDURE — U0002 COVID-19 LAB TEST NON-CDC: HCPCS | Mod: PBBFAC

## 2021-08-27 PROCEDURE — 99213 OFFICE O/P EST LOW 20 MIN: CPT | Mod: S$GLB,,, | Performed by: NURSE PRACTITIONER

## 2021-08-27 PROCEDURE — 99214 OFFICE O/P EST MOD 30 MIN: CPT | Mod: PBBFAC | Performed by: NURSE PRACTITIONER

## 2021-08-27 PROCEDURE — 36415 COLL VENOUS BLD VENIPUNCTURE: CPT | Mod: PBBFAC

## 2021-08-27 PROCEDURE — 99999 PR PBB SHADOW E&M-EST. PATIENT-LVL IV: ICD-10-PCS | Mod: PBBFAC,,, | Performed by: NURSE PRACTITIONER

## 2021-08-27 RX ORDER — METHYLPREDNISOLONE ACETATE 40 MG/ML
40 INJECTION, SUSPENSION INTRA-ARTICULAR; INTRALESIONAL; INTRAMUSCULAR; SOFT TISSUE
Status: COMPLETED | OUTPATIENT
Start: 2021-08-27 | End: 2021-08-27

## 2021-08-27 RX ORDER — TOBRAMYCIN 3 MG/ML
2 SOLUTION/ DROPS OPHTHALMIC 3 TIMES DAILY
Qty: 1 BOTTLE | Refills: 0 | Status: ON HOLD | OUTPATIENT
Start: 2021-08-27 | End: 2022-02-02 | Stop reason: HOSPADM

## 2021-08-27 RX ORDER — AZITHROMYCIN 500 MG/1
500 TABLET, FILM COATED ORAL DAILY
Qty: 3 TABLET | Refills: 0 | Status: SHIPPED | OUTPATIENT
Start: 2021-08-27 | End: 2021-08-30

## 2021-08-27 RX ADMIN — METHYLPREDNISOLONE ACETATE 40 MG: 40 INJECTION, SUSPENSION INTRA-ARTICULAR; INTRALESIONAL; INTRAMUSCULAR; SOFT TISSUE at 04:08

## 2021-09-06 ENCOUNTER — CLINICAL SUPPORT (OUTPATIENT)
Dept: CARDIOLOGY | Facility: HOSPITAL | Age: 71
End: 2021-09-06
Payer: COMMERCIAL

## 2021-09-06 DIAGNOSIS — Z95.818 PRESENCE OF OTHER CARDIAC IMPLANTS AND GRAFTS: ICD-10-CM

## 2021-09-06 PROCEDURE — G2066 INTER DEVC REMOTE 30D: HCPCS | Performed by: INTERNAL MEDICINE

## 2021-09-06 PROCEDURE — 93298 CARDIAC DEVICE CHECK - REMOTE: ICD-10-PCS | Mod: ,,, | Performed by: INTERNAL MEDICINE

## 2021-09-06 PROCEDURE — 93298 REM INTERROG DEV EVAL SCRMS: CPT | Mod: ,,, | Performed by: INTERNAL MEDICINE

## 2021-10-06 ENCOUNTER — CLINICAL SUPPORT (OUTPATIENT)
Dept: CARDIOLOGY | Facility: HOSPITAL | Age: 71
End: 2021-10-06
Payer: COMMERCIAL

## 2021-10-06 DIAGNOSIS — Z95.818 PRESENCE OF OTHER CARDIAC IMPLANTS AND GRAFTS: ICD-10-CM

## 2021-10-06 PROCEDURE — 93298 CARDIAC DEVICE CHECK - REMOTE: ICD-10-PCS | Mod: ,,, | Performed by: INTERNAL MEDICINE

## 2021-10-06 PROCEDURE — 93298 REM INTERROG DEV EVAL SCRMS: CPT | Mod: ,,, | Performed by: INTERNAL MEDICINE

## 2021-10-06 PROCEDURE — G2066 INTER DEVC REMOTE 30D: HCPCS | Performed by: INTERNAL MEDICINE

## 2021-10-14 ENCOUNTER — ANTI-COAG VISIT (OUTPATIENT)
Dept: CARDIOLOGY | Facility: CLINIC | Age: 71
End: 2021-10-14
Payer: COMMERCIAL

## 2021-10-14 ENCOUNTER — LAB VISIT (OUTPATIENT)
Dept: LAB | Facility: HOSPITAL | Age: 71
End: 2021-10-14
Attending: INTERNAL MEDICINE
Payer: COMMERCIAL

## 2021-10-14 DIAGNOSIS — D68.61 ANTIPHOSPHOLIPID SYNDROME: ICD-10-CM

## 2021-10-14 DIAGNOSIS — Z79.01 LONG TERM (CURRENT) USE OF ANTICOAGULANTS: ICD-10-CM

## 2021-10-14 DIAGNOSIS — I48.0 PAROXYSMAL ATRIAL FIBRILLATION: ICD-10-CM

## 2021-10-14 DIAGNOSIS — D68.61 ANTIPHOSPHOLIPID SYNDROME: Primary | ICD-10-CM

## 2021-10-14 LAB
INR PPP: 1.3 (ref 0.8–1.2)
PROTHROMBIN TIME: 13.9 SEC (ref 9–12.5)

## 2021-10-14 PROCEDURE — 93793 PR ANTICOAGULANT MGMT FOR PT TAKING WARFARIN: ICD-10-PCS | Mod: ,,,

## 2021-10-14 PROCEDURE — 93793 ANTICOAG MGMT PT WARFARIN: CPT | Mod: ,,,

## 2021-10-14 PROCEDURE — 36415 COLL VENOUS BLD VENIPUNCTURE: CPT | Performed by: INTERNAL MEDICINE

## 2021-10-14 PROCEDURE — 85610 PROTHROMBIN TIME: CPT | Performed by: INTERNAL MEDICINE

## 2021-10-21 ENCOUNTER — LAB VISIT (OUTPATIENT)
Dept: LAB | Facility: HOSPITAL | Age: 71
End: 2021-10-21
Attending: INTERNAL MEDICINE
Payer: COMMERCIAL

## 2021-10-21 ENCOUNTER — ANTI-COAG VISIT (OUTPATIENT)
Dept: CARDIOLOGY | Facility: CLINIC | Age: 71
End: 2021-10-21
Payer: COMMERCIAL

## 2021-10-21 DIAGNOSIS — D68.61 ANTIPHOSPHOLIPID SYNDROME: Primary | ICD-10-CM

## 2021-10-21 DIAGNOSIS — D68.61 ANTIPHOSPHOLIPID SYNDROME: ICD-10-CM

## 2021-10-21 DIAGNOSIS — I48.0 PAROXYSMAL ATRIAL FIBRILLATION: ICD-10-CM

## 2021-10-21 DIAGNOSIS — Z79.01 LONG TERM (CURRENT) USE OF ANTICOAGULANTS: ICD-10-CM

## 2021-10-21 LAB
INR PPP: 3.7 (ref 0.8–1.2)
PROTHROMBIN TIME: 36.5 SEC (ref 9–12.5)

## 2021-10-21 PROCEDURE — 85610 PROTHROMBIN TIME: CPT | Performed by: INTERNAL MEDICINE

## 2021-10-21 PROCEDURE — 93793 ANTICOAG MGMT PT WARFARIN: CPT | Mod: S$GLB,,,

## 2021-10-21 PROCEDURE — 36415 COLL VENOUS BLD VENIPUNCTURE: CPT | Performed by: INTERNAL MEDICINE

## 2021-10-21 PROCEDURE — 93793 PR ANTICOAGULANT MGMT FOR PT TAKING WARFARIN: ICD-10-PCS | Mod: S$GLB,,,

## 2021-11-01 ENCOUNTER — LAB VISIT (OUTPATIENT)
Dept: LAB | Facility: HOSPITAL | Age: 71
End: 2021-11-01
Attending: INTERNAL MEDICINE
Payer: COMMERCIAL

## 2021-11-01 ENCOUNTER — ANTI-COAG VISIT (OUTPATIENT)
Dept: CARDIOLOGY | Facility: CLINIC | Age: 71
End: 2021-11-01
Payer: COMMERCIAL

## 2021-11-01 DIAGNOSIS — D68.61 ANTIPHOSPHOLIPID SYNDROME: ICD-10-CM

## 2021-11-01 DIAGNOSIS — I48.0 PAROXYSMAL ATRIAL FIBRILLATION: ICD-10-CM

## 2021-11-01 DIAGNOSIS — Z79.01 LONG TERM (CURRENT) USE OF ANTICOAGULANTS: ICD-10-CM

## 2021-11-01 DIAGNOSIS — D68.61 ANTIPHOSPHOLIPID SYNDROME: Primary | ICD-10-CM

## 2021-11-01 LAB
INR PPP: 4.95
INR PPP: 5 (ref 0.8–1.2)
PROTHROMBIN TIME: 47.7 SEC (ref 9–12.5)

## 2021-11-01 PROCEDURE — 85610 PROTHROMBIN TIME: CPT | Performed by: INTERNAL MEDICINE

## 2021-11-01 PROCEDURE — 93793 PR ANTICOAGULANT MGMT FOR PT TAKING WARFARIN: ICD-10-PCS | Mod: S$GLB,,,

## 2021-11-01 PROCEDURE — 36415 COLL VENOUS BLD VENIPUNCTURE: CPT | Performed by: INTERNAL MEDICINE

## 2021-11-01 PROCEDURE — 93793 ANTICOAG MGMT PT WARFARIN: CPT | Mod: S$GLB,,,

## 2021-11-04 ENCOUNTER — ANTI-COAG VISIT (OUTPATIENT)
Dept: CARDIOLOGY | Facility: CLINIC | Age: 71
End: 2021-11-04
Payer: COMMERCIAL

## 2021-11-04 ENCOUNTER — LAB VISIT (OUTPATIENT)
Dept: LAB | Facility: HOSPITAL | Age: 71
End: 2021-11-04
Attending: INTERNAL MEDICINE
Payer: COMMERCIAL

## 2021-11-04 DIAGNOSIS — D68.61 ANTIPHOSPHOLIPID SYNDROME: Primary | ICD-10-CM

## 2021-11-04 DIAGNOSIS — Z79.01 LONG TERM (CURRENT) USE OF ANTICOAGULANTS: ICD-10-CM

## 2021-11-04 DIAGNOSIS — I48.0 PAROXYSMAL ATRIAL FIBRILLATION: ICD-10-CM

## 2021-11-04 DIAGNOSIS — D68.61 ANTIPHOSPHOLIPID SYNDROME: ICD-10-CM

## 2021-11-04 LAB
INR PPP: 1.8 (ref 0.8–1.2)
PROTHROMBIN TIME: 18.7 SEC (ref 9–12.5)

## 2021-11-04 PROCEDURE — 36415 COLL VENOUS BLD VENIPUNCTURE: CPT | Performed by: INTERNAL MEDICINE

## 2021-11-04 PROCEDURE — 93793 PR ANTICOAGULANT MGMT FOR PT TAKING WARFARIN: ICD-10-PCS | Mod: S$GLB,,,

## 2021-11-04 PROCEDURE — 93793 ANTICOAG MGMT PT WARFARIN: CPT | Mod: S$GLB,,,

## 2021-11-04 PROCEDURE — 85610 PROTHROMBIN TIME: CPT | Performed by: INTERNAL MEDICINE

## 2021-12-14 ENCOUNTER — HOSPITAL ENCOUNTER (EMERGENCY)
Facility: HOSPITAL | Age: 71
Discharge: HOME | End: 2021-12-14
Attending: STUDENT IN AN ORGANIZED HEALTH CARE EDUCATION/TRAINING PROGRAM
Payer: COMMERCIAL

## 2021-12-14 VITALS
TEMPERATURE: 99 F | HEART RATE: 69 BPM | OXYGEN SATURATION: 97 % | RESPIRATION RATE: 18 BRPM | SYSTOLIC BLOOD PRESSURE: 146 MMHG | DIASTOLIC BLOOD PRESSURE: 81 MMHG
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
## 2021-12-14 DIAGNOSIS — R07.89 CHEST WALL PAIN: ICD-10-CM

## 2021-12-14 PROCEDURE — 25000003 PHARM REV CODE 250: Performed by: SURGERY

## 2021-12-14 PROCEDURE — 99284 EMERGENCY DEPT VISIT MOD MDM: CPT | Mod: 25

## 2021-12-14 RX ORDER — ACETAMINOPHEN 500 MG
1000 TABLET ORAL
Status: COMPLETED | OUTPATIENT
Start: 2021-12-14 | End: 2021-12-14

## 2021-12-14 RX ORDER — ACETAMINOPHEN 500 MG
TABLET ORAL
Status: DISCONTINUED
Start: 2021-12-14 | End: 2021-12-14 | Stop reason: HOSPADM

## 2021-12-14 RX ORDER — IBUPROFEN 800 MG/1
800 TABLET ORAL
Status: COMPLETED | OUTPATIENT
Start: 2021-12-14 | End: 2021-12-14

## 2021-12-14 RX ORDER — CYCLOBENZAPRINE HCL 10 MG
10 TABLET ORAL 3 TIMES DAILY PRN
Qty: 10 TABLET | Refills: 0 | Status: SHIPPED | OUTPATIENT
Start: 2021-12-14 | End: 2021-12-19

## 2021-12-14 RX ORDER — IBUPROFEN 800 MG/1
800 TABLET ORAL EVERY 6 HOURS PRN
Qty: 20 TABLET | Refills: 0 | Status: ON HOLD | OUTPATIENT
Start: 2021-12-14 | End: 2022-02-02 | Stop reason: HOSPADM

## 2021-12-14 RX ORDER — IBUPROFEN 800 MG/1
TABLET ORAL
Status: DISCONTINUED
Start: 2021-12-14 | End: 2021-12-14 | Stop reason: HOSPADM

## 2021-12-14 RX ADMIN — IBUPROFEN 800 MG: 800 TABLET, FILM COATED ORAL at 03:12

## 2021-12-14 RX ADMIN — ACETAMINOPHEN 1000 MG: 500 TABLET ORAL at 03:12

## 2021-12-16 ENCOUNTER — LAB VISIT (OUTPATIENT)
Dept: LAB | Facility: HOSPITAL | Age: 71
End: 2021-12-16
Attending: INTERNAL MEDICINE
Payer: COMMERCIAL

## 2021-12-16 ENCOUNTER — ANTI-COAG VISIT (OUTPATIENT)
Dept: CARDIOLOGY | Facility: CLINIC | Age: 71
End: 2021-12-16
Payer: COMMERCIAL

## 2021-12-16 DIAGNOSIS — D68.61 ANTIPHOSPHOLIPID SYNDROME: ICD-10-CM

## 2021-12-16 DIAGNOSIS — Z79.01 LONG TERM (CURRENT) USE OF ANTICOAGULANTS: ICD-10-CM

## 2021-12-16 DIAGNOSIS — I48.0 PAROXYSMAL ATRIAL FIBRILLATION: ICD-10-CM

## 2021-12-16 DIAGNOSIS — D68.61 ANTIPHOSPHOLIPID SYNDROME: Primary | ICD-10-CM

## 2021-12-16 LAB
INR PPP: 1 (ref 0.8–1.2)
PROTHROMBIN TIME: 10.5 SEC (ref 9–12.5)

## 2021-12-16 PROCEDURE — 85610 PROTHROMBIN TIME: CPT | Performed by: INTERNAL MEDICINE

## 2021-12-16 PROCEDURE — 93793 ANTICOAG MGMT PT WARFARIN: CPT | Mod: S$GLB,,,

## 2021-12-16 PROCEDURE — 36415 COLL VENOUS BLD VENIPUNCTURE: CPT | Performed by: INTERNAL MEDICINE

## 2021-12-16 PROCEDURE — 93793 PR ANTICOAGULANT MGMT FOR PT TAKING WARFARIN: ICD-10-PCS | Mod: S$GLB,,,

## 2021-12-17 ENCOUNTER — OFFICE VISIT (OUTPATIENT)
Dept: INTERNAL MEDICINE | Facility: CLINIC | Age: 71
End: 2021-12-17
Payer: COMMERCIAL

## 2021-12-17 VITALS
HEART RATE: 75 BPM | DIASTOLIC BLOOD PRESSURE: 90 MMHG | RESPIRATION RATE: 16 BRPM | SYSTOLIC BLOOD PRESSURE: 142 MMHG | HEIGHT: 65 IN | BODY MASS INDEX: 32.61 KG/M2 | OXYGEN SATURATION: 99 % | WEIGHT: 195.75 LBS

## 2021-12-17 DIAGNOSIS — D68.61 ANTIPHOSPHOLIPID SYNDROME: ICD-10-CM

## 2021-12-17 DIAGNOSIS — Z79.01 LONG TERM (CURRENT) USE OF ANTICOAGULANTS: ICD-10-CM
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
## 2021-12-17 DIAGNOSIS — M79.601 RIGHT ARM PAIN: ICD-10-CM

## 2021-12-17 DIAGNOSIS — Z12.31 ENCOUNTER FOR SCREENING MAMMOGRAM FOR BREAST CANCER: ICD-10-CM
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
## 2021-12-17 PROCEDURE — G0008 ADMIN INFLUENZA VIRUS VAC: HCPCS | Mod: S$GLB,,, | Performed by: INTERNAL MEDICINE

## 2021-12-17 PROCEDURE — 99999 PR PBB SHADOW E&M-EST. PATIENT-LVL IV: CPT | Mod: PBBFAC,,, | Performed by: INTERNAL MEDICINE

## 2021-12-17 PROCEDURE — 99214 PR OFFICE/OUTPT VISIT, EST, LEVL IV, 30-39 MIN: ICD-10-PCS | Mod: 25,S$GLB,, | Performed by: INTERNAL MEDICINE

## 2021-12-17 PROCEDURE — 90694 FLU VACCINE - QUADRIVALENT - ADJUVANTED: ICD-10-PCS | Mod: S$GLB,,, | Performed by: INTERNAL MEDICINE

## 2021-12-17 PROCEDURE — 99214 OFFICE O/P EST MOD 30 MIN: CPT | Mod: 25,S$GLB,, | Performed by: INTERNAL MEDICINE

## 2021-12-17 PROCEDURE — 99214 OFFICE O/P EST MOD 30 MIN: CPT | Mod: PBBFAC | Performed by: INTERNAL MEDICINE

## 2021-12-17 PROCEDURE — G0008 FLU VACCINE - QUADRIVALENT - ADJUVANTED: ICD-10-PCS | Mod: S$GLB,,, | Performed by: INTERNAL MEDICINE

## 2021-12-17 PROCEDURE — 90694 VACC AIIV4 NO PRSRV 0.5ML IM: CPT | Mod: S$GLB,,, | Performed by: INTERNAL MEDICINE

## 2021-12-17 PROCEDURE — 99999 PR PBB SHADOW E&M-EST. PATIENT-LVL IV: ICD-10-PCS | Mod: PBBFAC,,, | Performed by: INTERNAL MEDICINE

## 2021-12-17 RX ORDER — METHYLPREDNISOLONE 4 MG/1
TABLET ORAL
Qty: 1 EACH | Refills: 0 | Status: ON HOLD | OUTPATIENT
Start: 2021-12-17 | End: 2022-02-02 | Stop reason: HOSPADM

## 2022-01-04 ENCOUNTER — LAB VISIT (OUTPATIENT)
Dept: LAB | Facility: HOSPITAL | Age: 72
End: 2022-01-04
Attending: INTERNAL MEDICINE
Payer: COMMERCIAL

## 2022-01-04 ENCOUNTER — ANTI-COAG VISIT (OUTPATIENT)
Dept: CARDIOLOGY | Facility: CLINIC | Age: 72
End: 2022-01-04
Payer: COMMERCIAL

## 2022-01-04 DIAGNOSIS — Z79.01 LONG TERM (CURRENT) USE OF ANTICOAGULANTS: ICD-10-CM

## 2022-01-04 DIAGNOSIS — D68.61 ANTIPHOSPHOLIPID SYNDROME: Primary | ICD-10-CM

## 2022-01-04 DIAGNOSIS — I48.0 PAROXYSMAL ATRIAL FIBRILLATION: ICD-10-CM

## 2022-01-04 DIAGNOSIS — D68.61 ANTIPHOSPHOLIPID SYNDROME: ICD-10-CM

## 2022-01-04 LAB
INR PPP: 4.2 (ref 0.8–1.2)
PROTHROMBIN TIME: 40.7 SEC (ref 9–12.5)

## 2022-01-04 PROCEDURE — 36415 COLL VENOUS BLD VENIPUNCTURE: CPT | Performed by: INTERNAL MEDICINE

## 2022-01-04 PROCEDURE — 85610 PROTHROMBIN TIME: CPT | Performed by: INTERNAL MEDICINE

## 2022-01-04 PROCEDURE — 93793 ANTICOAG MGMT PT WARFARIN: CPT | Mod: S$GLB,,,

## 2022-01-04 PROCEDURE — 93793 PR ANTICOAGULANT MGMT FOR PT TAKING WARFARIN: ICD-10-PCS | Mod: S$GLB,,,

## 2022-01-04 NOTE — PROGRESS NOTES
INR high. Pt fell and went to ER last week. She was prescribed medrol dose pack w/ will increase INR. She also again reports incorrect high dose. Furthermore, she has missed appropriate follow up.

## 2022-01-11 ENCOUNTER — LAB VISIT (OUTPATIENT)
Dept: LAB | Facility: HOSPITAL | Age: 72
End: 2022-01-11
Attending: INTERNAL MEDICINE
Payer: COMMERCIAL

## 2022-01-11 ENCOUNTER — ANTI-COAG VISIT (OUTPATIENT)
Dept: CARDIOLOGY | Facility: CLINIC | Age: 72
End: 2022-01-11
Payer: COMMERCIAL

## 2022-01-11 DIAGNOSIS — Z79.01 LONG TERM (CURRENT) USE OF ANTICOAGULANTS: ICD-10-CM

## 2022-01-11 DIAGNOSIS — I48.0 PAROXYSMAL ATRIAL FIBRILLATION: ICD-10-CM

## 2022-01-11 DIAGNOSIS — D68.61 ANTIPHOSPHOLIPID SYNDROME: ICD-10-CM

## 2022-01-11 DIAGNOSIS — D68.61 ANTIPHOSPHOLIPID SYNDROME: Primary | ICD-10-CM

## 2022-01-11 LAB
INR PPP: 1.5 (ref 0.8–1.2)
PROTHROMBIN TIME: 15.9 SEC (ref 9–12.5)

## 2022-01-11 PROCEDURE — 85610 PROTHROMBIN TIME: CPT | Performed by: INTERNAL MEDICINE

## 2022-01-11 PROCEDURE — 36415 COLL VENOUS BLD VENIPUNCTURE: CPT | Performed by: INTERNAL MEDICINE

## 2022-01-11 PROCEDURE — 93793 PR ANTICOAGULANT MGMT FOR PT TAKING WARFARIN: ICD-10-PCS | Mod: S$GLB,,,

## 2022-01-11 PROCEDURE — 93793 ANTICOAG MGMT PT WARFARIN: CPT | Mod: S$GLB,,,

## 2022-01-12 NOTE — PROGRESS NOTES
Called Patient and she reports message was received yesterday with dose for 1/11/22, Patient was given further coumadin instructions and redraw date, she verbalized understanding

## 2022-01-19 ENCOUNTER — LAB VISIT (OUTPATIENT)
Dept: LAB | Facility: HOSPITAL | Age: 72
End: 2022-01-19
Attending: INTERNAL MEDICINE
Payer: COMMERCIAL

## 2022-01-19 ENCOUNTER — ANTI-COAG VISIT (OUTPATIENT)
Dept: CARDIOLOGY | Facility: CLINIC | Age: 72
End: 2022-01-19
Payer: COMMERCIAL

## 2022-01-19 DIAGNOSIS — Z79.01 LONG TERM (CURRENT) USE OF ANTICOAGULANTS: ICD-10-CM

## 2022-01-19 DIAGNOSIS — I48.0 PAROXYSMAL ATRIAL FIBRILLATION: ICD-10-CM

## 2022-01-19 DIAGNOSIS — D68.61 ANTIPHOSPHOLIPID SYNDROME: Primary | ICD-10-CM

## 2022-01-19 DIAGNOSIS — D68.61 ANTIPHOSPHOLIPID SYNDROME: ICD-10-CM

## 2022-01-19 LAB
INR PPP: 1.4 (ref 0.8–1.2)
PROTHROMBIN TIME: 14.8 SEC (ref 9–12.5)

## 2022-01-19 PROCEDURE — 93793 ANTICOAG MGMT PT WARFARIN: CPT | Mod: S$GLB,,,

## 2022-01-19 PROCEDURE — 93793 PR ANTICOAGULANT MGMT FOR PT TAKING WARFARIN: ICD-10-PCS | Mod: S$GLB,,,

## 2022-01-19 PROCEDURE — 36415 COLL VENOUS BLD VENIPUNCTURE: CPT | Performed by: INTERNAL MEDICINE

## 2022-01-19 PROCEDURE — 85610 PROTHROMBIN TIME: CPT | Performed by: INTERNAL MEDICINE

## 2022-01-21 DIAGNOSIS — G44.52 NEW DAILY PERSISTENT HEADACHE: ICD-10-CM

## 2022-01-21 NOTE — TELEPHONE ENCOUNTER
No new care gaps identified.  Powered by [employer] by Victoria [employer]. Reference number: [id number].   1/21/2022 12:07:51 AM CST

## 2022-01-21 NOTE — PROGRESS NOTES
Per pt findings 'pt states that she ran out of her 1 mg tabs and had her 5 mg tabs. Pt states she took 1 mg on Tues, and Wed and she was out of her 1 mg and took half of the 5 mg tabs since Thursday. Pt had greens 3 times on last week'    Pt cannot use 5mg Rx - dispose of rx and refill 1mg tabs. She has refills at [facility]. Follow calendar. Limit greens to only 1x/week. Recheck INR in 1 week

## 2022-01-24 RX ORDER — NORTRIPTYLINE HYDROCHLORIDE 10 MG/1
10 CAPSULE ORAL NIGHTLY
Qty: 90 CAPSULE | Refills: 1 | Status: SHIPPED | OUTPATIENT
Start: 2022-01-24 | End: 2022-03-17

## 2022-01-27 ENCOUNTER — HOSPITAL ENCOUNTER (EMERGENCY)
Facility: HOSPITAL | Age: 72
Discharge: HOME | End: 2022-01-27
Attending: STUDENT IN AN ORGANIZED HEALTH CARE EDUCATION/TRAINING PROGRAM
Payer: COMMERCIAL

## 2022-01-27 ENCOUNTER — LAB VISIT (OUTPATIENT)
Dept: LAB | Facility: HOSPITAL | Age: 72
End: 2022-01-27
Attending: INTERNAL MEDICINE
Payer: COMMERCIAL

## 2022-01-27 ENCOUNTER — ANTI-COAG VISIT (OUTPATIENT)
Dept: CARDIOLOGY | Facility: CLINIC | Age: 72
End: 2022-01-27
Payer: COMMERCIAL

## 2022-01-27 VITALS
TEMPERATURE: 99 F | RESPIRATION RATE: 16 BRPM | DIASTOLIC BLOOD PRESSURE: 84 MMHG | SYSTOLIC BLOOD PRESSURE: 133 MMHG | OXYGEN SATURATION: 98 % | HEART RATE: 99 BPM | WEIGHT: 191.81 LBS | BODY MASS INDEX: 31.92 KG/M2

## 2022-01-27 DIAGNOSIS — Z79.01 LONG TERM (CURRENT) USE OF ANTICOAGULANTS: ICD-10-CM

## 2022-01-27 DIAGNOSIS — I48.0 PAROXYSMAL ATRIAL FIBRILLATION: ICD-10-CM

## 2022-01-27 DIAGNOSIS — D68.61 ANTIPHOSPHOLIPID SYNDROME: ICD-10-CM

## 2022-01-27 DIAGNOSIS — D68.61 ANTIPHOSPHOLIPID SYNDROME: Primary | ICD-10-CM

## 2022-01-27 DIAGNOSIS — Z20.822 SUSPECTED COVID-19 VIRUS INFECTION: Primary | ICD-10-CM

## 2022-01-27 LAB
INFLUENZA A, MOLECULAR: NEGATIVE
INFLUENZA B, MOLECULAR: NEGATIVE
INR PPP: 1.6 (ref 0.8–1.2)
PROTHROMBIN TIME: 16.6 SEC (ref 9–12.5)
SPECIMEN SOURCE: NORMAL

## 2022-01-27 PROCEDURE — U0005 INFEC AGEN DETEC AMPLI PROBE: HCPCS | Performed by: STUDENT IN AN ORGANIZED HEALTH CARE EDUCATION/TRAINING PROGRAM

## 2022-01-27 PROCEDURE — 85610 PROTHROMBIN TIME: CPT | Performed by: INTERNAL MEDICINE

## 2022-01-27 PROCEDURE — 99283 EMERGENCY DEPT VISIT LOW MDM: CPT

## 2022-01-27 PROCEDURE — U0003 INFECTIOUS AGENT DETECTION BY NUCLEIC ACID (DNA OR RNA); SEVERE ACUTE RESPIRATORY SYNDROME CORONAVIRUS 2 (SARS-COV-2) (CORONAVIRUS DISEASE [COVID-19]), AMPLIFIED PROBE TECHNIQUE, MAKING USE OF HIGH THROUGHPUT TECHNOLOGIES AS DESCRIBED BY CMS-2020-01-R: HCPCS | Performed by: STUDENT IN AN ORGANIZED HEALTH CARE EDUCATION/TRAINING PROGRAM

## 2022-01-27 PROCEDURE — 87502 INFLUENZA DNA AMP PROBE: CPT | Performed by: STUDENT IN AN ORGANIZED HEALTH CARE EDUCATION/TRAINING PROGRAM

## 2022-01-27 PROCEDURE — 36415 COLL VENOUS BLD VENIPUNCTURE: CPT | Performed by: INTERNAL MEDICINE

## 2022-01-27 PROCEDURE — 93793 ANTICOAG MGMT PT WARFARIN: CPT | Mod: S$GLB,,,

## 2022-01-27 PROCEDURE — 93793 PR ANTICOAGULANT MGMT FOR PT TAKING WARFARIN: ICD-10-PCS | Mod: S$GLB,,,

## 2022-01-27 RX ORDER — BENZONATATE 100 MG/1
100 CAPSULE ORAL 3 TIMES DAILY PRN
Qty: 20 CAPSULE | Refills: 0 | Status: SHIPPED | OUTPATIENT
Start: 2022-01-27 | End: 2022-02-06

## 2022-01-27 NOTE — ED TRIAGE NOTES
71 y.o. female presents to ER Room/bed info not found   Chief Complaint   Patient presents with    General Illness     C/o body aches, cough, and chills that started yesterday   . No acute distress noted.   Meals and Snack Collaboration and Referral of Nutrition Care

## 2022-01-27 NOTE — DISCHARGE INSTRUCTIONS
If you have a COVID Test PENDING:  Please access [facility] to review the results of your test. Until the results of your COVID test return, you should isolate yourself so as not to potentially spread illness to others.   If your COVID test returns positive, you should isolate yourself so as not to spread illness to others. After five full days, if you are feeling better and you have not had fever for 24 hours, you can return to your typical daily activities, but you must wear a mask around others for an additional 5 days.   If your COVID test returns negative and you are either unvaccinated or more than six months out from your two-dose vaccine and are not yet boosted, you should still quarantine for 5 full days followed by strict mask use for an additional 5 full days.   If your COVID test returns negative and you have received your 2-dose initial vaccine as well as a booster, you should continue strict mask use for 10 full days after the exposure.  For all those exposed, best practice includes a test at day 5 after the exposure. This can be a home test or a test through one of the many testing centers throughout our community.   Masking is always advised to limit the spread of COVID. Cdc.gov is an excellent site to obtain the latest up to date recommendations regarding COVID and COVID testing.     After your evaluation today, we ruled out any emergent condition. However, if you develop shortness of breath, chest pain, or ANY OTHER CONCERNS please return to the emergency department for further care.      CDC Testing and Quarantine Guidelines for patients with exposure to a known-positive COVID-19 person:  A close exposure is defined as anyone who has had an exposure (masked or unmasked) to a known COVID -19 positive person within 6 feet of someone for a cumulative total of 15 minutes or more over a 24-hour period.   Vaccinated and/or if you recently had a positive covid test within 90 days do NOT need to  quarantine after contact with someone who had COVID-19 unless you develop symptoms.   Fully vaccinated people who have not had a positive test within 90 days, should get tested 3-5 days after their exposure, even if they don't have symptoms and wear a mask indoors in public for 14 days following exposure or until their test result is negative.      Unvaccinated and/or NOT had a positive test within 90 days and meet close exposure  You are required by CDC guidelines to quarantine for at least 5 days from time of exposure followed by 5 days of strict masking. It is recommended, but not required to test after 5 days, unless you develop symptoms, in which case you should test at that time.  If you get tested after 5 days and your test is positive, your 5 day period of isolation starts the day of the positive test.    If your exposure does not meet the above definition, you can return to your normal daily activities to include social distancing, wearing a mask and frequent handwashing.      Here is a link to guidance from the CDC:  https://www.cdc.gov/media/releases/2021/s1227-isolation-quarantine-guidance.html      [facility] Testing Sites:  https://ldh.la.gov/page/3934      [facility] website with testing locations and guidance:  [email or web address]

## 2022-01-27 NOTE — ED PROVIDER NOTES
Encounter Date: 1/27/2022    This document was partially completed using speech recognition software and may contain misspellings, grammatical errors, and/or unexpected word substitutions.       History     Chief Complaint   Patient presents with    General Illness     C/o body aches, cough, and chills that started yesterday     71-year-old female presents to the emergency department with her  for myalgias, cough, chills, fatigue since yesterday. Flu and COVID19 vaccinated. No sick contacts. States she feels winded with walking. Denies chest pain, shortness of breath at rest.    I ambulated the patient back and forth in the triage room for about 10 times and rechecked pulse ox read 98-99%.        Review of patient's allergies indicates:   Allergen Reactions    Octreotide acetate Nausea And Vomiting     Had symptoms when she took Sandostatin with Codeine    Codeine Itching and Nausea And Vomiting     Pill form only, IV ok.,  Had symptoms when she took Codeine with Sandostatin.  Other reaction(s): Itching    Morphine Nausea And Vomiting     Patient reports reaction only when she takes po form of Morphine.     Past Medical History:   Diagnosis Date    Aortic atherosclerosis     Benign essential hypertension     Cataract     Senile    Crohn's colitis     Depression, major, recurrent, moderate     Fibromyalgia     GERD (gastroesophageal reflux disease)     Hypertensive cardiomyopathy     IBS (irritable bowel syndrome)     Migraine     Opioid abuse, in remission     Osteopenia     Paget disease of bone     Port-A-Cath in place     right chest    Prolonged QT interval     RA (rheumatoid arthritis)     Sedative hypnotic or anxiolytic dependence     in remission      Past Surgical History:   Procedure Laterality Date    CATARACT EXTRACTION W/  INTRAOCULAR LENS IMPLANT Left 02/21/2017    Dr. Christianson    CATARACT EXTRACTION W/  INTRAOCULAR LENS IMPLANT Right 03/07/2017    Dr. Christianson     CHOLECYSTECTOMY      COLON SURGERY      COLONOSCOPY N/A 3/16/2016    Procedure: COLONOSCOPY;  Surgeon: Dale Trejo MD;  Location: [facility] ENDO (4TH FLR);  Service: Endoscopy;  Laterality: N/A;    COLONOSCOPY N/A 10/12/2020    Procedure: COLONOSCOPY;  Surgeon: Cali Urena MD;  Location: [facility] ENDO (4TH FLR);  Service: Endoscopy;  Laterality: N/A;  covid test 10/9-[facility]- completed 10/9/20  ok to hold Coumadin x 5 days per coumadin clinic-see telephone encounterd ated 10/6-MS / Loop recorder  10/6/20- Pt confirmed- ERW@1122  10/9-Pt confirmed earlier arrival time, prep ins. reviewed and emailed to Pt    Colostomy reversal      HEMORRHOID SURGERY      HYSTERECTOMY      ILEOSTOMY      ILEOSTOMY CLOSURE      LEFT HEART CATHETERIZATION N/A 2/15/2019    Procedure: HEART CATH-LEFT;  Surgeon: Miky Keita MD;  [facility] CATH LAB;  Service: Cardiology;  Laterality: N/A;    NECK SURGERY      OOPHORECTOMY Bilateral     SBO      SMALL INTESTINE SURGERY      TONSILLECTOMY      TUBAL LIGATION       Family History   Problem Relation Age of Onset    Glaucoma Paternal Grandmother     Other Daughter         Diabetic Retinopathy    Breast cancer Daughter 40    Retinal detachment Grandchild     Heart attack Maternal Grandmother     Colon cancer Maternal Grandmother     Cancer Mother         Unknown type    Emphysema Father 67    Heart disease Father     Lung cancer Sister     Heart attack Sister     Breast cancer Daughter 47    Breast cancer Sister     Amblyopia Neg Hx     Blindness Neg Hx     Strabismus Neg Hx     Macular degeneration Neg Hx     Cataracts Neg Hx      Social History     Tobacco Use    Smoking status: Never Smoker    Smokeless tobacco: Never Used   Substance Use Topics    Alcohol use: Yes     Comment: ocassional     Drug use: No     Review of Systems   Constitutional: Positive for chills, fatigue and fever.   HENT: Negative for congestion, rhinorrhea and  sneezing.    Eyes: Negative for discharge and redness.   Respiratory: Positive for cough and shortness of breath.    Cardiovascular: Negative for chest pain and palpitations.   Gastrointestinal: Negative for abdominal pain, diarrhea, nausea and vomiting.   Genitourinary: Negative for dysuria, frequency, vaginal bleeding and vaginal discharge.   Musculoskeletal: Negative for back pain and neck pain.   Skin: Negative for rash and wound.   Neurological: Negative for weakness, numbness and headaches.       Physical Exam     Initial Vitals [01/27/22 1259]   BP Pulse Resp Temp SpO2   133/84 99 16 98.8 °F (37.1 °C) 98 %      MAP       --         Physical Exam    Nursing note and vitals reviewed.  Constitutional: She appears well-developed. She is not diaphoretic. No distress.   HENT:   Head: Normocephalic and atraumatic.   Right Ear: External ear normal.   Left Ear: External ear normal.   Eyes: Right eye exhibits no discharge. Left eye exhibits no discharge. No scleral icterus.   Neck: Neck supple.   Cardiovascular: Normal rate and regular rhythm.   Pulmonary/Chest: Breath sounds normal. No stridor. No respiratory distress. She has no wheezes. She has no rhonchi. She has no rales.   Abdominal: Abdomen is soft. There is no abdominal tenderness. There is no guarding.   Musculoskeletal:         General: No edema.      Cervical back: Neck supple.     Neurological: She is alert and oriented to person, place, and time.   Skin: Skin is warm and dry. Capillary refill takes less than 2 seconds.   Psychiatric: She has a normal mood and affect.         ED Course   Procedures  Labs Reviewed   INFLUENZA A & B BY MOLECULAR   SARS-COV-2 (COVID-19) QUALITATIVE PCR          Imaging Results    None          Medications - No data to display  Medical Decision Making:   Differential Diagnosis:   DDx: COVID19, flu, viral URI, PNA  ED Management:  Based on the patient's evaluation - patient appears well for discharge home. Flu negative. Possibly  COVID19 - PCR pending. No hypoxia. Will discharge home with PCP f/u. Return precautions given. Patient is in agreement with the plan.                      Clinical Impression:   Final diagnoses:  [Z20.822] Suspected COVID-19 virus infection (Primary)          ED Disposition Condition    Discharge Stable        ED Prescriptions     Medication Sig Dispense Start Date End Date Auth. Provider    benzonatate (TESSALON) 100 MG capsule Take 1 capsule (100 mg total) by mouth 3 (three) times daily as needed for Cough. 20 capsule 1/27/2022 2/6/2022 Davey Ibarra DO        Follow-up Information     Follow up With Specialties Details Why Contact Info    Neeru Hinkle MD Internal Medicine Schedule an appointment as soon as possible for a visit in 1 week As needed [facility] [address] [address]  [phone]             Davey Ibarra DO  01/27/22 1422

## 2022-01-28 DIAGNOSIS — U07.1 COVID-19 VIRUS DETECTED: ICD-10-CM

## 2022-01-28 LAB
SARS-COV-2 RNA RESP QL NAA+PROBE: DETECTED
SARS-COV-2- CYCLE NUMBER: 27

## 2022-01-31 ENCOUNTER — PATIENT MESSAGE (OUTPATIENT)
Dept: ADMINISTRATIVE | Facility: HOSPITAL | Age: 72
End: 2022-01-31
Payer: COMMERCIAL

## 2022-01-31 ENCOUNTER — HOSPITAL ENCOUNTER (OUTPATIENT)
Facility: HOSPITAL | Age: 72
Discharge: HOME | End: 2022-02-02
Attending: STUDENT IN AN ORGANIZED HEALTH CARE EDUCATION/TRAINING PROGRAM | Admitting: INTERNAL MEDICINE
Payer: COMMERCIAL

## 2022-01-31 DIAGNOSIS — R00.0 TACHYCARDIA: ICD-10-CM

## 2022-01-31 DIAGNOSIS — R53.1 GENERALIZED WEAKNESS: ICD-10-CM

## 2022-01-31 DIAGNOSIS — U07.1 COVID-19: ICD-10-CM

## 2022-01-31 DIAGNOSIS — I49.9 ARRHYTHMIA: ICD-10-CM

## 2022-01-31 DIAGNOSIS — I25.10 CARDIOVASCULAR DISEASE: ICD-10-CM

## 2022-01-31 LAB
ALBUMIN SERPL BCP-MCNC: 3.9 G/DL (ref 3.5–5.2)
ALP SERPL-CCNC: 84 U/L (ref 55–135)
ALT SERPL W/O P-5'-P-CCNC: 15 U/L (ref 10–44)
ANION GAP SERPL CALC-SCNC: 15 MMOL/L (ref 8–16)
APTT BLDCRRT: 32.1 SEC (ref 21–32)
AST SERPL-CCNC: 22 U/L (ref 10–40)
BACTERIA #/AREA URNS HPF: ABNORMAL /HPF
BASOPHILS # BLD AUTO: 0.03 K/UL (ref 0–0.2)
BASOPHILS NFR BLD: 0.4 % (ref 0–1.9)
BILIRUB SERPL-MCNC: 1.6 MG/DL (ref 0.1–1)
BILIRUB UR QL STRIP: NEGATIVE
BNP SERPL-MCNC: 40 PG/ML (ref 0–99)
BUN SERPL-MCNC: 19 MG/DL (ref 8–23)
CALCIUM SERPL-MCNC: 8.8 MG/DL (ref 8.7–10.5)
CHLORIDE SERPL-SCNC: 112 MMOL/L (ref 95–110)
CLARITY UR: CLEAR
CO2 SERPL-SCNC: 14 MMOL/L (ref 23–29)
COLOR UR: YELLOW
CREAT SERPL-MCNC: 0.8 MG/DL (ref 0.5–1.4)
D DIMER PPP IA.FEU-MCNC: 0.34 MG/L FEU
DIFFERENTIAL METHOD: NORMAL
EOSINOPHIL # BLD AUTO: 0.1 K/UL (ref 0–0.5)
EOSINOPHIL NFR BLD: 0.7 % (ref 0–8)
ERYTHROCYTE [DISTWIDTH] IN BLOOD BY AUTOMATED COUNT: 14 % (ref 11.5–14.5)
EST. GFR  (AFRICAN AMERICAN): >60 ML/MIN/1.73 M^2
EST. GFR  (NON AFRICAN AMERICAN): >60 ML/MIN/1.73 M^2
GLUCOSE SERPL-MCNC: 124 MG/DL (ref 70–110)
GLUCOSE UR QL STRIP: NEGATIVE
HCT VFR BLD AUTO: 45.9 % (ref 37–48.5)
HGB BLD-MCNC: 15 G/DL (ref 12–16)
HGB UR QL STRIP: ABNORMAL
HYALINE CASTS #/AREA URNS LPF: 2 /LPF
IMM GRANULOCYTES # BLD AUTO: 0.03 K/UL (ref 0–0.04)
IMM GRANULOCYTES NFR BLD AUTO: 0.4 % (ref 0–0.5)
INR PPP: 1.1 (ref 0.8–1.2)
KETONES UR QL STRIP: NEGATIVE
LACTATE SERPL-SCNC: 1.8 MMOL/L (ref 0.5–2.2)
LEUKOCYTE ESTERASE UR QL STRIP: NEGATIVE
LIPASE SERPL-CCNC: 14 U/L (ref 4–60)
LYMPHOCYTES # BLD AUTO: 2.9 K/UL (ref 1–4.8)
LYMPHOCYTES NFR BLD: 38.5 % (ref 18–48)
MAGNESIUM SERPL-MCNC: 1.5 MG/DL (ref 1.6–2.6)
MCH RBC QN AUTO: 28.6 PG (ref 27–31)
MCHC RBC AUTO-ENTMCNC: 32.7 G/DL (ref 32–36)
MCV RBC AUTO: 87 FL (ref 82–98)
MICROSCOPIC COMMENT: ABNORMAL
MONOCYTES # BLD AUTO: 0.6 K/UL (ref 0.3–1)
MONOCYTES NFR BLD: 7.3 % (ref 4–15)
NEUTROPHILS # BLD AUTO: 4 K/UL (ref 1.8–7.7)
NEUTROPHILS NFR BLD: 52.7 % (ref 38–73)
NITRITE UR QL STRIP: POSITIVE
NRBC BLD-RTO: 0 /100 WBC
OB PNL STL: NEGATIVE
PH UR STRIP: 6 [PH] (ref 5–8)
PLATELET # BLD AUTO: 298 K/UL (ref 150–450)
PMV BLD AUTO: 10.8 FL (ref 9.2–12.9)
POTASSIUM SERPL-SCNC: 3.3 MMOL/L (ref 3.5–5.1)
PROCALCITONIN SERPL IA-MCNC: 0.13 NG/ML
PROT SERPL-MCNC: 7.5 G/DL (ref 6–8.4)
PROT UR QL STRIP: NEGATIVE
PROTHROMBIN TIME: 12.1 SEC (ref 9–12.5)
RBC # BLD AUTO: 5.25 M/UL (ref 4–5.4)
RBC #/AREA URNS HPF: 4 /HPF (ref 0–4)
SODIUM SERPL-SCNC: 141 MMOL/L (ref 136–145)
SP GR UR STRIP: 1.02 (ref 1–1.03)
SQUAMOUS #/AREA URNS HPF: 4 /HPF
TROPONIN I SERPL DL<=0.01 NG/ML-MCNC: <0.006 NG/ML (ref 0–0.03)
TROPONIN I SERPL DL<=0.01 NG/ML-MCNC: <0.006 NG/ML (ref 0–0.03)
TSH SERPL DL<=0.005 MIU/L-ACNC: 1.82 UIU/ML (ref 0.4–4)
URN SPEC COLLECT METH UR: ABNORMAL
UROBILINOGEN UR STRIP-ACNC: NEGATIVE EU/DL
WBC # BLD AUTO: 7.49 K/UL (ref 3.9–12.7)
WBC #/AREA URNS HPF: 13 /HPF (ref 0–5)
YEAST URNS QL MICRO: ABNORMAL

## 2022-01-31 PROCEDURE — 93010 ELECTROCARDIOGRAM REPORT: CPT | Mod: ,,, | Performed by: INTERNAL MEDICINE

## 2022-01-31 PROCEDURE — 25000003 PHARM REV CODE 250: Performed by: NURSE PRACTITIONER

## 2022-01-31 PROCEDURE — 63600175 PHARM REV CODE 636 W HCPCS: Performed by: SURGERY

## 2022-01-31 PROCEDURE — 84145 PROCALCITONIN (PCT): CPT | Performed by: NURSE PRACTITIONER

## 2022-01-31 PROCEDURE — 87086 URINE CULTURE/COLONY COUNT: CPT | Performed by: NURSE PRACTITIONER

## 2022-01-31 PROCEDURE — 80053 COMPREHEN METABOLIC PANEL: CPT | Performed by: NURSE PRACTITIONER

## 2022-01-31 PROCEDURE — 36415 COLL VENOUS BLD VENIPUNCTURE: CPT | Performed by: NURSE PRACTITIONER

## 2022-01-31 PROCEDURE — 96372 THER/PROPH/DIAG INJ SC/IM: CPT | Mod: 59

## 2022-01-31 PROCEDURE — 87077 CULTURE AEROBIC IDENTIFY: CPT | Performed by: NURSE PRACTITIONER

## 2022-01-31 PROCEDURE — 96365 THER/PROPH/DIAG IV INF INIT: CPT | Mod: 59

## 2022-01-31 PROCEDURE — S0030 INJECTION, METRONIDAZOLE: HCPCS | Performed by: INTERNAL MEDICINE

## 2022-01-31 PROCEDURE — 96367 TX/PROPH/DG ADDL SEQ IV INF: CPT

## 2022-01-31 PROCEDURE — 96366 THER/PROPH/DIAG IV INF ADDON: CPT

## 2022-01-31 PROCEDURE — 96361 HYDRATE IV INFUSION ADD-ON: CPT

## 2022-01-31 PROCEDURE — 96368 THER/DIAG CONCURRENT INF: CPT

## 2022-01-31 PROCEDURE — 85379 FIBRIN DEGRADATION QUANT: CPT | Performed by: NURSE PRACTITIONER

## 2022-01-31 PROCEDURE — 82272 OCCULT BLD FECES 1-3 TESTS: CPT | Performed by: NURSE PRACTITIONER

## 2022-01-31 PROCEDURE — 85730 THROMBOPLASTIN TIME PARTIAL: CPT | Performed by: NURSE PRACTITIONER

## 2022-01-31 PROCEDURE — 85025 COMPLETE CBC W/AUTO DIFF WBC: CPT | Performed by: NURSE PRACTITIONER

## 2022-01-31 PROCEDURE — 83880 ASSAY OF NATRIURETIC PEPTIDE: CPT | Performed by: NURSE PRACTITIONER

## 2022-01-31 PROCEDURE — 63600175 PHARM REV CODE 636 W HCPCS: Performed by: NURSE PRACTITIONER

## 2022-01-31 PROCEDURE — 25000003 PHARM REV CODE 250: Performed by: SURGERY

## 2022-01-31 PROCEDURE — 83735 ASSAY OF MAGNESIUM: CPT | Performed by: NURSE PRACTITIONER

## 2022-01-31 PROCEDURE — 93010 EKG 12-LEAD: ICD-10-PCS | Mod: ,,, | Performed by: INTERNAL MEDICINE

## 2022-01-31 PROCEDURE — 85610 PROTHROMBIN TIME: CPT | Performed by: NURSE PRACTITIONER

## 2022-01-31 PROCEDURE — C9113 INJ PANTOPRAZOLE SODIUM, VIA: HCPCS | Performed by: NURSE PRACTITIONER

## 2022-01-31 PROCEDURE — 87186 SC STD MICRODIL/AGAR DIL: CPT | Performed by: NURSE PRACTITIONER

## 2022-01-31 PROCEDURE — 96376 TX/PRO/DX INJ SAME DRUG ADON: CPT

## 2022-01-31 PROCEDURE — 99291 CRITICAL CARE FIRST HOUR: CPT | Mod: 25

## 2022-01-31 PROCEDURE — 25500020 PHARM REV CODE 255: Performed by: STUDENT IN AN ORGANIZED HEALTH CARE EDUCATION/TRAINING PROGRAM

## 2022-01-31 PROCEDURE — 83690 ASSAY OF LIPASE: CPT | Performed by: NURSE PRACTITIONER

## 2022-01-31 PROCEDURE — 96375 TX/PRO/DX INJ NEW DRUG ADDON: CPT

## 2022-01-31 PROCEDURE — 94760 N-INVAS EAR/PLS OXIMETRY 1: CPT

## 2022-01-31 PROCEDURE — 84484 ASSAY OF TROPONIN QUANT: CPT | Performed by: NURSE PRACTITIONER

## 2022-01-31 PROCEDURE — 87040 BLOOD CULTURE FOR BACTERIA: CPT | Performed by: NURSE PRACTITIONER

## 2022-01-31 PROCEDURE — 63600175 PHARM REV CODE 636 W HCPCS: Performed by: INTERNAL MEDICINE

## 2022-01-31 PROCEDURE — 84443 ASSAY THYROID STIM HORMONE: CPT | Performed by: NURSE PRACTITIONER

## 2022-01-31 PROCEDURE — G0378 HOSPITAL OBSERVATION PER HR: HCPCS

## 2022-01-31 PROCEDURE — 81000 URINALYSIS NONAUTO W/SCOPE: CPT | Performed by: NURSE PRACTITIONER

## 2022-01-31 PROCEDURE — 25000003 PHARM REV CODE 250: Performed by: INTERNAL MEDICINE

## 2022-01-31 PROCEDURE — 93005 ELECTROCARDIOGRAM TRACING: CPT

## 2022-01-31 PROCEDURE — 83605 ASSAY OF LACTIC ACID: CPT | Performed by: NURSE PRACTITIONER

## 2022-01-31 PROCEDURE — 87088 URINE BACTERIA CULTURE: CPT | Performed by: NURSE PRACTITIONER

## 2022-01-31 RX ORDER — ONDANSETRON 2 MG/ML
4 INJECTION INTRAMUSCULAR; INTRAVENOUS EVERY 8 HOURS PRN
Status: DISCONTINUED | OUTPATIENT
Start: 2022-01-31 | End: 2022-02-02 | Stop reason: HOSPADM

## 2022-01-31 RX ORDER — CIPROFLOXACIN 2 MG/ML
400 INJECTION, SOLUTION INTRAVENOUS
Status: DISCONTINUED | OUTPATIENT
Start: 2022-01-31 | End: 2022-02-01

## 2022-01-31 RX ORDER — PANTOPRAZOLE SODIUM 40 MG/10ML
80 INJECTION, POWDER, LYOPHILIZED, FOR SOLUTION INTRAVENOUS
Status: COMPLETED | OUTPATIENT
Start: 2022-01-31 | End: 2022-01-31

## 2022-01-31 RX ORDER — DIGOXIN 0.25 MG/ML
125 INJECTION INTRAMUSCULAR; INTRAVENOUS
Status: DISCONTINUED | OUTPATIENT
Start: 2022-01-31 | End: 2022-01-31

## 2022-01-31 RX ORDER — ONDANSETRON 2 MG/ML
4 INJECTION INTRAMUSCULAR; INTRAVENOUS
Status: COMPLETED | OUTPATIENT
Start: 2022-01-31 | End: 2022-01-31

## 2022-01-31 RX ORDER — METRONIDAZOLE 500 MG/1
500 TABLET ORAL EVERY 8 HOURS
Status: DISCONTINUED | OUTPATIENT
Start: 2022-02-01 | End: 2022-02-01

## 2022-01-31 RX ORDER — TALC
6 POWDER (GRAM) TOPICAL NIGHTLY PRN
Status: DISCONTINUED | OUTPATIENT
Start: 2022-01-31 | End: 2022-02-02 | Stop reason: HOSPADM

## 2022-01-31 RX ORDER — CIPROFLOXACIN 2 MG/ML
400 INJECTION, SOLUTION INTRAVENOUS
Status: DISCONTINUED | OUTPATIENT
Start: 2022-02-01 | End: 2022-02-02 | Stop reason: HOSPADM

## 2022-01-31 RX ORDER — METRONIDAZOLE 500 MG/1
500 TABLET ORAL
Status: COMPLETED | OUTPATIENT
Start: 2022-01-31 | End: 2022-01-31

## 2022-01-31 RX ORDER — ASPIRIN 81 MG/1
81 TABLET ORAL DAILY
Status: DISCONTINUED | OUTPATIENT
Start: 2022-01-31 | End: 2022-02-02 | Stop reason: HOSPADM

## 2022-01-31 RX ORDER — MAGNESIUM SULFATE HEPTAHYDRATE 40 MG/ML
2 INJECTION, SOLUTION INTRAVENOUS
Status: COMPLETED | OUTPATIENT
Start: 2022-01-31 | End: 2022-01-31

## 2022-01-31 RX ORDER — CIPROFLOXACIN 2 MG/ML
400 INJECTION, SOLUTION INTRAVENOUS
Status: COMPLETED | OUTPATIENT
Start: 2022-01-31 | End: 2022-01-31

## 2022-01-31 RX ORDER — REMDESIVIR 100 MG/1
INJECTION, POWDER, LYOPHILIZED, FOR SOLUTION INTRAVENOUS
Status: DISPENSED
Start: 2022-01-31 | End: 2022-02-01

## 2022-01-31 RX ORDER — SODIUM CHLORIDE 9 MG/ML
INJECTION, SOLUTION INTRAVENOUS
Status: COMPLETED | OUTPATIENT
Start: 2022-01-31 | End: 2022-01-31

## 2022-01-31 RX ORDER — ENOXAPARIN SODIUM 100 MG/ML
1 INJECTION SUBCUTANEOUS
Status: DISCONTINUED | OUTPATIENT
Start: 2022-01-31 | End: 2022-02-02 | Stop reason: HOSPADM

## 2022-01-31 RX ORDER — SODIUM CHLORIDE 0.9 % (FLUSH) 0.9 %
10 SYRINGE (ML) INJECTION
Status: DISCONTINUED | OUTPATIENT
Start: 2022-01-31 | End: 2022-02-02 | Stop reason: HOSPADM

## 2022-01-31 RX ORDER — WARFARIN 1 MG/1
1 TABLET ORAL DAILY
Status: DISCONTINUED | OUTPATIENT
Start: 2022-01-31 | End: 2022-02-02 | Stop reason: HOSPADM

## 2022-01-31 RX ORDER — DEXAMETHASONE SODIUM PHOSPHATE 4 MG/ML
6 INJECTION, SOLUTION INTRA-ARTICULAR; INTRALESIONAL; INTRAMUSCULAR; INTRAVENOUS; SOFT TISSUE EVERY 24 HOURS
Status: DISCONTINUED | OUTPATIENT
Start: 2022-02-01 | End: 2022-02-02 | Stop reason: HOSPADM

## 2022-01-31 RX ORDER — SODIUM CHLORIDE 9 MG/ML
INJECTION, SOLUTION INTRAVENOUS CONTINUOUS
Status: DISCONTINUED | OUTPATIENT
Start: 2022-01-31 | End: 2022-02-02 | Stop reason: HOSPADM

## 2022-01-31 RX ORDER — METOPROLOL SUCCINATE 25 MG/1
100 TABLET, EXTENDED RELEASE ORAL ONCE
Status: COMPLETED | OUTPATIENT
Start: 2022-01-31 | End: 2022-01-31

## 2022-01-31 RX ORDER — DIGOXIN 0.25 MG/ML
250 INJECTION INTRAMUSCULAR; INTRAVENOUS
Status: COMPLETED | OUTPATIENT
Start: 2022-01-31 | End: 2022-01-31

## 2022-01-31 RX ORDER — DIGOXIN 125 MCG
0.12 TABLET ORAL DAILY
Status: DISCONTINUED | OUTPATIENT
Start: 2022-02-01 | End: 2022-02-02 | Stop reason: HOSPADM

## 2022-01-31 RX ORDER — ACETAMINOPHEN 325 MG/1
650 TABLET ORAL EVERY 8 HOURS PRN
Status: DISCONTINUED | OUTPATIENT
Start: 2022-01-31 | End: 2022-02-02 | Stop reason: HOSPADM

## 2022-01-31 RX ORDER — METOPROLOL SUCCINATE 50 MG/1
50 TABLET, EXTENDED RELEASE ORAL 2 TIMES DAILY
Status: DISCONTINUED | OUTPATIENT
Start: 2022-01-31 | End: 2022-02-02 | Stop reason: HOSPADM

## 2022-01-31 RX ORDER — DIGOXIN 0.25 MG/ML
0.5 INJECTION INTRAMUSCULAR; INTRAVENOUS
Status: COMPLETED | OUTPATIENT
Start: 2022-01-31 | End: 2022-01-31

## 2022-01-31 RX ORDER — METRONIDAZOLE 500 MG/100ML
500 INJECTION, SOLUTION INTRAVENOUS
Status: DISCONTINUED | OUTPATIENT
Start: 2022-01-31 | End: 2022-02-02 | Stop reason: HOSPADM

## 2022-01-31 RX ORDER — POTASSIUM CHLORIDE 20 MEQ/1
20 TABLET, EXTENDED RELEASE ORAL
Status: COMPLETED | OUTPATIENT
Start: 2022-01-31 | End: 2022-01-31

## 2022-01-31 RX ADMIN — ENOXAPARIN SODIUM 90 MG: 100 INJECTION, SOLUTION INTRAVENOUS; SUBCUTANEOUS at 05:01

## 2022-01-31 RX ADMIN — IOHEXOL 100 ML: 350 INJECTION, SOLUTION INTRAVENOUS at 03:01

## 2022-01-31 RX ADMIN — MAGNESIUM SULFATE HEPTAHYDRATE 2 G: 2 INJECTION, SOLUTION INTRAVENOUS at 04:01

## 2022-01-31 RX ADMIN — DIGOXIN 0.5 MG: 0.25 INJECTION INTRAMUSCULAR; INTRAVENOUS at 01:01

## 2022-01-31 RX ADMIN — CIPROFLOXACIN 400 MG: 2 INJECTION, SOLUTION INTRAVENOUS at 08:01

## 2022-01-31 RX ADMIN — DIGOXIN 250 MCG: 0.25 INJECTION INTRAMUSCULAR; INTRAVENOUS at 04:01

## 2022-01-31 RX ADMIN — REMDESIVIR 200 MG: 100 INJECTION, POWDER, LYOPHILIZED, FOR SOLUTION INTRAVENOUS at 10:01

## 2022-01-31 RX ADMIN — SODIUM CHLORIDE: 0.9 INJECTION, SOLUTION INTRAVENOUS at 08:01

## 2022-01-31 RX ADMIN — WARFARIN SODIUM 1 MG: 1 TABLET ORAL at 05:01

## 2022-01-31 RX ADMIN — ASPIRIN 81 MG: 81 TABLET, COATED ORAL at 01:01

## 2022-01-31 RX ADMIN — CIPROFLOXACIN 400 MG: 2 INJECTION, SOLUTION INTRAVENOUS at 04:01

## 2022-01-31 RX ADMIN — POTASSIUM CHLORIDE 20 MEQ: 1500 TABLET, EXTENDED RELEASE ORAL at 04:01

## 2022-01-31 RX ADMIN — METOPROLOL SUCCINATE 100 MG: 25 TABLET, EXTENDED RELEASE ORAL at 02:01

## 2022-01-31 RX ADMIN — METRONIDAZOLE 500 MG: 500 TABLET ORAL at 04:01

## 2022-01-31 RX ADMIN — ONDANSETRON HYDROCHLORIDE 4 MG: 2 SOLUTION INTRAMUSCULAR; INTRAVENOUS at 06:01

## 2022-01-31 RX ADMIN — PANTOPRAZOLE SODIUM 80 MG: 40 INJECTION, POWDER, LYOPHILIZED, FOR SOLUTION INTRAVENOUS at 01:01

## 2022-01-31 RX ADMIN — MELATONIN TAB 3 MG 6 MG: 3 TAB at 10:01

## 2022-01-31 RX ADMIN — METOPROLOL SUCCINATE 50 MG: 50 TABLET, EXTENDED RELEASE ORAL at 08:01

## 2022-01-31 RX ADMIN — SODIUM CHLORIDE: 0.9 INJECTION, SOLUTION INTRAVENOUS at 11:01

## 2022-01-31 RX ADMIN — METRONIDAZOLE 500 MG: 500 INJECTION, SOLUTION INTRAVENOUS at 08:01

## 2022-01-31 NOTE — ED PROVIDER NOTES
Encounter Date: 1/31/2022       History     Chief Complaint   Patient presents with    Cough     Ada DAHLIA Sinclair is a 71 y.o. female with PMH of hypertension, Crohn's colitis, fibromyalgia, GERD, cardiomyopathy, opioid abuse, osteopenia, RA, atrial fib, antiphospholipid syndrome who presents to the ED for evaluation of COVID-19 concerns.  Patient was diagnosed with COVID-19 on 01/27/2022.  She presents with continued symptoms including generalized body aches, cough, diarrhea, and dizziness.  Cough is productive of thick, yellow sputum with + SOB and mild anterior CW pain that is exacerbated by coughing. Occasional dizziness with standing with + reports of feeling like her heart is beating irregular at times. She notes hx of A-fib and currently takes coumadin and toprol XL.  She has had generalized abdominal pain with continuous nausea with Non-bilious, non-bloody emesis almost daily since dx. She also notes loose stool that she describes as black and tarry, sometimes with BRB. She has 4-5 episodes of loose stool daily.   She denies excessive use of NSAIDs. + hx of Crohn's colitis, but reports that she does not take medication for this; last GI visit with Dr. Urena in 09/2020.   Last colonoscopy Oct 2020>   A 2 mm polyp was found in the ascending colon. The polyp was   sessile. The polyp was removed with a cold biopsy forceps. Resection   and retrieval were complete.   There was evidence of a prior end-to-side colo-colonic anastomosis    in the ascending colon. This was patent and was characterized by    healthy appearing mucosa. The anastomosis was traversed.     The history is provided by the patient.     Review of patient's allergies indicates:   Allergen Reactions    Octreotide acetate Nausea And Vomiting     Had symptoms when she took Sandostatin with Codeine    Codeine Itching and Nausea And Vomiting     Pill form only, IV ok.,  Had symptoms when she took Codeine with Sandostatin.  Other reaction(s): Itching     Morphine Nausea And Vomiting     Patient reports reaction only when she takes po form of Morphine.     Past Medical History:   Diagnosis Date    Aortic atherosclerosis     Benign essential hypertension     Cataract     Senile    Crohn's colitis     Depression, major, recurrent, moderate     Fibromyalgia     GERD (gastroesophageal reflux disease)     Hypertensive cardiomyopathy     IBS (irritable bowel syndrome)     Migraine     Opioid abuse, in remission     Osteopenia     Paget disease of bone     Port-A-Cath in place     right chest    Prolonged QT interval     RA (rheumatoid arthritis)     Sedative hypnotic or anxiolytic dependence     in remission      Past Surgical History:   Procedure Laterality Date    CATARACT EXTRACTION W/  INTRAOCULAR LENS IMPLANT Left 02/21/2017    Dr. Christianson    CATARACT EXTRACTION W/  INTRAOCULAR LENS IMPLANT Right 03/07/2017    Dr. Christianson    CHOLECYSTECTOMY      COLON SURGERY      COLONOSCOPY N/A 3/16/2016    Procedure: COLONOSCOPY;  Surgeon: Dale Trejo MD;  Location: [facility] ([address] [place] [address]);  Service: Endoscopy;  Laterality: N/A;    COLONOSCOPY N/A 10/12/2020    Procedure: COLONOSCOPY;  Surgeon: Cali Urena MD;  Location: [facility] ENDO [address]);  Service: Endoscopy;  Laterality: N/A;  covid test 10/9-[facility]- completed 10/9/20  ok to hold Coumadin x 5 days per coumadin clinic-see telephone encounterd ated 10/6-MS / Loop recorder  10/6/20- Pt confirmed- ERW@1122  10/9-Pt confirmed earlier arrival time, prep ins. reviewed and emailed to Pt    Colostomy reversal      HEMORRHOID SURGERY      HYSTERECTOMY      ILEOSTOMY      ILEOSTOMY CLOSURE      LEFT HEART CATHETERIZATION N/A 2/15/2019    Procedure: HEART CATH-LEFT;  Surgeon: Miky Keita MD;  Location: [facility] CATH LAB;  Service: Cardiology;  Laterality: N/A;    NECK SURGERY      OOPHORECTOMY Bilateral     SBO      SMALL INTESTINE SURGERY      TONSILLECTOMY      TUBAL LIGATION        Family History   Problem Relation Age of Onset    Glaucoma Paternal Grandmother     Other Daughter         Diabetic Retinopathy    Breast cancer Daughter 40    Retinal detachment Grandchild     Heart attack Maternal Grandmother     Colon cancer Maternal Grandmother     Cancer Mother         Unknown type    Emphysema Father 67    Heart disease Father     Lung cancer Sister     Heart attack Sister     Breast cancer Daughter 47    Breast cancer Sister     Amblyopia Neg Hx     Blindness Neg Hx     Strabismus Neg Hx     Macular degeneration Neg Hx     Cataracts Neg Hx      Social History     Tobacco Use    Smoking status: Never Smoker    Smokeless tobacco: Never Used   Substance Use Topics    Alcohol use: Yes     Comment: ocassional     Drug use: No     Review of Systems   Constitutional: Positive for activity change, appetite change and fatigue. Negative for chills and fever.   HENT: Negative for congestion, ear discharge, ear pain, postnasal drip, sinus pressure, sinus pain and sore throat.    Respiratory: Negative for cough, chest tightness and shortness of breath.    Cardiovascular: Positive for palpitations. Negative for chest pain.   Gastrointestinal: Positive for blood in stool, diarrhea, nausea and vomiting. Negative for abdominal distention and abdominal pain.   Genitourinary: Negative for dysuria, frequency and urgency.   Musculoskeletal: Positive for myalgias. Negative for back pain.   Skin: Negative.  Negative for rash.   Neurological: Positive for weakness. Negative for dizziness, light-headedness and numbness.   Hematological: Does not bruise/bleed easily.       Physical Exam     Initial Vitals [01/31/22 0947]   BP Pulse Resp Temp SpO2   107/71 66 18 97.9 °F (36.6 °C) 99 %      MAP       --         Physical Exam    Nursing note and vitals reviewed.  Constitutional: She appears well-developed and well-nourished.   HENT:   Head: Normocephalic and atraumatic.   Right Ear: Tympanic  membrane, external ear and ear canal normal. Tympanic membrane is not erythematous. No middle ear effusion.   Left Ear: Tympanic membrane, external ear and ear canal normal. Tympanic membrane is not erythematous.  No middle ear effusion.   Nose: Nose normal.   Mouth/Throat: Uvula is midline, oropharynx is clear and moist and mucous membranes are normal. Mucous membranes are not pale and not dry.   Eyes: Conjunctivae and EOM are normal. Pupils are equal, round, and reactive to light.   Neck: Neck supple.   Normal range of motion.  Cardiovascular: Normal heart sounds, intact distal pulses and normal pulses. An irregularly irregular rhythm present.   No extrasystoles are present.  Tachycardia present.  Exam reveals no decreased pulses.    No murmur heard.  Pulmonary/Chest: Effort normal and breath sounds normal. She has no decreased breath sounds. She has no wheezes. She has no rhonchi. She has no rales.   Abdominal: Abdomen is soft. Bowel sounds are normal. There is no abdominal tenderness.   Musculoskeletal:         General: Normal range of motion.      Cervical back: Normal range of motion and neck supple.     Neurological: She is alert and oriented to person, place, and time. She has normal strength. She displays normal reflexes. No cranial nerve deficit or sensory deficit.   Skin: Skin is warm and dry. Capillary refill takes less than 2 seconds. No rash noted.   Psychiatric: She has a normal mood and affect. Her behavior is normal. Judgment and thought content normal.         ED Course   Procedures  Labs Reviewed   COMPREHENSIVE METABOLIC PANEL - Abnormal; Notable for the following components:       Result Value    Potassium 3.3 (*)     Chloride 112 (*)     CO2 14 (*)     Glucose 124 (*)     Total Bilirubin 1.6 (*)     All other components within normal limits   URINALYSIS, REFLEX TO URINE CULTURE - Abnormal; Notable for the following components:    Occult Blood UA 2+ (*)     Nitrite, UA Positive (*)     All other  components within normal limits    Narrative:     Specimen Source->Urine   APTT - Abnormal; Notable for the following components:    aPTT 32.1 (*)     All other components within normal limits   MAGNESIUM - Abnormal; Notable for the following components:    Magnesium 1.5 (*)     All other components within normal limits   URINALYSIS MICROSCOPIC - Abnormal; Notable for the following components:    WBC, UA 13 (*)     Bacteria Many (*)     Yeast, UA Few (*)     Hyaline Casts, UA 2 (*)     All other components within normal limits    Narrative:     Specimen Source->Urine   CULTURE, BLOOD   CULTURE, BLOOD   CULTURE, URINE   CBC W/ AUTO DIFFERENTIAL   LIPASE   TROPONIN I   B-TYPE NATRIURETIC PEPTIDE   LACTIC ACID, PLASMA   PROCALCITONIN   OCCULT BLOOD X 1, STOOL   PROTIME-INR   MAGNESIUM   TSH   TSH   D DIMER, QUANTITATIVE   D DIMER, QUANTITATIVE   TROPONIN I     EKG Readings: (Independently Interpreted)   Initial Reading: No STEMI. Rhythm: Atrial Fibrillation. Ectopy: No Ectopy. Axis: Normal. Other Impression: Rate improved after IV medication the emergency room, initial heart rate was 125 beats per minute atrial fib RVR   Atrial fibrillation RVR on initial EKG  Second EKG shows a sinus tachycardia     ECG Results          EKG 12-lead (Final result)  Result time 01/31/22 14:17:39    Final result by Interface, Lab In [facility] (01/31/22 14:17:39)                 Narrative:    Test Reason : R53.1,    Vent. Rate : 141 BPM     Atrial Rate : 144 BPM     P-R Int : 000 ms          QRS Dur : 094 ms      QT Int : 304 ms       P-R-T Axes : 000 003 128 degrees     QTc Int : 465 ms    Artifact  Atrial fibrillation with rapid ventricular response  Prolonged QT  Nonspecific ST and T wave abnormality  Abnormal ECG  When compared with ECG of 03-MAR-2021 07:48,  Atrial fibrillation has replaced Sinus rhythm    Confirmed by Fuentes Hines MD ([id number]) on 1/31/2022 2:17:29 PM    Referred By: KETAN   SELF           Confirmed By:Fuentes  Flora UNDERWOOD                            Imaging Results          CT Abdomen Pelvis With Contrast (Final result)  Result time 01/31/22 15:44:54    Final result by Sonny Lewis IV, MD (01/31/22 15:44:54)                 Impression:      1. Mild fat haziness and questioned wall thickening surrounding the descending colon sigmoid colon junction with multiple colonic diverticulum present.  This is age indeterminate but could relate to a mild current inflammatory process; colitis, mild diverticulitis, neoplasm or inflammatory bowel disease.  No drainable fluid collections or evidence of perforation is noted.  Clinical correlation is necessary.  Follow-up for resolution and or correlation with colonoscopy or history of colonoscopy may eventually be necessary.  2. Hypervascular hepatic blush of 1.4 cm.  MRI liver mass protocol would be suggested in a high-risk patient to confirm a benign etiology most likely a hemangioma  3. Splenic nodule of 12 mm nonspecific but that is favored to have been seen at a similar size on 10/26/2020 favoring a benign etiology.  This can also be further evaluated by liver mass protocol similar to the hepatic blush to follow for stability and or confirm benign etiology.  4. Coarsened trabecular pattern to the left femoral head most likely related to Paget's disease.  Clinical correlation may be useful as history of trauma or neoplastic involvement while less likely are not excluded  5. Calcified pleural plaques, please correlate for asbestos exposure.  Stable band like regions of scarring and atelectasis are noted  6. Decreased liver density as can be seen with fatty infiltration of the liver  Yellow Liver 2017 Alert: 1.4 cm hypervascular hepatic blush    Yellow Actionable Finding (J Am Brennon Radiol 2017;14:6823-6017)    UNEXPECTED FINDINGS:  Incidental Liver Mass 1-1.5 CM Flash-Filling Imaging Feature Unknown Risk or  High Risk Patient (3)    RECOMMENDATIONS:  MRI Liver with and without  contrast      Electronically signed by: Sonny Lewis MD  Date:    01/31/2022  Time:    15:44             Narrative:    EXAMINATION:  CT ABDOMEN PELVIS WITH CONTRAST    CLINICAL HISTORY:  Nausea/vomiting;Abdominal pain, acute, nonlocalized;hx of crohn's with generalized abdominal pain with bloody stool;    TECHNIQUE:  Low dose axial images, sagittal and coronal reformations were obtained from the lung bases to the pubic symphysis following the IV administration of 100 mL of Omnipaque 350 no oral contrast was administered.  The lack of oral contrast hinders evaluation of the bowel in particular    COMPARISON:  None.    FINDINGS:  Some predominantly band like reticular changes are noted at the lung bases bilaterally favored relate to scarring and atelectasis.    Calcified pleural plaques are noted, please correlate for asbestos exposure.  A band like thickened region of scarring is noted at the left lung base of approximately 7 mm in thickness image 39 sequence 2.  This appears stable since 10/26/2020 and is favored relate to a region of scarring.    A 4 mm pulmonary nodule is noted in the right lower lobe favored to be stable since 10/26/2020 as well suggesting a benign etiology.    A metallic foreign body is noted within the anterior chest wall on the right of uncertain etiology that could relate to a BB or shrapnel.    Decreased liver density is noted as can be seen with fatty infiltration of the liver.    A hypervascular nodule is noted with left lobe of the liver image 38 sequence 2 of 14 mm not well displayed on the contrast phase is seen on 10/26/2020.  MRI liver mass protocol evaluation may be useful.    Metallic density is noted in the gallbladder fossa suggestive of cholecystectomy.    Along the anterior abdominal wall multiple surgical clips are noted suggestive of prior postsurgical change.  The adrenal glands appear appropriate.  The pancreas demonstrates no worrisome abnormality.    The spleen  demonstrates a hypodensity along its anterior border nonspecific on this examination 12 mm that is thought to be seen in retrospect at a similar size on the noncontrast exam from 10/26/2020 favoring a benign etiology.  Follow-up for size stability or MRI or ultrasound for better evaluation would be reasonable given that this is not well displayed on the prior non contrasted exam.    Evidence of anterior abdominal wall hernia mesh is noted between the rectus muscles, diastasis of the rectus muscles is noted.    Evidence of hysterectomy is noted.    Multiple colonic diverticula are noted.  Inflammatory changes are suspected at the descending colon sigmoid colon junction with mild wall thickening and surrounding fat haziness questioned.  No priors are available for comparison and this could relate to mild acute inflammation as could be seen with colitis, inflammatory bowel disease, neoplasm.  No drainable fluid collections or evidence of free air or perforation is identified.  These could relate to remote and or mild changes.  A surgical line is noted in the region of the transverse colon suggestive of hemicolectomy.    A mottled appearance with a coarsened trabecular pattern is noted throughout the proximal femur, this could relate to Paget's disease, history of fracture, or neoplastic involvement.  Please clinically correlate.  This is most likely related to Paget's or history of trauma.                               X-Ray Chest AP Portable (Final result)  Result time 01/31/22 10:48:51    Final result by Kendrick Vines MD (01/31/22 10:48:51)                 Impression:      As above.      Electronically signed by: Kendrick Vines MD  Date:    01/31/2022  Time:    10:48             Narrative:    EXAMINATION:  XR CHEST AP PORTABLE    CLINICAL HISTORY:  .  COVID-19    TECHNIQUE:  Portable chest dated January 31, 2022.    COMPARISON:  December 14, 2021.    FINDINGS:  The cardiac silhouette is within normal limits  considering portable technique.  Atherosclerotic changes of the aorta.  Mild stable chronic lung changes.  No focal infiltrate or effusion.  Degenerative changes of the spine.  Prior multilevel cervical spine fusion.                                 Medications   metoprolol succinate (TOPROL-XL) 24 hr tablet 50 mg (has no administration in time range)   aspirin EC tablet 81 mg (81 mg Oral Given 1/31/22 1308)   digoxin tablet 0.125 mg (has no administration in time range)   warfarin (COUMADIN) tablet 1 mg (1 mg Oral Given 1/31/22 1716)   enoxaparin injection 90 mg (90 mg Subcutaneous Given 1/31/22 1709)   0.9%  NaCl infusion ( Intravenous Stopped 1/31/22 1215)   pantoprazole injection 80 mg (80 mg Intravenous Given 1/31/22 1306)   digoxin injection 0.5 mg (0.5 mg Intravenous Given 1/31/22 1308)   metoprolol succinate (TOPROL-XL) 24 hr tablet 100 mg (100 mg Oral Given 1/31/22 1439)   iohexoL (OMNIPAQUE 350) injection 100 mL (100 mLs Intravenous Given 1/31/22 1510)   magnesium sulfate 2g in water 50mL IVPB (premix) (2 g Intravenous New Bag 1/31/22 1657)   potassium chloride SA CR tablet 20 mEq (20 mEq Oral Given 1/31/22 1656)   ciprofloxacin (CIPRO)400mg/200ml D5W IVPB 400 mg (0 mg Intravenous Stopped 1/31/22 1755)   metroNIDAZOLE tablet 500 mg (500 mg Oral Given 1/31/22 1656)   digoxin injection 250 mcg (250 mcg Intravenous Given 1/31/22 1657)   ondansetron injection 4 mg (4 mg Intravenous Given 1/31/22 1816)     Medical decision making  -- this patient presented with abnormal heart rate as well as excruciating abdominal pain  -- patient was worked up by the nurse practitioner, physician took over due to higher level care  -- cardiology consult to do advocated for digoxin for atrial fib RVR today  -- metoprolol also given, magnesium also given for atrial fib RVR  -- patient also had colitis on CT, given Cipro and Flagyl, COVID positive as well  -- will admit to the ICU for observation, currently in a stable heart rate  at this time    Critical Care ED Physician Time (minutes):  -- Performed by: Louis Starks M.D.  -- Date/Time: 7:16 PM 1/31/2022   -- Direct Patient Care (Face Time): 5  -- Additional History from Records or Taking Additional History: 5  -- Ordering, Reviewing, and Interpreting Diagnostic Studies: 5  -- Total Time in Documentation: 11  -- Consultation with Other Physicians: 5  -- Consultation with Family Related to Condition: 0  -- Total Critical Care Time: 31  -- Critical care was necessary to treat atrial fibrillation  -- Critical care was time spent personally by me on the following activities:   -- blood draw for specimens discussions with consultants,   -- development of treatment plan with patient or surrogate,   -- examination of patient, ordering and performing treatments   -- review of radiographic studies, re-evaluation of pt's condition  -- review of labs and evaluation of response to treatment                       Clinical Impression:   Final diagnoses:  [R53.1] Generalized weakness  [U07.1] COVID-19  [R00.0] Tachycardia          ED Disposition Condition    Observation               Louis Starks MD  01/31/22 1771

## 2022-01-31 NOTE — ED TRIAGE NOTES
71 y.o. female presents to ER qTrack 03/qTrk 03   Chief Complaint   Patient presents with    Cough   .   Pt reports cough, body aches, dizziness, and diarrhea, dx'd with COVID 1-27
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
## 2022-02-01 PROBLEM — D18.03 LIVER HEMANGIOMA: Status: ACTIVE | Noted: 2022-02-01

## 2022-02-01 PROBLEM — D68.61 ANTIPHOSPHOLIPID SYNDROME: Status: ACTIVE | Noted: 2020-09-30

## 2022-02-01 PROBLEM — U07.1 COVID-19: Status: ACTIVE | Noted: 2022-02-01

## 2022-02-01 PROBLEM — I48.91 ATRIAL FIBRILLATION WITH RVR: Status: ACTIVE | Noted: 2022-02-01

## 2022-02-01 LAB
ALBUMIN SERPL BCP-MCNC: 3.3 G/DL (ref 3.5–5.2)
ALP SERPL-CCNC: 70 U/L (ref 55–135)
ALT SERPL W/O P-5'-P-CCNC: 14 U/L (ref 10–44)
ANION GAP SERPL CALC-SCNC: 12 MMOL/L (ref 8–16)
AST SERPL-CCNC: 19 U/L (ref 10–40)
BASOPHILS # BLD AUTO: 0.03 K/UL (ref 0–0.2)
BASOPHILS NFR BLD: 0.4 % (ref 0–1.9)
BILIRUB SERPL-MCNC: 1.3 MG/DL (ref 0.1–1)
BUN SERPL-MCNC: 14 MG/DL (ref 8–23)
CALCIUM SERPL-MCNC: 7.7 MG/DL (ref 8.7–10.5)
CHLORIDE SERPL-SCNC: 113 MMOL/L (ref 95–110)
CO2 SERPL-SCNC: 15 MMOL/L (ref 23–29)
CREAT SERPL-MCNC: 0.7 MG/DL (ref 0.5–1.4)
DIFFERENTIAL METHOD: NORMAL
EOSINOPHIL # BLD AUTO: 0.1 K/UL (ref 0–0.5)
EOSINOPHIL NFR BLD: 0.7 % (ref 0–8)
ERYTHROCYTE [DISTWIDTH] IN BLOOD BY AUTOMATED COUNT: 13.8 % (ref 11.5–14.5)
EST. GFR  (AFRICAN AMERICAN): >60 ML/MIN/1.73 M^2
EST. GFR  (NON AFRICAN AMERICAN): >60 ML/MIN/1.73 M^2
GLUCOSE SERPL-MCNC: 115 MG/DL (ref 70–110)
HCT VFR BLD AUTO: 40.9 % (ref 37–48.5)
HGB BLD-MCNC: 13.3 G/DL (ref 12–16)
IMM GRANULOCYTES # BLD AUTO: 0.02 K/UL (ref 0–0.04)
IMM GRANULOCYTES NFR BLD AUTO: 0.3 % (ref 0–0.5)
INR PPP: 1.1 (ref 0.8–1.2)
LYMPHOCYTES # BLD AUTO: 2.5 K/UL (ref 1–4.8)
LYMPHOCYTES NFR BLD: 35.5 % (ref 18–48)
MAGNESIUM SERPL-MCNC: 1.8 MG/DL (ref 1.6–2.6)
MCH RBC QN AUTO: 28.5 PG (ref 27–31)
MCHC RBC AUTO-ENTMCNC: 32.5 G/DL (ref 32–36)
MCV RBC AUTO: 88 FL (ref 82–98)
MONOCYTES # BLD AUTO: 0.7 K/UL (ref 0.3–1)
MONOCYTES NFR BLD: 9.2 % (ref 4–15)
NEUTROPHILS # BLD AUTO: 3.8 K/UL (ref 1.8–7.7)
NEUTROPHILS NFR BLD: 53.9 % (ref 38–73)
NRBC BLD-RTO: 0 /100 WBC
PLATELET # BLD AUTO: 249 K/UL (ref 150–450)
PMV BLD AUTO: 10.8 FL (ref 9.2–12.9)
POTASSIUM SERPL-SCNC: 3.5 MMOL/L (ref 3.5–5.1)
PROT SERPL-MCNC: 6.2 G/DL (ref 6–8.4)
PROTHROMBIN TIME: 12.1 SEC (ref 9–12.5)
RBC # BLD AUTO: 4.67 M/UL (ref 4–5.4)
SODIUM SERPL-SCNC: 140 MMOL/L (ref 136–145)
TROPONIN I SERPL DL<=0.01 NG/ML-MCNC: 0.01 NG/ML (ref 0–0.03)
TSH SERPL DL<=0.005 MIU/L-ACNC: 2.43 UIU/ML (ref 0.4–4)
WBC # BLD AUTO: 7.09 K/UL (ref 3.9–12.7)

## 2022-02-01 PROCEDURE — 84443 ASSAY THYROID STIM HORMONE: CPT | Performed by: NURSE PRACTITIONER

## 2022-02-01 PROCEDURE — 85025 COMPLETE CBC W/AUTO DIFF WBC: CPT | Performed by: INTERNAL MEDICINE

## 2022-02-01 PROCEDURE — 80053 COMPREHEN METABOLIC PANEL: CPT | Performed by: INTERNAL MEDICINE

## 2022-02-01 PROCEDURE — 93005 ELECTROCARDIOGRAM TRACING: CPT

## 2022-02-01 PROCEDURE — 94761 N-INVAS EAR/PLS OXIMETRY MLT: CPT

## 2022-02-01 PROCEDURE — 83735 ASSAY OF MAGNESIUM: CPT | Performed by: INTERNAL MEDICINE

## 2022-02-01 PROCEDURE — G0378 HOSPITAL OBSERVATION PER HR: HCPCS

## 2022-02-01 PROCEDURE — 93010 ELECTROCARDIOGRAM REPORT: CPT | Mod: ,,, | Performed by: INTERNAL MEDICINE

## 2022-02-01 PROCEDURE — 96372 THER/PROPH/DIAG INJ SC/IM: CPT | Mod: 59

## 2022-02-01 PROCEDURE — 96366 THER/PROPH/DIAG IV INF ADDON: CPT

## 2022-02-01 PROCEDURE — 99220 PR INITIAL OBSERVATION CARE,LEVL III: CPT | Mod: ,,, | Performed by: INTERNAL MEDICINE

## 2022-02-01 PROCEDURE — 25000003 PHARM REV CODE 250: Performed by: SURGERY

## 2022-02-01 PROCEDURE — 85610 PROTHROMBIN TIME: CPT | Performed by: NURSE PRACTITIONER

## 2022-02-01 PROCEDURE — 25000003 PHARM REV CODE 250: Performed by: INTERNAL MEDICINE

## 2022-02-01 PROCEDURE — 96375 TX/PRO/DX INJ NEW DRUG ADDON: CPT

## 2022-02-01 PROCEDURE — 96361 HYDRATE IV INFUSION ADD-ON: CPT

## 2022-02-01 PROCEDURE — 96372 THER/PROPH/DIAG INJ SC/IM: CPT | Performed by: SURGERY

## 2022-02-01 PROCEDURE — 84484 ASSAY OF TROPONIN QUANT: CPT | Performed by: INTERNAL MEDICINE

## 2022-02-01 PROCEDURE — 93010 EKG 12-LEAD: ICD-10-PCS | Mod: ,,, | Performed by: INTERNAL MEDICINE

## 2022-02-01 PROCEDURE — 63600175 PHARM REV CODE 636 W HCPCS: Performed by: SURGERY

## 2022-02-01 PROCEDURE — S0030 INJECTION, METRONIDAZOLE: HCPCS | Performed by: INTERNAL MEDICINE

## 2022-02-01 PROCEDURE — 99220 PR INITIAL OBSERVATION CARE,LEVL III: ICD-10-PCS | Mod: ,,, | Performed by: INTERNAL MEDICINE

## 2022-02-01 PROCEDURE — 36415 COLL VENOUS BLD VENIPUNCTURE: CPT | Performed by: NURSE PRACTITIONER

## 2022-02-01 PROCEDURE — 99900035 HC TECH TIME PER 15 MIN (STAT)

## 2022-02-01 PROCEDURE — 36415 COLL VENOUS BLD VENIPUNCTURE: CPT | Performed by: INTERNAL MEDICINE

## 2022-02-01 RX ORDER — POTASSIUM CHLORIDE 20 MEQ/1
20 TABLET, EXTENDED RELEASE ORAL ONCE
Status: COMPLETED | OUTPATIENT
Start: 2022-02-01 | End: 2022-02-01

## 2022-02-01 RX ORDER — POTASSIUM CHLORIDE 7.45 MG/ML
10 INJECTION INTRAVENOUS
Status: DISCONTINUED | OUTPATIENT
Start: 2022-02-01 | End: 2022-02-01

## 2022-02-01 RX ORDER — LANOLIN ALCOHOL/MO/W.PET/CERES
400 CREAM (GRAM) TOPICAL ONCE
Status: COMPLETED | OUTPATIENT
Start: 2022-02-01 | End: 2022-02-01

## 2022-02-01 RX ORDER — NORTRIPTYLINE HYDROCHLORIDE 10 MG/1
10 CAPSULE ORAL NIGHTLY
Status: DISCONTINUED | OUTPATIENT
Start: 2022-02-01 | End: 2022-02-02 | Stop reason: HOSPADM

## 2022-02-01 RX ORDER — MAGNESIUM SULFATE 1 G/100ML
1 INJECTION INTRAVENOUS ONCE
Status: DISCONTINUED | OUTPATIENT
Start: 2022-02-01 | End: 2022-02-01

## 2022-02-01 RX ORDER — DIPHENOXYLATE HYDROCHLORIDE AND ATROPINE SULFATE 2.5; .025 MG/1; MG/1
1 TABLET ORAL 4 TIMES DAILY PRN
Status: DISCONTINUED | OUTPATIENT
Start: 2022-02-01 | End: 2022-02-01

## 2022-02-01 RX ADMIN — WARFARIN SODIUM 1 MG: 1 TABLET ORAL at 05:02

## 2022-02-01 RX ADMIN — METRONIDAZOLE 500 MG: 500 INJECTION, SOLUTION INTRAVENOUS at 06:02

## 2022-02-01 RX ADMIN — SODIUM CHLORIDE: 0.9 INJECTION, SOLUTION INTRAVENOUS at 09:02

## 2022-02-01 RX ADMIN — METOPROLOL SUCCINATE 50 MG: 50 TABLET, EXTENDED RELEASE ORAL at 08:02

## 2022-02-01 RX ADMIN — REMDESIVIR 100 MG: 100 INJECTION, POWDER, LYOPHILIZED, FOR SOLUTION INTRAVENOUS at 12:02

## 2022-02-01 RX ADMIN — CIPROFLOXACIN 400 MG: 2 INJECTION, SOLUTION INTRAVENOUS at 10:02

## 2022-02-01 RX ADMIN — METRONIDAZOLE 500 MG: 500 INJECTION, SOLUTION INTRAVENOUS at 09:02

## 2022-02-01 RX ADMIN — ONDANSETRON HYDROCHLORIDE 4 MG: 2 SOLUTION INTRAMUSCULAR; INTRAVENOUS at 09:02

## 2022-02-01 RX ADMIN — CIPROFLOXACIN 400 MG: 2 INJECTION, SOLUTION INTRAVENOUS at 09:02

## 2022-02-01 RX ADMIN — ACETAMINOPHEN 650 MG: 325 TABLET ORAL at 10:02

## 2022-02-01 RX ADMIN — Medication 400 MG: at 10:02

## 2022-02-01 RX ADMIN — DEXAMETHASONE SODIUM PHOSPHATE 6 MG: 4 INJECTION, SOLUTION INTRAMUSCULAR; INTRAVENOUS at 09:02

## 2022-02-01 RX ADMIN — NORTRIPTYLINE HYDROCHLORIDE 10 MG: 10 CAPSULE ORAL at 08:02

## 2022-02-01 RX ADMIN — POTASSIUM CHLORIDE 20 MEQ: 1500 TABLET, EXTENDED RELEASE ORAL at 10:02

## 2022-02-01 RX ADMIN — ENOXAPARIN SODIUM 90 MG: 100 INJECTION, SOLUTION INTRAVENOUS; SUBCUTANEOUS at 05:02

## 2022-02-01 RX ADMIN — DIGOXIN 0.12 MG: 125 TABLET ORAL at 10:02

## 2022-02-01 RX ADMIN — ENOXAPARIN SODIUM 90 MG: 100 INJECTION, SOLUTION INTRAVENOUS; SUBCUTANEOUS at 06:02

## 2022-02-01 RX ADMIN — ASPIRIN 81 MG: 81 TABLET, COATED ORAL at 10:02

## 2022-02-01 RX ADMIN — METRONIDAZOLE 500 MG: 500 INJECTION, SOLUTION INTRAVENOUS at 12:02

## 2022-02-01 NOTE — ASSESSMENT & PLAN NOTE
Possible hemangioma noted on CT scan  MRI liver with and without recommended. Likely d/c home tomorrow if remains satble so can be done outpatient. If remains inpatient order tomorrow AM

## 2022-02-01 NOTE — PLAN OF CARE
No falls or near misses noted on shift, No skin issues or wounds, Lovenox for DVT prevention, no pain complaints at this time, personal preferences provided as requested for food/drinks as diet orders allow, plan of care discussed with patient and  at bedside, verbalized understanding, evidence of learning noted, Normal Saline gtt as ordered, plan is to maintain ICU status, possible discharge in AM per Dr Hinkle.

## 2022-02-01 NOTE — NURSING
"Dr Moreno at bedside, updated MD on vitals, status, condition, ST depression, "8/10 chest pain," HR 50's, /55 (map 84), new orders noted, per MD "Okay for Digoxin and Metoprolol this AM, do not hold doses." Will continue to monitor and update MD as needed.  "

## 2022-02-01 NOTE — HPI
Patient presented to ER with cough, dizziness, and diarrhea. She was diagnosed with COVID 1/27/22 and discharged home. Since then has felt more weak, dizziness, and increasing diarrhea so returned to ER. Found to be in a-fib RVR. Treated with digoxin and metoprolol and converted to sinus bradycardia. Admitted to ICU for HR monitoring. Has remains sinus padmini in 50s with few PACs on telemetry. She has sats 98% on RA. Was started on remdesivir for COVID treatment. CXR without pneumonia.CT abdomen and pelvis done for diarrhea shows changes consistent with infectious vs inflammatory colitis; she has h/o Crohn's disease. Also noted probable liver hemangioma needing MRI liver with and without contrast.     Of note she has h/o a-fib and antiphospholipid syndrome for which she was treated with coumadin. INR subtherapeutic. States she was taking but has h/o no-compliance.

## 2022-02-01 NOTE — PLAN OF CARE
02/01/22 1455   LEO Message   Medicare Outpatient and Observation Notification regarding financial responsibility Explained to patient/caregiver;Other (comments)  (Completed via phone due to covid 19 precautions.)   Date LEO was signed 02/01/22   Time LEO was signed [id number]

## 2022-02-01 NOTE — EICU
Rounding (Video Assessment):  Video rounding completed.  Pt resting quiet w/ eyes closed.  No distress noted.  Infusing NS at 75 cc/hr.  B/P 101/55, HR 54, RR 18, POx 98%.

## 2022-02-01 NOTE — H&P
[facility] Medicine  History & Physical    Patient Name: Celine Sinclair  MRN: [id number]  Patient Class: OP- Observation  Admission Date: 1/31/2022  Attending Physician: Charissa Carrizales MD   Primary Care Provider: Neeru Hinkle MD         Patient information was obtained from patient and ER records.     Subjective:     Principal Problem:Atrial fibrillation with RVR    Chief Complaint:   Chief Complaint   Patient presents with    Cough        HPI: Patient presented to ER with cough, dizziness, and diarrhea. She was diagnosed with COVID 1/27/22 and discharged home. Since then has felt more weak, dizziness, and increasing diarrhea so returned to ER. Found to be in a-fib RVR. Treated with digoxin and metoprolol and converted to sinus bradycardia. Admitted to ICU for HR monitoring. Has remains sinus padmini in 50s with few PACs on telemetry. She has sats 98% on RA. Was started on remdesivir for COVID treatment. CXR without pneumonia.CT abdomen and pelvis done for diarrhea shows changes consistent with infectious vs inflammatory colitis; she has h/o Crohn's disease. Also noted probable liver hemangioma needing MRI liver with and without contrast.     Of note she has h/o a-fib and antiphospholipid syndrome for which she was treated with coumadin. INR subtherapeutic. States she was taking but has h/o no-compliance.       Past Medical History:   Diagnosis Date    Aortic atherosclerosis     Benign essential hypertension     Cataract     Senile    Crohn's colitis     Depression, major, recurrent, moderate     Fibromyalgia     GERD (gastroesophageal reflux disease)     Hypertensive cardiomyopathy     IBS (irritable bowel syndrome)     Migraine     Opioid abuse, in remission     Osteopenia     Paget disease of bone     Port-A-Cath in place     right chest    Prolonged QT interval     RA (rheumatoid arthritis)     Sedative hypnotic or anxiolytic dependence     in remission         Past Surgical History:   Procedure Laterality Date    CATARACT EXTRACTION W/  INTRAOCULAR LENS IMPLANT Left 02/21/2017    Dr. Christianson    CATARACT EXTRACTION W/  INTRAOCULAR LENS IMPLANT Right 03/07/2017    Dr. Christianson    CHOLECYSTECTOMY      COLON SURGERY      COLONOSCOPY N/A 3/16/2016    Procedure: COLONOSCOPY;  Surgeon: Dale Trejo MD;  Location: [facility] ENDO (4TH FLR);  Service: Endoscopy;  Laterality: N/A;    COLONOSCOPY N/A 10/12/2020    Procedure: COLONOSCOPY;  Surgeon: Cali Urena MD;  Location: [facility] ENDO (4TH FLR);  Service: Endoscopy;  Laterality: N/A;  covid test 10/9-[place] [facility]- completed 10/9/20  ok to hold Coumadin x 5 days per coumadin clinic-see telephone encounterd ated 10/6-MS / Loop recorder  10/6/20- Pt confirmed- ERW@1122  10/9-Pt confirmed earlier arrival time, prep ins. reviewed and emailed to Pt    Colostomy reversal      HEMORRHOID SURGERY      HYSTERECTOMY      ILEOSTOMY      ILEOSTOMY CLOSURE      LEFT HEART CATHETERIZATION N/A 2/15/2019    Procedure: HEART CATH-LEFT;  Surgeon: Miky Keita MD;  Location: [facility] CATH LAB;  Service: Cardiology;  Laterality: N/A;    NECK SURGERY      OOPHORECTOMY Bilateral     SBO      SMALL INTESTINE SURGERY      TONSILLECTOMY      TUBAL LIGATION         Review of patient's allergies indicates:   Allergen Reactions    Octreotide acetate Nausea And Vomiting     Had symptoms when she took Sandostatin with Codeine    Codeine Itching and Nausea And Vomiting     Pill form only, IV ok.,  Had symptoms when she took Codeine with Sandostatin.  Other reaction(s): Itching    Morphine Nausea And Vomiting     Patient reports reaction only when she takes po form of Morphine.       No current facility-administered medications on file prior to encounter.     Current Outpatient Medications on File Prior to Encounter   Medication Sig    acetaminophen (TYLENOL) 500 MG tablet Take 1 tablet (500 mg total) by mouth daily as needed  for Pain.    amLODIPine (NORVASC) 10 MG tablet TAKE 1 TABLET BY MOUTH EVERY DAY    benzonatate (TESSALON) 100 MG capsule Take 1 capsule (100 mg total) by mouth 3 (three) times daily as needed for Cough.    diphenoxylate-atropine 2.5-0.025 mg (LOMOTIL) 2.5-0.025 mg per tablet Take 1 tablet by mouth 4 (four) times daily as needed for Diarrhea. Takes 2 tablets twice a day when needed    furosemide (LASIX) 20 MG tablet Take 20 mg by mouth as needed.     ibuprofen (ADVIL,MOTRIN) 800 MG tablet Take 1 tablet (800 mg total) by mouth every 6 (six) hours as needed for Pain.    INCONTINENCE PAD,LINER,DISP (BLADDER CONTROL PADS EX ABSORB MISC)     methylPREDNISolone (MEDROL DOSEPACK) 4 mg tablet use as directed    metoprolol succinate (TOPROL-XL) 50 MG 24 hr tablet Take 50 mg (1 pill) in the morning and 25mg (half a pill) in the evening    nortriptyline (PAMELOR) 10 MG capsule TAKE 1 CAPSULE (10 MG TOTAL) BY MOUTH EVERY EVENING.    tobramycin sulfate 0.3% (TOBREX) 0.3 % ophthalmic solution Place 2 drops into the right eye 3 (three) times daily.    traMADoL (ULTRAM) 50 mg tablet Take 1 tablet (50 mg total) by mouth every 8 (eight) hours as needed for Pain. (Patient not taking: Reported on 12/17/2021)    traZODone (DESYREL) 50 MG tablet Take 1 tablet (50 mg total) by mouth nightly as needed.    warfarin (COUMADIN) 1 MG tablet 0.5mg PO daily except 1mg PO Mon, Wed, Fri -- OR AS DIRECTED BY COUMADIN CLINIC     Family History     Problem Relation (Age of Onset)    Breast cancer Daughter (40), Daughter (47), Sister    Cancer Mother    Colon cancer Maternal Grandmother    Emphysema Father (67)    Glaucoma Paternal Grandmother    Heart attack Maternal Grandmother, Sister    Heart disease Father    Lung cancer Sister    Other Daughter    Retinal detachment Grandchild        Tobacco Use    Smoking status: Never Smoker    Smokeless tobacco: Never Used   Substance and Sexual Activity    Alcohol use: Yes     Comment: wine  coolers occassionally    Drug use: No    Sexual activity: Not on file     Review of Systems   Constitutional: Positive for fatigue. Negative for activity change, fever and unexpected weight change.   HENT: Negative for congestion, ear pain, hearing loss, rhinorrhea and sore throat.    Eyes: Negative for redness and visual disturbance.   Respiratory: Negative for cough, shortness of breath and wheezing.    Cardiovascular: Negative for chest pain, palpitations and leg swelling.   Gastrointestinal: Positive for diarrhea. Negative for abdominal pain, blood in stool, constipation, nausea and vomiting.   Genitourinary: Negative for dysuria, frequency and urgency.   Musculoskeletal: Negative for back pain, joint swelling and neck pain.   Skin: Negative for color change, rash and wound.   Neurological: Negative for dizziness, tremors, weakness, light-headedness and headaches.     Objective:     Vital Signs (Most Recent):  Temp: 96.7 °F (35.9 °C) (02/01/22 0715)  Pulse: (!) 58 (02/01/22 1218)  Resp: 18 (02/01/22 1218)  BP: 103/62 (02/01/22 1100)  SpO2: 100 % (02/01/22 1218) Vital Signs (24h Range):  Temp:  [96.7 °F (35.9 °C)-98 °F (36.7 °C)] 96.7 °F (35.9 °C)  Pulse:  [] 58  Resp:  [13-26] 18  SpO2:  [91 %-100 %] 100 %  BP: ()/(54-94) 103/62     Weight: (S) 88.2 kg (194 lb 7.1 oz)  Body mass index is 32.36 kg/m².    Physical Exam  Vitals reviewed.   Constitutional:       General: She is not in acute distress.     Appearance: She is well-developed and well-nourished.   HENT:      Head: Normocephalic and atraumatic.      Right Ear: External ear normal.      Left Ear: External ear normal.   Eyes:      General:         Right eye: No discharge.         Left eye: No discharge.      Extraocular Movements: EOM normal.   Neck:      Thyroid: No thyromegaly.   Cardiovascular:      Rate and Rhythm: Regular rhythm. Bradycardia present.      Heart sounds: No murmur heard.      Pulmonary:      Effort: Pulmonary effort is  normal. No respiratory distress.      Breath sounds: Normal breath sounds. No wheezing or rales.   Abdominal:      General: Bowel sounds are normal. There is no distension.      Palpations: Abdomen is soft.      Tenderness: There is no abdominal tenderness.   Musculoskeletal:      Cervical back: Neck supple.   Lymphadenopathy:      Cervical: No cervical adenopathy.   Skin:     General: Skin is warm and dry.   Neurological:      Mental Status: She is alert and oriented to person, place, and time.      Cranial Nerves: No cranial nerve deficit.   Psychiatric:         Mood and Affect: Mood and affect normal.         Behavior: Behavior normal.         Thought Content: Thought content normal.           CRANIAL NERVES     CN III, IV, VI   Extraocular motions are normal.        Significant Labs:   All pertinent labs within the past 24 hours have been reviewed.  CBC:   Recent Labs   Lab 01/31/22  1017 02/01/22  0318   WBC 7.49 7.09   HGB 15.0 13.3   HCT 45.9 40.9    249     CMP:   Recent Labs   Lab 01/31/22  1017 02/01/22  0318    140   K 3.3* 3.5   * 113*   CO2 14* 15*   * 115*   BUN 19 14   CREATININE 0.8 0.7   CALCIUM 8.8 7.7*   PROT 7.5 6.2   ALBUMIN 3.9 3.3*   BILITOT 1.6* 1.3*   ALKPHOS 84 70   AST 22 19   ALT 15 14   ANIONGAP 15 12   EGFRNONAA >60 >60     Troponin:   Recent Labs   Lab 01/31/22  1017 01/31/22  1606 02/01/22  0318   TROPONINI <0.006 <0.006 0.012     Urine Studies:   Recent Labs   Lab 01/31/22  1516   COLORU Yellow   APPEARANCEUA Clear   PHUR 6.0   SPECGRAV 1.020   PROTEINUA Negative   GLUCUA Negative   KETONESU Negative   BILIRUBINUA Negative   OCCULTUA 2+*   NITRITE Positive*   UROBILINOGEN Negative   LEUKOCYTESUR Negative   RBCUA 4   WBCUA 13*   BACTERIA Many*   SQUAMEPITHEL 4   HYALINECASTS 2*       Significant Imaging: I have reviewed all pertinent imaging results/findings within the past 24 hours.    Assessment/Plan:     * Atrial fibrillation with RVR  A-fib RVR on  arrival  [facility] following and giving digoxin and metoprolol  Converted to sinus padmini with few PACs on telemetry. [facility] aware and want to continue current treatment  lovenox bridge to coumadin, INR sub therapeutic. Will need to see if can get Eliquis or Xarelto for better long term management      COVID-19  Admitted on remdesivir and dexamethasone  She has no oxygen requirement and no COVID lung changes on imaging  Will continue today and monitor but admission seems to have been for a-fib RVR and not for COVID pneumonia/hypoxia. Suspect can d/c home tomorrow from COVID standpoint if cardiac remains stable. >5 days since diagnosis so no paxlovid outpatient       Liver hemangioma  Possible hemangioma noted on CT scan  MRI liver with and without recommended. Likely d/c home tomorrow if remains satble so can be done outpatient. If remains inpatient order tomorrow AM      Crohn's disease  CT changes infectious vs inflammatory bowel disease  Has not had recent GI follow up  For now cipro and flagyl started. This could be Crohn's flare however (no on biologic therapy outpatient) and may benefit from steroids???? On steroids for COVID right now. Would likely d/c with short course antibx and close GI follow up for this with repeat C-scope needed      Antiphospholipid syndrome  INR sub therapeutic  lovenox bridge to coumadin, daily INR ordered  Consider eliquis       Fibromyalgia  Chronic pain  PRN tyelnol, can add tramadol if needed      Essential hypertension  Home amlodipine on hold  SBP 90s  IVF, likely some dehydration with diarrhea on admit        VTE Risk Mitigation (From admission, onward)         Ordered     enoxaparin injection 90 mg  Every 12 hours (non-standard times)         01/31/22 1646     warfarin (COUMADIN) tablet 1 mg  Daily         01/31/22 1646              Critical care time spent on the evaluation and treatment of severe organ dysfunction, review of pertinent labs and imaging studies, discussions with  consulting providers and discussions with patient/family: 65 minutes.     Neeru Hinkle MD  Department of Hospital Medicine   [facility] - Intensive Care

## 2022-02-01 NOTE — NURSING
Received report from Jessica Salinas RN     Patient sleeping at this time, no family at bedside.     Physical to follow.

## 2022-02-01 NOTE — ASSESSMENT & PLAN NOTE
CT changes infectious vs inflammatory bowel disease  Has not had recent GI follow up  For now cipro and flagyl started. This could be Crohn's flare however (no on biologic therapy outpatient) and may benefit from steroids???? On steroids for COVID right now. Would likely d/c with short course antibx and close GI follow up for this with repeat C-scope needed

## 2022-02-01 NOTE — ASSESSMENT & PLAN NOTE
A-fib RVR on arrival  [facility] following and giving digoxin and metoprolol  Converted to sinus padmini with few PACs on telemetry. [facility] aware and want to continue current treatment  lovenox bridge to coumadin, INR sub therapeutic. Will need to see if can get Eliquis or Xarelto for better long term management

## 2022-02-01 NOTE — SUBJECTIVE & OBJECTIVE
Past Medical History:   Diagnosis Date    Aortic atherosclerosis     Benign essential hypertension     Cataract     Senile    Crohn's colitis     Depression, major, recurrent, moderate     Fibromyalgia     GERD (gastroesophageal reflux disease)     Hypertensive cardiomyopathy     IBS (irritable bowel syndrome)     Migraine     Opioid abuse, in remission     Osteopenia     Paget disease of bone     Port-A-Cath in place     right chest    Prolonged QT interval     RA (rheumatoid arthritis)     Sedative hypnotic or anxiolytic dependence     in remission        Past Surgical History:   Procedure Laterality Date    CATARACT EXTRACTION W/  INTRAOCULAR LENS IMPLANT Left 02/21/2017    Dr. Christianson    CATARACT EXTRACTION W/  INTRAOCULAR LENS IMPLANT Right 03/07/2017    Dr. Christianson    CHOLECYSTECTOMY      COLON SURGERY      COLONOSCOPY N/A 3/16/2016    Procedure: COLONOSCOPY;  Surgeon: Dale Trejo MD;  Location: [facility] ENDO (4TH FLR);  Service: Endoscopy;  Laterality: N/A;    COLONOSCOPY N/A 10/12/2020    Procedure: COLONOSCOPY;  Surgeon: Cali Urena MD;  Location: [facility] ENDO (4TH FLR);  Service: Endoscopy;  Laterality: N/A;  covid test 10/9-[place] [facility]- completed 10/9/20  ok to hold Coumadin x 5 days per coumadin clinic-see telephone encounterd ated 10/6-MS / Loop recorder  10/6/20- Pt confirmed- ERW@1122  10/9-Pt confirmed earlier arrival time, prep ins. reviewed and emailed to Pt    Colostomy reversal      HEMORRHOID SURGERY      HYSTERECTOMY      ILEOSTOMY      ILEOSTOMY CLOSURE      LEFT HEART CATHETERIZATION N/A 2/15/2019    Procedure: HEART CATH-LEFT;  Surgeon: Miky Keita MD;  Location: [facility] CATH LAB;  Service: Cardiology;  Laterality: N/A;    NECK SURGERY      OOPHORECTOMY Bilateral     SBO      SMALL INTESTINE SURGERY      TONSILLECTOMY      TUBAL LIGATION         Review of patient's allergies indicates:   Allergen Reactions    Octreotide acetate Nausea And  Vomiting     Had symptoms when she took Sandostatin with Codeine    Codeine Itching and Nausea And Vomiting     Pill form only, IV ok.,  Had symptoms when she took Codeine with Sandostatin.  Other reaction(s): Itching    Morphine Nausea And Vomiting     Patient reports reaction only when she takes po form of Morphine.       No current facility-administered medications on file prior to encounter.     Current Outpatient Medications on File Prior to Encounter   Medication Sig    acetaminophen (TYLENOL) 500 MG tablet Take 1 tablet (500 mg total) by mouth daily as needed for Pain.    amLODIPine (NORVASC) 10 MG tablet TAKE 1 TABLET BY MOUTH EVERY DAY    benzonatate (TESSALON) 100 MG capsule Take 1 capsule (100 mg total) by mouth 3 (three) times daily as needed for Cough.    diphenoxylate-atropine 2.5-0.025 mg (LOMOTIL) 2.5-0.025 mg per tablet Take 1 tablet by mouth 4 (four) times daily as needed for Diarrhea. Takes 2 tablets twice a day when needed    furosemide (LASIX) 20 MG tablet Take 20 mg by mouth as needed.     ibuprofen (ADVIL,MOTRIN) 800 MG tablet Take 1 tablet (800 mg total) by mouth every 6 (six) hours as needed for Pain.    INCONTINENCE PAD,LINER,DISP (BLADDER CONTROL PADS EX ABSORB MISC)     methylPREDNISolone (MEDROL DOSEPACK) 4 mg tablet use as directed    metoprolol succinate (TOPROL-XL) 50 MG 24 hr tablet Take 50 mg (1 pill) in the morning and 25mg (half a pill) in the evening    nortriptyline (PAMELOR) 10 MG capsule TAKE 1 CAPSULE (10 MG TOTAL) BY MOUTH EVERY EVENING.    tobramycin sulfate 0.3% (TOBREX) 0.3 % ophthalmic solution Place 2 drops into the right eye 3 (three) times daily.    traMADoL (ULTRAM) 50 mg tablet Take 1 tablet (50 mg total) by mouth every 8 (eight) hours as needed for Pain. (Patient not taking: Reported on 12/17/2021)    traZODone (DESYREL) 50 MG tablet Take 1 tablet (50 mg total) by mouth nightly as needed.    warfarin (COUMADIN) 1 MG tablet 0.5mg PO daily except 1mg  PO Mon, Wed, Fri -- OR AS DIRECTED BY COUMADIN CLINIC     Family History     Problem Relation (Age of Onset)    Breast cancer Daughter (40), Daughter (47), Sister    Cancer Mother    Colon cancer Maternal Grandmother    Emphysema Father (67)    Glaucoma Paternal Grandmother    Heart attack Maternal Grandmother, Sister    Heart disease Father    Lung cancer Sister    Other Daughter    Retinal detachment Grandchild        Tobacco Use    Smoking status: Never Smoker    Smokeless tobacco: Never Used   Substance and Sexual Activity    Alcohol use: Yes     Comment: wine coolers occassionally    Drug use: No    Sexual activity: Not on file     Review of Systems   Constitutional: Positive for fatigue. Negative for activity change, fever and unexpected weight change.   HENT: Negative for congestion, ear pain, hearing loss, rhinorrhea and sore throat.    Eyes: Negative for redness and visual disturbance.   Respiratory: Negative for cough, shortness of breath and wheezing.    Cardiovascular: Negative for chest pain, palpitations and leg swelling.   Gastrointestinal: Positive for diarrhea. Negative for abdominal pain, blood in stool, constipation, nausea and vomiting.   Genitourinary: Negative for dysuria, frequency and urgency.   Musculoskeletal: Negative for back pain, joint swelling and neck pain.   Skin: Negative for color change, rash and wound.   Neurological: Negative for dizziness, tremors, weakness, light-headedness and headaches.     Objective:     Vital Signs (Most Recent):  Temp: 96.7 °F (35.9 °C) (02/01/22 0715)  Pulse: (!) 58 (02/01/22 1218)  Resp: 18 (02/01/22 1218)  BP: 103/62 (02/01/22 1100)  SpO2: 100 % (02/01/22 1218) Vital Signs (24h Range):  Temp:  [96.7 °F (35.9 °C)-98 °F (36.7 °C)] 96.7 °F (35.9 °C)  Pulse:  [] 58  Resp:  [13-26] 18  SpO2:  [91 %-100 %] 100 %  BP: ()/(54-94) 103/62     Weight: (S) 88.2 kg (194 lb 7.1 oz)  Body mass index is 32.36 kg/m².    Physical Exam  Vitals reviewed.    Constitutional:       General: She is not in acute distress.     Appearance: She is well-developed and well-nourished.   HENT:      Head: Normocephalic and atraumatic.      Right Ear: External ear normal.      Left Ear: External ear normal.   Eyes:      General:         Right eye: No discharge.         Left eye: No discharge.      Extraocular Movements: EOM normal.   Neck:      Thyroid: No thyromegaly.   Cardiovascular:      Rate and Rhythm: Regular rhythm. Bradycardia present.      Heart sounds: No murmur heard.      Pulmonary:      Effort: Pulmonary effort is normal. No respiratory distress.      Breath sounds: Normal breath sounds. No wheezing or rales.   Abdominal:      General: Bowel sounds are normal. There is no distension.      Palpations: Abdomen is soft.      Tenderness: There is no abdominal tenderness.   Musculoskeletal:      Cervical back: Neck supple.   Lymphadenopathy:      Cervical: No cervical adenopathy.   Skin:     General: Skin is warm and dry.   Neurological:      Mental Status: She is alert and oriented to person, place, and time.      Cranial Nerves: No cranial nerve deficit.   Psychiatric:         Mood and Affect: Mood and affect normal.         Behavior: Behavior normal.         Thought Content: Thought content normal.           CRANIAL NERVES     CN III, IV, VI   Extraocular motions are normal.        Significant Labs:   All pertinent labs within the past 24 hours have been reviewed.  CBC:   Recent Labs   Lab 01/31/22  1017 02/01/22 0318   WBC 7.49 7.09   HGB 15.0 13.3   HCT 45.9 40.9    249     CMP:   Recent Labs   Lab 01/31/22  1017 02/01/22 0318    140   K 3.3* 3.5   * 113*   CO2 14* 15*   * 115*   BUN 19 14   CREATININE 0.8 0.7   CALCIUM 8.8 7.7*   PROT 7.5 6.2   ALBUMIN 3.9 3.3*   BILITOT 1.6* 1.3*   ALKPHOS 84 70   AST 22 19   ALT 15 14   ANIONGAP 15 12   EGFRNONAA >60 >60     Troponin:   Recent Labs   Lab 01/31/22  1017 01/31/22  1606 02/01/22 0318    TROPONINI <0.006 <0.006 0.012     Urine Studies:   Recent Labs   Lab 01/31/22  1516   COLORU Yellow   APPEARANCEUA Clear   PHUR 6.0   SPECGRAV 1.020   PROTEINUA Negative   GLUCUA Negative   KETONESU Negative   BILIRUBINUA Negative   OCCULTUA 2+*   NITRITE Positive*   UROBILINOGEN Negative   LEUKOCYTESUR Negative   RBCUA 4   WBCUA 13*   BACTERIA Many*   SQUAMEPITHEL 4   HYALINECASTS 2*       Significant Imaging: I have reviewed all pertinent imaging results/findings within the past 24 hours.

## 2022-02-01 NOTE — PLAN OF CARE
"Patient admitted to CCU 2 from ED. Heart rhythm and and rate upon arrival Afib 80's-150's metoprolol and IV fluids given as ordered. Converted to Sinus bradycardia with ST depression rate 56. Stat EKG and troponin level done notified Dr. Carrizales of results, no new orders rec'd. Patient tolerating well, with periods of tachycardia and SOB upon exertion stating, "I feel like I just ran a marathon". She has also had 3 episodes of dark colored diarrhea. No acute distress noted at this time, will continue to monitor.  "

## 2022-02-01 NOTE — ASSESSMENT & PLAN NOTE
Admitted on remdesivir and dexamethasone  She has no oxygen requirement and no COVID lung changes on imaging  Will continue today and monitor but admission seems to have been for a-fib RVR and not for COVID pneumonia/hypoxia. Suspect can d/c home tomorrow from COVID standpoint if cardiac remains stable. >5 days since diagnosis so no paxlovid outpatient

## 2022-02-01 NOTE — ED NOTES
AAOX4. Patient lying in bed. Verbal. Cardiac monitor, blood pressure and pulse ox. RA. Afib on cardiac monitor with controlled rate. Bilateral 22 G in hands. Saline locked. No swelling or redness noted. Patient request food. Diet tray. Update on bed assignment.

## 2022-02-01 NOTE — NURSING
"Dr Hinkle at bedside, updated MD on vitals, status, condition, ST depression, "8/10 chest pain," "5/10" abdomen pain, new orders noted, per MD "Okay for Digoxin and Metoprolol this AM as ordered per Dr Moreno." Will continue to monitor and update MD as needed  "
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
## 2022-02-01 NOTE — NURSING
Called and updated Dr Hinkle on vitals, status, condition, new orders noted, MD aware Toprol PO was held due to hypotension (see MAR), no nausea or vomiting at this time. Will continue to monitor and update MD as needed.

## 2022-02-01 NOTE — EICU
eICU Brief Note    Issue: 71 y old woman with cough, Atrial fibrillation with rapid ventricular rate and COVID admitted to ICU  Started on remdesivir, steroid  Had diarrhea and abdominal pain and diagnosed with diverticulitis on CT  Started on antibiotics  LA 1.8  Also on warfarin for atrial fibrillation   INR low 1.1  Low K,Mg    Plan :  Monitor lytes  COVID -monitor oxygenation  Monitor rate-got digoxin earlier  On enoxaparin full dose  On beta blocker   EKG ST-T changes ; seen by cardiology; follow troponin -prior low EF     D/w RN

## 2022-02-02 ENCOUNTER — PATIENT MESSAGE (OUTPATIENT)
Dept: ADMINISTRATIVE | Facility: CLINIC | Age: 72
End: 2022-02-02
Payer: COMMERCIAL

## 2022-02-02 VITALS
HEIGHT: 65 IN | HEART RATE: 70 BPM | TEMPERATURE: 98 F | WEIGHT: 194.44 LBS | SYSTOLIC BLOOD PRESSURE: 112 MMHG | DIASTOLIC BLOOD PRESSURE: 68 MMHG | BODY MASS INDEX: 32.4 KG/M2 | RESPIRATION RATE: 18 BRPM | OXYGEN SATURATION: 97 %

## 2022-02-02 LAB
ALBUMIN SERPL BCP-MCNC: 3.4 G/DL (ref 3.5–5.2)
ALP SERPL-CCNC: 62 U/L (ref 55–135)
ALT SERPL W/O P-5'-P-CCNC: 15 U/L (ref 10–44)
ANION GAP SERPL CALC-SCNC: 9 MMOL/L (ref 8–16)
AST SERPL-CCNC: 18 U/L (ref 10–40)
BASOPHILS # BLD AUTO: 0 K/UL (ref 0–0.2)
BASOPHILS NFR BLD: 0 % (ref 0–1.9)
BILIRUB SERPL-MCNC: 1.2 MG/DL (ref 0.1–1)
BUN SERPL-MCNC: 19 MG/DL (ref 8–23)
CALCIUM SERPL-MCNC: 8 MG/DL (ref 8.7–10.5)
CHLORIDE SERPL-SCNC: 116 MMOL/L (ref 95–110)
CO2 SERPL-SCNC: 16 MMOL/L (ref 23–29)
CREAT SERPL-MCNC: 0.7 MG/DL (ref 0.5–1.4)
DIFFERENTIAL METHOD: ABNORMAL
EOSINOPHIL # BLD AUTO: 0 K/UL (ref 0–0.5)
EOSINOPHIL NFR BLD: 0 % (ref 0–8)
ERYTHROCYTE [DISTWIDTH] IN BLOOD BY AUTOMATED COUNT: 13.9 % (ref 11.5–14.5)
EST. GFR  (AFRICAN AMERICAN): >60 ML/MIN/1.73 M^2
EST. GFR  (NON AFRICAN AMERICAN): >60 ML/MIN/1.73 M^2
GLUCOSE SERPL-MCNC: 137 MG/DL (ref 70–110)
HCT VFR BLD AUTO: 36.4 % (ref 37–48.5)
HGB BLD-MCNC: 11.8 G/DL (ref 12–16)
IMM GRANULOCYTES # BLD AUTO: 0.01 K/UL (ref 0–0.04)
IMM GRANULOCYTES NFR BLD AUTO: 0.2 % (ref 0–0.5)
INR PPP: 1.2 (ref 0.8–1.2)
INR PPP: 1.2 (ref 0.8–1.2)
LYMPHOCYTES # BLD AUTO: 1.4 K/UL (ref 1–4.8)
LYMPHOCYTES NFR BLD: 28.7 % (ref 18–48)
MAGNESIUM SERPL-MCNC: 1.8 MG/DL (ref 1.6–2.6)
MCH RBC QN AUTO: 28.3 PG (ref 27–31)
MCHC RBC AUTO-ENTMCNC: 32.4 G/DL (ref 32–36)
MCV RBC AUTO: 87 FL (ref 82–98)
MONOCYTES # BLD AUTO: 0.7 K/UL (ref 0.3–1)
MONOCYTES NFR BLD: 13.3 % (ref 4–15)
NEUTROPHILS # BLD AUTO: 2.9 K/UL (ref 1.8–7.7)
NEUTROPHILS NFR BLD: 57.8 % (ref 38–73)
NRBC BLD-RTO: 0 /100 WBC
PLATELET # BLD AUTO: 228 K/UL (ref 150–450)
PMV BLD AUTO: 10.5 FL (ref 9.2–12.9)
POTASSIUM SERPL-SCNC: 3.8 MMOL/L (ref 3.5–5.1)
PROT SERPL-MCNC: 6.3 G/DL (ref 6–8.4)
PROTHROMBIN TIME: 12.8 SEC (ref 9–12.5)
PROTHROMBIN TIME: 12.8 SEC (ref 9–12.5)
RBC # BLD AUTO: 4.17 M/UL (ref 4–5.4)
SODIUM SERPL-SCNC: 141 MMOL/L (ref 136–145)
WBC # BLD AUTO: 4.98 K/UL (ref 3.9–12.7)

## 2022-02-02 PROCEDURE — 99217 PR OBSERVATION CARE DISCHARGE: ICD-10-PCS | Mod: ,,, | Performed by: FAMILY MEDICINE

## 2022-02-02 PROCEDURE — 83735 ASSAY OF MAGNESIUM: CPT | Performed by: INTERNAL MEDICINE

## 2022-02-02 PROCEDURE — 96372 THER/PROPH/DIAG INJ SC/IM: CPT | Performed by: SURGERY

## 2022-02-02 PROCEDURE — S0030 INJECTION, METRONIDAZOLE: HCPCS | Performed by: INTERNAL MEDICINE

## 2022-02-02 PROCEDURE — 96361 HYDRATE IV INFUSION ADD-ON: CPT

## 2022-02-02 PROCEDURE — 85025 COMPLETE CBC W/AUTO DIFF WBC: CPT | Performed by: INTERNAL MEDICINE

## 2022-02-02 PROCEDURE — 36415 COLL VENOUS BLD VENIPUNCTURE: CPT | Performed by: NURSE PRACTITIONER

## 2022-02-02 PROCEDURE — 85610 PROTHROMBIN TIME: CPT | Performed by: NURSE PRACTITIONER

## 2022-02-02 PROCEDURE — 25000003 PHARM REV CODE 250: Performed by: INTERNAL MEDICINE

## 2022-02-02 PROCEDURE — 96372 THER/PROPH/DIAG INJ SC/IM: CPT | Mod: 59

## 2022-02-02 PROCEDURE — 99217 PR OBSERVATION CARE DISCHARGE: CPT | Mod: ,,, | Performed by: FAMILY MEDICINE

## 2022-02-02 PROCEDURE — G0378 HOSPITAL OBSERVATION PER HR: HCPCS

## 2022-02-02 PROCEDURE — 25000003 PHARM REV CODE 250: Performed by: SURGERY

## 2022-02-02 PROCEDURE — 63600175 PHARM REV CODE 636 W HCPCS: Performed by: SURGERY

## 2022-02-02 PROCEDURE — 80053 COMPREHEN METABOLIC PANEL: CPT | Performed by: INTERNAL MEDICINE

## 2022-02-02 PROCEDURE — 96366 THER/PROPH/DIAG IV INF ADDON: CPT

## 2022-02-02 RX ORDER — CIPROFLOXACIN 250 MG/1
250 TABLET, FILM COATED ORAL 2 TIMES DAILY
Qty: 10 TABLET | Refills: 0 | Status: SHIPPED | OUTPATIENT
Start: 2022-02-02 | End: 2022-02-09

## 2022-02-02 RX ORDER — METOPROLOL SUCCINATE 50 MG/1
50 TABLET, EXTENDED RELEASE ORAL 2 TIMES DAILY
Qty: 60 TABLET | Refills: 11 | Status: ON HOLD | OUTPATIENT
Start: 2022-02-02 | End: 2022-03-17 | Stop reason: SDUPTHER

## 2022-02-02 RX ORDER — DIGOXIN 125 MCG
0.12 TABLET ORAL DAILY
Qty: 30 TABLET | Refills: 11 | Status: SHIPPED | OUTPATIENT
Start: 2022-02-03 | End: 2022-03-17

## 2022-02-02 RX ORDER — METRONIDAZOLE 500 MG/1
500 TABLET ORAL EVERY 12 HOURS
Qty: 10 TABLET | Refills: 0 | Status: SHIPPED | OUTPATIENT
Start: 2022-02-02 | End: 2022-02-09

## 2022-02-02 RX ORDER — ASPIRIN 81 MG/1
81 TABLET ORAL DAILY
Qty: 30 TABLET | Refills: 5 | Status: SHIPPED | OUTPATIENT
Start: 2022-02-03 | End: 2023-02-28

## 2022-02-02 RX ADMIN — SODIUM CHLORIDE: 0.9 INJECTION, SOLUTION INTRAVENOUS at 02:02

## 2022-02-02 RX ADMIN — METOPROLOL SUCCINATE 50 MG: 50 TABLET, EXTENDED RELEASE ORAL at 08:02

## 2022-02-02 RX ADMIN — METRONIDAZOLE 500 MG: 500 INJECTION, SOLUTION INTRAVENOUS at 12:02

## 2022-02-02 RX ADMIN — REMDESIVIR 100 MG: 100 INJECTION, POWDER, LYOPHILIZED, FOR SOLUTION INTRAVENOUS at 08:02

## 2022-02-02 RX ADMIN — DIGOXIN 0.12 MG: 125 TABLET ORAL at 08:02

## 2022-02-02 RX ADMIN — WARFARIN SODIUM 1 MG: 1 TABLET ORAL at 04:02

## 2022-02-02 RX ADMIN — ASPIRIN 81 MG: 81 TABLET, COATED ORAL at 08:02

## 2022-02-02 RX ADMIN — METRONIDAZOLE 500 MG: 500 INJECTION, SOLUTION INTRAVENOUS at 04:02

## 2022-02-02 RX ADMIN — ENOXAPARIN SODIUM 90 MG: 100 INJECTION, SOLUTION INTRAVENOUS; SUBCUTANEOUS at 04:02

## 2022-02-02 RX ADMIN — DEXAMETHASONE SODIUM PHOSPHATE 6 MG: 4 INJECTION, SOLUTION INTRAMUSCULAR; INTRAVENOUS at 08:02

## 2022-02-02 RX ADMIN — ENOXAPARIN SODIUM 90 MG: 100 INJECTION, SOLUTION INTRAVENOUS; SUBCUTANEOUS at 05:02

## 2022-02-02 RX ADMIN — CIPROFLOXACIN 400 MG: 2 INJECTION, SOLUTION INTRAVENOUS at 09:02

## 2022-02-02 NOTE — PLAN OF CARE
[facility] - Intensive Care  Discharge Final Note    Primary Care Provider: Neeru Hinkle MD    Expected Discharge Date: 2/2/2022    Final Discharge Note (most recent)     Final Note - 02/02/22 1401        Final Note    Assessment Type Final Discharge Note     Anticipated Discharge Disposition Home or Self Care     Hospital Resources/Appts/Education Provided Appointments scheduled and added to AVS        Post-Acute Status    Post-Acute Authorization Other     Other Status No Post-Acute Service Needs     Discharge Delays None known at this time                 Important Message from Medicare             Contact Info     Neeru Hinkle MD   Specialty: Internal Medicine   Relationship: PCP - General    [address] [place] [address]  [place] [address]   Phone: [phone]       Next Steps: Go on 2/8/2022    Instructions: Hospital Follow-up at 1:45pm     Steve Moreno MD   Specialty: Cardiology    [address] [place] [address]  [place] [address]   Phone: [phone]       Next Steps: Follow up in 1 week(s)
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
## 2022-02-02 NOTE — PROGRESS NOTES
[facility] Medicine  Progress Note    Patient Name: Celine Sinclair  MRN: [id number]  Patient Class: OP- Observation   Admission Date: 1/31/2022  Length of Stay: 0 days  Attending Physician: Jared Tiwari MD  Primary Care Provider: Neeru Hinkle MD        Subjective:     Principal Problem:Atrial fibrillation with RVR        HPI:  Patient presented to ER with cough, dizziness, and diarrhea. She was diagnosed with COVID 1/27/22 and discharged home. Since then has felt more weak, dizziness, and increasing diarrhea so returned to ER. Found to be in a-fib RVR. Treated with digoxin and metoprolol and converted to sinus bradycardia. Admitted to ICU for HR monitoring. Has remains sinus padmini in 50s with few PACs on telemetry. She has sats 98% on RA. Was started on remdesivir for COVID treatment. CXR without pneumonia.CT abdomen and pelvis done for diarrhea shows changes consistent with infectious vs inflammatory colitis; she has h/o Crohn's disease. Also noted probable liver hemangioma needing MRI liver with and without contrast.     Of note she has h/o a-fib and antiphospholipid syndrome for which she was treated with coumadin. INR subtherapeutic. States she was taking but has h/o no-compliance.       Overview/Hospital Course:  Patient admitted for atrial fibrillation with RVR and also positive for COVID-19.  She is triple vaccinated.  Patient received her 3rd dose of REM does severe.  Her heart rate is well controlled on digoxin and metoprolol.  She is now in normal sinus rhythm.  She still having diarrhea and fatigue but overall she is feeling well enough to go home.      Interval History: doing better    Review of Systems   Constitutional: Positive for fatigue. Negative for activity change, chills, fever and unexpected weight change.   HENT: Negative for sore throat and trouble swallowing.    Respiratory: Negative for cough, chest tightness and shortness of breath.    Cardiovascular:  Negative for chest pain and leg swelling.   Gastrointestinal: Positive for diarrhea and nausea. Negative for abdominal pain.   Endocrine: Negative for cold intolerance and heat intolerance.   Genitourinary: Negative for difficulty urinating.   Musculoskeletal: Negative for back pain and joint swelling.   Skin: Negative for rash.   Neurological: Positive for weakness. Negative for numbness.   Hematological: Negative for adenopathy.   Psychiatric/Behavioral: Negative for behavioral problems and decreased concentration. The patient is not nervous/anxious.      Objective:     Vital Signs (Most Recent):  Temp: 98 °F (36.7 °C) (02/02/22 0715)  Pulse: 64 (02/02/22 1100)  Resp: 16 (02/02/22 1100)  BP: 121/72 (02/02/22 1100)  SpO2: 99 % (02/02/22 1100) Vital Signs (24h Range):  Temp:  [96.6 °F (35.9 °C)-98 °F (36.7 °C)] 98 °F (36.7 °C)  Pulse:  [55-81] 64  Resp:  [13-26] 16  SpO2:  [95 %-100 %] 99 %  BP: ()/(55-83) 121/72     Weight: (S) 88.2 kg (194 lb 7.1 oz)  Body mass index is 32.36 kg/m².    Intake/Output Summary (Last 24 hours) at 2/2/2022 1122  Last data filed at 2/2/2022 0631  Gross per 24 hour   Intake 3166.83 ml   Output --   Net 3166.83 ml      Physical Exam  Constitutional:       Appearance: Normal appearance. She is obese.   HENT:      Mouth/Throat:      Mouth: Mucous membranes are dry.   Cardiovascular:      Rate and Rhythm: Normal rate and regular rhythm.      Pulses: Normal pulses.      Heart sounds: No murmur heard.  No friction rub. No gallop.    Pulmonary:      Effort: Pulmonary effort is normal.      Breath sounds: Normal breath sounds.   Abdominal:      General: Abdomen is flat.      Tenderness: There is no abdominal tenderness.   Musculoskeletal:      Cervical back: Normal range of motion and neck supple.      Right lower leg: No edema.      Left lower leg: No edema.   Neurological:      General: No focal deficit present.      Mental Status: She is alert and oriented to person, place, and time.       Cranial Nerves: No cranial nerve deficit.   Psychiatric:         Mood and Affect: Mood normal.         Behavior: Behavior normal.         Thought Content: Thought content normal.         Judgment: Judgment normal.         Significant Labs:   All pertinent labs within the past 24 hours have been reviewed.  ABGs: No results for input(s): PH, PCO2, HCO3, POCSATURATED, BE, TOTALHB, COHB, METHB, O2HB, POCFIO2, PO2 in the last 48 hours.  CBC:   Recent Labs   Lab 02/01/22 0318 02/02/22  0436   WBC 7.09 4.98   HGB 13.3 11.8*   HCT 40.9 36.4*    228     CMP:   Recent Labs   Lab 02/01/22 0318 02/02/22  0436    141   K 3.5 3.8   * 116*   CO2 15* 16*   * 137*   BUN 14 19   CREATININE 0.7 0.7   CALCIUM 7.7* 8.0*   PROT 6.2 6.3   ALBUMIN 3.3* 3.4*   BILITOT 1.3* 1.2*   ALKPHOS 70 62   AST 19 18   ALT 14 15   ANIONGAP 12 9   EGFRNONAA >60 >60     Cardiac Markers: No results for input(s): CKMB, MYOGLOBIN, BNP, TROPISTAT in the last 48 hours.  Coagulation:   Recent Labs   Lab 02/02/22  0436   INR 1.2  1.2     Troponin:   Recent Labs   Lab 01/31/22  1606 02/01/22  0318   TROPONINI <0.006 0.012     TSH:   Recent Labs   Lab 02/01/22  0023   TSH 2.429     Urine Studies:   Recent Labs   Lab 01/31/22  1516   COLORU Yellow   APPEARANCEUA Clear   PHUR 6.0   SPECGRAV 1.020   PROTEINUA Negative   GLUCUA Negative   KETONESU Negative   BILIRUBINUA Negative   OCCULTUA 2+*   NITRITE Positive*   UROBILINOGEN Negative   LEUKOCYTESUR Negative   RBCUA 4   WBCUA 13*   BACTERIA Many*   SQUAMEPITHEL 4   HYALINECASTS 2*       Significant Imaging: I have reviewed all pertinent imaging results/findings within the past 24 hours.  I have reviewed and interpreted all pertinent imaging results/findings within the past 24 hours.     CXR ok      Assessment/Plan:      * Atrial fibrillation with RVR  A-fib RVR on arrival  CIS following and giving digoxin and metoprolol  Converted to sinus padmini with few PACs on telemetry. CIS aware  and want to continue current treatment  lovenox bridge to coumadin, INR sub therapeutic. She will start Eliquis for better long term management    F/u with cardiology    COVID-19  Admitted on remdesivir and dexamethasone.  She has received 3 doses of remdesivir so will discharge home.  She has no oxygen requirement and no COVID lung changes on imaging        Liver hemangioma  Possible hemangioma noted on CT scan  MRI liver with and without recommended. Likely d/c home tomorrow if remains satble so can be done outpatient.       Crohn's disease  CT changes infectious vs inflammatory bowel disease  Has not had recent GI follow up  Son home on oral Flagyl 500 mg p.o. q.12 hours x 5 days and Cipro 250 mg p.o. b.i.d for 5 days.. This could be Crohn's flare however (no on biologic therapy outpatient) and may benefit from steroids???? On steroids for COVID right now. Would likely d/c with short course antibx and close GI follow up for this with repeat C-scope needed      Antiphospholipid syndrome  INR sub therapeutic  Discontinue Coumadin and Lovenox  Sent home on eliquis       Fibromyalgia  Chronic pain  PRN tyelnol, can add tramadol if needed      Essential hypertension  Home amlodipine was stopped  Continue metoprolol 50 mg p.o. b.i.d.        VTE Risk Mitigation (From admission, onward)         Ordered     enoxaparin injection 90 mg  Every 12 hours (non-standard times)         01/31/22 1646     warfarin (COUMADIN) tablet 1 mg  Daily         01/31/22 1646                Discharge Planning   ORALIA:      Code Status: Full Code   Is the patient medically ready for discharge?:     Reason for patient still in hospital (select all that apply): Patient trending condition and Treatment  Discharge Plan A: Home with family            Critical care time spent on the evaluation and treatment of severe organ dysfunction, review of pertinent labs and imaging studies, discussions with consulting providers and discussions with  patient/family: 25 minutes.      Jared Tiwari MD  Department of Hospital Medicine   [facility] - Intensive Care

## 2022-02-02 NOTE — SUBJECTIVE & OBJECTIVE
Interval History: doing better    Review of Systems   Constitutional: Positive for fatigue. Negative for activity change, chills, fever and unexpected weight change.   HENT: Negative for sore throat and trouble swallowing.    Respiratory: Negative for cough, chest tightness and shortness of breath.    Cardiovascular: Negative for chest pain and leg swelling.   Gastrointestinal: Positive for diarrhea and nausea. Negative for abdominal pain.   Endocrine: Negative for cold intolerance and heat intolerance.   Genitourinary: Negative for difficulty urinating.   Musculoskeletal: Negative for back pain and joint swelling.   Skin: Negative for rash.   Neurological: Positive for weakness. Negative for numbness.   Hematological: Negative for adenopathy.   Psychiatric/Behavioral: Negative for behavioral problems and decreased concentration. The patient is not nervous/anxious.      Objective:     Vital Signs (Most Recent):  Temp: 98 °F (36.7 °C) (02/02/22 0715)  Pulse: 64 (02/02/22 1100)  Resp: 16 (02/02/22 1100)  BP: 121/72 (02/02/22 1100)  SpO2: 99 % (02/02/22 1100) Vital Signs (24h Range):  Temp:  [96.6 °F (35.9 °C)-98 °F (36.7 °C)] 98 °F (36.7 °C)  Pulse:  [55-81] 64  Resp:  [13-26] 16  SpO2:  [95 %-100 %] 99 %  BP: ()/(55-83) 121/72     Weight: (S) 88.2 kg (194 lb 7.1 oz)  Body mass index is 32.36 kg/m².    Intake/Output Summary (Last 24 hours) at 2/2/2022 1122  Last data filed at 2/2/2022 0631  Gross per 24 hour   Intake 3166.83 ml   Output --   Net 3166.83 ml      Physical Exam  Constitutional:       Appearance: Normal appearance. She is obese.   HENT:      Mouth/Throat:      Mouth: Mucous membranes are dry.   Cardiovascular:      Rate and Rhythm: Normal rate and regular rhythm.      Pulses: Normal pulses.      Heart sounds: No murmur heard.  No friction rub. No gallop.    Pulmonary:      Effort: Pulmonary effort is normal.      Breath sounds: Normal breath sounds.   Abdominal:      General: Abdomen is flat.       Tenderness: There is no abdominal tenderness.   Musculoskeletal:      Cervical back: Normal range of motion and neck supple.      Right lower leg: No edema.      Left lower leg: No edema.   Neurological:      General: No focal deficit present.      Mental Status: She is alert and oriented to person, place, and time.      Cranial Nerves: No cranial nerve deficit.   Psychiatric:         Mood and Affect: Mood normal.         Behavior: Behavior normal.         Thought Content: Thought content normal.         Judgment: Judgment normal.         Significant Labs:   All pertinent labs within the past 24 hours have been reviewed.  ABGs: No results for input(s): PH, PCO2, HCO3, POCSATURATED, BE, TOTALHB, COHB, METHB, O2HB, POCFIO2, PO2 in the last 48 hours.  CBC:   Recent Labs   Lab 02/01/22 0318 02/02/22  0436   WBC 7.09 4.98   HGB 13.3 11.8*   HCT 40.9 36.4*    228     CMP:   Recent Labs   Lab 02/01/22 0318 02/02/22  0436    141   K 3.5 3.8   * 116*   CO2 15* 16*   * 137*   BUN 14 19   CREATININE 0.7 0.7   CALCIUM 7.7* 8.0*   PROT 6.2 6.3   ALBUMIN 3.3* 3.4*   BILITOT 1.3* 1.2*   ALKPHOS 70 62   AST 19 18   ALT 14 15   ANIONGAP 12 9   EGFRNONAA >60 >60     Cardiac Markers: No results for input(s): CKMB, MYOGLOBIN, BNP, TROPISTAT in the last 48 hours.  Coagulation:   Recent Labs   Lab 02/02/22  0436   INR 1.2  1.2     Troponin:   Recent Labs   Lab 01/31/22  1606 02/01/22  0318   TROPONINI <0.006 0.012     TSH:   Recent Labs   Lab 02/01/22  0023   TSH 2.429     Urine Studies:   Recent Labs   Lab 01/31/22  1516   COLORU Yellow   APPEARANCEUA Clear   PHUR 6.0   SPECGRAV 1.020   PROTEINUA Negative   GLUCUA Negative   KETONESU Negative   BILIRUBINUA Negative   OCCULTUA 2+*   NITRITE Positive*   UROBILINOGEN Negative   LEUKOCYTESUR Negative   RBCUA 4   WBCUA 13*   BACTERIA Many*   SQUAMEPITHEL 4   HYALINECASTS 2*       Significant Imaging: I have reviewed all pertinent imaging results/findings within  the past 24 hours.  I have reviewed and interpreted all pertinent imaging results/findings within the past 24 hours.     CXR ok

## 2022-02-02 NOTE — ASSESSMENT & PLAN NOTE
A-fib RVR on arrival  CIS following and giving digoxin and metoprolol  Converted to sinus padmini with few PACs on telemetry. CIS aware and want to continue current treatment  lovenox bridge to coumadin, INR sub therapeutic. She will start Eliquis for better long term management    F/u with cardiology

## 2022-02-02 NOTE — DISCHARGE SUMMARY
[facility] Medicine  Discharge Summary      Patient Name: Celine Sinclair  MRN: [id number]  Patient Class: OP- Observation  Admission Date: 1/31/2022  Hospital Length of Stay: 0 days  Discharge Date and Time:  02/02/2022 11:50 AM  Attending Physician: Jared Tiwari MD   Discharging Provider: Jared Tiwari MD  Primary Care Provider: Neeru Hinkle MD      HPI:   Patient presented to ER with cough, dizziness, and diarrhea. She was diagnosed with COVID 1/27/22 and discharged home. Since then has felt more weak, dizziness, and increasing diarrhea so returned to ER. Found to be in a-fib RVR. Treated with digoxin and metoprolol and converted to sinus bradycardia. Admitted to ICU for HR monitoring. Has remains sinus padmini in 50s with few PACs on telemetry. She has sats 98% on RA. Was started on remdesivir for COVID treatment. CXR without pneumonia.CT abdomen and pelvis done for diarrhea shows changes consistent with infectious vs inflammatory colitis; she has h/o Crohn's disease. Also noted probable liver hemangioma needing MRI liver with and without contrast.     Of note she has h/o a-fib and antiphospholipid syndrome for which she was treated with coumadin. INR subtherapeutic. States she was taking but has h/o no-compliance.       * No surgery found *      Hospital Course:   Patient admitted for atrial fibrillation with RVR and also positive for COVID-19.  She is triple vaccinated.  Patient received her 3rd dose of REM does severe.  Her heart rate is well controlled on digoxin and metoprolol.  She is now in normal sinus rhythm.  She still having diarrhea and fatigue but overall she is feeling well enough to go home.       Goals of Care Treatment Preferences:  Code Status: Full Code      Consults:   Consults (From admission, onward)        Status Ordering Provider     Inpatient consult to Social Work/Case Management  Once        Provider:  (Not yet assigned)    Completed JIMY  EMMA CASTRO     Inpatient consult to Social Work  Once        Provider:  (Not yet assigned)    Completed STEVE PORTILLO     Inpatient consult to Cardiology-[facility]  Once        Provider:  Steve Portillo MD    Acknowledged TIFFANIE REDD          * Atrial fibrillation with RVR  A-fib RVR on arrival  CIS following and giving digoxin and metoprolol  Converted to sinus padmini with few PACs on telemetry. CIS aware and want to continue current treatment  lovenox bridge to coumadin, INR sub therapeutic. She will start Eliquis for better long term management    F/u with cardiology    COVID-19  Admitted on remdesivir and dexamethasone.  She has received 3 doses of remdesivir so will discharge home.  She has no oxygen requirement and no COVID lung changes on imaging        Liver hemangioma  Possible hemangioma noted on CT scan  MRI liver with and without recommended. Likely d/c home tomorrow if remains satble so can be done outpatient.       Crohn's disease  CT changes infectious vs inflammatory bowel disease  Has not had recent GI follow up  Son home on oral Flagyl 500 mg p.o. q.12 hours x 5 days and Cipro 250 mg p.o. b.i.d for 5 days.. This could be Crohn's flare however (no on biologic therapy outpatient) and may benefit from steroids???? On steroids for COVID right now. Would likely d/c with short course antibx and close GI follow up for this with repeat C-scope needed      Antiphospholipid syndrome  INR sub therapeutic  Discontinue Coumadin and Lovenox  Sent home on eliquis       Fibromyalgia  Chronic pain  PRN tyelnol, can add tramadol if needed      Essential hypertension  Home amlodipine was stopped  Continue metoprolol 50 mg p.o. b.i.d.        Final Active Diagnoses:    Diagnosis Date Noted POA    PRINCIPAL PROBLEM:  Atrial fibrillation with RVR [I48.91] 02/01/2022 Yes    Liver hemangioma [D18.03] 02/01/2022 Yes    COVID-19 [U07.1] 02/01/2022 Yes    Crohn's disease [K50.90] 10/12/2020 Yes    Antiphospholipid syndrome  [D68.61] 09/30/2020 Yes    Fibromyalgia [M79.7] 06/18/2019 Yes    Essential hypertension [I10] 02/08/2017 Yes      Problems Resolved During this Admission:       Discharged Condition: good    Disposition: Home or Self Care    Follow Up:   Follow-up Information     Neeru Hinkle MD In 1 week.    Specialty: Internal Medicine  Contact information:  [address] [facility] [address]  [place] [address]  [phone]             Steve Moreno MD In 1 week.    Specialty: Cardiology  Contact information:  [address] [place] [address]  [phone]                       Patient Instructions:      COVID-19 Surveillance Program     Order Specific Question Answer Comments   Does patient have a smartphone? Yes    Does patient have the [facility] meenu on their smartphone? Yes    While in surveillance program, will patient be using home oxygen? Yes        Significant Diagnostic Studies: Labs:   BMP:   Recent Labs   Lab 02/01/22 0318 02/02/22  0436   * 137*    141   K 3.5 3.8   * 116*   CO2 15* 16*   BUN 14 19   CREATININE 0.7 0.7   CALCIUM 7.7* 8.0*   MG 1.8 1.8   , CBC   Recent Labs   Lab 02/01/22 0318 02/01/22 0318 02/02/22  0436   WBC 7.09  --  4.98   HGB 13.3  --  11.8*   HCT 40.9   < > 36.4*     --  228    < > = values in this interval not displayed.   , INR   Lab Results   Component Value Date    INR 1.2 02/02/2022    INR 1.2 02/02/2022    INR 1.1 02/01/2022    and Troponin   Recent Labs   Lab 01/31/22  1017 01/31/22  1606 02/01/22 0318   TROPONINI <0.006 <0.006 0.012       Pending Diagnostic Studies:     None         Medications:  Reconciled Home Medications:      Medication List      START taking these medications    aspirin 81 MG EC tablet  Commonly known as: ECOTRIN  Take 1 tablet (81 mg total) by mouth once daily.  Start taking on: February 3, 2022     ciprofloxacin HCl 250 MG tablet  Commonly known as: CIPRO  Take 1 tablet (250 mg total) by mouth 2 (two) times daily.     digoxin 125 mcg  tablet  Commonly known as: LANOXIN  Take 1 tablet (0.125 mg total) by mouth once daily.  Start taking on: February 3, 2022     ELIQUIS 5 mg Tab  Generic drug: apixaban  Take 1 tablet (5 mg total) by mouth 2 (two) times daily.     metroNIDAZOLE 500 MG tablet  Commonly known as: FLAGYL  Take 1 tablet (500 mg total) by mouth every 12 (twelve) hours.     pulse oximeter device  Commonly known as: pulse oximeter  by Apply Externally route 2 (two) times a day. Use twice daily at 8 AM and 3 PM and record the value in [employer] as directed.        CHANGE how you take these medications    metoprolol succinate 50 MG 24 hr tablet  Commonly known as: TOPROL-XL  Take 1 tablet (50 mg total) by mouth 2 (two) times daily.  What changed:   · how much to take  · how to take this  · when to take this  · additional instructions        CONTINUE taking these medications    acetaminophen 500 MG tablet  Commonly known as: TYLENOL  Take 1 tablet (500 mg total) by mouth daily as needed for Pain.     benzonatate 100 MG capsule  Commonly known as: TESSALON  Take 1 capsule (100 mg total) by mouth 3 (three) times daily as needed for Cough.     BLADDER CONTROL PADS EX ABSORB MISC     diphenoxylate-atropine 2.5-0.025 mg 2.5-0.025 mg per tablet  Commonly known as: LOMOTIL  Take 1 tablet by mouth 4 (four) times daily as needed for Diarrhea. Takes 2 tablets twice a day when needed     nortriptyline 10 MG capsule  Commonly known as: PAMELOR  TAKE 1 CAPSULE (10 MG TOTAL) BY MOUTH EVERY EVENING.     traZODone 50 MG tablet  Commonly known as: DESYREL  Take 1 tablet (50 mg total) by mouth nightly as needed.        STOP taking these medications    amLODIPine 10 MG tablet  Commonly known as: NORVASC     furosemide 20 MG tablet  Commonly known as: LASIX     ibuprofen 800 MG tablet  Commonly known as: ADVIL,MOTRIN     methylPREDNISolone 4 mg tablet  Commonly known as: MEDROL DOSEPACK     tobramycin sulfate 0.3% 0.3 % ophthalmic solution  Commonly known as:  TOBREX     traMADoL 50 mg tablet  Commonly known as: ULTRAM     warfarin 1 MG tablet  Commonly known as: COUMADIN            Indwelling Lines/Drains at time of discharge:   Lines/Drains/Airways     Central Venous Catheter Line                 Port A Cath Single Lumen right subclavian -- days                Time spent on the discharge of patient: 30 minutes    Critical care time spent on the evaluation and treatment of severe organ dysfunction, review of pertinent labs and imaging studies, discussions with consulting providers and discussions with patient/family: 30 minutes.     Jared Tiwari MD  Department of Hospital Medicine  [facility] - Intensive Care

## 2022-02-02 NOTE — PLAN OF CARE
Plan of care followed by discharge instructions reviewed and agreed upon by patient. Patient Sinus Rhythm on telemetry and maintaining oxygen saturation between % on room air. Denies shortness of breath, difficulty breathing.Patient has been afebrile. Appetite remains decreased. Patient received 3rd dose of Remdesivir and antibiotics as ordered. No diarrhea reported. Patient has been independently getting up to bedside commode, denies any difficulty voiding. Patient has remained free from falls/injury.

## 2022-02-02 NOTE — NURSING
1901 Sitting up in bed watching TV. Denies difficulty breathing, chest pains, or body pain. Awake and alert times 4. Calm and cooperative. Continuous cardiac monitoring in progress. HR 67 SR with ST inversion. B/p 117/67. Sat 99% on room air. NS infusing at  75 ml/hr without difficulty to right thumb.Getting up to bedside commode and tolerating very well. MAEW. Can reposition self in bed. Discussed plan of care, medical equipment, medication, S&S to report to nurse, call button, and safety with patient. She verbalized understanding. Will continue to monitor. Side rails up times 2 and call button in reach.    2100 Awake in bed talking on phone. No distress noted. HR 68, b/p 109/65, sat 98% on room air.  Will continue to monitor. Side rails times 2 up and call button in reach.    2301 Sleeping. No distress noted. Respirations even and unlabored. HR 67 SR, b/p 121/77, and sat 98%. Will continue to monitor. Side rails times 2 up and call button in reach.    0100 Remains sleeping. Stable. NS infusing at 75 ml/hr. HR 62 SR, b/p 109/66, and sat 97%. Side rails times 2 up and call button in reach.    0300 Status unchanged. Sleeping and stable. NS infusing at 75 ml/hr. Side rails times 2 up and call button in reach.    0500 Patient got up to bedside commode as per self and tolerated very well. Voices no complaints. Assisted back to bed. Side rails times 2 up and call button in reach.

## 2022-02-02 NOTE — PLAN OF CARE
[facility] - Intensive Care  Initial Discharge Assessment       Primary Care Provider: Neeru Hinkle MD    Admission Diagnosis: Tachycardia [R00.0]  Generalized weakness [R53.1]  COVID-19 [U07.1]    Admission Date: 1/31/2022  Expected Discharge Date:     Discharge Barriers Identified: None    Payor: HEALTHY BLUE MEDICARE ADVANTAGE / Plan: HEALTHY BLUE DUAL ADVANTAGE / Product Type: Medicare Advantage /     Extended Emergency Contact Information  Primary Emergency Contact: DmitriymartinJuan  Address: [address] [place] [address] [facility]  Home Phone: [phone]  Relation: Spouse    Discharge Plan A: Home with family  Discharge Plan B: Home with family      [id number] - [place], [place] - [address]  [address]  [place] [address]  Phone: [phone] Fax: [phone]      Initial Assessment (most recent)     Adult Discharge Assessment - 02/02/22 0805        Discharge Assessment    Assessment Type Discharge Planning Assessment     Confirmed/corrected address, phone number and insurance Yes     Confirmed Demographics Correct on Facesheet     Source of Information patient     Communicated ORALIA with patient/caregiver Date not available/Unable to determine     Reason For Admission AFIB RVR     Lives With spouse     Do you expect to return to your current living situation? Yes     Do you have help at home or someone to help you manage your care at home? Yes     Who are your caregiver(s) and their phone number(s)? Juan Sinclair (Spouse) [phone]     Prior to hospitilization cognitive status: Alert/Oriented     Current cognitive status: Alert/Oriented     Walking or Climbing Stairs Difficulty ambulation difficulty, requires equipment     Mobility Management Patient utilizes a cane or rollator for ambulation when needed.     Dressing/Bathing Difficulty none     Equipment Currently Used at Home rollator;cane, straight     Readmission within 30 days? No     Patient  currently being followed by outpatient case management? No     Do you currently have service(s) that help you manage your care at home? No     Do you take prescription medications? Yes     Do you have prescription coverage? Yes     Coverage [employer] Medicare and Medicaid     Do you have any problems affording any of your prescribed medications? No     How do you get to doctors appointments? car, drives self;family or friend will provide     Are you on dialysis? No     Do you take coumadin? Yes     Discharge Plan A Home with family     Discharge Plan B Home with family     DME Needed Upon Discharge  none     Discharge Plan discussed with: Patient     Discharge Barriers Identified None                 Discharge assessment completed with patient. Denies any post-acute care needs at this time. SW to remain available.

## 2022-02-02 NOTE — EICU
"Rounding (Video Assessment):  Yes    Comments: Video assessment completed.  Pt sitting up in bed.  Noted to be AAO.  States, "I'm feeling much better today".  Noted SR on monitor at this time.  NAD noted.  "

## 2022-02-02 NOTE — ASSESSMENT & PLAN NOTE
Possible hemangioma noted on CT scan  MRI liver with and without recommended. Likely d/c home tomorrow if remains satble so can be done outpatient.

## 2022-02-02 NOTE — CONSULTS
SW consulted for assistance with pricing for discharge medications. Rounding MD sent a Rx to the [facility] who report that Eliquis for this patient would be free of charge. This information was relayed to MD and patient's nurse.

## 2022-02-02 NOTE — NURSING
Patient states her  should be on his way to pick her up. Discharged instructions reviewed with patient. Understanding voiced in regards to medications  to continue, what to stop, diet, COVID 19 instructions, and follow-up  appointments.

## 2022-02-02 NOTE — HOSPITAL COURSE
Patient admitted for atrial fibrillation with RVR and also positive for COVID-19.  She is triple vaccinated.  Patient received her 3rd dose of REM does severe.  Her heart rate is well controlled on digoxin and metoprolol.  She is now in normal sinus rhythm.  She still having diarrhea and fatigue but overall she is feeling well enough to go home.

## 2022-02-02 NOTE — ASSESSMENT & PLAN NOTE
Admitted on remdesivir and dexamethasone.  She has received 3 doses of remdesivir so will discharge home.  She has no oxygen requirement and no COVID lung changes on imaging

## 2022-02-02 NOTE — CONSULTS
"CM consulted for "advanced directive assistance."    Per chart patient is a full code.    There has been no documentation with staff or MD stating that patient would choose otherwise.     Therefore patient should remain full code.    IF discussion happens, and patient chooses to change code status, CM can be re-consulted to aid with paperwork.       Lisa Elliott RN, BSN  [facility]   Case Management/Utilization Review  [phone] (Phone)  [phone] (Fax)  ,  "

## 2022-02-02 NOTE — ASSESSMENT & PLAN NOTE
CT changes infectious vs inflammatory bowel disease  Has not had recent GI follow up  Son home on oral Flagyl 500 mg p.o. q.12 hours x 5 days and Cipro 250 mg p.o. b.i.d for 5 days.. This could be Crohn's flare however (no on biologic therapy outpatient) and may benefit from steroids???? On steroids for COVID right now. Would likely d/c with short course antibx and close GI follow up for this with repeat C-scope needed

## 2022-02-03 ENCOUNTER — ANTI-COAG VISIT (OUTPATIENT)
Dept: CARDIOLOGY | Facility: CLINIC | Age: 72
End: 2022-02-03
Payer: COMMERCIAL

## 2022-02-03 ENCOUNTER — NURSE TRIAGE (OUTPATIENT)
Dept: ADMINISTRATIVE | Facility: CLINIC | Age: 72
End: 2022-02-03
Payer: COMMERCIAL

## 2022-02-03 DIAGNOSIS — Z79.01 LONG TERM (CURRENT) USE OF ANTICOAGULANTS: ICD-10-CM

## 2022-02-03 DIAGNOSIS — D68.61 ANTIPHOSPHOLIPID SYNDROME: Primary | ICD-10-CM

## 2022-02-03 DIAGNOSIS — I48.0 PAROXYSMAL ATRIAL FIBRILLATION: ICD-10-CM

## 2022-02-03 LAB — BACTERIA UR CULT: ABNORMAL

## 2022-02-03 NOTE — PROGRESS NOTES
INR from [facility]. Pt d/c on Eliquis instead of warfarin per cardiology consult. Pt will also be following up with [facility]. We will d/c from clinic

## 2022-02-03 NOTE — NURSING
Patient's  here to bring her home. IV discontinued with catheter intact. Patient tolerated well. Dressing applied to IV site

## 2022-02-03 NOTE — TELEPHONE ENCOUNTER
Surveillance Enrollment Attempt #1:     Pt called for surveillance enrollment. Overview provided. Pt expresses interest in program but would like call back tomorrow as she is just laying down to rest. Will call pt back tomorrow.     Reason for Disposition   Information only question and nurse able to answer    Additional Information   Negative: Nursing judgment   Negative: Nursing judgment   Negative: Nursing judgment   Negative: Nursing judgment    Protocols used: INFORMATION ONLY CALL - NO TRIAGE-A-OH

## 2022-02-05 ENCOUNTER — TELEPHONE (OUTPATIENT)
Dept: HOME HEALTH SERVICES | Facility: CLINIC | Age: 72
End: 2022-02-05
Payer: COMMERCIAL

## 2022-02-05 ENCOUNTER — NURSE TRIAGE (OUTPATIENT)
Dept: ADMINISTRATIVE | Facility: CLINIC | Age: 72
End: 2022-02-05
Payer: COMMERCIAL

## 2022-02-05 LAB
BACTERIA BLD CULT: NORMAL
BACTERIA BLD CULT: NORMAL

## 2022-02-05 NOTE — TELEPHONE ENCOUNTER
Reason for Disposition   [1] MODERATE difficulty breathing (e.g., speaks in phrases, SOB even at rest, pulse 100 - 120) AND [2] NOT new-onset or worse than normal    Additional Information   Negative: SEVERE difficulty breathing (e.g., struggling for each breath, speaks in single words, pulse > 120)   Negative: Bluish (or gray) lips or face now   Negative: Difficult to awaken or acting confused (e.g., disoriented, slurred speech)   Negative: Slow, shallow and weak breathing   Negative: Sounds like a life-threatening emergency to the triager   Negative: [1] MODERATE difficulty breathing (e.g., speaks in phrases, SOB even at rest, pulse 100 - 120) AND [2] new-onset or worse than normal   Negative: [1] MODERATE difficulty breathing AND [2] oxygen level (e.g., pulse oximetry) 91 to 94 percent   Negative: Oxygen level (e.g., pulse oximetry) 90 percent or lower   Negative: Patient sounds very sick or weak to the triager    Protocols used: OXYGEN MONITORING AND [id number]

## 2022-02-05 NOTE — TELEPHONE ENCOUNTER
Called patient due to RN escalation in COVID Surveillance program. Pt escalated due to SOB.    Patient location: SOB    Vitals: Sp02 : 98%. P: 57. Temp: None reported    71 y.o. female with pertinent PMHx Afib, RA, cardiomyopathy, Depression, HTN, Crohns, Depression, antiphospholipid syndrome on day 7 of Covid symptoms. Positive Covid screen 1/27/22. CXR on 1/31. Home oxygen: no. COVID-19 Hospitalization History: 1/31/22. Received antibody infusion: no. Fully vaccinated: yes. Remdesivir treatment day: yes. SpO2 goal on hospital discharge: 94%.    HPI:  Patient escalated for SOB, was d/c from hospital on 2/2.  She reports she feels about the same as d/c.  SOB has not worsened.  She denies any CP, but endorses fatigue.  She reports hydrating throughout the day and eating the best she can.  She is able to speak in full sentences without difficulty.    ROS: Denies worsening cough, light headedness, fever, chills, diaphoresis, chest pain, abdominal pain, emesis, diarrhea or further symptoms.     Assessment: Vitals appear WNL. During phone call, patient appears alert and oriented. Able to speak in full sentences without difficulty. No audible wheezing heard during call.     Plan:  Continue plan of care at home  Strict ED precautions discussed  -Continue hydration and adequate nutrition      Reviewed with patient the reasons for seeking emergency care. Pt aware that if Sp02 <94% or if they have any worsening symptoms, they need to go to the emergency department. If they are having a medical emergency, they will call 911. Otherwise, patient will continue to submit data as scheduled. Reviewed importance of wearing mask if self or family members leave the house.     Advised next steps: Continue care at home

## 2022-02-05 NOTE — TELEPHONE ENCOUNTER
Called patient to review COVID-19 Surveillance Program enrollment process.     Smartphone: computer [facility]: computer    Program consent: yes     Pulse oximeter status: yes     Verified emergency contacts:    Program Overview: Reviewed , no response process, and importance of correct emergency contacts in event that well-being check is warranted. Patient will call [phone] 24/7 to speak with an OnCall nurse, if needed. Pt aware that if Sp02 less than or equal to 93% or if they have any worsening symptoms, they need to go to the emergency department. If they are having a medical emergency, they will call 911.     Patient had no further questions.    Sat 98% hr 52    Pt advised MEENU would call her for 3-4/5 IQBAL. Pt advised to call OOC with any concerns or worsening symptoms.     Escalated to MEENU, REBECCA Hughes for further evaluation and care directives. See MEENU note.

## 2022-02-07 ENCOUNTER — NURSE TRIAGE (OUTPATIENT)
Dept: ADMINISTRATIVE | Facility: CLINIC | Age: 72
End: 2022-02-07
Payer: COMMERCIAL

## 2022-02-07 NOTE — TELEPHONE ENCOUNTER
Surveillance pt escalated for no response. Pt stated that she has been taking VS and  has been entering. Mr. Sinclair is not home at this time but pt asked that we call her tomorrow morning while Mr Sinclair is home so we can help him figure it out.  Pt states she's feeling better. No triage.    Reason for Disposition   Information only question and nurse able to answer    Additional Information   Negative: Nursing judgment   Negative: Nursing judgment   Negative: Nursing judgment   Negative: Nursing judgment    Protocols used: INFORMATION ONLY CALL - NO TRIAGE-A-OH

## 2022-02-09 ENCOUNTER — PATIENT MESSAGE (OUTPATIENT)
Dept: ADMINISTRATIVE | Facility: OTHER | Age: 72
End: 2022-02-09
Payer: COMMERCIAL

## 2022-02-09 ENCOUNTER — PATIENT MESSAGE (OUTPATIENT)
Dept: ADMINISTRATIVE | Facility: CLINIC | Age: 72
End: 2022-02-09
Payer: COMMERCIAL

## 2022-02-09 ENCOUNTER — OFFICE VISIT (OUTPATIENT)
Dept: INTERNAL MEDICINE | Facility: CLINIC | Age: 72
End: 2022-02-09
Payer: COMMERCIAL

## 2022-02-09 VITALS
DIASTOLIC BLOOD PRESSURE: 72 MMHG | OXYGEN SATURATION: 99 % | HEIGHT: 65 IN | SYSTOLIC BLOOD PRESSURE: 104 MMHG | WEIGHT: 185.44 LBS | RESPIRATION RATE: 16 BRPM | HEART RATE: 61 BPM | BODY MASS INDEX: 30.89 KG/M2

## 2022-02-09 DIAGNOSIS — D68.61 ANTIPHOSPHOLIPID SYNDROME: ICD-10-CM

## 2022-02-09 DIAGNOSIS — Z86.16 HISTORY OF 2019 NOVEL CORONAVIRUS DISEASE (COVID-19): ICD-10-CM

## 2022-02-09 DIAGNOSIS — M79.7 FIBROMYALGIA: ICD-10-CM

## 2022-02-09 DIAGNOSIS — K50.90 CROHN'S DISEASE WITHOUT COMPLICATION, UNSPECIFIED GASTROINTESTINAL TRACT LOCATION: ICD-10-CM

## 2022-02-09 DIAGNOSIS — D18.03 LIVER HEMANGIOMA: ICD-10-CM

## 2022-02-09 DIAGNOSIS — Z09 HOSPITAL DISCHARGE FOLLOW-UP: Primary | ICD-10-CM

## 2022-02-09 DIAGNOSIS — I48.19 PERSISTENT ATRIAL FIBRILLATION: ICD-10-CM

## 2022-02-09 DIAGNOSIS — Z79.01 LONG TERM (CURRENT) USE OF ANTICOAGULANTS: ICD-10-CM

## 2022-02-09 PROBLEM — R07.89 CHEST TIGHTNESS: Status: RESOLVED | Noted: 2020-07-27 | Resolved: 2022-02-09

## 2022-02-09 PROBLEM — U07.1 COVID-19: Status: RESOLVED | Noted: 2022-02-01 | Resolved: 2022-02-09

## 2022-02-09 PROCEDURE — 99214 OFFICE O/P EST MOD 30 MIN: CPT | Mod: S$GLB,,, | Performed by: INTERNAL MEDICINE

## 2022-02-09 PROCEDURE — 99999 PR PBB SHADOW E&M-EST. PATIENT-LVL V: CPT | Mod: PBBFAC,,, | Performed by: INTERNAL MEDICINE

## 2022-02-09 PROCEDURE — 99999 PR PBB SHADOW E&M-EST. PATIENT-LVL V: ICD-10-PCS | Mod: PBBFAC,,, | Performed by: INTERNAL MEDICINE

## 2022-02-09 PROCEDURE — 99214 PR OFFICE/OUTPT VISIT, EST, LEVL IV, 30-39 MIN: ICD-10-PCS | Mod: S$GLB,,, | Performed by: INTERNAL MEDICINE

## 2022-02-09 RX ORDER — SACUBITRIL AND VALSARTAN 24; 26 MG/1; MG/1
1 TABLET, FILM COATED ORAL 2 TIMES DAILY
Status: ON HOLD | COMMUNITY
Start: 2022-01-13 | End: 2022-03-17 | Stop reason: HOSPADM

## 2022-02-09 RX ORDER — ATORVASTATIN CALCIUM 40 MG/1
40 TABLET, FILM COATED ORAL NIGHTLY
COMMUNITY
Start: 2021-12-15

## 2022-02-09 RX ORDER — DULOXETIN HYDROCHLORIDE 30 MG/1
30 CAPSULE, DELAYED RELEASE ORAL DAILY
Qty: 30 CAPSULE | Refills: 11 | Status: SHIPPED | OUTPATIENT
Start: 2022-02-09 | End: 2022-04-29 | Stop reason: SDUPTHER

## 2022-02-09 NOTE — PROGRESS NOTES
Subjective:       Patient ID: Celine Sinclair is a 71 y.o. female.    Chief Complaint: Hospital Follow Up (covid)      HPI:  Patient is known to me and presents for hospital follow up. She was admitted with COVID, a-fib RVR and diarrhea.     Admitted to ICU for a-fib RVr. She converted to sinus padmini on digoxin and metoprolol per [facility]. She has not had [facility] follow up yet. Reports she has intermittent palpitations and dizziness. Today HR is regular at 60 bpm which is consistent with her discharge. She was switched from coumadin to eliquis per [facility] for a-fib and h/o antiphospholipid. She denies abnormal bleeding on this medications    CT abd/pelvis show changes consistent with infections vs inflammatory colitis; she has h/o Crohn's disease. She was treated with cipro/flagyl at discharge. Competed antibx. Diarrhea is not improved. No blood in stool. No abd pain. She has not had GI follow up in years.   Of note, CT also showed probable lliver hemangioma and recommend MRI w and w/o for further evaluation.     Regarding COVID 19, she was started on remdesivir while admitted but she never had an oxygen requirement and no changes on CXR. She denies cough, SOB today. Sats normal. Lungs clear.     Today she continues to c/o diffuse aches and pains. Work up in past has been negative and thought to be due to fibromyalgia. She stopped her cymbalta, unsure why. Agreeable to resuming for pain control.   Past Medical History:   Diagnosis Date    Aortic atherosclerosis     Benign essential hypertension     Cataract     Senile    Crohn's colitis     Depression, major, recurrent, moderate     Fibromyalgia     GERD (gastroesophageal reflux disease)     Hypertensive cardiomyopathy     IBS (irritable bowel syndrome)     Migraine     Opioid abuse, in remission     Osteopenia     Paget disease of bone     Port-A-Cath in place     right chest    Prolonged QT interval     RA (rheumatoid arthritis)     Sedative hypnotic or  anxiolytic dependence     in remission        Family History   Problem Relation Age of Onset    Glaucoma Paternal Grandmother     Other Daughter         Diabetic Retinopathy    Breast cancer Daughter 40    Retinal detachment Grandchild     Heart attack Maternal Grandmother     Colon cancer Maternal Grandmother     Cancer Mother         Unknown type    Emphysema Father 67    Heart disease Father     Lung cancer Sister     Heart attack Sister     Breast cancer Daughter 47    Breast cancer Sister     Amblyopia Neg Hx     Blindness Neg Hx     Strabismus Neg Hx     Macular degeneration Neg Hx     Cataracts Neg Hx        Social History     Socioeconomic History    Marital status:     Number of children: 12   Occupational History    Occupation: [employer]     Comment: Retired/disabled   Tobacco Use    Smoking status: Never Smoker    Smokeless tobacco: Never Used   Substance and Sexual Activity    Alcohol use: Yes     Comment: wine coolers occassionally    Drug use: No   Social History Narrative    Patient gave birth to 7 children, adopted 2 and has 3 stepchildren with her .       Review of Systems   Constitutional: Positive for fatigue. Negative for activity change, fever and unexpected weight change.   HENT: Negative for congestion, ear pain, hearing loss, rhinorrhea and sore throat.    Eyes: Negative for pain, redness and visual disturbance.   Respiratory: Negative for cough, shortness of breath and wheezing.    Cardiovascular: Positive for palpitations. Negative for chest pain and leg swelling.   Gastrointestinal: Positive for diarrhea. Negative for abdominal pain, blood in stool, constipation, nausea and vomiting.   Genitourinary: Negative for dysuria, frequency, pelvic pain and urgency.   Musculoskeletal: Positive for arthralgias and myalgias. Negative for back pain, joint swelling and neck pain.   Skin: Negative for color change, rash and wound.   Neurological: Negative for  dizziness, tremors, weakness, light-headedness and headaches.         Objective:      Physical Exam  Vitals reviewed.   Constitutional:       General: She is not in acute distress.     Appearance: She is well-developed and well-nourished.   HENT:      Head: Normocephalic and atraumatic.      Right Ear: External ear normal.      Left Ear: External ear normal.   Eyes:      General:         Right eye: No discharge.         Left eye: No discharge.      Extraocular Movements: EOM normal.   Neck:      Thyroid: No thyromegaly.   Cardiovascular:      Rate and Rhythm: Regular rhythm. Bradycardia present.      Heart sounds: No murmur heard.      Pulmonary:      Effort: Pulmonary effort is normal. No respiratory distress.      Breath sounds: Normal breath sounds. No wheezing or rales.   Abdominal:      General: Bowel sounds are normal. There is no distension.      Palpations: Abdomen is soft.      Tenderness: There is no abdominal tenderness. There is no guarding or rebound.   Musculoskeletal:      Cervical back: Neck supple.   Lymphadenopathy:      Cervical: No cervical adenopathy.   Skin:     General: Skin is warm and dry.   Neurological:      Mental Status: She is alert and oriented to person, place, and time.      Cranial Nerves: No cranial nerve deficit.   Psychiatric:         Mood and Affect: Mood and affect normal.         Behavior: Behavior normal.         Assessment:       1. Hospital discharge follow-up    2. Crohn's disease without complication, unspecified gastrointestinal tract location    3. Persistent atrial fibrillation    4. Fibromyalgia    5. History of 2019 novel coronavirus disease (COVID-19)    6. Liver hemangioma    7. Antiphospholipid syndrome    8. Long term (current) use of anticoagulants        Plan:       Ada was seen today for hospital follow up.    Diagnoses and all orders for this visit:    Hospital discharge follow-up  D/c summary reviewed  D/c labs reviewed  meds reconciled    Crohn's disease  without complication, unspecified gastrointestinal tract location  -     Ambulatory referral/consult to Gastroenterology; Future  Remains with diarrhea s/p cipro/flagyl  CT consistent with infectious vs inflammatory bowel disease  Referred to GI for Crohn's management; has not been seen in years  Non-bloody stools    Persistent atrial fibrillation  -     Ambulatory referral/consult to Cardiology; Future  Cont eliquis  Cont metoprolol land digoxin  Referred to Dr. Moreno for follow up    Fibromyalgia  -     DULoxetine (CYMBALTA) 30 MG capsule; Take 1 capsule (30 mg total) by mouth once daily.  Chronic uncontrolled  Resume cymbalta  Side effects of SNRI discussed today in detail    History of 2019 novel coronavirus disease (COVID-19)  Doing well  No hypoxia or lung issues  Diarrhea crohn's vs COVID symptoms  Completed quarantine    Liver hemangioma  -     MRI Abdomen W WO Contrast; Future  Noted on CT  Rads recommended MRI w and w/o for further work up but has implanted cardiac device so she can not do    Antiphospholipid syndrome  Long term (current) use of anticoagulants  Chronic stable  Cont eliquis  Was not compliant with coumadin, INR subtherapeutic on admit and has had long time issues with controlling    RTC 3 months for orutine and PRN

## 2022-02-10 ENCOUNTER — PATIENT MESSAGE (OUTPATIENT)
Dept: ADMINISTRATIVE | Facility: CLINIC | Age: 72
End: 2022-02-10
Payer: COMMERCIAL

## 2022-02-11 ENCOUNTER — PATIENT MESSAGE (OUTPATIENT)
Dept: ADMINISTRATIVE | Facility: CLINIC | Age: 72
End: 2022-02-11
Payer: COMMERCIAL

## 2022-02-11 ENCOUNTER — NURSE TRIAGE (OUTPATIENT)
Dept: ADMINISTRATIVE | Facility: CLINIC | Age: 72
End: 2022-02-11
Payer: COMMERCIAL

## 2022-02-11 NOTE — TELEPHONE ENCOUNTER
Pt escalated for no response. Pt has had 4 or more no responses in a row and has not responded to follow up calls or [employer] messages. Removed surveillance tasks      Reason for Disposition   Caller has cancelled the call before the first contact    Protocols used: NO CONTACT OR DUPLICATE CONTACT CALL-A-OH

## 2022-02-23 DIAGNOSIS — D84.9 IMMUNOSUPPRESSED STATUS: ICD-10-CM

## 2022-03-15 NOTE — PLAN OF CARE
-Mild  -AAP completed  -Refilled albuterol to take as needed  -PPSV23 administered today.  Discussed risk and benefits.   Procedure explained to patient. Patient verbalized understanding.

## 2022-03-16 ENCOUNTER — HOSPITAL ENCOUNTER (OUTPATIENT)
Facility: HOSPITAL | Age: 72
Discharge: HOME | End: 2022-03-17
Attending: STUDENT IN AN ORGANIZED HEALTH CARE EDUCATION/TRAINING PROGRAM | Admitting: FAMILY MEDICINE
Payer: COMMERCIAL

## 2022-03-16 ENCOUNTER — PATIENT OUTREACH (OUTPATIENT)
Dept: EMERGENCY MEDICINE | Facility: HOSPITAL | Age: 72
End: 2022-03-16
Payer: COMMERCIAL

## 2022-03-16 ENCOUNTER — TELEPHONE (OUTPATIENT)
Dept: INTERNAL MEDICINE | Facility: CLINIC | Age: 72
End: 2022-03-16
Payer: COMMERCIAL

## 2022-03-16 DIAGNOSIS — I48.91 ATRIAL FIBRILLATION: ICD-10-CM

## 2022-03-16 DIAGNOSIS — R00.2 PALPITATIONS: ICD-10-CM

## 2022-03-16 DIAGNOSIS — E87.6 HYPOKALEMIA: ICD-10-CM

## 2022-03-16 DIAGNOSIS — I48.91 A-FIB: Primary | ICD-10-CM

## 2022-03-16 DIAGNOSIS — I48.0 PAF (PAROXYSMAL ATRIAL FIBRILLATION): ICD-10-CM

## 2022-03-16 DIAGNOSIS — R07.9 CHEST PAIN: ICD-10-CM

## 2022-03-16 DIAGNOSIS — E83.42 HYPOMAGNESEMIA: ICD-10-CM

## 2022-03-16 LAB
ALBUMIN SERPL BCP-MCNC: 3.7 G/DL (ref 3.5–5.2)
ALP SERPL-CCNC: 69 U/L (ref 55–135)
ALT SERPL W/O P-5'-P-CCNC: 11 U/L (ref 10–44)
ANION GAP SERPL CALC-SCNC: 15 MMOL/L (ref 8–16)
ANION GAP SERPL CALC-SCNC: 17 MMOL/L (ref 8–16)
AST SERPL-CCNC: 23 U/L (ref 10–40)
BASOPHILS # BLD AUTO: 0.04 K/UL (ref 0–0.2)
BASOPHILS NFR BLD: 0.5 % (ref 0–1.9)
BILIRUB SERPL-MCNC: 3 MG/DL (ref 0.1–1)
BNP SERPL-MCNC: 54 PG/ML (ref 0–99)
BUN SERPL-MCNC: 10 MG/DL (ref 8–23)
BUN SERPL-MCNC: 9 MG/DL (ref 8–23)
CALCIUM SERPL-MCNC: 8.1 MG/DL (ref 8.7–10.5)
CALCIUM SERPL-MCNC: 8.5 MG/DL (ref 8.7–10.5)
CHLORIDE SERPL-SCNC: 103 MMOL/L (ref 95–110)
CHLORIDE SERPL-SCNC: 104 MMOL/L (ref 95–110)
CK MB SERPL-MCNC: 1.4 NG/ML (ref 0.1–6.5)
CK MB SERPL-RTO: 0.9 % (ref 0–5)
CK SERPL-CCNC: 161 U/L (ref 20–180)
CK SERPL-CCNC: 161 U/L (ref 20–180)
CO2 SERPL-SCNC: 25 MMOL/L (ref 23–29)
CO2 SERPL-SCNC: 25 MMOL/L (ref 23–29)
CREAT SERPL-MCNC: 0.6 MG/DL (ref 0.5–1.4)
CREAT SERPL-MCNC: 0.7 MG/DL (ref 0.5–1.4)
DIFFERENTIAL METHOD: ABNORMAL
DIGOXIN SERPL-MCNC: <0.1 NG/ML (ref 0.8–2)
EOSINOPHIL # BLD AUTO: 0.1 K/UL (ref 0–0.5)
EOSINOPHIL NFR BLD: 1.7 % (ref 0–8)
ERYTHROCYTE [DISTWIDTH] IN BLOOD BY AUTOMATED COUNT: 14.9 % (ref 11.5–14.5)
EST. GFR  (AFRICAN AMERICAN): >60 ML/MIN/1.73 M^2
EST. GFR  (AFRICAN AMERICAN): >60 ML/MIN/1.73 M^2
EST. GFR  (NON AFRICAN AMERICAN): >60 ML/MIN/1.73 M^2
EST. GFR  (NON AFRICAN AMERICAN): >60 ML/MIN/1.73 M^2
GLUCOSE SERPL-MCNC: 90 MG/DL (ref 70–110)
GLUCOSE SERPL-MCNC: 99 MG/DL (ref 70–110)
HCT VFR BLD AUTO: 42.1 % (ref 37–48.5)
HGB BLD-MCNC: 13.5 G/DL (ref 12–16)
IMM GRANULOCYTES # BLD AUTO: 0.03 K/UL (ref 0–0.04)
IMM GRANULOCYTES NFR BLD AUTO: 0.4 % (ref 0–0.5)
LYMPHOCYTES # BLD AUTO: 2.1 K/UL (ref 1–4.8)
LYMPHOCYTES NFR BLD: 26 % (ref 18–48)
MCH RBC QN AUTO: 28.1 PG (ref 27–31)
MCHC RBC AUTO-ENTMCNC: 32.1 G/DL (ref 32–36)
MCV RBC AUTO: 88 FL (ref 82–98)
MONOCYTES # BLD AUTO: 0.7 K/UL (ref 0.3–1)
MONOCYTES NFR BLD: 8 % (ref 4–15)
NEUTROPHILS # BLD AUTO: 5.1 K/UL (ref 1.8–7.7)
NEUTROPHILS NFR BLD: 63.4 % (ref 38–73)
NRBC BLD-RTO: 0 /100 WBC
PLATELET # BLD AUTO: 262 K/UL (ref 150–450)
PMV BLD AUTO: 11.3 FL (ref 9.2–12.9)
POTASSIUM SERPL-SCNC: 2.4 MMOL/L (ref 3.5–5.1)
POTASSIUM SERPL-SCNC: 2.7 MMOL/L (ref 3.5–5.1)
POTASSIUM SERPL-SCNC: 3.1 MMOL/L (ref 3.5–5.1)
PROT SERPL-MCNC: 6.8 G/DL (ref 6–8.4)
RBC # BLD AUTO: 4.81 M/UL (ref 4–5.4)
SODIUM SERPL-SCNC: 144 MMOL/L (ref 136–145)
SODIUM SERPL-SCNC: 145 MMOL/L (ref 136–145)
TROPONIN I SERPL DL<=0.01 NG/ML-MCNC: 0.01 NG/ML (ref 0–0.03)
TROPONIN I SERPL DL<=0.01 NG/ML-MCNC: 0.01 NG/ML (ref 0–0.03)
TROPONIN I SERPL DL<=0.01 NG/ML-MCNC: 0.02 NG/ML (ref 0–0.03)
WBC # BLD AUTO: 8.1 K/UL (ref 3.9–12.7)

## 2022-03-16 PROCEDURE — 10060 I&D ABSCESS SIMPLE/SINGLE: CPT

## 2022-03-16 PROCEDURE — 84484 ASSAY OF TROPONIN QUANT: CPT | Performed by: NURSE PRACTITIONER

## 2022-03-16 PROCEDURE — 36415 COLL VENOUS BLD VENIPUNCTURE: CPT | Performed by: NURSE PRACTITIONER

## 2022-03-16 PROCEDURE — 80162 ASSAY OF DIGOXIN TOTAL: CPT | Performed by: NURSE PRACTITIONER

## 2022-03-16 PROCEDURE — 25000003 PHARM REV CODE 250: Performed by: NURSE PRACTITIONER

## 2022-03-16 PROCEDURE — 63600175 PHARM REV CODE 636 W HCPCS: Performed by: NURSE PRACTITIONER

## 2022-03-16 PROCEDURE — 96365 THER/PROPH/DIAG IV INF INIT: CPT | Mod: 59

## 2022-03-16 PROCEDURE — 93005 ELECTROCARDIOGRAM TRACING: CPT

## 2022-03-16 PROCEDURE — 99285 EMERGENCY DEPT VISIT HI MDM: CPT | Mod: 25

## 2022-03-16 PROCEDURE — 82553 CREATINE MB FRACTION: CPT | Performed by: NURSE PRACTITIONER

## 2022-03-16 PROCEDURE — 25000003 PHARM REV CODE 250: Performed by: FAMILY MEDICINE

## 2022-03-16 PROCEDURE — 25000003 PHARM REV CODE 250

## 2022-03-16 PROCEDURE — 84132 ASSAY OF SERUM POTASSIUM: CPT | Mod: 91 | Performed by: FAMILY MEDICINE

## 2022-03-16 PROCEDURE — 93010 EKG 12-LEAD: ICD-10-PCS | Mod: 76,,, | Performed by: INTERNAL MEDICINE

## 2022-03-16 PROCEDURE — 80053 COMPREHEN METABOLIC PANEL: CPT | Performed by: NURSE PRACTITIONER

## 2022-03-16 PROCEDURE — 85025 COMPLETE CBC W/AUTO DIFF WBC: CPT | Performed by: NURSE PRACTITIONER

## 2022-03-16 PROCEDURE — 94760 N-INVAS EAR/PLS OXIMETRY 1: CPT

## 2022-03-16 PROCEDURE — 96375 TX/PRO/DX INJ NEW DRUG ADDON: CPT

## 2022-03-16 PROCEDURE — 93010 ELECTROCARDIOGRAM REPORT: CPT | Mod: 76,,, | Performed by: INTERNAL MEDICINE

## 2022-03-16 PROCEDURE — G0378 HOSPITAL OBSERVATION PER HR: HCPCS

## 2022-03-16 PROCEDURE — 80048 BASIC METABOLIC PNL TOTAL CA: CPT | Performed by: NURSE PRACTITIONER

## 2022-03-16 PROCEDURE — 83880 ASSAY OF NATRIURETIC PEPTIDE: CPT | Performed by: NURSE PRACTITIONER

## 2022-03-16 RX ORDER — POTASSIUM CHLORIDE 20 MEQ/1
40 TABLET, EXTENDED RELEASE ORAL ONCE
Status: DISCONTINUED | OUTPATIENT
Start: 2022-03-17 | End: 2022-03-17

## 2022-03-16 RX ORDER — MORPHINE SULFATE 2 MG/ML
2 INJECTION, SOLUTION INTRAMUSCULAR; INTRAVENOUS
Status: COMPLETED | OUTPATIENT
Start: 2022-03-16 | End: 2022-03-16

## 2022-03-16 RX ORDER — METOPROLOL SUCCINATE 50 MG/1
50 TABLET, EXTENDED RELEASE ORAL 2 TIMES DAILY
Status: DISCONTINUED | OUTPATIENT
Start: 2022-03-16 | End: 2022-03-17 | Stop reason: HOSPADM

## 2022-03-16 RX ORDER — TRAMADOL HYDROCHLORIDE 50 MG/1
50 TABLET ORAL EVERY 4 HOURS PRN
Status: DISCONTINUED | OUTPATIENT
Start: 2022-03-16 | End: 2022-03-17 | Stop reason: HOSPADM

## 2022-03-16 RX ORDER — ONDANSETRON 2 MG/ML
4 INJECTION INTRAMUSCULAR; INTRAVENOUS
Status: COMPLETED | OUTPATIENT
Start: 2022-03-16 | End: 2022-03-16

## 2022-03-16 RX ORDER — AMIODARONE HYDROCHLORIDE 200 MG/1
400 TABLET ORAL 2 TIMES DAILY
Status: DISCONTINUED | OUTPATIENT
Start: 2022-03-16 | End: 2022-03-17

## 2022-03-16 RX ORDER — ACETAMINOPHEN 325 MG/1
650 TABLET ORAL EVERY 8 HOURS PRN
Status: DISCONTINUED | OUTPATIENT
Start: 2022-03-16 | End: 2022-03-17 | Stop reason: HOSPADM

## 2022-03-16 RX ORDER — LABETALOL HYDROCHLORIDE 5 MG/ML
5 INJECTION, SOLUTION INTRAVENOUS
Status: DISCONTINUED | OUTPATIENT
Start: 2022-03-16 | End: 2022-03-16

## 2022-03-16 RX ORDER — POTASSIUM CHLORIDE 20 MEQ/1
40 TABLET, EXTENDED RELEASE ORAL ONCE
Status: COMPLETED | OUTPATIENT
Start: 2022-03-16 | End: 2022-03-16

## 2022-03-16 RX ORDER — POTASSIUM CHLORIDE 20 MEQ/1
20 TABLET, EXTENDED RELEASE ORAL ONCE
Status: DISCONTINUED | OUTPATIENT
Start: 2022-03-16 | End: 2022-03-16

## 2022-03-16 RX ORDER — POTASSIUM CHLORIDE 20 MEQ/1
40 TABLET, EXTENDED RELEASE ORAL
Status: COMPLETED | OUTPATIENT
Start: 2022-03-16 | End: 2022-03-16

## 2022-03-16 RX ORDER — SODIUM CHLORIDE 0.9 % (FLUSH) 0.9 %
10 SYRINGE (ML) INJECTION
Status: DISCONTINUED | OUTPATIENT
Start: 2022-03-16 | End: 2022-03-16

## 2022-03-16 RX ORDER — POTASSIUM CHLORIDE 7.45 MG/ML
10 INJECTION INTRAVENOUS
Status: COMPLETED | OUTPATIENT
Start: 2022-03-16 | End: 2022-03-16

## 2022-03-16 RX ORDER — TALC
6 POWDER (GRAM) TOPICAL NIGHTLY PRN
Status: DISCONTINUED | OUTPATIENT
Start: 2022-03-16 | End: 2022-03-17 | Stop reason: HOSPADM

## 2022-03-16 RX ADMIN — AMIODARONE HYDROCHLORIDE 400 MG: 200 TABLET ORAL at 08:03

## 2022-03-16 RX ADMIN — MORPHINE SULFATE 2 MG: 2 INJECTION, SOLUTION INTRAMUSCULAR; INTRAVENOUS at 04:03

## 2022-03-16 RX ADMIN — POTASSIUM CHLORIDE 40 MEQ: 1500 TABLET, EXTENDED RELEASE ORAL at 05:03

## 2022-03-16 RX ADMIN — METOPROLOL SUCCINATE 50 MG: 50 TABLET, EXTENDED RELEASE ORAL at 05:03

## 2022-03-16 RX ADMIN — ONDANSETRON HYDROCHLORIDE 4 MG: 2 SOLUTION INTRAMUSCULAR; INTRAVENOUS at 04:03

## 2022-03-16 RX ADMIN — POTASSIUM CHLORIDE 40 MEQ: 1500 TABLET, EXTENDED RELEASE ORAL at 01:03

## 2022-03-16 RX ADMIN — Medication 6 MG: at 10:03

## 2022-03-16 RX ADMIN — POTASSIUM CHLORIDE 10 MEQ: 7.46 INJECTION, SOLUTION INTRAVENOUS at 01:03

## 2022-03-16 RX ADMIN — TRAMADOL HYDROCHLORIDE 50 MG: 50 TABLET, FILM COATED ORAL at 10:03

## 2022-03-16 RX ADMIN — ACETAMINOPHEN 650 MG: 325 TABLET ORAL at 08:03

## 2022-03-16 NOTE — ED TRIAGE NOTES
71 y.o. female presents to ER   Chief Complaint   Patient presents with    Palpitations     Since Monday - pt reports constant palpitations. Pt reports tightness to middle of chest    . No acute distress noted.

## 2022-03-16 NOTE — TELEPHONE ENCOUNTER
She does not have chronic diagnosis that would qualify but I see she is admitted to [facility]. When is jury duty--she needs the letter because she is admitted I presume but if jury duty not for a week or two then she will likely be well by then.

## 2022-03-16 NOTE — ED PROVIDER NOTES
Encounter Date: 3/16/2022       History     Chief Complaint   Patient presents with    Palpitations     Since Monday - pt reports constant palpitations. Pt reports tightness to middle of chest      Chief complaint:  Palpitations  [age] female with history of AFib depression fibromyalgia GERD cardiomyopathy presents to be evaluated for palpitations for the last 3 days.  Patient states they have been intermittent over the last 3 days with associated chest pain and shortness of breath.  She states she does have a history of AFib but is unsure if he is currently taking any anticoagulants at this time.  She states the pain is midsternal aching pain that radiates to her left shoulder and arm. She reports she sees Dr. Hayden at [facility] and has a loop recorder.        Review of patient's allergies indicates:   Allergen Reactions    Octreotide acetate Nausea And Vomiting     Had symptoms when she took Sandostatin with Codeine    Codeine Itching and Nausea And Vomiting     Pill form only, IV ok.,  Had symptoms when she took Codeine with Sandostatin.  Other reaction(s): Itching    Morphine Nausea And Vomiting     Patient reports reaction only when she takes po form of Morphine.     Past Medical History:   Diagnosis Date    Aortic atherosclerosis     Benign essential hypertension     Cataract     Senile    Crohn's colitis     Depression, major, recurrent, moderate     Fibromyalgia     GERD (gastroesophageal reflux disease)     Hypertensive cardiomyopathy     IBS (irritable bowel syndrome)     Migraine     Opioid abuse, in remission     Osteopenia     Paget disease of bone     Port-A-Cath in place     right chest    Prolonged QT interval     RA (rheumatoid arthritis)     Sedative hypnotic or anxiolytic dependence     in remission      Past Surgical History:   Procedure Laterality Date    CATARACT EXTRACTION W/  INTRAOCULAR LENS IMPLANT Left 02/21/2017    Dr. Christianson    CATARACT EXTRACTION W/   INTRAOCULAR LENS IMPLANT Right 03/07/2017    Dr. Christianson    CHOLECYSTECTOMY      COLON SURGERY      COLONOSCOPY N/A 3/16/2016    Procedure: COLONOSCOPY;  Surgeon: Dale Trejo MD;  Location: [facility] ENDO ([address] [place] [address]);  Service: Endoscopy;  Laterality: N/A;    COLONOSCOPY N/A 10/12/2020    Procedure: COLONOSCOPY;  Surgeon: Cali Urena MD;  Location: [facility] ENDO ([address] [place] [address]);  Service: Endoscopy;  Laterality: N/A;  covid test 10/9-[facility] [facility]- completed 10/9/20  ok to hold Coumadin x 5 days per coumadin clinic-see telephone encounterd ated 10/6-MS / Loop recorder  10/6/20- Pt confirmed- ERW@1122  10/9-Pt confirmed earlier arrival time, prep ins. reviewed and emailed to Pt    Colostomy reversal      HEMORRHOID SURGERY      HYSTERECTOMY      ILEOSTOMY      ILEOSTOMY CLOSURE      LEFT HEART CATHETERIZATION N/A 2/15/2019    Procedure: HEART CATH-LEFT;  Surgeon: Miky Keita MD;  Location: [facility] CATH LAB;  Service: Cardiology;  Laterality: N/A;    NECK SURGERY      OOPHORECTOMY Bilateral     SBO      SMALL INTESTINE SURGERY      TONSILLECTOMY      TUBAL LIGATION       Family History   Problem Relation Age of Onset    Glaucoma Paternal Grandmother     Other Daughter         Diabetic Retinopathy    Breast cancer Daughter 40    Retinal detachment Grandchild     Heart attack Maternal Grandmother     Colon cancer Maternal Grandmother     Cancer Mother         Unknown type    Emphysema Father 67    Heart disease Father     Lung cancer Sister     Heart attack Sister     Breast cancer Daughter 47    Breast cancer Sister     Amblyopia Neg Hx     Blindness Neg Hx     Strabismus Neg Hx     Macular degeneration Neg Hx     Cataracts Neg Hx      Social History     Tobacco Use    Smoking status: Never Smoker    Smokeless tobacco: Never Used   Substance Use Topics    Alcohol use: Yes     Comment: wine coolers occassionally    Drug use: No     Review of Systems   Constitutional: Negative.   Negative for fever.   HENT: Negative.    Eyes: Negative.    Respiratory: Positive for chest tightness and shortness of breath.    Cardiovascular: Positive for chest pain and palpitations.   Gastrointestinal: Negative.  Negative for abdominal pain.   Genitourinary: Negative.    Musculoskeletal: Negative.    Skin: Negative.    Neurological: Negative for dizziness and weakness.       Physical Exam     Initial Vitals [03/16/22 1207]   BP Pulse Resp Temp SpO2   124/80 (!) 133 (!) 24 96.1 °F (35.6 °C) 98 %      MAP       --         Physical Exam    Nursing note and vitals reviewed.  Constitutional: She appears well-developed and well-nourished.   HENT:   Head: Normocephalic.   Mouth/Throat: Oropharynx is clear and moist.   Eyes: EOM are normal. Pupils are equal, round, and reactive to light.   Neck: Neck supple.   Normal range of motion.  Cardiovascular: Normal heart sounds and intact distal pulses. Exam reveals no gallop and no friction rub.    No murmur heard.  Irregular rhythm   Pulmonary/Chest: Breath sounds normal. She has no wheezes. She has no rhonchi. She has no rales. She exhibits no tenderness.   Abdominal: Abdomen is soft.   Musculoskeletal:      Cervical back: Normal range of motion and neck supple.     Skin: Skin is warm and dry. Capillary refill takes less than 2 seconds.   Psychiatric: She has a normal mood and affect. Thought content normal.         ED Course   Procedures  Labs Reviewed   CBC W/ AUTO DIFFERENTIAL - Abnormal; Notable for the following components:       Result Value    RDW 14.9 (*)     All other components within normal limits   COMPREHENSIVE METABOLIC PANEL - Abnormal; Notable for the following components:    Potassium 2.4 (*)     Calcium 8.5 (*)     Total Bilirubin 3.0 (*)     Anion Gap 17 (*)     All other components within normal limits    Narrative:     Potassium critical result(s) called and verbal readback obtained from   Rocío Bowles RN. by MK1 03/16/2022 13:40   DIGOXIN LEVEL -  Abnormal; Notable for the following components:    Digoxin Lvl <0.1 (*)     All other components within normal limits   TROPONIN I   B-TYPE NATRIURETIC PEPTIDE   CK-MB   CK   TROPONIN I        ECG Results          EKG 12-lead (Final result)  Result time 03/16/22 14:55:03    Final result by Interface, Lab In [facility] (03/16/22 14:55:03)                 Narrative:    Test Reason : R07.9,    Vent. Rate : 127 BPM     Atrial Rate : 136 BPM     P-R Int : 000 ms          QRS Dur : 094 ms      QT Int : 366 ms       P-R-T Axes : 000 018 163 degrees     QTc Int : 531 ms    Atrial fibrillation with rapid ventricular response  Marked ST abnormality, possible inferior subendocardial injury  Abnormal ECG  When compared with ECG of 16-MAR-2022 12:09,  Atrial fibrillation has replaced Sinus rhythm  Confirmed by Haider Mayer MD ([id number]) on 3/16/2022 2:54:55 PM    Referred By: [email or web address]   SELF           Confirmed By:Haider Mayer MD                             EKG 12-lead (Final result)  Result time 03/16/22 14:55:01    Final result by Interface, Lab In [facility] (03/16/22 14:55:01)                 Narrative:    Test Reason : R07.9    Vent. Rate : 139 BPM     Atrial Rate : 141 BPM     P-R Int : 150 ms          QRS Dur : 094 ms      QT Int : 334 ms       P-R-T Axes : 077 037 234 degrees     QTc Int : 508 ms    Sinus tachycardia  Marked ST abnormality, possible inferior subendocardial injury  Abnormal ECG  When compared with ECG of 01-FEB-2022 07:54,  Premature atrial complexes are no longer Present  Vent. rate has increased BY  84 BPM  ST more depressed in Inferior leads  ST now depressed in Anterior leads  T wave inversion no longer evident in Anterior leads  Confirmed by Haider Mayer MD ([id number]) on 3/16/2022 2:54:52 PM    Referred By: [email or web address]   SELF           Confirmed By:Haider Mayer MD                            Imaging Results          X-Ray Chest PA And Lateral (Final result)  Result time 03/16/22 13:33:12    Final result by  Vanessa Sol MD (03/16/22 13:33:12)                 Impression:      No acute abnormality.      Electronically signed by: Vanessa Sol MD  Date:    03/16/2022  Time:    13:33             Narrative:    EXAMINATION:  XR CHEST PA AND LATERAL    CLINICAL HISTORY:  Chest Pain;    TECHNIQUE:  PA and lateral views of the chest were performed.    COMPARISON:  None    FINDINGS:  The lungs are clear, with normal appearance of pulmonary vasculature.No pleural effusion.No pneumothorax.    The cardiac silhouette is normal in size. The hilar and mediastinal contours are unremarkable.    Cervical fusion hardware.  Impella device is noted.  Metallic BBs overlie the chest.                                 Medications   labetaloL injection 5 mg (5 mg Intravenous Not Given 3/16/22 1251)   morphine injection 2 mg (has no administration in time range)   ondansetron injection 4 mg (has no administration in time range)   potassium chloride SA CR tablet 40 mEq (40 mEq Oral Given 3/16/22 1346)   potassium chloride 10 mEq in 100 mL IVPB (0 mEq Intravenous Stopped 3/16/22 1520)     Medical Decision Making:   Differential Diagnosis:   Chest pain,MI, ACS, AFib, tachycardia, hyponatremia  Clinical Tests:   Lab Tests: Reviewed  The following lab test(s) were unremarkable: CBC, CMP, Urinalysis, BNP and Troponin  Radiological Study: Reviewed  ED Management:  [age] female with a history of AFib presents for palpitations for the last 3 days with associated chest pain and tightness.  Reports it is intermittent she does have midsternal chest pain she currently eats 10/10 deep aching pain that radiates to her shoulder.  Patient was noted to have irregular heart rhythm on auscultation no murmur no rubs no gallops.  Patient's EKG showed AFib RVR with HR in the 140's.  Labetalol was ordered however patient spontaneously converted in the meantime to normal sinus with PVCs.  Patient's blood pressure stable heart rate remained in the 70 to  80s range despite lack of intervention.  Patient states that chest pains efficacy improved since her heart rate has returned to normal.  Currently states 4/10 improved from 10/10.  Patient was also noted to have hypokalemia treated with 40 of p.o. potassium and 10 mEq potassium rider.  Patient's cardiac enzymes negative.  Also noted to have negative dig level.  She reports that she takes all medications as prescribed however believe she has not complained she cannot state what she takes and questions specifically about a med she is unsure if she takes at all.  It is discussed patient with Dr. Coleman who will admit with CIS consult for further evaluation and treatment for hypokalemia and AFib RVR.    Discussed plan of care Dr. Meza who agreed with treatment plan  Other:   I discussed test(s) with the performing physician.                      Clinical Impression:   Final diagnoses:  [R07.9] Chest pain  [R00.2] Palpitations  [I48.91] A-fib (Primary)          ED Disposition Condition    Observation               Nadja Murry NP  03/16/22 0727

## 2022-03-16 NOTE — CONSULTS
[facility] - Med Surg (3rd Fl)  Cardiology  Consult Note    Patient Name: Celine Sinclair  MRN: [id number]  Admission Date: 3/16/2022  Hospital Length of Stay: 0 days  Code Status: Full Code   Attending Provider: Jared Tiwari MD   Consulting Provider: Michelle Acosta NP  Primary Care Physician: Neeru Hinkle MD  Principal Problem:<principal problem not specified>    Patient information was obtained from patient, past medical records and ER records.     Consults  Subjective:     Chief Complaint: Palpitation, chest tightness     HPI: Celine Sinclair is a 71 y.o. female with a PMHx of PAF, palpitations, chest pain, HTN, fibromyalgia and GERD presented to the ED with complaints of palpitations and chest tightness. She reports the palpitations have been intermittent for 3 days with associated chest pain and shortness of breath. She describes the midsternal chest pain as aching and radiates to her left shoulder and arm. EKG revealed A. Fib with RVR. Patient converted back to NSR without treatment.  CXR was without any acute abnormality. Labs significant for potassium of 2.4. Cardiac Enzymes x's 1 set negative thus far. Patient reported chest pain improved since heart rate returned to normal. She was given 40meq of po and 10meq IV potassium.  CIS was asked to evaluate.    Patient remains hemodynamically stable. Of  Note, patient was recently placed on amiodarone and eliquis per primary cardiologist for recent diagnosis of PAF per MCT monitor.    Past Medical History:   Diagnosis Date    Aortic atherosclerosis     Benign essential hypertension     Cataract     Senile    Crohn's colitis     Depression, major, recurrent, moderate     Fibromyalgia     GERD (gastroesophageal reflux disease)     Hypertensive cardiomyopathy     IBS (irritable bowel syndrome)     Migraine     Opioid abuse, in remission     Osteopenia     Paget disease of bone     Port-A-Cath in place     right chest    Prolonged QT interval      RA (rheumatoid arthritis)     Sedative hypnotic or anxiolytic dependence     in remission        Past Surgical History:   Procedure Laterality Date    CATARACT EXTRACTION W/  INTRAOCULAR LENS IMPLANT Left 02/21/2017    Dr. Christianson    CATARACT EXTRACTION W/  INTRAOCULAR LENS IMPLANT Right 03/07/2017    Dr. Christianson    CHOLECYSTECTOMY      COLON SURGERY      COLONOSCOPY N/A 3/16/2016    Procedure: COLONOSCOPY;  Surgeon: Dale Trejo MD;  Location: [address]);  Service: Endoscopy;  Laterality: N/A;    COLONOSCOPY N/A 10/12/2020    Procedure: COLONOSCOPY;  Surgeon: Cali Urena MD;  Location: [facility] ([address] [place] [address]);  Service: Endoscopy;  Laterality: N/A;  covid test 10/9-[facility] [facility]- completed 10/9/20  ok to hold Coumadin x 5 days per coumadin clinic-see telephone encounterd ated 10/6-MS / Loop recorder  10/6/20- Pt confirmed- ERW@1122  10/9-Pt confirmed earlier arrival time, prep ins. reviewed and emailed to Pt    Colostomy reversal      HEMORRHOID SURGERY      HYSTERECTOMY      ILEOSTOMY      ILEOSTOMY CLOSURE      LEFT HEART CATHETERIZATION N/A 2/15/2019    Procedure: HEART CATH-LEFT;  Surgeon: Miky Keita MD;  Location: [facility] CATH LAB;  Service: Cardiology;  Laterality: N/A;    NECK SURGERY      OOPHORECTOMY Bilateral     SBO      SMALL INTESTINE SURGERY      TONSILLECTOMY      TUBAL LIGATION         Review of patient's allergies indicates:   Allergen Reactions    Octreotide acetate Nausea And Vomiting     Had symptoms when she took Sandostatin with Codeine    Codeine Itching and Nausea And Vomiting     Pill form only, IV ok.,  Had symptoms when she took Codeine with Sandostatin.  Other reaction(s): Itching    Morphine Nausea And Vomiting     Patient reports reaction only when she takes po form of Morphine.       No current facility-administered medications on file prior to encounter.     Current Outpatient Medications on File Prior to Encounter   Medication Sig     acetaminophen (TYLENOL) 500 MG tablet Take 1 tablet (500 mg total) by mouth daily as needed for Pain.    apixaban (ELIQUIS) 5 mg Tab Take 1 tablet (5 mg total) by mouth 2 (two) times daily.    aspirin (ECOTRIN) 81 MG EC tablet Take 1 tablet (81 mg total) by mouth once daily.    atorvastatin (LIPITOR) 40 MG tablet     digoxin (LANOXIN) 125 mcg tablet Take 1 tablet (0.125 mg total) by mouth once daily.    diphenoxylate-atropine 2.5-0.025 mg (LOMOTIL) 2.5-0.025 mg per tablet Take 1 tablet by mouth 4 (four) times daily as needed for Diarrhea. Takes 2 tablets twice a day when needed    DULoxetine (CYMBALTA) 30 MG capsule Take 1 capsule (30 mg total) by mouth once daily.    ENTRESTO 24-26 mg per tablet Take 1 tablet by mouth 2 (two) times daily.    INCONTINENCE PAD,LINER,DISP (BLADDER CONTROL PADS EX ABSORB MISC)     metoprolol succinate (TOPROL-XL) 50 MG 24 hr tablet Take 1 tablet (50 mg total) by mouth 2 (two) times daily.    nortriptyline (PAMELOR) 10 MG capsule TAKE 1 CAPSULE (10 MG TOTAL) BY MOUTH EVERY EVENING.    pulse oximeter (PULSE OXIMETER) device by Apply Externally route 2 (two) times a day. Use twice daily at 8 AM and 3 PM and record the value in MyChart as directed. (Patient not taking: Reported on 2/9/2022)    traZODone (DESYREL) 50 MG tablet Take 1 tablet (50 mg total) by mouth nightly as needed.     Family History     Problem Relation (Age of Onset)    Breast cancer Daughter (40), Daughter (47), Sister    Cancer Mother    Colon cancer Maternal Grandmother    Emphysema Father (67)    Glaucoma Paternal Grandmother    Heart attack Maternal Grandmother, Sister    Heart disease Father    Lung cancer Sister    Other Daughter    Retinal detachment Grandchild        Tobacco Use    Smoking status: Never Smoker    Smokeless tobacco: Never Used   Substance and Sexual Activity    Alcohol use: Yes     Comment: wine coolers occassionally    Drug use: No    Sexual activity: Not on file     ROS    Constitutional : Negative  EENT : Negative  CV : Positive for chest tightness and palpitations  Respiratory : Postive for shortness of breath  Gastrointestinal: Negative   Genitourinary: Negative  Musculoskeletal: Negative  Skin : Negative  Neurological : AAO x 3   Objective:     Vital Signs (Most Recent):  Temp: 96.1 °F (35.6 °C) (03/16/22 1207)  Pulse: 73 (03/16/22 1517)  Resp: (!) 22 (03/16/22 1607)  BP: 132/71 (03/16/22 1517)  SpO2: 98 % (03/16/22 1518) Vital Signs (24h Range):  Temp:  [96.1 °F (35.6 °C)] 96.1 °F (35.6 °C)  Pulse:  [] 73  Resp:  [13-29] 22  SpO2:  [97 %-99 %] 98 %  BP: (117-132)/(61-87) 132/71     Weight: 80.8 kg (178 lb 3.9 oz)  Body mass index is 29.66 kg/m².    SpO2: 98 %         Intake/Output Summary (Last 24 hours) at 3/16/2022 1624  Last data filed at 3/16/2022 1520  Gross per 24 hour   Intake 100 ml   Output --   Net 100 ml       Lines/Drains/Airways     Central Venous Catheter Line  Duration                Port A Cath Single Lumen right subclavian -- days          Peripheral Intravenous Line  Duration                Peripheral IV - Single Lumen 03/16/22 1247 20 G Right Antecubital <1 day                Physical Exam   General appearance: alert, appears stated age and cooperative  Head: Normocephalic, without obvious abnormality, atraumatic  Eyes: conjunctivae/corneas clear. PERRL  Neck: no carotid bruit, no JVD and supple, symmetrical, trachea midline  Lungs: clear to auscultation bilaterally, normal respiratory effort  Chest Wall: no tenderness  Heart: regular rate and rhythm, S1, S2 normal, no murmur, click, rub or gallop  Abdomen: soft, non-tender; bowel sounds normal; no masses,  no organomegaly  Extremities: Extremities normal, atraumatic, no cyanosis, clubbing, or edema  Pulses: Dorsalis Pedis R: 2+ (normal)/L: 2+ (normal)  Skin: Skin color, texture, turgor normal. No rashes or lesions  Neurologic: Normal mood and affect  Alert and oriented X 3    Significant Labs:   Recent  Lab Results       03/16/22  1521   03/16/22  1248        Albumin   3.7       Alkaline Phosphatase   69       ALT   11       Anion Gap   17       AST   23       Baso #   0.04       Basophil %   0.5       BILIRUBIN TOTAL   3.0  Comment: For infants and newborns, interpretation of results should be based  on gestational age, weight and in agreement with clinical  observations.    Premature Infant recommended reference ranges:  Up to 24 hours.............<8.0 mg/dL  Up to 48 hours............<12.0 mg/dL  3-5 days..................<15.0 mg/dL  6-29 days.................<15.0 mg/dL         BNP   54  Comment: Values of less than 100 pg/ml are consistent with non-CHF populations.       BUN   10       Calcium   8.5       Chloride   103       CO2   25       CPK   161          161       CPK MB   1.4       Creatinine   0.7       Differential Method   Automated       Digoxin Lvl   <0.1  Comment: Toxic:  Adult: >2.5 ng/mL, Pediatric: >3.0 ng/mL         eGFR if    >60       eGFR if non    >60  Comment: Calculation used to obtain the estimated glomerular filtration  rate (eGFR) is the CKD-EPI equation.          Eos #   0.1       Eosinophil %   1.7       Glucose   99       Gran # (ANC)   5.1       Gran %   63.4       Hematocrit   42.1       Hemoglobin   13.5       Immature Grans (Abs)   0.03  Comment: Mild elevation in immature granulocytes is non specific and   can be seen in a variety of conditions including stress response,   acute inflammation, trauma and pregnancy. Correlation with other   laboratory and clinical findings is essential.         Immature Granulocytes   0.4       Lymph #   2.1       Lymph %   26.0       MB %   0.9  Comment: To be positive, the MB% must be greater than 5% AND the CK-MB  greater than 6.5 ng/mL. Values not in the reference interval,   but not qualifying as positive, should be considered trace.         MCH   28.1       MCHC   32.1       MCV   88       Mono #   0.7        Mono %   8.0       MPV   11.3       nRBC   0       Platelets   262       Potassium   2.4  Comment: Potassium critical result(s) called and verbal readback obtained from   Rocío Bowles RN. by MK1 03/16/2022 13:40         PROTEIN TOTAL   6.8       RBC   4.81       RDW   14.9       Sodium   145       Troponin I 0.009  Comment: The reference interval for Troponin I represents the 99th percentile   cutoff   for our facility and is consistent with 3rd generation assay   performance.     0.010  Comment: The reference interval for Troponin I represents the 99th percentile   cutoff   for our facility and is consistent with 3rd generation assay   performance.         WBC   8.10             Significant Imaging: X-Ray: CXR: X-Ray Chest 1 View (CXR): No results found for this visit on 03/16/22.  Assessment and Plan:     There are no hospital problems to display for this patient.      VTE Risk Mitigation (From admission, onward)         Ordered     IP VTE HIGH RISK PATIENT  Once         03/16/22 1610     Place sequential compression device  Until discontinued         03/16/22 1610              Current Facility-Administered Medications   Medication    acetaminophen tablet 650 mg    labetaloL injection 5 mg    melatonin tablet 6 mg     LHC 11/17/2021:  LAD 30% in the proximal segment of D2 followed by 30% stenosis in the midsegment  CFX had MLI  RCA 30% in the proximal segment  LVEF 55%    PET MPI 2/25/2022:  Normal perfusion study, no perfusion defects noted.    TTE 2/21/2022:  LVEF 60% with mild LVH  Mild NJ  Pulmonary systolic pressure is 11mmHg    Telemetry monitor 2/16/2022  NSR  Sinus arrhythmia  Occasional premature supraventricular contraction/couplets/bigeminy/trigenminy  PAF with RVR  No malignant arrythmia noted.      Dx:  Paroxysmal Atrial fibrillation with RVR due to very low K 2.4  Hypokalemia  CAD/ normal PET Stress 2/22, LVEF 60%   Palpitation  Shortness of breath  Chest Pain  HTN    Plan: hugh MANJARREZ  aggressively  Tele overnight  Increase amio to 400 mg bid  Increase metoprolol to 50 bid  ekg bmp in am  Mag Michelle Acosta NP scribed for Dr. Moreno  Cardiology   [facility] Surg (3rd Fl)    I have personally interviewed and examined this patient face-to-face, and as the physician. Documented the above plan and rendered all medical decision making for this encounter. I have read and agree with the above documentation unless otherwise noted.

## 2022-03-16 NOTE — TELEPHONE ENCOUNTER
----- Message from Lila Carvalho sent at 3/16/2022  1:47 PM CDT -----  Contact: Self  Celine Sinclair  MRN: [id number]  : 1950  PCP: Neeru Hinkle  Home Phone      [phone]  Work Phone      [phone]  Mobile          [phone]      MESSAGE:     Would like to request if a letter could be done excusing her from jury duty.  Please call to advise and assist.    Phone: [phone]

## 2022-03-16 NOTE — PLAN OF CARE
Problem: Adult Inpatient Plan of Care  Goal: Plan of Care Review  Outcome: Ongoing, Progressing  Goal: Patient-Specific Goal (Individualized)  Outcome: Ongoing, Progressing  Goal: Absence of Hospital-Acquired Illness or Injury  Outcome: Ongoing, Progressing  Goal: Optimal Comfort and Wellbeing  Outcome: Ongoing, Progressing  Goal: Readiness for Transition of Care  Outcome: Ongoing, Progressing     Problem: Infection  Goal: Absence of Infection Signs and Symptoms  Outcome: Ongoing, Progressing     Problem: Electrolyte Imbalance  Goal: Electrolyte Balance  Outcome: Ongoing, Progressing     Problem: Pain Acute  Goal: Acceptable Pain Control and Functional Ability  Outcome: Ongoing, Progressing     Problem: Dysrhythmia  Goal: Normalized Cardiac Rhythm  Outcome: Ongoing, Progressing      Pt K increased from 2.4 to 2.7 after 40meq K oral and 10 meq IV admin. Informed MD of critical value of K 2.7 . Admin 40meq one time dose orally today and will repeat tomorrow. Lab to pull K at 2200.

## 2022-03-16 NOTE — PROGRESS NOTES
Grace Moran, Patient Care Assistant  ED Navigator  Emergency Department    Project: [facility] ED Navigator  Role: Community Health Worker    Date: 03/16/2022  Patient Name: Celine Sinclair  MRN: [id number]  PCP: Neeru Hinkle MD    Assessment:     Celine Sinclair is a 71 y.o. female who has presented to ED for palpitations. Patient has visited the ED 2 times in the past 3 months. Patient did not contact PCP.     ED Navigator Initial Assessment    ED Navigator Enrollment Documentation  Consent to Services  Does patient consent to completing the assessment?: Yes  Contact  Method of Initial Contact: Face to Face  Transportation  Does the patient have issues with Transportation?: No  Does the patient have transportation to and from healthcare appointments?: Yes (Comment: Patient is currently using he daughter's car to get places.)  Insurance Coverage  Do you have coverage/adequate coverage?: Yes  Type/kind of coverage: HEALTHY BLUE DUAL ADVANTAGE / [place]  Is patient able to afford co-pays/deductibles?: Yes  Is patient able to afford HME or supplies?: Yes  Does patient have an established [facility] PCP?: Yes  Able to access?: Yes  Does the patient have a lack of adequate coverage?: No  Specialist Appointment  Did the patient come to the ED to see a specialist?: No  Does the patient have a pending specialist referral?: Yes  Does the patient have a specialist appointment made?: No  PCP Follow Up Appointment  Has the patient had an appointment with a primary care provider in the past year?: Yes  Approximate date: 2/9/22  Provider: Neeru Hinkle MD  Does the patient have a follow up appontment with a PCP?: Yes  Upcoming appointment date: 3/23/22  Provider: Neeru Hinkle MD  When was the last time you saw your PCP?: 2/9/22  Medications  Is patient able to afford medication?: Yes  Is patient unable to get medication due to lack of transportation?: No  Psychological  Does the patient have psycho-social concerns?:  No  Food  Does the patient have concerns about food?: Yes  What concerns does the patient have regarding food?: Lack of food  Communication/Education  Does the patient have limited English proficiency/English not primary language?: No  Does patient have low literacy and/or low health literacy?: Yes  Does patient have concerns with care?: No  Does patient have dissatisfaction with care?: No  Other Financial Concerns  Does the patient have immediate financial distress?: No  Does the patient have general financial concerns?: Yes  Other Social Barriers/Concerns  Does the patient have any additional barriers or concerns?: None  Primary Barrier  Barriers identified: Cognitive barrier (health literacy, language and communication, etc.)  Root Cause of ED Utilization: Chronic Conditions  Plan to address Chronic Conditions: Schedule appointment for patient with their PCP/specialist per ED discharge instructions, Remind patient of upcoming PCP/specialist appointment within 24 to 48 hours before scheduled appt  Next steps: Provided Education, Scheduled Appointment/Referral  Scheduled Appointment Date: 3/23/22  Appointment Reminder Date: 3/22/22  Was education/educational materials provided surrounding PCP services/creating a medical home?: Yes Was education verbal or written?: Written, Verbal   Was education/educational materials provided surrounding low cost, healthy foods?: Yes Was education verbal or written?: Verbal, Written   Was education/educational materials provided surrounding other items? If so, use comment to explain.: Yes Was education verbal or written?: Written, Verbal   Plan: The patient was given food bank/Pantry resources for their region.  Expected Date of Follow Up 1: 3/22/22  Additional Documentation: Patient expressed she would like assistance with scheduling a PCP follow-up appointment with Dr. Hinkle. Patient identified that she and her  live check to check and could use a list of food prabhakar in  her area. Patient expressed after paying all of their bills/expense, they pretty much having nothing left. Patient has been using her daughter's car for transportation; therefore could use resources on transportation as well. Patient is agreeable to a follow-up call next week.    ED navigator ensured to assist patient with all needs she has. ED navigator called and scheduled patient an appointment with Dr Hinkle for 3/23/2022 at 1:45 p.m. ED navigator provided patient with the following in-hand: her appointment information, medicaid medical transportation information, other transportation resources for her area, a list of food prabhakar for her area/close to her area, the [facility] 24/7 Nurse triage line, [phone] or 1-866-[facility] ([phone]) contact information, education to choose the Right level of care at the Right place, and education on [facility]'s Virtual visit program. ED navigator will follow-up with patient on 3/22/2022 to remind her of her appointment on 3/23/2022, see if she was able to reach out to any resources, and to see if she has any other needs during that time.    Grace Moran  ED Navigator- [place]          Social History     Socioeconomic History    Marital status:     Number of children: 12   Occupational History    Occupation: Hairdresser     Comment: Retired/disabled   Tobacco Use    Smoking status: Never Smoker    Smokeless tobacco: Never Used   Substance and Sexual Activity    Alcohol use: Yes     Comment: wine coolers occassionally    Drug use: No   Social History Narrative    Patient gave birth to 7 children, adopted 2 and has 3 stepchildren with her .     Social Determinants of Health     Financial Resource Strain: High Risk    Difficulty of Paying Living Expenses: Hard   Food Insecurity: Food Insecurity Present    Worried About Running Out of Food in the Last Year: Sometimes true    Ran Out of Food in the Last Year: Sometimes true    Transportation Needs: Unmet Transportation Needs    Lack of Transportation (Medical): Yes    Lack of Transportation (Non-Medical): Yes   Physical Activity: Inactive    Days of Exercise per Week: 0 days    Minutes of Exercise per Session: 0 min   Stress: Stress Concern Present    Feeling of Stress : Rather much   Social Connections: Socially Integrated    Frequency of Communication with Friends and Family: More than three times a week    Frequency of Social Gatherings with Friends and Family: More than three times a week    Attends Orthodoxy Services: More than 4 times per year    Active Member of Clubs or Organizations: Yes    Attends Club or Organization Meetings: More than 4 times per year    Marital Status:    Housing Stability: Low Risk     Unable to Pay for Housing in the Last Year: No    Number of Places Lived in the Last Year: 1    Unstable Housing in the Last Year: No       Plan:     Patient expressed she would like assistance with scheduling a PCP follow-up appointment with Dr. Hinkle. Patient identified that she and her  live check to check and could use a list of food prabhakar in her area. Patient expressed after paying all of their bills/expense, they pretty much having nothing left. Patient has been using her daughter's car for transportation; therefore could use resources on transportation as well. Patient is agreeable to a follow-up call next week.    ED navigator ensured to assist patient with all needs she has. ED navigator called and scheduled patient an appointment with Dr Hinkle for 3/23/2022 at 1:45 p.m. ED navigator provided patient with the following in-hand: her appointment information, medicaid medical transportation information, other transportation resources for her area, a list of food prabhakar for her area/close to her area, the [facility] 24/7 Nurse triage line, [phone] or 1-866-[facility] ([phone]) contact information, education to choose the Right  level of care at the Right place, and education on [facility]'s Virtual visit program. ED navigator will follow-up with patient on 3/22/2022 to remind her of her appointment on 3/23/2022, see if she was able to reach out to any resources, and to see if she has any other needs during that time.    Grace Moran  ED Navigator- [place]

## 2022-03-17 VITALS
SYSTOLIC BLOOD PRESSURE: 125 MMHG | WEIGHT: 183 LBS | OXYGEN SATURATION: 96 % | TEMPERATURE: 96 F | RESPIRATION RATE: 18 BRPM | BODY MASS INDEX: 30.49 KG/M2 | HEART RATE: 51 BPM | DIASTOLIC BLOOD PRESSURE: 76 MMHG | HEIGHT: 65 IN

## 2022-03-17 PROBLEM — I48.0 PAF (PAROXYSMAL ATRIAL FIBRILLATION): Status: ACTIVE | Noted: 2022-03-17

## 2022-03-17 PROBLEM — R07.9 CHEST PAIN: Status: ACTIVE | Noted: 2022-03-17

## 2022-03-17 PROBLEM — E87.6 HYPOKALEMIA: Status: ACTIVE | Noted: 2022-03-17

## 2022-03-17 PROBLEM — E83.42 HYPOMAGNESEMIA: Status: ACTIVE | Noted: 2022-03-17

## 2022-03-17 LAB
ALBUMIN SERPL BCP-MCNC: 3.2 G/DL (ref 3.5–5.2)
ALP SERPL-CCNC: 94 U/L (ref 55–135)
ALT SERPL W/O P-5'-P-CCNC: 14 U/L (ref 10–44)
ANION GAP SERPL CALC-SCNC: 12 MMOL/L (ref 8–16)
AST SERPL-CCNC: 46 U/L (ref 10–40)
BASOPHILS # BLD AUTO: 0.04 K/UL (ref 0–0.2)
BASOPHILS NFR BLD: 0.7 % (ref 0–1.9)
BILIRUB SERPL-MCNC: 3.4 MG/DL (ref 0.1–1)
BUN SERPL-MCNC: 10 MG/DL (ref 8–23)
CALCIUM SERPL-MCNC: 7.9 MG/DL (ref 8.7–10.5)
CHLORIDE SERPL-SCNC: 106 MMOL/L (ref 95–110)
CO2 SERPL-SCNC: 27 MMOL/L (ref 23–29)
CREAT SERPL-MCNC: 0.7 MG/DL (ref 0.5–1.4)
DIFFERENTIAL METHOD: ABNORMAL
EOSINOPHIL # BLD AUTO: 0.2 K/UL (ref 0–0.5)
EOSINOPHIL NFR BLD: 2.7 % (ref 0–8)
ERYTHROCYTE [DISTWIDTH] IN BLOOD BY AUTOMATED COUNT: 15.1 % (ref 11.5–14.5)
EST. GFR  (AFRICAN AMERICAN): >60 ML/MIN/1.73 M^2
EST. GFR  (NON AFRICAN AMERICAN): >60 ML/MIN/1.73 M^2
GLUCOSE SERPL-MCNC: 100 MG/DL (ref 70–110)
HCT VFR BLD AUTO: 36.8 % (ref 37–48.5)
HGB BLD-MCNC: 11.8 G/DL (ref 12–16)
IMM GRANULOCYTES # BLD AUTO: 0.02 K/UL (ref 0–0.04)
IMM GRANULOCYTES NFR BLD AUTO: 0.3 % (ref 0–0.5)
LYMPHOCYTES # BLD AUTO: 1.7 K/UL (ref 1–4.8)
LYMPHOCYTES NFR BLD: 27.7 % (ref 18–48)
MAGNESIUM SERPL-MCNC: 1.2 MG/DL (ref 1.6–2.6)
MCH RBC QN AUTO: 28.2 PG (ref 27–31)
MCHC RBC AUTO-ENTMCNC: 32.1 G/DL (ref 32–36)
MCV RBC AUTO: 88 FL (ref 82–98)
MONOCYTES # BLD AUTO: 0.6 K/UL (ref 0.3–1)
MONOCYTES NFR BLD: 10.4 % (ref 4–15)
NEUTROPHILS # BLD AUTO: 3.5 K/UL (ref 1.8–7.7)
NEUTROPHILS NFR BLD: 58.2 % (ref 38–73)
NRBC BLD-RTO: 0 /100 WBC
PLATELET # BLD AUTO: 226 K/UL (ref 150–450)
PMV BLD AUTO: 11 FL (ref 9.2–12.9)
POTASSIUM SERPL-SCNC: 3.4 MMOL/L (ref 3.5–5.1)
PROT SERPL-MCNC: 5.7 G/DL (ref 6–8.4)
RBC # BLD AUTO: 4.18 M/UL (ref 4–5.4)
SODIUM SERPL-SCNC: 145 MMOL/L (ref 136–145)
TROPONIN I SERPL DL<=0.01 NG/ML-MCNC: 0.01 NG/ML (ref 0–0.03)
WBC # BLD AUTO: 5.95 K/UL (ref 3.9–12.7)

## 2022-03-17 PROCEDURE — 99236 HOSP IP/OBS SAME DATE HI 85: CPT | Mod: ,,, | Performed by: STUDENT IN AN ORGANIZED HEALTH CARE EDUCATION/TRAINING PROGRAM

## 2022-03-17 PROCEDURE — 85025 COMPLETE CBC W/AUTO DIFF WBC: CPT | Performed by: NURSE PRACTITIONER

## 2022-03-17 PROCEDURE — 93005 ELECTROCARDIOGRAM TRACING: CPT

## 2022-03-17 PROCEDURE — 84484 ASSAY OF TROPONIN QUANT: CPT | Performed by: NURSE PRACTITIONER

## 2022-03-17 PROCEDURE — 80053 COMPREHEN METABOLIC PANEL: CPT | Performed by: NURSE PRACTITIONER

## 2022-03-17 PROCEDURE — 99236 PR OBSERV/HOSP SAME DATE,LEVL V: ICD-10-PCS | Mod: ,,, | Performed by: STUDENT IN AN ORGANIZED HEALTH CARE EDUCATION/TRAINING PROGRAM

## 2022-03-17 PROCEDURE — 63600175 PHARM REV CODE 636 W HCPCS: Performed by: NURSE PRACTITIONER

## 2022-03-17 PROCEDURE — 25000003 PHARM REV CODE 250: Performed by: NURSE PRACTITIONER

## 2022-03-17 PROCEDURE — 83735 ASSAY OF MAGNESIUM: CPT | Performed by: NURSE PRACTITIONER

## 2022-03-17 PROCEDURE — 94760 N-INVAS EAR/PLS OXIMETRY 1: CPT

## 2022-03-17 PROCEDURE — G0378 HOSPITAL OBSERVATION PER HR: HCPCS

## 2022-03-17 PROCEDURE — 25000003 PHARM REV CODE 250: Performed by: FAMILY MEDICINE

## 2022-03-17 PROCEDURE — 25000003 PHARM REV CODE 250

## 2022-03-17 PROCEDURE — 96367 TX/PROPH/DG ADDL SEQ IV INF: CPT

## 2022-03-17 PROCEDURE — 36415 COLL VENOUS BLD VENIPUNCTURE: CPT | Performed by: NURSE PRACTITIONER

## 2022-03-17 PROCEDURE — 96366 THER/PROPH/DIAG IV INF ADDON: CPT

## 2022-03-17 RX ORDER — METOPROLOL SUCCINATE 50 MG/1
50 TABLET, EXTENDED RELEASE ORAL 2 TIMES DAILY
Qty: 60 TABLET | Refills: 0 | Status: SHIPPED | OUTPATIENT
Start: 2022-03-17 | End: 2022-04-11

## 2022-03-17 RX ORDER — MAGNESIUM SULFATE HEPTAHYDRATE 40 MG/ML
2 INJECTION, SOLUTION INTRAVENOUS ONCE
Status: COMPLETED | OUTPATIENT
Start: 2022-03-17 | End: 2022-03-17

## 2022-03-17 RX ORDER — MAGNESIUM SULFATE 1 G/100ML
1 INJECTION INTRAVENOUS ONCE
Status: COMPLETED | OUTPATIENT
Start: 2022-03-17 | End: 2022-03-17

## 2022-03-17 RX ORDER — ASPIRIN 81 MG/1
81 TABLET ORAL DAILY
Status: DISCONTINUED | OUTPATIENT
Start: 2022-03-17 | End: 2022-03-17 | Stop reason: HOSPADM

## 2022-03-17 RX ORDER — POTASSIUM CHLORIDE 20 MEQ/1
40 TABLET, EXTENDED RELEASE ORAL 2 TIMES DAILY
Status: DISCONTINUED | OUTPATIENT
Start: 2022-03-17 | End: 2022-03-17 | Stop reason: HOSPADM

## 2022-03-17 RX ORDER — DULOXETIN HYDROCHLORIDE 30 MG/1
30 CAPSULE, DELAYED RELEASE ORAL DAILY
Status: DISCONTINUED | OUTPATIENT
Start: 2022-03-17 | End: 2022-03-17 | Stop reason: HOSPADM

## 2022-03-17 RX ORDER — AMIODARONE HYDROCHLORIDE 200 MG/1
200 TABLET ORAL DAILY
Qty: 30 TABLET | Refills: 0 | Status: ON HOLD | OUTPATIENT
Start: 2022-03-18 | End: 2023-06-30

## 2022-03-17 RX ORDER — AMIODARONE HYDROCHLORIDE 200 MG/1
200 TABLET ORAL DAILY
Status: DISCONTINUED | OUTPATIENT
Start: 2022-03-17 | End: 2022-03-17 | Stop reason: HOSPADM

## 2022-03-17 RX ORDER — AMIODARONE HYDROCHLORIDE 200 MG/1
200 TABLET ORAL DAILY
Status: DISCONTINUED | OUTPATIENT
Start: 2022-03-18 | End: 2022-03-17

## 2022-03-17 RX ORDER — ATORVASTATIN CALCIUM 40 MG/1
40 TABLET, FILM COATED ORAL NIGHTLY
Status: DISCONTINUED | OUTPATIENT
Start: 2022-03-17 | End: 2022-03-17 | Stop reason: HOSPADM

## 2022-03-17 RX ORDER — POTASSIUM CHLORIDE 20 MEQ/1
40 TABLET, EXTENDED RELEASE ORAL DAILY
Qty: 60 TABLET | Refills: 0 | Status: SHIPPED | OUTPATIENT
Start: 2022-03-17 | End: 2022-04-11

## 2022-03-17 RX ADMIN — DULOXETINE 30 MG: 30 CAPSULE, DELAYED RELEASE ORAL at 09:03

## 2022-03-17 RX ADMIN — MAGNESIUM SULFATE 1 G: 1 INJECTION INTRAVENOUS at 11:03

## 2022-03-17 RX ADMIN — ASPIRIN 81 MG: 81 TABLET, COATED ORAL at 09:03

## 2022-03-17 RX ADMIN — POTASSIUM CHLORIDE 40 MEQ: 1500 TABLET, EXTENDED RELEASE ORAL at 09:03

## 2022-03-17 RX ADMIN — APIXABAN 5 MG: 5 TABLET, FILM COATED ORAL at 09:03

## 2022-03-17 RX ADMIN — AMIODARONE HYDROCHLORIDE 200 MG: 200 TABLET ORAL at 11:03

## 2022-03-17 RX ADMIN — MAGNESIUM SULFATE HEPTAHYDRATE 2 G: 40 INJECTION, SOLUTION INTRAVENOUS at 12:03

## 2022-03-17 RX ADMIN — TRAMADOL HYDROCHLORIDE 50 MG: 50 TABLET, FILM COATED ORAL at 09:03

## 2022-03-17 NOTE — PLAN OF CARE
03/17/22 1219   LEO Message   Medicare Outpatient and Observation Notification regarding financial responsibility Given to patient/caregiver;Explained to patient/caregiver;Signed/date by patient/caregiver   Date LEO was signed 03/17/22   Time LEO was signed 1038

## 2022-03-17 NOTE — SUBJECTIVE & OBJECTIVE
Past Medical History:   Diagnosis Date    Aortic atherosclerosis     Benign essential hypertension     Cataract     Senile    Crohn's colitis     Depression, major, recurrent, moderate     Fibromyalgia     GERD (gastroesophageal reflux disease)     Hypertensive cardiomyopathy     IBS (irritable bowel syndrome)     Migraine     Opioid abuse, in remission     Osteopenia     Paget disease of bone     Port-A-Cath in place     right chest    Prolonged QT interval     RA (rheumatoid arthritis)     Sedative hypnotic or anxiolytic dependence     in remission        Past Surgical History:   Procedure Laterality Date    CATARACT EXTRACTION W/  INTRAOCULAR LENS IMPLANT Left 02/21/2017    Dr. Christianson    CATARACT EXTRACTION W/  INTRAOCULAR LENS IMPLANT Right 03/07/2017    Dr. Christianson    CHOLECYSTECTOMY      COLON SURGERY      COLONOSCOPY N/A 3/16/2016    Procedure: COLONOSCOPY;  Surgeon: Dale Trejo MD;  Location: [facility] ENDO (4TH FLR);  Service: Endoscopy;  Laterality: N/A;    COLONOSCOPY N/A 10/12/2020    Procedure: COLONOSCOPY;  Surgeon: Cali Urena MD;  Location: [facility] ENDO (4TH FLR);  Service: Endoscopy;  Laterality: N/A;  covid test 10/9-[place] [facility]- completed 10/9/20  ok to hold Coumadin x 5 days per coumadin clinic-see telephone encounterd ated 10/6-MS / Loop recorder  10/6/20- Pt confirmed- ERW@1122  10/9-Pt confirmed earlier arrival time, prep ins. reviewed and emailed to Pt    Colostomy reversal      HEMORRHOID SURGERY      HYSTERECTOMY      ILEOSTOMY      ILEOSTOMY CLOSURE      LEFT HEART CATHETERIZATION N/A 2/15/2019    Procedure: HEART CATH-LEFT;  Surgeon: Miky Keita MD;  Location: [facility] CATH LAB;  Service: Cardiology;  Laterality: N/A;    NECK SURGERY      OOPHORECTOMY Bilateral     SBO      SMALL INTESTINE SURGERY      TONSILLECTOMY      TUBAL LIGATION         Review of patient's allergies indicates:   Allergen Reactions    Octreotide acetate Nausea And Vomiting     Had symptoms when she  took Sandostatin with Codeine    Codeine Itching and Nausea And Vomiting     Pill form only, IV ok.,  Had symptoms when she took Codeine with Sandostatin.  Other reaction(s): Itching    Morphine Nausea And Vomiting     Patient reports reaction only when she takes po form of Morphine.       No current facility-administered medications on file prior to encounter.     Current Outpatient Medications on File Prior to Encounter   Medication Sig    apixaban (ELIQUIS) 5 mg Tab Take 1 tablet (5 mg total) by mouth 2 (two) times daily.    digoxin (LANOXIN) 125 mcg tablet Take 1 tablet (0.125 mg total) by mouth once daily.    DULoxetine (CYMBALTA) 30 MG capsule Take 1 capsule (30 mg total) by mouth once daily.    nortriptyline (PAMELOR) 10 MG capsule TAKE 1 CAPSULE (10 MG TOTAL) BY MOUTH EVERY EVENING.    acetaminophen (TYLENOL) 500 MG tablet Take 1 tablet (500 mg total) by mouth daily as needed for Pain.    aspirin (ECOTRIN) 81 MG EC tablet Take 1 tablet (81 mg total) by mouth once daily.    atorvastatin (LIPITOR) 40 MG tablet     diphenoxylate-atropine 2.5-0.025 mg (LOMOTIL) 2.5-0.025 mg per tablet Take 1 tablet by mouth 4 (four) times daily as needed for Diarrhea. Takes 2 tablets twice a day when needed    ENTRESTO 24-26 mg per tablet Take 1 tablet by mouth 2 (two) times daily.    INCONTINENCE PAD,LINER,DISP (BLADDER CONTROL PADS EX ABSORB MISC)     metoprolol succinate (TOPROL-XL) 50 MG 24 hr tablet Take 1 tablet (50 mg total) by mouth 2 (two) times daily.    pulse oximeter (PULSE OXIMETER) device by Apply Externally route 2 (two) times a day. Use twice daily at 8 AM and 3 PM and [facility]. (Patient not taking: Reported on 2/9/2022)    traZODone (DESYREL) 50 MG tablet Take 1 tablet (50 mg total) by mouth nightly as needed.     Family History       Problem Relation (Age of Onset)    Breast cancer Daughter (40), Daughter (47), Sister    Cancer Mother    Colon cancer Maternal Grandmother     Emphysema Father (67)    Glaucoma Paternal Grandmother    Heart attack Maternal Grandmother, Sister    Heart disease Father    Lung cancer Sister    Other Daughter    Retinal detachment Grandchild          Tobacco Use    Smoking status: Never Smoker    Smokeless tobacco: Never Used   Substance and Sexual Activity    Alcohol use: Not on file     Comment: wine coolers occassionally    Drug use: No    Sexual activity: Yes     Partners: Female     Review of Systems  Objective:     Vital Signs (Most Recent):  Temp: 97.3 °F (36.3 °C) (03/17/22 0338)  Pulse: (!) 50 (03/17/22 0600)  Resp: 19 (03/17/22 0338)  BP: (!) 122/55 (03/17/22 0338)  SpO2: 96 % (03/17/22 0338)   Vital Signs (24h Range):  Temp:  [96.1 °F (35.6 °C)-98.8 °F (37.1 °C)] 97.3 °F (36.3 °C)  Pulse:  [] 50  Resp:  [13-29] 19  SpO2:  [95 %-99 %] 96 %  BP: (114-132)/(55-87) 122/55     Weight: 83 kg (182 lb 15.7 oz)  Body mass index is 30.45 kg/m².    Physical Exam        Significant Labs: All pertinent labs within the past 24 hours have been reviewed.  CBC:   Recent Labs   Lab 03/16/22  1248 03/17/22  0547   WBC 8.10 5.95   HGB 13.5 11.8*   HCT 42.1 36.8*    226     CMP:   Recent Labs   Lab 03/16/22  1248 03/16/22  1647 03/16/22  2203 03/17/22  0547    144  --  145   K 2.4* 2.7* 3.1* 3.4*    104  --  106   CO2 25 25  --  27   GLU 99 90  --  100   BUN 10 9  --  10   CREATININE 0.7 0.6  --  0.7   CALCIUM 8.5* 8.1*  --  7.9*   PROT 6.8  --   --  5.7*   ALBUMIN 3.7  --   --  3.2*   BILITOT 3.0*  --   --  3.4*   ALKPHOS 69  --   --  94   AST 23  --   --  46*   ALT 11  --   --  14   ANIONGAP 17* 15  --  12   EGFRNONAA >60 >60  --  >60   Dig level   Cardiac Markers: <0.1  Recent Labs   Lab 03/16/22  1248   BNP 54     Troponin:   Recent Labs   Lab 03/16/22  1248 03/16/22  1521 03/16/22  2203   TROPONINI 0.010 0.009 0.020       Significant Imaging:     CXR No acute abnormality    3*16 1209pm EKG  Sinus tachycardia  Marked ST abnormality, possible  inferior subendocardial injury  Abnormal ECG  When compared with ECG of 01-FEB-2022 07:54,  Premature atrial complexes are no longer Present  Vent. rate has increased BY 84 BPM  ST more depressed in Inferior leads  ST now depressed in Anterior leads  T wave inversion no longer evident in Anterior leads    3*16 1226 EKG   Atrial fibrillation with rapid ventricular response  Marked ST abnormality, possible inferior subendocardial injury  Abnormal ECG  When compared with ECG of 16-MAR-2022 12:09,  Atrial fibrillation has replaced Sinus rhythm  Confirmed by Haider Mayer MD ([id number]) on 3/16/2022 2:54:55 PM    3*16 136pm EKG  Sinus rhythm with Premature supraventricular complexes  Nonspecific ST and T wave abnormality  Prolonged QT  Abnormal ECG  When compared with ECG of 16-MAR-2022 12:26,  Sinus rhythm has replaced Atrial fibrillation  Vent. rate has decreased BY 42 BPM  ST less depressed in Inferior leads  T wave inversion no longer evident in Inferior leads  Confirmed by Haider Mayer MD ([id number]) on 3/16/2022 3:56:12 PM    3*17 734am EKG  Sinus bradycardia  Low voltage QRS  Septal infarct ,age undetermined  Prolonged QT  Abnormal ECG  When compared with ECG of 16-MAR-2022 13:36,  Premature supraventricular complexes are no longer Present  Vent. rate has decreased BY 34 BPM  Septal infarct is now Present  T wave inversion more evident in Anterior leads  Confirmed by Haider Mayer MD ([id number]) on 3/16/2022 2:54:52 PM

## 2022-03-17 NOTE — HPI
Pt is a 71 y.o. female with a PMHx of PAF, palpitations, chest pain, HTN, fibromyalgia, depression, pagets disease, RA, crohns colitis and GERD presented to the ED with complaints of palpitations and chest tightness. She reports the palpitations have been intermittent for 3 days with associated chest pain and shortness of breath. She describes the midsternal chest pain as aching and radiates to her left shoulder and arm. EKG revealed A. Fib with RVR. Patient converted back to NSR without treatment.  Of  Note, patient was recently placed on amiodarone and eliquis per primary cardiologist for recent diagnosis of PAF per MCT monitor. CXR was without any acute abnormality. Labs significant for very low potassium of 2.4. Cardiac Enzymes x's 1 set negative in ER. Patient reported chest pain improved since heart rate returned to normal. [facility] was asked to evaluate in ER. They made adjustments to medication doses as well as rec tele overnight with replacement of K+. She was placed in observation overnioght. This am she is sinus padmini on tele and EKG. VSS/afebrile. K+ 3.4 after 2 doses of 40meq po KCL and a 10meq KCL IVPB. She also had 3 troponins that were resulted since admission that have been normal. She did c/o shoulder and hip pain overnight. NO chest pain or SOB. Tylenol not affective. Tramadol given with relief.

## 2022-03-17 NOTE — PROGRESS NOTES
[place] [facility] ([facility])  Cardiology  Progress Note    Patient Name: Celine Sinclair  MRN: [id number]  Admission Date: 3/16/2022  Hospital Length of Stay: 0 days  Code Status: Full Code   Attending Physician: Jared Tiwari MD   Primary Care Physician: Neeru Hinkle MD  Expected Discharge Date:   Principal Problem:<principal problem not specified>    Subjective:     Hospital Course: Celine Sinclair is a 71 y.o. female with a PMHx of PAF, palpitations, chest pain, HTN, fibromyalgia and GERD presented to the ED with complaints of palpitations and chest tightness. She reports the palpitations have been intermittent for 3 days with associated chest pain and shortness of breath. She describes the midsternal chest pain as aching and radiates to her left shoulder and arm. EKG revealed A. Fib with RVR. Patient converted back to NSR without treatment.  CXR was without any acute abnormality. Labs significant for potassium of 2.4. Cardiac Enzymes x's 1 set negative thus far. Patient reported chest pain improved since heart rate returned to normal. She was given 40meq of po and 10meq IV potassium.  [facility] was asked to evaluate.       Interval History: She remains hemodynamically stable. Labs reveal hypokalemia. EKG this am is sinus bradycardia, HR 51.     ROS   Constitutional : Negative  EENT : Negative  CV : Negative  Respiratory : Negative  Gastrointestinal: Negative   Genitourinary: Negative  Musculoskeletal: Negative  Skin : Negative  Neurological : AAO x 3     Objective:     Vital Signs (Most Recent):  Temp: 97.3 °F (36.3 °C) (03/17/22 0338)  Pulse: (!) 50 (03/17/22 0600)  Resp: 19 (03/17/22 0338)  BP: (!) 122/55 (03/17/22 0338)  SpO2: 96 % (03/17/22 0338) Vital Signs (24h Range):  Temp:  [96.1 °F (35.6 °C)-98.8 °F (37.1 °C)] 97.3 °F (36.3 °C)  Pulse:  [] 50  Resp:  [13-29] 19  SpO2:  [95 %-99 %] 96 %  BP: (114-132)/(55-87) 122/55     Weight: 83 kg (182 lb 15.7 oz)  Body mass index is 30.45 kg/m².    SpO2: 96  %  O2 Device (Oxygen Therapy): room air      Intake/Output Summary (Last 24 hours) at 3/17/2022 0800  Last data filed at 3/16/2022 2330  Gross per 24 hour   Intake 260 ml   Output 350 ml   Net -90 ml       Lines/Drains/Airways     Central Venous Catheter Line  Duration                Port A Cath Single Lumen right subclavian -- days          Peripheral Intravenous Line  Duration                Peripheral IV - Single Lumen 03/16/22 1247 20 G Right Antecubital <1 day                Physical Exam   General appearance: alert, appears stated age and cooperative  Head: Normocephalic, without obvious abnormality, atraumatic  Eyes: conjunctivae/corneas clear. PERRL  Neck: no carotid bruit, no JVD and supple, symmetrical, trachea midline  Lungs: clear to auscultation bilaterally, normal respiratory effort  Chest Wall: no tenderness  Heart: regular rate and rhythm, S1, S2 normal, no murmur, click, rub or gallop  Abdomen: soft, non-tender; bowel sounds normal; no masses,  no organomegaly  Extremities: Extremities normal, atraumatic, no cyanosis, clubbing, or edema  Pulses: Dorsalis Pedis R: 2+ (normal)/L: 2+ (normal)  Skin: Skin color, texture, turgor normal. No rashes or lesions  Neurologic: Normal mood and affect  Alert and oriented X 3    Significant Labs:   CMP   Recent Labs   Lab 03/16/22  1248 03/16/22  1647 03/16/22  2203 03/17/22  0547    144  --  145   K 2.4* 2.7* 3.1* 3.4*    104  --  106   CO2 25 25  --  27   GLU 99 90  --  100   BUN 10 9  --  10   CREATININE 0.7 0.6  --  0.7   CALCIUM 8.5* 8.1*  --  7.9*   PROT 6.8  --   --  5.7*   ALBUMIN 3.7  --   --  3.2*   BILITOT 3.0*  --   --  3.4*   ALKPHOS 69  --   --  94   AST 23  --   --  46*   ALT 11  --   --  14   ANIONGAP 17* 15  --  12   ESTGFRAFRICA >60 >60  --  >60   EGFRNONAA >60 >60  --  >60       Assessment and Plan:       There are no hospital problems to display for this patient.      VTE Risk Mitigation (From admission, onward)         Ordered      IP VTE HIGH RISK PATIENT  Once         03/16/22 1610     Place sequential compression device  Until discontinued         03/16/22 1610              Current Facility-Administered Medications   Medication    acetaminophen tablet 650 mg    amiodarone tablet 400 mg    apixaban tablet 5 mg    aspirin EC tablet 81 mg    atorvastatin tablet 40 mg    melatonin tablet 6 mg    metoprolol succinate (TOPROL-XL) 24 hr tablet 50 mg    potassium chloride SA CR tablet 40 mEq    traMADoL tablet 50 mg     LHC 11/17/2021:  LAD 30% in the proximal segment of D2 followed by 30% stenosis in the midsegment  CFX had MLI  RCA 30% in the proximal segment  LVEF 55%     PET MPI 2/25/2022:  Normal perfusion study, no perfusion defects noted.     TTE 2/21/2022:  LVEF 60% with mild LVH  Mild MO  Pulmonary systolic pressure is 11mmHg     Telemetry monitor 2/16/2022  NSR  Sinus arrhythmia  Occasional premature supraventricular contraction/couplets/bigeminy/trigenminy  PAF with RVR  No malignant arrythmia noted.        Dx:Stable overnight in SR  Paroxysmal Atrial fibrillation with RVR due to very low K 2.4  Hypokalemia almost back to normal; due to chronic watery stools probably  CAD mild 11/21/ normal PET Stress 2/22, LVEF 60%   Palpitation  Shortness of breath  Chest Pain  HTN     Plan:   replace K further and DC home on 40 meq BID  Mag check and supplement iv if less than 2  Decrease amio to 200 qd  Metoprolol to 50 bid  Eliquis 5mg po BID  Outpatient follow up with a BMP monday       Michelle Acosta NP scribed for Dr. Moreno  Cardiology  [facility] - Med Surg (3rd Fl)    I have personally interviewed and examined this patient face-to-face, and as the physician. Documented the above plan and rendered all medical decision making for this encounter. I have read and agree with the above documentation unless otherwise noted.

## 2022-03-17 NOTE — H&P
[facility] ([facility])  [facility]  History & Physical    Patient Name: Celine Sinclair  MRN: [id number]  Patient Class: OP- Observation  Admission Date: 3/16/2022  Attending Physician: Jared Tiwari MD   Primary Care Provider: Nereu Hinkle MD         Patient information was obtained from patient and ER records.     Subjective:     Principal Problem:PAF (paroxysmal atrial fibrillation)    Chief Complaint:   Chief Complaint   Patient presents with    Palpitations     Since Monday - pt reports constant palpitations. Pt reports tightness to middle of chest         HPI: Pt is a 71 y.o. female with a PMHx of PAF, palpitations, chest pain, HTN, fibromyalgia, depression, pagets disease, RA, crohns colitis and GERD presented to the ED with complaints of palpitations and chest tightness. She reports the palpitations have been intermittent for 3 days with associated chest pain and shortness of breath. She describes the midsternal chest pain as aching and radiates to her left shoulder and arm. EKG revealed A. Fib with RVR. Patient converted back to NSR without treatment.  Of  Note, patient was recently placed on amiodarone and eliquis per primary cardiologist for recent diagnosis of PAF per MCT monitor. CXR was without any acute abnormality. Labs significant for very low potassium of 2.4. Cardiac Enzymes x's 1 set negative in ER. Patient reported chest pain improved since heart rate returned to normal. [facility] was asked to evaluate in ER. They made adjustments to medication doses as well as rec tele overnight with replacement of K+. She was placed in observation overnioght. This am she is sinus padmini on tele and EKG. VSS/afebrile. K+ 3.4 after 2 doses of 40meq po KCL and a 10meq KCL IVPB. She also had 3 troponins that were resulted since admission that have been normal. She did c/o shoulder and hip pain overnight. NO chest pain or SOB. Tylenol not affective. Tramadol given with relief.            Past  Medical History:   Diagnosis Date    Aortic atherosclerosis     Benign essential hypertension     Cataract     Senile    Crohn's colitis     Depression, major, recurrent, moderate     Fibromyalgia     GERD (gastroesophageal reflux disease)     Hypertensive cardiomyopathy     IBS (irritable bowel syndrome)     Migraine     Opioid abuse, in remission     Osteopenia     Paget disease of bone     Port-A-Cath in place     right chest    Prolonged QT interval     RA (rheumatoid arthritis)     Sedative hypnotic or anxiolytic dependence     in remission        Past Surgical History:   Procedure Laterality Date    CATARACT EXTRACTION W/  INTRAOCULAR LENS IMPLANT Left 02/21/2017    Dr. Christianson    CATARACT EXTRACTION W/  INTRAOCULAR LENS IMPLANT Right 03/07/2017    Dr. Christianson    CHOLECYSTECTOMY      COLON SURGERY      COLONOSCOPY N/A 3/16/2016    Procedure: COLONOSCOPY;  Surgeon: Dale Trejo MD;  Location: [facility] ENDO (4TH FLR);  Service: Endoscopy;  Laterality: N/A;    COLONOSCOPY N/A 10/12/2020    Procedure: COLONOSCOPY;  Surgeon: Cali Urena MD;  Location: [facility] ENDO (4TH FLR);  Service: Endoscopy;  Laterality: N/A;  covid test 10/9-[place] [facility]- completed 10/9/20  ok to hold Coumadin x 5 days per coumadin clinic-see telephone encounterd ated 10/6-MS / Loop recorder  10/6/20- Pt confirmed- ERW@1122  10/9-Pt confirmed earlier arrival time, prep ins. reviewed and emailed to Pt    Colostomy reversal      HEMORRHOID SURGERY      HYSTERECTOMY      ILEOSTOMY      ILEOSTOMY CLOSURE      LEFT HEART CATHETERIZATION N/A 2/15/2019    Procedure: HEART CATH-LEFT;  Surgeon: Miky Keita MD;  Location: [facility] CATH LAB;  Service: Cardiology;  Laterality: N/A;    NECK SURGERY      OOPHORECTOMY Bilateral     SBO      SMALL INTESTINE SURGERY      TONSILLECTOMY      TUBAL LIGATION         Review of patient's allergies indicates:   Allergen Reactions    Octreotide acetate Nausea And  Vomiting     Had symptoms when she took Sandostatin with Codeine    Codeine Itching and Nausea And Vomiting     Pill form only, IV ok.,  Had symptoms when she took Codeine with Sandostatin.  Other reaction(s): Itching    Morphine Nausea And Vomiting     Patient reports reaction only when she takes po form of Morphine.       No current facility-administered medications on file prior to encounter.     Current Outpatient Medications on File Prior to Encounter   Medication Sig    apixaban (ELIQUIS) 5 mg Tab Take 1 tablet (5 mg total) by mouth 2 (two) times daily.    DULoxetine (CYMBALTA) 30 MG capsule Take 1 capsule (30 mg total) by mouth once daily.    [DISCONTINUED] digoxin (LANOXIN) 125 mcg tablet Take 1 tablet (0.125 mg total) by mouth once daily.    [DISCONTINUED] nortriptyline (PAMELOR) 10 MG capsule TAKE 1 CAPSULE (10 MG TOTAL) BY MOUTH EVERY EVENING.    acetaminophen (TYLENOL) 500 MG tablet Take 1 tablet (500 mg total) by mouth daily as needed for Pain.    aspirin (ECOTRIN) 81 MG EC tablet Take 1 tablet (81 mg total) by mouth once daily.    atorvastatin (LIPITOR) 40 MG tablet     [DISCONTINUED] diphenoxylate-atropine 2.5-0.025 mg (LOMOTIL) 2.5-0.025 mg per tablet Take 1 tablet by mouth 4 (four) times daily as needed for Diarrhea. Takes 2 tablets twice a day when needed    [DISCONTINUED] ENTRESTO 24-26 mg per tablet Take 1 tablet by mouth 2 (two) times daily.    [DISCONTINUED] INCONTINENCE PAD,LINER,DISP (BLADDER CONTROL PADS EX ABSORB MISC)     [DISCONTINUED] metoprolol succinate (TOPROL-XL) 50 MG 24 hr tablet Take 1 tablet (50 mg total) by mouth 2 (two) times daily.    [DISCONTINUED] pulse oximeter (PULSE OXIMETER) device by Apply Externally route 2 (two) times a day. Use twice daily at 8 AM and 3 PM and [facility]. (Patient not taking: Reported on 2/9/2022)    [DISCONTINUED] traZODone (DESYREL) 50 MG tablet Take 1 tablet (50 mg total) by mouth nightly as needed.      Family History       Problem Relation (Age of Onset)    Breast cancer Daughter (40), Daughter (47), Sister    Cancer Mother    Colon cancer Maternal Grandmother    Emphysema Father (67)    Glaucoma Paternal Grandmother    Heart attack Maternal Grandmother, Sister    Heart disease Father    Lung cancer Sister    Other Daughter    Retinal detachment Grandchild          Tobacco Use    Smoking status: Never Smoker    Smokeless tobacco: Never Used   Substance and Sexual Activity    Alcohol use: Not on file     Comment: wine coolers occassionally    Drug use: No    Sexual activity: Yes     Partners: Female     Review of Systems   Constitutional:  Positive for activity change. Negative for chills and fever.   HENT:  Negative for congestion and sore throat.    Respiratory:  Positive for chest tightness (now resolved) and shortness of breath (now resolved). Negative for cough.    Cardiovascular:  Negative for chest pain.   Gastrointestinal:  Negative for abdominal pain, diarrhea, nausea and vomiting.   Genitourinary:  Negative for dysuria and hematuria.   Musculoskeletal:  Positive for arthralgias (right shoulder).   Neurological:  Positive for dizziness.   Psychiatric/Behavioral:  Negative for confusion.    Objective:     Vital Signs (Most Recent):  Temp: 96.4 °F (35.8 °C) (03/17/22 1145)  Pulse: (!) 52 (03/17/22 1145)  Resp: 18 (03/17/22 1145)  BP: 125/76 (03/17/22 1145)  SpO2: 96 % (03/17/22 1145)   Vital Signs (24h Range):  Temp:  [96.1 °F (35.6 °C)-98.8 °F (37.1 °C)] 96.4 °F (35.8 °C)  Pulse:  [] 52  Resp:  [13-29] 18  SpO2:  [95 %-99 %] 96 %  BP: (111-132)/(55-87) 125/76     Weight: 83 kg (182 lb 15.7 oz)  Body mass index is 30.45 kg/m².    Physical Exam  Vitals reviewed.   Constitutional:       General: She is not in acute distress.  HENT:      Head: Normocephalic and atraumatic.      Right Ear: Tympanic membrane, ear canal and external ear normal.      Left Ear: Tympanic membrane, ear canal and  external ear normal.      Ears:      Comments: Cerumen bilaterally; no impaction     Mouth/Throat:      Mouth: Mucous membranes are moist.   Eyes:      Extraocular Movements: Extraocular movements intact.      Conjunctiva/sclera: Conjunctivae normal.      Pupils: Pupils are equal, round, and reactive to light.   Cardiovascular:      Rate and Rhythm: Normal rate and regular rhythm.      Heart sounds: Normal heart sounds. No murmur heard.  Pulmonary:      Effort: Pulmonary effort is normal. No respiratory distress.      Breath sounds: Normal breath sounds.   Abdominal:      General: Bowel sounds are normal. There is no distension.      Palpations: Abdomen is soft.      Tenderness: There is no abdominal tenderness.   Musculoskeletal:      Cervical back: Neck supple.      Right lower leg: No edema.      Left lower leg: No edema.   Neurological:      General: No focal deficit present.      Mental Status: She is alert and oriented to person, place, and time.   Psychiatric:         Mood and Affect: Mood normal.         Behavior: Behavior normal.         CRANIAL NERVES     CN III, IV, VI   Pupils are equal, round, and reactive to light.     Significant Labs: All pertinent labs within the past 24 hours have been reviewed.  CBC:   Recent Labs   Lab 03/16/22  1248 03/17/22  0547   WBC 8.10 5.95   HGB 13.5 11.8*   HCT 42.1 36.8*    226       CMP:   Recent Labs   Lab 03/16/22  1248 03/16/22  1647 03/16/22  2203 03/17/22  0547    144  --  145   K 2.4* 2.7* 3.1* 3.4*    104  --  106   CO2 25 25  --  27   GLU 99 90  --  100   BUN 10 9  --  10   CREATININE 0.7 0.6  --  0.7   CALCIUM 8.5* 8.1*  --  7.9*   PROT 6.8  --   --  5.7*   ALBUMIN 3.7  --   --  3.2*   BILITOT 3.0*  --   --  3.4*   ALKPHOS 69  --   --  94   AST 23  --   --  46*   ALT 11  --   --  14   ANIONGAP 17* 15  --  12   EGFRNONAA >60 >60  --  >60     Dig level   Cardiac Markers: <0.1  Recent Labs   Lab 03/16/22  1248   BNP 54       Troponin:   Recent  Labs   Lab 03/16/22  1521 03/16/22  2203 03/17/22  0547   TROPONINI 0.009 0.020 0.012         Significant Imaging:     CXR No acute abnormality    3*16 1209pm EKG  Sinus tachycardia  Marked ST abnormality, possible inferior subendocardial injury  Abnormal ECG  When compared with ECG of 01-FEB-2022 07:54,  Premature atrial complexes are no longer Present  Vent. rate has increased BY 84 BPM  ST more depressed in Inferior leads  ST now depressed in Anterior leads  T wave inversion no longer evident in Anterior leads    3*16 1226 EKG   Atrial fibrillation with rapid ventricular response  Marked ST abnormality, possible inferior subendocardial injury  Abnormal ECG  When compared with ECG of 16-MAR-2022 12:09,  Atrial fibrillation has replaced Sinus rhythm  Confirmed by Haider Mayre MD ([id number]) on 3/16/2022 2:54:55 PM    3*16 136pm EKG  Sinus rhythm with Premature supraventricular complexes  Nonspecific ST and T wave abnormality  Prolonged QT  Abnormal ECG  When compared with ECG of 16-MAR-2022 12:26,  Sinus rhythm has replaced Atrial fibrillation  Vent. rate has decreased BY 42 BPM  ST less depressed in Inferior leads  T wave inversion no longer evident in Inferior leads  Confirmed by Haider Mayer MD ([id number]) on 3/16/2022 3:56:12 PM    3*17 734am EKG  Sinus bradycardia  Low voltage QRS  Septal infarct ,age undetermined  Prolonged QT  Abnormal ECG  When compared with ECG of 16-MAR-2022 13:36,  Premature supraventricular complexes are no longer Present  Vent. rate has decreased BY 34 BPM  Septal infarct is now Present  T wave inversion more evident in Anterior leads  Confirmed by Haider Mayer MD ([id number]) on 3/16/2022 2:54:52 PM    Assessment/Plan:     * PAF (paroxysmal atrial fibrillation)  Increase amiodarone to 400mg po BID while here but plan to d/c on 200mg daily  Cont eliquis  Increase metoprolol to 50mg po BID      Hypomagnesemia  1.2-- replace with 3gm IVPB today      Chest pain  Re occuring. Sees Dr Ortiz at [facility]-  plan is cath later this month outpt       Hypokalemia  Replaced 40meq po x 2 and 10meq KCL IVPB yesterday, K+ 3.4 this am, repeat 40meq po once today and check mag  Was not on KCL at [facility]- will go home with supplemental kdur daily; needs close outpatient follow-up with [facility]     Depression, major, recurrent, moderate  Resume cymbalata        VTE Risk Mitigation (From admission, onward)         Ordered     apixaban tablet 5 mg  2 times daily         03/17/22 0804     IP VTE HIGH RISK PATIENT  Once         03/16/22 1610     Place sequential compression device  Until discontinued         03/16/22 1610                   Haider Rachel MD  Department of Hospital Medicine   [place] - Med Surg (3rd Fl)

## 2022-03-17 NOTE — SUBJECTIVE & OBJECTIVE
Past Medical History:   Diagnosis Date    Aortic atherosclerosis     Benign essential hypertension     Cataract     Senile    Crohn's colitis     Depression, major, recurrent, moderate     Fibromyalgia     GERD (gastroesophageal reflux disease)     Hypertensive cardiomyopathy     IBS (irritable bowel syndrome)     Migraine     Opioid abuse, in remission     Osteopenia     Paget disease of bone     Port-A-Cath in place     right chest    Prolonged QT interval     RA (rheumatoid arthritis)     Sedative hypnotic or anxiolytic dependence     in remission        Past Surgical History:   Procedure Laterality Date    CATARACT EXTRACTION W/  INTRAOCULAR LENS IMPLANT Left 02/21/2017    Dr. Christianson    CATARACT EXTRACTION W/  INTRAOCULAR LENS IMPLANT Right 03/07/2017    Dr. Christianson    CHOLECYSTECTOMY      COLON SURGERY      COLONOSCOPY N/A 3/16/2016    Procedure: COLONOSCOPY;  Surgeon: Dale Trejo MD;  Location: [facility] ENDO (4TH FLR);  Service: Endoscopy;  Laterality: N/A;    COLONOSCOPY N/A 10/12/2020    Procedure: COLONOSCOPY;  Surgeon: Cali Urena MD;  Location: [facility] ENDO (4TH FLR);  Service: Endoscopy;  Laterality: N/A;  covid test 10/9-[facility] [facility]- completed 10/9/20  ok to hold Coumadin x 5 days per coumadin clinic-see telephone encounterd ated 10/6-MS / Loop recorder  10/6/20- Pt confirmed- ERW@1122  10/9-Pt confirmed earlier arrival time, prep ins. reviewed and emailed to Pt    Colostomy reversal      HEMORRHOID SURGERY      HYSTERECTOMY      ILEOSTOMY      ILEOSTOMY CLOSURE      LEFT HEART CATHETERIZATION N/A 2/15/2019    Procedure: HEART CATH-LEFT;  Surgeon: Miky Keita MD;  Location: [facility] CATH LAB;  Service: Cardiology;  Laterality: N/A;    NECK SURGERY      OOPHORECTOMY Bilateral     SBO      SMALL INTESTINE SURGERY      TONSILLECTOMY      TUBAL LIGATION         Review of patient's allergies indicates:   Allergen Reactions    Octreotide acetate Nausea And Vomiting     Had symptoms when she  took Sandostatin with Codeine    Codeine Itching and Nausea And Vomiting     Pill form only, IV ok.,  Had symptoms when she took Codeine with Sandostatin.  Other reaction(s): Itching    Morphine Nausea And Vomiting     Patient reports reaction only when she takes po form of Morphine.       No current facility-administered medications on file prior to encounter.     Current Outpatient Medications on File Prior to Encounter   Medication Sig    apixaban (ELIQUIS) 5 mg Tab Take 1 tablet (5 mg total) by mouth 2 (two) times daily.    DULoxetine (CYMBALTA) 30 MG capsule Take 1 capsule (30 mg total) by mouth once daily.    [DISCONTINUED] digoxin (LANOXIN) 125 mcg tablet Take 1 tablet (0.125 mg total) by mouth once daily.    [DISCONTINUED] nortriptyline (PAMELOR) 10 MG capsule TAKE 1 CAPSULE (10 MG TOTAL) BY MOUTH EVERY EVENING.    acetaminophen (TYLENOL) 500 MG tablet Take 1 tablet (500 mg total) by mouth daily as needed for Pain.    aspirin (ECOTRIN) 81 MG EC tablet Take 1 tablet (81 mg total) by mouth once daily.    atorvastatin (LIPITOR) 40 MG tablet     [DISCONTINUED] diphenoxylate-atropine 2.5-0.025 mg (LOMOTIL) 2.5-0.025 mg per tablet Take 1 tablet by mouth 4 (four) times daily as needed for Diarrhea. Takes 2 tablets twice a day when needed    [DISCONTINUED] ENTRESTO 24-26 mg per tablet Take 1 tablet by mouth 2 (two) times daily.    [DISCONTINUED] INCONTINENCE PAD,LINER,DISP (BLADDER CONTROL PADS EX ABSORB MISC)     [DISCONTINUED] metoprolol succinate (TOPROL-XL) 50 MG 24 hr tablet Take 1 tablet (50 mg total) by mouth 2 (two) times daily.    [DISCONTINUED] pulse oximeter (PULSE OXIMETER) device by Apply Externally route 2 (two) times a day. Use twice daily at 8 AM and 3 PM and [facility]. (Patient not taking: Reported on 2/9/2022)    [DISCONTINUED] traZODone (DESYREL) 50 MG tablet Take 1 tablet (50 mg total) by mouth nightly as needed.     Family History       Problem Relation (Age of  Onset)    Breast cancer Daughter (40), Daughter (47), Sister    Cancer Mother    Colon cancer Maternal Grandmother    Emphysema Father (67)    Glaucoma Paternal Grandmother    Heart attack Maternal Grandmother, Sister    Heart disease Father    Lung cancer Sister    Other Daughter    Retinal detachment Grandchild          Tobacco Use    Smoking status: Never Smoker    Smokeless tobacco: Never Used   Substance and Sexual Activity    Alcohol use: Not on file     Comment: wine coolers occassionally    Drug use: No    Sexual activity: Yes     Partners: Female     Review of Systems   Constitutional:  Positive for activity change. Negative for chills and fever.   HENT:  Negative for congestion and sore throat.    Respiratory:  Positive for chest tightness (now resolved) and shortness of breath (now resolved). Negative for cough.    Cardiovascular:  Negative for chest pain.   Gastrointestinal:  Negative for abdominal pain, diarrhea, nausea and vomiting.   Genitourinary:  Negative for dysuria and hematuria.   Musculoskeletal:  Positive for arthralgias (right shoulder).   Neurological:  Positive for dizziness.   Psychiatric/Behavioral:  Negative for confusion.    Objective:     Vital Signs (Most Recent):  Temp: 96.4 °F (35.8 °C) (03/17/22 1145)  Pulse: (!) 52 (03/17/22 1145)  Resp: 18 (03/17/22 1145)  BP: 125/76 (03/17/22 1145)  SpO2: 96 % (03/17/22 1145)   Vital Signs (24h Range):  Temp:  [96.1 °F (35.6 °C)-98.8 °F (37.1 °C)] 96.4 °F (35.8 °C)  Pulse:  [] 52  Resp:  [13-29] 18  SpO2:  [95 %-99 %] 96 %  BP: (111-132)/(55-87) 125/76     Weight: 83 kg (182 lb 15.7 oz)  Body mass index is 30.45 kg/m².    Physical Exam  Vitals reviewed.   Constitutional:       General: She is not in acute distress.  HENT:      Head: Normocephalic and atraumatic.      Right Ear: Tympanic membrane, ear canal and external ear normal.      Left Ear: Tympanic membrane, ear canal and external ear normal.      Ears:      Comments: Cerumen  bilaterally; no impaction     Mouth/Throat:      Mouth: Mucous membranes are moist.   Eyes:      Extraocular Movements: Extraocular movements intact.      Conjunctiva/sclera: Conjunctivae normal.      Pupils: Pupils are equal, round, and reactive to light.   Cardiovascular:      Rate and Rhythm: Normal rate and regular rhythm.      Heart sounds: Normal heart sounds. No murmur heard.  Pulmonary:      Effort: Pulmonary effort is normal. No respiratory distress.      Breath sounds: Normal breath sounds.   Abdominal:      General: Bowel sounds are normal. There is no distension.      Palpations: Abdomen is soft.      Tenderness: There is no abdominal tenderness.   Musculoskeletal:      Cervical back: Neck supple.      Right lower leg: No edema.      Left lower leg: No edema.   Neurological:      General: No focal deficit present.      Mental Status: She is alert and oriented to person, place, and time.   Psychiatric:         Mood and Affect: Mood normal.         Behavior: Behavior normal.         CRANIAL NERVES     CN III, IV, VI   Pupils are equal, round, and reactive to light.     Significant Labs: All pertinent labs within the past 24 hours have been reviewed.  CBC:   Recent Labs   Lab 03/16/22  1248 03/17/22  0547   WBC 8.10 5.95   HGB 13.5 11.8*   HCT 42.1 36.8*    226       CMP:   Recent Labs   Lab 03/16/22  1248 03/16/22  1647 03/16/22 2203 03/17/22  0547    144  --  145   K 2.4* 2.7* 3.1* 3.4*    104  --  106   CO2 25 25  --  27   GLU 99 90  --  100   BUN 10 9  --  10   CREATININE 0.7 0.6  --  0.7   CALCIUM 8.5* 8.1*  --  7.9*   PROT 6.8  --   --  5.7*   ALBUMIN 3.7  --   --  3.2*   BILITOT 3.0*  --   --  3.4*   ALKPHOS 69  --   --  94   AST 23  --   --  46*   ALT 11  --   --  14   ANIONGAP 17* 15  --  12   EGFRNONAA >60 >60  --  >60     Dig level   Cardiac Markers: <0.1  Recent Labs   Lab 03/16/22  1248   BNP 54       Troponin:   Recent Labs   Lab 03/16/22  1521 03/16/22  2203 03/17/22  0547    TROPONINI 0.009 0.020 0.012         Significant Imaging:     CXR No acute abnormality    3*16 1209pm EKG  Sinus tachycardia  Marked ST abnormality, possible inferior subendocardial injury  Abnormal ECG  When compared with ECG of 01-FEB-2022 07:54,  Premature atrial complexes are no longer Present  Vent. rate has increased BY 84 BPM  ST more depressed in Inferior leads  ST now depressed in Anterior leads  T wave inversion no longer evident in Anterior leads    3*16 1226 EKG   Atrial fibrillation with rapid ventricular response  Marked ST abnormality, possible inferior subendocardial injury  Abnormal ECG  When compared with ECG of 16-MAR-2022 12:09,  Atrial fibrillation has replaced Sinus rhythm  Confirmed by Haider Mayer MD ([id number]) on 3/16/2022 2:54:55 PM    3*16 136pm EKG  Sinus rhythm with Premature supraventricular complexes  Nonspecific ST and T wave abnormality  Prolonged QT  Abnormal ECG  When compared with ECG of 16-MAR-2022 12:26,  Sinus rhythm has replaced Atrial fibrillation  Vent. rate has decreased BY 42 BPM  ST less depressed in Inferior leads  T wave inversion no longer evident in Inferior leads  Confirmed by Haider Mayer MD ([id number]) on 3/16/2022 3:56:12 PM    3*17 734am EKG  Sinus bradycardia  Low voltage QRS  Septal infarct ,age undetermined  Prolonged QT  Abnormal ECG  When compared with ECG of 16-MAR-2022 13:36,  Premature supraventricular complexes are no longer Present  Vent. rate has decreased BY 34 BPM  Septal infarct is now Present  T wave inversion more evident in Anterior leads  Confirmed by Haider Mayer MD ([id number]) on 3/16/2022 2:54:52 PM

## 2022-03-17 NOTE — NURSING
At approximately this time phoned pt physician on-call Dr. Jared Tiwari about pt increased c/o pain level to bilateral shoulders when lifting them upwards and L hip; pt rates pain 7/10 at this time; informed physician that pt only has Tylenol 650 mg PO PRN for pain and was administered upon initial assessment for c/o 5/10 pain and appears ineffective pain treatment at this time; VS WNL; NADN; pt normal sinus rhythm on cardiac monitor and reports no chest pain, back pain, or radiating pain to shoulder blades or jaw- physician informed of this at this time; further informed physician awaiting 2200 potassium and troponin lab results as pending at this time; this nurse writer receives verbal telephone order for Tramadol 50 mg Q4H PRN for pain 4-6/10 at this time with verbal readback; this nurse writer enters physician order for pharmacy verification at this time.

## 2022-03-17 NOTE — NURSING
At approximately this time this nurse writer informs charge nurse of pt potassium lab of 3.1 taken at 2200 and if physician needs to be notified; informed physician will follow up in AM; pt stable with NADN; call light in reach; normal sinus rhythm on telemetry monitor at this time; nursing continuing to monitor pt this shift.

## 2022-03-17 NOTE — PLAN OF CARE
[place] [facility] - Med Surg (3rd Fl)  Initial Discharge Assessment       Primary Care Provider: Neeru Hinkle MD    Admission Diagnosis: Palpitations [R00.2]  A-fib [I48.91]  Chest pain [R07.9]    Admission Date: 3/16/2022  Expected Discharge Date: 3/17/2022    Discharge Barriers Identified: None    Payor: HEALTHY BLUE MEDICARE ADVANTAGE / Plan: HEALTHY BLUE DUAL ADVANTAGE / Product Type: Medicare Advantage /     Extended Emergency Contact Information  Primary Emergency Contact: DmitriymartinJuan  Address: [address] [place] [address] [place]  Mobile Phone: [phone]  Relation: Spouse    Discharge Plan A: Home with family  Discharge Plan B: Home with family      [id number] - [place] - [address]  [address]  [place] [address]  Phone: [phone] Fax: [phone]    [id number] - [place] - [address]  [address]  [facility] [address]  Phone: [phone] Fax: [phone]      Initial Assessment (most recent)     Adult Discharge Assessment - 03/17/22 1250        Discharge Assessment    Assessment Type Discharge Planning Assessment     Confirmed/corrected address, phone number and insurance Yes     Confirmed Demographics Correct on Facesheet     Source of Information patient     Communicated ORALIA with patient/caregiver Date not available/Unable to determine     Reason For Admission A Fib     Lives With spouse     Do you expect to return to your current living situation? Yes     Do you have help at home or someone to help you manage your care at home? Yes     Prior to hospitilization cognitive status: Alert/Oriented     Current cognitive status: Alert/Oriented     Walking or Climbing Stairs Difficulty ambulation difficulty, requires equipment     Dressing/Bathing Difficulty none     Equipment Currently Used at Home rollator;cane, quad     Readmission within 30 days? No     Patient currently being followed by outpatient case management? No     Do  you currently have service(s) that help you manage your care at home? No     Do you take prescription medications? Yes     Do you have prescription coverage? Yes     Coverage Healthy Blue Dual Advantage     Do you have any problems affording any of your prescribed medications? No     How do you get to doctors appointments? car, drives self;family or friend will provide     Are you on dialysis? No     Do you take coumadin? No     Discharge Plan A Home with family     Discharge Plan B Home with family     DME Needed Upon Discharge  none     Discharge Plan discussed with: Patient     Discharge Barriers Identified None                 Discharge assessment completed with patient. Denies any post-acute care needs at this time. SW to remain available.

## 2022-03-17 NOTE — PLAN OF CARE
Problem: Adult Inpatient Plan of Care  Goal: Plan of Care Review  Outcome: Met  Goal: Patient-Specific Goal (Individualized)  Outcome: Met  Goal: Absence of Hospital-Acquired Illness or Injury  Outcome: Met  Goal: Optimal Comfort and Wellbeing  Outcome: Met  Goal: Readiness for Transition of Care  Outcome: Met     Problem: Infection  Goal: Absence of Infection Signs and Symptoms  Outcome: Met     Problem: Electrolyte Imbalance  Goal: Electrolyte Balance  Outcome: Met     Problem: Dysrhythmia  Goal: Normalized Cardiac Rhythm  Outcome: Met     Problem: Pain Acute  Goal: Acceptable Pain Control and Functional Ability  Outcome: Met     Problem: Fall Injury Risk  Goal: Absence of Fall and Fall-Related Injury  Outcome: Met      Discharge instructions given.Patient verbalized understanding. Follow up appointments scheduled.

## 2022-03-17 NOTE — PLAN OF CARE
POC reviewed with pt and pt spouse at bedside; pt verbalizes understanding; A&O x 4 to person, place, time, and situation; VS WNL; NADN; PO PRN pain medication administered for c/o bilateral shoulder pain when pt reports lifting arms and L hip pain during noc shift and Tramadol PRN appears effective pain treatment at this time; PO PRN Melatonin administered at HS for c/o insomnia and appears effective during shift as pt appears to be resting asleep in bed with no s/s of grimacing, restlessness, or moaning present; pt free from falls/injuries during shift; cardiac monitoring in place and functioning with pt exhibiting normal sinus rhythm on telemetry during shift; call light in reach and bed in low position; nursing continuing to monitor pt this shift.

## 2022-03-17 NOTE — ASSESSMENT & PLAN NOTE
Replaced 40meq po x 2 and 10meq KCL IVPB yesterday, K+ 3.4 this am, repeat 40meq po once today and check mag  Was not on KCL at [facility]- will go home with supplemental kdur daily; needs close outpatient follow-up with BMP

## 2022-03-17 NOTE — DISCHARGE SUMMARY
[facility] ([facility])  [facility] Medicine  Discharge Summary      Patient Name: Celine Sinclair  MRN: [id number]  Patient Class: OP- Observation  Admission Date: 3/16/2022  Hospital Length of Stay: 0 days  Discharge Date and Time:  03/17/2022 12:02 PM  Attending Physician: Jared Tiwari MD   Discharging Provider: Felisa Gonzalez NP  Primary Care Provider: Neeru Hinkle MD      HPI:   Pt is a 71 y.o. female with a PMHx of PAF, palpitations, chest pain, HTN, fibromyalgia, depression, pagets disease, RA, crohns colitis and GERD presented to the ED with complaints of palpitations and chest tightness. She reports the palpitations have been intermittent for 3 days with associated chest pain and shortness of breath. She describes the midsternal chest pain as aching and radiates to her left shoulder and arm. EKG revealed A. Fib with RVR. Patient converted back to NSR without treatment.  Of  Note, patient was recently placed on amiodarone and eliquis per primary cardiologist for recent diagnosis of PAF per MCT monitor. CXR was without any acute abnormality. Labs significant for very low potassium of 2.4. Cardiac Enzymes x's 1 set negative in ER. Patient reported chest pain improved since heart rate returned to normal. CIS was asked to evaluate in ER. They made adjustments to medication doses as well as rec tele overnight with replacement of K+. She was placed in observation overnioght. This am she is sinus padmini on tele and EKG. VSS/afebrile. K+ 3.4 after 2 doses of 40meq po KCL and a 10meq KCL IVPB. She also had 3 troponins that were resulted since admission that have been normal. She did c/o shoulder and hip pain overnight. NO chest pain or SOB. Tylenol not affective. Tramadol given with relief.            * No surgery found *      Hospital Course:   No notes on file     Goals of Care Treatment Preferences:  Code Status: Full Code      Consults:   Consults (From admission, onward)        Status Ordering  Provider     Inpatient consult to Cardiology-[facility]  Once        Provider:  Steve Moreno MD    Acknowledged MIA VALLEJO          * PAF (paroxysmal atrial fibrillation)  Increase amiodarone to 400mg po BID while here but plan to d/c on 200mg daily  Cont eliquis  Increase metoprolol to 50mg po BID      Hypomagnesemia  1.2-- replace with 3gm IVPB today      Chest pain  Re occuring. Sees Dr Ortiz at [facility]- plan is cath later this month outpt       Hypokalemia  Replaced 40meq po x 2 and 10meq KCL IVPB yesterday, K+ 3.4 this am, repeat 40meq po once today and check mag  Was not on KCL at [facility]- will go home with supplemental kdur daily     Depression, major, recurrent, moderate  Resume cymbalata        Final Active Diagnoses:    Diagnosis Date Noted POA    PRINCIPAL PROBLEM:  PAF (paroxysmal atrial fibrillation) [I48.0] 03/17/2022 Yes    Hypokalemia [E87.6] 03/17/2022 Yes    Chest pain [R07.9] 03/17/2022 Yes    Hypomagnesemia [E83.42] 03/17/2022 Yes    Depression, major, recurrent, moderate [F33.1]  Yes      Problems Resolved During this Admission:       Discharged Condition: good    Disposition: Home or Self Care    Follow Up:   Follow-up Information     Neeru Hinkle MD Follow up in 1 week(s).    Specialty: Internal Medicine  Contact information:  [address] [place] [address]  [phone]                       Patient Instructions:      BASIC METABOLIC PANEL   Standing Status: Future Standing Exp. Date: 03/17/23     Magnesium   Standing Status: Future Standing Exp. Date: 05/16/23       Significant Diagnostic Studies:     CBC:   Recent Labs   Lab 03/16/22  1248 03/17/22  0547   WBC 8.10 5.95   HGB 13.5 11.8*   HCT 42.1 36.8*    226       CMP:   Recent Labs   Lab 03/16/22  1248 03/16/22  1647 03/16/22  2203 03/17/22  0547    144  --  145   K 2.4* 2.7* 3.1* 3.4*    104  --  106   CO2 25 25  --  27   GLU 99 90  --  100   BUN 10 9  --  10   CREATININE 0.7 0.6  --  0.7   CALCIUM  8.5* 8.1*  --  7.9*   PROT 6.8  --   --  5.7*   ALBUMIN 3.7  --   --  3.2*   BILITOT 3.0*  --   --  3.4*   ALKPHOS 69  --   --  94   AST 23  --   --  46*   ALT 11  --   --  14   ANIONGAP 17* 15  --  12   EGFRNONAA >60 >60  --  >60     Dig level   Cardiac Markers: <0.1  Recent Labs   Lab 03/16/22  1248   BNP 54       Troponin:   Recent Labs   Lab 03/16/22  1521 03/16/22  2203 03/17/22  0547   TROPONINI 0.009 0.020 0.012         Significant Imaging:     CXR No acute abnormality    3*16 1209pm EKG  Sinus tachycardia  Marked ST abnormality, possible inferior subendocardial injury  Abnormal ECG  When compared with ECG of 01-FEB-2022 07:54,  Premature atrial complexes are no longer Present  Vent. rate has increased BY 84 BPM  ST more depressed in Inferior leads  ST now depressed in Anterior leads  T wave inversion no longer evident in Anterior leads    3*16 1226 EKG   Atrial fibrillation with rapid ventricular response  Marked ST abnormality, possible inferior subendocardial injury  Abnormal ECG  When compared with ECG of 16-MAR-2022 12:09,  Atrial fibrillation has replaced Sinus rhythm  Confirmed by Haider Mayer MD ([id number]) on 3/16/2022 2:54:55 PM    3*16 136pm EKG  Sinus rhythm with Premature supraventricular complexes  Nonspecific ST and T wave abnormality  Prolonged QT  Abnormal ECG  When compared with ECG of 16-MAR-2022 12:26,  Sinus rhythm has replaced Atrial fibrillation  Vent. rate has decreased BY 42 BPM  ST less depressed in Inferior leads  T wave inversion no longer evident in Inferior leads  Confirmed by Haider Mayer MD ([id number]) on 3/16/2022 3:56:12 PM    3*17 734am EKG  Sinus bradycardia  Low voltage QRS  Septal infarct ,age undetermined  Prolonged QT  Abnormal ECG  When compared with ECG of 16-MAR-2022 13:36,  Premature supraventricular complexes are no longer Present  Vent. rate has decreased BY 34 BPM  Septal infarct is now Present  T wave inversion more evident in Anterior leads  Confirmed by Moreno UNDERWOOD,  Haider DURAN ([id number]) on 3/16/2022 2:54:52 PM    Pending Diagnostic Studies:     None         Medications:  Reconciled Home Medications:      Medication List      START taking these medications    amiodarone 200 MG Tab  Commonly known as: PACERONE  Take 1 tablet (200 mg total) by mouth once daily.  Start taking on: March 18, 2022     potassium chloride SA 20 MEQ tablet  Commonly known as: K-DUR,KLOR-CON  Take 2 tablets (40 mEq total) by mouth once daily.        CONTINUE taking these medications    acetaminophen 500 MG tablet  Commonly known as: TYLENOL  Take 1 tablet (500 mg total) by mouth daily as needed for Pain.     aspirin 81 MG EC tablet  Commonly known as: ECOTRIN  Take 1 tablet (81 mg total) by mouth once daily.     atorvastatin 40 MG tablet  Commonly known as: LIPITOR     DULoxetine 30 MG capsule  Commonly known as: CYMBALTA  Take 1 capsule (30 mg total) by mouth once daily.     ELIQUIS 5 mg Tab  Generic drug: apixaban  Take 1 tablet (5 mg total) by mouth 2 (two) times daily.     metoprolol succinate 50 MG 24 hr tablet  Commonly known as: TOPROL-XL  Take 1 tablet (50 mg total) by mouth 2 (two) times daily.        STOP taking these medications    BLADDER CONTROL PADS EX ABSORB MISC     digoxin 125 mcg tablet  Commonly known as: LANOXIN     diphenoxylate-atropine 2.5-0.025 mg 2.5-0.025 mg per tablet  Commonly known as: LOMOTIL     ENTRESTO 24-26 mg per tablet  Generic drug: sacubitriL-valsartan     nortriptyline 10 MG capsule  Commonly known as: PAMELOR     pulse oximeter device  Commonly known as: pulse oximeter     traZODone 50 MG tablet  Commonly known as: DESYREL            Indwelling Lines/Drains at time of discharge:   Lines/Drains/Airways     Central Venous Catheter Line  Duration                Port A Cath Single Lumen right subclavian -- days                Time spent on the discharge of patient: 20 minutes         Felisa Gonzalez NP  Department of Hospital Medicine  [place] - [facility] Surg (3rd Fl)

## 2022-03-17 NOTE — PLAN OF CARE
[facility] - Med Surg (3rd Fl)  Discharge Final Note    Primary Care Provider: Neeru Hinkle MD    Expected Discharge Date: 3/17/2022    Final Discharge Note (most recent)     Final Note - 03/17/22 1440        Final Note    Assessment Type Final Discharge Note     Anticipated Discharge Disposition Home or Self Care     What phone number can be called within the next 1-3 days to see how you are doing after discharge? [phone]     Hospital Resources/Appts/Education Provided Appointments scheduled and added to AVS        Post-Acute Status    Post-Acute Authorization Other     Other Status No Post-Acute Service Needs     Discharge Delays None known at this time                 Important Message from Medicare             Contact Info     Neeru Hinkle MD   Specialty: Internal Medicine   Relationship: PCP - General    [address] [place] [address]   Phone: [phone]       Next Steps: Go on 3/23/2022    Instructions: Hospital Follow-up at 1:45pm

## 2022-03-17 NOTE — ASSESSMENT & PLAN NOTE
Increase amiodarone to 400mg po BID while here but plan to d/c on 200mg daily  Cont eliquis  Increase metoprolol to 50mg po BID

## 2022-03-17 NOTE — ASSESSMENT & PLAN NOTE
Replaced 40meq po x 2 and 10meq KCL IVPB yesterday, K+ 3.4 this am, repeat 40meq po once today and check mag  Was not on KCL at [facility]- will go home with supplemental kdur daily

## 2022-03-18 NOTE — TELEPHONE ENCOUNTER
Patient states that she is assigned jury duty on 4/4 an does not feel that she will be in adequate health to attend. She was admitted to [facility] 3/16-3-17 for PAF, Depression, Hypokalemia, Chest Pain, Hypomagnesemia. Please advise on jury duty excuse. Thanks.

## 2022-03-18 NOTE — TELEPHONE ENCOUNTER
I called patient to let her know Dr Hinkle declined to write her a jury duty for her current health conditions. She argued that she is having diarrhea and is having surgery soon on the 30th. I advised her that if her surgeon feels that she cannot participate in jury duty then they may be able to excuse her at that time since it will be closer to her court date. She hung up.

## 2022-03-18 NOTE — TELEPHONE ENCOUNTER
I'm not sure I understand why she can not participate. A-fib is not a diagnosis the typically disqualifies someone from jury duty. Once it is controlled out of the hospital she can participate.    If there is something else I am missing she can let me know.

## 2022-03-22 ENCOUNTER — PATIENT OUTREACH (OUTPATIENT)
Dept: EMERGENCY MEDICINE | Facility: HOSPITAL | Age: 72
End: 2022-03-22
Payer: COMMERCIAL

## 2022-03-22 NOTE — PROGRESS NOTES
ED navigator reminded patient about her appointment tomorrow (3/23/2022) for 1:45 p.m. with Dr. Hinkle. Patient stated she will still be attending this appointment.    Grace Moran  ED Navigator- [place]

## 2022-03-22 NOTE — PROGRESS NOTES
Patient stated she still has all of the resources that were provided to her in the ER; however, she has not gotten the chance to reach out to any yet. Patient expressed she does not have any other needs at this time.    ED navigator ensured patient did not have any needs and inquired if she reached out to any resources. ED navigator will follow-up with patient on/around 4/13/2022 to see if she has any other needs that could be assisted with.    Grace Moran  ED Navigator- [place]

## 2022-03-23 ENCOUNTER — LAB VISIT (OUTPATIENT)
Dept: LAB | Facility: HOSPITAL | Age: 72
End: 2022-03-23
Attending: NURSE PRACTITIONER
Payer: COMMERCIAL

## 2022-03-23 ENCOUNTER — OFFICE VISIT (OUTPATIENT)
Dept: INTERNAL MEDICINE | Facility: CLINIC | Age: 72
End: 2022-03-23
Payer: COMMERCIAL

## 2022-03-23 VITALS
WEIGHT: 179.69 LBS | OXYGEN SATURATION: 99 % | HEART RATE: 62 BPM | DIASTOLIC BLOOD PRESSURE: 78 MMHG | BODY MASS INDEX: 29.94 KG/M2 | SYSTOLIC BLOOD PRESSURE: 116 MMHG | RESPIRATION RATE: 16 BRPM | HEIGHT: 65 IN

## 2022-03-23 DIAGNOSIS — E87.6 HYPOKALEMIA: ICD-10-CM

## 2022-03-23 DIAGNOSIS — I48.0 PAF (PAROXYSMAL ATRIAL FIBRILLATION): ICD-10-CM

## 2022-03-23 DIAGNOSIS — E83.42 HYPOMAGNESEMIA: ICD-10-CM

## 2022-03-23 DIAGNOSIS — Z09 HOSPITAL DISCHARGE FOLLOW-UP: Primary | ICD-10-CM

## 2022-03-23 PROBLEM — I48.91 ATRIAL FIBRILLATION WITH RVR: Status: RESOLVED | Noted: 2022-02-01 | Resolved: 2022-03-23

## 2022-03-23 LAB
ANION GAP SERPL CALC-SCNC: 12 MMOL/L (ref 8–16)
BUN SERPL-MCNC: 11 MG/DL (ref 8–23)
CALCIUM SERPL-MCNC: 8.8 MG/DL (ref 8.7–10.5)
CHLORIDE SERPL-SCNC: 108 MMOL/L (ref 95–110)
CO2 SERPL-SCNC: 23 MMOL/L (ref 23–29)
CREAT SERPL-MCNC: 0.7 MG/DL (ref 0.5–1.4)
EST. GFR  (AFRICAN AMERICAN): >60 ML/MIN/1.73 M^2
EST. GFR  (NON AFRICAN AMERICAN): >60 ML/MIN/1.73 M^2
GLUCOSE SERPL-MCNC: 94 MG/DL (ref 70–110)
MAGNESIUM SERPL-MCNC: 1.4 MG/DL (ref 1.6–2.6)
POTASSIUM SERPL-SCNC: 4.6 MMOL/L (ref 3.5–5.1)
SODIUM SERPL-SCNC: 143 MMOL/L (ref 136–145)

## 2022-03-23 PROCEDURE — 80048 BASIC METABOLIC PNL TOTAL CA: CPT | Performed by: NURSE PRACTITIONER

## 2022-03-23 PROCEDURE — 99213 PR OFFICE/OUTPT VISIT, EST, LEVL III, 20-29 MIN: ICD-10-PCS | Mod: S$GLB,,, | Performed by: INTERNAL MEDICINE

## 2022-03-23 PROCEDURE — 83735 ASSAY OF MAGNESIUM: CPT | Performed by: NURSE PRACTITIONER

## 2022-03-23 PROCEDURE — 36415 COLL VENOUS BLD VENIPUNCTURE: CPT | Performed by: NURSE PRACTITIONER

## 2022-03-23 PROCEDURE — 99999 PR PBB SHADOW E&M-EST. PATIENT-LVL IV: CPT | Mod: PBBFAC,,, | Performed by: INTERNAL MEDICINE

## 2022-03-23 PROCEDURE — 99213 OFFICE O/P EST LOW 20 MIN: CPT | Mod: S$GLB,,, | Performed by: INTERNAL MEDICINE

## 2022-03-23 PROCEDURE — 99999 PR PBB SHADOW E&M-EST. PATIENT-LVL IV: ICD-10-PCS | Mod: PBBFAC,,, | Performed by: INTERNAL MEDICINE

## 2022-03-23 NOTE — PROGRESS NOTES
"Subjective:       Patient ID: Celine Sinclair is a 71 y.o. female.    Chief Complaint: Hospital Follow Up      HPI:  Patient is known to me and presents for hospital follow up for a-fib. She was admitted 3/16/22 with a-fib RVR and chest pain. [facility] adjusted me and she converted to sinus at time of discharge. HypoK was corrected and had 3 "negative" troponins. Today she reports she continues to have intermittent chest pains and palpitations. She is scheduled for angiogram with Dr. Reyes soon. She gets fatigued with exertion. Sx are intermittent. None today in clinic.     Past Medical History:   Diagnosis Date    Aortic atherosclerosis     Benign essential hypertension     Cataract     Senile    Crohn's colitis     Depression, major, recurrent, moderate     Fibromyalgia     GERD (gastroesophageal reflux disease)     Hypertensive cardiomyopathy     IBS (irritable bowel syndrome)     Migraine     Opioid abuse, in remission     Osteopenia     Paget disease of bone     Port-A-Cath in place     right chest    Prolonged QT interval     RA (rheumatoid arthritis)     Sedative hypnotic or anxiolytic dependence     in remission        Family History   Problem Relation Age of Onset    Glaucoma Paternal Grandmother     Other Daughter         Diabetic Retinopathy    Breast cancer Daughter 40    Retinal detachment Grandchild     Heart attack Maternal Grandmother     Colon cancer Maternal Grandmother     Cancer Mother         Unknown type    Emphysema Father 67    Heart disease Father     Lung cancer Sister     Heart attack Sister     Breast cancer Daughter 47    Breast cancer Sister     Amblyopia Neg Hx     Blindness Neg Hx     Strabismus Neg Hx     Macular degeneration Neg Hx     Cataracts Neg Hx        Social History     Socioeconomic History    Marital status:     Number of children: 12   Occupational History    Occupation: Hairdresser     Comment: Retired/disabled   Tobacco Use    " Smoking status: Never Smoker    Smokeless tobacco: Never Used   Substance and Sexual Activity    Drug use: No    Sexual activity: Yes     Partners: Female   Social History Narrative    Patient gave birth to 7 children, adopted 2 and has 3 stepchildren with her .     Social Determinants of Health     Financial Resource Strain: High Risk    Difficulty of Paying Living Expenses: Hard   Food Insecurity: Food Insecurity Present    Worried About Running Out of Food in the Last Year: Sometimes true    Ran Out of Food in the Last Year: Sometimes true   Transportation Needs: Unmet Transportation Needs    Lack of Transportation (Medical): Yes    Lack of Transportation (Non-Medical): Yes   Physical Activity: Inactive    Days of Exercise per Week: 0 days    Minutes of Exercise per Session: 0 min   Stress: Stress Concern Present    Feeling of Stress : Rather much   Social Connections: Socially Integrated    Frequency of Communication with Friends and Family: More than three times a week    Frequency of Social Gatherings with Friends and Family: More than three times a week    Attends Christian Services: More than 4 times per year    Active Member of Clubs or Organizations: Yes    Attends Club or Organization Meetings: More than 4 times per year    Marital Status:    Housing Stability: Low Risk     Unable to Pay for Housing in the Last Year: No    Number of Places Lived in the Last Year: 1    Unstable Housing in the Last Year: No       Review of Systems   Constitutional: Negative for activity change, fatigue, fever and unexpected weight change.   HENT: Negative for congestion, ear pain, hearing loss, rhinorrhea and sore throat.    Eyes: Negative for redness and visual disturbance.   Respiratory: Negative for cough, shortness of breath and wheezing.    Cardiovascular: Positive for palpitations. Negative for chest pain and leg swelling.   Gastrointestinal: Negative for abdominal pain, constipation,  diarrhea, nausea and vomiting.   Genitourinary: Negative for dysuria, frequency and urgency.   Musculoskeletal: Negative for back pain, joint swelling and neck pain.   Skin: Negative for color change, rash and wound.   Neurological: Negative for dizziness, tremors, weakness, light-headedness and headaches.         Objective:      Physical Exam  Vitals reviewed.   Constitutional:       General: She is not in acute distress.     Appearance: She is well-developed.   HENT:      Head: Normocephalic and atraumatic.      Right Ear: External ear normal.      Left Ear: External ear normal.      Nose: Nose normal.   Eyes:      General:         Right eye: No discharge.         Left eye: No discharge.      Extraocular Movements: Extraocular movements intact.      Conjunctiva/sclera: Conjunctivae normal.      Pupils: Pupils are equal, round, and reactive to light.   Neck:      Thyroid: No thyromegaly.   Cardiovascular:      Rate and Rhythm: Normal rate and regular rhythm.      Heart sounds: No murmur heard.  Pulmonary:      Effort: Pulmonary effort is normal. No respiratory distress.      Breath sounds: Normal breath sounds. No wheezing or rales.   Abdominal:      General: Bowel sounds are normal. There is no distension.      Palpations: Abdomen is soft.      Tenderness: There is no abdominal tenderness.   Skin:     General: Skin is warm and dry.   Neurological:      Mental Status: She is alert and oriented to person, place, and time.      Cranial Nerves: No cranial nerve deficit.   Psychiatric:         Behavior: Behavior normal.         Thought Content: Thought content normal.         Assessment:       1. Hospital discharge follow-up    2. PAF (paroxysmal atrial fibrillation)    3. Hypokalemia        Plan:       Celine was seen today for hospital follow up.    Diagnoses and all orders for this visit:    Hospital discharge follow-up    PAF (paroxysmal atrial fibrillation)    Hypokalemia      Discharge summary and labs personally  reviewed today  Med reconciliation completed  She is RRR on exam today. No acute sx in clinic but is having intermittent palpations and chest pain still. Angiogram scheduled for Friday. Er precautions discussed  Cont meds same dose pending cards eval Friday  Update labs after visit today to re-eval hypoK. Cont supplement pending labs.

## 2022-04-13 ENCOUNTER — PATIENT OUTREACH (OUTPATIENT)
Dept: EMERGENCY MEDICINE | Facility: HOSPITAL | Age: 72
End: 2022-04-13
Payer: COMMERCIAL

## 2022-04-13 NOTE — PROGRESS NOTES
Patient stated she is doing okay and hanging in there. Patient expressed she is not needing any appointments scheduled or any additional resources at this time.    ED navigator ensured patient did not need any assistance. ED navigator reminded patient about the [facility] On Call 24/7 Nurse triage line, [phone] or [phone] ([phone]) contact information for her healthcare needs. ED navigator will follow-up with patient on 5/6/2022 to remind her about her appointment on 5/9/2022 and ensure she has everything she needs during that time.    Grace Moran  ED Navigator- [place]

## 2022-04-25 ENCOUNTER — HOSPITAL ENCOUNTER (EMERGENCY)
Facility: HOSPITAL | Age: 72
Discharge: HOME | End: 2022-04-25
Attending: SURGERY
Payer: COMMERCIAL

## 2022-04-25 VITALS
OXYGEN SATURATION: 98 % | TEMPERATURE: 98 F | RESPIRATION RATE: 16 BRPM | SYSTOLIC BLOOD PRESSURE: 150 MMHG | DIASTOLIC BLOOD PRESSURE: 71 MMHG | HEART RATE: 56 BPM | BODY MASS INDEX: 29.33 KG/M2 | WEIGHT: 176.25 LBS

## 2022-04-25 DIAGNOSIS — M19.90 ARTHRITIS: ICD-10-CM

## 2022-04-25 DIAGNOSIS — M25.519 SHOULDER PAIN: ICD-10-CM

## 2022-04-25 DIAGNOSIS — M79.7 FIBROMYALGIA: ICD-10-CM

## 2022-04-25 DIAGNOSIS — R06.02 SOB (SHORTNESS OF BREATH): ICD-10-CM

## 2022-04-25 DIAGNOSIS — R07.9 CHEST PAIN: ICD-10-CM

## 2022-04-25 DIAGNOSIS — M54.9 BACK PAIN: ICD-10-CM

## 2022-04-25 DIAGNOSIS — F45.42 PAIN DISORDER ASSOCIATED WITH PSYCHOLOGICAL AND PHYSICAL FACTORS: Primary | ICD-10-CM

## 2022-04-25 LAB
ALBUMIN SERPL BCP-MCNC: 4 G/DL (ref 3.5–5.2)
ALP SERPL-CCNC: 79 U/L (ref 55–135)
ALT SERPL W/O P-5'-P-CCNC: 16 U/L (ref 10–44)
ANION GAP SERPL CALC-SCNC: 10 MMOL/L (ref 8–16)
AST SERPL-CCNC: 20 U/L (ref 10–40)
BASOPHILS # BLD AUTO: 0.06 K/UL (ref 0–0.2)
BASOPHILS NFR BLD: 0.6 % (ref 0–1.9)
BILIRUB SERPL-MCNC: 2.5 MG/DL (ref 0.1–1)
BUN SERPL-MCNC: 10 MG/DL (ref 8–23)
CALCIUM SERPL-MCNC: 9.2 MG/DL (ref 8.7–10.5)
CHLORIDE SERPL-SCNC: 106 MMOL/L (ref 95–110)
CK MB SERPL-MCNC: 0.5 NG/ML (ref 0.1–6.5)
CK MB SERPL-RTO: 1.6 % (ref 0–5)
CK SERPL-CCNC: 31 U/L (ref 20–180)
CK SERPL-CCNC: 31 U/L (ref 20–180)
CO2 SERPL-SCNC: 27 MMOL/L (ref 23–29)
CREAT SERPL-MCNC: 0.7 MG/DL (ref 0.5–1.4)
DIFFERENTIAL METHOD: ABNORMAL
EOSINOPHIL # BLD AUTO: 0.2 K/UL (ref 0–0.5)
EOSINOPHIL NFR BLD: 1.5 % (ref 0–8)
ERYTHROCYTE [DISTWIDTH] IN BLOOD BY AUTOMATED COUNT: 14.9 % (ref 11.5–14.5)
EST. GFR  (AFRICAN AMERICAN): >60 ML/MIN/1.73 M^2
EST. GFR  (NON AFRICAN AMERICAN): >60 ML/MIN/1.73 M^2
GLUCOSE SERPL-MCNC: 97 MG/DL (ref 70–110)
HCT VFR BLD AUTO: 40.6 % (ref 37–48.5)
HGB BLD-MCNC: 13 G/DL (ref 12–16)
IMM GRANULOCYTES # BLD AUTO: 0.04 K/UL (ref 0–0.04)
IMM GRANULOCYTES NFR BLD AUTO: 0.4 % (ref 0–0.5)
LYMPHOCYTES # BLD AUTO: 2.5 K/UL (ref 1–4.8)
LYMPHOCYTES NFR BLD: 25.4 % (ref 18–48)
MCH RBC QN AUTO: 28.4 PG (ref 27–31)
MCHC RBC AUTO-ENTMCNC: 32 G/DL (ref 32–36)
MCV RBC AUTO: 89 FL (ref 82–98)
MONOCYTES # BLD AUTO: 0.6 K/UL (ref 0.3–1)
MONOCYTES NFR BLD: 5.8 % (ref 4–15)
NEUTROPHILS # BLD AUTO: 6.5 K/UL (ref 1.8–7.7)
NEUTROPHILS NFR BLD: 66.3 % (ref 38–73)
NRBC BLD-RTO: 0 /100 WBC
PLATELET # BLD AUTO: 235 K/UL (ref 150–450)
PMV BLD AUTO: 11.3 FL (ref 9.2–12.9)
POTASSIUM SERPL-SCNC: 3.4 MMOL/L (ref 3.5–5.1)
PROT SERPL-MCNC: 7.2 G/DL (ref 6–8.4)
RBC # BLD AUTO: 4.57 M/UL (ref 4–5.4)
SODIUM SERPL-SCNC: 143 MMOL/L (ref 136–145)
TROPONIN I SERPL DL<=0.01 NG/ML-MCNC: <0.006 NG/ML (ref 0–0.03)
WBC # BLD AUTO: 9.76 K/UL (ref 3.9–12.7)

## 2022-04-25 PROCEDURE — 93010 EKG 12-LEAD: ICD-10-PCS | Mod: ,,, | Performed by: INTERNAL MEDICINE

## 2022-04-25 PROCEDURE — 99285 EMERGENCY DEPT VISIT HI MDM: CPT | Mod: 25

## 2022-04-25 PROCEDURE — 82553 CREATINE MB FRACTION: CPT | Performed by: NURSE PRACTITIONER

## 2022-04-25 PROCEDURE — 84484 ASSAY OF TROPONIN QUANT: CPT | Performed by: NURSE PRACTITIONER

## 2022-04-25 PROCEDURE — 93005 ELECTROCARDIOGRAM TRACING: CPT

## 2022-04-25 PROCEDURE — 85025 COMPLETE CBC W/AUTO DIFF WBC: CPT | Performed by: NURSE PRACTITIONER

## 2022-04-25 PROCEDURE — 80053 COMPREHEN METABOLIC PANEL: CPT | Performed by: NURSE PRACTITIONER

## 2022-04-25 PROCEDURE — 36415 COLL VENOUS BLD VENIPUNCTURE: CPT | Performed by: NURSE PRACTITIONER

## 2022-04-25 PROCEDURE — 93010 ELECTROCARDIOGRAM REPORT: CPT | Mod: ,,, | Performed by: INTERNAL MEDICINE

## 2022-04-25 NOTE — ED PROVIDER NOTES
Encounter Date: 4/25/2022       History     Chief Complaint   Patient presents with    Generalized Pain     Chief complaint:  Chest pain back pain  71-year-old female with a history of Crohn's depression fibromyalgia cataracts GERD cardiomegaly , Crohn's, migraines, IBS, osteopenia, rheumatoid arthritis recently evaluated for generalized upper body pain that she has had for the last several weeks.  She reports associated shortness of breath that these inspiration as for the last several weeks as well as shoulder pain and back pain and rib pain.  Denies palpitations or shortness of breath at rest and IV medications for her symptoms        Review of patient's allergies indicates:   Allergen Reactions    Octreotide acetate Nausea And Vomiting     Had symptoms when she took Sandostatin with Codeine    Codeine Itching and Nausea And Vomiting     Pill form only, IV ok.,  Had symptoms when she took Codeine with Sandostatin.  Other reaction(s): Itching    Morphine Nausea And Vomiting     Patient reports reaction only when she takes po form of Morphine.     Past Medical History:   Diagnosis Date    Aortic atherosclerosis     Benign essential hypertension     Cataract     Senile    Crohn's colitis     Depression, major, recurrent, moderate     Fibromyalgia     GERD (gastroesophageal reflux disease)     Hypertensive cardiomyopathy     IBS (irritable bowel syndrome)     Migraine     Opioid abuse, in remission     Osteopenia     Paget disease of bone     Port-A-Cath in place     right chest    Prolonged QT interval     RA (rheumatoid arthritis)     Sedative hypnotic or anxiolytic dependence     in remission      Past Surgical History:   Procedure Laterality Date    CATARACT EXTRACTION W/  INTRAOCULAR LENS IMPLANT Left 02/21/2017    Dr. Christianson    CATARACT EXTRACTION W/  INTRAOCULAR LENS IMPLANT Right 03/07/2017    Dr. Christianson    CHOLECYSTECTOMY      COLON SURGERY      COLONOSCOPY N/A 3/16/2016     Procedure: COLONOSCOPY;  Surgeon: Dale Trejo MD;  [facility]  Service: Endoscopy;  Laterality: N/A;    COLONOSCOPY N/A 10/12/2020    Procedure: COLONOSCOPY;  Surgeon: Cali Urena MD;  [facility]  Service: Endoscopy;  Laterality: N/A;  covid test 10/9-[facility]- completed 10/9/20  ok to hold Coumadin x 5 days per coumadin clinic-see telephone encounterd ated 10/6-MS / Loop recorder  10/6/20- Pt confirmed- ERW@1122  10/9-Pt confirmed earlier arrival time, prep ins. reviewed and emailed to Pt    Colostomy reversal      HEMORRHOID SURGERY      HYSTERECTOMY      ILEOSTOMY      ILEOSTOMY CLOSURE      LEFT HEART CATHETERIZATION N/A 2/15/2019    Procedure: HEART CATH-LEFT;  Surgeon: Miky Keita MD;  [facility] C[facility] LAB;  Service: Cardiology;  Laterality: N/A;    NECK SURGERY      OOPHORECTOMY Bilateral     SBO      SMALL INTESTINE SURGERY      TONSILLECTOMY      TUBAL LIGATION       Family History   Problem Relation Age of Onset    Glaucoma Paternal Grandmother     Other Daughter         Diabetic Retinopathy    Breast cancer Daughter 40    Retinal detachment Grandchild     Heart attack Maternal Grandmother     Colon cancer Maternal Grandmother     Cancer Mother         Unknown type    Emphysema Father 67    Heart disease Father     Lung cancer Sister     Heart attack Sister     Breast cancer Daughter 47    Breast cancer Sister     Amblyopia Neg Hx     Blindness Neg Hx     Strabismus Neg Hx     Macular degeneration Neg Hx     Cataracts Neg Hx      Social History     Tobacco Use    Smoking status: Never Smoker    Smokeless tobacco: Never Used   Substance Use Topics    Drug use: No     Review of Systems   Constitutional: Negative.    HENT: Negative.    Eyes: Negative.    Respiratory: Positive for chest tightness and shortness of breath.    Cardiovascular: Positive for chest pain.   Gastrointestinal: Negative.    Genitourinary:  Negative.    Musculoskeletal: Positive for arthralgias, back pain, myalgias and neck stiffness.   Skin: Negative.    Neurological: Negative.        Physical Exam     Initial Vitals [04/25/22 1206]   BP Pulse Resp Temp SpO2   (!) 155/78 (!) 53 20 98.4 °F (36.9 °C) 98 %      MAP       --         Physical Exam    Nursing note and vitals reviewed.  Constitutional: She appears well-developed and well-nourished.   HENT:   Head: Normocephalic and atraumatic.   Mouth/Throat: Oropharynx is clear and moist.   Eyes: EOM are normal. Pupils are equal, round, and reactive to light.   Neck: Neck supple.   Normal range of motion.  Cardiovascular: Normal rate, regular rhythm and normal heart sounds. Exam reveals no gallop and no friction rub.    No murmur heard.  Pulmonary/Chest: Breath sounds normal. She has no wheezes. She has no rhonchi. She has no rales.   Abdominal: Abdomen is soft. She exhibits no mass. There is no abdominal tenderness. There is no rebound and no guarding.   Musculoskeletal:         General: Normal range of motion.      Cervical back: Normal range of motion and neck supple.      Comments: Tenderness to thoracic and lumbar paraspinal muscles noted point tenderness to spine.  No bony step-off or gross deformity noted.  Also posterior tenderness in the right shoulder with full range of motion     Neurological: She is alert and oriented to person, place, and time.   Skin: Skin is warm and dry. Capillary refill takes less than 2 seconds.   Psychiatric: She has a normal mood and affect. Thought content normal.         ED Course   Procedures  Labs Reviewed   CBC W/ AUTO DIFFERENTIAL - Abnormal; Notable for the following components:       Result Value    RDW 14.9 (*)     All other components within normal limits   COMPREHENSIVE METABOLIC PANEL - Abnormal; Notable for the following components:    Potassium 3.4 (*)     Total Bilirubin 2.5 (*)     All other components within normal limits   TROPONIN I   CK   CK-MB           Imaging Results          X-Ray Thoracic Spine AP Lateral (Final result)  Result time 04/25/22 12:52:17    Final result by Kendrick Vines MD (04/25/22 12:52:17)                 Impression:      As above.      Electronically signed by: Kendrick Vines MD  Date:    04/25/2022  Time:    12:52             Narrative:    EXAMINATION:  XR THORACIC SPINE AP LATERAL    CLINICAL HISTORY:  .  Dorsalgia, unspecified    TECHNIQUE:  Thoracic spine dated April 25, 2022.    COMPARISON:  No prior study for comparison.    FINDINGS:  Multilevel degenerative changes of the thoracic spine.  Bony spurring and disc space narrowing present greatest in the upper thoracic region..    There is no evidence of fracture, dislocation or other acute osseous abnormality.                               X-Ray Shoulder Complete 2 View Right (Final result)  Result time 04/25/22 12:45:28    Final result by Kendrick Vines MD (04/25/22 12:45:28)                 Impression:      As above.      Electronically signed by: Kendrick Vines MD  Date:    04/25/2022  Time:    12:45             Narrative:    EXAMINATION:  XR SHOULDER COMPLETE 2 OR MORE VIEWS RIGHT    CLINICAL HISTORY:  .  Pain in unspecified shoulder    TECHNIQUE:  Three views of the right shoulder dated April 25, 2022.    COMPARISON:  No prior study for comparison.    FINDINGS:  Degenerative changes of the AC joint.    There is no evidence of fracture, dislocation or other acute osseous abnormality. No focal soft tissue abnormality.                               X-Ray Lumbar Spine Ap And Lateral (Final result)  Result time 04/25/22 12:44:19    Final result by Kendrick Vines MD (04/25/22 12:44:19)                 Impression:      As above.      Electronically signed by: Kendrick Vines MD  Date:    04/25/2022  Time:    12:44             Narrative:    EXAMINATION:  XR LUMBAR SPINE AP AND LATERAL    CLINICAL HISTORY:  back pain;.    TECHNIQUE:  Three views of the lumbar spine were  obtained dated April 25, 2022.    COMPARISON:  No prior study for comparison.    FINDINGS:  Mild degenerative changes of the lumbar spine.  Facet degenerative changes greatest at L4 and L5.  Disc space narrowing greatest at L2 and L3.  Rudimentary disc at the L1-L2 level.    There is no evidence of an acute fracture.  Alignment is maintained.  No evidence of spondylolysis or spondylolisthesis.    Prior abdominal surgery and bowel resection.  Vascular appearing calcifications.                               X-Ray Chest AP Portable (Final result)  Result time 04/25/22 12:33:57    Final result by Kendrick Vines MD (04/25/22 12:33:57)                 Impression:      No acute infiltrate.  No significant interval change when compared with the prior study of March 16, 2022.      Electronically signed by: Kendrick Vines MD  Date:    04/25/2022  Time:    12:33             Narrative:    EXAMINATION:  XR CHEST AP PORTABLE    CLINICAL HISTORY:  .  Shortness of breath    TECHNIQUE:  Portable chest dated April 25, 2022.    COMPARISON:  March 16, 2022.    FINDINGS:  The cardiac silhouette is within normal limits considering portable technique. Atherosclerotic changes of the aorta.    No focal infiltrate or effusion.  Metallic BBs overlie the chest.  Implant device is noted.    Degenerative changes of the spine.  Prior multilevel cervical fusion.                                 Medications - No data to display  Medical Decision Making:   Differential Diagnosis:   Arthritis, fibromyalgia, chronic pain, chest pain, ACS, pneumonia  Clinical Tests:   Lab Tests: Reviewed  The following lab test(s) were unremarkable: CBC, CMP and Troponin  Radiological Study: Reviewed  ED Management:  [age] female with longstanding history of arthritis and fibromyalgia presents for generalized upper body aching she has had for the last several months.  She also reports intermittent shortness of breath that worsens with deep inspiration.   Patient's cardiac workup negative EKG showed bradycardia with cardiac enzymes negative.  No signs of pneumonia.  Patient's x-ray showed multiple areas of degenerative changes consistent with arthritis.  Patient was experiencing pain secondary to arthritis and possible fibromyalgia flare.  She was instructed to take Tylenol arthritis due to numerous allergies and to follow up with primary care. Given return precautions.                      Clinical Impression:   Final diagnoses:  [R07.9] Chest pain  [M54.9] Back pain  [M25.519] Shoulder pain  [R06.02] SOB (shortness of breath)  [M19.90] Arthritis (Primary)  [M79.7] Fibromyalgia          ED Disposition Condition    Discharge Stable        ED Prescriptions     None        Follow-up Information     Follow up With Specialties Details Why Contact Info    Neeru Hinkle MD Internal Medicine Schedule an appointment as soon as possible for a visit in 3 days  [address] [place] [address]  [phone]             Nadja Murry NP  04/25/22 5547

## 2022-04-25 NOTE — DISCHARGE INSTRUCTIONS
Please take over-the-counter Tylenol Arthritis for generalized body aches and pains  Please follow up with Dr. Hinkle in the next 2-3 days  Please return to the ED for new worrisome symptoms

## 2022-04-29 ENCOUNTER — OFFICE VISIT (OUTPATIENT)
Dept: INTERNAL MEDICINE | Facility: CLINIC | Age: 72
End: 2022-04-29
Payer: COMMERCIAL

## 2022-04-29 VITALS
HEIGHT: 65 IN | DIASTOLIC BLOOD PRESSURE: 76 MMHG | RESPIRATION RATE: 16 BRPM | WEIGHT: 171.5 LBS | BODY MASS INDEX: 28.57 KG/M2 | SYSTOLIC BLOOD PRESSURE: 132 MMHG | HEART RATE: 64 BPM | OXYGEN SATURATION: 99 %

## 2022-04-29 DIAGNOSIS — M51.36 DDD (DEGENERATIVE DISC DISEASE), LUMBAR: ICD-10-CM

## 2022-04-29 DIAGNOSIS — M51.34 DDD (DEGENERATIVE DISC DISEASE), THORACIC: ICD-10-CM

## 2022-04-29 DIAGNOSIS — F33.1 DEPRESSION, MAJOR, RECURRENT, MODERATE: ICD-10-CM

## 2022-04-29 DIAGNOSIS — Z09 HOSPITAL DISCHARGE FOLLOW-UP: ICD-10-CM

## 2022-04-29 DIAGNOSIS — M79.7 FIBROMYALGIA: Primary | ICD-10-CM

## 2022-04-29 PROBLEM — M51.369 DDD (DEGENERATIVE DISC DISEASE), LUMBAR: Status: ACTIVE | Noted: 2022-04-29

## 2022-04-29 PROCEDURE — 99999 PR PBB SHADOW E&M-EST. PATIENT-LVL IV: CPT | Mod: PBBFAC,,, | Performed by: INTERNAL MEDICINE

## 2022-04-29 PROCEDURE — 99214 PR OFFICE/OUTPT VISIT, EST, LEVL IV, 30-39 MIN: ICD-10-PCS | Mod: S$GLB,,, | Performed by: INTERNAL MEDICINE

## 2022-04-29 PROCEDURE — 99214 OFFICE O/P EST MOD 30 MIN: CPT | Mod: S$GLB,,, | Performed by: INTERNAL MEDICINE

## 2022-04-29 PROCEDURE — 99999 PR PBB SHADOW E&M-EST. PATIENT-LVL IV: ICD-10-PCS | Mod: PBBFAC,,, | Performed by: INTERNAL MEDICINE

## 2022-04-29 RX ORDER — DULOXETIN HYDROCHLORIDE 30 MG/1
30 CAPSULE, DELAYED RELEASE ORAL DAILY
Qty: 30 CAPSULE | Refills: 11 | Status: SHIPPED | OUTPATIENT
Start: 2022-04-29 | End: 2022-09-21

## 2022-04-29 RX ORDER — GABAPENTIN 100 MG/1
100 CAPSULE ORAL 3 TIMES DAILY
Qty: 90 CAPSULE | Refills: 11 | Status: SHIPPED | OUTPATIENT
Start: 2022-04-29 | End: 2022-06-08 | Stop reason: SDUPTHER

## 2022-04-29 NOTE — PROGRESS NOTES
Subjective:       Patient ID: Celine Sinclair is a 71 y.o. female.    Chief Complaint: Hospital Follow Up      HPI:  Patient is known to me and presents for ER follow up. She went to ER 4/25/22 with pain all over. She has h/o fibromyalgia. No acute issues were found and d/c home. Xray thoracic and lumbar spine showing DDD. Xray shoulder showing arthritis changes of AC joint. She was seeing Dr. Porter Ramos, last visit I see is 6/2021. History antiphospholipid syndrome.   Patient has stopped her cymbalta for fibromyalgia. No longer taking pamelor at night. She is unsure why s he stopped these meds; she thinks one of her doctors told her to stop taking. Unclear to me why.  She is still very stressed and anxious. 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 She is still in chronic, constant pain. Doesn't have much family support.      Past Medical History:   Diagnosis Date    Aortic atherosclerosis     Benign essential hypertension     Cataract     Senile    Crohn's colitis     Depression, major, recurrent, moderate     Fibromyalgia     GERD (gastroesophageal reflux disease)     Hypertensive cardiomyopathy     IBS (irritable bowel syndrome)     Migraine     Opioid abuse, in remission     Osteopenia     Paget disease of bone     Port-A-Cath in place     right chest    Prolonged QT interval     RA (rheumatoid arthritis)     Sedative hypnotic or anxiolytic dependence     in remission        Family History   Problem Relation Age of Onset    Glaucoma Paternal Grandmother     Other Daughter         Diabetic Retinopathy    Breast cancer Daughter 40    Retinal detachment Grandchild     Heart attack Maternal Grandmother     Colon cancer Maternal Grandmother     Cancer Mother         Unknown type    Emphysema Father 67    Heart disease Father     Lung cancer Sister     Heart attack Sister     Breast cancer Daughter 47    Breast cancer Sister     Amblyopia Neg Hx     Blindness Neg Hx     Strabismus Neg  Hx     Macular degeneration Neg Hx     Cataracts Neg Hx        Social History     Socioeconomic History    Marital status:     Number of children: 12   Occupational History    Occupation: [employer]     Comment: Retired/disabled   Tobacco Use    Smoking status: Never Smoker    Smokeless tobacco: Never Used   Substance and Sexual Activity    Drug use: No    Sexual activity: Yes     Partners: Female   Social History Narrative    Patient gave birth to 7 children, adopted 2 and has 3 stepchildren with her .     Social Determinants of Health     Financial Resource Strain: High Risk    Difficulty of Paying Living Expenses: Hard   Food Insecurity: Food Insecurity Present    Worried About Running Out of Food in the Last Year: Sometimes true    Ran Out of Food in the Last Year: Sometimes true   Transportation Needs: Unmet Transportation Needs    Lack of Transportation (Medical): Yes    Lack of Transportation (Non-Medical): Yes   Physical Activity: Inactive    Days of Exercise per Week: 0 days    Minutes of Exercise per Session: 0 min   Stress: Stress Concern Present    Feeling of Stress : Rather much   Social Connections: Socially Integrated    Frequency of Communication with Friends and Family: More than three times a week    Frequency of Social Gatherings with Friends and Family: More than three times a week    Attends Pentecostalism Services: More than 4 times per year    Active Member of Clubs or Organizations: Yes    Attends Club or Organization Meetings: More than 4 times per year    Marital Status:    Housing Stability: Low Risk     Unable to Pay for Housing in the Last Year: No    Number of Places Lived in the Last Year: 1    Unstable Housing in the Last Year: No       Review of Systems   Constitutional: Negative for activity change, fatigue, fever and unexpected weight change.   HENT: Negative for congestion, ear pain, hearing loss, rhinorrhea and sore throat.    Eyes:  Negative for redness and visual disturbance.   Respiratory: Negative for cough, shortness of breath and wheezing.    Cardiovascular: Negative for chest pain, palpitations and leg swelling.   Gastrointestinal: Negative for abdominal pain, constipation, diarrhea, nausea and vomiting.   Genitourinary: Negative for dysuria, frequency and urgency.   Musculoskeletal: Positive for arthralgias, back pain and myalgias. Negative for joint swelling and neck pain.   Skin: Negative for color change, rash and wound.   Neurological: Negative for dizziness, tremors, weakness, light-headedness and headaches.   Psychiatric/Behavioral: Positive for decreased concentration and dysphoric mood.         Objective:      Physical Exam  Vitals reviewed.   Constitutional:       General: She is not in acute distress.     Appearance: She is well-developed.   HENT:      Head: Normocephalic and atraumatic.      Right Ear: External ear normal.      Left Ear: External ear normal.      Nose: Nose normal.   Eyes:      General:         Right eye: No discharge.         Left eye: No discharge.      Extraocular Movements: Extraocular movements intact.      Conjunctiva/sclera: Conjunctivae normal.      Pupils: Pupils are equal, round, and reactive to light.   Neck:      Thyroid: No thyromegaly.   Cardiovascular:      Rate and Rhythm: Normal rate and regular rhythm.      Heart sounds: No murmur heard.  Pulmonary:      Effort: Pulmonary effort is normal. No respiratory distress.      Breath sounds: Normal breath sounds. No wheezing or rales.   Abdominal:      General: Bowel sounds are normal. There is no distension.      Palpations: Abdomen is soft.      Tenderness: There is no abdominal tenderness.   Musculoskeletal:         General: Tenderness present. Injury: back, multiple tender locations.   Skin:     General: Skin is warm and dry.   Neurological:      Mental Status: She is alert and oriented to person, place, and time.      Cranial Nerves: No cranial  nerve deficit.   Psychiatric:         Behavior: Behavior normal.         Thought Content: Thought content normal.         Assessment:       1. Fibromyalgia    2. DDD (degenerative disc disease), lumbar    3. DDD (degenerative disc disease), thoracic    4. Depression, major, recurrent, moderate    5. Hospital discharge follow-up        Plan:       Celine was seen today for hospital follow up.    Diagnoses and all orders for this visit:    Fibromyalgia  -     Ambulatory referral/consult to Physical/Occupational Therapy; Future  -     gabapentin (NEURONTIN) 100 MG capsule; Take 1 capsule (100 mg total) by mouth 3 (three) times daily.  -     DULoxetine (CYMBALTA) 30 MG capsule; Take 1 capsule (30 mg total) by mouth once daily.    DDD (degenerative disc disease), lumbar  -     Ambulatory referral/consult to Physical/Occupational Therapy; Future    DDD (degenerative disc disease), thoracic  -     Ambulatory referral/consult to Physical/Occupational Therapy; Future    Depression, major, recurrent, moderate  -     DULoxetine (CYMBALTA) 30 MG capsule; Take 1 capsule (30 mg total) by mouth once daily.    Hospital discharge follow-up    Hospital discharge follow-up  ER notes reviewed  ER radiology reports reviewed  Er labs reviewed  All discussed with patient today    Patient has known h/o fibromyalgia and arthritis changes in the back  I do not see any reason she can not continue cymbalta. Will resume. If there is something I am unaware from a specialist she can let me know; she will check with cardiology to be sure there is not a diagnosis I am unaware as I don't have all the records  Start gabapentin, titrate up to 100mg TID and increase from there as tolerated  Start PT for DDD of spine to decrease pain and increase mobility  She also suffers with depression sx which is likely increasing her pain. Resume cymbalta for this as well and given list to start therapy as she feels she doesn't have much family support as she cares  for everyone else but herself.  Offered support and counseling today    RTC as uzma and PRN

## 2022-04-29 NOTE — PATIENT INSTRUCTIONS
Take gabapentin 100mg at night for 1 week, then take morning and night for 1 week, then take three times a day (morning, noon and night). If not effective after 2 weeks of taking it 3 times a day let me know.

## 2022-05-06 ENCOUNTER — PATIENT OUTREACH (OUTPATIENT)
Dept: EMERGENCY MEDICINE | Facility: HOSPITAL | Age: 72
End: 2022-05-06
Payer: COMMERCIAL

## 2022-05-06 NOTE — PROGRESS NOTES
Patient stated once again that she could be better and is hanging in there. Patient expressed she will see what she could keep up with at physical therapy and is hoping it goes well. Patient has no other needs at this time and is agreeable to a follow-up in a few weeks.    ED navigator ensured patient has no needs at this time and will follow-up with her on/around 6/7/2022.    Grace Moran  ED Navigator- [place]

## 2022-05-06 NOTE — PROGRESS NOTES
ED navigator reminded patient about her appointment for Monday (5/9/2022) at 1:00 p.m. for physical therapy. Patient stated she will still be attending this appointment.    Grace Moran  ED Navigator- [place]

## 2022-05-09 ENCOUNTER — TELEPHONE (OUTPATIENT)
Dept: INTERNAL MEDICINE | Facility: CLINIC | Age: 72
End: 2022-05-09
Payer: COMMERCIAL

## 2022-05-09 NOTE — TELEPHONE ENCOUNTER
----- Message from Martha Sams sent at 2022 10:03 AM CDT -----  Regarding: Request to speak to a nurse  Contact: self  Celine Sinclair  MRN: [id number]  : 1950  PCP: Neeru Hinkle  Home Phone      [phone]  Work Phone      Not on file.  Mobile          [phone]      MESSAGE:   Request to speak to a nurse regarding PCP orders for physical therapy.   Patient was referred to [facility] physical theraphy department in [place], [place]    would like to prefer to have PCP orders sent to [facility] physical therapy department. She stated she is scheduled for an appointment with their office today.    Phone # [phone]  Fax # [phone]      Phone # [phone]    Pharmacy - [facility]/pharmacy #[id number] - [place], [place] - [address] [place] [address]

## 2022-05-20 ENCOUNTER — TELEPHONE (OUTPATIENT)
Dept: INTERNAL MEDICINE | Facility: CLINIC | Age: 72
End: 2022-05-20
Payer: COMMERCIAL

## 2022-05-20 NOTE — TELEPHONE ENCOUNTER
----- Message from Martha Sams sent at 2022  2:15 PM CDT -----  Regarding: Request to speak to a nurse  Contact: Alyssa with [employer]  Celine Sinclair  MRN: [id number]  : 1950  PCP: Neeru Hinkle  Home Phone      [phone]  Work Phone      Not on file.  Mobile          [phone]      MESSAGE:   Request to speak to a nurse regarding status of a prior authorization for physical therapy   The office will require progress notes, plan of care, NPI number, Tax ID number, and physical address of the tacitly in which the testing will be performed   Ref # [id number]      Phone # [phone]   Fax # [phone]    Pharmacy - [facility]/pharmacy #[id number] - [place], [place] - [address]

## 2022-05-20 NOTE — TELEPHONE ENCOUNTER
Attempted to return the call. Was placed on hold for over 10 minutes then the call started over. They will call again if something is needed

## 2022-06-08 ENCOUNTER — OFFICE VISIT (OUTPATIENT)
Dept: INTERNAL MEDICINE | Facility: CLINIC | Age: 72
End: 2022-06-08
Payer: COMMERCIAL

## 2022-06-08 VITALS
OXYGEN SATURATION: 98 % | HEART RATE: 60 BPM | RESPIRATION RATE: 16 BRPM | WEIGHT: 175.94 LBS | BODY MASS INDEX: 29.31 KG/M2 | DIASTOLIC BLOOD PRESSURE: 78 MMHG | SYSTOLIC BLOOD PRESSURE: 118 MMHG | HEIGHT: 65 IN

## 2022-06-08 DIAGNOSIS — Z79.01 LONG TERM (CURRENT) USE OF ANTICOAGULANTS: ICD-10-CM

## 2022-06-08 DIAGNOSIS — I10 ESSENTIAL HYPERTENSION: Primary | ICD-10-CM

## 2022-06-08 DIAGNOSIS — M51.36 DDD (DEGENERATIVE DISC DISEASE), LUMBAR: ICD-10-CM

## 2022-06-08 DIAGNOSIS — I48.0 PAF (PAROXYSMAL ATRIAL FIBRILLATION): ICD-10-CM

## 2022-06-08 DIAGNOSIS — D68.61 ANTIPHOSPHOLIPID SYNDROME: ICD-10-CM

## 2022-06-08 DIAGNOSIS — K50.90 CROHN'S DISEASE WITHOUT COMPLICATION, UNSPECIFIED GASTROINTESTINAL TRACT LOCATION: ICD-10-CM

## 2022-06-08 DIAGNOSIS — M06.9 RHEUMATOID ARTHRITIS, INVOLVING UNSPECIFIED SITE, UNSPECIFIED WHETHER RHEUMATOID FACTOR PRESENT: ICD-10-CM

## 2022-06-08 DIAGNOSIS — I70.0 AORTIC ATHEROSCLEROSIS: ICD-10-CM

## 2022-06-08 DIAGNOSIS — M79.7 FIBROMYALGIA: ICD-10-CM

## 2022-06-08 DIAGNOSIS — F33.1 DEPRESSION, MAJOR, RECURRENT, MODERATE: ICD-10-CM

## 2022-06-08 PROCEDURE — 99999 PR PBB SHADOW E&M-EST. PATIENT-LVL IV: ICD-10-PCS | Mod: PBBFAC,,, | Performed by: INTERNAL MEDICINE

## 2022-06-08 PROCEDURE — 99999 PR PBB SHADOW E&M-EST. PATIENT-LVL IV: CPT | Mod: PBBFAC,,, | Performed by: INTERNAL MEDICINE

## 2022-06-08 PROCEDURE — 99214 PR OFFICE/OUTPT VISIT, EST, LEVL IV, 30-39 MIN: ICD-10-PCS | Mod: S$GLB,,, | Performed by: INTERNAL MEDICINE

## 2022-06-08 PROCEDURE — 99214 OFFICE O/P EST MOD 30 MIN: CPT | Mod: S$GLB,,, | Performed by: INTERNAL MEDICINE

## 2022-06-08 RX ORDER — GABAPENTIN 100 MG/1
100 CAPSULE ORAL 3 TIMES DAILY
Qty: 90 CAPSULE | Refills: 11 | Status: SHIPPED | OUTPATIENT
Start: 2022-06-08 | End: 2022-12-14 | Stop reason: SDUPTHER

## 2022-06-08 NOTE — PROGRESS NOTES
Subjective:       Patient ID: Celine Sinclair is a 71 y.o. female.    Chief Complaint: Follow-up      HPI:  Patient is known to me and presents for follow up HTN, antiphospholipid syndrome, fibromyalgia, MDD/anxiety, Crohn's disease, h/o atrial fibrillation. No new labs piror to today's visit.      Fibromyalgia and anxiety: on cymbalta to 30mg daily (had stopped taking and just resumed at 30mg). She is having more generalized pain over last few months.  Aching sensation. She is feeling more depressed lately, no precipitating factors. She feels her fibromyalgia is flaring up. Unsure why but not taking her gabapentin right now; denies side effects.      HTN: on metoprolol for h/o a-fib. BP today is well controlled. Denies chest pains, SOB, LE edema.      HLD: on atorvastatin 40mg. Due for lipid panel with me next visit. She does see cardiology but can't remember who she is seeing; unsure when last labs done there.     Antiphospholipid: was on coumadin; no on eliquis for this and a-fib. Denies abnormal bleeding.      A-fib: She also has h/o polymorphic VT and SVT/flutter with aberrancy. Follows with cardiology. She was on amiodarone for this but discontinued 3/2021 by cardiology.  remains on metoprolol and eliquis and ASA therapy.     Past Medical History:   Diagnosis Date    Aortic atherosclerosis     Benign essential hypertension     Cataract     Senile    Crohn's colitis     Depression, major, recurrent, moderate     Fibromyalgia     GERD (gastroesophageal reflux disease)     Hypertensive cardiomyopathy     IBS (irritable bowel syndrome)     Migraine     Opioid abuse, in remission     Osteopenia     Paget disease of bone     Port-A-Cath in place     right chest    Prolonged QT interval     RA (rheumatoid arthritis)     Sedative hypnotic or anxiolytic dependence     in remission        Family History   Problem Relation Age of Onset    Glaucoma Paternal Grandmother     Other Daughter         Diabetic  Retinopathy    Breast cancer Daughter 40    Retinal detachment Grandchild     Heart attack Maternal Grandmother     Colon cancer Maternal Grandmother     Cancer Mother         Unknown type    Emphysema Father 67    Heart disease Father     Lung cancer Sister     Heart attack Sister     Breast cancer Daughter 47    Breast cancer Sister     Amblyopia Neg Hx     Blindness Neg Hx     Strabismus Neg Hx     Macular degeneration Neg Hx     Cataracts Neg Hx        Social History     Socioeconomic History    Marital status:     Number of children: 12   Occupational History    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     Social Determinants of Health     Financial Resource Strain: High Risk    Difficulty of Paying Living Expenses: Hard   Food Insecurity: Food Insecurity Present    Worried About Running Out of Food in the Last Year: Sometimes true    Ran Out of Food in the Last Year: Sometimes true   Transportation Needs: Unmet Transportation Needs    Lack of Transportation (Medical): Yes    Lack of Transportation (Non-Medical): Yes   Physical Activity: Inactive    Days of Exercise per Week: 0 days    Minutes of Exercise per Session: 0 min   Stress: Stress Concern Present    Feeling of Stress : Rather much   Social Connections: Socially Integrated    Frequency of Communication with Friends and Family: More than three times a week    Frequency of Social Gatherings with Friends and Family: More than three times a week    Attends Zoroastrianism Services: More than 4 times per year    Active Member of Clubs or Organizations: Yes    Attends Club or Organization Meetings: More than 4 times per year     Marital Status:    Housing Stability: Low Risk     Unable to Pay for Housing in the Last Year: No    Number of Places Lived in the Last Year: 1    Unstable Housing in the Last Year: No       Review of Systems   Constitutional: Positive for fatigue. Negative for activity change, fever and unexpected weight change.   HENT: Negative for congestion, ear pain, hearing loss, rhinorrhea and sore throat.    Eyes: Negative for pain, redness and visual disturbance.   Respiratory: Negative for cough, shortness of breath and wheezing.    Cardiovascular: Negative for chest pain, palpitations and leg swelling.   Gastrointestinal: Negative for abdominal pain, constipation, diarrhea, nausea and vomiting.   Genitourinary: Negative for dysuria, frequency and urgency.   Musculoskeletal: Positive for arthralgias, back pain and myalgias. Negative for joint swelling and neck pain.   Skin: Negative for color change, rash and wound.   Neurological: Negative for dizziness, tremors, weakness, light-headedness and headaches.         Objective:      Physical Exam  Vitals reviewed.   Constitutional:       General: She is not in acute distress.     Appearance: She is well-developed.   HENT:      Head: Normocephalic and atraumatic.      Right Ear: External ear normal.      Left Ear: External ear normal.      Nose: Nose normal.   Eyes:      General:         Right eye: No discharge.         Left eye: No discharge.      Extraocular Movements: Extraocular movements intact.      Conjunctiva/sclera: Conjunctivae normal.      Pupils: Pupils are equal, round, and reactive to light.   Neck:      Thyroid: No thyromegaly.   Cardiovascular:      Rate and Rhythm: Normal rate and regular rhythm.      Heart sounds: No murmur heard.  Pulmonary:      Effort: Pulmonary effort is normal. No respiratory distress.      Breath sounds: Normal breath sounds. No wheezing or rales.   Abdominal:      General: Bowel sounds are normal. There is no distension.       Palpations: Abdomen is soft.      Tenderness: There is no abdominal tenderness.   Musculoskeletal:         General: Tenderness present. No swelling or deformity.   Skin:     General: Skin is warm and dry.   Neurological:      Mental Status: She is alert and oriented to person, place, and time.      Cranial Nerves: No cranial nerve deficit.   Psychiatric:         Behavior: Behavior normal.         Thought Content: Thought content normal.         Assessment:       1. Essential hypertension    2. PAF (paroxysmal atrial fibrillation)    3. Aortic atherosclerosis    4. Depression, major, recurrent, moderate    5. Fibromyalgia    6. DDD (degenerative disc disease), lumbar    7. Crohn's disease without complication, unspecified gastrointestinal tract location    8. Antiphospholipid syndrome    9. Long term (current) use of anticoagulants    10. Rheumatoid arthritis, involving unspecified site, unspecified whether rheumatoid factor present        Plan:       Ada was seen today for follow-up.    Diagnoses and all orders for this visit:    Essential hypertension  -     CBC Auto Differential; Future  -     Comprehensive Metabolic Panel; Future  -     TSH; Future  -     Lipid Panel; Future  Chronic controlled  Continue medications at same dose  Low Na diet  Exercise, weight loss  Check BP and keep log for next visit    PAF (paroxysmal atrial fibrillation)  -     CBC Auto Differential; Future  -     Comprehensive Metabolic Panel; Future  -     TSH; Future  -     Lipid Panel; Future  Chronic stable  Cont BB and eliquis  Off antiarrythmic per cardiology; she doesn't remember who she is seeing now so can't request records today  No chest pains, palpitations    Aortic atherosclerosis  -     CBC Auto Differential; Future  -     Comprehensive Metabolic Panel; Future  -     TSH; Future  -     Lipid Panel; Future  Noted on prior imaging  Cont statin and aSA    Depression, major, recurrent, moderate  -     CBC Auto Differential;  Future  -     Comprehensive Metabolic Panel; Future  -     TSH; Future  -     Lipid Panel; Future  Chronic stable  Cont cymbalta same dose for now as adding gabapentin today for pain control  Consider increasing to 60mg next visit    Fibromyalgia  -     gabapentin (NEURONTIN) 100 MG capsule; Take 1 capsule (100 mg total) by mouth 3 (three) times daily.  -     CBC Auto Differential; Future  -     Comprehensive Metabolic Panel; Future  -     TSH; Future  -     Lipid Panel; Future  Chronic uncontrolled  Cont cymbalta  Add gabapentin 100mg TID    Cont PT  See rheumatology    DDD (degenerative disc disease), lumbar  -     gabapentin (NEURONTIN) 100 MG capsule; Take 1 capsule (100 mg total) by mouth 3 (three) times daily.  Cont cymbalta for pain  Add gabapentin 100mg TID  Cont PT    Crohn's disease without complication, unspecified gastrointestinal tract location  Chronic stable  No acute sx  Cont GI follow up as planned  UTD with C-scope    Antiphospholipid syndrome  Long term (current) use of anticoagulants  Chronic stable  Cont eliquis    Rheumatoid arthritis, involving unspecified site, unspecified whether rheumatoid factor present  -     Comprehensive Metabolic Panel; Future  Was seeing outside rheumatology  Due for apptl encouraged to schedule  I do not see clinical signs of RA flaring on exam but needs to be re-evaluated by rheum given increasing pain  Not clear on this diagnosis, she will get me name of her rheumatologist to request records    RTC 6 months with labs for orutine and PRN

## 2022-08-05 NOTE — NURSING
At approximately this time pt physician Dr. Jared Tiwari phones to speak with this nurse writer to follow up on pt pain level; this nurse writer informs physician of 0/10 pain at this time as pt appears to be resting asleep in bed with no s/s of distress; further informs administered PRN Melatonin per pt request and that pt 2200 potassium labs returned 3.1 and troponin 0.020; no new orders from physician at this time; this nurse writer continuing to monitor pt this shift; call light in reach; normal sinus rhythm on cardiac telemetry monitor.    Was The Patient On Physician Recommended Anticoagulation Therapy?: Please Select the Appropriate Response

## 2022-08-09 ENCOUNTER — HOSPITAL ENCOUNTER (EMERGENCY)
Facility: HOSPITAL | Age: 72
Discharge: HOME | End: 2022-08-09
Attending: STUDENT IN AN ORGANIZED HEALTH CARE EDUCATION/TRAINING PROGRAM
Payer: COMMERCIAL

## 2022-08-09 VITALS
TEMPERATURE: 96 F | WEIGHT: 178.38 LBS | BODY MASS INDEX: 29.68 KG/M2 | HEART RATE: 65 BPM | RESPIRATION RATE: 18 BRPM | SYSTOLIC BLOOD PRESSURE: 138 MMHG | DIASTOLIC BLOOD PRESSURE: 76 MMHG | OXYGEN SATURATION: 100 %

## 2022-08-09 DIAGNOSIS — R17 TOTAL BILIRUBIN, ELEVATED: ICD-10-CM

## 2022-08-09 DIAGNOSIS — R79.1 ELEVATED INR: ICD-10-CM

## 2022-08-09 DIAGNOSIS — R07.9 CHEST PAIN: Primary | ICD-10-CM

## 2022-08-09 DIAGNOSIS — R07.89 CHEST PAIN, NON-CARDIAC: ICD-10-CM

## 2022-08-09 DIAGNOSIS — K62.5 BRBPR (BRIGHT RED BLOOD PER RECTUM): ICD-10-CM

## 2022-08-09 DIAGNOSIS — E87.6 HYPOKALEMIA: ICD-10-CM

## 2022-08-09 LAB
ALBUMIN SERPL BCP-MCNC: 3.7 G/DL (ref 3.5–5.2)
ALP SERPL-CCNC: 75 U/L (ref 55–135)
ALT SERPL W/O P-5'-P-CCNC: 12 U/L (ref 10–44)
ANION GAP SERPL CALC-SCNC: 11 MMOL/L (ref 8–16)
APTT BLDCRRT: 60 SEC (ref 21–32)
AST SERPL-CCNC: 17 U/L (ref 10–40)
BASOPHILS # BLD AUTO: 0.05 K/UL (ref 0–0.2)
BASOPHILS NFR BLD: 0.5 % (ref 0–1.9)
BILIRUB SERPL-MCNC: 1.6 MG/DL (ref 0.1–1)
BNP SERPL-MCNC: 109 PG/ML (ref 0–99)
BUN SERPL-MCNC: 15 MG/DL (ref 8–23)
CALCIUM SERPL-MCNC: 8.5 MG/DL (ref 8.7–10.5)
CHLORIDE SERPL-SCNC: 110 MMOL/L (ref 95–110)
CO2 SERPL-SCNC: 23 MMOL/L (ref 23–29)
CREAT SERPL-MCNC: 0.7 MG/DL (ref 0.5–1.4)
D DIMER PPP IA.FEU-MCNC: <0.19 MG/L FEU
DIFFERENTIAL METHOD: ABNORMAL
EOSINOPHIL # BLD AUTO: 0.2 K/UL (ref 0–0.5)
EOSINOPHIL NFR BLD: 1.5 % (ref 0–8)
ERYTHROCYTE [DISTWIDTH] IN BLOOD BY AUTOMATED COUNT: 14.1 % (ref 11.5–14.5)
EST. GFR  (NO RACE VARIABLE): >60 ML/MIN/1.73 M^2
GLUCOSE SERPL-MCNC: 104 MG/DL (ref 70–110)
HCT VFR BLD AUTO: 37.6 % (ref 37–48.5)
HGB BLD-MCNC: 12.7 G/DL (ref 12–16)
IMM GRANULOCYTES # BLD AUTO: 0.05 K/UL (ref 0–0.04)
IMM GRANULOCYTES NFR BLD AUTO: 0.5 % (ref 0–0.5)
INFLUENZA A, MOLECULAR: NEGATIVE
INFLUENZA B, MOLECULAR: NEGATIVE
INR PPP: 6.3 (ref 0.8–1.2)
LYMPHOCYTES # BLD AUTO: 2.1 K/UL (ref 1–4.8)
LYMPHOCYTES NFR BLD: 20.6 % (ref 18–48)
MCH RBC QN AUTO: 29.9 PG (ref 27–31)
MCHC RBC AUTO-ENTMCNC: 33.8 G/DL (ref 32–36)
MCV RBC AUTO: 89 FL (ref 82–98)
MONOCYTES # BLD AUTO: 0.7 K/UL (ref 0.3–1)
MONOCYTES NFR BLD: 7.2 % (ref 4–15)
NEUTROPHILS # BLD AUTO: 7 K/UL (ref 1.8–7.7)
NEUTROPHILS NFR BLD: 69.7 % (ref 38–73)
NRBC BLD-RTO: 0 /100 WBC
PLATELET # BLD AUTO: 219 K/UL (ref 150–450)
PMV BLD AUTO: 10.2 FL (ref 9.2–12.9)
POTASSIUM SERPL-SCNC: 2.9 MMOL/L (ref 3.5–5.1)
PROT SERPL-MCNC: 6.8 G/DL (ref 6–8.4)
PROTHROMBIN TIME: 58.9 SEC (ref 9–12.5)
RBC # BLD AUTO: 4.25 M/UL (ref 4–5.4)
SARS-COV-2 RDRP RESP QL NAA+PROBE: NEGATIVE
SODIUM SERPL-SCNC: 144 MMOL/L (ref 136–145)
SPECIMEN SOURCE: NORMAL
TROPONIN I SERPL DL<=0.01 NG/ML-MCNC: 0.01 NG/ML (ref 0–0.03)
TROPONIN I SERPL DL<=0.01 NG/ML-MCNC: <0.006 NG/ML (ref 0–0.03)
WBC # BLD AUTO: 10.09 K/UL (ref 3.9–12.7)

## 2022-08-09 PROCEDURE — 25000003 PHARM REV CODE 250: Performed by: STUDENT IN AN ORGANIZED HEALTH CARE EDUCATION/TRAINING PROGRAM

## 2022-08-09 PROCEDURE — 63600175 PHARM REV CODE 636 W HCPCS: Performed by: STUDENT IN AN ORGANIZED HEALTH CARE EDUCATION/TRAINING PROGRAM

## 2022-08-09 PROCEDURE — U0002 COVID-19 LAB TEST NON-CDC: HCPCS | Performed by: STUDENT IN AN ORGANIZED HEALTH CARE EDUCATION/TRAINING PROGRAM

## 2022-08-09 PROCEDURE — 96372 THER/PROPH/DIAG INJ SC/IM: CPT | Performed by: STUDENT IN AN ORGANIZED HEALTH CARE EDUCATION/TRAINING PROGRAM

## 2022-08-09 PROCEDURE — 99285 EMERGENCY DEPT VISIT HI MDM: CPT | Mod: 25

## 2022-08-09 PROCEDURE — 85730 THROMBOPLASTIN TIME PARTIAL: CPT | Performed by: STUDENT IN AN ORGANIZED HEALTH CARE EDUCATION/TRAINING PROGRAM

## 2022-08-09 PROCEDURE — 93010 ELECTROCARDIOGRAM REPORT: CPT | Mod: ,,, | Performed by: INTERNAL MEDICINE

## 2022-08-09 PROCEDURE — 84484 ASSAY OF TROPONIN QUANT: CPT | Performed by: STUDENT IN AN ORGANIZED HEALTH CARE EDUCATION/TRAINING PROGRAM

## 2022-08-09 PROCEDURE — 83880 ASSAY OF NATRIURETIC PEPTIDE: CPT | Performed by: STUDENT IN AN ORGANIZED HEALTH CARE EDUCATION/TRAINING PROGRAM

## 2022-08-09 PROCEDURE — 93010 EKG 12-LEAD: ICD-10-PCS | Mod: ,,, | Performed by: INTERNAL MEDICINE

## 2022-08-09 PROCEDURE — 36415 COLL VENOUS BLD VENIPUNCTURE: CPT | Performed by: STUDENT IN AN ORGANIZED HEALTH CARE EDUCATION/TRAINING PROGRAM

## 2022-08-09 PROCEDURE — 85379 FIBRIN DEGRADATION QUANT: CPT | Performed by: STUDENT IN AN ORGANIZED HEALTH CARE EDUCATION/TRAINING PROGRAM

## 2022-08-09 PROCEDURE — 93005 ELECTROCARDIOGRAM TRACING: CPT

## 2022-08-09 PROCEDURE — 85025 COMPLETE CBC W/AUTO DIFF WBC: CPT | Performed by: STUDENT IN AN ORGANIZED HEALTH CARE EDUCATION/TRAINING PROGRAM

## 2022-08-09 PROCEDURE — 87502 INFLUENZA DNA AMP PROBE: CPT | Performed by: STUDENT IN AN ORGANIZED HEALTH CARE EDUCATION/TRAINING PROGRAM

## 2022-08-09 PROCEDURE — 85610 PROTHROMBIN TIME: CPT | Performed by: STUDENT IN AN ORGANIZED HEALTH CARE EDUCATION/TRAINING PROGRAM

## 2022-08-09 PROCEDURE — 80053 COMPREHEN METABOLIC PANEL: CPT | Performed by: STUDENT IN AN ORGANIZED HEALTH CARE EDUCATION/TRAINING PROGRAM

## 2022-08-09 RX ORDER — NAPROXEN SODIUM 220 MG/1
324 TABLET, FILM COATED ORAL
Status: COMPLETED | OUTPATIENT
Start: 2022-08-09 | End: 2022-08-09

## 2022-08-09 RX ORDER — KETOROLAC TROMETHAMINE 30 MG/ML
15 INJECTION, SOLUTION INTRAMUSCULAR; INTRAVENOUS
Status: COMPLETED | OUTPATIENT
Start: 2022-08-09 | End: 2022-08-09

## 2022-08-09 RX ORDER — PHYTONADIONE 5 MG/1
5 TABLET ORAL
Status: COMPLETED | OUTPATIENT
Start: 2022-08-09 | End: 2022-08-09

## 2022-08-09 RX ADMIN — ASPIRIN 324 MG: 81 TABLET, CHEWABLE ORAL at 08:08

## 2022-08-09 RX ADMIN — PHYTONADIONE 5 MG: 5 TABLET ORAL at 11:08

## 2022-08-09 RX ADMIN — POTASSIUM BICARBONATE 50 MEQ: 978 TABLET, EFFERVESCENT ORAL at 10:08

## 2022-08-09 RX ADMIN — KETOROLAC TROMETHAMINE 15 MG: 30 INJECTION, SOLUTION INTRAMUSCULAR at 10:08

## 2022-08-09 NOTE — ED PROVIDER NOTES
[facility]                                                  Chief Complaint     Chief Complaint   Patient presents with    Chest Pain       History of Present Illness  71 y.o. female with with past medical history of aortic atherosclerosis, hypertension, GERD, depression, fibromyalgia, IBS, migraines and osteopenia who presents with chest pain x 1 week. At the onset of the chest pain, patient was cooking. The pain is described as a midsternal tightness that is 3/10 in severity and non-radiating. Denies alleviating factors. Denies exacerbating factors. Denies association with exertion. Denies personal history of MI. Denies family history of MI. She is not a current tobacco user. Denies hx of VTEs. Endorses intermittent shortness of breath. Patient also endorses easy BUE bruising over the past 5 days and intermittent bright red blood per rectum. Denies chest wall trauma, fever, chills, myalgias, lightheadedness, nausea, vomiting, diaphoresis, anxiety, palpitations or leg swelling.    History obtained from:  Patient    Review of patient's allergies indicates:   Allergen Reactions    Octreotide acetate Nausea And Vomiting     Had symptoms when she took Sandostatin with Codeine    Codeine Itching and Nausea And Vomiting     Pill form only, IV ok.,  Had symptoms when she took Codeine with Sandostatin.  Other reaction(s): Itching    Morphine Nausea And Vomiting     Patient reports reaction only when she takes po form of Morphine.     Past Medical History:   Diagnosis Date    Aortic atherosclerosis     Benign essential hypertension     Cataract     Senile    Crohn's colitis     Depression, major, recurrent, moderate     Fibromyalgia     GERD (gastroesophageal reflux disease)     Hypertensive cardiomyopathy     IBS (irritable bowel syndrome)     Migraine     Opioid abuse, in remission     Osteopenia     Paget disease of bone     Port-A-Cath in place     right chest    Prolonged QT interval      RA (rheumatoid arthritis)     Sedative hypnotic or anxiolytic dependence     in remission      Past Surgical History:   Procedure Laterality Date    CATARACT EXTRACTION W/  INTRAOCULAR LENS IMPLANT Left 02/21/2017    Dr. Christianson    CATARACT EXTRACTION W/  INTRAOCULAR LENS IMPLANT Right 03/07/2017    Dr. Christianson    CHOLECYSTECTOMY      COLON SURGERY      COLONOSCOPY N/A 3/16/2016    Procedure: COLONOSCOPY;  Surgeon: Dale Trejo MD;  Location: [address]);  Service: Endoscopy;  Laterality: N/A;    COLONOSCOPY N/A 10/12/2020    Procedure: COLONOSCOPY;  Surgeon: Cali Urena MD;  Location: [address]);  Service: Endoscopy;  Laterality: N/A;  covid test 10/9-[facility]- completed 10/9/20  ok to hold Coumadin x 5 days per coumadin clinic-see telephone encounterd ated 10/6-MS / Loop recorder  10/6/20- Pt confirmed- ERW@1122  10/9-Pt confirmed earlier arrival time, prep ins. reviewed and emailed to Pt    Colostomy reversal      HEMORRHOID SURGERY      HYSTERECTOMY      ILEOSTOMY      ILEOSTOMY CLOSURE      LEFT HEART CATHETERIZATION N/A 2/15/2019    Procedure: HEART CATH-LEFT;  Surgeon: Miky Keita MD;  Location: [facility] CATH LAB;  Service: Cardiology;  Laterality: N/A;    NECK SURGERY      OOPHORECTOMY Bilateral     SBO      SMALL INTESTINE SURGERY      TONSILLECTOMY      TUBAL LIGATION        Family History   Problem Relation Age of Onset    Glaucoma Paternal Grandmother     Other Daughter         Diabetic Retinopathy    Breast cancer Daughter 40    Retinal detachment Grandchild     Heart attack Maternal Grandmother     Colon cancer Maternal Grandmother     Cancer Mother         Unknown type    Emphysema Father 67    Heart disease Father     Lung cancer Sister     Heart attack Sister     Breast cancer Daughter 47    Breast cancer Sister     Amblyopia Neg Hx     Blindness Neg Hx     Strabismus Neg Hx     Macular degeneration Neg Hx     Cataracts  Neg Hx      Social History     Tobacco Use    Smoking status: Never Smoker    Smokeless tobacco: Never Used   Substance Use Topics    Drug use: No          Review of Systems and Physical Exam     Review of Systems      + chest pain, shortness of breath    All other symptoms negative as stated below    --Constitutional - Denies fever, appetite change, chills  --Eyes - Denies eye discharge, eye pain, eye redness or visual disturbance  --HENT- Denies congestion, sore throat, drooling, ear discharge, rhinorrhea or trouble swallowing  --Respiratory - Denies cough, wheezing  --Cardiovascular - Denies leg swelling, palpitations  --Gastrointestinal - Denies abdominal pain, abdominal distension, constipation, diarrhea, nausea or vomiting  --Genitourinary - Denies difficulty urinating, dysuria, hematuria, urgency  --Musculoskeletal - Denies arthralgias, myalgias  --Neurological - Denies dizziness, headaches, lightheadedness, weakness  --Hematologic - Denies easy bruising or easy bleeding  --Skin - Denies rash, wound or pallor  --Psychiatric- Denies dysphoric mood, nervousness/anxiety    Vital Signs   weight is 80.9 kg (178 lb 5.6 oz). Her temperature is 96.4 °F (35.8 °C). Her blood pressure is 138/76 and her pulse is 65. Her respiration is 18 and oxygen saturation is 100%.      Physical Exam   Nursing note and vitals reviewed  --Constitutional:  Well developed, well nourished. In no acute distress.  --HENT: Normocephalic, atraumatic. No rhinorrhea. Moist oral mucosa. No oropharyngeal edema, erythema or exudates.   --Eyes: PERRL. Extraocular movements intact. No periorbital swelling. Normal conjunctiva.  --Neck: Normal range of motion. Neck supple. No adenopathy  --Cardiac: Regular rhythm, normal S1, normal S2, no murmur, normal rate, intact distal pulses  --Pulmonary: Normal respiratory effort, breath sounds normal and equal bilaterally, no accessory muscle use, no respiratory distress  --Abdominal: Soft, normal bowel  sounds, no tenderness, no guarding, no rebound  --Rectal: Patient refused.  --Musculoskeletal: Normal range of motion. No deformity, tenderness or edema.  --Neurological:  Alert and oriented x 4. Follows commands appropriately.    --Skin: BUE bruising noted. Warm and dry. No rash, pallor, cyanosis or jaundice.  --Psych: Normal mood    ED Course     EKG (interpreted by me)  Rate: 68 bpm  Rhythm: Normal sinus rhythm  Axis: Normal  ST-segments: No elevation or depression but non-specific ST-changes noted in the lateral leads.  Overall Interpretation: Normal sinus rhythm with lateral non-specific ST-changes and chronically prolonged QTc interval.    Lab Results (reviewed by me)  Labs Reviewed   B-TYPE NATRIURETIC PEPTIDE - Abnormal; Notable for the following components:       Result Value     (*)     All other components within normal limits   COMPREHENSIVE METABOLIC PANEL - Abnormal; Notable for the following components:    Potassium 2.9 (*)     Calcium 8.5 (*)     Total Bilirubin 1.6 (*)     All other components within normal limits   CBC W/ AUTO DIFFERENTIAL - Abnormal; Notable for the following components:    Immature Grans (Abs) 0.05 (*)     All other components within normal limits   PROTIME-INR - Abnormal; Notable for the following components:    Prothrombin Time 58.9 (*)     INR 6.3 (*)     All other components within normal limits    Narrative:       INR critical result(s) called and verbal readback obtained from   Shannon Arthur RN by AK5 08/09/2022 11:26   APTT - Abnormal; Notable for the following components:    aPTT 60.0 (*)     All other components within normal limits   INFLUENZA A & B BY MOLECULAR   D DIMER, QUANTITATIVE   TROPONIN I   SARS-COV-2 RNA AMPLIFICATION, QUAL   TROPONIN I       Radiology (images visualized & reports reviewed by me)  X-Ray Chest 1 View   Final Result      1. No evidence of acute cardiopulmonary process.   2. Mild central vascular prominence without evidence of overt  pulmonary edema.   3. Operative change of prior lower cervical fusion.         Electronically signed by: Sonny Panda MD   Date:    08/09/2022   Time:    09:26          Medications Given  Medications   aspirin chewable tablet 324 mg (324 mg Oral Given 8/9/22 0839)   ketorolac injection 15 mg (15 mg Intramuscular Given 8/9/22 1043)   potassium bicarbonate disintegrating tablet 50 mEq (50 mEq Oral Given 8/9/22 1050)   phytonadione (vitamin K1) tablet 5 mg (5 mg Oral Given 8/9/22 1152)       Differential Diagnosis:  NSTEMI, STEMI, unstable angina, aortic dissection, pulmonary embolism, tension pneumothorax, pericarditis, valvular heart disease, pneumonia, anxiety, anticoagulant side effect    Clinical Tests:  Lab Tests: Ordered and reviewed  Radiological Study: Ordered and reviewed  Medical Tests: Ordered and reviewed    ED Management     weight is 80.9 kg (178 lb 5.6 oz). Her temperature is 96.4 °F (35.8 °C). Her blood pressure is 138/76 and her pulse is 65. Her respiration is 18 and oxygen saturation is 100%.     Physical exam with BUE bruising but normal heart sounds and clear lungs with no respiratory distress. Labs revealed hypokalemia and elevated INR (6.3) but were otherwise unremarkable. EKG revealed normal sinus rhythm with lateral non-specific ST-changes and chronically prolonged QTc interval.Vitamin K given for elevated INR. Considering elevated INR, hematology was consulted and recommended holding the next eliquis dose. Patient deemed stable for discharge. Patient to follow up with primary care in 1-2 days.  Strict return precautions given. Understanding of the plan was expressed and all questions were answered.      Diagnosis  The primary encounter diagnosis was Chest pain. Diagnoses of BRBPR (bright red blood per rectum), Chest pain, non-cardiac, Elevated INR, Hypokalemia, and Total bilirubin, elevated were also pertinent to this visit.    Disposition and Plan  Condition: Stable  Disposition:  Discharge    ED Prescriptions     None        Follow-up Information     Follow up With Specialties Details Why Contact Info     [facility] Dept Emergency Medicine Go to  As needed, If symptoms worsen [address] [place] [id number] [phone]    Neeru Hinkle MD Internal Medicine Schedule an appointment as soon as possible for a visit in 2 days For follow-up of your ED visit [address] [place] [id number]  [phone]      Kadeem Foote MD Hematology, Oncology, Hematology and Oncology Schedule an appointment as soon as possible for a visit in 2 days For follow-up with a hematologist [address] [place] [id number]  [phone]               Stan Meza MD  08/15/22 0530

## 2022-08-10 ENCOUNTER — OFFICE VISIT (OUTPATIENT)
Dept: INTERNAL MEDICINE | Facility: CLINIC | Age: 72
End: 2022-08-10
Payer: COMMERCIAL

## 2022-08-10 VITALS
DIASTOLIC BLOOD PRESSURE: 82 MMHG | SYSTOLIC BLOOD PRESSURE: 142 MMHG | RESPIRATION RATE: 18 BRPM | WEIGHT: 180.56 LBS | HEIGHT: 65 IN | OXYGEN SATURATION: 98 % | HEART RATE: 77 BPM | BODY MASS INDEX: 30.08 KG/M2

## 2022-08-10 DIAGNOSIS — K62.5 RECTAL BLEEDING: Primary | ICD-10-CM

## 2022-08-10 PROCEDURE — 99999 PR PBB SHADOW E&M-EST. PATIENT-LVL IV: CPT | Mod: PBBFAC,,, | Performed by: INTERNAL MEDICINE

## 2022-08-10 PROCEDURE — 99213 OFFICE O/P EST LOW 20 MIN: CPT | Mod: S$GLB,,, | Performed by: INTERNAL MEDICINE

## 2022-08-10 PROCEDURE — 99999 PR PBB SHADOW E&M-EST. PATIENT-LVL IV: ICD-10-PCS | Mod: PBBFAC,,, | Performed by: INTERNAL MEDICINE

## 2022-08-10 PROCEDURE — 99213 PR OFFICE/OUTPT VISIT, EST, LEVL III, 20-29 MIN: ICD-10-PCS | Mod: S$GLB,,, | Performed by: INTERNAL MEDICINE

## 2022-08-10 NOTE — PROGRESS NOTES
Subjective:       Patient ID: Celine Sinclair is a 71 y.o. female.    Chief Complaint: Follow-up    Celine Sinclair is a 71 y.o. female  Here with ER follow up for bright red blood for 3 days.  Bright red blood.  She reports easy bruising ; taking eliquis  And ASA .   Lab Results      Component                Value               Date                      WBC                      10.09               08/09/2022                HGB                      12.7                08/09/2022                HCT                      37.6                08/09/2022                MCV                      89                  08/09/2022                PLT                      219                 08/09/2022                      Follow-up  Associated symptoms include arthralgias and myalgias. Pertinent negatives include no chest pain, chills, congestion, coughing, fever, nausea, numbness, sore throat or vomiting.     Review of Systems   Constitutional: Negative for chills and fever.   HENT: Negative for congestion, hearing loss, sinus pressure and sore throat.    Eyes: Negative for photophobia.   Respiratory: Negative for cough, choking, chest tightness and wheezing.    Cardiovascular: Negative for chest pain and palpitations.   Gastrointestinal: Negative for blood in stool, nausea and vomiting.   Genitourinary: Negative for dysuria and hematuria.   Musculoskeletal: Positive for arthralgias and myalgias.   Skin: Negative for pallor.   Neurological: Negative for dizziness and numbness.   Hematological: Does not bruise/bleed easily.   Psychiatric/Behavioral: Negative for confusion and suicidal ideas. The patient is not nervous/anxious.        Objective:      Physical Exam  Vitals and nursing note reviewed.   Constitutional:       Appearance: She is well-developed.   HENT:      Head: Normocephalic and atraumatic.      Right Ear: External ear normal.      Left Ear: External ear normal.   Eyes:      Conjunctiva/sclera: Conjunctivae normal.       Pupils: Pupils are equal, round, and reactive to light.   Neck:      Thyroid: No thyromegaly.      Vascular: No JVD.      Trachea: No tracheal deviation.   Cardiovascular:      Rate and Rhythm: Normal rate and regular rhythm.      Heart sounds: Normal heart sounds.   Pulmonary:      Effort: Pulmonary effort is normal. No respiratory distress.      Breath sounds: Normal breath sounds. No wheezing or rales.   Chest:      Chest wall: No tenderness.   Abdominal:      General: Bowel sounds are normal. There is no distension.      Palpations: Abdomen is soft. There is no mass.      Tenderness: There is no abdominal tenderness. There is no guarding or rebound.   Musculoskeletal:         General: Normal range of motion.      Cervical back: Normal range of motion and neck supple.   Lymphadenopathy:      Cervical: No cervical adenopathy.   Skin:     General: Skin is warm and dry.             Comments: Bruising     Also painful nodules   No redness ; no heat . Tender    Neurological:      Mental Status: She is alert and oriented to person, place, and time.      Cranial Nerves: No cranial nerve deficit.      Motor: No abnormal muscle tone.      Coordination: Coordination normal.      Deep Tendon Reflexes: Reflexes are normal and symmetric. Reflexes normal.      Comments: CN: Optic discs are flat with normal vasculature, PERRL, Extraoccular movements and visual fields are full. Normal facial sensation and strength, Hearing symmetric, Tongue and Palate are midline and strong. Shoulder Shrug symmetric and strong.         Assessment:       1. Rectal bleeding        Plan:   Ada was seen today for follow-up.    Diagnoses and all orders for this visit:    Rectal bleeding  -     Case Request Endoscopy: COLONOSCOPY    she is taking ASA and eliquis   Stop ASA   CBC is reassuring   Her painful nodules may be erythema nodosum ; needs to see rheumatology if not better       Problem List Items Addressed This Visit    None

## 2022-08-12 ENCOUNTER — PATIENT OUTREACH (OUTPATIENT)
Dept: ADMINISTRATIVE | Facility: HOSPITAL | Age: 72
End: 2022-08-12
Payer: COMMERCIAL

## 2022-08-12 NOTE — PROGRESS NOTES
Chart reviewed, immunization record updated.  No new results noted on [facility] or [facility] web site.  Care Everywhere updated.   Patient care coordination note and upcoming PCP visit updated.  Next PCP visit 12/14/2022.  Left detailed message for patient to return call to discuss scheduling [facility].

## 2022-08-16 ENCOUNTER — OFFICE VISIT (OUTPATIENT)
Dept: INTERNAL MEDICINE | Facility: CLINIC | Age: 72
End: 2022-08-16
Payer: COMMERCIAL

## 2022-08-16 ENCOUNTER — HOSPITAL ENCOUNTER (OUTPATIENT)
Dept: PREADMISSION TESTING | Facility: HOSPITAL | Age: 72
Discharge: HOME | End: 2022-08-16
Attending: COLON & RECTAL SURGERY
Payer: COMMERCIAL

## 2022-08-16 VITALS
OXYGEN SATURATION: 99 % | WEIGHT: 181.44 LBS | DIASTOLIC BLOOD PRESSURE: 74 MMHG | RESPIRATION RATE: 16 BRPM | SYSTOLIC BLOOD PRESSURE: 130 MMHG | HEART RATE: 58 BPM | BODY MASS INDEX: 30.23 KG/M2 | HEIGHT: 65 IN

## 2022-08-16 DIAGNOSIS — R41.3 MEMORY LOSS: ICD-10-CM

## 2022-08-16 DIAGNOSIS — K62.5 RECTAL BLEEDING: ICD-10-CM

## 2022-08-16 DIAGNOSIS — I10 ESSENTIAL HYPERTENSION: ICD-10-CM

## 2022-08-16 DIAGNOSIS — E87.6 HYPOKALEMIA: ICD-10-CM

## 2022-08-16 DIAGNOSIS — I48.0 PAF (PAROXYSMAL ATRIAL FIBRILLATION): ICD-10-CM

## 2022-08-16 DIAGNOSIS — R22.9 SUBCUTANEOUS NODULES: Primary | ICD-10-CM

## 2022-08-16 PROCEDURE — 99999 PR PBB SHADOW E&M-EST. PATIENT-LVL IV: ICD-10-PCS | Mod: PBBFAC,,, | Performed by: NURSE PRACTITIONER

## 2022-08-16 PROCEDURE — 1101F PR PT FALLS ASSESS DOC 0-1 FALLS W/OUT INJ PAST YR: ICD-10-PCS | Mod: CPTII,S$GLB,, | Performed by: NURSE PRACTITIONER

## 2022-08-16 PROCEDURE — 3078F PR MOST RECENT DIASTOLIC BLOOD PRESSURE < 80 MM HG: ICD-10-PCS | Mod: CPTII,S$GLB,, | Performed by: NURSE PRACTITIONER

## 2022-08-16 PROCEDURE — 3288F PR FALLS RISK ASSESSMENT DOCUMENTED: ICD-10-PCS | Mod: CPTII,S$GLB,, | Performed by: NURSE PRACTITIONER

## 2022-08-16 PROCEDURE — 4010F PR ACE/ARB THEARPY RXD/TAKEN: ICD-10-PCS | Mod: CPTII,S$GLB,, | Performed by: NURSE PRACTITIONER

## 2022-08-16 PROCEDURE — 99214 OFFICE O/P EST MOD 30 MIN: CPT | Mod: S$GLB,,, | Performed by: NURSE PRACTITIONER

## 2022-08-16 PROCEDURE — 3008F PR BODY MASS INDEX (BMI) DOCUMENTED: ICD-10-PCS | Mod: CPTII,S$GLB,, | Performed by: NURSE PRACTITIONER

## 2022-08-16 PROCEDURE — 3075F SYST BP GE 130 - 139MM HG: CPT | Mod: CPTII,S$GLB,, | Performed by: NURSE PRACTITIONER

## 2022-08-16 PROCEDURE — 3075F PR MOST RECENT SYSTOLIC BLOOD PRESS GE 130-139MM HG: ICD-10-PCS | Mod: CPTII,S$GLB,, | Performed by: NURSE PRACTITIONER

## 2022-08-16 PROCEDURE — 3078F DIAST BP <80 MM HG: CPT | Mod: CPTII,S$GLB,, | Performed by: NURSE PRACTITIONER

## 2022-08-16 PROCEDURE — 99214 PR OFFICE/OUTPT VISIT, EST, LEVL IV, 30-39 MIN: ICD-10-PCS | Mod: S$GLB,,, | Performed by: NURSE PRACTITIONER

## 2022-08-16 PROCEDURE — 99999 PR PBB SHADOW E&M-EST. PATIENT-LVL IV: CPT | Mod: PBBFAC,,, | Performed by: NURSE PRACTITIONER

## 2022-08-16 PROCEDURE — 1125F AMNT PAIN NOTED PAIN PRSNT: CPT | Mod: CPTII,S$GLB,, | Performed by: NURSE PRACTITIONER

## 2022-08-16 PROCEDURE — 3008F BODY MASS INDEX DOCD: CPT | Mod: CPTII,S$GLB,, | Performed by: NURSE PRACTITIONER

## 2022-08-16 PROCEDURE — 1101F PT FALLS ASSESS-DOCD LE1/YR: CPT | Mod: CPTII,S$GLB,, | Performed by: NURSE PRACTITIONER

## 2022-08-16 PROCEDURE — 3288F FALL RISK ASSESSMENT DOCD: CPT | Mod: CPTII,S$GLB,, | Performed by: NURSE PRACTITIONER

## 2022-08-16 PROCEDURE — 1125F PR PAIN SEVERITY QUANTIFIED, PAIN PRESENT: ICD-10-PCS | Mod: CPTII,S$GLB,, | Performed by: NURSE PRACTITIONER

## 2022-08-16 PROCEDURE — 4010F ACE/ARB THERAPY RXD/TAKEN: CPT | Mod: CPTII,S$GLB,, | Performed by: NURSE PRACTITIONER

## 2022-08-16 RX ORDER — PREDNISONE 20 MG/1
20 TABLET ORAL DAILY
Qty: 7 TABLET | Refills: 0 | Status: SHIPPED | OUTPATIENT
Start: 2022-08-16 | End: 2022-08-23

## 2022-08-16 NOTE — DISCHARGE INSTRUCTIONS
Colonoscopy Outpatient Procedure      Prep/Procedure Review:     Follow instructions as written on Dr. Luke's Colonoscopy Prep Sheet     Dr. Luke patients have nothing to eat or drink after morning prep is complete.     Take medications as instructed by your doctor and pre-admit nurse.     Wear something comfortable that is easy for you to take off and put on.      Someone must come with you to drive you home. YOU CANNOT DRIVE FOR 24 HOURS AFTER YOUR PROCEDURE.      Do not wear any makeup, jewelry, or body piercings. Leave valuables at home or let your family member keep them for you. Do not bring them to the Surgery area.        Date/Day of Procedure:      Arrival time: Someone will call you the workday day before the procedure to give you an arrival time.      If asked to report to the hospital before 7:00 am report to the Emergency Room.     If asked to report to the hospital at 7:00 a.m. or later report to Patient Registration     It is not necessary to report earlier than the time you are told.      Plan to be at the hospital for about 4 hours, however, it could be longer.        Diabetics:     If you are diabetic do not take your diabetes medication the morning of the procedure unless otherwise instructed by your doctor or pre-admit nurse.     It is important to monitor your blood sugar levels the day you are doing your prep to make sure your blood sugar levels are maintained.     When you begin the clear liquid diet you may drink liquids with sugar as a source of glucose if needed.       Home Medications:      You may take your morning medications the day of your procedure.      The pre-admit nurse will let you know which medications you will need to skip prior to your procedure.      If you are on anticoagulants/ blood thinning medications, you will need to be off these for a specific number of days before your procedure. The pre-admit nurse will give you instructions on when to stop these.           Colonoscopy Prep Instructions      Date: Thursday   Arrival time:       IMPORTANT: PLEASE READ YOUR INSTRUCTIONS CAREFULLY. FAILURE TO FOLLOW THESE INSTRUCTIONS MAY RESULT IN YOUR PROCEURE BEING             CANCELED,RESCHEDULED, OR REPEATED.            Clear Liquid Diet     The Wednesday before your procedure you will follow a clear liquid diet ALL day.     You may have any of the following:     Water, tea, coffee (decaffeinated or regular)     Soft drinks (regular or sugar free)     Gelatin dessert (plain or flavored)     Juice, Gatorade, Powerade, Crystal Lite, lemonade, limeade, Eliud-Aid     Bouillon, clear consommé, 100% fat free beef, chicken, or vegetable broths     Snowballs, popsicles     100% cranberry juice is the only red liquid allowed     Please Avoid the following:     Anything with RED dye     Liquids not specifically listed     Dairy (liquid and powder)     Creamers (liquid and powder)     Alcohol            Suprep Instructions:   Please disregard the Suprep insert/box instructions from pharmacy.         The day before your procedure: Wednesday     Upon awakening begin the clear liquid diet. DO NOT EAT SOLID FOODS     Drink at least 6-8 glasses of clear liquids until you begin your prep     You may mix your prep Wednesday morning     At 12:00 pm (noon): Take four (4) Dulcolax tablets (Bisacodyl) with 8 ounces of clear liquids.     At 6:00 pm: Pour one 6-ounce bottle of Suprep into the mixing container                          Add water to the 16-ounce line on the container and mix                Drink all the mixed prep plus 2 more 16-ounce containers of clear liquid within 1 hour     It is common to experience abdominal cramping, nausea, and vomiting when taking the prep. If you have nausea and/or vomiting while taking the prep, stop drinking for 20-30 minutes then resume.      You may continue with the clear liquids after this step.     The day of your procedure: Thursday              Pour one  6-ounce bottle of Suprep into the mixing container             Add water to the 16-ounce line on the container and mix             Drink all the mixed prep plus 2 more 16-ounce containers of clear liquid within 1 hour     You must finish your morning prep 4 hours before you are to arrive at the hospital.     Your stools should be clear to clear yellow by this time.     Once you have completed the Thursday morning portion of your prep you may take your medicines as directed with a small amount of water.      NOTHING ELSE TO DRINK AFTER YOUR THURSDAY MORNING PREP IS COMPLETE

## 2022-08-16 NOTE — PATIENT INSTRUCTIONS
Trial of prednisone daily x 1 week  for tender nodules   F/u with hematology for further evaluation

## 2022-08-16 NOTE — PROGRESS NOTES
"Subjective:           Patient ID: Celine Sinclair is a 71 y.o. female.    Chief Complaint: Hospital Follow Up        Celine Sinclair is a 71 y.o. female        Seen in ER 8/3 for rectal bleeding then f/u with Dr. Carrizales; asa or eliquis x 2 days  Started back on eliquis yesterday; rectal bleeding better ; " spotty here and there'  CBC was stable  Lab Results      Component                Value               Date                      WBC                      10.09               08/09/2022                HGB                      12.7                08/09/2022                HCT                      37.6                08/09/2022                MCV                      89                  08/09/2022                PLT                      219                 08/09/2022              Still having wide spready painful nodules and bruising  Taking ibuprofen prn       Now planned for colonoscopy 8/16 on Thursday  Then planned for hematology on 8/22/22     Review of Systems   Constitutional: Negative for chills, fatigue and fever.   HENT: Negative for congestion, ear pain, postnasal drip, sinus pressure, sneezing and sore throat.    Eyes: Negative for discharge.   Respiratory: Negative for cough, chest tightness and shortness of breath.    Cardiovascular: Negative.    Gastrointestinal: Negative for abdominal pain, constipation, diarrhea, nausea and vomiting.   Genitourinary: Negative for difficulty urinating, flank pain and hematuria.   Musculoskeletal: Positive for arthralgias. Negative for joint swelling.   Skin: Negative.    Neurological: Negative for dizziness and headaches.   Hematological: Positive for adenopathy. Bruises/bleeds easily.   Psychiatric/Behavioral: Positive for decreased concentration. Negative for behavioral problems and confusion.        Noting memory loss        Objective:      Physical Exam  Vitals and nursing note reviewed.   Constitutional:       Appearance: She is well-developed.   HENT:      Head: " Normocephalic and atraumatic.      Nose: Nose normal.   Eyes:      Pupils: Pupils are equal, round, and reactive to light.   Cardiovascular:      Rate and Rhythm: Normal rate and regular rhythm.      Heart sounds: No murmur heard.  Pulmonary:      Effort: Pulmonary effort is normal.      Breath sounds: Normal breath sounds.   Abdominal:      General: Bowel sounds are normal.      Palpations: Abdomen is soft.   Musculoskeletal:         General: Tenderness present. Normal range of motion.      Cervical back: Normal range of motion and neck supple.      Comments: Tender,  painful nodules to arms and legs   No redness ; no heat  6-8mm palpable to left arm and left lower leg    Skin:     General: Skin is warm and dry.      Capillary Refill: Capillary refill takes less than 2 seconds.   Neurological:      Mental Status: She is alert and oriented to person, place, and time.   Psychiatric:         Behavior: Behavior normal.         Thought Content: Thought content normal.         Judgment: Judgment normal.         Assessment:       1. Subcutaneous nodules    2. Memory loss    3. Rectal bleeding    4. PAF (paroxysmal atrial fibrillation)    5. Essential hypertension    6. Hypokalemia        Plan:   Ada was seen today for hospital follow up.    Diagnoses and all orders for this visit:    Subcutaneous nodules  Comments:  DDX- erythema nodosum  Orders:  -     predniSONE (DELTASONE) 20 MG tablet; Take 1 tablet (20 mg total) by mouth once daily. for 7 days    Memory loss    Rectal bleeding    PAF (paroxysmal atrial fibrillation)    Essential hypertension    Hypokalemia  -     BASIC METABOLIC PANEL; Future      Problem List Items Addressed This Visit        Cardiac/Vascular    Essential hypertension    PAF (paroxysmal atrial fibrillation)       Renal/    Hypokalemia    Relevant Orders    BASIC METABOLIC PANEL      Other Visit Diagnoses     Subcutaneous nodules    -  Primary    DDX- erythema nodosum    Relevant Medications     predniSONE (DELTASONE) 20 MG tablet    Memory loss        Rectal bleeding            Trial of prednisone daily x 1 week  for tender nodules   F/u with hematology for further evaluation  F/u with Dr. Hinkle in 1m for memory loss   After colonoscopy and hematology w/u- if negative consider f/u with rheumatology   BMP today prior to colonoscopy to check K+ level; recently 2.9- taking supplement

## 2022-08-22 ENCOUNTER — TELEPHONE (OUTPATIENT)
Dept: INTERNAL MEDICINE | Facility: CLINIC | Age: 72
End: 2022-08-22
Payer: COMMERCIAL

## 2022-08-22 DIAGNOSIS — E87.6 HYPOKALEMIA: Primary | ICD-10-CM

## 2022-08-22 PROBLEM — R16.0 LIVER MASS: Status: ACTIVE | Noted: 2022-08-22

## 2022-08-22 PROBLEM — R41.3 MEMORY LOSS: Status: ACTIVE | Noted: 2022-08-22

## 2022-08-22 PROBLEM — D73.89 NODULE OF SPLEEN: Status: ACTIVE | Noted: 2022-08-22

## 2022-08-22 PROBLEM — F03.90 DEMENTIA WITHOUT BEHAVIORAL DISTURBANCE: Status: ACTIVE | Noted: 2022-08-22

## 2022-08-22 NOTE — TELEPHONE ENCOUNTER
----- Message from Neeru Hinkle MD sent at 8/22/2022  4:11 PM CDT -----  Her potassium supplement. Is she taking it for her low potassium?  ----- Message -----  From: Lety Matta MA  Sent: 8/22/2022   3:59 PM CDT  To: Neeru Hinkle MD    What supplement?   ----- Message -----  From: Neeru Hinkle MD  Sent: 8/22/2022   3:57 PM CDT  To: Lety Matta MA    Please verify patient taking her supplement. Last script I see was sent April for 1month supply. If she is taking OTC, how much so I can adjust further. Thanks  Dr. Hinkle    ----- Message -----  From: Lety Matta MA  Sent: 8/22/2022   3:53 PM CDT  To: Neeru Hinkle MD      ----- Message -----  From: Hilda Borrego MA  Sent: 8/22/2022   3:51 PM CDT  To: Arin Pandey    Good evening, pt was was seen in our clinic this afternoon by Dr. Friedman for Antiphospholipid. He wanted Dr. Hinkle to be aware of pt's hypokalemia. Thanks

## 2022-08-23 RX ORDER — POTASSIUM CHLORIDE 20 MEQ/1
40 TABLET, EXTENDED RELEASE ORAL DAILY
Qty: 60 TABLET | Refills: 11 | Status: SHIPPED | OUTPATIENT
Start: 2022-08-23 | End: 2023-02-28 | Stop reason: SDUPTHER

## 2022-08-23 NOTE — TELEPHONE ENCOUNTER
Spoke to pt she states she has not taken her potassium since she last filled her prescription when she finished she completely stopped. Please advise.

## 2022-08-25 ENCOUNTER — ANESTHESIA EVENT (OUTPATIENT)
Dept: ENDOSCOPY | Facility: HOSPITAL | Age: 72
End: 2022-08-25
Payer: COMMERCIAL

## 2022-08-25 ENCOUNTER — ANESTHESIA (OUTPATIENT)
Dept: ENDOSCOPY | Facility: HOSPITAL | Age: 72
End: 2022-08-25
Payer: COMMERCIAL

## 2022-08-25 ENCOUNTER — HOSPITAL ENCOUNTER (OUTPATIENT)
Facility: HOSPITAL | Age: 72
Discharge: HOME | End: 2022-08-25
Attending: COLON & RECTAL SURGERY | Admitting: COLON & RECTAL SURGERY
Payer: COMMERCIAL

## 2022-08-25 VITALS
DIASTOLIC BLOOD PRESSURE: 73 MMHG | OXYGEN SATURATION: 95 % | RESPIRATION RATE: 18 BRPM | SYSTOLIC BLOOD PRESSURE: 115 MMHG | TEMPERATURE: 97 F | HEART RATE: 57 BPM

## 2022-08-25 DIAGNOSIS — K62.5 RECTAL BLEEDING: Primary | ICD-10-CM

## 2022-08-25 LAB — CRC RECOMMENDATION EXT: NORMAL

## 2022-08-25 PROCEDURE — 45378 PR COLONOSCOPY,DIAGNOSTIC: ICD-10-PCS | Mod: ,,, | Performed by: COLON & RECTAL SURGERY

## 2022-08-25 PROCEDURE — 37000008 HC ANESTHESIA 1ST 15 MINUTES: Performed by: COLON & RECTAL SURGERY

## 2022-08-25 PROCEDURE — D9220AH HC ANESTHESIA PROFESSIONAL FEE: Mod: QZ,P3 | Performed by: NURSE ANESTHETIST, CERTIFIED REGISTERED

## 2022-08-25 PROCEDURE — 25000003 PHARM REV CODE 250: Performed by: NURSE ANESTHETIST, CERTIFIED REGISTERED

## 2022-08-25 PROCEDURE — 63600175 PHARM REV CODE 636 W HCPCS: Performed by: NURSE ANESTHETIST, CERTIFIED REGISTERED

## 2022-08-25 PROCEDURE — 45378 DIAGNOSTIC COLONOSCOPY: CPT | Mod: ,,, | Performed by: COLON & RECTAL SURGERY

## 2022-08-25 PROCEDURE — 37000009 HC ANESTHESIA EA ADD 15 MINS: Performed by: COLON & RECTAL SURGERY

## 2022-08-25 PROCEDURE — 45378 DIAGNOSTIC COLONOSCOPY: CPT | Performed by: COLON & RECTAL SURGERY

## 2022-08-25 PROCEDURE — 00812 ANES LWR INTST SCR COLSC: CPT | Mod: QZ,P3 | Performed by: NURSE ANESTHETIST, CERTIFIED REGISTERED

## 2022-08-25 RX ORDER — LIDOCAINE HCL/PF 100 MG/5ML
SYRINGE (ML) INTRAVENOUS
Status: DISCONTINUED | OUTPATIENT
Start: 2022-08-25 | End: 2022-08-25

## 2022-08-25 RX ORDER — PROPOFOL 10 MG/ML
INJECTION, EMULSION INTRAVENOUS
Status: DISCONTINUED | OUTPATIENT
Start: 2022-08-25 | End: 2022-08-25

## 2022-08-25 RX ORDER — SODIUM CHLORIDE, SODIUM LACTATE, POTASSIUM CHLORIDE, CALCIUM CHLORIDE 600; 310; 30; 20 MG/100ML; MG/100ML; MG/100ML; MG/100ML
INJECTION, SOLUTION INTRAVENOUS CONTINUOUS PRN
Status: DISCONTINUED | OUTPATIENT
Start: 2022-08-25 | End: 2022-08-25

## 2022-08-25 RX ADMIN — PROPOFOL 30 MG: 10 INJECTION, EMULSION INTRAVENOUS at 08:08

## 2022-08-25 RX ADMIN — LIDOCAINE HYDROCHLORIDE 50 MG: 20 INJECTION, SOLUTION INTRAVENOUS at 08:08

## 2022-08-25 RX ADMIN — SODIUM CHLORIDE, SODIUM LACTATE, POTASSIUM CHLORIDE, AND CALCIUM CHLORIDE: .6; .31; .03; .02 INJECTION, SOLUTION INTRAVENOUS at 08:08

## 2022-08-25 RX ADMIN — PROPOFOL 100 MG: 10 INJECTION, EMULSION INTRAVENOUS at 08:08

## 2022-08-25 NOTE — ANESTHESIA PREPROCEDURE EVALUATION
08/25/2022  Celine Sinclair is a 71 y.o., female.    Pre-op Assessment    I have reviewed the Patient Summary Reports.    I have reviewed the Nursing Notes. I have reviewed the NPO Status.   I have reviewed the Medications.     Review of Systems  Anesthesia Hx:  No problems with previous Anesthesia  History of prior surgery of interest to airway management or planning: Previous anesthesia: General Denies Family Hx of Anesthesia complications.   Denies Personal Hx of Anesthesia complications.   Social:  Non-Smoker, No Alcohol Use    Hematology/Oncology:  Hematology Normal   Oncology Normal     EENT/Dental:EENT/Dental Normal   Cardiovascular:   Exercise tolerance: good Hypertension, well controlled CHF ECG has been reviewed.    Pulmonary:   COPD, mild    Renal/:  Renal/ Normal     Hepatic/GI:   GERD, well controlled Liver Disease, Liver hemangioma   Musculoskeletal:   Arthritis  Paget's disease, RA   Neurological:   Neuromuscular Disease, Headaches    Psych:   Psychiatric History depression          Physical Exam  General:  Well nourished      Airway/Jaw/Neck:  Airway Findings: Mouth Opening: Normal   Tongue: Normal   General Airway Assessment: Adult Mallampati: II  Improves to II with phonation.  TM Distance: Normal, at least 6 cm   Jaw/Neck Findings:  Neck ROM: Normal ROM       Dental:  Dental Findings: Upper Dentures     Chest/Lungs:  Chest/Lungs Findings: Clear to auscultation, Normal Respiratory Rate      Heart/Vascular:  Heart Findings: Rate: Normal  Rhythm: Regular Rhythm  Sounds: Normal        Mental Status:  Mental Status Findings:  Cooperative, Alert and Oriented         Anesthesia Plan  Type of Anesthesia, risks & benefits discussed:  Anesthesia Type:  general    Patient's Preference:   Plan Factors:          Intra-op Monitoring Plan: standard ASA monitors  Intra-op Monitoring Plan Comments:   Post  Op Pain Control Plan: IV/PO Opioids PRN and multimodal analgesia  Post Op Pain Control Plan Comments:     Induction:   IV  Beta Blocker:  Patient is not currently on a Beta-Blocker (No further documentation required).       Informed Consent: Informed consent signed with the Patient and all parties understand the risks and agree with anesthesia plan.  All questions answered.  Anesthesia consent signed with patient.  ASA Score: 3     Day of Surgery Review of History & Physical:              Ready For Surgery From Anesthesia Perspective.           Physical Exam  General: Well nourished    Airway:  Mallampati: II / II  Mouth Opening: Normal  TM Distance: Normal, at least 6 cm  Tongue: Normal  Neck ROM: Normal ROM    Dental:  Upper Dentures    Chest/Lungs:  Clear to auscultation, Normal Respiratory Rate    Heart:  Rate: Normal  Rhythm: Regular Rhythm  Sounds: Normal          Anesthesia Plan  Type of Anesthesia, risks & benefits discussed:    Anesthesia Type: general  Intra-op Monitoring Plan: standard ASA monitors  Post Op Pain Control Plan: IV/PO Opioids PRN and multimodal analgesia  Induction:  IV  Informed Consent: Informed consent signed with the Patient and all parties understand the risks and agree with anesthesia plan.  All questions answered.   ASA Score: 3    Ready For Surgery From Anesthesia Perspective.       .

## 2022-08-25 NOTE — H&P
Procedure : Colonoscopy    Indication(s):  rectal bleeding and history of Crohn's disease    Review of patient's allergies indicates:   Allergen Reactions    Octreotide acetate Nausea And Vomiting     Had symptoms when she took Sandostatin with Codeine    Codeine Itching and Nausea And Vomiting     Pill form only, IV ok.,  Had symptoms when she took Codeine with Sandostatin.  Other reaction(s): Itching    Morphine Nausea And Vomiting     Patient reports reaction only when she takes po form of Morphine.       Past Medical History:   Diagnosis Date    Aortic atherosclerosis     Atrial fibrillation     Benign essential hypertension     Cataract     Senile    Crohn's colitis     Depression, major, recurrent, moderate     Fibromyalgia     GERD (gastroesophageal reflux disease)     Hypertensive cardiomyopathy     IBS (irritable bowel syndrome)     Migraine     Opioid abuse, in remission     Osteopenia     Paget disease of bone     Port-A-Cath in place     right chest    Prolonged QT interval     RA (rheumatoid arthritis)     Sedative hypnotic or anxiolytic dependence     in remission        Prior to Admission medications    Medication Sig Start Date End Date Taking? Authorizing Provider   acetaminophen (TYLENOL) 500 MG tablet Take 1 tablet (500 mg total) by mouth daily as needed for Pain. 1/5/21  Yes Neeru Hinkle MD   amiodarone (PACERONE) 200 MG Tab Take 1 tablet (200 mg total) by mouth once daily. 3/18/22 3/18/23 Yes Felisa Gonzalez NP   apixaban (ELIQUIS) 5 mg Tab Take 1 tablet (5 mg total) by mouth 2 (two) times daily. 2/1/22  Yes Neeru Hinkle MD   aspirin (ECOTRIN) 81 MG EC tablet Take 1 tablet (81 mg total) by mouth once daily. 2/3/22 2/3/23 Yes Jared Tiwari MD   atorvastatin (LIPITOR) 40 MG tablet  12/15/21  Yes Historical Provider   DULoxetine (CYMBALTA) 30 MG capsule Take 1 capsule (30 mg total) by mouth once daily. 4/29/22 4/29/23 Yes Neeru Hinkle MD   gabapentin (NEURONTIN) 100  MG capsule Take 1 capsule (100 mg total) by mouth 3 (three) times daily. 6/8/22 6/8/23 Yes Neeru Hinkle MD   metoprolol succinate (TOPROL-XL) 50 MG 24 hr tablet TAKE 1 TABLET BY MOUTH TWICE A DAY 4/11/22  Yes Felisa Gonzalez NP   bisacodyL (DULCOLAX) 5 mg EC tablet Take 4 tablets (20 mg total) by mouth daily as needed for Constipation. 8/24/22 8/25/22  Lewis Luke MD   potassium chloride SA (K-DUR,KLOR-CON) 20 MEQ tablet Take 2 tablets (40 mEq total) by mouth once daily. 8/23/22   Neeru Hinkle MD   sodium,potassium,mag sulfates (SUPREP BOWEL PREP KIT) 17.5-3.13-1.6 gram SolR Take 354 mLs by mouth once daily. for 1 day 8/24/22 8/25/22  Lewis Luke MD   digoxin (LANOXIN) 125 mcg tablet Take 1 tablet (0.125 mg total) by mouth once daily. 2/3/22 3/17/22  Jared Tiwari MD   nortriptyline (PAMELOR) 10 MG capsule TAKE 1 CAPSULE (10 MG TOTAL) BY MOUTH EVERY EVENING. 1/24/22 3/17/22  Neeru Hinkle MD   traZODone (DESYREL) 50 MG tablet Take 1 tablet (50 mg total) by mouth nightly as needed. 6/9/20 3/17/22  Neeru Hinkle MD       Sedation Problems: NO    Family History   Problem Relation Age of Onset    Glaucoma Paternal Grandmother     Other Daughter         Diabetic Retinopathy    Breast cancer Daughter 40    Retinal detachment Grandchild     Heart attack Maternal Grandmother     Colon cancer Maternal Grandmother     Cancer Mother         Unknown type    Emphysema Father 67    Heart disease Father     Lung cancer Sister     Heart attack Sister     Breast cancer Daughter 47    Breast cancer Sister     Amblyopia Neg Hx     Blindness Neg Hx     Strabismus Neg Hx     Macular degeneration Neg Hx     Cataracts Neg Hx        Fam Hx of Sedation Problems: NO    Social History     Socioeconomic History    Marital status:     Number of children: 12   Occupational History    Occupation: [employer]     Comment: Retired/disabled   Tobacco Use    Smoking status: Never Smoker    Smokeless  tobacco: Never Used   Substance and Sexual Activity    Drug use: No    Sexual activity: Not Currently     Partners: Female   Social History Narrative    Patient gave birth to 7 children, adopted 2 and has 3 stepchildren with her .     Social Determinants of Health     Financial Resource Strain: High Risk    Difficulty of Paying Living Expenses: Hard   Food Insecurity: Food Insecurity Present    Worried About Running Out of Food in the Last Year: Sometimes true    Ran Out of Food in the Last Year: Sometimes true   Transportation Needs: Unmet Transportation Needs    Lack of Transportation (Medical): Yes    Lack of Transportation (Non-Medical): Yes   Physical Activity: Inactive    Days of Exercise per Week: 0 days    Minutes of Exercise per Session: 0 min   Stress: Stress Concern Present    Feeling of Stress : Rather much   Social Connections: Socially Integrated    Frequency of Communication with Friends and Family: More than three times a week    Frequency of Social Gatherings with Friends and Family: More than three times a week    Attends Caodaism Services: More than 4 times per year    Active Member of Clubs or Organizations: Yes    Attends Club or Organization Meetings: More than 4 times per year    Marital Status:    Housing Stability: Low Risk     Unable to Pay for Housing in the Last Year: No    Number of Places Lived in the Last Year: 1    Unstable Housing in the Last Year: No       Review of Systems -     Respiratory ROS: no cough, shortness of breath, or wheezing  Cardiovascular ROS: no chest pain or dyspnea on exertion  Gastrointestinal ROS: no abdominal pain, change in bowel habits, +rectal bleeding, +diarrhea  Musculoskeletal ROS: negative  Neurological ROS: no TIA or stroke symptoms        Physical Exam:  General: no distress  Head: normocephalic  Airway:  normal oropharynx, airway normal  Neck: supple, symmetrical, trachea midline  Lungs:  normal respiratory  effort  Heart: regular rate and rhythm  Abdomen: soft, non-tender non-distented; bowel sounds normal; no masses,  no organomegaly  Extremities: no cyanosis or edema, or clubbing       Deep Sedation: Mallampati Score per anesthesia     SedationPlan :Moderate     ASA : III    Patient is medically cleared for anesthesia.    Anesthesia/Surgery risks, benefits and alternative options discussed and understood by patient/family.

## 2022-08-25 NOTE — OP NOTE
Patient Name: Celine Sinclair   Procedure Date: 2022  MRN: [id number]  : 1950  Age: 71 y.o.  Gender: female   Referring Physician :  Neeru Hinkle MD  Plan for Procedure: Monitored anaesthesia care  Indication: rectal bleeding and history of Crohn's disease  Procedure:   Colonoscopy    Surgeon(s) and Role:     * Lewis Luke MD - Primary    Complications: None     Medicines: monitored anesthesia care    Procedure:  Prior to the procedure, a History and Physical was performed, and patient medications, allergies and sensitivities were reviewed.  The patient's tolerance of previous anesthesia was reviewed. The risks and benefits of the procedure and the sedation options and risks were discussed with the patient.  All questions were answered and informed consent was obtained.    After I obtained informed consent, the scope was passed under direct vision.  Throughout the procedure, the patient's blood pressure, pulse, and oxygen saturations were monitored continuously.  The Olympus scope CF - [id number] was introduced through the anus and advanced to the ceum, identified by appendiceal orifice and ileocecal valve.  The colonoscopy was performed without difficulty.  The patient tolerated the procedure well.  The quality of the bowel preparation was fair     Findings:  -evidence of prior surgery side-to-end colocolonic anastomosis in ascending colon  -mucosal abnormality, characterized by mild inflammatory changes, diffuse in distribution,, mild in degree involving the entire colon ; biopsies were not obtained because patient did not hold Eliquis pre-procedure as instructed  -Small internal hemorrhoids     EBL: none    Impression:  Small internal hemorrhoids  Diffuse mild mucosal inflammation  No obvious source of rectal bleeding noted    Recommendation:  Patient previously seen by Dr Urena for Crohn's disease and has not followed up in 2 years - recommend follow-up with him for surveillance of Crohn's.    Return to my office prn  High fiber diet

## 2022-08-25 NOTE — ANESTHESIA POSTPROCEDURE EVALUATION
Anesthesia Post Evaluation    Patient: Celine Sinclair    Procedure(s) Performed: Procedure(s) (LRB):  COLONOSCOPY (N/A)    Final Anesthesia Type: general      Patient location during evaluation: PACU  Patient participation: Yes- Able to Participate  Level of consciousness: awake and alert and oriented  Post-procedure vital signs: reviewed and stable  Pain management: adequate  Airway patency: patent    PONV status at discharge: No PONV  Anesthetic complications: no      Cardiovascular status: blood pressure returned to baseline and hemodynamically stable  Respiratory status: unassisted, spontaneous ventilation and room air  Hydration status: euvolemic  Follow-up not needed.          Vitals Value Taken Time   /73 08/25/22 0900   Temp  08/25/22 0917   Pulse 57 08/25/22 0900   Resp 18 08/25/22 0900   SpO2 95 % 08/25/22 0900         Event Time   Out of Recovery 09:17:21         Pain/Yoana Score: Yoana Score: 10 (8/25/2022  8:55 AM)

## 2022-08-25 NOTE — TRANSFER OF CARE
Anesthesia Transfer of Care Note    Patient: Celine Sinclair    Procedure(s) Performed: Procedure(s) (LRB):  COLONOSCOPY (N/A)    Patient location: PACU    Anesthesia Type: general    Transport from OR: Transported from OR on 6-10 L/min O2 by face mask with adequate spontaneous ventilation    Post pain: adequate analgesia    Post assessment: no apparent anesthetic complications and tolerated procedure well    Post vital signs: stable    Level of consciousness: sedated    Nausea/Vomiting: no nausea/vomiting    Complications: none    Transfer of care protocol was followed      Last vitals:   Visit Vitals  /68   Pulse (!) 57   Temp 36.2 °C (97.1 °F) (Tympanic)   Resp 18   SpO2 98%   Breastfeeding No

## 2022-08-25 NOTE — DISCHARGE SUMMARY
Discharge Note  Short Stay      SUMMARY     Admit Date: 8/25/2022    Attending Physician: Lewis Luke MD     Discharge Physician: Lewis Luke MD    Discharge Date: 8/25/2022 8:53 AM    Admission Diagnosis: rectal bleeding, history of Crohn's disease    Final Diagnosis:  Mild diffuse colonic inflammation, Internal hemorrhoids    Procedures Performed:    Colonoscopy    Disposition: Home or Self Care    Condition at Discharge:  Stable    Patient Instructions:   Current Discharge Medication List      CONTINUE these medications which have NOT CHANGED    Details   acetaminophen (TYLENOL) 500 MG tablet Take 1 tablet (500 mg total) by mouth daily as needed for Pain.  Qty: 30 tablet, Refills: 5    Associated Diagnoses: Fibromyalgia      amiodarone (PACERONE) 200 MG Tab Take 1 tablet (200 mg total) by mouth once daily.  Qty: 30 tablet, Refills: 0      apixaban (ELIQUIS) 5 mg Tab Take 1 tablet (5 mg total) by mouth 2 (two) times daily.  Qty: 30 tablet, Refills: 11    Comments: Price check prior to discharge please      aspirin (ECOTRIN) 81 MG EC tablet Take 1 tablet (81 mg total) by mouth once daily.  Qty: 30 tablet, Refills: 5      atorvastatin (LIPITOR) 40 MG tablet       DULoxetine (CYMBALTA) 30 MG capsule Take 1 capsule (30 mg total) by mouth once daily.  Qty: 30 capsule, Refills: 11    Associated Diagnoses: Fibromyalgia; Depression, major, recurrent, moderate      gabapentin (NEURONTIN) 100 MG capsule Take 1 capsule (100 mg total) by mouth 3 (three) times daily.  Qty: 90 capsule, Refills: 11    Associated Diagnoses: Fibromyalgia; DDD (degenerative disc disease), lumbar      metoprolol succinate (TOPROL-XL) 50 MG 24 hr tablet TAKE 1 TABLET BY MOUTH TWICE A DAY  Qty: 60 tablet, Refills: 0      potassium chloride SA (K-DUR,KLOR-CON) 20 MEQ tablet Take 2 tablets (40 mEq total) by mouth once daily.  Qty: 60 tablet, Refills: 11         STOP taking these medications       bisacodyL (DULCOLAX) 5 mg EC tablet Comments:    Reason for Stopping:         digoxin (LANOXIN) 125 mcg tablet Comments:   Reason for Stopping:         nortriptyline (PAMELOR) 10 MG capsule Comments:   Reason for Stopping:         sodium,potassium,mag sulfates (SUPREP BOWEL PREP KIT) 17.5-3.13-1.6 gram SolR Comments:   Reason for Stopping:         traZODone (DESYREL) 50 MG tablet Comments:   Reason for Stopping:               Discharge Procedure Orders (must include Diet, Follow-up, Activity)   Discharge Procedure Orders (must include Diet, Follow-up, Activity)   Diet Adult Regular     Activity as tolerated        Follow-up with Dr Urena  High fiber diet

## 2022-08-30 ENCOUNTER — LAB VISIT (OUTPATIENT)
Dept: LAB | Facility: HOSPITAL | Age: 72
End: 2022-08-30
Attending: INTERNAL MEDICINE
Payer: COMMERCIAL

## 2022-08-30 DIAGNOSIS — E87.6 HYPOKALEMIA: ICD-10-CM

## 2022-08-30 LAB
ANION GAP SERPL CALC-SCNC: 14 MMOL/L (ref 8–16)
BUN SERPL-MCNC: 12 MG/DL (ref 8–23)
CALCIUM SERPL-MCNC: 9 MG/DL (ref 8.7–10.5)
CHLORIDE SERPL-SCNC: 112 MMOL/L (ref 95–110)
CO2 SERPL-SCNC: 20 MMOL/L (ref 23–29)
CREAT SERPL-MCNC: 0.7 MG/DL (ref 0.5–1.4)
EST. GFR  (NO RACE VARIABLE): >60 ML/MIN/1.73 M^2
GLUCOSE SERPL-MCNC: 93 MG/DL (ref 70–110)
POTASSIUM SERPL-SCNC: 3.2 MMOL/L (ref 3.5–5.1)
SODIUM SERPL-SCNC: 146 MMOL/L (ref 136–145)

## 2022-08-30 PROCEDURE — 80048 BASIC METABOLIC PNL TOTAL CA: CPT | Performed by: INTERNAL MEDICINE

## 2022-08-30 PROCEDURE — 36415 COLL VENOUS BLD VENIPUNCTURE: CPT | Performed by: INTERNAL MEDICINE

## 2022-08-31 ENCOUNTER — HOSPITAL ENCOUNTER (EMERGENCY)
Facility: HOSPITAL | Age: 72
Discharge: HOME | End: 2022-08-31
Attending: EMERGENCY MEDICINE
Payer: COMMERCIAL

## 2022-08-31 VITALS
WEIGHT: 178.56 LBS | DIASTOLIC BLOOD PRESSURE: 83 MMHG | TEMPERATURE: 97 F | OXYGEN SATURATION: 100 % | HEART RATE: 75 BPM | SYSTOLIC BLOOD PRESSURE: 151 MMHG | RESPIRATION RATE: 18 BRPM | BODY MASS INDEX: 29.72 KG/M2

## 2022-08-31 DIAGNOSIS — M54.12 CERVICAL RADICULOPATHY: Primary | ICD-10-CM

## 2022-08-31 DIAGNOSIS — M25.519 SHOULDER PAIN: ICD-10-CM

## 2022-08-31 DIAGNOSIS — R79.1 ABNORMAL COAGULATION PROFILE: ICD-10-CM

## 2022-08-31 DIAGNOSIS — N30.00 ACUTE CYSTITIS WITHOUT HEMATURIA: ICD-10-CM

## 2022-08-31 LAB
ALBUMIN SERPL BCP-MCNC: 3.7 G/DL (ref 3.5–5.2)
ALP SERPL-CCNC: 77 U/L (ref 55–135)
ALT SERPL W/O P-5'-P-CCNC: 9 U/L (ref 10–44)
ANION GAP SERPL CALC-SCNC: 10 MMOL/L (ref 8–16)
APTT BLDCRRT: 56.4 SEC (ref 21–32)
AST SERPL-CCNC: 13 U/L (ref 10–40)
BACTERIA #/AREA URNS HPF: ABNORMAL /HPF
BASOPHILS # BLD AUTO: 0.05 K/UL (ref 0–0.2)
BASOPHILS NFR BLD: 0.5 % (ref 0–1.9)
BILIRUB SERPL-MCNC: 3.5 MG/DL (ref 0.1–1)
BILIRUB UR QL STRIP: NEGATIVE
BNP SERPL-MCNC: 89 PG/ML (ref 0–99)
BUN SERPL-MCNC: 13 MG/DL (ref 8–23)
CALCIUM SERPL-MCNC: 9 MG/DL (ref 8.7–10.5)
CHLORIDE SERPL-SCNC: 110 MMOL/L (ref 95–110)
CK MB SERPL-MCNC: 0.7 NG/ML (ref 0.1–6.5)
CK MB SERPL-RTO: 1.1 % (ref 0–5)
CK SERPL-CCNC: 61 U/L (ref 20–180)
CK SERPL-CCNC: 61 U/L (ref 20–180)
CLARITY UR: ABNORMAL
CO2 SERPL-SCNC: 23 MMOL/L (ref 23–29)
COLOR UR: YELLOW
CREAT SERPL-MCNC: 0.7 MG/DL (ref 0.5–1.4)
DIFFERENTIAL METHOD: ABNORMAL
EOSINOPHIL # BLD AUTO: 0.1 K/UL (ref 0–0.5)
EOSINOPHIL NFR BLD: 1.3 % (ref 0–8)
ERYTHROCYTE [DISTWIDTH] IN BLOOD BY AUTOMATED COUNT: 14.5 % (ref 11.5–14.5)
EST. GFR  (NO RACE VARIABLE): >60 ML/MIN/1.73 M^2
GLUCOSE SERPL-MCNC: 111 MG/DL (ref 70–110)
GLUCOSE UR QL STRIP: NEGATIVE
HCT VFR BLD AUTO: 33.1 % (ref 37–48.5)
HGB BLD-MCNC: 11.3 G/DL (ref 12–16)
HGB UR QL STRIP: ABNORMAL
IMM GRANULOCYTES # BLD AUTO: 0.02 K/UL (ref 0–0.04)
IMM GRANULOCYTES NFR BLD AUTO: 0.2 % (ref 0–0.5)
INR PPP: 6.3 (ref 0.8–1.2)
KETONES UR QL STRIP: NEGATIVE
LEUKOCYTE ESTERASE UR QL STRIP: NEGATIVE
LYMPHOCYTES # BLD AUTO: 2 K/UL (ref 1–4.8)
LYMPHOCYTES NFR BLD: 19.4 % (ref 18–48)
MCH RBC QN AUTO: 30.5 PG (ref 27–31)
MCHC RBC AUTO-ENTMCNC: 34.1 G/DL (ref 32–36)
MCV RBC AUTO: 90 FL (ref 82–98)
MICROSCOPIC COMMENT: ABNORMAL
MONOCYTES # BLD AUTO: 0.8 K/UL (ref 0.3–1)
MONOCYTES NFR BLD: 7.5 % (ref 4–15)
NEUTROPHILS # BLD AUTO: 7.4 K/UL (ref 1.8–7.7)
NEUTROPHILS NFR BLD: 71.1 % (ref 38–73)
NITRITE UR QL STRIP: POSITIVE
NRBC BLD-RTO: 0 /100 WBC
PH UR STRIP: 6 [PH] (ref 5–8)
PLATELET # BLD AUTO: 207 K/UL (ref 150–450)
PMV BLD AUTO: 10.1 FL (ref 9.2–12.9)
POTASSIUM SERPL-SCNC: 3.1 MMOL/L (ref 3.5–5.1)
PROT SERPL-MCNC: 6.8 G/DL (ref 6–8.4)
PROT UR QL STRIP: NEGATIVE
PROTHROMBIN TIME: 59.2 SEC (ref 9–12.5)
RBC # BLD AUTO: 3.7 M/UL (ref 4–5.4)
RBC #/AREA URNS HPF: 3 /HPF (ref 0–4)
SODIUM SERPL-SCNC: 143 MMOL/L (ref 136–145)
SP GR UR STRIP: 1.02 (ref 1–1.03)
TROPONIN I SERPL DL<=0.01 NG/ML-MCNC: <0.006 NG/ML (ref 0–0.03)
TROPONIN I SERPL DL<=0.01 NG/ML-MCNC: <0.006 NG/ML (ref 0–0.03)
URN SPEC COLLECT METH UR: ABNORMAL
UROBILINOGEN UR STRIP-ACNC: 1 EU/DL
WBC # BLD AUTO: 10.38 K/UL (ref 3.9–12.7)
WBC #/AREA URNS HPF: 15 /HPF (ref 0–5)

## 2022-08-31 PROCEDURE — 93010 EKG 12-LEAD: ICD-10-PCS | Mod: ,,, | Performed by: INTERNAL MEDICINE

## 2022-08-31 PROCEDURE — 63600175 PHARM REV CODE 636 W HCPCS: Performed by: EMERGENCY MEDICINE

## 2022-08-31 PROCEDURE — 93005 ELECTROCARDIOGRAM TRACING: CPT

## 2022-08-31 PROCEDURE — 96372 THER/PROPH/DIAG INJ SC/IM: CPT | Performed by: EMERGENCY MEDICINE

## 2022-08-31 PROCEDURE — 93010 ELECTROCARDIOGRAM REPORT: CPT | Mod: ,,, | Performed by: INTERNAL MEDICINE

## 2022-08-31 PROCEDURE — 85610 PROTHROMBIN TIME: CPT | Performed by: EMERGENCY MEDICINE

## 2022-08-31 PROCEDURE — 36415 COLL VENOUS BLD VENIPUNCTURE: CPT | Performed by: EMERGENCY MEDICINE

## 2022-08-31 PROCEDURE — 99285 EMERGENCY DEPT VISIT HI MDM: CPT | Mod: 25

## 2022-08-31 PROCEDURE — 25000003 PHARM REV CODE 250: Performed by: EMERGENCY MEDICINE

## 2022-08-31 PROCEDURE — 85730 THROMBOPLASTIN TIME PARTIAL: CPT | Performed by: EMERGENCY MEDICINE

## 2022-08-31 PROCEDURE — 80053 COMPREHEN METABOLIC PANEL: CPT | Performed by: EMERGENCY MEDICINE

## 2022-08-31 PROCEDURE — 83880 ASSAY OF NATRIURETIC PEPTIDE: CPT | Performed by: EMERGENCY MEDICINE

## 2022-08-31 PROCEDURE — 85025 COMPLETE CBC W/AUTO DIFF WBC: CPT | Performed by: EMERGENCY MEDICINE

## 2022-08-31 PROCEDURE — 81000 URINALYSIS NONAUTO W/SCOPE: CPT | Performed by: EMERGENCY MEDICINE

## 2022-08-31 PROCEDURE — 84484 ASSAY OF TROPONIN QUANT: CPT | Performed by: EMERGENCY MEDICINE

## 2022-08-31 PROCEDURE — 82553 CREATINE MB FRACTION: CPT | Performed by: EMERGENCY MEDICINE

## 2022-08-31 RX ORDER — ACETAMINOPHEN 500 MG
1000 TABLET ORAL
Status: COMPLETED | OUTPATIENT
Start: 2022-08-31 | End: 2022-08-31

## 2022-08-31 RX ORDER — DEXAMETHASONE SODIUM PHOSPHATE 4 MG/ML
12 INJECTION, SOLUTION INTRA-ARTICULAR; INTRALESIONAL; INTRAMUSCULAR; INTRAVENOUS; SOFT TISSUE
Status: DISCONTINUED | OUTPATIENT
Start: 2022-08-31 | End: 2022-08-31

## 2022-08-31 RX ORDER — CEFTRIAXONE 1 G/1
1 INJECTION, POWDER, FOR SOLUTION INTRAMUSCULAR; INTRAVENOUS
Status: COMPLETED | OUTPATIENT
Start: 2022-08-31 | End: 2022-08-31

## 2022-08-31 RX ORDER — DEXAMETHASONE SODIUM PHOSPHATE 4 MG/ML
12 INJECTION, SOLUTION INTRA-ARTICULAR; INTRALESIONAL; INTRAMUSCULAR; INTRAVENOUS; SOFT TISSUE
Status: COMPLETED | OUTPATIENT
Start: 2022-08-31 | End: 2022-08-31

## 2022-08-31 RX ORDER — NITROFURANTOIN 25; 75 MG/1; MG/1
100 CAPSULE ORAL 2 TIMES DAILY
Qty: 10 CAPSULE | Refills: 0 | Status: SHIPPED | OUTPATIENT
Start: 2022-08-31 | End: 2022-09-05

## 2022-08-31 RX ADMIN — ACETAMINOPHEN 1000 MG: 500 TABLET ORAL at 04:08

## 2022-08-31 RX ADMIN — CEFTRIAXONE SODIUM 1 G: 1 INJECTION, POWDER, FOR SOLUTION INTRAMUSCULAR; INTRAVENOUS at 04:08

## 2022-08-31 RX ADMIN — DEXAMETHASONE SODIUM PHOSPHATE 12 MG: 4 INJECTION, SOLUTION INTRA-ARTICULAR; INTRALESIONAL; INTRAMUSCULAR; INTRAVENOUS; SOFT TISSUE at 04:08

## 2022-08-31 NOTE — ED PROVIDER NOTES
[facility]                                                  Chief Complaint  71 y.o. female with Arm Pain (Patient to ER CC of pain to her right arm last night and she states she can hardly move it due to the pain, she denies and trauma)    History of Present Illness  Celine Sinclair presents to the emergency room with complaints of right arm pain as well as diffuse bruising which she states is new, a subconjunctival hemorrhage on the left. She denies trauma. She recently saw [facility] and was diagnosed with antiphospholipid syndrome. Patient typically is on anticoagulant for atrial fibrillation however she had a colonoscopy a couple weeks ago and did not restart her medicine after the procedure.    Past Medical History:   Diagnosis Date    Aortic atherosclerosis     Atrial fibrillation     Benign essential hypertension     Cataract     Senile    Crohn's colitis     Depression, major, recurrent, moderate     Fibromyalgia     GERD (gastroesophageal reflux disease)     Hypertensive cardiomyopathy     IBS (irritable bowel syndrome)     Migraine     Opioid abuse, in remission     Osteopenia     Paget disease of bone     Port-A-Cath in place     right chest    Prolonged QT interval     RA (rheumatoid arthritis)     Sedative hypnotic or anxiolytic dependence     in remission      Past Surgical History:   Procedure Laterality Date    CATARACT EXTRACTION W/  INTRAOCULAR LENS IMPLANT Left 02/21/2017    Dr. Christianson    CATARACT EXTRACTION W/  INTRAOCULAR LENS IMPLANT Right 03/07/2017    Dr. Christianson    CHOLECYSTECTOMY      COLON SURGERY      COLONOSCOPY N/A 3/16/2016    Procedure: COLONOSCOPY;  Surgeon: Dale Trejo MD;  Location: [facility] ([address] [place] [address]);  Service: Endoscopy;  Laterality: N/A;    COLONOSCOPY N/A 10/12/2020    Procedure: COLONOSCOPY;  Surgeon: Cali Urena MD;  Location: [facility] ([address] [place] [address]);  Service: Endoscopy;  Laterality: N/A;  covid test 10/9[facility] urgent care- completed  10/9/20  ok to hold Coumadin x 5 days per coumadin clinic-see telephone encounterd ated 10/6-MS / Loop recorder  10/6/20- Pt confirmed- ERW@1122  10/9-Pt confirmed earlier arrival time, prep ins. reviewed and emailed to Pt    COLONOSCOPY N/A 8/25/2022    Procedure: COLONOSCOPY;  Surgeon: Lewis Luke MD;  Location: [facility];  Service: Endoscopy;  Laterality: N/A;    Colostomy reversal      HEMORRHOID SURGERY      HYSTERECTOMY      ILEOSTOMY      ILEOSTOMY CLOSURE      LEFT HEART CATHETERIZATION N/A 2/15/2019    Procedure: HEART CATH-LEFT;  Surgeon: Miky Keita MD;  Location: [facility] [place] CATH [facility];  Service: Cardiology;  Laterality: N/A;    NECK SURGERY      OOPHORECTOMY Bilateral     SBO      SMALL INTESTINE SURGERY      TONSILLECTOMY      TUBAL LIGATION        Review of patient's allergies indicates:   Allergen Reactions    Octreotide acetate Nausea And Vomiting     Had symptoms when she took Sandostatin with Codeine    Codeine Itching and Nausea And Vomiting     Pill form only, IV ok.,  Had symptoms when she took Codeine with Sandostatin.  Other reaction(s): Itching    Morphine Nausea And Vomiting     Patient reports reaction only when she takes po form of Morphine.        Review of Systems and Physical Exam     Review of Systems  -- Constitution - no fever, no weight loss, no loss of consciousness  -- Eyes - no changes in vision, no redness, no swelling  -- Ear, Nose - no  earache, denies congestion  -- Mouth,Throat - no sore throat, no toothache, normal voice, normal swallowing  -- Respiratory - denies cough and congestion, no shortness of breath, no wheezing  -- Cardiovascular - denies chest pain, no palpitations,   -- Gastrointestinal - denies abdominal pain, denies nausea, vomiting, and diarrhea  -- Genitourinary - no dysuria, no denies flank pain, no hematuria or frequency   -- Musculoskeletal - denies back pain, negative for myalgias and arthralgias states right arm pain  -- Neurological - no headache, no  neurologic changes, no loss of bladder or bowel function no seizure like activity, no changes in hearing or vision  -- Skin - denies skin changes, no rash, no hives, no suspected skin infection    Vital Signs   weight is 81 kg (178 lb 9.2 oz). Her oral temperature is 96.8 °F (36 °C). Her blood pressure is 151/83 (abnormal) and her pulse is 75. Her respiration is 18 and oxygen saturation is 100%.      Physical Exam  -- Nursing note and vitals reviewed  -- Constitutional:  Awake alert and oriented, GCS 15, no acute distress.  Appears well.  -- Head: Atraumatic. Normocephalic. No obvious abnormality  -- Eyes: Pupils are equal and reactive to light. Extraocular movements intact. No nystagmus.  No periorbital swelling.  Small subconjunctival hemorrhage noted on the left  -- Nose: Nose grossly normal in appearance, nares grossly normal. No rhinorrhea.  -- Throat: Mucous membranes moist, pharynx normal, normal tonsils.  Airway patent.  -- Ears: External ears and TM normal bilaterally. Normal hearing.   -- Neck: Normal range of motion. Neck supple. No meningismus. No adenopathy  -- Cardiac: Normal rate, regular rhythm and normal heart sounds. No carotid bruit. No lower extremity edema.  -- Pulmonary: Normal respiratory effort, breath sounds equal bilaterally. Adequate flow.  No wheezing.  No crackles.  -- Abdominal: Soft, no tenderness, no guarding, no rebound. Normal bowel sounds.   -- Musculoskeletal: Normal range of motion, all 4 extremities 5/5 strength.  Neurovascularly intact. Atraumatic. No deformities.  -- Neurological:  Cranial nerves 2-12 grossly intact. No focal deficits.   -- Vascular: Posterior tibial, dorsalis pedis and radial pulses 2+ bilaterally    -- Lymphatics: No cervical or peripheral lymphadenopathy.   -- Skin: Warm and dry. No evidence of rash or cellulitis.  Two bruises noted to patient's upper extremity which do not appear extensive  -- Psychiatric: Normal mood and affect. Bedside behavior is  appropriate.  Patient is cooperative.  Denies suicidal homicidal ideation.    Emergency Room Course     Treatment Course, Evaluation, and Medical Decision Making  1. Physical exam significant for tenderness to palpation over right upper extremity starting in the paracervical region.  Patient has full range of motion and is neurovascularly intact.  Small subconjunctival hemorrhage noted.    2. EKG normal sinus rhythm no evidence ischemia or arrhythmia.  3. Cardiac enzymes negative x2   4. CBC/CMP with elevated bilirubin  5. PT/PTT/INR with elevated INR.  However patient had exactly same INR three weeks ago and has seen Hematology Oncology since that time.  6.  Urinalysis nitrite positive   7. Her previous cultures will give Rocephin for UTI   8. Patient has a history of cervical radiculopathy.  Will give Decadron for shoulder pain   9. Discharge home with follow-up Hematology-Oncology    Abnormal lab values  Labs Reviewed   CBC W/ AUTO DIFFERENTIAL   COMPREHENSIVE METABOLIC PANEL   TROPONIN I   CK   CK-MB   B-TYPE NATRIURETIC PEPTIDE   APTT   PROTIME-INR   URINALYSIS       Medications Given  Medications - No data to display      Diagnosis  -- cervical radiculopathy right upper extremity pain   --elevated INR, secondary to antiphospholipid syndrome  --UTI    Disposition and Plan  -- Disposition: home  -- Condition: stable  -- Follow-up: Patient to follow up with Neeru Hinkle MD in 1-2 days, and any specialists noted on discharge paperwork  -- I advised the patient that we have found no life threatening condition today  -- At this time, I believe the patient is clinically stable for discharge.   -- The patient acknowledges that close follow up with a MD is required   -- Patient agrees to comply with all instruction and direction given in the ER  -- Patient counseled on strict return precautions as discussed       Nay Meza MD  08/31/22 [id number]

## 2022-09-02 ENCOUNTER — TELEPHONE (OUTPATIENT)
Dept: GASTROENTEROLOGY | Facility: CLINIC | Age: 72
End: 2022-09-02
Payer: COMMERCIAL

## 2022-09-02 NOTE — TELEPHONE ENCOUNTER
Spoke with patient and scheduled appt for 9/13 at 3:00 pm with a 2:45 pm arrival.  Pt verbalized understanding to all and has no further questions at this time.

## 2022-09-07 DIAGNOSIS — E87.6 HYPOKALEMIA: Primary | ICD-10-CM

## 2022-09-12 ENCOUNTER — OFFICE VISIT (OUTPATIENT)
Dept: INTERNAL MEDICINE | Facility: CLINIC | Age: 72
End: 2022-09-12
Payer: COMMERCIAL

## 2022-09-12 VITALS
DIASTOLIC BLOOD PRESSURE: 84 MMHG | HEIGHT: 65 IN | BODY MASS INDEX: 30.23 KG/M2 | HEART RATE: 86 BPM | OXYGEN SATURATION: 98 % | WEIGHT: 181.44 LBS | RESPIRATION RATE: 18 BRPM | SYSTOLIC BLOOD PRESSURE: 128 MMHG

## 2022-09-12 DIAGNOSIS — Z79.01 LONG TERM (CURRENT) USE OF ANTICOAGULANTS: ICD-10-CM

## 2022-09-12 DIAGNOSIS — R41.3 MEMORY LOSS: ICD-10-CM

## 2022-09-12 DIAGNOSIS — Z12.31 ENCOUNTER FOR SCREENING MAMMOGRAM FOR MALIGNANT NEOPLASM OF BREAST: ICD-10-CM

## 2022-09-12 DIAGNOSIS — F33.1 DEPRESSION, MAJOR, RECURRENT, MODERATE: ICD-10-CM

## 2022-09-12 DIAGNOSIS — E87.6 HYPOKALEMIA: Primary | ICD-10-CM

## 2022-09-12 DIAGNOSIS — I48.0 PAF (PAROXYSMAL ATRIAL FIBRILLATION): ICD-10-CM

## 2022-09-12 PROCEDURE — 3008F BODY MASS INDEX DOCD: CPT | Mod: CPTII,S$GLB,, | Performed by: INTERNAL MEDICINE

## 2022-09-12 PROCEDURE — 1101F PR PT FALLS ASSESS DOC 0-1 FALLS W/OUT INJ PAST YR: ICD-10-PCS | Mod: CPTII,S$GLB,, | Performed by: INTERNAL MEDICINE

## 2022-09-12 PROCEDURE — 1159F MED LIST DOCD IN RCRD: CPT | Mod: CPTII,S$GLB,, | Performed by: INTERNAL MEDICINE

## 2022-09-12 PROCEDURE — 99999 PR PBB SHADOW E&M-EST. PATIENT-LVL IV: ICD-10-PCS | Mod: PBBFAC,,, | Performed by: INTERNAL MEDICINE

## 2022-09-12 PROCEDURE — 99499 UNLISTED E&M SERVICE: CPT | Mod: S$GLB,,, | Performed by: INTERNAL MEDICINE

## 2022-09-12 PROCEDURE — 4010F ACE/ARB THERAPY RXD/TAKEN: CPT | Mod: CPTII,S$GLB,, | Performed by: INTERNAL MEDICINE

## 2022-09-12 PROCEDURE — 1160F PR REVIEW ALL MEDS BY PRESCRIBER/CLIN PHARMACIST DOCUMENTED: ICD-10-PCS | Mod: CPTII,S$GLB,, | Performed by: INTERNAL MEDICINE

## 2022-09-12 PROCEDURE — 99214 OFFICE O/P EST MOD 30 MIN: CPT | Mod: S$GLB,,, | Performed by: INTERNAL MEDICINE

## 2022-09-12 PROCEDURE — 4010F PR ACE/ARB THEARPY RXD/TAKEN: ICD-10-PCS | Mod: CPTII,S$GLB,, | Performed by: INTERNAL MEDICINE

## 2022-09-12 PROCEDURE — 3079F PR MOST RECENT DIASTOLIC BLOOD PRESSURE 80-89 MM HG: ICD-10-PCS | Mod: CPTII,S$GLB,, | Performed by: INTERNAL MEDICINE

## 2022-09-12 PROCEDURE — 3079F DIAST BP 80-89 MM HG: CPT | Mod: CPTII,S$GLB,, | Performed by: INTERNAL MEDICINE

## 2022-09-12 PROCEDURE — 3288F PR FALLS RISK ASSESSMENT DOCUMENTED: ICD-10-PCS | Mod: CPTII,S$GLB,, | Performed by: INTERNAL MEDICINE

## 2022-09-12 PROCEDURE — 1101F PT FALLS ASSESS-DOCD LE1/YR: CPT | Mod: CPTII,S$GLB,, | Performed by: INTERNAL MEDICINE

## 2022-09-12 PROCEDURE — 1159F PR MEDICATION LIST DOCUMENTED IN MEDICAL RECORD: ICD-10-PCS | Mod: CPTII,S$GLB,, | Performed by: INTERNAL MEDICINE

## 2022-09-12 PROCEDURE — 3074F PR MOST RECENT SYSTOLIC BLOOD PRESSURE < 130 MM HG: ICD-10-PCS | Mod: CPTII,S$GLB,, | Performed by: INTERNAL MEDICINE

## 2022-09-12 PROCEDURE — 1126F AMNT PAIN NOTED NONE PRSNT: CPT | Mod: CPTII,S$GLB,, | Performed by: INTERNAL MEDICINE

## 2022-09-12 PROCEDURE — 3288F FALL RISK ASSESSMENT DOCD: CPT | Mod: CPTII,S$GLB,, | Performed by: INTERNAL MEDICINE

## 2022-09-12 PROCEDURE — 1160F RVW MEDS BY RX/DR IN RCRD: CPT | Mod: CPTII,S$GLB,, | Performed by: INTERNAL MEDICINE

## 2022-09-12 PROCEDURE — 99999 PR PBB SHADOW E&M-EST. PATIENT-LVL IV: CPT | Mod: PBBFAC,,, | Performed by: INTERNAL MEDICINE

## 2022-09-12 PROCEDURE — 1126F PR PAIN SEVERITY QUANTIFIED, NO PAIN PRESENT: ICD-10-PCS | Mod: CPTII,S$GLB,, | Performed by: INTERNAL MEDICINE

## 2022-09-12 PROCEDURE — 3008F PR BODY MASS INDEX (BMI) DOCUMENTED: ICD-10-PCS | Mod: CPTII,S$GLB,, | Performed by: INTERNAL MEDICINE

## 2022-09-12 PROCEDURE — 99214 PR OFFICE/OUTPT VISIT, EST, LEVL IV, 30-39 MIN: ICD-10-PCS | Mod: S$GLB,,, | Performed by: INTERNAL MEDICINE

## 2022-09-12 PROCEDURE — 3074F SYST BP LT 130 MM HG: CPT | Mod: CPTII,S$GLB,, | Performed by: INTERNAL MEDICINE

## 2022-09-12 PROCEDURE — 99499 RISK ADDL DX/OHS AUDIT: ICD-10-PCS | Mod: S$GLB,,, | Performed by: INTERNAL MEDICINE

## 2022-09-12 NOTE — PATIENT INSTRUCTIONS
For low potassium start taking KCL (2 tablets) daily.  from pharmacy today. After taking every day for 1-2 weeks repeat labs (ordered already--just go to the lab).    Patient will like labs put in for her clearance patient form states she will need fasting labs done so patient will like labs put into [facility] to come have labs done before her appt on 10/06/2020 which her appt is at 1:45 patient states she can not fast that long /so please put labs in

## 2022-09-12 NOTE — PROGRESS NOTES
Subjective:       Patient ID: Celine Sinclair is a 71 y.o. female.    Chief Complaint: Follow-up (X 1 month )      HPI:  Patient is known to me and presents for follow up. She was last seen here 8/16/22 for memory loss, rectal bleeding, hypokalemia and has seen multiple specialist since there.     She has since seen neurology. Ct head largely unremarkable. She also had CT neck and EMG done for presumed radiculopathy sx though EMG not full consistent with her reports sx. Also has some carpal tunnel. Referred to ortho by neuro for chronic neck pain.  Has neuro follow up in Oct. She reports she is still having difficulty remembering, worse with short term. Leaving herself notes. She reports grandmother had dementia.     She had C-scope done 8/2022 but I can not view the report in Epic today. She is scheduled to see GI tomorrow afternoon. She remains on Eliquis for a-fib and possible anti-phospholipid.     She is on eliquis. She is seeing and being worked up by hematology currently for anti-phospholipid syndrome. Sees heme again 9/22/22.     She had ER visit for same above complaints. She was found to have hypoK. I called in K supplement but not clear she is taking daily and repeat K was unchanged. T bili was 3.5 8/31/22--unclear etiology and this was new. US abd is scheduled.     Past Medical History:   Diagnosis Date    Aortic atherosclerosis     Atrial fibrillation     Benign essential hypertension     Cataract     Senile    Crohn's colitis     Depression, major, recurrent, moderate     Fibromyalgia     GERD (gastroesophageal reflux disease)     Hypertensive cardiomyopathy     IBS (irritable bowel syndrome)     Migraine     Opioid abuse, in remission     Osteopenia     Paget disease of bone     Port-A-Cath in place     right chest    Prolonged QT interval     RA (rheumatoid arthritis)     Sedative hypnotic or anxiolytic dependence     in remission        Family History   Problem Relation Age of Onset     Glaucoma Paternal Grandmother     Other Daughter         Diabetic Retinopathy    Breast cancer Daughter 40    Retinal detachment Grandchild     Heart attack Maternal Grandmother     Colon cancer Maternal Grandmother     Cancer Mother         Unknown type    Emphysema Father 67    Heart disease Father     Lung cancer Sister     Heart attack Sister     Breast cancer Daughter 47    Breast cancer Sister     Amblyopia Neg Hx     Blindness Neg Hx     Strabismus Neg Hx     Macular degeneration Neg Hx     Cataracts Neg Hx        Social History     Socioeconomic History    Marital status:     Number of children: 12   Occupational History    Occupation: [email or web address]     Comment: Retired/disabled   Tobacco Use    Smoking status: Never    Smokeless tobacco: Never   Substance and Sexual Activity    Drug use: No    Sexual activity: Not Currently     Partners: Female   Social History Narrative    Patient gave birth to 7 children, adopted 2 and has 3 stepchildren with her .     Social Determinants of Health     Financial Resource Strain: High Risk    Difficulty of Paying Living Expenses: Hard   Food Insecurity: Food Insecurity Present    Worried About Running Out of Food in the Last Year: Sometimes true    Ran Out of Food in the Last Year: Sometimes true   Transportation Needs: Unmet Transportation Needs    Lack of Transportation (Medical): Yes    Lack of Transportation (Non-Medical): Yes   Physical Activity: Inactive    Days of Exercise per Week: 0 days    Minutes of Exercise per Session: 0 min   Stress: Stress Concern Present    Feeling of Stress : Rather much   Social Connections: Socially Integrated    Frequency of Communication with Friends and Family: More than three times a week    Frequency of Social Gatherings with Friends and Family: More than three times a week    Attends Caodaism Services: More than 4 times per year    Active Member of Clubs or Organizations: Yes     Attends Club or Organization Meetings: More than 4 times per year    Marital Status:    Housing Stability: Low Risk     Unable to Pay for Housing in the Last Year: No    Number of Places Lived in the Last Year: 1    Unstable Housing in the Last Year: No       Review of Systems   Constitutional:  Negative for activity change, fatigue, fever and unexpected weight change.   HENT:  Negative for congestion, ear pain, hearing loss, rhinorrhea and sore throat.    Eyes:  Negative for redness and visual disturbance.   Respiratory:  Negative for cough, shortness of breath and wheezing.    Cardiovascular:  Negative for chest pain, palpitations and leg swelling.   Gastrointestinal:  Negative for abdominal pain, constipation, nausea and vomiting.   Genitourinary:  Negative for dysuria, frequency and urgency.   Musculoskeletal:  Positive for arthralgias. Negative for back pain, joint swelling and neck pain.   Skin:  Negative for color change, rash and wound.   Neurological:  Negative for dizziness, tremors, weakness, light-headedness and headaches.        Memory loss       Objective:      Physical Exam  Vitals reviewed.   Constitutional:       General: She is not in acute distress.     Appearance: She is well-developed.   HENT:      Head: Normocephalic and atraumatic.      Right Ear: External ear normal.      Left Ear: External ear normal.      Nose: Nose normal.   Eyes:      General:         Right eye: No discharge.         Left eye: No discharge.      Extraocular Movements: Extraocular movements intact.      Conjunctiva/sclera: Conjunctivae normal.      Pupils: Pupils are equal, round, and reactive to light.   Neck:      Thyroid: No thyromegaly.   Cardiovascular:      Rate and Rhythm: Normal rate and regular rhythm.      Heart sounds: No murmur heard.  Pulmonary:      Effort: Pulmonary effort is normal. No respiratory distress.      Breath sounds: Normal breath sounds. No wheezing.   Abdominal:      General: Bowel  "sounds are normal. There is no distension.      Palpations: Abdomen is soft.      Tenderness: There is no abdominal tenderness.   Skin:     General: Skin is warm and dry.   Neurological:      Mental Status: She is alert and oriented to person, place, and time.      Cranial Nerves: No cranial nerve deficit.   Psychiatric:         Behavior: Behavior normal.         Thought Content: Thought content normal.       Assessment:       1. Hypokalemia    2. Long term (current) use of anticoagulants    3. Memory loss    4. Depression, major, recurrent, moderate    5. PAF (paroxysmal atrial fibrillation)    6. Encounter for screening mammogram for malignant neoplasm of breast        Plan:       Ada was seen today for follow-up.    Diagnoses and all orders for this visit:    Hypokalemia  Chronic, not worsening but not improving  She has not filled supplement yet  Will bernarda today  Start taking daily  Repeat labs in 1-2 weeks  High K foods     Long term (current) use of anticoagulants  On eliquis  Cont taking for now pending further heme eval  Ruling out for anti-phosphlipid  Also taking for a-fib    Memory loss  New problem to me  Already seeing neurology  CT head reviewed  Labs reviewed  Will follow up Oct to discuss net steps  I wonder if neuropsych testing would be beneficial    Depression, major, recurrent, moderate  On cymbalta, cont for now  She is feeling "scared" to leave her house alone due to memory issues. Offered reassurance, this can be normal reaction given her circumstances.  Pending further neuro eval I wonder if there is any psychiatric component to her memory loss and if neuro psych testing would be warranted    PAF (paroxysmal atrial fibrillation)  Chronic stable  Cont eliquis  Cont maiodarone and metoprolol  K supplement and repeat labs as noted above    RTC as scheduled for routine and PRN    I spent 33 minutes in total on this patient encounter today including review of prior medical records, direct face to " face consultation with the patient, and documentation in the medical records.

## 2022-09-13 ENCOUNTER — OFFICE VISIT (OUTPATIENT)
Dept: GASTROENTEROLOGY | Facility: CLINIC | Age: 72
End: 2022-09-13
Payer: COMMERCIAL

## 2022-09-13 VITALS
SYSTOLIC BLOOD PRESSURE: 131 MMHG | WEIGHT: 180.75 LBS | HEART RATE: 69 BPM | TEMPERATURE: 98 F | BODY MASS INDEX: 30.08 KG/M2 | DIASTOLIC BLOOD PRESSURE: 84 MMHG | OXYGEN SATURATION: 100 %

## 2022-09-13 DIAGNOSIS — R17 ELEVATED BILIRUBIN: ICD-10-CM

## 2022-09-13 DIAGNOSIS — K50.819 CROHN'S DISEASE OF SMALL AND LARGE INTESTINES WITH COMPLICATION: Primary | ICD-10-CM

## 2022-09-13 DIAGNOSIS — K76.9 LIVER LESION: ICD-10-CM

## 2022-09-13 PROCEDURE — 99499 UNLISTED E&M SERVICE: CPT | Mod: S$GLB,,, | Performed by: INTERNAL MEDICINE

## 2022-09-13 PROCEDURE — 1159F MED LIST DOCD IN RCRD: CPT | Mod: CPTII,S$GLB,, | Performed by: INTERNAL MEDICINE

## 2022-09-13 PROCEDURE — 4010F ACE/ARB THERAPY RXD/TAKEN: CPT | Mod: CPTII,S$GLB,, | Performed by: INTERNAL MEDICINE

## 2022-09-13 PROCEDURE — 1125F AMNT PAIN NOTED PAIN PRSNT: CPT | Mod: CPTII,S$GLB,, | Performed by: INTERNAL MEDICINE

## 2022-09-13 PROCEDURE — 1159F PR MEDICATION LIST DOCUMENTED IN MEDICAL RECORD: ICD-10-PCS | Mod: CPTII,S$GLB,, | Performed by: INTERNAL MEDICINE

## 2022-09-13 PROCEDURE — 1160F PR REVIEW ALL MEDS BY PRESCRIBER/CLIN PHARMACIST DOCUMENTED: ICD-10-PCS | Mod: CPTII,S$GLB,, | Performed by: INTERNAL MEDICINE

## 2022-09-13 PROCEDURE — 1160F RVW MEDS BY RX/DR IN RCRD: CPT | Mod: CPTII,S$GLB,, | Performed by: INTERNAL MEDICINE

## 2022-09-13 PROCEDURE — 99215 PR OFFICE/OUTPT VISIT, EST, LEVL V, 40-54 MIN: ICD-10-PCS | Mod: S$GLB,,, | Performed by: INTERNAL MEDICINE

## 2022-09-13 PROCEDURE — 99499 RISK ADDL DX/OHS AUDIT: ICD-10-PCS | Mod: S$GLB,,, | Performed by: INTERNAL MEDICINE

## 2022-09-13 PROCEDURE — 1101F PT FALLS ASSESS-DOCD LE1/YR: CPT | Mod: CPTII,S$GLB,, | Performed by: INTERNAL MEDICINE

## 2022-09-13 PROCEDURE — 3079F PR MOST RECENT DIASTOLIC BLOOD PRESSURE 80-89 MM HG: ICD-10-PCS | Mod: CPTII,S$GLB,, | Performed by: INTERNAL MEDICINE

## 2022-09-13 PROCEDURE — 3075F PR MOST RECENT SYSTOLIC BLOOD PRESS GE 130-139MM HG: ICD-10-PCS | Mod: CPTII,S$GLB,, | Performed by: INTERNAL MEDICINE

## 2022-09-13 PROCEDURE — 3288F PR FALLS RISK ASSESSMENT DOCUMENTED: ICD-10-PCS | Mod: CPTII,S$GLB,, | Performed by: INTERNAL MEDICINE

## 2022-09-13 PROCEDURE — 99215 OFFICE O/P EST HI 40 MIN: CPT | Mod: S$GLB,,, | Performed by: INTERNAL MEDICINE

## 2022-09-13 PROCEDURE — 1101F PR PT FALLS ASSESS DOC 0-1 FALLS W/OUT INJ PAST YR: ICD-10-PCS | Mod: CPTII,S$GLB,, | Performed by: INTERNAL MEDICINE

## 2022-09-13 PROCEDURE — 4010F PR ACE/ARB THEARPY RXD/TAKEN: ICD-10-PCS | Mod: CPTII,S$GLB,, | Performed by: INTERNAL MEDICINE

## 2022-09-13 PROCEDURE — 3075F SYST BP GE 130 - 139MM HG: CPT | Mod: CPTII,S$GLB,, | Performed by: INTERNAL MEDICINE

## 2022-09-13 PROCEDURE — 3288F FALL RISK ASSESSMENT DOCD: CPT | Mod: CPTII,S$GLB,, | Performed by: INTERNAL MEDICINE

## 2022-09-13 PROCEDURE — 3008F BODY MASS INDEX DOCD: CPT | Mod: CPTII,S$GLB,, | Performed by: INTERNAL MEDICINE

## 2022-09-13 PROCEDURE — 3008F PR BODY MASS INDEX (BMI) DOCUMENTED: ICD-10-PCS | Mod: CPTII,S$GLB,, | Performed by: INTERNAL MEDICINE

## 2022-09-13 PROCEDURE — 1125F PR PAIN SEVERITY QUANTIFIED, PAIN PRESENT: ICD-10-PCS | Mod: CPTII,S$GLB,, | Performed by: INTERNAL MEDICINE

## 2022-09-13 PROCEDURE — 3079F DIAST BP 80-89 MM HG: CPT | Mod: CPTII,S$GLB,, | Performed by: INTERNAL MEDICINE

## 2022-09-13 RX ORDER — MESALAMINE 1.2 G/1
2.4 TABLET, DELAYED RELEASE ORAL
Qty: 60 TABLET | Refills: 5 | Status: SHIPPED | OUTPATIENT
Start: 2022-09-13 | End: 2023-03-10

## 2022-09-13 NOTE — PROGRESS NOTES
IBD PATIENT INTAKE:    COVID symptoms in the last 7 days (runny nose, sore throat, congestion, cough, fever): No  PCP: Neeru Hinkle  If not PCP-  number given to establish [phone]: Yes    ALLERGIES REVIEWED:  Yes    CHIEF COMPLAINT:    Chief Complaint   Patient presents with    Crohn's Disease       VITAL SIGNS:  /84 (BP Location: Left arm, Patient Position: Sitting)   Pulse 69   Temp 98.2 °F (36.8 °C)   Wt 82 kg (180 lb 12.4 oz)   SpO2 100%   BMI 30.08 kg/m²      Change in medical, surgical, family or social history: No    IBD THERAPY (name, dose/frequency):  none  Last dose:  n/a    Next dose:  n/a  Infusion/Pharmacy: NA    NSAIDs (aspirin, ibuprofen-advil or motrin, naproxen-aleve, diclofenac-voltaren, BC powder, excedrin, goodies): Yes ASA 81    Alternative/Complementary Medications (i.e. probiotics, turmeric, fish oil, aloe vera):      no  Name/dose:  n/a    Vitamins:   Vit D:  no     Vit B-12:  no   Folic Acid: no       Calcium: no     Iron:  no      MVI: no    Antibiotics (past 30 Days):  no  If yes   Indication:  Name of antibiotic:  Completion date:     REVIEWED MEDICATION LIST RECONCILED INCLUDING ABOVE MEDS:  yes                  Answers submitted by the patient for this visit:  Established Patient Questionnaire  (Submitted on 9/13/2022)  back pain: Yes

## 2022-09-15 ENCOUNTER — TELEPHONE (OUTPATIENT)
Dept: GASTROENTEROLOGY | Facility: CLINIC | Age: 72
End: 2022-09-15
Payer: COMMERCIAL

## 2022-09-15 NOTE — TELEPHONE ENCOUNTER
----- Message from Debora Bernstein LPN sent at 9/15/2022 11:04 AM CDT -----  Bart Denise,  Please have the patient contact our office.  Thanks,  Debora Bernstein LPN     ----- Message -----  From: Camelia Harris RN  Sent: 9/15/2022   9:02 AM CDT  To: Arin WYNNE StaffAyanna Dr. Gregory Gaspard is treating this patient for IBD at [facility] in [place].  We did labs on her and her B-12 level is low.  Dr. Urena would like to start this patient on B-12 injections, but feels it may be best for your office to help her with that since she lives in [place] and our office is in [place].  I have already spoken with the patient about this and she is agreeable to this.  Would it be possible for someone in your office to contact the patient to get this started or should I have her contact your office?  Please let me know.    Thank you,  Camelia Harris RN

## 2022-09-15 NOTE — TELEPHONE ENCOUNTER
Spoke with patient's , Juan, and advised that Debora at Dr. Hinkle's office said to contact her to get B-12 injections set up.  He verbalized understanding to and has no further questions at this time.

## 2022-09-15 NOTE — TELEPHONE ENCOUNTER
----- Message from Cali Uerna MD sent at 9/15/2022  7:32 AM CDT -----  Let her know that her labs look okay.  Her bilirubin level has improved and the labs we did to evaluate this suggest that the cause of the elevation was likely the benign liver condition that we discussed.  There does not appear to be any issues with her red blood cells.  Her vitamin-D level is low so I would recommend starting 2000 IU daily.  She can get this over-the-counter.  Her B12 level is also low and I think it would be worthwhile to start B12 injections.  I know that she lives in [place] so she might want to reach out to her primary care physician locally to get started on this.  Otherwise we can help get this started.

## 2022-09-15 NOTE — TELEPHONE ENCOUNTER
"Spoke with patient and let her know that Dr. Urena has reviewed her lab results and says that "...her labs look okay.  Her bilirubin level has improved and the labs we did to evaluate this suggest that the cause of the elevation was likely the benign liver condition that we discussed.  There does not appear to be any issues with her red blood cells.  Her vitamin-D level is low so I would recommend starting 2000 IU daily.  She can get this over-the-counter.  Her B12 level is also low and I think it would be worthwhile to start B12 injections.  I know that she lives in [place] so she might want to reach out to her primary care physician locally to get started on this."    Patient's  was on the call as well.  She will start the Vit D 2000iu supplement and a message was sent to her PCP to see if they could help out with the B-12 injections.    Reminder set to follow up.  "

## 2022-09-20 NOTE — TELEPHONE ENCOUNTER
DAVID for patient to call me back at [phone] re:  Has she gotten her B-12 injections set up with Dr. Hinkle yet?

## 2022-09-21 ENCOUNTER — OFFICE VISIT (OUTPATIENT)
Dept: INTERNAL MEDICINE | Facility: CLINIC | Age: 72
End: 2022-09-21
Payer: COMMERCIAL

## 2022-09-21 VITALS
OXYGEN SATURATION: 98 % | WEIGHT: 179 LBS | SYSTOLIC BLOOD PRESSURE: 134 MMHG | DIASTOLIC BLOOD PRESSURE: 84 MMHG | HEIGHT: 65 IN | BODY MASS INDEX: 29.82 KG/M2 | HEART RATE: 66 BPM | RESPIRATION RATE: 16 BRPM

## 2022-09-21 DIAGNOSIS — I48.0 PAF (PAROXYSMAL ATRIAL FIBRILLATION): ICD-10-CM

## 2022-09-21 DIAGNOSIS — D68.61 ANTIPHOSPHOLIPID SYNDROME: ICD-10-CM

## 2022-09-21 DIAGNOSIS — F03.90 DEMENTIA WITHOUT BEHAVIORAL DISTURBANCE, UNSPECIFIED DEMENTIA TYPE: Primary | ICD-10-CM

## 2022-09-21 DIAGNOSIS — R23.3 EASY BRUISING: ICD-10-CM

## 2022-09-21 DIAGNOSIS — F33.1 DEPRESSION, MAJOR, RECURRENT, MODERATE: ICD-10-CM

## 2022-09-21 DIAGNOSIS — Z79.01 LONG TERM (CURRENT) USE OF ANTICOAGULANTS: ICD-10-CM

## 2022-09-21 PROCEDURE — 1101F PT FALLS ASSESS-DOCD LE1/YR: CPT | Mod: CPTII,S$GLB,, | Performed by: INTERNAL MEDICINE

## 2022-09-21 PROCEDURE — 3079F PR MOST RECENT DIASTOLIC BLOOD PRESSURE 80-89 MM HG: ICD-10-PCS | Mod: CPTII,S$GLB,, | Performed by: INTERNAL MEDICINE

## 2022-09-21 PROCEDURE — 99499 UNLISTED E&M SERVICE: CPT | Mod: S$GLB,,, | Performed by: INTERNAL MEDICINE

## 2022-09-21 PROCEDURE — 3079F DIAST BP 80-89 MM HG: CPT | Mod: CPTII,S$GLB,, | Performed by: INTERNAL MEDICINE

## 2022-09-21 PROCEDURE — 4010F PR ACE/ARB THEARPY RXD/TAKEN: ICD-10-PCS | Mod: CPTII,S$GLB,, | Performed by: INTERNAL MEDICINE

## 2022-09-21 PROCEDURE — 99214 OFFICE O/P EST MOD 30 MIN: CPT | Mod: S$GLB,,, | Performed by: INTERNAL MEDICINE

## 2022-09-21 PROCEDURE — 3008F PR BODY MASS INDEX (BMI) DOCUMENTED: ICD-10-PCS | Mod: CPTII,S$GLB,, | Performed by: INTERNAL MEDICINE

## 2022-09-21 PROCEDURE — 1159F MED LIST DOCD IN RCRD: CPT | Mod: CPTII,S$GLB,, | Performed by: INTERNAL MEDICINE

## 2022-09-21 PROCEDURE — 1125F PR PAIN SEVERITY QUANTIFIED, PAIN PRESENT: ICD-10-PCS | Mod: CPTII,S$GLB,, | Performed by: INTERNAL MEDICINE

## 2022-09-21 PROCEDURE — 3075F PR MOST RECENT SYSTOLIC BLOOD PRESS GE 130-139MM HG: ICD-10-PCS | Mod: CPTII,S$GLB,, | Performed by: INTERNAL MEDICINE

## 2022-09-21 PROCEDURE — 1160F RVW MEDS BY RX/DR IN RCRD: CPT | Mod: CPTII,S$GLB,, | Performed by: INTERNAL MEDICINE

## 2022-09-21 PROCEDURE — 99499 RISK ADDL DX/OHS AUDIT: ICD-10-PCS | Mod: S$GLB,,, | Performed by: INTERNAL MEDICINE

## 2022-09-21 PROCEDURE — 3288F FALL RISK ASSESSMENT DOCD: CPT | Mod: CPTII,S$GLB,, | Performed by: INTERNAL MEDICINE

## 2022-09-21 PROCEDURE — 1101F PR PT FALLS ASSESS DOC 0-1 FALLS W/OUT INJ PAST YR: ICD-10-PCS | Mod: CPTII,S$GLB,, | Performed by: INTERNAL MEDICINE

## 2022-09-21 PROCEDURE — 1125F AMNT PAIN NOTED PAIN PRSNT: CPT | Mod: CPTII,S$GLB,, | Performed by: INTERNAL MEDICINE

## 2022-09-21 PROCEDURE — 3288F PR FALLS RISK ASSESSMENT DOCUMENTED: ICD-10-PCS | Mod: CPTII,S$GLB,, | Performed by: INTERNAL MEDICINE

## 2022-09-21 PROCEDURE — 1160F PR REVIEW ALL MEDS BY PRESCRIBER/CLIN PHARMACIST DOCUMENTED: ICD-10-PCS | Mod: CPTII,S$GLB,, | Performed by: INTERNAL MEDICINE

## 2022-09-21 PROCEDURE — 3008F BODY MASS INDEX DOCD: CPT | Mod: CPTII,S$GLB,, | Performed by: INTERNAL MEDICINE

## 2022-09-21 PROCEDURE — 99999 PR PBB SHADOW E&M-EST. PATIENT-LVL IV: CPT | Mod: PBBFAC,,, | Performed by: INTERNAL MEDICINE

## 2022-09-21 PROCEDURE — 99214 PR OFFICE/OUTPT VISIT, EST, LEVL IV, 30-39 MIN: ICD-10-PCS | Mod: S$GLB,,, | Performed by: INTERNAL MEDICINE

## 2022-09-21 PROCEDURE — 4010F ACE/ARB THERAPY RXD/TAKEN: CPT | Mod: CPTII,S$GLB,, | Performed by: INTERNAL MEDICINE

## 2022-09-21 PROCEDURE — 99999 PR PBB SHADOW E&M-EST. PATIENT-LVL IV: ICD-10-PCS | Mod: PBBFAC,,, | Performed by: INTERNAL MEDICINE

## 2022-09-21 PROCEDURE — 1159F PR MEDICATION LIST DOCUMENTED IN MEDICAL RECORD: ICD-10-PCS | Mod: CPTII,S$GLB,, | Performed by: INTERNAL MEDICINE

## 2022-09-21 PROCEDURE — 3075F SYST BP GE 130 - 139MM HG: CPT | Mod: CPTII,S$GLB,, | Performed by: INTERNAL MEDICINE

## 2022-09-21 RX ORDER — DULOXETIN HYDROCHLORIDE 60 MG/1
60 CAPSULE, DELAYED RELEASE ORAL DAILY
Qty: 30 CAPSULE | Refills: 11 | Status: SHIPPED | OUTPATIENT
Start: 2022-09-21 | End: 2022-12-14 | Stop reason: SDUPTHER

## 2022-09-21 NOTE — PROGRESS NOTES
"Subjective:       Patient ID: Celine Sinclair is a 71 y.o. female.    Chief Complaint: Bleeding/Bruising (Bruising to bilateral arms and legs, x 1 month Bruise starts off as a "bump" then brusies), Generalized Body Aches, and Memory Loss      HPI:    Patient is known to me and presents to discuss easy bruising and still having concerns about her memory loss.    Memory loss:   She has since seen neurology. Ct head largely unremarkable. She also had CT neck and EMG done for presumed radiculopathy sx though EMG not full consistent with her reports sx. Also has some carpal tunnel. Referred to ortho by neuro for chronic neck pain.  Has neuro follow up in Oct. She reports she is still having difficulty remembering, worse with short term. Leaving herself notes. She reports grandmother had dementia. she is feeling very worried she will be a burden on her family. Sees Dr. Curiel again in October.     She had C-scope done 8/2022 but I can not view the report in [facility] today. She is scheduled to see GI tomorrow again. She remains on Eliquis for a-fib and possible anti-phospholipid.      She is on eliquis. She is seeing and being worked up by hematology currently for anti-phospholipid syndrome. Sees heme again 9/22/22 (tomorrow).  She is worried because he is getting large bruises on her legs and arms. She feels it is getting worse but this is a complaints she has had for at least a year, maybe longer, with me. No falls/ no trauma.       Past Medical History:   Diagnosis Date    Aortic atherosclerosis     Atrial fibrillation     Benign essential hypertension     Cataract     Senile    Crohn's colitis     Depression, major, recurrent, moderate     Fibromyalgia     GERD (gastroesophageal reflux disease)     Hypertensive cardiomyopathy     IBS (irritable bowel syndrome)     Migraine     Opioid abuse, in remission     Osteopenia     Paget disease of bone     Port-A-Cath in place     right chest    Prolonged QT interval     RA (rheumatoid " arthritis)     Sedative hypnotic or anxiolytic dependence     in remission        Family History   Problem Relation Age of Onset    Glaucoma Paternal Grandmother     Other Daughter         Diabetic Retinopathy    Breast cancer Daughter 40    Retinal detachment Grandchild     Heart attack Maternal Grandmother     Colon cancer Maternal Grandmother     Cancer Mother         Unknown type    Emphysema Father 67    Heart disease Father     Lung cancer Sister     Heart attack Sister     Breast cancer Daughter 47    Breast cancer Sister     Amblyopia Neg Hx     Blindness Neg Hx     Strabismus Neg Hx     Macular degeneration Neg Hx     Cataracts Neg Hx        Social History     Socioeconomic History    Marital status:     Number of children: 12   Occupational History    Occupation: [employer]     Comment: Retired/disabled   Tobacco Use    Smoking status: Never    Smokeless tobacco: Never   Substance and Sexual Activity    Drug use: No    Sexual activity: Not Currently     Partners: Female   Social History Narrative    Patient gave birth to 7 children, adopted 2 and has 3 stepchildren with her .     Social Determinants of Health     Financial Resource Strain: High Risk    Difficulty of Paying Living Expenses: Hard   Food Insecurity: Food Insecurity Present    Worried About Running Out of Food in the Last Year: Sometimes true    Ran Out of Food in the Last Year: Sometimes true   Transportation Needs: Unmet Transportation Needs    Lack of Transportation (Medical): Yes    Lack of Transportation (Non-Medical): Yes   Physical Activity: Inactive    Days of Exercise per Week: 0 days    Minutes of Exercise per Session: 0 min   Stress: Stress Concern Present    Feeling of Stress : Rather much   Social Connections: Socially Integrated    Frequency of Communication with Friends and Family: More than three times a week    Frequency of Social Gatherings with Friends and Family: More than three times a week    Attends  [employer] Services: More than 4 times per year    Active Member of Clubs or Organizations: Yes    Attends Club or Organization Meetings: More than 4 times per year    Marital Status:    Housing Stability: Low Risk     Unable to Pay for Housing in the Last Year: No    Number of Places Lived in the Last Year: 1    Unstable Housing in the Last Year: No       Review of Systems   Constitutional:  Negative for activity change, fatigue, fever and unexpected weight change.   HENT:  Negative for congestion, ear pain, hearing loss, rhinorrhea and sore throat.    Eyes:  Negative for pain, redness and visual disturbance.   Respiratory:  Negative for cough, shortness of breath and wheezing.    Cardiovascular:  Negative for chest pain, palpitations and leg swelling.   Gastrointestinal:  Negative for abdominal pain, constipation, diarrhea, nausea and vomiting.   Genitourinary:  Negative for dysuria, frequency and urgency.   Musculoskeletal:  Negative for back pain, joint swelling and neck pain.   Skin:  Negative for color change, rash and wound.        bruising   Neurological:  Negative for dizziness, tremors, weakness, light-headedness and headaches.        Memory loss     Psychiatric/Behavioral:  Positive for decreased concentration and dysphoric mood.        Objective:      Physical Exam  Vitals reviewed.   Constitutional:       General: She is not in acute distress.     Appearance: She is well-developed.   HENT:      Head: Normocephalic and atraumatic.      Right Ear: External ear normal.      Left Ear: External ear normal.      Nose: Nose normal.   Eyes:      General:         Right eye: No discharge.         Left eye: No discharge.      Extraocular Movements: Extraocular movements intact.      Conjunctiva/sclera: Conjunctivae normal.      Pupils: Pupils are equal, round, and reactive to light.   Neck:      Thyroid: No thyromegaly.   Cardiovascular:      Rate and Rhythm: Normal rate and regular rhythm.      Heart  "sounds: No murmur heard.  Pulmonary:      Effort: Pulmonary effort is normal. No respiratory distress.      Breath sounds: Normal breath sounds. No wheezing or rales.   Abdominal:      General: Bowel sounds are normal. There is no distension.      Palpations: Abdomen is soft.      Tenderness: There is no abdominal tenderness.   Skin:     General: Skin is warm and dry.      Comments: Large ecchymoses on b/l LE, most noteable on right thigh   Neurological:      Mental Status: She is alert and oriented to person, place, and time.      Cranial Nerves: No cranial nerve deficit.   Psychiatric:         Behavior: Behavior normal.         Thought Content: Thought content normal.      Comments: Tearful discussing her concerns about memory loss; not inappropriate but she is not coping well with potential diagnosis       Assessment:       1. Dementia without behavioral disturbance, unspecified dementia type    2. Depression, major, recurrent, moderate    3. PAF (paroxysmal atrial fibrillation)    4. Antiphospholipid syndrome    5. Long term (current) use of anticoagulants    6. Easy bruising        Plan:       Ada was seen today for bleeding/bruising, generalized body aches and memory loss.    Diagnoses and all orders for this visit:    Dementia without behavioral disturbance, unspecified dementia type  -     DULoxetine (CYMBALTA) 60 MG capsule; Take 1 capsule (60 mg total) by mouth once daily.  Memory loss reported  Seeing neurology who has begun work up and has follow up in a few weeks  She is very worried about being a burden on her family. She does not "feel like myself". She is scared to be alone  I wonder if there is a depression/mood disorder component to her memory issues but she is certainly not coping well  Increase cymbalta (see below) and follow up neuro for recommendation on further work up    Depression, major, recurrent, moderate  -     DULoxetine (CYMBALTA) 60 MG capsule; Take 1 capsule (60 mg total) by mouth " once daily.  Chronic, uncontrolled  Increase cymbalta to 60mg daily  She is not coping well with potential dementia diagnosis and having excess worry about her future  Side effects of SNRI discussed    PAF (paroxysmal atrial fibrillation)  Antiphospholipid syndrome  Long term (current) use of anticoagulants  Easy bruising  Chronic problems  Reporting worsening bruising, she has had bruising for years  Suspect due to anticoagulation and discussed again with patient today  Sees hematology tomorrow so defer further work up to heme

## 2022-09-27 ENCOUNTER — TELEPHONE (OUTPATIENT)
Dept: INTERNAL MEDICINE | Facility: CLINIC | Age: 72
End: 2022-09-27
Payer: COMMERCIAL

## 2022-09-27 RX ORDER — CYANOCOBALAMIN 1000 UG/ML
1000 INJECTION, SOLUTION INTRAMUSCULAR; SUBCUTANEOUS WEEKLY
Qty: 4 ML | Refills: 0 | Status: SHIPPED | OUTPATIENT
Start: 2022-09-27

## 2022-09-27 NOTE — TELEPHONE ENCOUNTER
----- Message from Ame Fitzpatrick sent at 2022 11:34 AM CDT -----  Contact: Pt  Celine Sinclair  MRN: [id number]  : 1950  PCP: Neeru Hinkle  Home Phone      [phone]  Work Phone      Not on file.  Mobile          [phone]      MESSAGE: Pt states Dr Urena wants Dr Hinkle to start the B12 injections because her level is low.      [phone]

## 2022-09-27 NOTE — TELEPHONE ENCOUNTER
Spoke with patient  She say Dr. Hinkle on 9/21, but forgot to ask about the B12 injections.  She is going to contact Dr. Hinkle today to get that set up.  Reminder set to follow up.

## 2022-09-27 NOTE — TELEPHONE ENCOUNTER
Pt hasw no one to administer b12 at home. I scheduled her here in the clinic for weekly injections for 4 weeks.

## 2022-09-27 NOTE — TELEPHONE ENCOUNTER
Discussed with Dr. Urena.  Will start 1000mcg weekly for 4 weeks then monthly. He stated he will follow up her levels with her next follow up.    Order placed for her weekly dosing. Once complete will order monthly dosing.   Please notify patient and set up for injections. thanks

## 2022-09-28 ENCOUNTER — PATIENT OUTREACH (OUTPATIENT)
Dept: ADMINISTRATIVE | Facility: HOSPITAL | Age: 72
End: 2022-09-28
Payer: COMMERCIAL

## 2022-09-29 ENCOUNTER — CLINICAL SUPPORT (OUTPATIENT)
Dept: INTERNAL MEDICINE | Facility: CLINIC | Age: 72
End: 2022-09-29
Payer: COMMERCIAL

## 2022-09-29 DIAGNOSIS — E53.8 B12 DEFICIENCY: Primary | ICD-10-CM

## 2022-09-29 PROCEDURE — 96372 PR INJECTION,THERAP/PROPH/DIAG2ST, IM OR SUBCUT: ICD-10-PCS | Mod: S$GLB,,, | Performed by: INTERNAL MEDICINE

## 2022-09-29 PROCEDURE — 96372 THER/PROPH/DIAG INJ SC/IM: CPT | Mod: S$GLB,,, | Performed by: INTERNAL MEDICINE

## 2022-09-29 RX ORDER — CYANOCOBALAMIN 1000 UG/ML
1000 INJECTION, SOLUTION INTRAMUSCULAR; SUBCUTANEOUS WEEKLY
Status: COMPLETED | OUTPATIENT
Start: 2022-09-29 | End: 2022-10-20

## 2022-09-29 RX ORDER — CYANOCOBALAMIN 1000 UG/ML
1000 INJECTION, SOLUTION INTRAMUSCULAR; SUBCUTANEOUS ONCE
Status: DISCONTINUED | OUTPATIENT
Start: 2022-09-29 | End: 2022-09-29

## 2022-09-29 RX ADMIN — CYANOCOBALAMIN 1000 MCG: 1000 INJECTION, SOLUTION INTRAMUSCULAR; SUBCUTANEOUS at 09:09

## 2022-10-06 ENCOUNTER — CLINICAL SUPPORT (OUTPATIENT)
Dept: INTERNAL MEDICINE | Facility: CLINIC | Age: 72
End: 2022-10-06
Payer: COMMERCIAL

## 2022-10-06 PROCEDURE — 96372 PR INJECTION,THERAP/PROPH/DIAG2ST, IM OR SUBCUT: ICD-10-PCS | Mod: S$GLB,,, | Performed by: INTERNAL MEDICINE

## 2022-10-06 PROCEDURE — 96372 THER/PROPH/DIAG INJ SC/IM: CPT | Mod: S$GLB,,, | Performed by: INTERNAL MEDICINE

## 2022-10-06 RX ADMIN — CYANOCOBALAMIN 1000 MCG: 1000 INJECTION, SOLUTION INTRAMUSCULAR; SUBCUTANEOUS at 09:10

## 2022-10-11 ENCOUNTER — HOSPITAL ENCOUNTER (OUTPATIENT)
Dept: RADIOLOGY | Facility: HOSPITAL | Age: 72
Discharge: HOME | End: 2022-10-11
Attending: INTERNAL MEDICINE
Payer: COMMERCIAL

## 2022-10-11 VITALS — WEIGHT: 181 LBS | BODY MASS INDEX: 30.16 KG/M2 | HEIGHT: 65 IN

## 2022-10-11 DIAGNOSIS — Z12.31 ENCOUNTER FOR SCREENING MAMMOGRAM FOR MALIGNANT NEOPLASM OF BREAST: ICD-10-CM

## 2022-10-11 PROCEDURE — 77067 SCR MAMMO BI INCL CAD: CPT | Mod: 26,,, | Performed by: RADIOLOGY

## 2022-10-11 PROCEDURE — 77063 MAMMO DIGITAL SCREENING BILAT WITH TOMO: ICD-10-PCS | Mod: 26,,, | Performed by: RADIOLOGY

## 2022-10-11 PROCEDURE — 77063 BREAST TOMOSYNTHESIS BI: CPT | Mod: TC

## 2022-10-11 PROCEDURE — 77063 BREAST TOMOSYNTHESIS BI: CPT | Mod: 26,,, | Performed by: RADIOLOGY

## 2022-10-11 PROCEDURE — 77067 MAMMO DIGITAL SCREENING BILAT WITH TOMO: ICD-10-PCS | Mod: 26,,, | Performed by: RADIOLOGY

## 2022-10-13 ENCOUNTER — CLINICAL SUPPORT (OUTPATIENT)
Dept: INTERNAL MEDICINE | Facility: CLINIC | Age: 72
End: 2022-10-13
Payer: COMMERCIAL

## 2022-10-13 PROCEDURE — 96372 PR INJECTION,THERAP/PROPH/DIAG2ST, IM OR SUBCUT: ICD-10-PCS | Mod: S$GLB,,, | Performed by: INTERNAL MEDICINE

## 2022-10-13 PROCEDURE — 96372 THER/PROPH/DIAG INJ SC/IM: CPT | Mod: S$GLB,,, | Performed by: INTERNAL MEDICINE

## 2022-10-13 RX ADMIN — CYANOCOBALAMIN 1000 MCG: 1000 INJECTION, SOLUTION INTRAMUSCULAR; SUBCUTANEOUS at 09:10

## 2022-10-20 ENCOUNTER — CLINICAL SUPPORT (OUTPATIENT)
Dept: INTERNAL MEDICINE | Facility: CLINIC | Age: 72
End: 2022-10-20
Payer: COMMERCIAL

## 2022-10-20 PROCEDURE — 96372 PR INJECTION,THERAP/PROPH/DIAG2ST, IM OR SUBCUT: ICD-10-PCS | Mod: S$GLB,,, | Performed by: INTERNAL MEDICINE

## 2022-10-20 PROCEDURE — 96372 THER/PROPH/DIAG INJ SC/IM: CPT | Mod: S$GLB,,, | Performed by: INTERNAL MEDICINE

## 2022-10-20 RX ADMIN — CYANOCOBALAMIN 1000 MCG: 1000 INJECTION, SOLUTION INTRAMUSCULAR; SUBCUTANEOUS at 09:10

## 2022-10-24 ENCOUNTER — TELEPHONE (OUTPATIENT)
Dept: GASTROENTEROLOGY | Facility: CLINIC | Age: 72
End: 2022-10-24
Payer: COMMERCIAL

## 2022-11-22 ENCOUNTER — CLINICAL SUPPORT (OUTPATIENT)
Dept: INTERNAL MEDICINE | Facility: CLINIC | Age: 72
End: 2022-11-22
Payer: COMMERCIAL

## 2022-11-22 DIAGNOSIS — E53.8 B12 DEFICIENCY: Primary | ICD-10-CM

## 2022-11-22 PROCEDURE — 96372 THER/PROPH/DIAG INJ SC/IM: CPT | Mod: S$GLB,,, | Performed by: INTERNAL MEDICINE

## 2022-11-22 PROCEDURE — 96372 PR INJECTION,THERAP/PROPH/DIAG2ST, IM OR SUBCUT: ICD-10-PCS | Mod: S$GLB,,, | Performed by: INTERNAL MEDICINE

## 2022-11-22 RX ORDER — CYANOCOBALAMIN 1000 UG/ML
1000 INJECTION, SOLUTION INTRAMUSCULAR; SUBCUTANEOUS
Status: COMPLETED | OUTPATIENT
Start: 2022-11-22 | End: 2023-04-25

## 2022-11-22 RX ADMIN — CYANOCOBALAMIN 1000 MCG: 1000 INJECTION, SOLUTION INTRAMUSCULAR at 09:11

## 2022-12-05 ENCOUNTER — TELEPHONE (OUTPATIENT)
Dept: INTERNAL MEDICINE | Facility: CLINIC | Age: 72
End: 2022-12-05
Payer: COMMERCIAL

## 2022-12-05 DIAGNOSIS — K64.9 HEMORRHOIDS, UNSPECIFIED HEMORRHOID TYPE: Primary | ICD-10-CM

## 2022-12-05 NOTE — TELEPHONE ENCOUNTER
----- Message from Светлана Marie sent at 2022  1:12 PM CST -----  Contact: self  Celine Sinclair  MRN: [id number]  : 1950  PCP: Neeru Hinkle  Home Phone      [phone]  Work Phone      Not on file.  Mobile          [phone]      MESSAGE:    PT states has been on and off bleeding from rectum over the last month. States it can be both light and dark red just depends. Would like to discuss with nurse.  Phone: [phone]

## 2022-12-09 ENCOUNTER — LAB VISIT (OUTPATIENT)
Dept: LAB | Facility: HOSPITAL | Age: 72
End: 2022-12-09
Attending: INTERNAL MEDICINE
Payer: COMMERCIAL

## 2022-12-09 DIAGNOSIS — I48.0 PAF (PAROXYSMAL ATRIAL FIBRILLATION): ICD-10-CM

## 2022-12-09 DIAGNOSIS — M79.7 FIBROMYALGIA: ICD-10-CM

## 2022-12-09 DIAGNOSIS — I70.0 AORTIC ATHEROSCLEROSIS: ICD-10-CM

## 2022-12-09 DIAGNOSIS — F33.1 DEPRESSION, MAJOR, RECURRENT, MODERATE: ICD-10-CM

## 2022-12-09 DIAGNOSIS — I10 ESSENTIAL HYPERTENSION: Primary | ICD-10-CM

## 2022-12-09 DIAGNOSIS — I10 ESSENTIAL HYPERTENSION: ICD-10-CM

## 2022-12-09 LAB
ALBUMIN SERPL BCP-MCNC: 3.3 G/DL (ref 3.5–5.2)
ALP SERPL-CCNC: 84 U/L (ref 55–135)
ALT SERPL W/O P-5'-P-CCNC: 12 U/L (ref 10–44)
ANION GAP SERPL CALC-SCNC: 8 MMOL/L (ref 8–16)
AST SERPL-CCNC: 15 U/L (ref 10–40)
BASOPHILS # BLD AUTO: 0.06 K/UL (ref 0–0.2)
BASOPHILS NFR BLD: 0.7 % (ref 0–1.9)
BILIRUB SERPL-MCNC: 1 MG/DL (ref 0.1–1)
BUN SERPL-MCNC: 11 MG/DL (ref 8–23)
CALCIUM SERPL-MCNC: 8.5 MG/DL (ref 8.7–10.5)
CHLORIDE SERPL-SCNC: 111 MMOL/L (ref 95–110)
CHOLEST SERPL-MCNC: 180 MG/DL (ref 120–199)
CHOLEST/HDLC SERPL: 3.7 {RATIO} (ref 2–5)
CO2 SERPL-SCNC: 25 MMOL/L (ref 23–29)
CREAT SERPL-MCNC: 0.7 MG/DL (ref 0.5–1.4)
DIFFERENTIAL METHOD: ABNORMAL
EOSINOPHIL # BLD AUTO: 0.2 K/UL (ref 0–0.5)
EOSINOPHIL NFR BLD: 2 % (ref 0–8)
ERYTHROCYTE [DISTWIDTH] IN BLOOD BY AUTOMATED COUNT: 12.7 % (ref 11.5–14.5)
EST. GFR  (NO RACE VARIABLE): >60 ML/MIN/1.73 M^2
GLUCOSE SERPL-MCNC: 108 MG/DL (ref 70–110)
HCT VFR BLD AUTO: 35.2 % (ref 37–48.5)
HDLC SERPL-MCNC: 49 MG/DL (ref 40–75)
HDLC SERPL: 27.2 % (ref 20–50)
HGB BLD-MCNC: 11.3 G/DL (ref 12–16)
IMM GRANULOCYTES # BLD AUTO: 0.05 K/UL (ref 0–0.04)
IMM GRANULOCYTES NFR BLD AUTO: 0.6 % (ref 0–0.5)
LDLC SERPL CALC-MCNC: 98 MG/DL (ref 63–159)
LYMPHOCYTES # BLD AUTO: 2 K/UL (ref 1–4.8)
LYMPHOCYTES NFR BLD: 23.2 % (ref 18–48)
MCH RBC QN AUTO: 27.6 PG (ref 27–31)
MCHC RBC AUTO-ENTMCNC: 32.1 G/DL (ref 32–36)
MCV RBC AUTO: 86 FL (ref 82–98)
MONOCYTES # BLD AUTO: 0.7 K/UL (ref 0.3–1)
MONOCYTES NFR BLD: 8.3 % (ref 4–15)
NEUTROPHILS # BLD AUTO: 5.8 K/UL (ref 1.8–7.7)
NEUTROPHILS NFR BLD: 65.2 % (ref 38–73)
NONHDLC SERPL-MCNC: 131 MG/DL
NRBC BLD-RTO: 0 /100 WBC
PLATELET # BLD AUTO: 239 K/UL (ref 150–450)
PMV BLD AUTO: 11.3 FL (ref 9.2–12.9)
POTASSIUM SERPL-SCNC: 4.4 MMOL/L (ref 3.5–5.1)
PROT SERPL-MCNC: 6.6 G/DL (ref 6–8.4)
RBC # BLD AUTO: 4.09 M/UL (ref 4–5.4)
SODIUM SERPL-SCNC: 144 MMOL/L (ref 136–145)
TRIGL SERPL-MCNC: 165 MG/DL (ref 30–150)
TSH SERPL DL<=0.005 MIU/L-ACNC: 3.25 UIU/ML (ref 0.4–4)
WBC # BLD AUTO: 8.81 K/UL (ref 3.9–12.7)

## 2022-12-09 PROCEDURE — 80053 COMPREHEN METABOLIC PANEL: CPT | Performed by: INTERNAL MEDICINE

## 2022-12-09 PROCEDURE — 36415 COLL VENOUS BLD VENIPUNCTURE: CPT | Performed by: INTERNAL MEDICINE

## 2022-12-09 PROCEDURE — 84443 ASSAY THYROID STIM HORMONE: CPT | Performed by: INTERNAL MEDICINE

## 2022-12-09 PROCEDURE — 85025 COMPLETE CBC W/AUTO DIFF WBC: CPT | Performed by: INTERNAL MEDICINE

## 2022-12-09 PROCEDURE — 80061 LIPID PANEL: CPT | Performed by: INTERNAL MEDICINE

## 2022-12-14 ENCOUNTER — OFFICE VISIT (OUTPATIENT)
Dept: INTERNAL MEDICINE | Facility: CLINIC | Age: 72
End: 2022-12-14
Payer: COMMERCIAL

## 2022-12-14 VITALS
OXYGEN SATURATION: 100 % | RESPIRATION RATE: 16 BRPM | SYSTOLIC BLOOD PRESSURE: 130 MMHG | DIASTOLIC BLOOD PRESSURE: 80 MMHG | HEIGHT: 65 IN | BODY MASS INDEX: 31.19 KG/M2 | HEART RATE: 74 BPM | WEIGHT: 187.19 LBS

## 2022-12-14 DIAGNOSIS — F03.90 DEMENTIA WITHOUT BEHAVIORAL DISTURBANCE: ICD-10-CM

## 2022-12-14 DIAGNOSIS — I48.0 PAF (PAROXYSMAL ATRIAL FIBRILLATION): ICD-10-CM

## 2022-12-14 DIAGNOSIS — M79.7 FIBROMYALGIA: ICD-10-CM

## 2022-12-14 DIAGNOSIS — M51.36 DDD (DEGENERATIVE DISC DISEASE), LUMBAR: ICD-10-CM

## 2022-12-14 DIAGNOSIS — D68.61 ANTIPHOSPHOLIPID SYNDROME: ICD-10-CM

## 2022-12-14 DIAGNOSIS — I10 ESSENTIAL HYPERTENSION: Primary | ICD-10-CM

## 2022-12-14 DIAGNOSIS — F33.1 DEPRESSION, MAJOR, RECURRENT, MODERATE: ICD-10-CM

## 2022-12-14 DIAGNOSIS — I70.0 AORTIC ATHEROSCLEROSIS: ICD-10-CM

## 2022-12-14 DIAGNOSIS — Z79.01 LONG TERM (CURRENT) USE OF ANTICOAGULANTS: ICD-10-CM

## 2022-12-14 DIAGNOSIS — H01.004 BLEPHARITIS OF LEFT UPPER EYELID, UNSPECIFIED TYPE: ICD-10-CM

## 2022-12-14 PROBLEM — R07.9 CHEST PAIN: Status: RESOLVED | Noted: 2022-03-17 | Resolved: 2022-12-14

## 2022-12-14 PROCEDURE — 3288F PR FALLS RISK ASSESSMENT DOCUMENTED: ICD-10-PCS | Mod: CPTII,S$GLB,, | Performed by: INTERNAL MEDICINE

## 2022-12-14 PROCEDURE — 96372 THER/PROPH/DIAG INJ SC/IM: CPT | Mod: S$GLB,,, | Performed by: INTERNAL MEDICINE

## 2022-12-14 PROCEDURE — 99499 UNLISTED E&M SERVICE: CPT | Mod: S$GLB,,, | Performed by: INTERNAL MEDICINE

## 2022-12-14 PROCEDURE — 1125F PR PAIN SEVERITY QUANTIFIED, PAIN PRESENT: ICD-10-PCS | Mod: CPTII,S$GLB,, | Performed by: INTERNAL MEDICINE

## 2022-12-14 PROCEDURE — 1159F PR MEDICATION LIST DOCUMENTED IN MEDICAL RECORD: ICD-10-PCS | Mod: CPTII,S$GLB,, | Performed by: INTERNAL MEDICINE

## 2022-12-14 PROCEDURE — 4010F ACE/ARB THERAPY RXD/TAKEN: CPT | Mod: CPTII,S$GLB,, | Performed by: INTERNAL MEDICINE

## 2022-12-14 PROCEDURE — 96372 PR INJECTION,THERAP/PROPH/DIAG2ST, IM OR SUBCUT: ICD-10-PCS | Mod: S$GLB,,, | Performed by: INTERNAL MEDICINE

## 2022-12-14 PROCEDURE — 99214 PR OFFICE/OUTPT VISIT, EST, LEVL IV, 30-39 MIN: ICD-10-PCS | Mod: 25,S$GLB,, | Performed by: INTERNAL MEDICINE

## 2022-12-14 PROCEDURE — 1160F RVW MEDS BY RX/DR IN RCRD: CPT | Mod: CPTII,S$GLB,, | Performed by: INTERNAL MEDICINE

## 2022-12-14 PROCEDURE — 3288F FALL RISK ASSESSMENT DOCD: CPT | Mod: CPTII,S$GLB,, | Performed by: INTERNAL MEDICINE

## 2022-12-14 PROCEDURE — 3008F PR BODY MASS INDEX (BMI) DOCUMENTED: ICD-10-PCS | Mod: CPTII,S$GLB,, | Performed by: INTERNAL MEDICINE

## 2022-12-14 PROCEDURE — 99214 OFFICE O/P EST MOD 30 MIN: CPT | Mod: 25,S$GLB,, | Performed by: INTERNAL MEDICINE

## 2022-12-14 PROCEDURE — 1159F MED LIST DOCD IN RCRD: CPT | Mod: CPTII,S$GLB,, | Performed by: INTERNAL MEDICINE

## 2022-12-14 PROCEDURE — 3075F SYST BP GE 130 - 139MM HG: CPT | Mod: CPTII,S$GLB,, | Performed by: INTERNAL MEDICINE

## 2022-12-14 PROCEDURE — 3079F DIAST BP 80-89 MM HG: CPT | Mod: CPTII,S$GLB,, | Performed by: INTERNAL MEDICINE

## 2022-12-14 PROCEDURE — 3079F PR MOST RECENT DIASTOLIC BLOOD PRESSURE 80-89 MM HG: ICD-10-PCS | Mod: CPTII,S$GLB,, | Performed by: INTERNAL MEDICINE

## 2022-12-14 PROCEDURE — 99999 PR PBB SHADOW E&M-EST. PATIENT-LVL IV: CPT | Mod: PBBFAC,,, | Performed by: INTERNAL MEDICINE

## 2022-12-14 PROCEDURE — 1101F PT FALLS ASSESS-DOCD LE1/YR: CPT | Mod: CPTII,S$GLB,, | Performed by: INTERNAL MEDICINE

## 2022-12-14 PROCEDURE — 99999 PR PBB SHADOW E&M-EST. PATIENT-LVL IV: ICD-10-PCS | Mod: PBBFAC,,, | Performed by: INTERNAL MEDICINE

## 2022-12-14 PROCEDURE — 4010F PR ACE/ARB THEARPY RXD/TAKEN: ICD-10-PCS | Mod: CPTII,S$GLB,, | Performed by: INTERNAL MEDICINE

## 2022-12-14 PROCEDURE — 1101F PR PT FALLS ASSESS DOC 0-1 FALLS W/OUT INJ PAST YR: ICD-10-PCS | Mod: CPTII,S$GLB,, | Performed by: INTERNAL MEDICINE

## 2022-12-14 PROCEDURE — 1125F AMNT PAIN NOTED PAIN PRSNT: CPT | Mod: CPTII,S$GLB,, | Performed by: INTERNAL MEDICINE

## 2022-12-14 PROCEDURE — 1160F PR REVIEW ALL MEDS BY PRESCRIBER/CLIN PHARMACIST DOCUMENTED: ICD-10-PCS | Mod: CPTII,S$GLB,, | Performed by: INTERNAL MEDICINE

## 2022-12-14 PROCEDURE — 3075F PR MOST RECENT SYSTOLIC BLOOD PRESS GE 130-139MM HG: ICD-10-PCS | Mod: CPTII,S$GLB,, | Performed by: INTERNAL MEDICINE

## 2022-12-14 PROCEDURE — 3008F BODY MASS INDEX DOCD: CPT | Mod: CPTII,S$GLB,, | Performed by: INTERNAL MEDICINE

## 2022-12-14 PROCEDURE — 99499 RISK ADDL DX/OHS AUDIT: ICD-10-PCS | Mod: S$GLB,,, | Performed by: INTERNAL MEDICINE

## 2022-12-14 RX ORDER — GABAPENTIN 100 MG/1
100 CAPSULE ORAL 3 TIMES DAILY
Qty: 180 CAPSULE | Refills: 3 | Status: SHIPPED | OUTPATIENT
Start: 2022-12-14 | End: 2023-09-08

## 2022-12-14 RX ORDER — CEPHALEXIN 500 MG/1
500 CAPSULE ORAL EVERY 8 HOURS
Qty: 21 CAPSULE | Refills: 0 | Status: SHIPPED | OUTPATIENT
Start: 2022-12-14 | End: 2022-12-21

## 2022-12-14 RX ORDER — DULOXETIN HYDROCHLORIDE 60 MG/1
60 CAPSULE, DELAYED RELEASE ORAL DAILY
Qty: 90 CAPSULE | Refills: 3 | Status: SHIPPED | OUTPATIENT
Start: 2022-12-14 | End: 2024-03-19

## 2022-12-14 RX ADMIN — CYANOCOBALAMIN 1000 MCG: 1000 INJECTION, SOLUTION INTRAMUSCULAR at 09:12

## 2022-12-14 NOTE — PROGRESS NOTES
Subjective:       Patient ID: Celine Sinclair is a 72 y.o. female.    Chief Complaint: Follow-up and blepharitis (left)      HPI:  Patient is known to me and presents for follow up HTN, antiphospholipid syndrome, fibromyalgia, MDD/anxiety, Crohn's disease, h/o atrial fibrillation. Labs from 12/9/22 personally reviewed and discussed with the patient today.      Memory loss:   She has since seen neurology. Ct head largely unremarkable. She also had CT neck and EMG done for presumed radiculopathy sx though EMG not full consistent with her reports sx. Also has some carpal tunnel. Referred to ortho by neuro for chronic neck pain.  Had neuro follow up in Oct. She is not on B12 levels and neuro feels B12 deficiency is etiology for memory loss. She reports grandmother had dementia. she is feeling very worried she will be a burden on her family. Sees Dr. Curiel again in October.     She had C-scope done 8/2022, report in . She is following with GI.     Fibromyalgia and anxiety: on cymbalta to 60mg daily (had stopped taking but now back on and dose recently increased). She continues to have pain complaints but mostly unchanged today.  Aching sensation. She is feeling more depressed lately, no precipitating factors. She feels her fibromyalgia is flaring up. Unsure why but not taking her gabapentin right now; denies side effects.      HTN: on metoprolol for h/o a-fib. BP today is well controlled. Denies chest pains, SOB, LE edema.      HLD: on atorvastatin 40mg. Due for lipid panel with me next visit. She does see cardiology but can't remember who she is seeing; unsure when last labs done there.      Antiphospholipid: was on coumadin; no on eliquis for this and a-fib. Denies abnormal bleeding.      A-fib: She also has h/o polymorphic VT and SVT/flutter with aberrancy. Follows with cardiology. She was on amiodarone for this but discontinued 3/2021 by cardiology.  remains on metoprolol and eliquis and ASA therapy.        Yesterday  started left eye swelling. Mild blurred vision has not tried anything OTC  Feels like she has something in her eye. Clear drainage reported. No falls/trauma. No fevers. No sore throat, rhinorrhea, otalgia.        Past Medical History:   Diagnosis Date    Aortic atherosclerosis     Atrial fibrillation     Benign essential hypertension     Cataract     Senile    Crohn's colitis     Depression, major, recurrent, moderate     Fibromyalgia     GERD (gastroesophageal reflux disease)     Hypertensive cardiomyopathy     IBS (irritable bowel syndrome)     Migraine     Opioid abuse, in remission     Osteopenia     Paget disease of bone     Port-A-Cath in place     right chest    Prolonged QT interval     RA (rheumatoid arthritis)     Sedative hypnotic or anxiolytic dependence     in remission        Family History   Problem Relation Age of Onset    Glaucoma Paternal Grandmother     Other Daughter         Diabetic Retinopathy    Breast cancer Daughter 40    Retinal detachment Grandchild     Heart attack Maternal Grandmother     Colon cancer Maternal Grandmother     Cancer Mother         Unknown type    Emphysema Father 67    Heart disease Father     Lung cancer Sister     Heart attack Sister     Breast cancer Daughter 47    Breast cancer Sister     Amblyopia Neg Hx     Blindness Neg Hx     Strabismus Neg Hx     Macular degeneration Neg Hx     Cataracts Neg Hx        Social History     Socioeconomic History    Marital status:     Number of children: 12   Occupational History    Occupation: Hairdresser     Comment: Retired/disabled   Tobacco Use    Smoking status: Never    Smokeless tobacco: Never   Substance and Sexual Activity    Drug use: No    Sexual activity: Not Currently     Partners: Female   Social History Narrative    Patient gave birth to 7 children, adopted 2 and has 3 stepchildren with her .     Social Determinants of Health     Financial Resource Strain: High Risk    Difficulty of Paying Living  Expenses: Hard   Food Insecurity: Food Insecurity Present    Worried About Running Out of Food in the Last Year: Sometimes true    Ran Out of Food in the Last Year: Sometimes true   Transportation Needs: Unmet Transportation Needs    Lack of Transportation (Medical): Yes    Lack of Transportation (Non-Medical): Yes   Physical Activity: Inactive    Days of Exercise per Week: 0 days    Minutes of Exercise per Session: 0 min   Stress: Stress Concern Present    Feeling of Stress : Rather much   Social Connections: Socially Integrated    Frequency of Communication with Friends and Family: More than three times a week    Frequency of Social Gatherings with Friends and Family: More than three times a week    Attends [employer] Services: More than 4 times per year    Active Member of Clubs or Organizations: Yes    Attends Club or Organization Meetings: More than 4 times per year    Marital Status:    Housing Stability: Low Risk     Unable to Pay for Housing in the Last Year: No    Number of Places Lived in the Last Year: 1    Unstable Housing in the Last Year: No       Review of Systems   Constitutional:  Negative for activity change, fatigue, fever and unexpected weight change.   HENT:  Negative for congestion, ear pain, hearing loss, rhinorrhea, sore throat and tinnitus.    Eyes:  Negative for pain, redness and visual disturbance.   Respiratory:  Negative for cough, shortness of breath and wheezing.    Cardiovascular:  Negative for chest pain, palpitations and leg swelling.   Gastrointestinal:  Negative for abdominal pain, blood in stool, constipation, diarrhea, nausea and vomiting.   Genitourinary:  Negative for dysuria, frequency, pelvic pain and urgency.   Musculoskeletal:  Negative for back pain, joint swelling and neck pain.   Skin:  Negative for color change, rash and wound.   Neurological:  Negative for dizziness, tremors, weakness, light-headedness and headaches.       Objective:      Physical Exam  Vitals  reviewed.   Constitutional:       General: She is not in acute distress.     Appearance: She is well-developed.   HENT:      Head: Normocephalic and atraumatic.      Right Ear: External ear normal.      Left Ear: External ear normal.      Nose: Nose normal.   Eyes:      General:         Right eye: Discharge present.         Left eye: No discharge.      Extraocular Movements: Extraocular movements intact.      Conjunctiva/sclera: Conjunctivae normal.      Pupils: Pupils are equal, round, and reactive to light.      Comments: Left upper eye lid swelling with lateral stye   Neck:      Thyroid: No thyromegaly.   Cardiovascular:      Rate and Rhythm: Normal rate and regular rhythm.      Heart sounds: No murmur heard.  Pulmonary:      Effort: Pulmonary effort is normal. No respiratory distress.      Breath sounds: Normal breath sounds. No wheezing.   Abdominal:      General: Bowel sounds are normal. There is no distension.      Palpations: Abdomen is soft.      Tenderness: There is no abdominal tenderness.   Skin:     General: Skin is warm and dry.   Neurological:      Mental Status: She is alert and oriented to person, place, and time.      Cranial Nerves: No cranial nerve deficit.   Psychiatric:         Behavior: Behavior normal.         Thought Content: Thought content normal.       Assessment:       1. Essential hypertension    2. Fibromyalgia    3. DDD (degenerative disc disease), lumbar    4. Depression, major, recurrent, moderate    5. Dementia without behavioral disturbance    6. Aortic atherosclerosis    7. PAF (paroxysmal atrial fibrillation)    8. Antiphospholipid syndrome    9. Long term (current) use of anticoagulants    10. Blepharitis of left upper eyelid, unspecified type        Plan:       Ada was seen today for follow-up and blepharitis.    Diagnoses and all orders for this visit:    Essential hypertension  -     CBC Auto Differential; Future  -     Comprehensive Metabolic Panel; Future  -     Lipid  Panel; Future  Chronic controlled  Continue medications at same dose  Low Na diet  Exercise, weight loss  Check BP and keep log for next visit    Fibromyalgia  -     gabapentin (NEURONTIN) 100 MG capsule; Take 1 capsule (100 mg total) by mouth 3 (three) times daily.  -     DULoxetine (CYMBALTA) 60 MG capsule; Take 1 capsule (60 mg total) by mouth once daily.  Chronic stable  Cont meds same dose    DDD (degenerative disc disease), lumbar  -     gabapentin (NEURONTIN) 100 MG capsule; Take 1 capsule (100 mg total) by mouth 3 (three) times daily.  Chronic stable  Cont meds same dose    Depression, major, recurrent, moderate  -     DULoxetine (CYMBALTA) 60 MG capsule; Take 1 capsule (60 mg total) by mouth once daily.  -     CBC Auto Differential; Future  -     Comprehensive Metabolic Panel; Future  -     Lipid Panel; Future  Chronic stble  Cont cymbalta same dose    Dementia without behavioral disturbance  -     CBC Auto Differential; Future  -     Comprehensive Metabolic Panel; Future  -     Lipid Panel; Future  Chronic stable  Cont B12  Cont neuro follow up as planned    Aortic atherosclerosis  -     CBC Auto Differential; Future  -     Comprehensive Metabolic Panel; Future  -     Lipid Panel; Future  Noted on prior imaging  Cont meds same dose    PAF (paroxysmal atrial fibrillation)  -     CBC Auto Differential; Future  -     Comprehensive Metabolic Panel; Future  -     Lipid Panel; Future  Chronic stable  Cont cardiology follow up  Cont meds same dose per cardiology    Antiphospholipid syndrome  -     CBC Auto Differential; Future  -     Comprehensive Metabolic Panel; Future  -     Lipid Panel; Future  Chronic stable  Cont eliquis    Long term (current) use of anticoagulants  -     CBC Auto Differential; Future  -     Comprehensive Metabolic Panel; Future  -     Lipid Panel; Future  She does have persistent bruising  Likely NOAC related but also sees hematology    Blepharitis of left upper eyelid, unspecified type  -      cephALEXin (KEFLEX) 500 MG capsule; Take 1 capsule (500 mg total) by mouth every 8 (eight) hours. for 7 days  New problem  Warm compress  Start Keflex    RTC 6 months and PRN

## 2022-12-15 ENCOUNTER — TELEPHONE (OUTPATIENT)
Dept: INTERNAL MEDICINE | Facility: CLINIC | Age: 72
End: 2022-12-15
Payer: COMMERCIAL

## 2022-12-15 NOTE — TELEPHONE ENCOUNTER
----- Message from Ame Fitzpatrick sent at 12/15/2022  8:57 AM CST -----  Contact: Niyah Sinclair  MRN: [id number]  : 1950  PCP: Neeru Hinkle  Home Phone      [phone]  Work Phone      Not on file.  [employer]          [phone]      MESSAGE: Niyah with Dr. Eliceo Ramos's office called to let us know he is sending the pt to the ER for cellulitis.

## 2023-01-11 ENCOUNTER — OFFICE VISIT (OUTPATIENT)
Dept: INTERNAL MEDICINE | Facility: CLINIC | Age: 73
End: 2023-01-11
Payer: COMMERCIAL

## 2023-01-11 VITALS
BODY MASS INDEX: 30.85 KG/M2 | SYSTOLIC BLOOD PRESSURE: 116 MMHG | WEIGHT: 185.19 LBS | HEART RATE: 65 BPM | DIASTOLIC BLOOD PRESSURE: 76 MMHG | HEIGHT: 65 IN

## 2023-01-11 DIAGNOSIS — L27.0 DERMATITIS DUE TO DRUG REACTION: ICD-10-CM

## 2023-01-11 DIAGNOSIS — Z09 HOSPITAL DISCHARGE FOLLOW-UP: Primary | ICD-10-CM

## 2023-01-11 DIAGNOSIS — H05.012 CELLULITIS OF LEFT ORBITAL REGION: ICD-10-CM

## 2023-01-11 PROCEDURE — 1160F PR REVIEW ALL MEDS BY PRESCRIBER/CLIN PHARMACIST DOCUMENTED: ICD-10-PCS | Mod: CPTII,S$GLB,, | Performed by: INTERNAL MEDICINE

## 2023-01-11 PROCEDURE — 1101F PR PT FALLS ASSESS DOC 0-1 FALLS W/OUT INJ PAST YR: ICD-10-PCS | Mod: CPTII,S$GLB,, | Performed by: INTERNAL MEDICINE

## 2023-01-11 PROCEDURE — 99999 PR PBB SHADOW E&M-EST. PATIENT-LVL IV: ICD-10-PCS | Mod: PBBFAC,,, | Performed by: INTERNAL MEDICINE

## 2023-01-11 PROCEDURE — 1160F RVW MEDS BY RX/DR IN RCRD: CPT | Mod: CPTII,S$GLB,, | Performed by: INTERNAL MEDICINE

## 2023-01-11 PROCEDURE — 99214 OFFICE O/P EST MOD 30 MIN: CPT | Mod: S$GLB,,, | Performed by: INTERNAL MEDICINE

## 2023-01-11 PROCEDURE — 1101F PT FALLS ASSESS-DOCD LE1/YR: CPT | Mod: CPTII,S$GLB,, | Performed by: INTERNAL MEDICINE

## 2023-01-11 PROCEDURE — 99999 PR PBB SHADOW E&M-EST. PATIENT-LVL IV: CPT | Mod: PBBFAC,,, | Performed by: INTERNAL MEDICINE

## 2023-01-11 PROCEDURE — 3288F FALL RISK ASSESSMENT DOCD: CPT | Mod: CPTII,S$GLB,, | Performed by: INTERNAL MEDICINE

## 2023-01-11 PROCEDURE — 3074F SYST BP LT 130 MM HG: CPT | Mod: CPTII,S$GLB,, | Performed by: INTERNAL MEDICINE

## 2023-01-11 PROCEDURE — 1159F PR MEDICATION LIST DOCUMENTED IN MEDICAL RECORD: ICD-10-PCS | Mod: CPTII,S$GLB,, | Performed by: INTERNAL MEDICINE

## 2023-01-11 PROCEDURE — 3288F PR FALLS RISK ASSESSMENT DOCUMENTED: ICD-10-PCS | Mod: CPTII,S$GLB,, | Performed by: INTERNAL MEDICINE

## 2023-01-11 PROCEDURE — 3078F DIAST BP <80 MM HG: CPT | Mod: CPTII,S$GLB,, | Performed by: INTERNAL MEDICINE

## 2023-01-11 PROCEDURE — 3008F PR BODY MASS INDEX (BMI) DOCUMENTED: ICD-10-PCS | Mod: CPTII,S$GLB,, | Performed by: INTERNAL MEDICINE

## 2023-01-11 PROCEDURE — 1159F MED LIST DOCD IN RCRD: CPT | Mod: CPTII,S$GLB,, | Performed by: INTERNAL MEDICINE

## 2023-01-11 PROCEDURE — 3078F PR MOST RECENT DIASTOLIC BLOOD PRESSURE < 80 MM HG: ICD-10-PCS | Mod: CPTII,S$GLB,, | Performed by: INTERNAL MEDICINE

## 2023-01-11 PROCEDURE — 3074F PR MOST RECENT SYSTOLIC BLOOD PRESSURE < 130 MM HG: ICD-10-PCS | Mod: CPTII,S$GLB,, | Performed by: INTERNAL MEDICINE

## 2023-01-11 PROCEDURE — 99214 PR OFFICE/OUTPT VISIT, EST, LEVL IV, 30-39 MIN: ICD-10-PCS | Mod: S$GLB,,, | Performed by: INTERNAL MEDICINE

## 2023-01-11 PROCEDURE — 3008F BODY MASS INDEX DOCD: CPT | Mod: CPTII,S$GLB,, | Performed by: INTERNAL MEDICINE

## 2023-01-11 RX ORDER — SULFAMETHOXAZOLE AND TRIMETHOPRIM 800; 160 MG/1; MG/1
TABLET ORAL
COMMUNITY
Start: 2022-12-30 | End: 2023-03-15

## 2023-01-11 RX ORDER — MOXIFLOXACIN 5 MG/ML
SOLUTION/ DROPS OPHTHALMIC
COMMUNITY
Start: 2022-12-30 | End: 2023-06-12

## 2023-01-11 RX ORDER — TRIAMCINOLONE ACETONIDE 1 MG/G
CREAM TOPICAL 2 TIMES DAILY
Qty: 30 G | Refills: 0 | Status: SHIPPED | OUTPATIENT
Start: 2023-01-11 | End: 2023-01-19 | Stop reason: SDUPTHER

## 2023-01-11 RX ORDER — HYDROCODONE BITARTRATE AND ACETAMINOPHEN 10; 325 MG/1; MG/1
TABLET ORAL
COMMUNITY
Start: 2022-12-30 | End: 2023-06-12

## 2023-01-11 RX ORDER — DORZOLAMIDE HYDROCHLORIDE AND TIMOLOL MALEATE 20; 5 MG/ML; MG/ML
SOLUTION/ DROPS OPHTHALMIC
COMMUNITY
Start: 2022-12-30 | End: 2023-09-08

## 2023-01-11 NOTE — PROGRESS NOTES
Subjective:       Patient ID: Celine Sinclair is a 72 y.o. female.    Chief Complaint: follow up from hospital  (Pt here for follow up from hospital stay for eye infection ) and flaking skin (Pt states since she has been home from the hospital her skin has become very flaky )      HPI:  Patient is known to me and presents for hospital follow up. She was admitted at [facility] for orbital cellulitis. I have no discharge summary to reviews; she has her discharge paperwork with medications and her diagnosis but I have no other information from hospital today. She reports she was hospitalized for 2 weeks. She was treated with IV antibiotics and discharged without antibiotics. She was discharged with antibiotic eye gtt only per her report but I note her paperwork lists bactrim. Regardless states she has completed her medications. Her eye swelling and pain is improved. No fevers. She is having dryness of b/l hands, started while she was in the hospital. + itching. Tried OTC hydrocortisone without relief.     Past Medical History:   Diagnosis Date    Aortic atherosclerosis     Atrial fibrillation     Benign essential hypertension     Cataract     Senile    Crohn's colitis     Depression, major, recurrent, moderate     Fibromyalgia     GERD (gastroesophageal reflux disease)     Hypertensive cardiomyopathy     IBS (irritable bowel syndrome)     Migraine     Opioid abuse, in remission     Orbital cellulitis on left     Osteopenia     Paget disease of bone     Port-A-Cath in place     right chest    Prolonged QT interval     RA (rheumatoid arthritis)     Sedative hypnotic or anxiolytic dependence     in remission        Family History   Problem Relation Age of Onset    Glaucoma Paternal Grandmother     Other Daughter         Diabetic Retinopathy    Breast cancer Daughter 40    Retinal detachment Grandchild     Heart attack Maternal Grandmother     Colon cancer Maternal Grandmother     Cancer Mother         Unknown type    Emphysema  Father 67    Heart disease Father     Lung cancer Sister     Heart attack Sister     Breast cancer Daughter 47    Breast cancer Sister     Amblyopia Neg Hx     Blindness Neg Hx     Strabismus Neg Hx     Macular degeneration Neg Hx     Cataracts Neg Hx        Social History     Socioeconomic History    Marital status:     Number of children: 12   Occupational History    Occupation: [employer]     Comment: Retired/disabled   Tobacco Use    Smoking status: Never    Smokeless tobacco: Never   Substance and Sexual Activity    Drug use: No    Sexual activity: Not Currently     Partners: Female   Social History Narrative    Patient gave birth to 7 children, adopted 2 and has 3 stepchildren with her .     Social Determinants of Health     Financial Resource Strain: High Risk    Difficulty of Paying Living Expenses: Hard   Food Insecurity: Food Insecurity Present    Worried About Running Out of Food in the Last Year: Sometimes true    Ran Out of Food in the Last Year: Sometimes true   Transportation Needs: Unmet Transportation Needs    Lack of Transportation (Medical): Yes    Lack of Transportation (Non-Medical): Yes   Physical Activity: Inactive    Days of Exercise per Week: 0 days    Minutes of Exercise per Session: 0 min   Stress: Stress Concern Present    Feeling of Stress : Rather much   Social Connections: Socially Integrated    Frequency of Communication with Friends and Family: More than three times a week    Frequency of Social Gatherings with Friends and Family: More than three times a week    Attends Je[employer] witness Services: More than 4 times per year    Active Member of Clubs or Organizations: Yes    Attends Club or Organization Meetings: More than 4 times per year    Marital Status:    Housing Stability: Low Risk     Unable to Pay for Housing in the Last Year: No    Number of Places Lived in the Last Year: 1    Unstable Housing in the Last Year: No       Review of Systems   Constitutional:   Negative for activity change, fatigue, fever and unexpected weight change.   HENT:  Negative for congestion, ear pain, hearing loss, rhinorrhea and sore throat.    Eyes:  Negative for redness and visual disturbance.   Respiratory:  Negative for cough, shortness of breath and wheezing.    Cardiovascular:  Negative for chest pain, palpitations and leg swelling.   Gastrointestinal:  Negative for abdominal pain, constipation, diarrhea, nausea and vomiting.   Genitourinary:  Negative for dysuria, frequency and urgency.   Musculoskeletal:  Negative for back pain, joint swelling and neck pain.   Skin:  Positive for rash (dryness b/l hands). Negative for color change and wound.   Neurological:  Negative for dizziness, tremors, weakness, light-headedness and headaches.       Objective:      Physical Exam  Vitals reviewed.   Constitutional:       General: She is not in acute distress.     Appearance: She is well-developed.   HENT:      Head: Normocephalic and atraumatic.      Right Ear: External ear normal.      Left Ear: External ear normal.      Nose: Nose normal.   Eyes:      General:         Right eye: No discharge.         Left eye: No discharge.      Extraocular Movements: Extraocular movements intact.      Conjunctiva/sclera: Conjunctivae normal.      Pupils: Pupils are equal, round, and reactive to light.   Neck:      Thyroid: No thyromegaly.   Cardiovascular:      Rate and Rhythm: Normal rate and regular rhythm.   Pulmonary:      Effort: Pulmonary effort is normal. No respiratory distress.      Breath sounds: Normal breath sounds.   Abdominal:      General: Bowel sounds are normal. There is no distension.      Palpations: Abdomen is soft.      Tenderness: There is no abdominal tenderness.   Skin:     General: Skin is warm and dry.      Findings: Rash (b/l hands and abdomen) present.   Neurological:      Mental Status: She is alert and oriented to person, place, and time.      Cranial Nerves: No cranial nerve deficit.    Psychiatric:         Behavior: Behavior normal.         Thought Content: Thought content normal.       Assessment:       1. Hospital discharge follow-up    2. Cellulitis of left orbital region    3. Dermatitis due to drug reaction        Plan:       1. Hospital discharge follow-up  Meds reconciled  Problem list reviewed and updated  Discharge paperwork given to patient reviewed today    2. Cellulitis of left orbital region  Resolved  Completed antibx  No signs of acute infection today    3. Dermatitis due to drug reaction  Suspect due to antibiotics, though unsure what she was given while inpatient  Add topical kenalog for hand and abdomen  -     triamcinolone acetonide 0.1% (KENALOG) 0.1 % cream; Apply topically 2 (two) times daily.  Dispense: 30 g; Refill: 0

## 2023-01-19 ENCOUNTER — OFFICE VISIT (OUTPATIENT)
Dept: INTERNAL MEDICINE | Facility: CLINIC | Age: 73
End: 2023-01-19
Payer: COMMERCIAL

## 2023-01-19 VITALS
SYSTOLIC BLOOD PRESSURE: 140 MMHG | RESPIRATION RATE: 20 BRPM | HEART RATE: 70 BPM | HEIGHT: 65 IN | WEIGHT: 185.88 LBS | BODY MASS INDEX: 30.97 KG/M2 | DIASTOLIC BLOOD PRESSURE: 96 MMHG

## 2023-01-19 DIAGNOSIS — M79.10 MYALGIA: Primary | ICD-10-CM

## 2023-01-19 DIAGNOSIS — L27.0 DERMATITIS DUE TO DRUG REACTION: ICD-10-CM

## 2023-01-19 PROCEDURE — 3077F SYST BP >= 140 MM HG: CPT | Mod: CPTII,S$GLB,, | Performed by: NURSE PRACTITIONER

## 2023-01-19 PROCEDURE — 99214 PR OFFICE/OUTPT VISIT, EST, LEVL IV, 30-39 MIN: ICD-10-PCS | Mod: 25,S$GLB,, | Performed by: NURSE PRACTITIONER

## 2023-01-19 PROCEDURE — 1160F RVW MEDS BY RX/DR IN RCRD: CPT | Mod: CPTII,S$GLB,, | Performed by: NURSE PRACTITIONER

## 2023-01-19 PROCEDURE — 99999 PR PBB SHADOW E&M-EST. PATIENT-LVL IV: CPT | Mod: PBBFAC,,, | Performed by: NURSE PRACTITIONER

## 2023-01-19 PROCEDURE — 1159F PR MEDICATION LIST DOCUMENTED IN MEDICAL RECORD: ICD-10-PCS | Mod: CPTII,S$GLB,, | Performed by: NURSE PRACTITIONER

## 2023-01-19 PROCEDURE — 96372 THER/PROPH/DIAG INJ SC/IM: CPT | Mod: S$GLB,,, | Performed by: NURSE PRACTITIONER

## 2023-01-19 PROCEDURE — 3008F BODY MASS INDEX DOCD: CPT | Mod: CPTII,S$GLB,, | Performed by: NURSE PRACTITIONER

## 2023-01-19 PROCEDURE — 3008F PR BODY MASS INDEX (BMI) DOCUMENTED: ICD-10-PCS | Mod: CPTII,S$GLB,, | Performed by: NURSE PRACTITIONER

## 2023-01-19 PROCEDURE — 3077F PR MOST RECENT SYSTOLIC BLOOD PRESSURE >= 140 MM HG: ICD-10-PCS | Mod: CPTII,S$GLB,, | Performed by: NURSE PRACTITIONER

## 2023-01-19 PROCEDURE — 96372 PR INJECTION,THERAP/PROPH/DIAG2ST, IM OR SUBCUT: ICD-10-PCS | Mod: S$GLB,,, | Performed by: NURSE PRACTITIONER

## 2023-01-19 PROCEDURE — 3080F PR MOST RECENT DIASTOLIC BLOOD PRESSURE >= 90 MM HG: ICD-10-PCS | Mod: CPTII,S$GLB,, | Performed by: NURSE PRACTITIONER

## 2023-01-19 PROCEDURE — 1101F PR PT FALLS ASSESS DOC 0-1 FALLS W/OUT INJ PAST YR: ICD-10-PCS | Mod: CPTII,S$GLB,, | Performed by: NURSE PRACTITIONER

## 2023-01-19 PROCEDURE — 99999 PR PBB SHADOW E&M-EST. PATIENT-LVL IV: ICD-10-PCS | Mod: PBBFAC,,, | Performed by: NURSE PRACTITIONER

## 2023-01-19 PROCEDURE — 1160F PR REVIEW ALL MEDS BY PRESCRIBER/CLIN PHARMACIST DOCUMENTED: ICD-10-PCS | Mod: CPTII,S$GLB,, | Performed by: NURSE PRACTITIONER

## 2023-01-19 PROCEDURE — 1159F MED LIST DOCD IN RCRD: CPT | Mod: CPTII,S$GLB,, | Performed by: NURSE PRACTITIONER

## 2023-01-19 PROCEDURE — 1125F PR PAIN SEVERITY QUANTIFIED, PAIN PRESENT: ICD-10-PCS | Mod: CPTII,S$GLB,, | Performed by: NURSE PRACTITIONER

## 2023-01-19 PROCEDURE — 3288F FALL RISK ASSESSMENT DOCD: CPT | Mod: CPTII,S$GLB,, | Performed by: NURSE PRACTITIONER

## 2023-01-19 PROCEDURE — 3288F PR FALLS RISK ASSESSMENT DOCUMENTED: ICD-10-PCS | Mod: CPTII,S$GLB,, | Performed by: NURSE PRACTITIONER

## 2023-01-19 PROCEDURE — 99214 OFFICE O/P EST MOD 30 MIN: CPT | Mod: 25,S$GLB,, | Performed by: NURSE PRACTITIONER

## 2023-01-19 PROCEDURE — 3080F DIAST BP >= 90 MM HG: CPT | Mod: CPTII,S$GLB,, | Performed by: NURSE PRACTITIONER

## 2023-01-19 PROCEDURE — 1125F AMNT PAIN NOTED PAIN PRSNT: CPT | Mod: CPTII,S$GLB,, | Performed by: NURSE PRACTITIONER

## 2023-01-19 PROCEDURE — 1101F PT FALLS ASSESS-DOCD LE1/YR: CPT | Mod: CPTII,S$GLB,, | Performed by: NURSE PRACTITIONER

## 2023-01-19 RX ORDER — TRIAMCINOLONE ACETONIDE 40 MG/ML
40 INJECTION, SUSPENSION INTRA-ARTICULAR; INTRAMUSCULAR
Status: COMPLETED | OUTPATIENT
Start: 2023-01-19 | End: 2023-01-19

## 2023-01-19 RX ORDER — TRIAMCINOLONE ACETONIDE 1 MG/G
CREAM TOPICAL 2 TIMES DAILY
Qty: 30 G | Refills: 0 | Status: SHIPPED | OUTPATIENT
Start: 2023-01-19 | End: 2023-06-12

## 2023-01-19 RX ADMIN — TRIAMCINOLONE ACETONIDE 40 MG: 40 INJECTION, SUSPENSION INTRA-ARTICULAR; INTRAMUSCULAR at 03:01

## 2023-01-19 RX ADMIN — CYANOCOBALAMIN 1000 MCG: 1000 INJECTION, SOLUTION INTRAMUSCULAR at 03:01

## 2023-01-19 NOTE — PROGRESS NOTES
Subjective:       Patient ID: Celine Sinclair is a 72 y.o. female.    Chief Complaint: Generalized Body Aches, Dry Skin, and Nasal Congestion    HPI: Pt presents to clinic today knopwn to Dr Hinkle here because she can't get an appt with her. She report sthat she has hx of fibromyalgia and RA. She report sthat she sees rheumatology. She last saw Dr Roth per chart 6/2021 > dx codes do not include RA. But does fibromyalgia/antiphospholipid  and crohnes       RA was 41  Eleavted CRP  Also had + IGG hep a   Review of chart 2020 shows low vit d and B12   BMP  Lab Results   Component Value Date     12/09/2022    K 4.4 12/09/2022     (H) 12/09/2022    CO2 25 12/09/2022    BUN 11 12/09/2022    CREATININE 0.7 12/09/2022    CALCIUM 8.5 (L) 12/09/2022    ANIONGAP 8 12/09/2022    EGFRNORACEVR >60 12/09/2022         She recently saw Dr Hall for hospital f/u. She was on hydrocortisone fopr p[eeling sckin after abx- helping but needs refill.   Review of Systems   Constitutional:  Positive for activity change. Negative for chills and fever.   Respiratory:  Negative for cough, chest tightness and shortness of breath.    Cardiovascular:  Negative for chest pain, palpitations and leg swelling.   Gastrointestinal:  Negative for abdominal pain, constipation, diarrhea, nausea and vomiting.   Genitourinary:  Negative for difficulty urinating, flank pain, frequency, hematuria and urgency.   Musculoskeletal:  Positive for arthralgias, back pain and myalgias.   Skin:  Negative for rash and wound.   Neurological:  Negative for dizziness, weakness, numbness and headaches.     Objective:      Physical Exam  Vitals and nursing note reviewed.   Constitutional:       Appearance: She is well-developed. She is obese.   HENT:      Head: Normocephalic and atraumatic.      Right Ear: External ear normal.      Left Ear: External ear normal.   Eyes:      Conjunctiva/sclera: Conjunctivae normal.      Pupils: Pupils are equal, round, and  reactive to light.   Neck:      Thyroid: No thyromegaly.      Vascular: No JVD.      Trachea: No tracheal deviation.   Cardiovascular:      Rate and Rhythm: Normal rate and regular rhythm.      Heart sounds: Normal heart sounds.      Comments: Reg rate   Pulmonary:      Effort: Pulmonary effort is normal. No respiratory distress.      Breath sounds: Normal breath sounds. No wheezing or rales.   Chest:      Chest wall: No tenderness.   Abdominal:      General: Bowel sounds are normal. There is no distension.      Palpations: Abdomen is soft. There is no mass.      Tenderness: There is no abdominal tenderness. There is no guarding or rebound.   Musculoskeletal:         General: Normal range of motion.      Cervical back: Normal range of motion and neck supple.   Lymphadenopathy:      Cervical: No cervical adenopathy.   Skin:     General: Skin is warm and dry.   Neurological:      Mental Status: She is alert and oriented to person, place, and time.      Cranial Nerves: No cranial nerve deficit.      Motor: No abnormal muscle tone.      Coordination: Coordination normal.      Deep Tendon Reflexes: Reflexes are normal and symmetric. Reflexes normal.       Assessment:       1. Myalgia    2. Dermatitis due to drug reaction        Plan:     Problem List Items Addressed This Visit    None  Visit Diagnoses       Myalgia    -  Primary    Dermatitis due to drug reaction            Kenalog today and f/u made with Dr Ramos for treatment- confirm RA dx     B12 today missed last month  Rec vit d

## 2023-02-17 ENCOUNTER — OFFICE VISIT (OUTPATIENT)
Dept: INTERNAL MEDICINE | Facility: CLINIC | Age: 73
End: 2023-02-17
Payer: COMMERCIAL

## 2023-02-17 ENCOUNTER — TELEPHONE (OUTPATIENT)
Dept: INTERNAL MEDICINE | Facility: CLINIC | Age: 73
End: 2023-02-17

## 2023-02-17 ENCOUNTER — TELEPHONE (OUTPATIENT)
Dept: INTERNAL MEDICINE | Facility: CLINIC | Age: 73
End: 2023-02-17
Payer: MEDICAID

## 2023-02-17 VITALS
OXYGEN SATURATION: 99 % | WEIGHT: 189.63 LBS | DIASTOLIC BLOOD PRESSURE: 96 MMHG | RESPIRATION RATE: 16 BRPM | HEIGHT: 65 IN | HEART RATE: 75 BPM | BODY MASS INDEX: 31.59 KG/M2 | SYSTOLIC BLOOD PRESSURE: 130 MMHG

## 2023-02-17 DIAGNOSIS — M25.532 LEFT WRIST PAIN: ICD-10-CM

## 2023-02-17 DIAGNOSIS — R07.81 RIB PAIN ON LEFT SIDE: ICD-10-CM

## 2023-02-17 DIAGNOSIS — I96: ICD-10-CM

## 2023-02-17 DIAGNOSIS — I10 ESSENTIAL HYPERTENSION: ICD-10-CM

## 2023-02-17 DIAGNOSIS — T80.818A: ICD-10-CM

## 2023-02-17 DIAGNOSIS — N30.01 ACUTE CYSTITIS WITH HEMATURIA: Primary | ICD-10-CM

## 2023-02-17 DIAGNOSIS — Z79.01 LONG TERM (CURRENT) USE OF ANTICOAGULANTS: ICD-10-CM

## 2023-02-17 LAB
BILIRUB SERPL-MCNC: NEGATIVE MG/DL
BLOOD URINE, POC: 50
CLARITY, POC UA: CLEAR
COLOR, POC UA: YELLOW
GLUCOSE UR QL STRIP: NORMAL
KETONES UR QL STRIP: NEGATIVE
LEUKOCYTE ESTERASE URINE, POC: NEGATIVE
NITRITE, POC UA: NEGATIVE
PH, POC UA: 5
PROTEIN, POC: ABNORMAL
SPECIFIC GRAVITY, POC UA: 1.02
UROBILINOGEN, POC UA: NORMAL

## 2023-02-17 PROCEDURE — 3008F PR BODY MASS INDEX (BMI) DOCUMENTED: ICD-10-PCS | Mod: CPTII,S$GLB,, | Performed by: NURSE PRACTITIONER

## 2023-02-17 PROCEDURE — 99999 PR PBB SHADOW E&M-EST. PATIENT-LVL V: ICD-10-PCS | Mod: PBBFAC,,, | Performed by: NURSE PRACTITIONER

## 2023-02-17 PROCEDURE — 1125F PR PAIN SEVERITY QUANTIFIED, PAIN PRESENT: ICD-10-PCS | Mod: CPTII,S$GLB,, | Performed by: NURSE PRACTITIONER

## 2023-02-17 PROCEDURE — 1125F AMNT PAIN NOTED PAIN PRSNT: CPT | Mod: CPTII,S$GLB,, | Performed by: NURSE PRACTITIONER

## 2023-02-17 PROCEDURE — 1159F MED LIST DOCD IN RCRD: CPT | Mod: CPTII,S$GLB,, | Performed by: NURSE PRACTITIONER

## 2023-02-17 PROCEDURE — 3080F DIAST BP >= 90 MM HG: CPT | Mod: CPTII,S$GLB,, | Performed by: NURSE PRACTITIONER

## 2023-02-17 PROCEDURE — 3080F PR MOST RECENT DIASTOLIC BLOOD PRESSURE >= 90 MM HG: ICD-10-PCS | Mod: CPTII,S$GLB,, | Performed by: NURSE PRACTITIONER

## 2023-02-17 PROCEDURE — 99999 PR PBB SHADOW E&M-EST. PATIENT-LVL V: CPT | Mod: PBBFAC,,, | Performed by: NURSE PRACTITIONER

## 2023-02-17 PROCEDURE — 99214 PR OFFICE/OUTPT VISIT, EST, LEVL IV, 30-39 MIN: ICD-10-PCS | Mod: S$GLB,,, | Performed by: NURSE PRACTITIONER

## 2023-02-17 PROCEDURE — 3075F PR MOST RECENT SYSTOLIC BLOOD PRESS GE 130-139MM HG: ICD-10-PCS | Mod: CPTII,S$GLB,, | Performed by: NURSE PRACTITIONER

## 2023-02-17 PROCEDURE — 3008F BODY MASS INDEX DOCD: CPT | Mod: CPTII,S$GLB,, | Performed by: NURSE PRACTITIONER

## 2023-02-17 PROCEDURE — 3075F SYST BP GE 130 - 139MM HG: CPT | Mod: CPTII,S$GLB,, | Performed by: NURSE PRACTITIONER

## 2023-02-17 PROCEDURE — 81002 URINALYSIS NONAUTO W/O SCOPE: CPT | Mod: S$GLB,,, | Performed by: NURSE PRACTITIONER

## 2023-02-17 PROCEDURE — 1159F PR MEDICATION LIST DOCUMENTED IN MEDICAL RECORD: ICD-10-PCS | Mod: CPTII,S$GLB,, | Performed by: NURSE PRACTITIONER

## 2023-02-17 PROCEDURE — 99214 OFFICE O/P EST MOD 30 MIN: CPT | Mod: S$GLB,,, | Performed by: NURSE PRACTITIONER

## 2023-02-17 PROCEDURE — 81002 POCT URINE DIPSTICK WITHOUT MICROSCOPE: ICD-10-PCS | Mod: S$GLB,,, | Performed by: NURSE PRACTITIONER

## 2023-02-17 RX ORDER — TRAMADOL HYDROCHLORIDE 50 MG/1
50 TABLET ORAL EVERY 8 HOURS PRN
Qty: 21 TABLET | Refills: 0 | Status: SHIPPED | OUTPATIENT
Start: 2023-02-17 | End: 2023-02-24

## 2023-02-17 RX ORDER — SILVER SULFADIAZINE 10 G/1000G
CREAM TOPICAL DAILY
Qty: 50 G | Refills: 0 | Status: SHIPPED | OUTPATIENT
Start: 2023-02-17 | End: 2023-06-12

## 2023-02-17 NOTE — TELEPHONE ENCOUNTER
----- Message from Essence Keating NP sent at 2/17/2023  4:28 PM CST -----  Normal result; let pt know urine today is normal  Recent labs from [facility] reviewed; no culture was done  She can stop abx after completing 5 days so might have one more day left

## 2023-02-17 NOTE — ASSESSMENT & PLAN NOTE
Get records; ? Given IV irrigation tx  Discussed cool compress and monitor sloughing  May need surgical   Will add topical ABX

## 2023-02-17 NOTE — TELEPHONE ENCOUNTER
----- Message from Cierra Marie sent at 2023  8:22 AM CST -----  Contact: pt  Celine Sinclair  MRN: [id number]  : 1950  PCP: Neeru Hinkle  Home Phone      [phone]  Work Phone      Not on file.  Mobile          [phone]      MESSAGE:     Pt states she was discharged from the hospital since 23 and has blisters from some medication she was  given and is wanting an appt soon as it is painful.    Please advise  [phone]

## 2023-02-17 NOTE — PROGRESS NOTES
Normal result; let pt know urine today is normal  Recent labs from [facility] reviewed; no culture was done  She can stop abx after completing 5 days so might have one more day left

## 2023-02-17 NOTE — ASSESSMENT & PLAN NOTE
Still with signifiicant tenderness   O2 sat 100% lungs clear    get records, continue Tylenol prn and muscle relaxer   Takes Gabapentin daily  Was given  Hydrocodone prn in December ; out of this  Will give short course of tramadol

## 2023-02-17 NOTE — PROGRESS NOTES
"Subjective:           Patient ID: Celine Sinclair is a 72 y.o. female.    Chief Complaint: Arm Pain    Celine Sinclair is a 72 y.o. female klnown to me from acute visit in 2020, known to fellow providers. PMHx HTN, chest pain, aortic atherosclerosis, antiphospholid syndrome, paroxysmal afib, cataracts, myocardial bridge, long-term use of anticoagulants, and Crohn's.    Here today for ER f/U  See at [facility] with c/o left side rib pain, Muscle strain- tx with tylenol and muscle relaxer,  and UTI tx with macrobid 2/13/23  She fell on the morning of the 13 out of bed onto left side   Went to ED for this - they attempted CT but Had IV extravasation; told " ribs bruised"       Returned to ED 2/14   Skin Bulla  Extravasation of IV contrast   Was sent to OT for left arm     Did not have any urine symptoms; incidental  finding of UTI   Taking macrobid, denies burning , fever,   Repeat U/A in office negative  Will get [facility] records /labs ; reviewed, + Nitrites and few bacteria, reflex culture was not done  Will stop abx after 5 days - call with instructions  Records show she went with Sob- also : D- dimer negative,     CTA was ordered to rule out PE due to pleuritic chest pain; however, IV infiltrated, CT not done  D-dimer negative     Review of Systems   Constitutional:  Negative for chills, fatigue and fever.   HENT:  Negative for congestion, ear pain, postnasal drip, sinus pressure, sneezing and sore throat.    Eyes:  Negative for discharge.   Respiratory:  Negative for cough, chest tightness and shortness of breath.    Cardiovascular: Negative.    Gastrointestinal:  Negative for abdominal pain, constipation, diarrhea, nausea and vomiting.   Genitourinary:  Negative for difficulty urinating, flank pain and hematuria.   Musculoskeletal:  Positive for arthralgias. Negative for joint swelling.   Skin: Negative.    Neurological:  Negative for dizziness and headaches.   Psychiatric/Behavioral:  Negative for behavioral problems and " confusion.      Objective:      Physical Exam  Vitals and nursing note reviewed.   Constitutional:       Appearance: She is well-developed. She is obese.   HENT:      Head: Normocephalic and atraumatic.      Right Ear: External ear normal.      Left Ear: External ear normal.   Eyes:      Conjunctiva/sclera: Conjunctivae normal.      Pupils: Pupils are equal, round, and reactive to light.   Neck:      Thyroid: No thyromegaly.      Vascular: No JVD.      Trachea: No tracheal deviation.   Cardiovascular:      Rate and Rhythm: Normal rate and regular rhythm.      Heart sounds: Normal heart sounds.      Comments: Reg rate   Pulmonary:      Effort: Pulmonary effort is normal. No respiratory distress.      Breath sounds: Normal breath sounds. No wheezing or rales.   Chest:      Chest wall: No tenderness.   Abdominal:      General: Bowel sounds are normal. There is no distension.      Palpations: Abdomen is soft. There is no mass.      Tenderness: There is no abdominal tenderness. There is no guarding or rebound.   Musculoskeletal:         General: Tenderness and signs of injury present. Normal range of motion.      Cervical back: Normal range of motion and neck supple.      Comments: Left rib tenderness, no crepitus,   Lungs clear, no SOB   Pain with twisting    Lymphadenopathy:      Cervical: No cervical adenopathy.   Skin:     General: Skin is warm and dry.      Findings: Erythema present.      Comments: See picture of dorsal left wrist with blister/ bullae,   ~2x2 area of erythema, no induration    Neurological:      Mental Status: She is alert and oriented to person, place, and time.      Cranial Nerves: No cranial nerve deficit.      Motor: No abnormal muscle tone.      Coordination: Coordination normal.      Deep Tendon Reflexes: Reflexes are normal and symmetric. Reflexes normal.         Assessment:       1. Acute cystitis with hematuria    2. Rib pain on left side    3. Left wrist pain    4. Extravasation injury of  "intravenous catheter site with skin sloughing    5. Essential hypertension    6. Long term (current) use of anticoagulants        Plan:   1. Acute cystitis with hematuria  Comments:  repeat UA  takingn macrobid   Orders:  -     Urinalysis  -     POCT urine dipstick without microscope    2. Rib pain on left side  Comments:  get records, continue Tylenol prn and muscle relaxer   Overview:  Feel out of bed on 2/13 onto left side  S/p ED visit- told " bruised ribs"       Assessment & Plan:  Still with signifiicant tenderness   O2 sat 100% lungs clear    get records, continue Tylenol prn and muscle relaxer   Takes Gabapentin daily  Was given  Hydrocodone prn in December ; out of this  Will give short course of tramadol     Orders:  -     traMADoL (ULTRAM) 50 mg tablet; Take 1 tablet (50 mg total) by mouth every 8 (eight) hours as needed for Pain.  Dispense: 21 tablet; Refill: 0    3. Left wrist pain  -     traMADoL (ULTRAM) 50 mg tablet; Take 1 tablet (50 mg total) by mouth every 8 (eight) hours as needed for Pain.  Dispense: 21 tablet; Refill: 0    4. Extravasation injury of intravenous catheter site with skin sloughing  Overview:  Occurred 2/13/23 at [facility] per ED CT ordered  Left wrist injury  Planned for OP ortho eval    Assessment & Plan:  Get records; ? Given IV irrigation tx  Discussed cool compress and monitor sloughing  May need surgical   Will add topical ABX     Orders:  -     silver sulfADIAZINE 1% (SILVADENE) 1 % cream; Apply topically once daily.  Dispense: 50 g; Refill: 0    5. Essential hypertension  Assessment & Plan:  DBP a little increase but having pain   Continue metoprolol       6. Long term (current) use of anticoagulants  Assessment & Plan:  No s/sx of bleeding  Stable          F/u with PCP in 2 weeks   F/u with Ortho as planned /PT   "

## 2023-02-17 NOTE — TELEPHONE ENCOUNTER
I called patient to give her a message from REBECCA Lowry:Normal result; let pt know urine today is normal   Recent labs from [facility] reviewed; no culture was done   She can stop abx after completing 5 days so might have one more day left.I had to leave a message.

## 2023-02-28 ENCOUNTER — OFFICE VISIT (OUTPATIENT)
Dept: INTERNAL MEDICINE | Facility: CLINIC | Age: 73
End: 2023-02-28
Payer: COMMERCIAL

## 2023-02-28 VITALS
BODY MASS INDEX: 31.19 KG/M2 | RESPIRATION RATE: 18 BRPM | SYSTOLIC BLOOD PRESSURE: 128 MMHG | DIASTOLIC BLOOD PRESSURE: 82 MMHG | HEIGHT: 65 IN | HEART RATE: 69 BPM | WEIGHT: 187.19 LBS | OXYGEN SATURATION: 99 %

## 2023-02-28 DIAGNOSIS — H10.13 ALLERGIC CONJUNCTIVITIS OF BOTH EYES: ICD-10-CM

## 2023-02-28 DIAGNOSIS — Z09 HOSPITAL DISCHARGE FOLLOW-UP: Primary | ICD-10-CM

## 2023-02-28 DIAGNOSIS — Z76.0 MEDICATION REFILL: ICD-10-CM

## 2023-02-28 DIAGNOSIS — L03.114 LEFT ARM CELLULITIS: ICD-10-CM

## 2023-02-28 PROCEDURE — 99214 OFFICE O/P EST MOD 30 MIN: CPT | Mod: 25,S$GLB,, | Performed by: INTERNAL MEDICINE

## 2023-02-28 PROCEDURE — 3074F PR MOST RECENT SYSTOLIC BLOOD PRESSURE < 130 MM HG: ICD-10-PCS | Mod: CPTII,S$GLB,, | Performed by: INTERNAL MEDICINE

## 2023-02-28 PROCEDURE — 3008F BODY MASS INDEX DOCD: CPT | Mod: CPTII,S$GLB,, | Performed by: INTERNAL MEDICINE

## 2023-02-28 PROCEDURE — 3288F PR FALLS RISK ASSESSMENT DOCUMENTED: ICD-10-PCS | Mod: CPTII,S$GLB,, | Performed by: INTERNAL MEDICINE

## 2023-02-28 PROCEDURE — 1159F MED LIST DOCD IN RCRD: CPT | Mod: CPTII,S$GLB,, | Performed by: INTERNAL MEDICINE

## 2023-02-28 PROCEDURE — 1125F PR PAIN SEVERITY QUANTIFIED, PAIN PRESENT: ICD-10-PCS | Mod: CPTII,S$GLB,, | Performed by: INTERNAL MEDICINE

## 2023-02-28 PROCEDURE — 1101F PR PT FALLS ASSESS DOC 0-1 FALLS W/OUT INJ PAST YR: ICD-10-PCS | Mod: CPTII,S$GLB,, | Performed by: INTERNAL MEDICINE

## 2023-02-28 PROCEDURE — 99999 PR PBB SHADOW E&M-EST. PATIENT-LVL IV: CPT | Mod: PBBFAC,,, | Performed by: INTERNAL MEDICINE

## 2023-02-28 PROCEDURE — 3079F DIAST BP 80-89 MM HG: CPT | Mod: CPTII,S$GLB,, | Performed by: INTERNAL MEDICINE

## 2023-02-28 PROCEDURE — 1160F PR REVIEW ALL MEDS BY PRESCRIBER/CLIN PHARMACIST DOCUMENTED: ICD-10-PCS | Mod: CPTII,S$GLB,, | Performed by: INTERNAL MEDICINE

## 2023-02-28 PROCEDURE — 99214 PR OFFICE/OUTPT VISIT, EST, LEVL IV, 30-39 MIN: ICD-10-PCS | Mod: 25,S$GLB,, | Performed by: INTERNAL MEDICINE

## 2023-02-28 PROCEDURE — 3288F FALL RISK ASSESSMENT DOCD: CPT | Mod: CPTII,S$GLB,, | Performed by: INTERNAL MEDICINE

## 2023-02-28 PROCEDURE — 96372 PR INJECTION,THERAP/PROPH/DIAG2ST, IM OR SUBCUT: ICD-10-PCS | Mod: S$GLB,,, | Performed by: INTERNAL MEDICINE

## 2023-02-28 PROCEDURE — 3008F PR BODY MASS INDEX (BMI) DOCUMENTED: ICD-10-PCS | Mod: CPTII,S$GLB,, | Performed by: INTERNAL MEDICINE

## 2023-02-28 PROCEDURE — 1101F PT FALLS ASSESS-DOCD LE1/YR: CPT | Mod: CPTII,S$GLB,, | Performed by: INTERNAL MEDICINE

## 2023-02-28 PROCEDURE — 99999 PR PBB SHADOW E&M-EST. PATIENT-LVL IV: ICD-10-PCS | Mod: PBBFAC,,, | Performed by: INTERNAL MEDICINE

## 2023-02-28 PROCEDURE — 3074F SYST BP LT 130 MM HG: CPT | Mod: CPTII,S$GLB,, | Performed by: INTERNAL MEDICINE

## 2023-02-28 PROCEDURE — 1160F RVW MEDS BY RX/DR IN RCRD: CPT | Mod: CPTII,S$GLB,, | Performed by: INTERNAL MEDICINE

## 2023-02-28 PROCEDURE — 96372 THER/PROPH/DIAG INJ SC/IM: CPT | Mod: S$GLB,,, | Performed by: INTERNAL MEDICINE

## 2023-02-28 PROCEDURE — 1125F AMNT PAIN NOTED PAIN PRSNT: CPT | Mod: CPTII,S$GLB,, | Performed by: INTERNAL MEDICINE

## 2023-02-28 PROCEDURE — 1159F PR MEDICATION LIST DOCUMENTED IN MEDICAL RECORD: ICD-10-PCS | Mod: CPTII,S$GLB,, | Performed by: INTERNAL MEDICINE

## 2023-02-28 PROCEDURE — 3079F PR MOST RECENT DIASTOLIC BLOOD PRESSURE 80-89 MM HG: ICD-10-PCS | Mod: CPTII,S$GLB,, | Performed by: INTERNAL MEDICINE

## 2023-02-28 RX ORDER — POTASSIUM CHLORIDE 20 MEQ/1
40 TABLET, EXTENDED RELEASE ORAL DAILY
Qty: 60 TABLET | Refills: 11 | Status: SHIPPED | OUTPATIENT
Start: 2023-02-28 | End: 2024-03-22

## 2023-02-28 RX ADMIN — CYANOCOBALAMIN 1000 MCG: 1000 INJECTION, SOLUTION INTRAMUSCULAR at 09:02

## 2023-02-28 NOTE — PROGRESS NOTES
Subjective:       Patient ID: Celine Sinclair is a 72 y.o. female.    Chief Complaint: Abdominal Pain, eye trouble, and Arm Pain      HPI:  Patient is known to me and presents with multiple acute complaints. She originally presented to clinic for her routine B12 injection but due to multiple acute issues converted to appointment with me today.     She reports she had an injection in the left arm 2 weeks ago. She is unsure what she was injected with. She had swelling and blistering below the injection site and went to [facility] and was admitted to the hospital for treatment of cellulitis. She is healing well. No signs of infection today. No fevers. Some scabbing to the left arm and hand remains. Not following in any wound care; no need for that at this time. Using silvadene. Will need to request records from [facility].     She also reports she had a recent trip and fall out of her bed onto the left side. No bruising. She is ambulatory.     She is also reporting b/l eye pain. She has h/o periorbital cellulitis. She has some itching in b/le eyes. No drainage. Put cold compress and sx resolved.         Past Medical History:   Diagnosis Date    Aortic atherosclerosis     Atrial fibrillation     Benign essential hypertension     Cataract     Senile    Crohn's colitis     Depression, major, recurrent, moderate     Fibromyalgia     GERD (gastroesophageal reflux disease)     Hypertensive cardiomyopathy     IBS (irritable bowel syndrome)     Migraine     Opioid abuse, in remission     Orbital cellulitis on left     Osteopenia     Paget disease of bone     Port-A-Cath in place     right chest    Prolonged QT interval     RA (rheumatoid arthritis)     Sedative hypnotic or anxiolytic dependence     in remission        Family History   Problem Relation Age of Onset    Glaucoma Paternal Grandmother     Other Daughter         Diabetic Retinopathy    Breast cancer Daughter 40    Retinal detachment Grandchild     Heart attack  Maternal Grandmother     Colon cancer Maternal Grandmother     Cancer Mother         Unknown type    Emphysema Father 67    Heart disease Father     Lung cancer Sister     Heart attack Sister     Breast cancer Daughter 47    Breast cancer Sister     Amblyopia Neg Hx     Blindness Neg Hx     Strabismus Neg Hx     Macular degeneration Neg Hx     Cataracts Neg Hx        Social History     Socioeconomic History    Marital status:     Number of children: 12   Occupational History    Occupation: Hairdresser     Comment: Retired/disabled   Tobacco Use    Smoking status: Never    Smokeless tobacco: Never   Substance and Sexual Activity    Drug use: No    Sexual activity: Not Currently     Partners: Female   Social History Narrative    Patient gave birth to 7 children, adopted 2 and has 3 stepchildren with her .     Social Determinants of Health     Financial Resource Strain: High Risk    Difficulty of Paying Living Expenses: Hard   Food Insecurity: Food Insecurity Present    Worried About Running Out of Food in the Last Year: Sometimes true    Ran Out of Food in the Last Year: Sometimes true   Transportation Needs: Unmet Transportation Needs    Lack of Transportation (Medical): Yes    Lack of Transportation (Non-Medical): Yes   Physical Activity: Inactive    Days of Exercise per Week: 0 days    Minutes of Exercise per Session: 0 min   Stress: Stress Concern Present    Feeling of Stress : Rather much   Social Connections: Socially Integrated    Frequency of Communication with Friends and Family: More than three times a week    Frequency of Social Gatherings with Friends and Family: More than three times a week    Attends Denominational Services: More than 4 times per year    Active Member of Clubs or Organizations: Yes    Attends Club or Organization Meetings: More than 4 times per year    Marital Status:    Housing Stability: Low Risk     Unable to Pay for Housing in the Last Year: No    Number of Places  Lived in the Last Year: 1    Unstable Housing in the Last Year: No       Review of Systems   Constitutional:  Negative for activity change, fatigue, fever and unexpected weight change.   HENT:  Negative for congestion, ear pain, hearing loss, rhinorrhea and sore throat.    Eyes:  Positive for discharge (watery) and itching. Negative for pain, redness and visual disturbance.   Respiratory:  Negative for cough, shortness of breath and wheezing.    Cardiovascular:  Negative for chest pain, palpitations and leg swelling.   Gastrointestinal:  Negative for abdominal pain, constipation, diarrhea, nausea and vomiting.   Genitourinary:  Negative for dysuria, frequency and urgency.   Musculoskeletal:  Negative for back pain, joint swelling and neck pain.   Skin:  Positive for wound. Negative for color change and rash.   Neurological:  Negative for dizziness, tremors, weakness, light-headedness and headaches.       Objective:      Physical Exam  Vitals reviewed.   Constitutional:       General: She is not in acute distress.     Appearance: She is well-developed.   HENT:      Head: Normocephalic and atraumatic.      Right Ear: External ear normal.      Left Ear: External ear normal.      Nose: Nose normal.   Eyes:      General:         Right eye: No discharge.         Left eye: No discharge.      Extraocular Movements: Extraocular movements intact.      Conjunctiva/sclera: Conjunctivae normal.      Pupils: Pupils are equal, round, and reactive to light.   Neck:      Thyroid: No thyromegaly.   Cardiovascular:      Rate and Rhythm: Normal rate and regular rhythm.      Heart sounds: No murmur heard.  Pulmonary:      Effort: Pulmonary effort is normal. No respiratory distress.      Breath sounds: Normal breath sounds. No wheezing or rales.   Abdominal:      General: Bowel sounds are normal. There is no distension.      Palpations: Abdomen is soft.      Tenderness: There is no abdominal tenderness.   Skin:     General: Skin is warm  and dry.      Comments: Healing skin to left lower arm from recent blistering/cellulitis    Neurological:      Mental Status: She is alert and oriented to person, place, and time.      Cranial Nerves: No cranial nerve deficit.   Psychiatric:         Behavior: Behavior normal.         Thought Content: Thought content normal.       Assessment:       1. Hospital discharge follow-up    2. Left arm cellulitis    3. Allergic conjunctivitis of both eyes    4. Medication refill        Plan:       1. Hospital discharge follow-up  Meds reconciled  Will request d/c paperwork to further review    2. Left arm cellulitis  Resolved  Treated at [facility]  ? Bullous Cellulitis vs drug reaction causing skin blistering and sloughing. Will need outside records  Regardless, today wounds are healing nicely  No change in management. Can cont PRN silvadene as she is doing    3. Allergic conjunctivitis of both eyes  New problem  Exam is normal today but her reported sx of b/l eye itching and intermittent redness seems most consistent  Due to recent h/o cellulitis of eyes she was concerned but I see no signs of swelling, redness, or infection today in exam  Add daily antihistamine    4. Medication refill    -     potassium chloride SA (K-DUR,KLOR-CON) 20 MEQ tablet; Take 2 tablets (40 mEq total) by mouth once daily.  Dispense: 60 tablet; Refill: 11       RTC as scheudled and PRN

## 2023-03-08 ENCOUNTER — TELEPHONE (OUTPATIENT)
Dept: GASTROENTEROLOGY | Facility: CLINIC | Age: 73
End: 2023-03-08
Payer: MEDICARE

## 2023-03-08 NOTE — TELEPHONE ENCOUNTER
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

## 2023-03-12 ENCOUNTER — HOSPITAL ENCOUNTER (EMERGENCY)
Facility: HOSPITAL | Age: 73
Discharge: HOME | End: 2023-03-12
Attending: EMERGENCY MEDICINE
Payer: MEDICARE

## 2023-03-12 VITALS
BODY MASS INDEX: 31.16 KG/M2 | OXYGEN SATURATION: 99 % | DIASTOLIC BLOOD PRESSURE: 84 MMHG | HEART RATE: 62 BPM | SYSTOLIC BLOOD PRESSURE: 160 MMHG | WEIGHT: 187 LBS | TEMPERATURE: 98 F | RESPIRATION RATE: 18 BRPM | HEIGHT: 65 IN

## 2023-03-12 DIAGNOSIS — S09.90XA CLOSED HEAD INJURY, INITIAL ENCOUNTER: Primary | ICD-10-CM
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
## 2023-03-12 PROCEDURE — 99285 EMERGENCY DEPT VISIT HI MDM: CPT | Mod: ,,, | Performed by: EMERGENCY MEDICINE

## 2023-03-12 PROCEDURE — 93010 EKG 12-LEAD: ICD-10-PCS | Mod: ,,, | Performed by: INTERNAL MEDICINE

## 2023-03-12 PROCEDURE — 63600175 PHARM REV CODE 636 W HCPCS: Performed by: STUDENT IN AN ORGANIZED HEALTH CARE EDUCATION/TRAINING PROGRAM

## 2023-03-12 PROCEDURE — 93005 ELECTROCARDIOGRAM TRACING: CPT

## 2023-03-12 PROCEDURE — 93010 ELECTROCARDIOGRAM REPORT: CPT | Mod: ,,, | Performed by: INTERNAL MEDICINE

## 2023-03-12 PROCEDURE — 96374 THER/PROPH/DIAG INJ IV PUSH: CPT

## 2023-03-12 PROCEDURE — 99285 PR EMERGENCY DEPT VISIT,LEVEL V: ICD-10-PCS | Mod: ,,, | Performed by: EMERGENCY MEDICINE

## 2023-03-12 PROCEDURE — 99285 EMERGENCY DEPT VISIT HI MDM: CPT | Mod: 25

## 2023-03-12 RX ORDER — MORPHINE SULFATE 4 MG/ML
4 INJECTION, SOLUTION INTRAMUSCULAR; INTRAVENOUS
Status: COMPLETED | OUTPATIENT
Start: 2023-03-12 | End: 2023-03-12

## 2023-03-12 RX ORDER — NAPROXEN SODIUM 220 MG/1
81 TABLET, FILM COATED ORAL DAILY
Status: ON HOLD | COMMUNITY
End: 2023-06-28

## 2023-03-12 RX ORDER — HYDROCODONE BITARTRATE AND ACETAMINOPHEN 5; 325 MG/1; MG/1
1 TABLET ORAL
Status: DISCONTINUED | OUTPATIENT
Start: 2023-03-12 | End: 2023-03-12

## 2023-03-12 RX ORDER — MORPHINE SULFATE 4 MG/ML
4 INJECTION, SOLUTION INTRAMUSCULAR; INTRAVENOUS
Status: DISCONTINUED | OUTPATIENT
Start: 2023-03-12 | End: 2023-03-12

## 2023-03-12 RX ADMIN — MORPHINE SULFATE 4 MG: 4 INJECTION INTRAVENOUS at 09:03

## 2023-03-12 NOTE — ED PROVIDER NOTES
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 of left upper extremity, chest, pelvis and left hip are unremarkable.  CT head and spine shows no acute changes.  Patient is neurologically intact with no focal deficits.  Supportive care measures discussed with patient.  Follow up with PCP encouraged.  Patient verbalized understanding and agreement with plan.  Patient discharged home with return precautions.  All questions answered.          Attending Attestation:   Physician Attestation Statement for Resident:  As the supervising MD   Physician Attestation Statement: I have personally seen and examined this patient.   I agree with the above history.  -:   As the supervising MD I agree with the above PE.     As the supervising MD I agree with the above treatment, course, plan, and disposition.                Attending ED Notes:   STAFF ATTENDING PHYSICIAN NOTE:  I have individually/jointly evaluated Celine Sinclair and discussed their ED management with the resident physician. I have also reviewed their notes, assessments, and procedures documented.  I was present during all critical portions of any procedure(s) performed on Celine Sinclair.   ____________________  Sylvester Townsend MD, [facility]  Emergency Medicine Staff                   Clinical Impression:   Final diagnoses:  [W19.XXXA] Fall        ED Disposition Condition    Discharge Stable          ED Prescriptions     None       Follow-up Information       Follow up With Specialties Details Why Contact Info    Neeru Hinkle MD Internal Medicine   [address] [address]  [phone]      [facility] - Emergency Dept Emergency Medicine  As needed, If symptoms worsen [id number]  [facility] [id number] [phone]             Suzanne Ruiz MD  Resident  03/12/23 223       Moris Townsend MD  03/27/23 0673

## 2023-03-12 NOTE — CODE/ RAPID DOCUMENTATION
RAPID RESPONSE NURSE NOTE        Admit Date: 3/12/2023  LOS: 0  Code Status: Prior   Date of Consult: 2023  : 1950  Age: 72 y.o.  Weight:   Wt Readings from Last 1 Encounters:   23 84.8 kg (187 lb)     Sex: female  Race: Black or    Bed: ED :   MRN: [id number]  Time Rapid Response Team call Received: 1725  Time Rapid Response Team at Bedside: 1730  Time Rapid Response Team left Bedside: 1803  Was the patient discharged from an ICU this admission? No  Was the patient discharged from a PACU within last 24 hours? No   Did the patient receive conscious sedation/general anesthesia in last 24 hours? No  Was the patient in the ED within the past 24 hours? No  Was the patient on NIPPV within the past 24 hours? No   Did this progress into an ARC or CPA: No  Attending Physician: TORI  Primary Service: NA    SITUATION    Notified by charge RN via phone call.    Reason for alert: Witnessed fall and possible head injury      BACKGROUND     Why is the patient in the hospital?: Patient was visiting daughter at the time of the fall.  The fall was witnessed the MD at bedside.   Per report, she fell backwards and hit the back of her head on the wall before hitting the ground.      Patient has a past medical history of Aortic atherosclerosis, Atrial fibrillation, Benign essential hypertension, Cataract, Crohn's colitis, Depression, major, recurrent, moderate, Fibromyalgia, GERD (gastroesophageal reflux disease), Hypertensive cardiomyopathy, IBS (irritable bowel syndrome), Migraine, Opioid abuse, in remission, Orbital cellulitis on left, Osteopenia, Paget disease of bone, Port-A-Cath in place, Prolonged QT interval, RA (rheumatoid arthritis), and Sedative hypnotic or anxiolytic dependence.    ASSESSMENT/INTERVENTIONS    What did you find: Upon arrival to room, patient was supine on the ground with her knees bent.  She is awake and alert.  Complains of headache, blurred vision, and left arm  numbness/tingling.  Spinal precautions initiated.  C-collar placed and placed on spinal board.  Patient transported to ED.     RECOMMENDATIONS    We recommend: ED transfer and evaluation.    FOLLOW UP    Call the Rapid Response Nurse, Collette Fields RN at [facility] for additional questions or concerns.

## 2023-03-12 NOTE — ED TRIAGE NOTES
Celine Sinclair, a 72 y.o. female presents to the ED w/ complaint of Patient was visiting Daughter in the hospital when she tripped and fell in daughters room. Patient fell backwards into wall hitting wall with head, back, and left shoulder, leg. Patient c/o of pain in all of these areas. Patient states 10/10 pain in all areas described as constant int sharp and aching. Patient is in C-Collar and arrive on backboard. Spinal precautions being observed at this time. Patient denies LOC with fall.     Triage note:  Chief Complaint   Patient presents with    Fall     Fall upstairs. Hit head, on blood thinners.      Review of patient's allergies indicates:   Allergen Reactions    Octreotide acetate Nausea And Vomiting     Had symptoms when she took Sandostatin with Codeine    Codeine Itching and Nausea And Vomiting     Pill form only, IV ok.,  Had symptoms when she took Codeine with Sandostatin.  Other reaction(s): Itching    Morphine Nausea And Vomiting     Patient reports reaction only when she takes po form of Morphine.     Past Medical History:   Diagnosis Date    Aortic atherosclerosis     Atrial fibrillation     Benign essential hypertension     Cataract     Senile    Crohn's colitis     Depression, major, recurrent, moderate     Fibromyalgia     GERD (gastroesophageal reflux disease)     Hypertensive cardiomyopathy     IBS (irritable bowel syndrome)     Migraine     Opioid abuse, in remission     Orbital cellulitis on left     Osteopenia     Paget disease of bone     Port-A-Cath in place     right chest    Prolonged QT interval     RA (rheumatoid arthritis)     Sedative hypnotic or anxiolytic dependence     in remission

## 2023-03-12 NOTE — RESPIRATORY THERAPY
RAPID RESPONSE RESPIRATORY THERAPY NOTE             Code Status: Prior   : 1950  Bed: ED :   MRN: [id number]  Time page Received: 1725  Time Rapid Response RT at Bedside: 1731  Time Rapid Response RT left Bedside: 1803    SITUATION    Evaluated patient for: Fall/neuro deficit    BACKGROUND    Why is the patient in the hospital?: <principal problem not specified>    Patient has a past medical history of Aortic atherosclerosis, Atrial fibrillation, Benign essential hypertension, Cataract, Crohn's colitis, Depression, major, recurrent, moderate, Fibromyalgia, GERD (gastroesophageal reflux disease), Hypertensive cardiomyopathy, IBS (irritable bowel syndrome), Migraine, Opioid abuse, in remission, Orbital cellulitis on left, Osteopenia, Paget disease of bone, Port-A-Cath in place, Prolonged QT interval, RA (rheumatoid arthritis), and Sedative hypnotic or anxiolytic dependence.    24 Hours Vitals Range:       Labs:    No results for input(s): NA, K, CL, CO2, CREATININE, GLU, PHOS, MG in the last 72 hours.    Invalid input(s): CMP,  BUN, TBIL     No results for input(s): PH, PCO2, PO2, HCO3, POCSATURATED, BE in the last 72 hours.    ASSESSMENT/INTERVENTIONS  Cervical collar placed, pt placed on backboard then gurney and transported to ED    Last Vitals:    Level of Consciousness:    Respiratory Effort:    Expansion/Accessory Muscle Usage:    All Lung Field Breath Sounds:    O2 Device/Concentration: R.A.  NIPPV: No Surgical airway: No Vent: No  ETCO2 monitored:    Ambu at bedside:      Active Orders   There are no active orders of the following types: Respiratory Care.       RECOMMENDATIONS  ?  We recommend: RRT Recs: ED to assess head and neck     ESCALATION        FOLLOW-UP    Disposition: Tx to ER bed .    Please call back the Rapid Response RT, Sanchez Ahmadi RRT at x [phone] for any questions or concerns.

## 2023-03-13 NOTE — DISCHARGE INSTRUCTIONS
Home Care Instructions:  - Medications: Continue taking your home medications as prescribed  - Alternate between 500 mg of Tylenol and 400 mg of ibuprofen every 3 hours as needed for pain.    Follow-Up Plan:  - Follow-up with: Primary care doctor within 3  days  - Additional testing and/or evaluation will be directed by your primary doctor    Return to the Emergency Department for symptoms including but not limited to: worsening symptoms, severe back pain, shortness of breath or chest pain, vomiting with inability to hold down fluids, blood in vomit or poop, fevers greater than 100.4°F, passing out/fainting/unconsciousness, or other concerning symptoms.

## 2023-03-15 ENCOUNTER — OFFICE VISIT (OUTPATIENT)
Dept: INTERNAL MEDICINE | Facility: CLINIC | Age: 73
End: 2023-03-15
Payer: MEDICARE

## 2023-03-15 VITALS
WEIGHT: 188.94 LBS | HEIGHT: 65 IN | HEART RATE: 75 BPM | OXYGEN SATURATION: 99 % | SYSTOLIC BLOOD PRESSURE: 130 MMHG | DIASTOLIC BLOOD PRESSURE: 80 MMHG | RESPIRATION RATE: 16 BRPM | BODY MASS INDEX: 31.48 KG/M2

## 2023-03-15 DIAGNOSIS — R93.6 ABNORMAL X-RAY OF FEMUR: ICD-10-CM

## 2023-03-15 DIAGNOSIS — Z09 HOSPITAL DISCHARGE FOLLOW-UP: ICD-10-CM

## 2023-03-15 DIAGNOSIS — R29.6 FREQUENT FALLS: Primary | ICD-10-CM

## 2023-03-15 PROCEDURE — 1101F PT FALLS ASSESS-DOCD LE1/YR: CPT | Mod: CPTII,S$GLB,, | Performed by: INTERNAL MEDICINE

## 2023-03-15 PROCEDURE — 4010F ACE/ARB THERAPY RXD/TAKEN: CPT | Mod: CPTII,S$GLB,, | Performed by: INTERNAL MEDICINE

## 2023-03-15 PROCEDURE — 3008F BODY MASS INDEX DOCD: CPT | Mod: CPTII,S$GLB,, | Performed by: INTERNAL MEDICINE

## 2023-03-15 PROCEDURE — 99999 PR PBB SHADOW E&M-EST. PATIENT-LVL V: CPT | Mod: PBBFAC,,, | Performed by: INTERNAL MEDICINE

## 2023-03-15 PROCEDURE — 99214 PR OFFICE/OUTPT VISIT, EST, LEVL IV, 30-39 MIN: ICD-10-PCS | Mod: S$GLB,,, | Performed by: INTERNAL MEDICINE

## 2023-03-15 PROCEDURE — 3075F PR MOST RECENT SYSTOLIC BLOOD PRESS GE 130-139MM HG: ICD-10-PCS | Mod: CPTII,S$GLB,, | Performed by: INTERNAL MEDICINE

## 2023-03-15 PROCEDURE — 1159F PR MEDICATION LIST DOCUMENTED IN MEDICAL RECORD: ICD-10-PCS | Mod: CPTII,S$GLB,, | Performed by: INTERNAL MEDICINE

## 2023-03-15 PROCEDURE — 1159F MED LIST DOCD IN RCRD: CPT | Mod: CPTII,S$GLB,, | Performed by: INTERNAL MEDICINE

## 2023-03-15 PROCEDURE — 4010F PR ACE/ARB THEARPY RXD/TAKEN: ICD-10-PCS | Mod: CPTII,S$GLB,, | Performed by: INTERNAL MEDICINE

## 2023-03-15 PROCEDURE — 99999 PR PBB SHADOW E&M-EST. PATIENT-LVL V: ICD-10-PCS | Mod: PBBFAC,,, | Performed by: INTERNAL MEDICINE

## 2023-03-15 PROCEDURE — 3008F PR BODY MASS INDEX (BMI) DOCUMENTED: ICD-10-PCS | Mod: CPTII,S$GLB,, | Performed by: INTERNAL MEDICINE

## 2023-03-15 PROCEDURE — 1125F AMNT PAIN NOTED PAIN PRSNT: CPT | Mod: CPTII,S$GLB,, | Performed by: INTERNAL MEDICINE

## 2023-03-15 PROCEDURE — 1160F PR REVIEW ALL MEDS BY PRESCRIBER/CLIN PHARMACIST DOCUMENTED: ICD-10-PCS | Mod: CPTII,S$GLB,, | Performed by: INTERNAL MEDICINE

## 2023-03-15 PROCEDURE — 1101F PR PT FALLS ASSESS DOC 0-1 FALLS W/OUT INJ PAST YR: ICD-10-PCS | Mod: CPTII,S$GLB,, | Performed by: INTERNAL MEDICINE

## 2023-03-15 PROCEDURE — 99215 OFFICE O/P EST HI 40 MIN: CPT | Mod: PBBFAC | Performed by: INTERNAL MEDICINE

## 2023-03-15 PROCEDURE — 3288F FALL RISK ASSESSMENT DOCD: CPT | Mod: CPTII,S$GLB,, | Performed by: INTERNAL MEDICINE

## 2023-03-15 PROCEDURE — 3288F PR FALLS RISK ASSESSMENT DOCUMENTED: ICD-10-PCS | Mod: CPTII,S$GLB,, | Performed by: INTERNAL MEDICINE

## 2023-03-15 PROCEDURE — 3079F PR MOST RECENT DIASTOLIC BLOOD PRESSURE 80-89 MM HG: ICD-10-PCS | Mod: CPTII,S$GLB,, | Performed by: INTERNAL MEDICINE

## 2023-03-15 PROCEDURE — 3079F DIAST BP 80-89 MM HG: CPT | Mod: CPTII,S$GLB,, | Performed by: INTERNAL MEDICINE

## 2023-03-15 PROCEDURE — 1160F RVW MEDS BY RX/DR IN RCRD: CPT | Mod: CPTII,S$GLB,, | Performed by: INTERNAL MEDICINE

## 2023-03-15 PROCEDURE — 3075F SYST BP GE 130 - 139MM HG: CPT | Mod: CPTII,S$GLB,, | Performed by: INTERNAL MEDICINE

## 2023-03-15 PROCEDURE — 99214 OFFICE O/P EST MOD 30 MIN: CPT | Mod: S$GLB,,, | Performed by: INTERNAL MEDICINE

## 2023-03-15 PROCEDURE — 1125F PR PAIN SEVERITY QUANTIFIED, PAIN PRESENT: ICD-10-PCS | Mod: CPTII,S$GLB,, | Performed by: INTERNAL MEDICINE

## 2023-03-15 RX ORDER — SACUBITRIL AND VALSARTAN 24; 26 MG/1; MG/1
1 TABLET, FILM COATED ORAL 2 TIMES DAILY
COMMUNITY
End: 2023-05-16

## 2023-03-15 NOTE — PROGRESS NOTES
"Subjective:       Patient ID: Celine Sinclair is a 72 y.o. female.    Chief Complaint: Follow-up and Fall      HPI:  Patient is known to me and presents for ER follow up. She was visiting her daughter in hospital and tripped on IV stand hitting her head on the wall. No LOC. ED work up showed CT neck, thoracici and lumbar spine without acute fractures. CT head normal and no acute bleed. She is on ASA and Eliquis. Xray pelvis, femur, shoulder and left elbow also without acute fracture..    Left femur xray showed "Grossly stable mottled appearance with a coarsened trabecular pattern throughout the proximal left femur, which could relate to Paget's disease.  No pathologic fracture."       She is walking with her cane. She is still feeling sore on her left side. No increasing headaches, vision changes, confusion.     Past Medical History:   Diagnosis Date    Aortic atherosclerosis     Atrial fibrillation     Benign essential hypertension     Cataract     Senile    Crohn's colitis     Depression, major, recurrent, moderate     Fibromyalgia     GERD (gastroesophageal reflux disease)     Hypertensive cardiomyopathy     IBS (irritable bowel syndrome)     Migraine     Opioid abuse, in remission     Orbital cellulitis on left     Osteopenia     Paget disease of bone     Port-A-Cath in place     right chest    Prolonged QT interval     RA (rheumatoid arthritis)     Sedative hypnotic or anxiolytic dependence     in remission        Family History   Problem Relation Age of Onset    Glaucoma Paternal Grandmother     Other Daughter         Diabetic Retinopathy    Breast cancer Daughter 40    Retinal detachment Grandchild     Heart attack Maternal Grandmother     Colon cancer Maternal Grandmother     Cancer Mother         Unknown type    Emphysema Father 67    Heart disease Father     Lung cancer Sister     Heart attack Sister     Breast cancer Daughter 47    Breast cancer Sister     Amblyopia Neg Hx     Blindness Neg Hx     " Strabismus Neg Hx     Macular degeneration Neg Hx     Cataracts Neg Hx        Social History     Socioeconomic History    Marital status:     Number of children: 12   Occupational History    Occupation: [employer]     Comment: Retired/disabled   Tobacco Use    Smoking status: Never    Smokeless tobacco: Never   Substance and Sexual Activity    Drug use: No    Sexual activity: Not Currently     Partners: Female   Social History Narrative    Patient gave birth to 7 children, adopted 2 and has 3 stepchildren with her .     Social Determinants of Health     Financial Resource Strain: High Risk    Difficulty of Paying Living Expenses: Hard   Food Insecurity: Food Insecurity Present    Worried About Running Out of Food in the Last Year: Sometimes true    Ran Out of Food in the Last Year: Sometimes true   Transportation Needs: Unmet Transportation Needs    Lack of Transportation (Medical): Yes    Lack of Transportation (Non-Medical): Yes   Physical Activity: Inactive    Days of Exercise per Week: 0 days    Minutes of Exercise per Session: 0 min   Stress: Stress Concern Present    Feeling of Stress : Rather much   Social Connections: Socially Integrated    Frequency of Communication with Friends and Family: More than three times a week    Frequency of Social Gatherings with Friends and Family: More than three times a week    Attends Jew Services: More than 4 times per year    Active Member of Clubs or Organizations: Yes    Attends Club or Organization Meetings: More than 4 times per year    Marital Status:    Housing Stability: Low Risk     Unable to Pay for Housing in the Last Year: No    Number of Places Lived in the Last Year: 1    Unstable Housing in the Last Year: No       Review of Systems   Constitutional:  Negative for activity change, fatigue, fever and unexpected weight change.   HENT:  Negative for congestion, ear pain, hearing loss, rhinorrhea and sore throat.    Eyes:  Negative for  redness and visual disturbance.   Respiratory:  Negative for cough, shortness of breath and wheezing.    Cardiovascular:  Negative for chest pain, palpitations and leg swelling.   Gastrointestinal:  Negative for abdominal pain, constipation, diarrhea, nausea and vomiting.   Genitourinary:  Negative for dysuria, frequency and urgency.   Musculoskeletal:  Positive for arthralgias and myalgias. Negative for back pain, joint swelling and neck pain.   Skin:  Negative for color change, rash and wound.   Neurological:  Negative for dizziness, tremors, weakness, light-headedness and headaches.       Objective:      Physical Exam  Vitals reviewed.   Constitutional:       General: She is not in acute distress.     Appearance: She is well-developed.   HENT:      Head: Normocephalic and atraumatic.      Right Ear: External ear normal.      Left Ear: External ear normal.      Nose: Nose normal.   Eyes:      General:         Right eye: No discharge.         Left eye: No discharge.      Extraocular Movements: Extraocular movements intact.      Conjunctiva/sclera: Conjunctivae normal.      Pupils: Pupils are equal, round, and reactive to light.   Neck:      Thyroid: No thyromegaly.   Cardiovascular:      Rate and Rhythm: Normal rate and regular rhythm.      Heart sounds: No murmur heard.    No gallop.   Pulmonary:      Effort: Pulmonary effort is normal. No respiratory distress.      Breath sounds: Normal breath sounds. No wheezing.   Abdominal:      General: Bowel sounds are normal. There is no distension.      Palpations: Abdomen is soft.      Tenderness: There is no abdominal tenderness.   Skin:     General: Skin is warm and dry.   Neurological:      Mental Status: She is alert and oriented to person, place, and time.      Cranial Nerves: No cranial nerve deficit.   Psychiatric:         Behavior: Behavior normal.         Thought Content: Thought content normal.       Assessment:       1. Frequent falls    2. Hospital discharge  follow-up        Plan:       1. Frequent falls  New problem  Falling more and feeling more unsteady on her feet  Walks with cane  Start PT for gait assessment and balance training to help prevent future falls  -     Ambulatory referral/consult to Physical/Occupational Therapy; Future; Expected date: 03/22/2023    2. Hospital discharge follow-up  ER notes reviewed  Er radiology reports reviewed  Nothing on clinical exam today to suspect intracranial process. She hit her head 3 days ago on Eliquis and no progression of any neurologic symptoms. No further imaging needed at this time  PRN Tyelnol for soreness/pain control  Start PT    3. Abnormal xray of femur  New problem  Alk phos has been normal last several checks  She has chronic pain related to fibromyalgia and has pain post fall now so difficult to determine how much pain, if any, related to radiographic findings  Follow up again next visit with labs and consider repeat imaging with CT femur to further characterize. Needs to update DEXA as well. Could likely benefit from bisphosphonate therapy

## 2023-03-28 ENCOUNTER — CLINICAL SUPPORT (OUTPATIENT)
Dept: INTERNAL MEDICINE | Facility: CLINIC | Age: 73
End: 2023-03-28
Payer: MEDICARE

## 2023-03-28 PROCEDURE — 96372 THER/PROPH/DIAG INJ SC/IM: CPT | Mod: PBBFAC

## 2023-03-28 RX ADMIN — CYANOCOBALAMIN 1000 MCG: 1000 INJECTION, SOLUTION INTRAMUSCULAR at 09:03

## 2023-04-25 ENCOUNTER — CLINICAL SUPPORT (OUTPATIENT)
Dept: INTERNAL MEDICINE | Facility: CLINIC | Age: 73
End: 2023-04-25
Payer: MEDICARE

## 2023-04-25 PROCEDURE — 96372 THER/PROPH/DIAG INJ SC/IM: CPT | Mod: HCNC,S$GLB,, | Performed by: INTERNAL MEDICINE

## 2023-04-25 PROCEDURE — 96372 PR INJECTION,THERAP/PROPH/DIAG2ST, IM OR SUBCUT: ICD-10-PCS | Mod: HCNC,S$GLB,, | Performed by: INTERNAL MEDICINE

## 2023-04-25 RX ADMIN — CYANOCOBALAMIN 1000 MCG: 1000 INJECTION, SOLUTION INTRAMUSCULAR at 08:04

## 2023-05-14 ENCOUNTER — HOSPITAL ENCOUNTER (EMERGENCY)
Facility: HOSPITAL | Age: 73
Discharge: HOME | End: 2023-05-15
Attending: STUDENT IN AN ORGANIZED HEALTH CARE EDUCATION/TRAINING PROGRAM
Payer: MEDICARE

## 2023-05-14 DIAGNOSIS — R07.9 ACUTE CHEST PAIN: ICD-10-CM

## 2023-05-14 DIAGNOSIS — I49.3 PVC'S (PREMATURE VENTRICULAR CONTRACTIONS): ICD-10-CM

## 2023-05-14 DIAGNOSIS — E83.42 HYPOMAGNESEMIA: ICD-10-CM

## 2023-05-14 DIAGNOSIS — I47.10 SVT (SUPRAVENTRICULAR TACHYCARDIA): Primary | ICD-10-CM

## 2023-05-14 LAB
ALBUMIN SERPL BCP-MCNC: 3.4 G/DL (ref 3.5–5.2)
ALP SERPL-CCNC: 71 U/L (ref 55–135)
ALT SERPL W/O P-5'-P-CCNC: 18 U/L (ref 10–44)
ANION GAP SERPL CALC-SCNC: 13 MMOL/L (ref 8–16)
AST SERPL-CCNC: 33 U/L (ref 10–40)
BASOPHILS # BLD AUTO: 0.04 K/UL (ref 0–0.2)
BASOPHILS NFR BLD: 0.5 % (ref 0–1.9)
BILIRUB SERPL-MCNC: 0.7 MG/DL (ref 0.1–1)
BUN SERPL-MCNC: 13 MG/DL (ref 8–23)
CALCIUM SERPL-MCNC: 8.3 MG/DL (ref 8.7–10.5)
CHLORIDE SERPL-SCNC: 113 MMOL/L (ref 95–110)
CO2 SERPL-SCNC: 19 MMOL/L (ref 23–29)
CREAT SERPL-MCNC: 0.8 MG/DL (ref 0.5–1.4)
DIFFERENTIAL METHOD: ABNORMAL
EOSINOPHIL # BLD AUTO: 0.1 K/UL (ref 0–0.5)
EOSINOPHIL NFR BLD: 0.9 % (ref 0–8)
ERYTHROCYTE [DISTWIDTH] IN BLOOD BY AUTOMATED COUNT: 14.9 % (ref 11.5–14.5)
EST. GFR  (NO RACE VARIABLE): >60 ML/MIN/1.73 M^2
GLUCOSE SERPL-MCNC: 136 MG/DL (ref 70–110)
HCT VFR BLD AUTO: 35.5 % (ref 37–48.5)
HGB BLD-MCNC: 11 G/DL (ref 12–16)
IMM GRANULOCYTES # BLD AUTO: 0.03 K/UL (ref 0–0.04)
IMM GRANULOCYTES NFR BLD AUTO: 0.4 % (ref 0–0.5)
LYMPHOCYTES # BLD AUTO: 1.3 K/UL (ref 1–4.8)
LYMPHOCYTES NFR BLD: 15.9 % (ref 18–48)
MAGNESIUM SERPL-MCNC: 1.4 MG/DL (ref 1.6–2.6)
MCH RBC QN AUTO: 27 PG (ref 27–31)
MCHC RBC AUTO-ENTMCNC: 31 G/DL (ref 32–36)
MCV RBC AUTO: 87 FL (ref 82–98)
MONOCYTES # BLD AUTO: 0.7 K/UL (ref 0.3–1)
MONOCYTES NFR BLD: 8.7 % (ref 4–15)
NEUTROPHILS # BLD AUTO: 5.9 K/UL (ref 1.8–7.7)
NEUTROPHILS NFR BLD: 73.6 % (ref 38–73)
NRBC BLD-RTO: 0 /100 WBC
PLATELET # BLD AUTO: 181 K/UL (ref 150–450)
PMV BLD AUTO: 11 FL (ref 9.2–12.9)
POTASSIUM SERPL-SCNC: 3.9 MMOL/L (ref 3.5–5.1)
PROT SERPL-MCNC: 6.5 G/DL (ref 6–8.4)
RBC # BLD AUTO: 4.08 M/UL (ref 4–5.4)
SODIUM SERPL-SCNC: 145 MMOL/L (ref 136–145)
WBC # BLD AUTO: 8.04 K/UL (ref 3.9–12.7)

## 2023-05-14 PROCEDURE — 84484 ASSAY OF TROPONIN QUANT: CPT | Performed by: STUDENT IN AN ORGANIZED HEALTH CARE EDUCATION/TRAINING PROGRAM

## 2023-05-14 PROCEDURE — 25000003 PHARM REV CODE 250: Performed by: STUDENT IN AN ORGANIZED HEALTH CARE EDUCATION/TRAINING PROGRAM

## 2023-05-14 PROCEDURE — 93010 EKG 12-LEAD: ICD-10-PCS | Mod: 76,,, | Performed by: INTERNAL MEDICINE

## 2023-05-14 PROCEDURE — 84443 ASSAY THYROID STIM HORMONE: CPT | Performed by: STUDENT IN AN ORGANIZED HEALTH CARE EDUCATION/TRAINING PROGRAM

## 2023-05-14 PROCEDURE — 96375 TX/PRO/DX INJ NEW DRUG ADDON: CPT

## 2023-05-14 PROCEDURE — 93005 ELECTROCARDIOGRAM TRACING: CPT

## 2023-05-14 PROCEDURE — 80053 COMPREHEN METABOLIC PANEL: CPT | Performed by: STUDENT IN AN ORGANIZED HEALTH CARE EDUCATION/TRAINING PROGRAM

## 2023-05-14 PROCEDURE — 83880 ASSAY OF NATRIURETIC PEPTIDE: CPT | Performed by: STUDENT IN AN ORGANIZED HEALTH CARE EDUCATION/TRAINING PROGRAM

## 2023-05-14 PROCEDURE — 83735 ASSAY OF MAGNESIUM: CPT | Performed by: STUDENT IN AN ORGANIZED HEALTH CARE EDUCATION/TRAINING PROGRAM

## 2023-05-14 PROCEDURE — 93010 ELECTROCARDIOGRAM REPORT: CPT | Mod: 76,,, | Performed by: INTERNAL MEDICINE

## 2023-05-14 PROCEDURE — 96361 HYDRATE IV INFUSION ADD-ON: CPT

## 2023-05-14 PROCEDURE — 36415 COLL VENOUS BLD VENIPUNCTURE: CPT | Performed by: STUDENT IN AN ORGANIZED HEALTH CARE EDUCATION/TRAINING PROGRAM

## 2023-05-14 PROCEDURE — 99900031 HC PATIENT EDUCATION (STAT)

## 2023-05-14 PROCEDURE — 99900035 HC TECH TIME PER 15 MIN (STAT)

## 2023-05-14 PROCEDURE — 85025 COMPLETE CBC W/AUTO DIFF WBC: CPT | Performed by: STUDENT IN AN ORGANIZED HEALTH CARE EDUCATION/TRAINING PROGRAM

## 2023-05-14 PROCEDURE — 63600175 PHARM REV CODE 636 W HCPCS

## 2023-05-14 RX ORDER — ADENOSINE 3 MG/ML
INJECTION, SOLUTION INTRAVENOUS
Status: COMPLETED
Start: 2023-05-14 | End: 2023-05-14

## 2023-05-14 RX ORDER — METOPROLOL TARTRATE 1 MG/ML
5 INJECTION, SOLUTION INTRAVENOUS
Status: COMPLETED | OUTPATIENT
Start: 2023-05-14 | End: 2023-05-14

## 2023-05-14 RX ORDER — SODIUM CHLORIDE 9 MG/ML
1000 INJECTION, SOLUTION INTRAVENOUS
Status: COMPLETED | OUTPATIENT
Start: 2023-05-14 | End: 2023-05-14

## 2023-05-14 RX ORDER — ADENOSINE 3 MG/ML
6 INJECTION, SOLUTION INTRAVENOUS
Status: COMPLETED | OUTPATIENT
Start: 2023-05-14 | End: 2023-05-14

## 2023-05-14 RX ADMIN — ADENOSINE 12 MG: 3 INJECTION, SOLUTION INTRAVENOUS at 10:05

## 2023-05-14 RX ADMIN — SODIUM CHLORIDE 1000 ML: 0.9 INJECTION, SOLUTION INTRAVENOUS at 11:05

## 2023-05-14 RX ADMIN — ADENOSINE 6 MG: 3 INJECTION INTRAVENOUS at 10:05

## 2023-05-14 RX ADMIN — METOPROLOL TARTRATE 5 MG: 1 INJECTION, SOLUTION INTRAVENOUS at 11:05

## 2023-05-14 RX ADMIN — ADENOSINE 6 MG: 3 INJECTION, SOLUTION INTRAVENOUS at 10:05

## 2023-05-15 VITALS
OXYGEN SATURATION: 98 % | DIASTOLIC BLOOD PRESSURE: 94 MMHG | RESPIRATION RATE: 20 BRPM | WEIGHT: 190.94 LBS | BODY MASS INDEX: 31.81 KG/M2 | TEMPERATURE: 98 F | HEIGHT: 65 IN | HEART RATE: 100 BPM | SYSTOLIC BLOOD PRESSURE: 146 MMHG

## 2023-05-15 LAB
BNP SERPL-MCNC: 193 PG/ML (ref 0–99)
TROPONIN I SERPL DL<=0.01 NG/ML-MCNC: 0.11 NG/ML (ref 0–0.03)
TROPONIN I SERPL DL<=0.01 NG/ML-MCNC: 0.53 NG/ML (ref 0–0.03)
TSH SERPL DL<=0.005 MIU/L-ACNC: 2.69 UIU/ML (ref 0.4–4)

## 2023-05-15 PROCEDURE — 99291 CRITICAL CARE FIRST HOUR: CPT

## 2023-05-15 PROCEDURE — 36415 COLL VENOUS BLD VENIPUNCTURE: CPT | Performed by: STUDENT IN AN ORGANIZED HEALTH CARE EDUCATION/TRAINING PROGRAM

## 2023-05-15 PROCEDURE — 96366 THER/PROPH/DIAG IV INF ADDON: CPT

## 2023-05-15 PROCEDURE — 96365 THER/PROPH/DIAG IV INF INIT: CPT

## 2023-05-15 PROCEDURE — 63600175 PHARM REV CODE 636 W HCPCS: Performed by: STUDENT IN AN ORGANIZED HEALTH CARE EDUCATION/TRAINING PROGRAM

## 2023-05-15 PROCEDURE — 84484 ASSAY OF TROPONIN QUANT: CPT | Performed by: STUDENT IN AN ORGANIZED HEALTH CARE EDUCATION/TRAINING PROGRAM

## 2023-05-15 RX ORDER — MAGNESIUM SULFATE HEPTAHYDRATE 40 MG/ML
2 INJECTION, SOLUTION INTRAVENOUS
Status: COMPLETED | OUTPATIENT
Start: 2023-05-15 | End: 2023-05-15

## 2023-05-15 RX ADMIN — MAGNESIUM SULFATE HEPTAHYDRATE 2 G: 40 INJECTION, SOLUTION INTRAVENOUS at 01:05

## 2023-05-15 NOTE — ED NOTES
. Md at bedside. Crash cart at bedside. Ekg at bedside. Pt on continuous cardiac monitoring, pulse ox

## 2023-05-15 NOTE — ED TRIAGE NOTES
Pt arrived to ED c/o left sided constant chest pressure/pain that radiates to left side of neck, head. Pt also reports shortness of breath, palpitations. Pt rates pain 10/10. Pt reports hx of HTN.

## 2023-05-15 NOTE — ED NOTES
Pt remains in SVT rate 204. 12 mg of adenosine pushed per md's verbal order.md at [facility]. Crash cart at bedside. Ekg at bedside. PT ON CONTINUOUS CARDIAC MONITORING, PULSE OX.    DISCHARGE

## 2023-05-15 NOTE — ED PROVIDER NOTES
Encounter Date: 5/14/2023       History     Chief Complaint   Patient presents with    Chest Pain     Pt arrived to ED c/o left sided constant chest pressure/pain that radiates to left side of neck, head. Pt also reports shortness of breath, palpitations. Pt rates pain 10/10. Pt reports hx of HTN.      72 year old female with a PMHx of Afib on eliquis, depression, GERD, hypertensive cardiomyopathy, RA presents to the ED with chest pain. Occurred when she was at home suddenly. She felt her heart racing as well. Reports it as a pressure that radiates to the left neck and head. Reported shortness of breath, palpitations. Takes her medications as prescribed.      Review of patient's allergies indicates:   Allergen Reactions    Octreotide acetate Nausea And Vomiting     Had symptoms when she took Sandostatin with Codeine    Codeine Itching and Nausea And Vomiting     Pill form only, IV ok.,  Had symptoms when she took Codeine with Sandostatin.  Other reaction(s): Itching    Morphine Nausea And Vomiting     Patient reports reaction only when she takes po form of Morphine.     Past Medical History:   Diagnosis Date    Aortic atherosclerosis     Atrial fibrillation     Benign essential hypertension     Cataract     Senile    Crohn's colitis     Depression, major, recurrent, moderate     Fibromyalgia     GERD (gastroesophageal reflux disease)     Hypertensive cardiomyopathy     IBS (irritable bowel syndrome)     Migraine     Opioid abuse, in remission     Orbital cellulitis on left     Osteopenia     Paget disease of bone     Port-A-Cath in place     right chest    Prolonged QT interval     RA (rheumatoid arthritis)     Sedative hypnotic or anxiolytic dependence     in remission      Past Surgical History:   Procedure Laterality Date    CATARACT EXTRACTION W/  INTRAOCULAR LENS IMPLANT Left 02/21/2017    Dr. Christianson    CATARACT EXTRACTION W/  INTRAOCULAR LENS IMPLANT Right 03/07/2017    Dr. Christianson    CHOLECYSTECTOMY       COLON SURGERY      COLONOSCOPY N/A 3/16/2016    Procedure: COLONOSCOPY;  Surgeon: Dale Trejo MD;  Location: [facility] ENDO (4TH FLR);  Service: Endoscopy;  Laterality: N/A;    COLONOSCOPY N/A 10/12/2020    Procedure: COLONOSCOPY;  Surgeon: Cali Urena MD;  Location: [facility] ENDO (4TH FLR);  Service: Endoscopy;  Laterality: N/A;  covid test 10/9-[facility]- completed 10/9/20  ok to hold Coumadin x 5 days per coumadin clinic-see telephone encounterd ated 10/6-MS / Loop recorder  10/6/20- Pt confirmed- ERW@1122  10/9-Pt confirmed earlier arrival time, prep ins. reviewed and emailed to Pt    COLONOSCOPY N/A 8/25/2022    Procedure: COLONOSCOPY;  Surgeon: Lewis Luke MD;  Location: [facility];  Service: Endoscopy;  Laterality: N/A;    Colostomy reversal      HEMORRHOID SURGERY      HYSTERECTOMY      ILEOSTOMY      ILEOSTOMY CLOSURE      LEFT HEART CATHETERIZATION N/A 2/15/2019    Procedure: HEART CATH-LEFT;  Surgeon: Miky Keita MD;  Location: [facility] CATH LAB;  Service: Cardiology;  Laterality: N/A;    NECK SURGERY      OOPHORECTOMY Bilateral     SBO      SMALL INTESTINE SURGERY      TONSILLECTOMY      TUBAL LIGATION       Family History   Problem Relation Age of Onset    Glaucoma Paternal Grandmother     Other Daughter         Diabetic Retinopathy    Breast cancer Daughter 40    Retinal detachment Grandchild     Heart attack Maternal Grandmother     Colon cancer Maternal Grandmother     Cancer Mother         Unknown type    Emphysema Father 67    Heart disease Father     Lung cancer Sister     Heart attack Sister     Breast cancer Daughter 47    Breast cancer Sister     Amblyopia Neg Hx     Blindness Neg Hx     Strabismus Neg Hx     Macular degeneration Neg Hx     Cataracts Neg Hx      Social History     Tobacco Use    Smoking status: Never    Smokeless tobacco: Never   Substance Use Topics    Drug use: No     Review of Systems   Constitutional:  Negative for chills and fever.   HENT:  Negative for  congestion, rhinorrhea and sneezing.    Eyes:  Negative for discharge and redness.   Respiratory:  Positive for shortness of breath. Negative for cough.    Cardiovascular:  Positive for chest pain and palpitations.   Gastrointestinal:  Negative for abdominal pain, diarrhea, nausea and vomiting.   Genitourinary:  Negative for dysuria, frequency, vaginal bleeding and vaginal discharge.   Musculoskeletal:  Negative for back pain and neck pain.   Skin:  Negative for rash and wound.   Neurological:  Negative for weakness, numbness and headaches.     Physical Exam     Initial Vitals   BP Pulse Resp Temp SpO2   05/14/23 2223 05/14/23 2231 05/14/23 2223 05/14/23 2223 05/14/23 2235   115/78 (!) 211 20 98.2 °F (36.8 °C) 97 %      MAP       --                Physical Exam    Nursing note and vitals reviewed.  Constitutional: She appears well-developed. She is not diaphoretic. No distress.   HENT:   Head: Normocephalic and atraumatic.   Right Ear: External ear normal.   Left Ear: External ear normal.   Eyes: Right eye exhibits no discharge. Left eye exhibits no discharge. No scleral icterus.   Neck: Neck supple.   Cardiovascular:  Regular rhythm.   Tachycardia present.         Pulmonary/Chest: Breath sounds normal. No stridor. No respiratory distress. She has no wheezes. She has no rhonchi. She has no rales.   Abdominal: Abdomen is soft. There is no abdominal tenderness. There is no guarding.   Musculoskeletal:         General: No edema.      Cervical back: Neck supple.     Neurological: She is alert and oriented to person, place, and time.   Skin: Skin is warm and dry. Capillary refill takes less than 2 seconds.   Psychiatric: She has a normal mood and affect.       ED Course   Critical Care    Date/Time: 5/15/2023 2:45 AM  Performed by: Davey Ibarra DO  Authorized by: Davey Ibarra DO   Direct patient critical care time: 23 minutes  Additional history critical care time: 4 minutes  Ordering / reviewing critical care time: 3  minutes  Documentation critical care time: 4 minutes  Consult with family critical care time: 3 minutes  Total critical care time (exclusive of procedural time) : 37 minutes  Critical care time was exclusive of separately billable procedures and treating other patients and teaching time.  Critical care was necessary to treat or prevent imminent or life-threatening deterioration of the following conditions: cardiac failure.  Critical care was time spent personally by me on the following activities: development of treatment plan with patient or surrogate, interpretation of cardiac output measurements, evaluation of patient's response to treatment, examination of patient, obtaining history from patient or surrogate, ordering and performing treatments and interventions, ordering and review of laboratory studies, ordering and review of radiographic studies, pulse oximetry, re-evaluation of patient's condition and review of old charts.      Labs Reviewed   CBC W/ AUTO DIFFERENTIAL - Abnormal; Notable for the following components:       Result Value    Hemoglobin 11.0 (*)     Hematocrit 35.5 (*)     MCHC 31.0 (*)     RDW 14.9 (*)     Gran % 73.6 (*)     Lymph % 15.9 (*)     All other components within normal limits   COMPREHENSIVE METABOLIC PANEL - Abnormal; Notable for the following components:    Chloride 113 (*)     CO2 19 (*)     Glucose 136 (*)     Calcium 8.3 (*)     Albumin 3.4 (*)     All other components within normal limits   MAGNESIUM - Abnormal; Notable for the following components:    Magnesium 1.4 (*)     All other components within normal limits   B-TYPE NATRIURETIC PEPTIDE - Abnormal; Notable for the following components:     (*)     All other components within normal limits   TROPONIN I - Abnormal; Notable for the following components:    Troponin I 0.113 (*)     All other components within normal limits   TROPONIN I - Abnormal; Notable for the following components:    Troponin I 0.530 (*)     All  other components within normal limits   TSH     EKG Readings: (Independently Interpreted)   Previous EKG: Compared with most recent EKG Previous EKG Date: 3/12/2023.   SVT at 209 bpm. Normal axis. STD in multiple leads. No STEMI.   Other EKG Interpretations: ECG 5/14/2023 at 2350  Sinus tachycardia at 101 bpm with 1st degree AV blocks. PVCs seen. ST and T wave abnormalities. No STEMI.     Imaging Results              X-Ray Chest AP Portable (Final result)  Result time 05/14/23 23:57:07      Final result by Hussain Mederos MD (05/14/23 23:57:07)                   Impression:      No acute findings.      Electronically signed by: Hussain Mederos  Date:    05/14/2023  Time:    23:57               Narrative:    EXAMINATION:  XR CHEST AP PORTABLE    CLINICAL HISTORY:  Chest pain, unspecified    TECHNIQUE:  Single frontal view of the chest was performed.    COMPARISON:  03/12/2023    FINDINGS:  Loop recorder.  Defibrillator pads and EKG monitoring.  Metallic foreign bodies project over the chest and lower neck.  No focal consolidation, pleural effusion, or pneumothorax.  Borderline prominent cardiac silhouette.                                       Medications   adenosine injection 6 mg (12 mg Intravenous Given 5/14/23 2241)   0.9%  NaCl infusion (0 mLs Intravenous Stopped 5/14/23 2352)   metoprolol injection 5 mg (5 mg Intravenous Given 5/14/23 2313)   magnesium sulfate 2g in water 50mL IVPB (premix) (2 g Intravenous New Bag 5/15/23 0103)     Medical Decision Making:   Differential Diagnosis:   Ddx: cardiac dysrhythmia, ACS, PE, electrolyte derangement, thyroid  ED Management:  Based on the patient's evaluation - patient appears to be in SVT with rate in the 200s. Stable BP. 6mg and then 12mg of adenosine given IV rapid push. Resulting rhythm appeared sinus with PVCs in bigeminy. 5mg IV metoprolol given with latest rhythm on telemetry and ECG showing sinus with occasional PVCs. Labs with slightly elevated  troponin likely due to demand with HR in the 200s as well as hypoMg. Given 2g IV Mg.    I observed the patient in the ED for nearly 6 hours with her HR on telemetry remaining in NSR with occasional PVCs but definitely not to the frequency as earlier. Her chest pain is much, much better. Slightly still present. 2nd trop 0.113 -> 0.530 but again, I suspect this is due to demand with her Hrn in the 200s earlier.    I recommended observation overnight for serial troponins, cardiology evaluation in the AM. However, she wishes to go home and will return if her symptoms worsen or return.    Will discharge home with close cardiology f/u. Patient and  are in agreement.                        Clinical Impression:   Final diagnoses:  [R07.9] Acute chest pain  [I47.1] SVT (supraventricular tachycardia) (Primary)  [I49.3] PVC's (premature ventricular contractions)  [E83.42] Hypomagnesemia        ED Disposition Condition    Discharge Stable          ED Prescriptions    None       Follow-up Information       Follow up With Specialties Details Why Contact Info    Justin Hayden MD Cardiology Call today  [address]  [address]  [facility] [id number]  [phone]               Davey Ibarra DO  05/15/23 0402

## 2023-05-15 NOTE — ED NOTES
SECOND RN ATTEMPTING TO GET SECOND IV ON PT. 3X ATTEMPTS UNSUCCESSFUL. MD AT BEDSIDE. MD AT BEDSIDE WITH ULTRASOUND

## 2023-05-16 ENCOUNTER — HOSPITAL ENCOUNTER (EMERGENCY)
Facility: HOSPITAL | Age: 73
Discharge: HOME | End: 2023-05-16
Attending: STUDENT IN AN ORGANIZED HEALTH CARE EDUCATION/TRAINING PROGRAM
Payer: MEDICARE

## 2023-05-16 VITALS
RESPIRATION RATE: 22 BRPM | OXYGEN SATURATION: 99 % | DIASTOLIC BLOOD PRESSURE: 93 MMHG | HEART RATE: 80 BPM | TEMPERATURE: 98 F | WEIGHT: 190 LBS | SYSTOLIC BLOOD PRESSURE: 176 MMHG | BODY MASS INDEX: 31.62 KG/M2

## 2023-05-16 DIAGNOSIS — I10 PRIMARY HYPERTENSION: ICD-10-CM

## 2023-05-16 DIAGNOSIS — R79.89 ELEVATED TROPONIN: ICD-10-CM

## 2023-05-16 DIAGNOSIS — R07.9 ACUTE CHEST PAIN: Primary | ICD-10-CM

## 2023-05-16 LAB
ALBUMIN SERPL BCP-MCNC: 3.9 G/DL (ref 3.5–5.2)
ALP SERPL-CCNC: 84 U/L (ref 55–135)
ALT SERPL W/O P-5'-P-CCNC: 14 U/L (ref 10–44)
ANION GAP SERPL CALC-SCNC: 9 MMOL/L (ref 8–16)
AST SERPL-CCNC: 23 U/L (ref 10–40)
BASOPHILS # BLD AUTO: 0.04 K/UL (ref 0–0.2)
BASOPHILS NFR BLD: 0.4 % (ref 0–1.9)
BILIRUB SERPL-MCNC: 1.3 MG/DL (ref 0.1–1)
BNP SERPL-MCNC: 184 PG/ML (ref 0–99)
BUN SERPL-MCNC: 13 MG/DL (ref 8–23)
CALCIUM SERPL-MCNC: 9 MG/DL (ref 8.7–10.5)
CHLORIDE SERPL-SCNC: 111 MMOL/L (ref 95–110)
CO2 SERPL-SCNC: 24 MMOL/L (ref 23–29)
CREAT SERPL-MCNC: 0.8 MG/DL (ref 0.5–1.4)
DIFFERENTIAL METHOD: ABNORMAL
EOSINOPHIL # BLD AUTO: 0.1 K/UL (ref 0–0.5)
EOSINOPHIL NFR BLD: 1.2 % (ref 0–8)
ERYTHROCYTE [DISTWIDTH] IN BLOOD BY AUTOMATED COUNT: 14.8 % (ref 11.5–14.5)
EST. GFR  (NO RACE VARIABLE): >60 ML/MIN/1.73 M^2
GLUCOSE SERPL-MCNC: 104 MG/DL (ref 70–110)
HCT VFR BLD AUTO: 38.6 % (ref 37–48.5)
HGB BLD-MCNC: 12 G/DL (ref 12–16)
IMM GRANULOCYTES # BLD AUTO: 0.03 K/UL (ref 0–0.04)
IMM GRANULOCYTES NFR BLD AUTO: 0.3 % (ref 0–0.5)
LYMPHOCYTES # BLD AUTO: 1.7 K/UL (ref 1–4.8)
LYMPHOCYTES NFR BLD: 19.4 % (ref 18–48)
MCH RBC QN AUTO: 27.1 PG (ref 27–31)
MCHC RBC AUTO-ENTMCNC: 31.1 G/DL (ref 32–36)
MCV RBC AUTO: 87 FL (ref 82–98)
MONOCYTES # BLD AUTO: 0.7 K/UL (ref 0.3–1)
MONOCYTES NFR BLD: 7.5 % (ref 4–15)
NEUTROPHILS # BLD AUTO: 6.4 K/UL (ref 1.8–7.7)
NEUTROPHILS NFR BLD: 71.2 % (ref 38–73)
NRBC BLD-RTO: 0 /100 WBC
PLATELET # BLD AUTO: 204 K/UL (ref 150–450)
PMV BLD AUTO: 11.2 FL (ref 9.2–12.9)
POTASSIUM SERPL-SCNC: 4.5 MMOL/L (ref 3.5–5.1)
PROT SERPL-MCNC: 7.3 G/DL (ref 6–8.4)
RBC # BLD AUTO: 4.43 M/UL (ref 4–5.4)
SODIUM SERPL-SCNC: 144 MMOL/L (ref 136–145)
TROPONIN I SERPL DL<=0.01 NG/ML-MCNC: 0.48 NG/ML (ref 0–0.03)
TROPONIN I SERPL DL<=0.01 NG/ML-MCNC: 0.5 NG/ML (ref 0–0.03)
WBC # BLD AUTO: 8.96 K/UL (ref 3.9–12.7)

## 2023-05-16 PROCEDURE — 36415 COLL VENOUS BLD VENIPUNCTURE: CPT | Performed by: STUDENT IN AN ORGANIZED HEALTH CARE EDUCATION/TRAINING PROGRAM

## 2023-05-16 PROCEDURE — 93010 EKG 12-LEAD: ICD-10-PCS | Mod: ,,, | Performed by: INTERNAL MEDICINE

## 2023-05-16 PROCEDURE — 25000003 PHARM REV CODE 250: Performed by: STUDENT IN AN ORGANIZED HEALTH CARE EDUCATION/TRAINING PROGRAM

## 2023-05-16 PROCEDURE — 85025 COMPLETE CBC W/AUTO DIFF WBC: CPT | Performed by: STUDENT IN AN ORGANIZED HEALTH CARE EDUCATION/TRAINING PROGRAM

## 2023-05-16 PROCEDURE — 80053 COMPREHEN METABOLIC PANEL: CPT | Performed by: STUDENT IN AN ORGANIZED HEALTH CARE EDUCATION/TRAINING PROGRAM

## 2023-05-16 PROCEDURE — 84484 ASSAY OF TROPONIN QUANT: CPT | Mod: 91 | Performed by: STUDENT IN AN ORGANIZED HEALTH CARE EDUCATION/TRAINING PROGRAM

## 2023-05-16 PROCEDURE — 99285 EMERGENCY DEPT VISIT HI MDM: CPT | Mod: 25

## 2023-05-16 PROCEDURE — 63600175 PHARM REV CODE 636 W HCPCS: Performed by: STUDENT IN AN ORGANIZED HEALTH CARE EDUCATION/TRAINING PROGRAM

## 2023-05-16 PROCEDURE — 93010 ELECTROCARDIOGRAM REPORT: CPT | Mod: ,,, | Performed by: INTERNAL MEDICINE

## 2023-05-16 PROCEDURE — 96374 THER/PROPH/DIAG INJ IV PUSH: CPT

## 2023-05-16 PROCEDURE — 93005 ELECTROCARDIOGRAM TRACING: CPT

## 2023-05-16 PROCEDURE — 83880 ASSAY OF NATRIURETIC PEPTIDE: CPT | Performed by: STUDENT IN AN ORGANIZED HEALTH CARE EDUCATION/TRAINING PROGRAM

## 2023-05-16 RX ORDER — SACUBITRIL AND VALSARTAN 49; 51 MG/1; MG/1
1 TABLET, FILM COATED ORAL 2 TIMES DAILY
Qty: 60 TABLET | Refills: 0 | Status: SHIPPED | OUTPATIENT
Start: 2023-05-16 | End: 2023-06-15

## 2023-05-16 RX ORDER — ASPIRIN 325 MG
325 TABLET ORAL
Status: COMPLETED | OUTPATIENT
Start: 2023-05-16 | End: 2023-05-16

## 2023-05-16 RX ORDER — FUROSEMIDE 10 MG/ML
20 INJECTION INTRAMUSCULAR; INTRAVENOUS
Status: COMPLETED | OUTPATIENT
Start: 2023-05-16 | End: 2023-05-16

## 2023-05-16 RX ADMIN — ASPIRIN 325 MG ORAL TABLET 325 MG: 325 PILL ORAL at 09:05

## 2023-05-16 RX ADMIN — SACUBITRIL AND VALSARTAN 1 TABLET: 49; 51 TABLET, FILM COATED ORAL at 12:05

## 2023-05-16 RX ADMIN — FUROSEMIDE 20 MG: 10 INJECTION, SOLUTION INTRAMUSCULAR; INTRAVENOUS at 12:05

## 2023-05-16 RX ADMIN — NITROGLYCERIN 1 INCH: 20 OINTMENT TOPICAL at 12:05

## 2023-05-16 NOTE — ED PROVIDER NOTES
Encounter Date: 5/16/2023       History     Chief Complaint   Patient presents with    Chest Pain     Patient to ER CC of chest pain that started last night, states she had this happen a few days ago       72 year old female with a PMHx of Afib on eliquis, depression, GERD, hypertensive cardiomyopathy, RA presents to the ED with chest pain. Seen by me on 5/14/23 and was found to be in SVT. She required 6mg of adenosine and 12mg of adenosine. She then vagaled and converted to bigeminy. Given 5mg of metoprolol and converted to sinus rhythm. Found to have hypoMg and repleted Mg IV. She had elevated troponins that were likely due to demand with her SVT in the 200s.    She states she was home yesterday and called her cardiologist yesterday, Dr. Hayden, and has an appointment next month. Last night, her chest pain started again. Described as a constant pressure on the mid to left chest. Doesn't radiate anywhere else. Denies shortness of breath, nausea, vomiting, cough. States nothing in particular makes it worse or better.    She is with her .      Review of patient's allergies indicates:   Allergen Reactions    Octreotide acetate Nausea And Vomiting     Had symptoms when she took Sandostatin with Codeine    Codeine Itching and Nausea And Vomiting     Pill form only, IV ok.,  Had symptoms when she took Codeine with Sandostatin.  Other reaction(s): Itching    Morphine Nausea And Vomiting     Patient reports reaction only when she takes po form of Morphine.     Past Medical History:   Diagnosis Date    Aortic atherosclerosis     Atrial fibrillation     Benign essential hypertension     Cataract     Senile    Crohn's colitis     Depression, major, recurrent, moderate     Fibromyalgia     GERD (gastroesophageal reflux disease)     Hypertensive cardiomyopathy     IBS (irritable bowel syndrome)     Migraine     Opioid abuse, in remission     Orbital cellulitis on left     Osteopenia     Paget disease of bone      Port-A-Cath in place     right chest    Prolonged QT interval     RA (rheumatoid arthritis)     Sedative hypnotic or anxiolytic dependence     in remission      Past Surgical History:   Procedure Laterality Date    CATARACT EXTRACTION W/  INTRAOCULAR LENS IMPLANT Left 02/21/2017    Dr. Christianson    CATARACT EXTRACTION W/  INTRAOCULAR LENS IMPLANT Right 03/07/2017    Dr. Christianson    CHOLECYSTECTOMY      COLON SURGERY      COLONOSCOPY N/A 3/16/2016    Procedure: COLONOSCOPY;  Surgeon: Dale Trejo MD;  Location: [facility] ENDO (4TH FLR);  Service: Endoscopy;  Laterality: N/A;    COLONOSCOPY N/A 10/12/2020    Procedure: COLONOSCOPY;  Surgeon: Cali Urena MD;  [facility]  Service: Endoscopy;  Laterality: N/A;  covid test 10/9-[facility] [facility]- completed 10/9/20  ok to hold Coumadin x 5 days per coumadin clinic-see telephone encounterd ated 10/6-MS / Loop recorder  10/6/20- Pt confirmed- ERW@1122  10/9-Pt confirmed earlier arrival time, prep ins. reviewed and emailed to Pt    COLONOSCOPY N/A 8/25/2022    Procedure: COLONOSCOPY;  Surgeon: Lewis Luke MD;  Location: [facility];  Service: Endoscopy;  Laterality: N/A;    Colostomy reversal      HEMORRHOID SURGERY      HYSTERECTOMY      ILEOSTOMY      ILEOSTOMY CLOSURE      LEFT HEART CATHETERIZATION N/A 2/15/2019    Procedure: HEART CATH-LEFT;  Surgeon: Miky Keita MD;  Location: [facility] CATH LAB;  Service: Cardiology;  Laterality: N/A;    NECK SURGERY      OOPHORECTOMY Bilateral     SBO      SMALL INTESTINE SURGERY      TONSILLECTOMY      TUBAL LIGATION       Family History   Problem Relation Age of Onset    Glaucoma Paternal Grandmother     Other Daughter         Diabetic Retinopathy    Breast cancer Daughter 40    Retinal detachment Grandchild     Heart attack Maternal Grandmother     Colon cancer Maternal Grandmother     Cancer Mother         Unknown type    Emphysema Father 67    Heart disease Father     Lung cancer Sister     Heart  attack Sister     Breast cancer Daughter 47    Breast cancer Sister     Amblyopia Neg Hx     Blindness Neg Hx     Strabismus Neg Hx     Macular degeneration Neg Hx     Cataracts Neg Hx      Social History     Tobacco Use    Smoking status: Never    Smokeless tobacco: Never   Substance Use Topics    Drug use: No     Review of Systems   Constitutional:  Negative for chills and fever.   HENT:  Negative for congestion, rhinorrhea and sneezing.    Eyes:  Negative for discharge and redness.   Respiratory:  Negative for cough and shortness of breath.    Cardiovascular:  Positive for chest pain. Negative for palpitations.   Gastrointestinal:  Negative for abdominal pain, diarrhea, nausea and vomiting.   Genitourinary:  Negative for dysuria, frequency, vaginal bleeding and vaginal discharge.   Musculoskeletal:  Negative for back pain and neck pain.   Skin:  Negative for rash and wound.   Neurological:  Negative for weakness, numbness and headaches.     Physical Exam     Initial Vitals [05/16/23 0908]   BP Pulse Resp Temp SpO2   (!) 159/99 85 20 97.5 °F (36.4 °C) 99 %      MAP       --         Physical Exam    Nursing note and vitals reviewed.  Constitutional: She appears well-developed. She is not diaphoretic. No distress.   HENT:   Head: Normocephalic and atraumatic.   Right Ear: External ear normal.   Left Ear: External ear normal.   Eyes: Right eye exhibits no discharge. Left eye exhibits no discharge. No scleral icterus.   Neck: Neck supple.   Cardiovascular:  Normal rate and regular rhythm.           Pulmonary/Chest: Breath sounds normal. No stridor. No respiratory distress. She has no wheezes. She has no rhonchi. She has no rales. She exhibits tenderness.   Abdominal: Abdomen is soft. There is no abdominal tenderness. There is no guarding.   Musculoskeletal:         General: No edema.      Cervical back: Neck supple.     Neurological: She is alert and oriented to person, place, and time.   Skin: Skin is warm and dry.  Capillary refill takes less than 2 seconds.   Psychiatric: She has a normal mood and affect.       ED Course   Procedures  Labs Reviewed   CBC W/ AUTO DIFFERENTIAL - Abnormal; Notable for the following components:       Result Value    MCHC 31.1 (*)     RDW 14.8 (*)     All other components within normal limits   COMPREHENSIVE METABOLIC PANEL - Abnormal; Notable for the following components:    Chloride 111 (*)     Total Bilirubin 1.3 (*)     All other components within normal limits   TROPONIN I - Abnormal; Notable for the following components:    Troponin I 0.496 (*)     All other components within normal limits   B-TYPE NATRIURETIC PEPTIDE - Abnormal; Notable for the following components:     (*)     All other components within normal limits   TROPONIN I - Abnormal; Notable for the following components:    Troponin I 0.485 (*)     All other components within normal limits     EKG Readings: (Independently Interpreted)   Previous EKG: Compared with most recent EKG Previous EKG Date: 5/14/2023.   Sinus rhythm with PACs. No STEMI.     Imaging Results              X-Ray Chest AP Portable (Final result)  Result time 05/16/23 09:46:12      Final result by Vanessa Sol MD (05/16/23 09:46:12)                   Impression:      As above.      Electronically signed by: Vanessa Sol MD  Date:    05/16/2023  Time:    09:46               Narrative:    EXAMINATION:  XR CHEST AP PORTABLE    CLINICAL HISTORY:  Chest Pain;    TECHNIQUE:  Single frontal view of the chest was performed.    COMPARISON:  5/14/23    FINDINGS:  Metallic BBs overlie the chest.The lungs are clear with normal appearance of pulmonary vasculature. No pleural effusion. No evident pneumothorax.    The cardiac silhouette is mildly enlarged..  Minimal central pulmonary vascular congestion.    Bones are intact.                                       Medications   aspirin tablet 325 mg (325 mg Oral Given 5/16/23 0912)   nitroGLYCERIN 2% TD oint  ointment 1 inch (1 inch Topical (Top) Given 5/16/23 1234)   sacubitriL-valsartan 49-51 mg per tablet 1 tablet (1 tablet Oral Given 5/16/23 1250)   furosemide injection 20 mg (20 mg Intravenous Given 5/16/23 1234)     Medical Decision Making:   Differential Diagnosis:   Differential considerations include (in particular order): ACS, Pneumonia, Pneumothorax, PE, Aortic dissection, Pericarditis, Zoster rash, Cardiac tamponade, Esophageal perforation, Myocarditis    ED Management:  Based on the patient's evaluation - patient appears well and stable. No STEMI on ECG. Trop is 0.496, down from 0.530 2 days ago when she presented for SVT. Will consult [facility] cardiology for assistance. This could either be elevated trop from demand ischemia or a new, NSTEMI.       1:24 PM  Reassessed patient who is doing well. 2nd trop stable/slight downtrend. In consultation with cardiology, advised to increase entresto, ordered lasix x 1, and nitropaste. Considered adding metoprolol or amlodipine but will hold cautiously to avoid decreasing BP too much with the increase in entresto. Will discharge home with close cardiology f/u. Patient is in agreement.           ED Course as of 05/16/23 1324   Tue May 16, 2023   1321 Troponin I(!): 0.485 [CB]      ED Course User Index  [CB] Nadja Murry NP                 Clinical Impression:   Final diagnoses:  [R07.9] Acute chest pain (Primary)  [I10] Primary hypertension  [R77.8] Elevated troponin        ED Disposition Condition    Discharge Stable          ED Prescriptions       Medication Sig Dispense Start Date End Date Auth. Provider    sacubitriL-valsartan (ENTRESTO) 49-51 mg per tablet Take 1 tablet by mouth 2 (two) times daily. 60 tablet 5/16/2023 6/15/2023 Davey Ibarra,           Follow-up Information       Follow up With Specialties Details Why Contact Info    Justin Hayden MD Cardiology Schedule an appointment as soon as possible for a visit in 1 week  [address]  [place] [id number]  [phone]               Davey Ibarra DO  05/16/23 1327

## 2023-05-16 NOTE — CONSULTS
[facility] - Emergency Dept  Cardiology  Consult Note    Patient Name: Celine Sinclair  MRN: [id number]  Admission Date: 5/16/2023  Hospital Length of Stay: 0 days  Code Status: Prior   Consulting Provider: Mj Jose NP  Primary Care Physician: Neeru Hinkle MD  Principal Problem:<principal problem not specified>      Inpatient consult to [facility]  Consult performed by: Mj Jose NP  Consult ordered by: Davey Ibarra DO      Subjective:     Chief Complaint:  CP    HPI:  72-year-old female with past history of hypertension, paroxysmal atrial fibrillation, SVT presents to emergency department with complaints of chest pain.  Of note, 2 days ago patient presented to the hospital with chest pain palpitations resulting in SVT treatment with adenosine.  Patient did have minimal elevation troponin at the time secondary to rapid rate and was discharged.  Patient states that chest pain started again last night described as a constant pressure to the left chest but does not radiate.  She denies any other symptoms currently.  Troponin noted to be slightly more elevated.  [facility] asked to evaluate.    Past Medical History:   Diagnosis Date    Aortic atherosclerosis     Atrial fibrillation     Benign essential hypertension     Cataract     Senile    Crohn's colitis     Depression, major, recurrent, moderate     Fibromyalgia     GERD (gastroesophageal reflux disease)     Hypertensive cardiomyopathy     IBS (irritable bowel syndrome)     Migraine     Opioid abuse, in remission     Orbital cellulitis on left     Osteopenia     Paget disease of bone     Port-A-Cath in place     right chest    Prolonged QT interval     RA (rheumatoid arthritis)     Sedative hypnotic or anxiolytic dependence     in remission        Past Surgical History:   Procedure Laterality Date    CATARACT EXTRACTION W/  INTRAOCULAR LENS IMPLANT Left 02/21/2017    Dr. Christianson    CATARACT EXTRACTION W/  INTRAOCULAR LENS IMPLANT Right 03/07/2017     Dr. Christianson    CHOLECYSTECTOMY      COLON SURGERY      COLONOSCOPY N/A 3/16/2016    Procedure: COLONOSCOPY;  Surgeon: Dale Trejo MD;  Location: [facility] ENDO (4TH FLR);  Service: Endoscopy;  Laterality: N/A;    COLONOSCOPY N/A 10/12/2020    Procedure: COLONOSCOPY;  Surgeon: Cali Urena MD;  Location: [facility] ENDO (4TH FLR);  Service: Endoscopy;  Laterality: N/A;  covid test 10/9-[facility]- completed 10/9/20  ok to hold Coumadin x 5 days per coumadin clinic-see telephone encounterd ated 10/6-MS / Loop recorder  10/6/20- Pt confirmed- ERW@1122  10/9-Pt confirmed earlier arrival time, prep ins. reviewed and emailed to Pt    COLONOSCOPY N/A 8/25/2022    Procedure: COLONOSCOPY;  Surgeon: Lewis Luke MD;  Location: [facility];  Service: Endoscopy;  Laterality: N/A;    Colostomy reversal      HEMORRHOID SURGERY      HYSTERECTOMY      ILEOSTOMY      ILEOSTOMY CLOSURE      LEFT HEART CATHETERIZATION N/A 2/15/2019    Procedure: HEART CATH-LEFT;  Surgeon: Miky Keita MD;  Location: [facility] CATH LAB;  Service: Cardiology;  Laterality: N/A;    NECK SURGERY      OOPHORECTOMY Bilateral     SBO      SMALL INTESTINE SURGERY      TONSILLECTOMY      TUBAL LIGATION         Review of patient's allergies indicates:   Allergen Reactions    Octreotide acetate Nausea And Vomiting     Had symptoms when she took Sandostatin with Codeine    Codeine Itching and Nausea And Vomiting     Pill form only, IV ok.,  Had symptoms when she took Codeine with Sandostatin.  Other reaction(s): Itching    Morphine Nausea And Vomiting     Patient reports reaction only when she takes po form of Morphine.       No current facility-administered medications on file prior to encounter.     Current Outpatient Medications on File Prior to Encounter   Medication Sig    acetaminophen (TYLENOL) 500 MG tablet Take 1 tablet (500 mg total) by mouth daily as needed for Pain.    amiodarone (PACERONE) 200 MG Tab Take 1 tablet (200 mg total) by mouth  once daily.    apixaban (ELIQUIS) 5 mg Tab Take 1 tablet (5 mg total) by mouth 2 (two) times daily.    aspirin 81 MG Chew Take 81 mg by mouth once daily.    atorvastatin (LIPITOR) 40 MG tablet     balsalazide (COLAZAL) 750 mg capsule Take 3 capsules (2,250 mg total) by mouth 3 (three) times daily.    cyanocobalamin 1,000 mcg/mL injection Inject 1 mL (1,000 mcg total) into the muscle once a week.    dorzolamide-timolol 2-0.5% (COSOPT) 22.3-6.8 mg/mL ophthalmic solution     DULoxetine (CYMBALTA) 60 MG capsule Take 1 capsule (60 mg total) by mouth once daily.    gabapentin (NEURONTIN) 100 MG capsule Take 1 capsule (100 mg total) by mouth 3 (three) times daily. (Patient not taking: Reported on 3/15/2023)    HYDROcodone-acetaminophen (NORCO)  mg per tablet     metoprolol succinate (TOPROL-XL) 50 MG 24 hr tablet TAKE 1 TABLET BY MOUTH TWICE A DAY    moxifloxacin (VIGAMOX) 0.5 % ophthalmic solution     pantoprazole (PROTONIX) 40 MG tablet Take 1 tablet by mouth once daily at 6am.    potassium chloride SA (K-DUR,KLOR-CON) 20 MEQ tablet Take 2 tablets (40 mEq total) by mouth once daily.    sacubitriL-valsartan (ENTRESTO) 24-26 mg per tablet Take 1 tablet by mouth 2 (two) times daily.    silver sulfADIAZINE 1% (SILVADENE) 1 % cream Apply topically once daily. (Patient not taking: Reported on 3/15/2023)    triamcinolone acetonide 0.1% (KENALOG) 0.1 % cream Apply topically 2 (two) times daily. (Patient not taking: Reported on 2/17/2023)    [DISCONTINUED] digoxin (LANOXIN) 125 mcg tablet Take 1 tablet (0.125 mg total) by mouth once daily.    [DISCONTINUED] nortriptyline (PAMELOR) 10 MG capsule TAKE 1 CAPSULE (10 MG TOTAL) BY MOUTH EVERY EVENING.    [DISCONTINUED] traZODone (DESYREL) 50 MG tablet Take 1 tablet (50 mg total) by mouth nightly as needed.     Family History       Problem Relation (Age of Onset)    Breast cancer Daughter (40), Daughter (47), Sister    Cancer Mother    Colon cancer Maternal Grandmother     Emphysema Father (67)    Glaucoma Paternal Grandmother    Heart attack Maternal Grandmother, Sister    Heart disease Father    Lung cancer Sister    Other Daughter    Retinal detachment Grandchild          Tobacco Use    Smoking status: Never    Smokeless tobacco: Never   Substance and Sexual Activity    Alcohol use: Not on file     Comment: wine coolers occassionally    Drug use: No    Sexual activity: Not Currently     Partners: Female     ROS  Constitutional : Negative  EENT : Negative  CV :  Chest pain  Respiratory : Negative  Gastrointestinal: Negative   Genitourinary: Negative  Musculoskeletal: Negative  Skin : Negative  Neurological : AAO x 3      Objective:     Vital Signs (Most Recent):  Temp: 97.5 °F (36.4 °C) (05/16/23 0908)  Pulse: 75 (05/16/23 1102)  Resp: 18 (05/16/23 1102)  BP: (!) 163/91 (05/16/23 1102)  SpO2: 99 % (05/16/23 1102) Vital Signs (24h Range):  Temp:  [97.5 °F (36.4 °C)] 97.5 °F (36.4 °C)  Pulse:  [70-85] 75  Resp:  [11-20] 18  SpO2:  [98 %-99 %] 99 %  BP: (159-170)/(91-99) 163/91     Weight: 86.2 kg (190 lb)  Body mass index is 31.62 kg/m².    SpO2: 99 %       No intake or output data in the 24 hours ending 05/16/23 1127    Lines/Drains/Airways       Peripheral Intravenous Line  Duration                  Peripheral IV - Single Lumen 05/16/23 0933 22 G Right Hand <1 day                    Physical Exam  General appearance: alert, appears stated age and cooperative  Head: Normocephalic, without obvious abnormality, atraumatic  Eyes: conjunctivae/corneas clear. PERRL  Neck: no carotid bruit, no JVD and supple, symmetrical, trachea midline  Lungs: clear to auscultation bilaterally, normal respiratory effort  Chest Wall: no tenderness  Heart: regular rate and rhythm, S1, S2 normal, no murmur, click, rub or gallop  Abdomen: soft, non-tender; bowel sounds normal; no masses,  no organomegaly  Extremities: Extremities normal, atraumatic, no cyanosis, clubbing, or edema  Pulses: Dorsalis Pedis R:  2+ (normal)/L: 2+ (normal)  Skin: Skin color, texture, turgor normal. No rashes or lesions  Neurologic: Normal mood and affect  Alert and oriented X 3     Significant Labs: BMP:   Recent Labs   Lab 05/14/23 2336 05/16/23  0926   * 104    144   K 3.9 4.5   * 111*   CO2 19* 24   BUN 13 13   CREATININE 0.8 0.8   CALCIUM 8.3* 9.0   MG 1.4*  --    , CMP   Recent Labs   Lab 05/14/23 2336 05/16/23  0926    144   K 3.9 4.5   * 111*   CO2 19* 24   * 104   BUN 13 13   CREATININE 0.8 0.8   CALCIUM 8.3* 9.0   PROT 6.5 7.3   ALBUMIN 3.4* 3.9   BILITOT 0.7 1.3*   ALKPHOS 71 84   AST 33 23   ALT 18 14   ANIONGAP 13 9   , and Troponin   Recent Labs   Lab 05/14/23  2336 05/15/23  0107 05/16/23  0926   TROPONINI 0.113* 0.530* 0.496*         Assessment and Plan:     There are no hospital problems to display for this patient.      VTE Risk Mitigation (From admission, onward)      None            Left heart catheterization March 2022:   Normal coronaries  Normal stress PET    Echocardiogram February 2022:   Ejection fraction 60%   Mild pulmonic regurgitation    Diagnosis:  Chest pain ;Type 2 myocardial ischemia due to uncontrolled BP and recent SVT rate 209 bpm that converted with adenosine. Mild   Recent SVT with low Mag 1.4  H/o Paroxysmal atrial fibrillation RVR with RVR  Hypertension LVEF 60% 2/22  LUE hematoma from IV blood draw 2 d ago     Plan  Continue asa apixaban amiodarone  Increase entresto to 49 mg bid  Add Nitropaste   Consider adding metoprolol, amlodipine  Give 20 mg iv lasix x 1 for mild acute diastolic CHF        Mj Jose NP scribed for Dr Moreno  Cardiology   [facility] - Emergency Dept    I attest that I have personally seen and examined this patient. I have reviewed and discussed the management in detail as outlined above.

## 2023-05-17 ENCOUNTER — PATIENT OUTREACH (OUTPATIENT)
Dept: EMERGENCY MEDICINE | Facility: HOSPITAL | Age: 73
End: 2023-05-17
Payer: MEDICARE

## 2023-05-18 NOTE — PROGRESS NOTES
Patient declined assistance with appts. Pt stated she is still in hopsital, but ER documentation says discharged and there is no bed # under name.    Grace Peters  ED Navigator- [place]  [phone]

## 2023-05-23 ENCOUNTER — PATIENT MESSAGE (OUTPATIENT)
Dept: ADMINISTRATIVE | Facility: CLINIC | Age: 73
End: 2023-05-23
Payer: MEDICARE

## 2023-05-23 ENCOUNTER — PATIENT OUTREACH (OUTPATIENT)
Dept: ADMINISTRATIVE | Facility: CLINIC | Age: 73
End: 2023-05-23
Payer: MEDICARE

## 2023-05-23 ENCOUNTER — EXTERNAL HOSPITAL ADMISSION (OUTPATIENT)
Dept: ADMINISTRATIVE | Facility: CLINIC | Age: 73
End: 2023-05-23
Payer: MEDICARE

## 2023-05-23 ENCOUNTER — CLINICAL SUPPORT (OUTPATIENT)
Dept: INTERNAL MEDICINE | Facility: CLINIC | Age: 73
End: 2023-05-23
Payer: MEDICARE

## 2023-05-23 DIAGNOSIS — E53.8 B12 DEFICIENCY: Primary | ICD-10-CM

## 2023-05-23 PROCEDURE — 96372 THER/PROPH/DIAG INJ SC/IM: CPT | Mod: S$GLB,,, | Performed by: INTERNAL MEDICINE

## 2023-05-23 PROCEDURE — 96372 PR INJECTION,THERAP/PROPH/DIAG2ST, IM OR SUBCUT: ICD-10-PCS | Mod: S$GLB,,, | Performed by: INTERNAL MEDICINE

## 2023-05-23 RX ORDER — CYANOCOBALAMIN 1000 UG/ML
1000 INJECTION, SOLUTION INTRAMUSCULAR; SUBCUTANEOUS
Status: COMPLETED | OUTPATIENT
Start: 2023-05-23 | End: 2023-05-23

## 2023-05-23 RX ADMIN — CYANOCOBALAMIN 1000 MCG: 1000 INJECTION, SOLUTION INTRAMUSCULAR; SUBCUTANEOUS at 09:05

## 2023-05-23 NOTE — PROGRESS NOTES
C3 nurse attempted to contact Celine Sinclair for a TCC post hospital discharge follow up call. No answer. Left voicemail with callback information. The patient has a scheduled appointment with Neeru Hinkle MD (PCP) 6/7/23 @ 10:15am. Message sent to PCP staff to request that this appointment be changed to a HOSFU.

## 2023-05-24 NOTE — PROGRESS NOTES
C3 nurse attempted to contact patient. The following occurred:   C3 nurse attempted to contact Celine Sinclair for a TCC post hospital discharge follow up call. The patient is unable to conduct the call @ this time. The patient requested a callback tomorrow after 1:00PM, as she is in the car at this time and cannot review medications.     The patient has a scheduled HOSFU appointment with Neeru Hinkle MD (PCP) 6/7/23 @ 10:15am.

## 2023-05-25 NOTE — PROGRESS NOTES
C3 nurse attempted to contact patient. The following occurred:   C3 nurse contacted Celine Sinclair for a TCC post hospital discharge follow up call.      The patient has a scheduled HOSFU appointment with Neeru Hinkle MD (PCP) 6/7/23 @ 10:15am.

## 2023-06-07 ENCOUNTER — EXTERNAL HOSPITAL ADMISSION (OUTPATIENT)
Dept: ADMINISTRATIVE | Facility: CLINIC | Age: 73
End: 2023-06-07
Payer: MEDICARE

## 2023-06-07 ENCOUNTER — OFFICE VISIT (OUTPATIENT)
Dept: INTERNAL MEDICINE | Facility: CLINIC | Age: 73
End: 2023-06-07
Payer: MEDICARE

## 2023-06-07 ENCOUNTER — LAB VISIT (OUTPATIENT)
Dept: LAB | Facility: HOSPITAL | Age: 73
End: 2023-06-07
Attending: INTERNAL MEDICINE
Payer: MEDICARE

## 2023-06-07 ENCOUNTER — PATIENT OUTREACH (OUTPATIENT)
Dept: ADMINISTRATIVE | Facility: CLINIC | Age: 73
End: 2023-06-07
Payer: MEDICARE

## 2023-06-07 VITALS
DIASTOLIC BLOOD PRESSURE: 80 MMHG | HEIGHT: 65 IN | OXYGEN SATURATION: 100 % | SYSTOLIC BLOOD PRESSURE: 134 MMHG | BODY MASS INDEX: 31.96 KG/M2 | HEART RATE: 70 BPM | RESPIRATION RATE: 16 BRPM | WEIGHT: 191.81 LBS

## 2023-06-07 DIAGNOSIS — E66.09 CLASS 1 OBESITY DUE TO EXCESS CALORIES WITH SERIOUS COMORBIDITY AND BODY MASS INDEX (BMI) OF 33.0 TO 33.9 IN ADULT: ICD-10-CM

## 2023-06-07 DIAGNOSIS — F03.90 DEMENTIA WITHOUT BEHAVIORAL DISTURBANCE: ICD-10-CM

## 2023-06-07 DIAGNOSIS — I48.0 PAF (PAROXYSMAL ATRIAL FIBRILLATION): ICD-10-CM

## 2023-06-07 DIAGNOSIS — D68.61 ANTIPHOSPHOLIPID SYNDROME: ICD-10-CM

## 2023-06-07 DIAGNOSIS — F33.1 DEPRESSION, MAJOR, RECURRENT, MODERATE: ICD-10-CM

## 2023-06-07 DIAGNOSIS — I70.0 AORTIC ATHEROSCLEROSIS: ICD-10-CM

## 2023-06-07 DIAGNOSIS — M79.7 FIBROMYALGIA: ICD-10-CM

## 2023-06-07 DIAGNOSIS — I42.8 CARDIOMYOPATHY, NONISCHEMIC: ICD-10-CM

## 2023-06-07 DIAGNOSIS — E66.09 CLASS 1 OBESITY DUE TO EXCESS CALORIES WITH SERIOUS COMORBIDITY AND BODY MASS INDEX (BMI) OF 33.0 TO 33.9 IN ADULT: Primary | ICD-10-CM

## 2023-06-07 DIAGNOSIS — K50.819 CROHN'S DISEASE OF SMALL AND LARGE INTESTINES WITH COMPLICATION: ICD-10-CM

## 2023-06-07 PROBLEM — M06.9 RA (RHEUMATOID ARTHRITIS): Status: RESOLVED | Noted: 2020-01-29 | Resolved: 2023-06-07

## 2023-06-07 LAB
ALBUMIN SERPL BCP-MCNC: 4 G/DL (ref 3.5–5.2)
ALP SERPL-CCNC: 83 U/L (ref 55–135)
ALT SERPL W/O P-5'-P-CCNC: 11 U/L (ref 10–44)
ANION GAP SERPL CALC-SCNC: 12 MMOL/L (ref 8–16)
AST SERPL-CCNC: 18 U/L (ref 10–40)
BASOPHILS # BLD AUTO: 0.05 K/UL (ref 0–0.2)
BASOPHILS NFR BLD: 0.6 % (ref 0–1.9)
BILIRUB SERPL-MCNC: 1.1 MG/DL (ref 0.1–1)
BUN SERPL-MCNC: 14 MG/DL (ref 8–23)
CALCIUM SERPL-MCNC: 9.3 MG/DL (ref 8.7–10.5)
CHLORIDE SERPL-SCNC: 110 MMOL/L (ref 95–110)
CHOLEST SERPL-MCNC: 115 MG/DL (ref 120–199)
CHOLEST/HDLC SERPL: 2.3 {RATIO} (ref 2–5)
CO2 SERPL-SCNC: 23 MMOL/L (ref 23–29)
CREAT SERPL-MCNC: 0.8 MG/DL (ref 0.5–1.4)
DIFFERENTIAL METHOD: ABNORMAL
EOSINOPHIL # BLD AUTO: 0.2 K/UL (ref 0–0.5)
EOSINOPHIL NFR BLD: 2.2 % (ref 0–8)
ERYTHROCYTE [DISTWIDTH] IN BLOOD BY AUTOMATED COUNT: 13.5 % (ref 11.5–14.5)
EST. GFR  (NO RACE VARIABLE): >60 ML/MIN/1.73 M^2
GLUCOSE SERPL-MCNC: 92 MG/DL (ref 70–110)
HCT VFR BLD AUTO: 37.1 % (ref 37–48.5)
HDLC SERPL-MCNC: 50 MG/DL (ref 40–75)
HDLC SERPL: 43.5 % (ref 20–50)
HGB BLD-MCNC: 11.4 G/DL (ref 12–16)
IMM GRANULOCYTES # BLD AUTO: 0.03 K/UL (ref 0–0.04)
IMM GRANULOCYTES NFR BLD AUTO: 0.3 % (ref 0–0.5)
LDLC SERPL CALC-MCNC: 45.6 MG/DL (ref 63–159)
LYMPHOCYTES # BLD AUTO: 2.1 K/UL (ref 1–4.8)
LYMPHOCYTES NFR BLD: 24.4 % (ref 18–48)
MCH RBC QN AUTO: 27.7 PG (ref 27–31)
MCHC RBC AUTO-ENTMCNC: 30.7 G/DL (ref 32–36)
MCV RBC AUTO: 90 FL (ref 82–98)
MONOCYTES # BLD AUTO: 0.8 K/UL (ref 0.3–1)
MONOCYTES NFR BLD: 8.7 % (ref 4–15)
NEUTROPHILS # BLD AUTO: 5.6 K/UL (ref 1.8–7.7)
NEUTROPHILS NFR BLD: 63.8 % (ref 38–73)
NONHDLC SERPL-MCNC: 65 MG/DL
NRBC BLD-RTO: 0 /100 WBC
PLATELET # BLD AUTO: 219 K/UL (ref 150–450)
PMV BLD AUTO: 10.5 FL (ref 9.2–12.9)
POTASSIUM SERPL-SCNC: 4.1 MMOL/L (ref 3.5–5.1)
PROT SERPL-MCNC: 7.4 G/DL (ref 6–8.4)
RBC # BLD AUTO: 4.12 M/UL (ref 4–5.4)
SODIUM SERPL-SCNC: 145 MMOL/L (ref 136–145)
TRIGL SERPL-MCNC: 97 MG/DL (ref 30–150)
WBC # BLD AUTO: 8.76 K/UL (ref 3.9–12.7)

## 2023-06-07 PROCEDURE — 99215 PR OFFICE/OUTPT VISIT, EST, LEVL V, 40-54 MIN: ICD-10-PCS | Mod: S$GLB,,, | Performed by: INTERNAL MEDICINE

## 2023-06-07 PROCEDURE — 1101F PT FALLS ASSESS-DOCD LE1/YR: CPT | Mod: CPTII,S$GLB,, | Performed by: INTERNAL MEDICINE

## 2023-06-07 PROCEDURE — 99215 OFFICE O/P EST HI 40 MIN: CPT | Mod: PBBFAC | Performed by: INTERNAL MEDICINE

## 2023-06-07 PROCEDURE — 36415 COLL VENOUS BLD VENIPUNCTURE: CPT | Performed by: INTERNAL MEDICINE

## 2023-06-07 PROCEDURE — 99999 PR PBB SHADOW E&M-EST. PATIENT-LVL V: ICD-10-PCS | Mod: PBBFAC,,, | Performed by: INTERNAL MEDICINE

## 2023-06-07 PROCEDURE — 1159F PR MEDICATION LIST DOCUMENTED IN MEDICAL RECORD: ICD-10-PCS | Mod: CPTII,S$GLB,, | Performed by: INTERNAL MEDICINE

## 2023-06-07 PROCEDURE — 3008F PR BODY MASS INDEX (BMI) DOCUMENTED: ICD-10-PCS | Mod: CPTII,S$GLB,, | Performed by: INTERNAL MEDICINE

## 2023-06-07 PROCEDURE — 80061 LIPID PANEL: CPT | Performed by: INTERNAL MEDICINE

## 2023-06-07 PROCEDURE — 1125F PR PAIN SEVERITY QUANTIFIED, PAIN PRESENT: ICD-10-PCS | Mod: CPTII,S$GLB,, | Performed by: INTERNAL MEDICINE

## 2023-06-07 PROCEDURE — 3075F PR MOST RECENT SYSTOLIC BLOOD PRESS GE 130-139MM HG: ICD-10-PCS | Mod: CPTII,S$GLB,, | Performed by: INTERNAL MEDICINE

## 2023-06-07 PROCEDURE — 1125F AMNT PAIN NOTED PAIN PRSNT: CPT | Mod: CPTII,S$GLB,, | Performed by: INTERNAL MEDICINE

## 2023-06-07 PROCEDURE — 3008F BODY MASS INDEX DOCD: CPT | Mod: CPTII,S$GLB,, | Performed by: INTERNAL MEDICINE

## 2023-06-07 PROCEDURE — 3075F SYST BP GE 130 - 139MM HG: CPT | Mod: CPTII,S$GLB,, | Performed by: INTERNAL MEDICINE

## 2023-06-07 PROCEDURE — 99999 PR PBB SHADOW E&M-EST. PATIENT-LVL V: CPT | Mod: PBBFAC,,, | Performed by: INTERNAL MEDICINE

## 2023-06-07 PROCEDURE — 4010F PR ACE/ARB THEARPY RXD/TAKEN: ICD-10-PCS | Mod: CPTII,S$GLB,, | Performed by: INTERNAL MEDICINE

## 2023-06-07 PROCEDURE — 1159F MED LIST DOCD IN RCRD: CPT | Mod: CPTII,S$GLB,, | Performed by: INTERNAL MEDICINE

## 2023-06-07 PROCEDURE — 1101F PR PT FALLS ASSESS DOC 0-1 FALLS W/OUT INJ PAST YR: ICD-10-PCS | Mod: CPTII,S$GLB,, | Performed by: INTERNAL MEDICINE

## 2023-06-07 PROCEDURE — 3079F PR MOST RECENT DIASTOLIC BLOOD PRESSURE 80-89 MM HG: ICD-10-PCS | Mod: CPTII,S$GLB,, | Performed by: INTERNAL MEDICINE

## 2023-06-07 PROCEDURE — 1160F PR REVIEW ALL MEDS BY PRESCRIBER/CLIN PHARMACIST DOCUMENTED: ICD-10-PCS | Mod: CPTII,S$GLB,, | Performed by: INTERNAL MEDICINE

## 2023-06-07 PROCEDURE — 80053 COMPREHEN METABOLIC PANEL: CPT | Performed by: INTERNAL MEDICINE

## 2023-06-07 PROCEDURE — 99215 OFFICE O/P EST HI 40 MIN: CPT | Mod: S$GLB,,, | Performed by: INTERNAL MEDICINE

## 2023-06-07 PROCEDURE — 1160F RVW MEDS BY RX/DR IN RCRD: CPT | Mod: CPTII,S$GLB,, | Performed by: INTERNAL MEDICINE

## 2023-06-07 PROCEDURE — 85025 COMPLETE CBC W/AUTO DIFF WBC: CPT | Performed by: INTERNAL MEDICINE

## 2023-06-07 PROCEDURE — 4010F ACE/ARB THERAPY RXD/TAKEN: CPT | Mod: CPTII,S$GLB,, | Performed by: INTERNAL MEDICINE

## 2023-06-07 PROCEDURE — 3288F PR FALLS RISK ASSESSMENT DOCUMENTED: ICD-10-PCS | Mod: CPTII,S$GLB,, | Performed by: INTERNAL MEDICINE

## 2023-06-07 PROCEDURE — 3288F FALL RISK ASSESSMENT DOCD: CPT | Mod: CPTII,S$GLB,, | Performed by: INTERNAL MEDICINE

## 2023-06-07 PROCEDURE — 3079F DIAST BP 80-89 MM HG: CPT | Mod: CPTII,S$GLB,, | Performed by: INTERNAL MEDICINE

## 2023-06-07 RX ORDER — SPIRONOLACTONE 25 MG/1
12.5 TABLET ORAL DAILY
COMMUNITY

## 2023-06-07 RX ORDER — BISACODYL 5 MG
TABLET, DELAYED RELEASE (ENTERIC COATED) ORAL DAILY PRN
COMMUNITY
Start: 2022-08-18 | End: 2023-09-08

## 2023-06-07 RX ORDER — DAPAGLIFLOZIN 10 MG/1
10 TABLET, FILM COATED ORAL
COMMUNITY
Start: 2023-05-25 | End: 2023-09-08

## 2023-06-07 RX ORDER — DIGOXIN 125 MCG
0.12 TABLET ORAL EVERY OTHER DAY
Status: ON HOLD | COMMUNITY
Start: 2023-06-03 | End: 2023-06-28

## 2023-06-07 NOTE — PROGRESS NOTES
Subjective:       Patient ID: Celine Sinclair is a 72 y.o. female.    Chief Complaint: Follow-up and Hospital Follow Up (Dx of SVT from [facility]. Upcoming heart SX in July)      HPI:  Patient is known to me and presents for follow up HTN, antiphospholipid syndrome, fibromyalgia, MDD/anxiety, Crohn's disease, h/o atrial fibrillation. No new labs piror to today's visit but labs from ER visit 5/16/23 personally reviewed, interpreted and discussed with the patient today.      Fibromyalgia and anxiety: on cymbalta to 60mg daily . She still deals with chronic aches and apin but no new pain complaints today. Overall sx stable     HTN: on metoprolol for h/o a-fib. BP today is well controlled. Denies chest pains, SOB, LE edema.      HLD: on atorvastatin 40mg. She does see cardiology but can't remember who she is seeing; unsure when last labs done there.      Antiphospholipid: was on coumadin; now on eliquis for this and a-fib. Denies abnormal bleeding.      A-fib: She also has h/o polymorphic VT and SVT/flutter with aberrancy. Follows with cardiology. She is on amiodarone and recently started on digoxin. She continues to have SVT/RVR and reports planned for ablation with [facility] in July.she is currently wearing heart monitor.  remains on metoprolol and eliquis and ASA therapy.     CHF: I do not have documented history of CHF. However, new medications since last vist include Entresto, farxiga and aldactone. This would suggest low EF. I will obtain outside records from [facility] to confirm accurate diagnosis for chart. No signs of volume overload on exam today.     Past Medical History:   Diagnosis Date    Aortic atherosclerosis     Atrial fibrillation     Benign essential hypertension     Cataract     Senile    Crohn's colitis     Depression, major, recurrent, moderate     Fibromyalgia     GERD (gastroesophageal reflux disease)     Hypertensive cardiomyopathy     IBS (irritable bowel syndrome)     Migraine     Opioid abuse, in remission      Orbital cellulitis on left     Osteopenia     Paget disease of bone     Port-A-Cath in place     right chest    Prolonged QT interval     RA (rheumatoid arthritis)     Sedative hypnotic or anxiolytic dependence     in remission        Family History   Problem Relation Age of Onset    Glaucoma Paternal Grandmother     Other Daughter         Diabetic Retinopathy    Breast cancer Daughter 40    Retinal detachment Grandchild     Heart attack Maternal Grandmother     Colon cancer Maternal Grandmother     Cancer Mother         Unknown type    Emphysema Father 67    Heart disease Father     Lung cancer Sister     Heart attack Sister     Breast cancer Daughter 47    Breast cancer Sister     Amblyopia Neg Hx     Blindness Neg Hx     Strabismus Neg Hx     Macular degeneration Neg Hx     Cataracts Neg Hx        Social History     Socioeconomic History    Marital status:     Number of children: 12   Occupational History    Occupation: [employer]     Comment: Retired/disabled   Tobacco Use    Smoking status: Never    Smokeless tobacco: Never   Substance and Sexual Activity    Drug use: No    Sexual activity: Not Currently     Partners: Female   Social History Narrative    Patient gave birth to 7 children, adopted 2 and has 3 stepchildren with her .       Review of Systems   Constitutional:  Positive for fatigue. Negative for activity change, fever and unexpected weight change.   HENT:  Negative for congestion, ear pain, hearing loss, rhinorrhea and sore throat.    Eyes:  Negative for pain, redness and visual disturbance.   Respiratory:  Negative for cough, shortness of breath and wheezing.    Cardiovascular:  Negative for chest pain, palpitations and leg swelling.   Gastrointestinal:  Negative for abdominal pain, constipation, diarrhea, nausea and vomiting.   Genitourinary:  Negative for dysuria, frequency and urgency.   Musculoskeletal:  Positive for arthralgias and myalgias. Negative for back pain, joint  swelling and neck pain.   Skin:  Negative for color change, rash and wound.   Neurological:  Positive for weakness (generalized). Negative for dizziness, light-headedness and headaches.       Objective:      Physical Exam  Vitals reviewed.   Constitutional:       General: She is not in acute distress.     Appearance: She is well-developed.   HENT:      Head: Normocephalic and atraumatic.      Right Ear: External ear normal.      Left Ear: External ear normal.      Nose: Nose normal.   Eyes:      General:         Right eye: No discharge.         Left eye: No discharge.      Extraocular Movements: Extraocular movements intact.      Conjunctiva/sclera: Conjunctivae normal.   Neck:      Thyroid: No thyromegaly.   Cardiovascular:      Rate and Rhythm: Normal rate and regular rhythm.      Heart sounds: No murmur heard.  Pulmonary:      Effort: Pulmonary effort is normal. No respiratory distress.      Breath sounds: Normal breath sounds. No wheezing.   Abdominal:      General: Bowel sounds are normal. There is no distension.      Palpations: Abdomen is soft.      Tenderness: There is no abdominal tenderness.   Musculoskeletal:      Right lower leg: No edema.      Left lower leg: No edema.   Skin:     General: Skin is warm and dry.   Neurological:      Mental Status: She is alert and oriented to person, place, and time.      Cranial Nerves: No cranial nerve deficit.   Psychiatric:         Behavior: Behavior normal.         Thought Content: Thought content normal.       Assessment:       1. Class 1 obesity due to excess calories with serious comorbidity and body mass index (BMI) of 33.0 to 33.9 in adult    2. Crohn's disease of small and large intestines with complication    3. Antiphospholipid syndrome    4. Depression, major, recurrent, moderate    5. Cardiomyopathy, nonischemic    6. Dementia without behavioral disturbance    7. Fibromyalgia    8. PAF (paroxysmal atrial fibrillation)    9. Aortic atherosclerosis         Plan:       1. Class 1 obesity due to excess calories with serious comorbidity and body mass index (BMI) of 33.0 to 33.9 in adult  Chronic stable  Diet, exercise, weight loss discussed  -     CBC Auto Differential; Future; Expected date: 06/07/2023  -     Comprehensive Metabolic Panel; Future; Expected date: 06/07/2023  -     Lipid Panel; Future; Expected date: 06/07/2023    2. Crohn's disease of small and large intestines with complication  Chronic stable  Cont GI follow up as planned  Cont meds per GI recommendations  No acute flares  -     CBC Auto Differential; Future; Expected date: 06/07/2023  -     Comprehensive Metabolic Panel; Future; Expected date: 06/07/2023  -     Lipid Panel; Future; Expected date: 06/07/2023    3. Antiphospholipid syndrome  Chronic stable  On eliquis for a-fib  -     CBC Auto Differential; Future; Expected date: 06/07/2023  -     Comprehensive Metabolic Panel; Future; Expected date: 06/07/2023  -     Lipid Panel; Future; Expected date: 06/07/2023    4. Depression, major, recurrent, moderate  Chronic stable  Cont cymbalta same dose  -     CBC Auto Differential; Future; Expected date: 06/07/2023  -     Comprehensive Metabolic Panel; Future; Expected date: 06/07/2023  -     Lipid Panel; Future; Expected date: 06/07/2023    5. Cardiomyopathy, nonischemic  Chronic stable  Presumed low EF given recnet med additions from [facility]. Will obtain outside TTE  No signs of volume overload  Cont meds same dose  -     CBC Auto Differential; Future; Expected date: 06/07/2023  -     Comprehensive Metabolic Panel; Future; Expected date: 06/07/2023  -     Lipid Panel; Future; Expected date: 06/07/2023    6. Dementia without behavioral disturbance  Chornic stable  Still with some memory loss per patient  Cont neuro follow up as planned  -     CBC Auto Differential; Future; Expected date: 06/07/2023  -     Comprehensive Metabolic Panel; Future; Expected date: 06/07/2023    7. Fibromyalgia  Chronic  stable  Cont meds same dose  -     CBC Auto Differential; Future; Expected date: 06/07/2023    8. PAF (paroxysmal atrial fibrillation)  Chronic uncontrolled  On amio and digoxin per [facility], cont same dose  Cont BB  Cont eliquis  Follow up [facility] as planned---planned for ablation next months  -     CBC Auto Differential; Future; Expected date: 06/07/2023  -     Comprehensive Metabolic Panel; Future; Expected date: 06/07/2023  -     Lipid Panel; Future; Expected date: 06/07/2023    9. Aortic atherosclerosis  Chronic stable  Noted on prior imaging  Cont statin therapy  -     Comprehensive Metabolic Panel; Future; Expected date: 06/07/2023  -     Lipid Panel; Future; Expected date: 06/07/2023       RTC 6 months with labs and PRN

## 2023-06-20 ENCOUNTER — DOCUMENT SCAN (OUTPATIENT)
Dept: HOME HEALTH SERVICES | Facility: HOSPITAL | Age: 73
End: 2023-06-20
Payer: MEDICARE

## 2023-06-28 PROBLEM — I48.92 ATRIAL FLUTTER: Status: ACTIVE | Noted: 2023-06-28

## 2023-07-21 LAB
CHOLEST SERPL-MSCNC: 104 MG/DL (ref 0–200)
CHOLEST/HDLC SERPL: 2.1 {RATIO}
EGFR: 82.3
HDLC SERPL-MCNC: 50 MG/DL (ref 35–70)
LDL CHOLESTEROL DIRECT: 43 MG/DL
NON HDL CHOL. (LDL+VLDL): 54
TRIGL SERPL-MCNC: 93 MG/DL (ref 40–160)
VLDLC SERPL-MCNC: 19 MG/DL

## 2023-07-21 PROCEDURE — G0179 MD RECERTIFICATION HHA PT: HCPCS | Mod: ,,, | Performed by: INTERNAL MEDICINE

## 2023-07-21 PROCEDURE — G0179 PR HOME HEALTH MD RECERTIFICATION: ICD-10-PCS | Mod: ,,, | Performed by: INTERNAL MEDICINE

## 2023-07-25 ENCOUNTER — DOCUMENT SCAN (OUTPATIENT)
Dept: HOME HEALTH SERVICES | Facility: HOSPITAL | Age: 73
End: 2023-07-25
Payer: MEDICARE

## 2023-08-01 ENCOUNTER — TELEPHONE (OUTPATIENT)
Dept: INTERNAL MEDICINE | Facility: CLINIC | Age: 73
End: 2023-08-01
Payer: MEDICARE

## 2023-08-01 NOTE — TELEPHONE ENCOUNTER
----- Message from Ame Fitzpatrick sent at 2023 10:38 AM CDT -----  Contact: pt  Celine Sinclair  MRN: [id number]  : 1950  PCP: Neeru Hinkle  Home Phone      [phone]  Work Phone      Not on file.  Mobile          [phone]      MESSAGE: Celine has an appt scheduled for tomorrow but she states she had an angiogram yesterday but no blockages were found so they didn't put a stint. Two week prior she had an obligation. Pt is asking if something can be called in for the pain. I explained the doctor may not call anything in due to the fact she didn't preform any of the test. I did tell the pt she might want to check with the physicians who preformed the procedures.       [phone]

## 2023-08-01 NOTE — TELEPHONE ENCOUNTER
Patient c/o chest tightness and pain. Instructed patient to contact cardiologist since she had procedure done with them yesterday

## 2023-08-02 ENCOUNTER — HOSPITAL ENCOUNTER (OUTPATIENT)
Dept: PULMONOLOGY | Facility: HOSPITAL | Age: 73
Discharge: HOME | End: 2023-08-02
Attending: INTERNAL MEDICINE
Payer: MEDICARE

## 2023-08-02 ENCOUNTER — OFFICE VISIT (OUTPATIENT)
Dept: INTERNAL MEDICINE | Facility: CLINIC | Age: 73
End: 2023-08-02
Payer: MEDICARE

## 2023-08-02 VITALS
HEART RATE: 57 BPM | RESPIRATION RATE: 16 BRPM | BODY MASS INDEX: 31.22 KG/M2 | WEIGHT: 187.38 LBS | DIASTOLIC BLOOD PRESSURE: 74 MMHG | OXYGEN SATURATION: 99 % | HEIGHT: 65 IN | SYSTOLIC BLOOD PRESSURE: 122 MMHG

## 2023-08-02 DIAGNOSIS — R06.09 DOE (DYSPNEA ON EXERTION): ICD-10-CM

## 2023-08-02 DIAGNOSIS — R07.9 CHEST PAIN, UNSPECIFIED TYPE: ICD-10-CM

## 2023-08-02 DIAGNOSIS — R07.9 CHEST PAIN, UNSPECIFIED TYPE: Primary | ICD-10-CM

## 2023-08-02 PROCEDURE — 1101F PR PT FALLS ASSESS DOC 0-1 FALLS W/OUT INJ PAST YR: ICD-10-PCS | Mod: HCNC,CPTII,S$GLB, | Performed by: INTERNAL MEDICINE

## 2023-08-02 PROCEDURE — 1125F AMNT PAIN NOTED PAIN PRSNT: CPT | Mod: HCNC,CPTII,S$GLB, | Performed by: INTERNAL MEDICINE

## 2023-08-02 PROCEDURE — 93010 EKG 12-LEAD: ICD-10-PCS | Mod: HCNC,,, | Performed by: INTERNAL MEDICINE

## 2023-08-02 PROCEDURE — 99213 PR OFFICE/OUTPT VISIT, EST, LEVL III, 20-29 MIN: ICD-10-PCS | Mod: HCNC,S$GLB,, | Performed by: INTERNAL MEDICINE

## 2023-08-02 PROCEDURE — 93010 ELECTROCARDIOGRAM REPORT: CPT | Mod: HCNC,,, | Performed by: INTERNAL MEDICINE

## 2023-08-02 PROCEDURE — 3074F SYST BP LT 130 MM HG: CPT | Mod: HCNC,CPTII,S$GLB, | Performed by: INTERNAL MEDICINE

## 2023-08-02 PROCEDURE — 99999 PR PBB SHADOW E&M-EST. PATIENT-LVL IV: CPT | Mod: PBBFAC,HCNC,, | Performed by: INTERNAL MEDICINE

## 2023-08-02 PROCEDURE — 99213 OFFICE O/P EST LOW 20 MIN: CPT | Mod: HCNC,S$GLB,, | Performed by: INTERNAL MEDICINE

## 2023-08-02 PROCEDURE — 1159F MED LIST DOCD IN RCRD: CPT | Mod: HCNC,CPTII,S$GLB, | Performed by: INTERNAL MEDICINE

## 2023-08-02 PROCEDURE — 3074F PR MOST RECENT SYSTOLIC BLOOD PRESSURE < 130 MM HG: ICD-10-PCS | Mod: HCNC,CPTII,S$GLB, | Performed by: INTERNAL MEDICINE

## 2023-08-02 PROCEDURE — 1160F RVW MEDS BY RX/DR IN RCRD: CPT | Mod: HCNC,CPTII,S$GLB, | Performed by: INTERNAL MEDICINE

## 2023-08-02 PROCEDURE — 3288F FALL RISK ASSESSMENT DOCD: CPT | Mod: HCNC,CPTII,S$GLB, | Performed by: INTERNAL MEDICINE

## 2023-08-02 PROCEDURE — 1101F PT FALLS ASSESS-DOCD LE1/YR: CPT | Mod: HCNC,CPTII,S$GLB, | Performed by: INTERNAL MEDICINE

## 2023-08-02 PROCEDURE — 93005 ELECTROCARDIOGRAM TRACING: CPT | Mod: HCNC

## 2023-08-02 PROCEDURE — 1159F PR MEDICATION LIST DOCUMENTED IN MEDICAL RECORD: ICD-10-PCS | Mod: HCNC,CPTII,S$GLB, | Performed by: INTERNAL MEDICINE

## 2023-08-02 PROCEDURE — 1160F PR REVIEW ALL MEDS BY PRESCRIBER/CLIN PHARMACIST DOCUMENTED: ICD-10-PCS | Mod: HCNC,CPTII,S$GLB, | Performed by: INTERNAL MEDICINE

## 2023-08-02 PROCEDURE — 4010F ACE/ARB THERAPY RXD/TAKEN: CPT | Mod: HCNC,CPTII,S$GLB, | Performed by: INTERNAL MEDICINE

## 2023-08-02 PROCEDURE — 99999 PR PBB SHADOW E&M-EST. PATIENT-LVL IV: ICD-10-PCS | Mod: PBBFAC,HCNC,, | Performed by: INTERNAL MEDICINE

## 2023-08-02 PROCEDURE — 99214 OFFICE O/P EST MOD 30 MIN: CPT | Mod: PBBFAC,HCNC | Performed by: INTERNAL MEDICINE

## 2023-08-02 PROCEDURE — 3008F BODY MASS INDEX DOCD: CPT | Mod: HCNC,CPTII,S$GLB, | Performed by: INTERNAL MEDICINE

## 2023-08-02 PROCEDURE — 1125F PR PAIN SEVERITY QUANTIFIED, PAIN PRESENT: ICD-10-PCS | Mod: HCNC,CPTII,S$GLB, | Performed by: INTERNAL MEDICINE

## 2023-08-02 PROCEDURE — 3078F PR MOST RECENT DIASTOLIC BLOOD PRESSURE < 80 MM HG: ICD-10-PCS | Mod: HCNC,CPTII,S$GLB, | Performed by: INTERNAL MEDICINE

## 2023-08-02 PROCEDURE — 3288F PR FALLS RISK ASSESSMENT DOCUMENTED: ICD-10-PCS | Mod: HCNC,CPTII,S$GLB, | Performed by: INTERNAL MEDICINE

## 2023-08-02 PROCEDURE — 4010F PR ACE/ARB THEARPY RXD/TAKEN: ICD-10-PCS | Mod: HCNC,CPTII,S$GLB, | Performed by: INTERNAL MEDICINE

## 2023-08-02 PROCEDURE — 3008F PR BODY MASS INDEX (BMI) DOCUMENTED: ICD-10-PCS | Mod: HCNC,CPTII,S$GLB, | Performed by: INTERNAL MEDICINE

## 2023-08-02 PROCEDURE — 3078F DIAST BP <80 MM HG: CPT | Mod: HCNC,CPTII,S$GLB, | Performed by: INTERNAL MEDICINE

## 2023-08-02 NOTE — PROGRESS NOTES
"Subjective:       Patient ID: Celine Sinclair is a 72 y.o. female.    Chief Complaint: Follow-up and Shortness of Breath      HPI:  Patient is known to me and presents to discuss outside test results. She was seen at [facility]/23 for chest pain and SOB. She reports CXR and labs were all normal and told 'It's not your heart". She reports constant chest pain since that time. She feels pain in center of her chest going to her back. Does not radiate. Feels like pressure. Can't take a deep breath, feels pain and SOB at rest but does get worse with walking. NO dizziness, lightheadedness or syncope. Reports intermittent palpitations, h/o a-fib.  Having pain currently in clinic. EKG done in clinic and personally interpreted by myself: NSR, no acute ST T wave changes.   She has reproducible pain with palpation on exam today.          Past Medical History:   Diagnosis Date    Aortic atherosclerosis     Atrial fibrillation     Benign essential hypertension     Cataract     Senile    Chest pain     CHF (congestive heart failure)     Crohn's colitis     Depression, major, recurrent, moderate     Encounter for blood transfusion     Fibromyalgia     GERD (gastroesophageal reflux disease)     Hypertensive cardiomyopathy     IBS (irritable bowel syndrome)     Migraine     Opioid abuse, in remission     Orbital cellulitis on left     Osteopenia     Paget disease of bone     Palpitations     Port-A-Cath in place     right chest    Prolonged QT interval     RA (rheumatoid arthritis)     Sedative hypnotic or anxiolytic dependence     in remission     SOB (shortness of breath)     SVT (supraventricular tachycardia)     Unspecified urinary incontinence        Family History   Problem Relation Age of Onset    Glaucoma Paternal Grandmother     Other Daughter         Diabetic Retinopathy    Breast cancer Daughter 40    Retinal detachment Grandchild     Heart attack Maternal Grandmother     Colon cancer Maternal Grandmother     Cancer Mother  "        Unknown type    Emphysema Father 67    Heart disease Father     Lung cancer Sister     Heart attack Sister     Breast cancer Daughter 47    Breast cancer Sister     Amblyopia Neg Hx     Blindness Neg Hx     Strabismus Neg Hx     Macular degeneration Neg Hx     Cataracts Neg Hx        Social History     Socioeconomic History    Marital status:     Number of children: 12   Occupational History    Occupation: [employer]     Comment: Retired/disabled   Tobacco Use    Smoking status: Never    Smokeless tobacco: Never   Substance and Sexual Activity    Alcohol use: Yes     Comment: wine coolers occassionally    Drug use: No    Sexual activity: Not Currently     Partners: Female   Social History Narrative    Patient gave birth to 7 children, adopted 2 and has 3 stepchildren with her .     Social Determinants of Health     Financial Resource Strain: Low Risk  (6/29/2023)    Overall Financial Resource Strain (CARDIA)     Difficulty of Paying Living Expenses: Not very hard   Food Insecurity: No Food Insecurity (6/29/2023)    Hunger Vital Sign     Worried About Running Out of Food in the Last Year: Never true     Ran Out of Food in the Last Year: Never true   Transportation Needs: No Transportation Needs (6/29/2023)    PRAPARE - Transportation     Lack of Transportation (Medical): No     Lack of Transportation (Non-Medical): No   Physical Activity: Inactive (6/29/2023)    Exercise Vital Sign     Days of Exercise per Week: 0 days     Minutes of Exercise per Session: 0 min   Stress: No Stress Concern Present (6/29/2023)    [place] [employer] - Occupational Stress Questionnaire     Feeling of Stress : Only a little   Social Connections: Unknown (6/29/2023)    Social Connection and Isolation Panel [NHANES]     Frequency of Communication with Friends and Family: More than three times a week     Frequency of Social Gatherings with Friends and Family: More than three times a week      Marital Status:    Housing Stability: Low Risk  (6/29/2023)    Housing Stability Vital Sign     Unable to Pay for Housing in the Last Year: No     Number of Places Lived in the Last Year: 1     Unstable Housing in the Last Year: No       Review of Systems   Constitutional:  Negative for activity change, fatigue, fever and unexpected weight change.   HENT:  Negative for congestion, ear pain, hearing loss, rhinorrhea and sore throat.    Eyes:  Negative for pain, redness and visual disturbance.   Respiratory:  Positive for shortness of breath. Negative for cough and wheezing.    Cardiovascular:  Positive for chest pain. Negative for palpitations and leg swelling.   Gastrointestinal:  Negative for abdominal pain, constipation, diarrhea, nausea and vomiting.   Genitourinary:  Negative for dysuria, frequency and urgency.   Musculoskeletal:  Negative for back pain, joint swelling and neck pain.   Skin:  Negative for color change, rash and wound.   Neurological:  Negative for dizziness, tremors, weakness, light-headedness and headaches.         Objective:      Physical Exam  Vitals reviewed.   Constitutional:       General: She is not in acute distress.     Appearance: She is well-developed.   HENT:      Head: Normocephalic and atraumatic.      Right Ear: External ear normal.      Left Ear: External ear normal.      Nose: Nose normal.   Eyes:      General:         Right eye: No discharge.         Left eye: No discharge.      Conjunctiva/sclera: Conjunctivae normal.   Neck:      Thyroid: No thyromegaly.   Cardiovascular:      Rate and Rhythm: Normal rate and regular rhythm.      Heart sounds: No murmur heard.  Pulmonary:      Effort: Pulmonary effort is normal. No respiratory distress.      Breath sounds: Normal breath sounds.   Chest:      Chest wall: Tenderness (diffuse and reports is the exact pain she is feeling) present.   Skin:     General: Skin is warm and dry.   Neurological:      Mental Status: She is alert  and oriented to person, place, and time. Mental status is at baseline.   Psychiatric:         Behavior: Behavior normal.         Thought Content: Thought content normal.         Assessment:       1. Chest pain, unspecified type    2. IQBAL (dyspnea on exertion)        Plan:       1. Chest pain, unspecified type  Acute problem  -     EKG 12-lead; Future; Expected date: 08/02/2023  -     TROPONIN I; Future; Expected date: 08/02/2023  -     D-DIMER, QUANTITATIVE; Future; Expected date: 08/02/2023    2. IQBAL (dyspnea on exertion)  Acute problem  -     D-DIMER, QUANTITATIVE; Future; Expected date: 08/02/2023       EKG without acute ST T wave changes  Sent to lab for cardiac enzymes, d-dimer  Vitals are completely stable: normal BP, HR and O2 sats in clinic and no signs of distress  She has reproducible chest pain on exam today which is why I do not think needs repeat ER eval 48 hours later after negative initial eval. However, does warrant due diligence of repeating work up.  ER precautions discussed at length  Ddx: costochondritis, ACS, PE, dissection, GERD  At this time, I feel presentation most consistent with costochondritis for which she can take PRN Tylenol, heating pads, etc for pain control.

## 2023-08-07 ENCOUNTER — EXTERNAL HOME HEALTH (OUTPATIENT)
Dept: HOME HEALTH SERVICES | Facility: HOSPITAL | Age: 73
End: 2023-08-07
Payer: MEDICARE

## 2023-08-07 LAB — EGFR: 82.3

## 2023-08-09 ENCOUNTER — HOSPITAL ENCOUNTER (OUTPATIENT)
Dept: RADIOLOGY | Facility: HOSPITAL | Age: 73
Discharge: HOME | End: 2023-08-09
Attending: INTERNAL MEDICINE
Payer: MEDICARE

## 2023-08-09 ENCOUNTER — OFFICE VISIT (OUTPATIENT)
Dept: INTERNAL MEDICINE | Facility: CLINIC | Age: 73
End: 2023-08-09
Payer: MEDICARE

## 2023-08-09 VITALS
DIASTOLIC BLOOD PRESSURE: 84 MMHG | HEIGHT: 65 IN | SYSTOLIC BLOOD PRESSURE: 128 MMHG | WEIGHT: 187 LBS | RESPIRATION RATE: 16 BRPM | OXYGEN SATURATION: 98 % | HEART RATE: 66 BPM | BODY MASS INDEX: 31.16 KG/M2

## 2023-08-09 DIAGNOSIS — M94.0 COSTOCHONDRITIS: Primary | ICD-10-CM

## 2023-08-09 DIAGNOSIS — R06.2 WHEEZING: ICD-10-CM

## 2023-08-09 DIAGNOSIS — R05.9 COUGH, UNSPECIFIED TYPE: ICD-10-CM

## 2023-08-09 PROCEDURE — 99999 PR PBB SHADOW E&M-EST. PATIENT-LVL III: ICD-10-PCS | Mod: PBBFAC,HCNC,, | Performed by: INTERNAL MEDICINE

## 2023-08-09 PROCEDURE — 3079F PR MOST RECENT DIASTOLIC BLOOD PRESSURE 80-89 MM HG: ICD-10-PCS | Mod: HCNC,CPTII,S$GLB, | Performed by: INTERNAL MEDICINE

## 2023-08-09 PROCEDURE — 3008F BODY MASS INDEX DOCD: CPT | Mod: HCNC,CPTII,S$GLB, | Performed by: INTERNAL MEDICINE

## 2023-08-09 PROCEDURE — 71046 X-RAY EXAM CHEST 2 VIEWS: CPT | Mod: TC,HCNC

## 2023-08-09 PROCEDURE — 3074F PR MOST RECENT SYSTOLIC BLOOD PRESSURE < 130 MM HG: ICD-10-PCS | Mod: HCNC,CPTII,S$GLB, | Performed by: INTERNAL MEDICINE

## 2023-08-09 PROCEDURE — 4010F ACE/ARB THERAPY RXD/TAKEN: CPT | Mod: HCNC,CPTII,S$GLB, | Performed by: INTERNAL MEDICINE

## 2023-08-09 PROCEDURE — 1159F MED LIST DOCD IN RCRD: CPT | Mod: HCNC,CPTII,S$GLB, | Performed by: INTERNAL MEDICINE

## 2023-08-09 PROCEDURE — 71046 XR CHEST PA AND LATERAL: ICD-10-PCS | Mod: 26,HCNC,, | Performed by: RADIOLOGY

## 2023-08-09 PROCEDURE — 3074F SYST BP LT 130 MM HG: CPT | Mod: HCNC,CPTII,S$GLB, | Performed by: INTERNAL MEDICINE

## 2023-08-09 PROCEDURE — 71046 X-RAY EXAM CHEST 2 VIEWS: CPT | Mod: 26,HCNC,, | Performed by: RADIOLOGY

## 2023-08-09 PROCEDURE — 99214 PR OFFICE/OUTPT VISIT, EST, LEVL IV, 30-39 MIN: ICD-10-PCS | Mod: HCNC,S$GLB,, | Performed by: INTERNAL MEDICINE

## 2023-08-09 PROCEDURE — 99213 OFFICE O/P EST LOW 20 MIN: CPT | Mod: PBBFAC,HCNC,25 | Performed by: INTERNAL MEDICINE

## 2023-08-09 PROCEDURE — 1160F RVW MEDS BY RX/DR IN RCRD: CPT | Mod: HCNC,CPTII,S$GLB, | Performed by: INTERNAL MEDICINE

## 2023-08-09 PROCEDURE — 99214 OFFICE O/P EST MOD 30 MIN: CPT | Mod: HCNC,S$GLB,, | Performed by: INTERNAL MEDICINE

## 2023-08-09 PROCEDURE — 1101F PT FALLS ASSESS-DOCD LE1/YR: CPT | Mod: HCNC,CPTII,S$GLB, | Performed by: INTERNAL MEDICINE

## 2023-08-09 PROCEDURE — 1101F PR PT FALLS ASSESS DOC 0-1 FALLS W/OUT INJ PAST YR: ICD-10-PCS | Mod: HCNC,CPTII,S$GLB, | Performed by: INTERNAL MEDICINE

## 2023-08-09 PROCEDURE — 3288F FALL RISK ASSESSMENT DOCD: CPT | Mod: HCNC,CPTII,S$GLB, | Performed by: INTERNAL MEDICINE

## 2023-08-09 PROCEDURE — 1125F AMNT PAIN NOTED PAIN PRSNT: CPT | Mod: HCNC,CPTII,S$GLB, | Performed by: INTERNAL MEDICINE

## 2023-08-09 PROCEDURE — 3288F PR FALLS RISK ASSESSMENT DOCUMENTED: ICD-10-PCS | Mod: HCNC,CPTII,S$GLB, | Performed by: INTERNAL MEDICINE

## 2023-08-09 PROCEDURE — 1159F PR MEDICATION LIST DOCUMENTED IN MEDICAL RECORD: ICD-10-PCS | Mod: HCNC,CPTII,S$GLB, | Performed by: INTERNAL MEDICINE

## 2023-08-09 PROCEDURE — 1125F PR PAIN SEVERITY QUANTIFIED, PAIN PRESENT: ICD-10-PCS | Mod: HCNC,CPTII,S$GLB, | Performed by: INTERNAL MEDICINE

## 2023-08-09 PROCEDURE — 4010F PR ACE/ARB THEARPY RXD/TAKEN: ICD-10-PCS | Mod: HCNC,CPTII,S$GLB, | Performed by: INTERNAL MEDICINE

## 2023-08-09 PROCEDURE — 3008F PR BODY MASS INDEX (BMI) DOCUMENTED: ICD-10-PCS | Mod: HCNC,CPTII,S$GLB, | Performed by: INTERNAL MEDICINE

## 2023-08-09 PROCEDURE — 99999 PR PBB SHADOW E&M-EST. PATIENT-LVL III: CPT | Mod: PBBFAC,HCNC,, | Performed by: INTERNAL MEDICINE

## 2023-08-09 PROCEDURE — 1160F PR REVIEW ALL MEDS BY PRESCRIBER/CLIN PHARMACIST DOCUMENTED: ICD-10-PCS | Mod: HCNC,CPTII,S$GLB, | Performed by: INTERNAL MEDICINE

## 2023-08-09 PROCEDURE — 3079F DIAST BP 80-89 MM HG: CPT | Mod: HCNC,CPTII,S$GLB, | Performed by: INTERNAL MEDICINE

## 2023-08-09 RX ORDER — TRAMADOL HYDROCHLORIDE 50 MG/1
50 TABLET ORAL EVERY 8 HOURS PRN
Qty: 9 TABLET | Refills: 0 | Status: SHIPPED | OUTPATIENT
Start: 2023-08-09 | End: 2023-08-12

## 2023-08-09 RX ORDER — BENZONATATE 200 MG/1
200 CAPSULE ORAL 3 TIMES DAILY PRN
Qty: 30 CAPSULE | Refills: 0 | Status: SHIPPED | OUTPATIENT
Start: 2023-08-09 | End: 2023-08-19

## 2023-08-09 RX ORDER — LEVALBUTEROL TARTRATE 45 UG/1
1-2 AEROSOL, METERED ORAL EVERY 4 HOURS PRN
Qty: 15 G | Refills: 0 | Status: ON HOLD | OUTPATIENT
Start: 2023-08-09 | End: 2024-01-03 | Stop reason: HOSPADM

## 2023-08-09 RX ORDER — LEVALBUTEROL TARTRATE 45 UG/1
1-2 AEROSOL, METERED ORAL EVERY 4 HOURS PRN
Refills: 0 | OUTPATIENT
Start: 2023-08-09 | End: 2024-08-08

## 2023-08-09 NOTE — TELEPHONE ENCOUNTER
Refill Decision Note   Celine Sinclair  is requesting a refill authorization.  Brief Assessment and Rationale for Refill:  Quick Discontinue     Medication Therapy Plan:  Receipt confirmed by pharmacy (8/9/2023  1:23 PM CDT)      Comments:     Note composed:2:27 PM 08/09/2023

## 2023-08-09 NOTE — TELEPHONE ENCOUNTER
No care due was identified.  [facility] Embedded Care Due Messages. Reference number: [id number].   8/09/2023 1:28:16 PM CDT

## 2023-08-09 NOTE — PROGRESS NOTES
Subjective:       Patient ID: Celine Sinclair is a 72 y.o. female.    Chief Complaint: Cough (Dry cough), Chest Pain, and Shortness of Breath      HPI:  Patient is known to me and presents with chest pain, SOB and cough. She was seen 8/2/23 with similar symptoms. Cardiac work up unrevealing. D-dimer negative. Trop negative. EKG stable. She had reproducible tenderness on exam at that visit and sx felt to be MSK. Today she reports she is still having chest pain. Feels pain in center of chest going into her back and into her ribs b/l. + cough that is productive.     Past Medical History:   Diagnosis Date    Aortic atherosclerosis     Atrial fibrillation     Benign essential hypertension     Cataract     Senile    Chest pain     CHF (congestive heart failure)     Crohn's colitis     Depression, major, recurrent, moderate     Encounter for blood transfusion     Fibromyalgia     GERD (gastroesophageal reflux disease)     Hypertensive cardiomyopathy     IBS (irritable bowel syndrome)     Migraine     Opioid abuse, in remission     Orbital cellulitis on left     Osteopenia     Paget disease of bone     Palpitations     Port-A-Cath in place     right chest    Prolonged QT interval     RA (rheumatoid arthritis)     Sedative hypnotic or anxiolytic dependence     in remission     SOB (shortness of breath)     SVT (supraventricular tachycardia)     Unspecified urinary incontinence        Family History   Problem Relation Age of Onset    Glaucoma Paternal Grandmother     Other Daughter         Diabetic Retinopathy    Breast cancer Daughter 40    Retinal detachment Grandchild     Heart attack Maternal Grandmother     Colon cancer Maternal Grandmother     Cancer Mother         Unknown type    Emphysema Father 67    Heart disease Father     Lung cancer Sister     Heart attack Sister     Breast cancer Daughter 47    Breast cancer Sister     Amblyopia Neg Hx     Blindness Neg Hx     Strabismus Neg Hx     Macular degeneration Neg Hx      Cataracts Neg Hx        Social History     Socioeconomic History    Marital status:     Number of children: 12   Occupational History    Occupation: [employer]     Comment: Retired/disabled   Tobacco Use    Smoking status: Never    Smokeless tobacco: Never   Substance and Sexual Activity    Alcohol use: Yes     Comment: wine coolers occassionally    Drug use: No    Sexual activity: Not Currently     Partners: Female   Social History Narrative    Patient gave birth to 7 children, adopted 2 and has 3 stepchildren with her .     Social Determinants of Health     Financial Resource Strain: Low Risk  (6/29/2023)    Overall Financial Resource Strain (CARDIA)     Difficulty of Paying Living Expenses: Not very hard   Food Insecurity: No Food Insecurity (6/29/2023)    Hunger Vital Sign     Worried About Running Out of Food in the Last Year: Never true     Ran Out of Food in the Last Year: Never true   Transportation Needs: No Transportation Needs (6/29/2023)    PRAPARE - Transportation     Lack of Transportation (Medical): No     Lack of Transportation (Non-Medical): No   Physical Activity: Inactive (6/29/2023)    Exercise Vital Sign     Days of Exercise per Week: 0 days     Minutes of Exercise per Session: 0 min   Stress: No Stress Concern Present (6/29/2023)    [place] [employer] - Occupational Stress Questionnaire     Feeling of Stress : Only a little   Social Connections: Unknown (6/29/2023)    Social Connection and Isolation Panel [NHANES]     Frequency of Communication with Friends and Family: More than three times a week     Frequency of Social Gatherings with Friends and Family: More than three times a week     Marital Status:    Housing Stability: Low Risk  (6/29/2023)    Housing Stability Vital Sign     Unable to Pay for Housing in the Last Year: No     Number of Places Lived in the Last Year: 1     Unstable Housing in the Last Year: No       Review of Systems    Constitutional:  Negative for activity change, fatigue, fever and unexpected weight change.   HENT:  Negative for congestion, ear pain, hearing loss, rhinorrhea and sore throat.    Eyes:  Negative for redness and visual disturbance.   Respiratory:  Positive for cough and shortness of breath. Negative for wheezing.    Cardiovascular:  Positive for chest pain. Negative for palpitations and leg swelling.   Gastrointestinal:  Negative for abdominal pain, constipation, diarrhea, nausea and vomiting.   Genitourinary:  Negative for dysuria, frequency and urgency.   Musculoskeletal:  Negative for back pain, joint swelling and neck pain.   Skin:  Negative for color change, rash and wound.   Neurological:  Negative for dizziness, tremors, weakness, light-headedness and headaches.         Objective:      Physical Exam  Vitals reviewed.   Constitutional:       General: She is not in acute distress.     Appearance: She is well-developed.   HENT:      Head: Normocephalic and atraumatic.      Right Ear: External ear normal.      Left Ear: External ear normal.      Nose: Nose normal.   Eyes:      General:         Right eye: No discharge.         Left eye: No discharge.      Conjunctiva/sclera: Conjunctivae normal.   Neck:      Thyroid: No thyromegaly.   Cardiovascular:      Rate and Rhythm: Normal rate and regular rhythm.      Heart sounds: No murmur heard.  Pulmonary:      Effort: Pulmonary effort is normal. No respiratory distress.      Breath sounds: Wheezing (soft wheeze right side; new) present.   Chest:      Chest wall: Tenderness (diffuse tenderness to palpation) present.   Skin:     General: Skin is warm and dry.   Neurological:      Mental Status: She is alert and oriented to person, place, and time.      Cranial Nerves: No cranial nerve deficit.   Psychiatric:         Behavior: Behavior normal.         Thought Content: Thought content normal.         Assessment:       1. Costochondritis    2. Cough, unspecified type     3. Wheezing        Plan:       1. Costochondritis  Acute  Not improving  Tylenol scheduled (no NSAIDs)  Short course tramadol provided;  reviewed    2. Cough, unspecified type  My concern is she has developed atelectasis due to shallow breathing from pain w hich can lead to bronchitis/PNA  Xray today  New wheeze on the right  Tessalon for cough and PRN nebs for SOB/wheezing ordered  Will send antibx pending radiology  No fevers. No hypoxia  -     X-Ray Chest PA And Lateral; Future; Expected date: 08/09/2023  -     benzonatate (TESSALON) 200 MG capsule; Take 1 capsule (200 mg total) by mouth 3 (three) times daily as needed.  Dispense: 30 capsule; Refill: 0  -     levalbuterol (XOPENEX HFA) 45 mcg/actuation inhaler; Inhale 1-2 puffs into the lungs every 4 (four) hours as needed for Wheezing. Rescue  Dispense: 15 g; Refill: 0  -     NEBULIZER FOR HOME USE    3. Wheezing  My concern is she has developed atelectasis due to shallow breathing from pain w hich can lead to bronchitis/PNA  Xray today  New wheeze on the right  Tessalon for cough and PRN nebs for SOB/wheezing ordered  Will send antibx pending radiology  No fevers. No hypoxia  -     benzonatate (TESSALON) 200 MG capsule; Take 1 capsule (200 mg total) by mouth 3 (three) times daily as needed.  Dispense: 30 capsule; Refill: 0  -     levalbuterol (XOPENEX HFA) 45 mcg/actuation inhaler; Inhale 1-2 puffs into the lungs every 4 (four) hours as needed for Wheezing. Rescue  Dispense: 15 g; Refill: 0  -     NEBULIZER FOR HOME USE

## 2023-08-17 ENCOUNTER — TELEPHONE (OUTPATIENT)
Dept: INTERNAL MEDICINE | Facility: CLINIC | Age: 73
End: 2023-08-17
Payer: MEDICARE

## 2023-08-17 NOTE — TELEPHONE ENCOUNTER
----- Message from Carrie Madrid sent at 8/17/2023  2:46 PM CDT -----  Regarding: neb order  Good afternoon.  I left the patient a v/m to inform them, based on the Dx, she doesn't qualify for a neb via insurance.  I offered a self pay option but mentioned she may want to check with the pharmacy first to see if insurance will cover the medication.

## 2023-08-18 ENCOUNTER — TELEPHONE (OUTPATIENT)
Dept: INTERNAL MEDICINE | Facility: CLINIC | Age: 73
End: 2023-08-18
Payer: MEDICARE

## 2023-08-18 DIAGNOSIS — L27.0 DERMATITIS DUE TO DRUG REACTION: ICD-10-CM

## 2023-08-18 RX ORDER — IPRATROPIUM BROMIDE AND ALBUTEROL SULFATE 2.5; .5 MG/3ML; MG/3ML
3 SOLUTION RESPIRATORY (INHALATION) EVERY 6 HOURS PRN
Qty: 75 ML | Refills: 0 | Status: ON HOLD | OUTPATIENT
Start: 2023-08-18 | End: 2024-01-03 | Stop reason: HOSPADM

## 2023-08-18 NOTE — TELEPHONE ENCOUNTER
----- Message from Ame Fitzpatrick sent at 2023  9:09 AM CDT -----  Contact: pt  Celine Sinclair  MRN: [id number]  : 1950  PCP: Neeru Hinkle  Home Phone      [phone]  Work Phone      Not on file.  Mobile          [phone]      MESSAGE: pt states she picked up her nebulizer yesterday but the pharmacy states no medication was sent in for it.      [facility]/pharmacy #[id number] - [place] - [address]  [address] [place] [address]  Phone: [phone] Fax: [phone]  Hours: Open 24 hours    [phone]

## 2023-08-18 NOTE — TELEPHONE ENCOUNTER
No care due was identified.  [facility] Embedded Care Due Messages. Reference number: [id number].   8/18/2023 10:53:57 AM CDT

## 2023-08-19 RX ORDER — TRIAMCINOLONE ACETONIDE 1 MG/G
CREAM TOPICAL 2 TIMES DAILY
Qty: 30 G | Refills: 0 | OUTPATIENT
Start: 2023-08-19

## 2023-08-19 NOTE — TELEPHONE ENCOUNTER
Refill Decision Note   Celine Dottie  is requesting a refill authorization.  Brief Assessment and Rationale for Refill:  Quick Discontinue     Medication Therapy Plan:  discontinued on 1/19/2023 by Felisa Gonzalez NP      Comments:     Note composed:2:24 PM 08/19/2023

## 2023-08-22 ENCOUNTER — OFFICE VISIT (OUTPATIENT)
Dept: GASTROENTEROLOGY | Facility: CLINIC | Age: 73
End: 2023-08-22
Payer: MEDICARE

## 2023-08-22 VITALS
SYSTOLIC BLOOD PRESSURE: 139 MMHG | OXYGEN SATURATION: 99 % | HEART RATE: 63 BPM | WEIGHT: 185.19 LBS | BODY MASS INDEX: 30.82 KG/M2 | DIASTOLIC BLOOD PRESSURE: 76 MMHG | TEMPERATURE: 98 F

## 2023-08-22 DIAGNOSIS — K50.819 CROHN'S DISEASE OF SMALL AND LARGE INTESTINES WITH COMPLICATION: Primary | ICD-10-CM

## 2023-08-22 DIAGNOSIS — K76.9 LIVER DISEASE, UNSPECIFIED: ICD-10-CM

## 2023-08-22 DIAGNOSIS — I48.92 ATRIAL FLUTTER, UNSPECIFIED TYPE: ICD-10-CM

## 2023-08-22 DIAGNOSIS — Z79.01 CHRONIC ANTICOAGULATION: ICD-10-CM

## 2023-08-22 DIAGNOSIS — K76.9 LIVER LESION: ICD-10-CM

## 2023-08-22 PROCEDURE — 1125F PR PAIN SEVERITY QUANTIFIED, PAIN PRESENT: ICD-10-PCS | Mod: HCNC,CPTII,S$GLB, | Performed by: INTERNAL MEDICINE

## 2023-08-22 PROCEDURE — 1101F PT FALLS ASSESS-DOCD LE1/YR: CPT | Mod: HCNC,CPTII,S$GLB, | Performed by: INTERNAL MEDICINE

## 2023-08-22 PROCEDURE — 3078F DIAST BP <80 MM HG: CPT | Mod: HCNC,CPTII,S$GLB, | Performed by: INTERNAL MEDICINE

## 2023-08-22 PROCEDURE — 1160F RVW MEDS BY RX/DR IN RCRD: CPT | Mod: HCNC,CPTII,S$GLB, | Performed by: INTERNAL MEDICINE

## 2023-08-22 PROCEDURE — 3288F FALL RISK ASSESSMENT DOCD: CPT | Mod: HCNC,CPTII,S$GLB, | Performed by: INTERNAL MEDICINE

## 2023-08-22 PROCEDURE — 3078F PR MOST RECENT DIASTOLIC BLOOD PRESSURE < 80 MM HG: ICD-10-PCS | Mod: HCNC,CPTII,S$GLB, | Performed by: INTERNAL MEDICINE

## 2023-08-22 PROCEDURE — 4010F PR ACE/ARB THEARPY RXD/TAKEN: ICD-10-PCS | Mod: HCNC,CPTII,S$GLB, | Performed by: INTERNAL MEDICINE

## 2023-08-22 PROCEDURE — 3288F PR FALLS RISK ASSESSMENT DOCUMENTED: ICD-10-PCS | Mod: HCNC,CPTII,S$GLB, | Performed by: INTERNAL MEDICINE

## 2023-08-22 PROCEDURE — 4010F ACE/ARB THERAPY RXD/TAKEN: CPT | Mod: HCNC,CPTII,S$GLB, | Performed by: INTERNAL MEDICINE

## 2023-08-22 PROCEDURE — 99214 OFFICE O/P EST MOD 30 MIN: CPT | Mod: HCNC,GC,S$GLB, | Performed by: INTERNAL MEDICINE

## 2023-08-22 PROCEDURE — 3075F PR MOST RECENT SYSTOLIC BLOOD PRESS GE 130-139MM HG: ICD-10-PCS | Mod: HCNC,CPTII,S$GLB, | Performed by: INTERNAL MEDICINE

## 2023-08-22 PROCEDURE — 1160F PR REVIEW ALL MEDS BY PRESCRIBER/CLIN PHARMACIST DOCUMENTED: ICD-10-PCS | Mod: HCNC,CPTII,S$GLB, | Performed by: INTERNAL MEDICINE

## 2023-08-22 PROCEDURE — 1125F AMNT PAIN NOTED PAIN PRSNT: CPT | Mod: HCNC,CPTII,S$GLB, | Performed by: INTERNAL MEDICINE

## 2023-08-22 PROCEDURE — 99214 PR OFFICE/OUTPT VISIT, EST, LEVL IV, 30-39 MIN: ICD-10-PCS | Mod: HCNC,GC,S$GLB, | Performed by: INTERNAL MEDICINE

## 2023-08-22 PROCEDURE — 1159F PR MEDICATION LIST DOCUMENTED IN MEDICAL RECORD: ICD-10-PCS | Mod: HCNC,CPTII,S$GLB, | Performed by: INTERNAL MEDICINE

## 2023-08-22 PROCEDURE — 1159F MED LIST DOCD IN RCRD: CPT | Mod: HCNC,CPTII,S$GLB, | Performed by: INTERNAL MEDICINE

## 2023-08-22 PROCEDURE — 3075F SYST BP GE 130 - 139MM HG: CPT | Mod: HCNC,CPTII,S$GLB, | Performed by: INTERNAL MEDICINE

## 2023-08-22 PROCEDURE — 3008F BODY MASS INDEX DOCD: CPT | Mod: HCNC,CPTII,S$GLB, | Performed by: INTERNAL MEDICINE

## 2023-08-22 PROCEDURE — 1101F PR PT FALLS ASSESS DOC 0-1 FALLS W/OUT INJ PAST YR: ICD-10-PCS | Mod: HCNC,CPTII,S$GLB, | Performed by: INTERNAL MEDICINE

## 2023-08-22 PROCEDURE — 3008F PR BODY MASS INDEX (BMI) DOCUMENTED: ICD-10-PCS | Mod: HCNC,CPTII,S$GLB, | Performed by: INTERNAL MEDICINE

## 2023-08-22 NOTE — PROGRESS NOTES
[facility]          Inflammatory Bowel Disease Follow Up Note         TODAY'S VISIT DATE:  8/22/2023    Reason for Consult:    Chief Complaint   Patient presents with    Crohn's Disease       PCP: Neeru Hinkle      Referring MD:   No ref. provider found    History of Present Illness:  Celine Sinclair who is a 72 y.o. female past medical history hyperlipidemia, atrial fibrillation on eliquis s/p ablation (06/2023), s/p cholecystectomy, ileocolonic Crohn's disease complicated by stricture status post Ileocolonic resection presents to [facility] for follow up.    Last seen by Dr Urena on 09/2022 and had not been seen for two years prior to that. Had CRC screening colonoscopy with Dr Luke notable for pancolonic inflammatory changes. Unable to take biopsies given anticoagulation. Reported afterwards that she had been having blood in stool and diarrhea.    Initiated on Lialda 2.4g daily after this last appointment on 09/2022. Has not been seen since. Fecal calprotectin drawn at time was borderline at 104. CRP was normal. Vit D was low.     Switched to Colazal 2250mg TID on 04/2023    Reports 4-5 watery BM/ day, no blood. Reports dark brown color.  Reports bilateral lower quadrant abdominal pain as well.   Reports intermittent flares that can last for 2-3 weeks followed by months of normalcy with 1 brown formed BM/ day.    IBD History:  Diagnosed with Crohn's disease in the 1990s.  The details of her diagnosis in history are unclear and records from back then are not available.  She thinks she was diagnosed around 1990 or possibly even in the late 1980s.  She was having severe abdominal pain, body pains, nausea, vomiting, and diarrhea.  She was diagnosed with Crohn's disease and reports undergoing surgery for a bowel obstruction shortly after the initial diagnosis.  After surgery she was treated with Remicade for an un clear amount of time.  She was not having any symptoms at  the time so she thinks the Remicade may have worked.  She does not recall why it was stopped.  In 2002 she underwent a hysterectomy that was complicated by an enterotomy that was repaired during surgery.  Lysis of extensive adhesions led to multiple enterotomies that required repair.  During that hospitalization she developed an abscess because of a leak from 1 of the enterotomies.  She eventually had an ileostomy that was subsequently taken down about 6 months later.  She has had chronic diarrhea since the time of her initial surgery.  She reports having about 3-4 bowel movements every day that are the consistency of applesauce.  She typically only has bowel movements after meals.  She denies any baseline abdominal pain, nausea, or vomiting.  She denies any recent weight loss.  She had a capsule endoscopy in 2013 that was normal.  A colonoscopy from March of 2016 revealed an ileocolonic anastomosis but no evidence of inflammatory disease.  She had an EGD and colonoscopy in December of 2017 but I do not have the reports from these.  A subsequent colonoscopy in October of 2020 showed no evidence of active disease.  She did not follow up after that but presented for a screening colonoscopy in August 2022 with Dr. Luke was noted to have active inflammation throughout her entire colon.  There was no mention of ileal inflammation.  Biopsies were not done because she was taking anticoagulation.    Initiated on Lialda 2.4g daily after this last appointment on 09/2022.   Switched to Colazal 2250mg TID on 04/2023    IBD Details:  Dx Date:  Early 1990s  Disease type/distribution:  Crohn's disease/colon inflammation on most recent colonoscopy  Current Treatment:  Colazal 2250mg TID  Start Date: 04/2023 Response: Poor  Optimized: Yes Adverse reactions: None  Prior surgeries:  Ileocolonic resection  CRP Elevation:  Unknown  Disease Complications:  Stricture formation requiring surgery  Extraintestinal manifestations:  Possible  "joint pain  Prior treatments:   Steroids:  Unclear  5ASA:  Lialda 2.8g daily  IMM:  None  TNF Inh:  Treated with Remicade in the past, unclear response   Anti-Integrin:  None   IL 12/23:  None  IJEOMA Inh:  None    Previous Clinical Trials:  None    Last Colonoscopy:   August 2022-inflammation throughout the colon, no mention of ileal inflammation, no biopsies done  October 2020-no active inflammation in the ileum or colon  2017-report not available    Other Endoscopies:  Colonoscopy in 2016 with an ileocolonic anastomosis, no active inflammation.  Capsule endoscopy from 2013 with no small-bowel inflammation    Imaging:   MRE:  None   CT:  CT from earlier this year with colonic inflammatory changes as well as a lesion in the liver   Other:  None    Pertinent Labs:  Lab Results   Component Value Date    SEDRATE 8 06/18/2020    CRP 3.9 09/13/2022     No results found for: "TTGIGA", "IGA"  Lab Results   Component Value Date    TSH 2.695 05/14/2023    FREET4 0.85 06/18/2020     Lab Results   Component Value Date    MDRCBWDJ85UB 18 (L) 09/13/2022    KLIULOOL20 212 09/13/2022     Lab Results   Component Value Date    HEPBSAG Non-reactive 09/13/2022    HEPBCAB Non-reactive 09/13/2022    HEPCAB Non-reactive 09/13/2022     Lab Results   Component Value Date    GZF42EJTN Negative 09/25/2020     Lab Results   Component Value Date    NIL 0.35820 09/13/2022    MITOGENNIL 9.970 09/13/2022     Lab Results   Component Value Date    TPTMINTERP SEE BELOW 09/25/2020     Lab Results   Component Value Date    CDIFFICILEAN Negative 09/28/2020    CDIFFTOX Negative 09/28/2020     Lab Results   Component Value Date    CALPROTECTIN 104 09/15/2022       Therapeutic Drug Monitoring Labs:  No results found for: "PROMETH"  No results found for: "ANSADAINIT", "INFLIXIMAB", "INFLIXINTERP"    Vaccinations:  Lab Results   Component Value Date    HEPBSAB Negative 09/25/2020     Lab Results   Component Value Date    HEPAIGG Reactive 09/13/2022     Lab " Results   Component Value Date    VARICELLAZOS 0.66 09/13/2022    VARICELLAINT Negative 09/13/2022     Immunization History   Administered Date(s) Administered    COVID-19, MRNA, LN-S, PF (Pfizer) (Gray Cap) 06/02/2022    COVID-19, MRNA, LN-S, PF (Pfizer) (Purple Cap) 02/11/2021, 03/04/2021, 09/20/2021, 11/03/2022    Influenza (FLUAD) - Quadrivalent - Adjuvanted - PF *Preferred* (65+) 12/17/2021    Influenza - Quadrivalent - High Dose - PF (65 years and older) 10/01/2020, 09/01/2022    Pneumococcal Conjugate - 13 Valent 10/03/2019    Pneumococcal Polysaccharide - 23 Valent 11/06/2020         Review of Systems  Review of Systems   Constitutional:  Negative for chills, fever and weight loss.   HENT:  Negative for sore throat.    Eyes:  Negative for pain, discharge and redness.   Respiratory:  Negative for cough, shortness of breath and wheezing.    Cardiovascular:  Negative for chest pain, orthopnea and leg swelling.   Gastrointestinal:  Positive for blood in stool and diarrhea. Negative for abdominal pain, constipation, heartburn, melena, nausea and vomiting.   Genitourinary:  Negative for dysuria, frequency and urgency.   Musculoskeletal:  Positive for back pain. Negative for joint pain and myalgias.   Skin:  Negative for itching and rash.   Neurological:  Negative for focal weakness and seizures.   Endo/Heme/Allergies:  Does not bruise/bleed easily.   Psychiatric/Behavioral:  Negative for depression. The patient is not nervous/anxious.        Medical History:   Past Medical History:   Diagnosis Date    Aortic atherosclerosis     Atrial fibrillation     Benign essential hypertension     Cataract     Senile    Chest pain     CHF (congestive heart failure)     Crohn's colitis     Depression, major, recurrent, moderate     Encounter for blood transfusion     Fibromyalgia     GERD (gastroesophageal reflux disease)     Hypertensive cardiomyopathy     IBS (irritable bowel syndrome)     Migraine     Opioid abuse, in  remission     Orbital cellulitis on left     Osteopenia     Paget disease of bone     Palpitations     Port-A-Cath in place     right chest    Prolonged QT interval     RA (rheumatoid arthritis)     Sedative hypnotic or anxiolytic dependence     in remission     SOB (shortness of breath)     SVT (supraventricular tachycardia)     Unspecified urinary incontinence        Surgical History:  Past Surgical History:   Procedure Laterality Date    ABLATION N/A 6/28/2023    Procedure: Ablation;  Surgeon: Johnny Nieves MD;  Location: [facility] CATH;  Service: Cardiology;  Laterality: N/A;  ops # 7    ABLATION, ATRIAL FLUTTER, TYPICAL N/A 6/28/2023    Procedure: Ablation, Atrial Flutter, Typical;  Surgeon: Johnny Nieves MD;  Location: [facility] CATH;  Service: Cardiology;  Laterality: N/A;    CATARACT EXTRACTION W/  INTRAOCULAR LENS IMPLANT Left 02/21/2017    Dr. Christianson    CATARACT EXTRACTION W/  INTRAOCULAR LENS IMPLANT Right 03/07/2017    Dr. Christianson    CHOLECYSTECTOMY      COLON SURGERY      COLONOSCOPY N/A 3/16/2016    Procedure: COLONOSCOPY;  Surgeon: Dale Trejo MD;  [facility]  Service: Endoscopy;  Laterality: N/A;    COLONOSCOPY N/A 10/12/2020    Procedure: COLONOSCOPY;  Surgeon: Cali Urena MD;  [facility]  Service: Endoscopy;  Laterality: N/A;  covid test 10/9-[facility]- completed 10/9/20  ok to hold Coumadin x 5 days per coumadin clinic-see telephone encounterd ated 10/6-MS / Loop recorder  10/6/20- Pt confirmed- ERW@1122  10/9-Pt confirmed earlier arrival time, prep ins. reviewed and emailed to Pt    COLONOSCOPY N/A 8/25/2022    Procedure: COLONOSCOPY;  Surgeon: Lewis Luke MD;  Location: [facility];  Service: Endoscopy;  Laterality: N/A;    Colostomy reversal      HEMORRHOID SURGERY      HYSTERECTOMY      ILEOSTOMY      ILEOSTOMY CLOSURE      LEFT HEART CATHETERIZATION N/A 2/15/2019    Procedure: HEART CATH-LEFT;  Surgeon: Miky Keita MD;   Location: [facility] CATH LAB;  Service: Cardiology;  Laterality: N/A;    NECK SURGERY      OOPHORECTOMY Bilateral     SBO      SMALL INTESTINE SURGERY      TONSILLECTOMY      TUBAL LIGATION         Family History:   Family History   Problem Relation Age of Onset    Glaucoma Paternal Grandmother     Other Daughter         Diabetic Retinopathy    Breast cancer Daughter 40    Retinal detachment Grandchild     Heart attack Maternal Grandmother     Colon cancer Maternal Grandmother     Cancer Mother         Unknown type    Emphysema Father 67    Heart disease Father     Lung cancer Sister     Heart attack Sister     Breast cancer Daughter 47    Breast cancer Sister     Amblyopia Neg Hx     Blindness Neg Hx     Strabismus Neg Hx     Macular degeneration Neg Hx     Cataracts Neg Hx        Social History:   Social History     Tobacco Use    Smoking status: Never    Smokeless tobacco: Never   Substance Use Topics    Alcohol use: Yes     Comment: wine coolers occassionally    Drug use: No       Allergies: Reviewed    Home Medications:   Medication List with Changes/Refills   Current Medications    ALBUTEROL-IPRATROPIUM (DUO-NEB) 2.5 MG-0.5 MG/3 ML NEBULIZER SOLUTION    Take 3 mLs by nebulization every 6 (six) hours as needed for Wheezing. Rescue    AMIODARONE (PACERONE) 200 MG TAB    Take 0.5 tablets (100 mg total) by mouth once daily.    APIXABAN (ELIQUIS) 5 MG TAB    Take 1 tablet (5 mg total) by mouth 2 (two) times daily.    ATORVASTATIN (LIPITOR) 40 MG TABLET        BALSALAZIDE (COLAZAL) 750 MG CAPSULE    Take 3 capsules (2,250 mg total) by mouth 3 (three) times daily.    BISACODYL (DULCOLAX) 5 MG EC TABLET    daily as needed.    COLCHICINE (COLCRYS) 0.6 MG TABLET    Take 1 tablet (0.6 mg total) by mouth once daily. for 7 days    CYANOCOBALAMIN 1,000 MCG/ML INJECTION    Inject 1 mL (1,000 mcg total) into the muscle once a week.    DORZOLAMIDE-TIMOLOL 2-0.5% (COSOPT) 22.3-6.8 MG/ML OPHTHALMIC SOLUTION        DULOXETINE  (CYMBALTA) 60 MG CAPSULE    Take 1 capsule (60 mg total) by mouth once daily.    FARXIGA 10 MG TABLET    Take 10 mg by mouth.    GABAPENTIN (NEURONTIN) 100 MG CAPSULE    Take 1 capsule (100 mg total) by mouth 3 (three) times daily.    LEVALBUTEROL (XOPENEX HFA) 45 MCG/ACTUATION INHALER    Inhale 1-2 puffs into the lungs every 4 (four) hours as needed for Wheezing. Rescue    METOPROLOL SUCCINATE (TOPROL-XL) 25 MG 24 HR TABLET    Take 25 mg by mouth 2 (two) times a day.    NITROGLYCERIN (NITROSTAT) 0.4 MG SL TABLET    Place 0.4 mg under the tongue every 5 (five) minutes as needed.    PANTOPRAZOLE (PROTONIX) 40 MG TABLET    Increase to 40mg twice daily x 2 wks then resume daily thereafter    POTASSIUM CHLORIDE SA (K-DUR,KLOR-CON) 20 MEQ TABLET    Take 2 tablets (40 mEq total) by mouth once daily.    SPIRONOLACTONE (ALDACTONE) 25 MG TABLET    Take 12.5 mg by mouth once daily.       Physical Exam:  Vital Signs:  /76 (BP Location: Right arm, Patient Position: Sitting)   Pulse 63   Temp 98.1 °F (36.7 °C)   Wt 84 kg (185 lb 3 oz)   SpO2 99%   BMI 30.82 kg/m²   Body mass index is 30.82 kg/m².    Physical Exam  Vitals and nursing note reviewed.   Constitutional:       General: She is not in acute distress.     Appearance: Normal appearance. She is well-developed. She is not ill-appearing or toxic-appearing.   Eyes:      Conjunctiva/sclera: Conjunctivae normal.      Pupils: Pupils are equal, round, and reactive to light.   Neck:      Thyroid: No thyromegaly.   Cardiovascular:      Rate and Rhythm: Normal rate and regular rhythm.      Heart sounds: Normal heart sounds. No murmur heard.  Pulmonary:      Effort: Pulmonary effort is normal.      Breath sounds: Normal breath sounds. No wheezing or rales.   Abdominal:      General: Bowel sounds are normal. There is no distension.      Palpations: Abdomen is soft. There is no mass.      Tenderness: There is no abdominal tenderness.   Musculoskeletal:         General: No  tenderness. Normal range of motion.   Lymphadenopathy:      Cervical: No cervical adenopathy.   Skin:     Findings: No erythema or rash.   Neurological:      General: No focal deficit present.      Mental Status: She is alert and oriented to person, place, and time.   Psychiatric:         Mood and Affect: Mood normal.         Behavior: Behavior normal.         Thought Content: Thought content normal.         Judgment: Judgment normal.         Labs: reviewed and pertinent noted above    Assessment/Plan:  Celine Sinclair is a 72 y.o. female with a history of Crohn's disease status post surgical resection many years ago who has not had any clear evidence of active disease presenting for follow up of diarrhea, abdominal pain.    1. Crohn's disease of small and large intestines with complication    2. Liver disease, unspecified    3. Liver lesion        1.  Crohn's colitis:  Currently taking Colazal 2250mg TID without much improvement. We will schedule for colonoscopy to re-evaluate inflammation with plans to hold anticoagulation if safe to do so per cardiology so we can obtain biopsies and confirm Crohn's disease on pathology.    2.  Diarrhea:  Possibly post-surgical vs inflammatory from Crohn's colitis. Will recommend immodium and plan for colonoscopy as outlined above.     3.  Elevated bilirubin: Normalized on most recent CMP 05/2023    4. Liver lesion: MRI Abdomen Pelvis to be ordered     # IBD specific health maintenance:  Colon cancer surveillance:  Up-to-date    Annual:  - Eye exam:  Not applicable  - Skin exam (if on IMM/TNF):  Not applicable  - reminded pt to use sunblock/hats/sunprotective clothing  - PAP (if immunosuppressed):  Status post hysterectomy    DEXA:  Done previous    Vitamin D:  Low 09/2022    Vaccines:    Influenza:  Will order the future   Pneumovax:     PCV13:  Completed October 2019    PSV23:  2020   HAV:  Immune per labs (09/2022)   HBV: No immunity per labs 09/2022, discuss in future   Tdap:   Review in the future   MMR:  Review in the future   VZV:  No immunity per labs 09/2022, discuss in future   HZV:  Received 1st dose at some point   HPV:  Not applicable   Meningococcus:  Not applicable   COVID: Completed 4 doses    Follow up: Follow up in about 6 months (around 2/22/2024).      Thank you again for sending Celine Sinclair to see Dr. Ziyad Urena today at the [facility]. Please don't hesitate to contact Dr. Urena if there are any questions regarding this evaluation, or if you have any other patients with inflammatory bowel disease for whom you would like a consultation. You can reach Dr. Urena at [phone] or by email at [email or web address]    Ambrocio Gonzales MD  Department of Gastroenterology  Inflammatory Bowel Disease

## 2023-08-22 NOTE — PROGRESS NOTES
IBD PATIENT INTAKE:    COVID symptoms in the last 7 days (runny nose, sore throat, congestion, cough, fever): No  PCP: Neeru Hinkle  If not PCP-  number given to establish [phone]: N/A    ALLERGIES REVIEWED:  Yes    CHIEF COMPLAINT:    Chief Complaint   Patient presents with    Crohn's Disease       VITAL SIGNS:  /76 (BP Location: Right arm, Patient Position: Sitting)   Pulse 63   Temp 98.1 °F (36.7 °C)   Wt 84 kg (185 lb 3 oz)   SpO2 99%   BMI 30.82 kg/m²      Change in medical, surgical, family or social history: No    IBD THERAPY (name, dose/frequency):  balsalazide 2250mg x3 daily  Last dose:  n/a    Next dose:  n/a  Infusion/Pharmacy: NA    Vitamins (be sure this has been put in medication list):   Vit D:  no     Vit B-12:  no   Folic Acid: no       Calcium: no     Iron:  no      MVI: no    Antibiotics (past 30 Days):  No  If yes   Indication:  Name of antibiotic:  Completion date:     REVIEWED MEDICATION LIST RECONCILED INCLUDING ABOVE MEDS:  Yes

## 2023-08-22 NOTE — PATIENT INSTRUCTIONS
Continue balsalazide  Ask your cardiologist about holding the Eliquis for a colonoscopy and let me know what he says- [phone]  Get MRI of the liver  Follow up in 6 months

## 2023-09-08 ENCOUNTER — OFFICE VISIT (OUTPATIENT)
Dept: INTERNAL MEDICINE | Facility: CLINIC | Age: 73
End: 2023-09-08
Payer: MEDICARE

## 2023-09-08 ENCOUNTER — HOSPITAL ENCOUNTER (OUTPATIENT)
Dept: PULMONOLOGY | Facility: HOSPITAL | Age: 73
Discharge: HOME | End: 2023-09-08
Attending: INTERNAL MEDICINE
Payer: MEDICARE

## 2023-09-08 ENCOUNTER — PATIENT MESSAGE (OUTPATIENT)
Dept: INTERNAL MEDICINE | Facility: CLINIC | Age: 73
End: 2023-09-08

## 2023-09-08 VITALS
RESPIRATION RATE: 16 BRPM | HEIGHT: 65 IN | OXYGEN SATURATION: 100 % | SYSTOLIC BLOOD PRESSURE: 126 MMHG | HEART RATE: 116 BPM | DIASTOLIC BLOOD PRESSURE: 96 MMHG | BODY MASS INDEX: 30.01 KG/M2 | WEIGHT: 180.13 LBS

## 2023-09-08 DIAGNOSIS — Z12.31 ENCOUNTER FOR SCREENING MAMMOGRAM FOR BREAST CANCER: ICD-10-CM

## 2023-09-08 DIAGNOSIS — Z78.0 POSTMENOPAUSAL STATE: ICD-10-CM

## 2023-09-08 DIAGNOSIS — R00.0 TACHYCARDIA: ICD-10-CM

## 2023-09-08 DIAGNOSIS — M94.0 COSTOCHONDRITIS: ICD-10-CM

## 2023-09-08 DIAGNOSIS — K50.90 CROHN'S DISEASE WITHOUT COMPLICATION, UNSPECIFIED GASTROINTESTINAL TRACT LOCATION: Primary | ICD-10-CM

## 2023-09-08 PROCEDURE — 3080F PR MOST RECENT DIASTOLIC BLOOD PRESSURE >= 90 MM HG: ICD-10-PCS | Mod: HCNC,CPTII,S$GLB, | Performed by: INTERNAL MEDICINE

## 2023-09-08 PROCEDURE — 1101F PT FALLS ASSESS-DOCD LE1/YR: CPT | Mod: HCNC,CPTII,S$GLB, | Performed by: INTERNAL MEDICINE

## 2023-09-08 PROCEDURE — 4010F ACE/ARB THERAPY RXD/TAKEN: CPT | Mod: HCNC,CPTII,S$GLB, | Performed by: INTERNAL MEDICINE

## 2023-09-08 PROCEDURE — 4010F PR ACE/ARB THEARPY RXD/TAKEN: ICD-10-PCS | Mod: HCNC,CPTII,S$GLB, | Performed by: INTERNAL MEDICINE

## 2023-09-08 PROCEDURE — 3288F FALL RISK ASSESSMENT DOCD: CPT | Mod: HCNC,CPTII,S$GLB, | Performed by: INTERNAL MEDICINE

## 2023-09-08 PROCEDURE — 1125F AMNT PAIN NOTED PAIN PRSNT: CPT | Mod: HCNC,CPTII,S$GLB, | Performed by: INTERNAL MEDICINE

## 2023-09-08 PROCEDURE — 3080F DIAST BP >= 90 MM HG: CPT | Mod: HCNC,CPTII,S$GLB, | Performed by: INTERNAL MEDICINE

## 2023-09-08 PROCEDURE — 1160F RVW MEDS BY RX/DR IN RCRD: CPT | Mod: HCNC,CPTII,S$GLB, | Performed by: INTERNAL MEDICINE

## 2023-09-08 PROCEDURE — 99214 OFFICE O/P EST MOD 30 MIN: CPT | Mod: HCNC,S$GLB,, | Performed by: INTERNAL MEDICINE

## 2023-09-08 PROCEDURE — 1159F PR MEDICATION LIST DOCUMENTED IN MEDICAL RECORD: ICD-10-PCS | Mod: HCNC,CPTII,S$GLB, | Performed by: INTERNAL MEDICINE

## 2023-09-08 PROCEDURE — 1125F PR PAIN SEVERITY QUANTIFIED, PAIN PRESENT: ICD-10-PCS | Mod: HCNC,CPTII,S$GLB, | Performed by: INTERNAL MEDICINE

## 2023-09-08 PROCEDURE — 99999 PR PBB SHADOW E&M-EST. PATIENT-LVL V: ICD-10-PCS | Mod: PBBFAC,HCNC,, | Performed by: INTERNAL MEDICINE

## 2023-09-08 PROCEDURE — 1159F MED LIST DOCD IN RCRD: CPT | Mod: HCNC,CPTII,S$GLB, | Performed by: INTERNAL MEDICINE

## 2023-09-08 PROCEDURE — 99999 PR PBB SHADOW E&M-EST. PATIENT-LVL V: CPT | Mod: PBBFAC,HCNC,, | Performed by: INTERNAL MEDICINE

## 2023-09-08 PROCEDURE — 93010 ELECTROCARDIOGRAM REPORT: CPT | Mod: HCNC,,, | Performed by: INTERNAL MEDICINE

## 2023-09-08 PROCEDURE — 3074F SYST BP LT 130 MM HG: CPT | Mod: HCNC,CPTII,S$GLB, | Performed by: INTERNAL MEDICINE

## 2023-09-08 PROCEDURE — 3008F PR BODY MASS INDEX (BMI) DOCUMENTED: ICD-10-PCS | Mod: HCNC,CPTII,S$GLB, | Performed by: INTERNAL MEDICINE

## 2023-09-08 PROCEDURE — 3074F PR MOST RECENT SYSTOLIC BLOOD PRESSURE < 130 MM HG: ICD-10-PCS | Mod: HCNC,CPTII,S$GLB, | Performed by: INTERNAL MEDICINE

## 2023-09-08 PROCEDURE — 99214 PR OFFICE/OUTPT VISIT, EST, LEVL IV, 30-39 MIN: ICD-10-PCS | Mod: HCNC,S$GLB,, | Performed by: INTERNAL MEDICINE

## 2023-09-08 PROCEDURE — 93010 EKG 12-LEAD: ICD-10-PCS | Mod: HCNC,,, | Performed by: INTERNAL MEDICINE

## 2023-09-08 PROCEDURE — 93005 ELECTROCARDIOGRAM TRACING: CPT | Mod: HCNC

## 2023-09-08 PROCEDURE — 3288F PR FALLS RISK ASSESSMENT DOCUMENTED: ICD-10-PCS | Mod: HCNC,CPTII,S$GLB, | Performed by: INTERNAL MEDICINE

## 2023-09-08 PROCEDURE — 1160F PR REVIEW ALL MEDS BY PRESCRIBER/CLIN PHARMACIST DOCUMENTED: ICD-10-PCS | Mod: HCNC,CPTII,S$GLB, | Performed by: INTERNAL MEDICINE

## 2023-09-08 PROCEDURE — 3008F BODY MASS INDEX DOCD: CPT | Mod: HCNC,CPTII,S$GLB, | Performed by: INTERNAL MEDICINE

## 2023-09-08 PROCEDURE — 1101F PR PT FALLS ASSESS DOC 0-1 FALLS W/OUT INJ PAST YR: ICD-10-PCS | Mod: HCNC,CPTII,S$GLB, | Performed by: INTERNAL MEDICINE

## 2023-09-08 RX ORDER — GABAPENTIN 300 MG/1
300 CAPSULE ORAL 3 TIMES DAILY
Qty: 90 CAPSULE | Refills: 11 | Status: SHIPPED | OUTPATIENT
Start: 2023-09-08 | End: 2024-09-07

## 2023-09-08 NOTE — PROGRESS NOTES
kSubjective:       Patient ID: Celine Sinclair is a 72 y.o. female.    Chief Complaint: Generalized Body Aches, Diarrhea (For a few weeks.), and Chest Pain (Started last week)      HPI:  Patient is known to me and presents with multiple acute complaints. Today she has body aches, diarrhea and chest pain.       She has h/o Crohn's disease. She has not followed up with GI regularly. She was finally seen again 8/22/23 and notes by Dr. Urena personally reviewed and discussed today. It was recommended to continue balsalazide and update C-scope. MRI for prior liver lesion also ordered. She still c/o loose stools 3-4x daily. No blood. No fevers. No recent antibx use.       She also c/o diffuse body aches. Reports 10/10 pain today. She has known h/o fibromyalgia. She has also had recurrent chest pain for last few months. I have seen her twice for this over the last 1 month. Cardiac work up has been unremarkable. Tylenol recommended due to eliquis and crohn's history. Today her pain is not improving. Pain remains constant. She feels pain from chest radiating to her spine and lower back. Worse with taking a deep breath.       Past Medical History:   Diagnosis Date    Aortic atherosclerosis     Atrial fibrillation     Benign essential hypertension     Cataract     Senile    Chest pain     CHF (congestive heart failure)     Crohn's colitis     Depression, major, recurrent, moderate     Encounter for blood transfusion     Fibromyalgia     GERD (gastroesophageal reflux disease)     Hypertensive cardiomyopathy     IBS (irritable bowel syndrome)     Migraine     Opioid abuse, in remission     Orbital cellulitis on left     Osteopenia     Paget disease of bone     Palpitations     Port-A-Cath in place     right chest    Prolonged QT interval     RA (rheumatoid arthritis)     Sedative hypnotic or anxiolytic dependence     in remission     SOB (shortness of breath)     SVT (supraventricular tachycardia)     Unspecified urinary  incontinence        Family History   Problem Relation Age of Onset    Glaucoma Paternal Grandmother     Other Daughter         Diabetic Retinopathy    Breast cancer Daughter 40    Retinal detachment Grandchild     Heart attack Maternal Grandmother     Colon cancer Maternal Grandmother     Cancer Mother         Unknown type    Emphysema Father 67    Heart disease Father     Lung cancer Sister     Heart attack Sister     Breast cancer Daughter 47    Breast cancer Sister     Amblyopia Neg Hx     Blindness Neg Hx     Strabismus Neg Hx     Macular degeneration Neg Hx     Cataracts Neg Hx        Social History     Socioeconomic History    Marital status:     Number of children: 12   Occupational History    Occupation: [employer]     Comment: Retired/disabled   Tobacco Use    Smoking status: Never    Smokeless tobacco: Never   Substance and Sexual Activity    Alcohol use: Yes     Comment: wine coolers occassionally    Drug use: No    Sexual activity: Not Currently     Partners: Female   Social History Narrative    Patient gave birth to 7 children, adopted 2 and has 3 stepchildren with her .     Social Determinants of Health     Financial Resource Strain: Low Risk  (6/29/2023)    Overall Financial Resource Strain (CARDIA)     Difficulty of Paying Living Expenses: Not very hard   Food Insecurity: No Food Insecurity (6/29/2023)    Hunger Vital Sign     Worried About Running Out of Food in the Last Year: Never true     Ran Out of Food in the Last Year: Never true   Transportation Needs: No Transportation Needs (6/29/2023)    PRAPARE - Transportation     Lack of Transportation (Medical): No     Lack of Transportation (Non-Medical): No   Physical Activity: Inactive (6/29/2023)    Exercise Vital Sign     Days of Exercise per Week: 0 days     Minutes of Exercise per Session: 0 min   Stress: No Stress Concern Present (6/29/2023)    [place] [employer] - Occupational Stress Questionnaire      Feeling of Stress : Only a little   Social Connections: Unknown (6/29/2023)    Social Connection and Isolation Panel [NHANES]     Frequency of Communication with Friends and Family: More than three times a week     Frequency of Social Gatherings with Friends and Family: More than three times a week     Marital Status:    Housing Stability: Low Risk  (6/29/2023)    Housing Stability Vital Sign     Unable to Pay for Housing in the Last Year: No     Number of Places Lived in the Last Year: 1     Unstable Housing in the Last Year: No       Review of Systems   Constitutional:  Positive for fatigue. Negative for activity change, fever and unexpected weight change.   HENT:  Negative for congestion, ear pain, hearing loss, rhinorrhea and sore throat.    Eyes:  Negative for redness and visual disturbance.   Respiratory:  Negative for cough, shortness of breath and wheezing.    Cardiovascular:  Positive for chest pain. Negative for palpitations and leg swelling.   Gastrointestinal:  Positive for diarrhea. Negative for abdominal pain, blood in stool, constipation, nausea and vomiting.   Genitourinary:  Negative for dysuria, frequency and urgency.   Musculoskeletal:  Positive for arthralgias and myalgias. Negative for back pain, joint swelling and neck pain.   Skin:  Negative for color change, rash and wound.   Neurological:  Negative for dizziness, tremors, weakness, light-headedness and headaches.         Objective:      Physical Exam  Vitals reviewed.   Constitutional:       General: She is not in acute distress.     Appearance: She is well-developed.   HENT:      Head: Normocephalic and atraumatic.      Right Ear: External ear normal.      Left Ear: External ear normal.      Nose: Nose normal.   Eyes:      General:         Right eye: No discharge.         Left eye: No discharge.      Conjunctiva/sclera: Conjunctivae normal.   Neck:      Thyroid: No thyromegaly.   Cardiovascular:      Rate and Rhythm: Regular rhythm.  Tachycardia present.      Heart sounds: No murmur heard.  Pulmonary:      Effort: Pulmonary effort is normal. No respiratory distress.      Breath sounds: Normal breath sounds. No wheezing.   Chest:      Chest wall: Tenderness present.   Abdominal:      General: There is no distension.      Tenderness: There is no abdominal tenderness.   Skin:     General: Skin is warm and dry.   Neurological:      Mental Status: She is alert and oriented to person, place, and time. Mental status is at baseline.   Psychiatric:         Behavior: Behavior normal.         Thought Content: Thought content normal.         Assessment:       1. Crohn's disease without complication, unspecified gastrointestinal tract location    2. Costochondritis    3. Encounter for screening mammogram for breast cancer    4. Postmenopausal state    5. Tachycardia        Plan:       1. Crohn's disease without complication, unspecified gastrointestinal tract location  Chronic  Still with diarrhea sx. Description today appears similar to GI visit  C-scope is scheduled  Discussed at this point I agree with ending C-scope to assess how active Crohn's is--this will help determine appropriate treatment moving forward    2. Costochondritis  Chronic, unimproved  Cont PRN Tyelnol  INCREASE gabapentin to 300mg TID. Side effects discussed today. Consider lyrica  Cardiac work up unrevealing thus far  -     gabapentin (NEURONTIN) 300 MG capsule; Take 1 capsule (300 mg total) by mouth 3 (three) times daily.  Dispense: 90 capsule; Refill: 11    3. Encounter for screening mammogram for breast cancer  scheduled  -     Mammo Digital Screening Bilat w/ [place]; Future; Expected date: 09/08/2023    4. Postmenopausal state  scheduled  -     DXA Bone Density Axial Skeleton 1 or more sites; Future; Expected date: 09/08/2023    5. Tachycardia  New problem today  H/o a-fib but very regular on exam. EKG today  Check labs for anemia and dehydration given frequent diarrhea episodes last  few week  Will all with results this afternoon  -     EKG 12-lead; Future; Expected date: 09/08/2023  -     CBC Auto Differential; Future; Expected date: 09/08/2023  -     Basic Metabolic Panel; Future; Expected date: 09/09/2023

## 2023-09-14 ENCOUNTER — HOSPITAL ENCOUNTER (OUTPATIENT)
Dept: RADIOLOGY | Facility: HOSPITAL | Age: 73
Discharge: HOME | End: 2023-09-14
Attending: INTERNAL MEDICINE
Payer: MEDICARE

## 2023-09-14 DIAGNOSIS — K76.9 LIVER DISEASE, UNSPECIFIED: ICD-10-CM

## 2023-09-14 PROCEDURE — 74183 MRI ABD W/O CNTR FLWD CNTR: CPT | Mod: 26,HCNC,, | Performed by: STUDENT IN AN ORGANIZED HEALTH CARE EDUCATION/TRAINING PROGRAM

## 2023-09-14 PROCEDURE — 74183 MRI ABDOMEN W WO CONTRAST: ICD-10-PCS | Mod: 26,HCNC,, | Performed by: STUDENT IN AN ORGANIZED HEALTH CARE EDUCATION/TRAINING PROGRAM

## 2023-09-14 PROCEDURE — A9585 GADOBUTROL INJECTION: HCPCS | Mod: HCNC | Performed by: INTERNAL MEDICINE

## 2023-09-14 PROCEDURE — 25500020 PHARM REV CODE 255: Mod: HCNC | Performed by: INTERNAL MEDICINE

## 2023-09-14 PROCEDURE — 74183 MRI ABD W/O CNTR FLWD CNTR: CPT | Mod: TC,HCNC

## 2023-09-14 RX ORDER — GADOBUTROL 604.72 MG/ML
10 INJECTION INTRAVENOUS
Status: COMPLETED | OUTPATIENT
Start: 2023-09-14 | End: 2023-09-14

## 2023-09-14 RX ADMIN — GADOBUTROL 10 ML: 604.72 INJECTION INTRAVENOUS at 07:09

## 2023-09-20 ENCOUNTER — TELEPHONE (OUTPATIENT)
Dept: INTERNAL MEDICINE | Facility: CLINIC | Age: 73
End: 2023-09-20
Payer: MEDICARE

## 2023-09-20 NOTE — TELEPHONE ENCOUNTER
----- Message from Rupinder Walker sent at 9/20/2023 11:57 AM CDT -----  Regarding: Lab and MRI  Patient would like to be called with results of MRI and Lab work done on 9/14. Please call at [phone].

## 2023-09-25 ENCOUNTER — TELEPHONE (OUTPATIENT)
Dept: GASTROENTEROLOGY | Facility: CLINIC | Age: 73
End: 2023-09-25
Payer: MEDICARE

## 2023-09-29 ENCOUNTER — OFFICE VISIT (OUTPATIENT)
Dept: HOME HEALTH SERVICES | Facility: CLINIC | Age: 73
End: 2023-09-29
Payer: MEDICARE

## 2023-09-29 DIAGNOSIS — I42.8 CARDIOMYOPATHY, NONISCHEMIC: ICD-10-CM

## 2023-09-29 DIAGNOSIS — F33.1 DEPRESSION, MAJOR, RECURRENT, MODERATE: ICD-10-CM

## 2023-09-29 DIAGNOSIS — D68.61 ANTIPHOSPHOLIPID SYNDROME: ICD-10-CM

## 2023-09-29 DIAGNOSIS — I96: ICD-10-CM

## 2023-09-29 DIAGNOSIS — I48.92 ATRIAL FLUTTER, UNSPECIFIED TYPE: ICD-10-CM

## 2023-09-29 DIAGNOSIS — Z00.00 ENCOUNTER FOR PREVENTIVE HEALTH EXAMINATION: Primary | ICD-10-CM

## 2023-09-29 DIAGNOSIS — I70.0 AORTIC ATHEROSCLEROSIS: ICD-10-CM

## 2023-09-29 DIAGNOSIS — K50.919 CROHN'S DISEASE WITH COMPLICATION, UNSPECIFIED GASTROINTESTINAL TRACT LOCATION: ICD-10-CM

## 2023-09-29 DIAGNOSIS — F03.94 DEMENTIA WITH ANXIETY, UNSPECIFIED DEMENTIA SEVERITY, UNSPECIFIED DEMENTIA TYPE: ICD-10-CM

## 2023-09-29 DIAGNOSIS — T80.818A: ICD-10-CM

## 2023-09-29 DIAGNOSIS — F03.90 DEMENTIA WITHOUT BEHAVIORAL DISTURBANCE: ICD-10-CM

## 2023-09-29 DIAGNOSIS — I48.0 PAF (PAROXYSMAL ATRIAL FIBRILLATION): ICD-10-CM

## 2023-09-29 PROCEDURE — 1101F PR PT FALLS ASSESS DOC 0-1 FALLS W/OUT INJ PAST YR: ICD-10-PCS | Mod: CPTII,S$GLB,, | Performed by: NURSE PRACTITIONER

## 2023-09-29 PROCEDURE — G0439 PR MEDICARE ANNUAL WELLNESS SUBSEQUENT VISIT: ICD-10-PCS | Mod: S$GLB,,, | Performed by: NURSE PRACTITIONER

## 2023-09-29 PROCEDURE — 3080F PR MOST RECENT DIASTOLIC BLOOD PRESSURE >= 90 MM HG: ICD-10-PCS | Mod: CPTII,S$GLB,, | Performed by: NURSE PRACTITIONER

## 2023-09-29 PROCEDURE — G0439 PPPS, SUBSEQ VISIT: HCPCS | Mod: S$GLB,,, | Performed by: NURSE PRACTITIONER

## 2023-09-29 PROCEDURE — 3077F SYST BP >= 140 MM HG: CPT | Mod: CPTII,S$GLB,, | Performed by: NURSE PRACTITIONER

## 2023-09-29 PROCEDURE — G9919 PR SCREENING AND POSITIVE: ICD-10-PCS | Mod: CPTII,S$GLB,, | Performed by: NURSE PRACTITIONER

## 2023-09-29 PROCEDURE — G9919 SCRN ND POS ND PROV OF REC: HCPCS | Mod: CPTII,S$GLB,, | Performed by: NURSE PRACTITIONER

## 2023-09-29 PROCEDURE — 3080F DIAST BP >= 90 MM HG: CPT | Mod: CPTII,S$GLB,, | Performed by: NURSE PRACTITIONER

## 2023-09-29 PROCEDURE — 1126F AMNT PAIN NOTED NONE PRSNT: CPT | Mod: CPTII,S$GLB,, | Performed by: NURSE PRACTITIONER

## 2023-09-29 PROCEDURE — 3288F FALL RISK ASSESSMENT DOCD: CPT | Mod: CPTII,S$GLB,, | Performed by: NURSE PRACTITIONER

## 2023-09-29 PROCEDURE — 1126F PR PAIN SEVERITY QUANTIFIED, NO PAIN PRESENT: ICD-10-PCS | Mod: CPTII,S$GLB,, | Performed by: NURSE PRACTITIONER

## 2023-09-29 PROCEDURE — 3288F PR FALLS RISK ASSESSMENT DOCUMENTED: ICD-10-PCS | Mod: CPTII,S$GLB,, | Performed by: NURSE PRACTITIONER

## 2023-09-29 PROCEDURE — 1101F PT FALLS ASSESS-DOCD LE1/YR: CPT | Mod: CPTII,S$GLB,, | Performed by: NURSE PRACTITIONER

## 2023-09-29 PROCEDURE — 3077F PR MOST RECENT SYSTOLIC BLOOD PRESSURE >= 140 MM HG: ICD-10-PCS | Mod: CPTII,S$GLB,, | Performed by: NURSE PRACTITIONER

## 2023-09-29 PROCEDURE — 4010F PR ACE/ARB THEARPY RXD/TAKEN: ICD-10-PCS | Mod: CPTII,S$GLB,, | Performed by: NURSE PRACTITIONER

## 2023-09-29 PROCEDURE — 4010F ACE/ARB THERAPY RXD/TAKEN: CPT | Mod: CPTII,S$GLB,, | Performed by: NURSE PRACTITIONER

## 2023-09-29 NOTE — PROGRESS NOTES
Celine Sinclair presented for a  Medicare AWV and comprehensive Health Risk Assessment today. The following components were reviewed and updated:    Medical history  Family History  Social history  Allergies and Current Medications  Health Risk Assessment  Health Maintenance  Care Team     Patient screened moderate and/or high risk for one or more social determinants of health (SDOH). Patient connected to community resources through the ED Navigator.      ** See Completed Assessments for Annual Wellness Visit within the encounter summary.**         The following assessments were completed:  Living Situation  CAGE  Depression Screening  Timed Get Up and Go  Whisper Test  Cognitive Function Screening      Nutrition Screening  ADL Screening  PAQ Screening        Vitals:    09/29/23 1525   BP: (!) 150/96   Pulse: (!) 51   Resp: 16   SpO2: 98%     There is no height or weight on file to calculate BMI.  Physical Exam  Vitals and nursing note reviewed.   Constitutional:       General: She is not in acute distress.     Appearance: She is obese. She is not ill-appearing.   HENT:      Head: Normocephalic and atraumatic.      Nose: Nose normal.      Mouth/Throat:      Mouth: Mucous membranes are moist.   Eyes:      Pupils: Pupils are equal, round, and reactive to light.   Cardiovascular:      Rate and Rhythm: Normal rate and regular rhythm.      Pulses: Normal pulses.      Heart sounds: Normal heart sounds.   Pulmonary:      Effort: Pulmonary effort is normal.      Breath sounds: Normal breath sounds.   Abdominal:      General: Bowel sounds are normal.      Palpations: Abdomen is soft.   Musculoskeletal:         General: No swelling. Normal range of motion.      Cervical back: Normal range of motion.   Skin:     General: Skin is warm and dry.   Neurological:      Mental Status: She is alert and oriented to person, place, and time.   Psychiatric:         Mood and Affect: Mood normal.         Behavior: Behavior normal.          Thought Content: Thought content normal.               Diagnoses and health risks identified today and associated recommendations/orders:    1. Encounter for preventive health examination  Assessments completed.  HM recommendations reviewed.  F/u with PCP as instructed.     Patient not on chronic opioids.   Risk factors reviewed for any potential opioid use disorder   Pain evaluated during visit.  Current treatment plan documented.  Will refer to specialist, as appropriate.    2. Aortic atherosclerosis  Chronic, stable on current regimen. Followed by cardiology    3. 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 Followed by PCP    4. PAF (paroxysmal atrial fibrillation)  Chronic, stable on current regimen. Followed by cardiology    5. Atrial flutter, unspecified type  Chronic, stable on current regimen. Followed by cardiology    6. Cardiomyopathy, nonischemic  Chronic, stable on current regimen. Followed by cardiology    7. Depression, major, recurrent, moderate  Chronic, stable on current regimen. Followed by PCP    8. Dementia without behavioral disturbance  Chronic, stable on current regimen. Followed by neurology    9. Antiphospholipid syndrome  Chronic, stable on current regimen. Followed by hematology    10. Crohn's disease with complication, unspecified gastrointestinal tract location  Chronic, stable on current regimen. Followed by GI      Provided Ada with a 5-10 year written screening schedule and personal prevention plan. Recommendations were developed using the USPSTF age appropriate recommendations. Education, counseling, and referrals were provided as needed. After Visit Summary printed and given to patient which includes a list of additional screenings\tests needed.    Follow up in about 1 year (around 9/29/2024) for Medicare AWV.    Mee Ramos NP  I offered to discuss advanced care planning, including how to pick a person who  would make decisions for you if you were unable to make them for yourself, called a health care power of , and what kind of decisions you might make such as use of life sustaining treatments such as ventilators and tube feeding when faced with a life limiting illness recorded on a living will that they will need to know. (How you want to be cared for as you near the end of your natural life)     X  Patient is unwilling to engage in a discussion regarding advance directives at this time.

## 2023-10-02 VITALS
OXYGEN SATURATION: 98 % | SYSTOLIC BLOOD PRESSURE: 150 MMHG | HEART RATE: 51 BPM | DIASTOLIC BLOOD PRESSURE: 96 MMHG | RESPIRATION RATE: 16 BRPM

## 2023-10-02 PROBLEM — F03.94: Status: ACTIVE | Noted: 2023-10-02

## 2023-10-02 NOTE — PATIENT INSTRUCTIONS
Counseling and Referral of Other Preventative  (Italic type indicates deductible and co-insurance are waived)    Patient Name: Celine Sinclair  Today's Date: 10/2/2023    Health Maintenance       Date Due Completion Date    TETANUS VACCINE Never done ---    Shingles Vaccine (1 of 2) Never done ---    DEXA Scan 10/30/2022 10/30/2019    COVID-19 Vaccine (2 - Moderna series) 03/03/2023 11/3/2022    Influenza Vaccine (1) 09/01/2023 9/1/2022    Mammogram 10/11/2023 10/11/2022    Colorectal Cancer Screening 08/25/2024 8/25/2022    Lipid Panel 07/21/2028 7/21/2023        No orders of the defined types were placed in this encounter.    The following information is provided to all patients.  This information is to help you find resources for any of the problems found today that may be affecting your health:                Living healthy guide: www.[facility].[place].[facility]      Understanding Diabetes: www.diabetes.org      Eating healthy: www.cdc.gov/healthyweight      CDC home safety checklist: www.cdc.gov/steadi/patient.html      Agency on Aging: www.goea.[place].[facility]      Alcoholics anonymous (AA): www.aa.org      Physical Activity: www.tobin.nih.gov/ln4mife      Tobacco use: www.quitwithusla.org      Left message to call back pt lab orders

## 2023-10-04 ENCOUNTER — OFFICE VISIT (OUTPATIENT)
Dept: INTERNAL MEDICINE | Facility: CLINIC | Age: 73
End: 2023-10-04
Payer: MEDICARE

## 2023-10-04 ENCOUNTER — LAB VISIT (OUTPATIENT)
Dept: LAB | Facility: HOSPITAL | Age: 73
End: 2023-10-04
Attending: INTERNAL MEDICINE
Payer: MEDICARE

## 2023-10-04 VITALS
HEIGHT: 65 IN | HEART RATE: 71 BPM | RESPIRATION RATE: 16 BRPM | DIASTOLIC BLOOD PRESSURE: 76 MMHG | OXYGEN SATURATION: 100 % | SYSTOLIC BLOOD PRESSURE: 122 MMHG | WEIGHT: 176.56 LBS | BODY MASS INDEX: 29.42 KG/M2

## 2023-10-04 DIAGNOSIS — Z23 NEED FOR VACCINATION: ICD-10-CM

## 2023-10-04 DIAGNOSIS — E72.11 MTHFR (METHYLENE THF REDUCTASE) DEFICIENCY AND HOMOCYSTINURIA: ICD-10-CM

## 2023-10-04 DIAGNOSIS — E53.8 VITAMIN B12 DEFICIENCY: ICD-10-CM

## 2023-10-04 DIAGNOSIS — E72.12 MTHFR (METHYLENE THF REDUCTASE) DEFICIENCY AND HOMOCYSTINURIA: ICD-10-CM

## 2023-10-04 DIAGNOSIS — E55.9 VITAMIN D DEFICIENCY: ICD-10-CM

## 2023-10-04 DIAGNOSIS — E53.8 VITAMIN B12 DEFICIENCY: Primary | ICD-10-CM

## 2023-10-04 PROCEDURE — G0008 ADMIN INFLUENZA VIRUS VAC: HCPCS | Mod: HCNC,S$GLB,, | Performed by: INTERNAL MEDICINE

## 2023-10-04 PROCEDURE — 3078F DIAST BP <80 MM HG: CPT | Mod: HCNC,CPTII,S$GLB, | Performed by: INTERNAL MEDICINE

## 2023-10-04 PROCEDURE — 36415 COLL VENOUS BLD VENIPUNCTURE: CPT | Mod: HCNC | Performed by: INTERNAL MEDICINE

## 2023-10-04 PROCEDURE — 99999 PR PBB SHADOW E&M-EST. PATIENT-LVL IV: CPT | Mod: PBBFAC,HCNC,, | Performed by: INTERNAL MEDICINE

## 2023-10-04 PROCEDURE — 4010F ACE/ARB THERAPY RXD/TAKEN: CPT | Mod: HCNC,CPTII,S$GLB, | Performed by: INTERNAL MEDICINE

## 2023-10-04 PROCEDURE — 1159F MED LIST DOCD IN RCRD: CPT | Mod: HCNC,CPTII,S$GLB, | Performed by: INTERNAL MEDICINE

## 2023-10-04 PROCEDURE — 1159F PR MEDICATION LIST DOCUMENTED IN MEDICAL RECORD: ICD-10-PCS | Mod: HCNC,CPTII,S$GLB, | Performed by: INTERNAL MEDICINE

## 2023-10-04 PROCEDURE — 3288F FALL RISK ASSESSMENT DOCD: CPT | Mod: HCNC,CPTII,S$GLB, | Performed by: INTERNAL MEDICINE

## 2023-10-04 PROCEDURE — G0008 FLU VACCINE - QUADRIVALENT - ADJUVANTED: ICD-10-PCS | Mod: HCNC,S$GLB,, | Performed by: INTERNAL MEDICINE

## 2023-10-04 PROCEDURE — 3288F PR FALLS RISK ASSESSMENT DOCUMENTED: ICD-10-PCS | Mod: HCNC,CPTII,S$GLB, | Performed by: INTERNAL MEDICINE

## 2023-10-04 PROCEDURE — 4010F PR ACE/ARB THEARPY RXD/TAKEN: ICD-10-PCS | Mod: HCNC,CPTII,S$GLB, | Performed by: INTERNAL MEDICINE

## 2023-10-04 PROCEDURE — 1101F PT FALLS ASSESS-DOCD LE1/YR: CPT | Mod: HCNC,CPTII,S$GLB, | Performed by: INTERNAL MEDICINE

## 2023-10-04 PROCEDURE — 82607 VITAMIN B-12: CPT | Mod: HCNC | Performed by: INTERNAL MEDICINE

## 2023-10-04 PROCEDURE — 90694 VACC AIIV4 NO PRSRV 0.5ML IM: CPT | Mod: HCNC,S$GLB,, | Performed by: INTERNAL MEDICINE

## 2023-10-04 PROCEDURE — 99213 PR OFFICE/OUTPT VISIT, EST, LEVL III, 20-29 MIN: ICD-10-PCS | Mod: HCNC,S$GLB,, | Performed by: INTERNAL MEDICINE

## 2023-10-04 PROCEDURE — 3078F PR MOST RECENT DIASTOLIC BLOOD PRESSURE < 80 MM HG: ICD-10-PCS | Mod: HCNC,CPTII,S$GLB, | Performed by: INTERNAL MEDICINE

## 2023-10-04 PROCEDURE — 1160F RVW MEDS BY RX/DR IN RCRD: CPT | Mod: HCNC,CPTII,S$GLB, | Performed by: INTERNAL MEDICINE

## 2023-10-04 PROCEDURE — 3074F PR MOST RECENT SYSTOLIC BLOOD PRESSURE < 130 MM HG: ICD-10-PCS | Mod: HCNC,CPTII,S$GLB, | Performed by: INTERNAL MEDICINE

## 2023-10-04 PROCEDURE — 3008F BODY MASS INDEX DOCD: CPT | Mod: HCNC,CPTII,S$GLB, | Performed by: INTERNAL MEDICINE

## 2023-10-04 PROCEDURE — 1101F PR PT FALLS ASSESS DOC 0-1 FALLS W/OUT INJ PAST YR: ICD-10-PCS | Mod: HCNC,CPTII,S$GLB, | Performed by: INTERNAL MEDICINE

## 2023-10-04 PROCEDURE — 82306 VITAMIN D 25 HYDROXY: CPT | Mod: HCNC | Performed by: INTERNAL MEDICINE

## 2023-10-04 PROCEDURE — 99213 OFFICE O/P EST LOW 20 MIN: CPT | Mod: HCNC,S$GLB,, | Performed by: INTERNAL MEDICINE

## 2023-10-04 PROCEDURE — 3008F PR BODY MASS INDEX (BMI) DOCUMENTED: ICD-10-PCS | Mod: HCNC,CPTII,S$GLB, | Performed by: INTERNAL MEDICINE

## 2023-10-04 PROCEDURE — 83090 ASSAY OF HOMOCYSTEINE: CPT | Mod: HCNC | Performed by: INTERNAL MEDICINE

## 2023-10-04 PROCEDURE — 90694 FLU VACCINE - QUADRIVALENT - ADJUVANTED: ICD-10-PCS | Mod: HCNC,S$GLB,, | Performed by: INTERNAL MEDICINE

## 2023-10-04 PROCEDURE — 1160F PR REVIEW ALL MEDS BY PRESCRIBER/CLIN PHARMACIST DOCUMENTED: ICD-10-PCS | Mod: HCNC,CPTII,S$GLB, | Performed by: INTERNAL MEDICINE

## 2023-10-04 PROCEDURE — 3074F SYST BP LT 130 MM HG: CPT | Mod: HCNC,CPTII,S$GLB, | Performed by: INTERNAL MEDICINE

## 2023-10-04 PROCEDURE — 1125F PR PAIN SEVERITY QUANTIFIED, PAIN PRESENT: ICD-10-PCS | Mod: HCNC,CPTII,S$GLB, | Performed by: INTERNAL MEDICINE

## 2023-10-04 PROCEDURE — 1125F AMNT PAIN NOTED PAIN PRSNT: CPT | Mod: HCNC,CPTII,S$GLB, | Performed by: INTERNAL MEDICINE

## 2023-10-04 PROCEDURE — 99999 PR PBB SHADOW E&M-EST. PATIENT-LVL IV: ICD-10-PCS | Mod: PBBFAC,HCNC,, | Performed by: INTERNAL MEDICINE

## 2023-10-04 NOTE — PROGRESS NOTES
Subjective:       Patient ID: Celine Sinclair is a 72 y.o. female.    Chief Complaint: Follow-up and discuss b-12 injection      HPI:  Patient is known to me and presents to discuss B12 injections. She was initially diagnosed by Dr. Urena who was following patient for IBD. We she started on B12 injections which I was ordering for her here. It seems her last injection was 9/2022. At that time Dr. Urena had ordered labs and B12 had normalized. She had not had repeat labs or injections in 1 year. She recently saw her neurology, Dr. Curiel for memory loss who started aricept 10mg and told patient to see me to continue B12 injections.           Past Medical History:   Diagnosis Date    Aortic atherosclerosis     Atrial fibrillation     Benign essential hypertension     Cataract     Senile    Chest pain     CHF (congestive heart failure)     Crohn's colitis     Depression, major, recurrent, moderate     Encounter for blood transfusion     Fibromyalgia     GERD (gastroesophageal reflux disease)     Hypertensive cardiomyopathy     IBS (irritable bowel syndrome)     Migraine     Opioid abuse, in remission     Orbital cellulitis on left     Osteopenia     Paget disease of bone     Palpitations     Port-A-Cath in place     right chest    Prolonged QT interval     RA (rheumatoid arthritis)     Sedative hypnotic or anxiolytic dependence     in remission     SOB (shortness of breath)     SVT (supraventricular tachycardia)     Unspecified urinary incontinence        Family History   Problem Relation Age of Onset    Glaucoma Paternal Grandmother     Other Daughter         Diabetic Retinopathy    Breast cancer Daughter 40    Retinal detachment Grandchild     Heart attack Maternal Grandmother     Colon cancer Maternal Grandmother     Cancer Mother         Unknown type    Emphysema Father 67    Heart disease Father     Lung cancer Sister     Heart attack Sister     Breast cancer Daughter 47    Breast cancer Sister     Amblyopia Neg Hx      Blindness Neg Hx     Strabismus Neg Hx     Macular degeneration Neg Hx     Cataracts Neg Hx        Social History     Socioeconomic History    Marital status:     Number of children: 12   Occupational History    Occupation: Hairdresser     Comment: Retired/disabled   Tobacco Use    Smoking status: Never    Smokeless tobacco: Never   Substance and Sexual Activity    Alcohol use: Yes     Comment: wine coolers occassionally    Drug use: No    Sexual activity: Not Currently     Partners: Female   Social History Narrative    Patient gave birth to 7 children, adopted 2 and has 3 stepchildren with her .     Social Determinants of Health     Financial Resource Strain: Low Risk  (9/29/2023)    Overall Financial Resource Strain (CARDIA)     Difficulty of Paying Living Expenses: Not hard at all   Food Insecurity: No Food Insecurity (9/29/2023)    Hunger Vital Sign     Worried About Running Out of Food in the Last Year: Never true     Ran Out of Food in the Last Year: Never true   Transportation Needs: No Transportation Needs (9/29/2023)    PRAPARE - Transportation     Lack of Transportation (Medical): No     Lack of Transportation (Non-Medical): No   Physical Activity: Inactive (9/29/2023)    Exercise Vital Sign     Days of Exercise per Week: 0 days     Minutes of Exercise per Session: 0 min   Stress: No Stress Concern Present (9/29/2023)    [place] [employer]ational Health - Occupational Stress Questionnaire     Feeling of Stress : Only a little   Social Connections: Moderately Integrated (9/29/2023)    Social Connection and Isolation Panel [NHANES]     Frequency of Communication with Friends and Family: More than three times a week     Frequency of Social Gatherings with Friends and Family: More than three times a week     Attends Sabianism Services: More than 4 times per year     Active Member of Clubs or Organizations: No     Attends Club or Organization Meetings: Never     Marital Status:     Housing Stability: Low Risk  (9/29/2023)    Housing Stability Vital Sign     Unable to Pay for Housing in the Last Year: No     Number of Places Lived in the Last Year: 1     Unstable Housing in the Last Year: No       Review of Systems   Constitutional:  Negative for activity change, fatigue, fever and unexpected weight change.   HENT:  Negative for congestion, ear pain, hearing loss, rhinorrhea and sore throat.    Eyes:  Negative for redness and visual disturbance.   Respiratory:  Negative for cough, shortness of breath and wheezing.    Cardiovascular:  Negative for chest pain, palpitations and leg swelling.   Gastrointestinal:  Negative for abdominal pain, constipation, diarrhea, nausea and vomiting.   Genitourinary:  Negative for dysuria, frequency and urgency.   Musculoskeletal:  Positive for arthralgias and myalgias. Negative for back pain, joint swelling and neck pain.   Skin:  Negative for color change, rash and wound.   Neurological:  Negative for dizziness, tremors, weakness, light-headedness and headaches.        Memory loss           Objective:      Physical Exam  Vitals reviewed.   Constitutional:       General: She is not in acute distress.     Appearance: She is well-developed.   HENT:      Head: Normocephalic and atraumatic.      Right Ear: External ear normal.      Left Ear: External ear normal.      Nose: Nose normal.   Eyes:      General:         Right eye: No discharge.         Left eye: No discharge.      Conjunctiva/sclera: Conjunctivae normal.   Neck:      Thyroid: No thyromegaly.   Cardiovascular:      Rate and Rhythm: Normal rate and regular rhythm.   Pulmonary:      Effort: Pulmonary effort is normal. No respiratory distress.      Breath sounds: No wheezing.   Skin:     General: Skin is warm and dry.   Neurological:      Mental Status: She is alert and oriented to person, place, and time.   Psychiatric:         Behavior: Behavior normal.         Thought Content: Thought content normal.          Assessment:       1. Vitamin B12 deficiency    2. 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 Vitamin D deficiency    4. Need for vaccination        Plan:       1. Vitamin B12 deficiency  Chronic  ? Uncontrolled  Update labs prior to resuming injections to be sure we are accurately treating her condition   -     Vitamin B12; Future; Expected date: 10/04/2023  -     HOMOCYSTEINE, SERUM; Future; Expected date: 10/04/2023    2. 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 Uncontrolled  Update labs prior to resuming injections to be sure we are accurately treating her condition   -     Vitamin B12; Future; Expected date: 10/04/2023  -     HOMOCYSTEINE, SERUM; Future; Expected date: 10/04/2023    3. Vitamin D deficiency  Chronic  Update labs  -     Vitamin D; Future; Expected date: 01/02/2024  -     Vitamin D; Future; Expected date: 10/04/2023    4. Need for vaccination  Done today  -     Influenza - Quadrivalent (Adjuvanted)       RTC PRN and as scheduled for routine. Will call with labs

## 2023-10-05 LAB
25(OH)D3+25(OH)D2 SERPL-MCNC: 27 NG/ML (ref 30–96)
HCYS SERPL-SCNC: 16.6 UMOL/L (ref 4–15.5)
VIT B12 SERPL-MCNC: 419 PG/ML (ref 210–950)

## 2023-10-31 ENCOUNTER — HOSPITAL ENCOUNTER (OUTPATIENT)
Dept: RADIOLOGY | Facility: HOSPITAL | Age: 73
Discharge: HOME | End: 2023-10-31
Attending: INTERNAL MEDICINE
Payer: MEDICARE

## 2023-10-31 VITALS — HEIGHT: 65 IN | WEIGHT: 176 LBS | BODY MASS INDEX: 29.32 KG/M2

## 2023-10-31 DIAGNOSIS — Z12.31 ENCOUNTER FOR SCREENING MAMMOGRAM FOR BREAST CANCER: ICD-10-CM

## 2023-10-31 DIAGNOSIS — Z78.0 POSTMENOPAUSAL STATE: ICD-10-CM

## 2023-10-31 PROCEDURE — 77063 MAMMO DIGITAL SCREENING BILAT WITH TOMO: ICD-10-PCS | Mod: 26,HCNC,, | Performed by: RADIOLOGY

## 2023-10-31 PROCEDURE — 77080 DXA BONE DENSITY AXIAL: CPT | Mod: TC,HCNC

## 2023-10-31 PROCEDURE — 77067 SCR MAMMO BI INCL CAD: CPT | Mod: 26,HCNC,, | Performed by: RADIOLOGY

## 2023-10-31 PROCEDURE — 77080 DXA BONE DENSITY AXIAL SKELETON 1 OR MORE SITES: ICD-10-PCS | Mod: 26,HCNC,, | Performed by: RADIOLOGY

## 2023-10-31 PROCEDURE — 77067 SCR MAMMO BI INCL CAD: CPT | Mod: TC,HCNC

## 2023-10-31 PROCEDURE — 77067 MAMMO DIGITAL SCREENING BILAT WITH TOMO: ICD-10-PCS | Mod: 26,HCNC,, | Performed by: RADIOLOGY

## 2023-10-31 PROCEDURE — 77080 DXA BONE DENSITY AXIAL: CPT | Mod: 26,HCNC,, | Performed by: RADIOLOGY

## 2023-10-31 PROCEDURE — 77063 BREAST TOMOSYNTHESIS BI: CPT | Mod: 26,HCNC,, | Performed by: RADIOLOGY

## 2023-11-01 ENCOUNTER — PATIENT OUTREACH (OUTPATIENT)
Dept: ADMINISTRATIVE | Facility: HOSPITAL | Age: 73
End: 2023-11-01
Payer: MEDICARE

## 2023-11-01 NOTE — PROGRESS NOTES
Chart reviewed, immunization record updated.  No new results noted on [facility] or [facility] web site.  Care Everywhere updated.   Patient care coordination note updated.  Upcoming PCP visit updated.  Next PCP visit 12/08/2023.  LOV with PCP 10/04/2023.  Contacted patient to discuss Enhanced Annual Wellness Visit.   Patient scheduled eAWV visit for 01/09/2024.   E-fax sent to Dr. Justin Hayden for last lab work completed.

## 2023-11-01 NOTE — LETTER
AUTHORIZATION FOR RELEASE OF   CONFIDENTIAL INFORMATION    Dear Dr. Justin Hayden,    We are seeing Celine Sinclair, date of birth 1950, in the clinic at [facility]. Neeru Hinkle MD is the patient's PCP. Celine Sinclair has an outstanding lab/procedure at the time we reviewed her chart. In order to help keep her health information updated, she has authorized us to request the following medical record(s):                                               ( X )  OUTSIDE LAB RESULTS            Please fax records to [facility], Knight, Sarah, MD, [phone].    If you have any questions, please contact...  Essence Ramos LPN  Clinical Care Coordinator  [facility]  Phone: [phone]  Fax: [phone]           Patient Name: Celine Sinclair  : 1950  Patient Phone #: [phone]

## 2023-11-02 NOTE — PROGRESS NOTES
Received external Lab work updated to .   Lipid and CMP completed 07/21/2023.  BMP completed 08/07/2023.

## 2023-11-16 ENCOUNTER — TELEPHONE (OUTPATIENT)
Dept: ENDOSCOPY | Facility: HOSPITAL | Age: 73
End: 2023-11-16
Payer: MEDICARE

## 2023-11-16 NOTE — TELEPHONE ENCOUNTER
Contacted the patient to schedule an endoscopy procedure(s) . The patient did not answer the call and was not able to leave a voicemail mailbox full

## 2023-11-16 NOTE — TELEPHONE ENCOUNTER
----- Message from María Moran sent at 11/16/2023  8:39 AM CST -----  Contact: [phone]    ----- Message -----  From: Kelly Lopez RN  Sent: 11/15/2023   1:31 PM CST  To: [facility] Endo Schedulers      ----- Message -----  From: Emilia Manzanares  Sent: 11/15/2023  12:56 PM CST  To: Ayanna SCHNEIDER Staff    Celine Sinclair calling regarding Appointment Access  (message) Pt asking for a call back to try and reschedule a procedure that she states she missed asking to speak with the nurse

## 2023-11-30 ENCOUNTER — HOSPITAL ENCOUNTER (EMERGENCY)
Facility: HOSPITAL | Age: 73
Discharge: HOME | End: 2023-11-30
Attending: STUDENT IN AN ORGANIZED HEALTH CARE EDUCATION/TRAINING PROGRAM
Payer: MEDICARE

## 2023-11-30 VITALS
RESPIRATION RATE: 20 BRPM | BODY MASS INDEX: 29.75 KG/M2 | WEIGHT: 178.56 LBS | HEART RATE: 65 BPM | DIASTOLIC BLOOD PRESSURE: 84 MMHG | OXYGEN SATURATION: 99 % | SYSTOLIC BLOOD PRESSURE: 172 MMHG | HEIGHT: 65 IN | TEMPERATURE: 98 F

## 2023-11-30 DIAGNOSIS — N39.0 URINARY TRACT INFECTION WITH HEMATURIA, SITE UNSPECIFIED: Primary | ICD-10-CM

## 2023-11-30 DIAGNOSIS — R31.9 URINARY TRACT INFECTION WITH HEMATURIA, SITE UNSPECIFIED: Primary | ICD-10-CM

## 2023-11-30 DIAGNOSIS — E87.6 HYPOKALEMIA: ICD-10-CM

## 2023-11-30 DIAGNOSIS — R07.89 CHEST DISCOMFORT: ICD-10-CM

## 2023-11-30 DIAGNOSIS — R05.9 COUGH: ICD-10-CM

## 2023-11-30 LAB
ALBUMIN SERPL BCP-MCNC: 3.9 G/DL (ref 3.5–5.2)
ALP SERPL-CCNC: 92 U/L (ref 55–135)
ALT SERPL W/O P-5'-P-CCNC: 10 U/L (ref 10–44)
ANION GAP SERPL CALC-SCNC: 17 MMOL/L (ref 8–16)
AST SERPL-CCNC: 17 U/L (ref 10–40)
BACTERIA #/AREA URNS HPF: ABNORMAL /HPF
BASOPHILS # BLD AUTO: 0.05 K/UL (ref 0–0.2)
BASOPHILS NFR BLD: 0.6 % (ref 0–1.9)
BILIRUB SERPL-MCNC: 2.5 MG/DL (ref 0.1–1)
BILIRUB UR QL STRIP: NEGATIVE
BNP SERPL-MCNC: 42 PG/ML (ref 0–99)
BUN SERPL-MCNC: 14 MG/DL (ref 8–23)
CALCIUM SERPL-MCNC: 8.9 MG/DL (ref 8.7–10.5)
CHLORIDE SERPL-SCNC: 109 MMOL/L (ref 95–110)
CLARITY UR: ABNORMAL
CO2 SERPL-SCNC: 18 MMOL/L (ref 23–29)
COLOR UR: YELLOW
CREAT SERPL-MCNC: 0.7 MG/DL (ref 0.5–1.4)
DIFFERENTIAL METHOD: ABNORMAL
EOSINOPHIL # BLD AUTO: 0.2 K/UL (ref 0–0.5)
EOSINOPHIL NFR BLD: 2 % (ref 0–8)
ERYTHROCYTE [DISTWIDTH] IN BLOOD BY AUTOMATED COUNT: 13.6 % (ref 11.5–14.5)
EST. GFR  (NO RACE VARIABLE): >60 ML/MIN/1.73 M^2
GLUCOSE SERPL-MCNC: 94 MG/DL (ref 70–110)
GLUCOSE UR QL STRIP: NEGATIVE
GROUP A STREP, MOLECULAR: NEGATIVE
HCT VFR BLD AUTO: 39.3 % (ref 37–48.5)
HGB BLD-MCNC: 12.1 G/DL (ref 12–16)
HGB UR QL STRIP: ABNORMAL
HYALINE CASTS #/AREA URNS LPF: 1 /LPF
IMM GRANULOCYTES # BLD AUTO: 0.03 K/UL (ref 0–0.04)
IMM GRANULOCYTES NFR BLD AUTO: 0.4 % (ref 0–0.5)
INFLUENZA A, MOLECULAR: NEGATIVE
INFLUENZA B, MOLECULAR: NEGATIVE
KETONES UR QL STRIP: NEGATIVE
LEUKOCYTE ESTERASE UR QL STRIP: ABNORMAL
LYMPHOCYTES # BLD AUTO: 1.8 K/UL (ref 1–4.8)
LYMPHOCYTES NFR BLD: 21.8 % (ref 18–48)
MCH RBC QN AUTO: 26.8 PG (ref 27–31)
MCHC RBC AUTO-ENTMCNC: 30.8 G/DL (ref 32–36)
MCV RBC AUTO: 87 FL (ref 82–98)
MICROSCOPIC COMMENT: ABNORMAL
MONOCYTES # BLD AUTO: 0.6 K/UL (ref 0.3–1)
MONOCYTES NFR BLD: 6.9 % (ref 4–15)
NEUTROPHILS # BLD AUTO: 5.7 K/UL (ref 1.8–7.7)
NEUTROPHILS NFR BLD: 68.3 % (ref 38–73)
NITRITE UR QL STRIP: POSITIVE
NRBC BLD-RTO: 0 /100 WBC
PH UR STRIP: 6 [PH] (ref 5–8)
PLATELET # BLD AUTO: 219 K/UL (ref 150–450)
PMV BLD AUTO: 11 FL (ref 9.2–12.9)
POTASSIUM SERPL-SCNC: 3 MMOL/L (ref 3.5–5.1)
PROT SERPL-MCNC: 7.2 G/DL (ref 6–8.4)
PROT UR QL STRIP: ABNORMAL
RBC # BLD AUTO: 4.52 M/UL (ref 4–5.4)
RBC #/AREA URNS HPF: 15 /HPF (ref 0–4)
RSV AG SPEC QL IA: NEGATIVE
SARS-COV-2 RDRP RESP QL NAA+PROBE: NEGATIVE
SODIUM SERPL-SCNC: 144 MMOL/L (ref 136–145)
SP GR UR STRIP: >=1.03 (ref 1–1.03)
SPECIMEN SOURCE: NORMAL
SPECIMEN SOURCE: NORMAL
SQUAMOUS #/AREA URNS HPF: 15 /HPF
TROPONIN I SERPL DL<=0.01 NG/ML-MCNC: <0.006 NG/ML (ref 0–0.03)
URN SPEC COLLECT METH UR: ABNORMAL
UROBILINOGEN UR STRIP-ACNC: 1 EU/DL
WBC # BLD AUTO: 8.4 K/UL (ref 3.9–12.7)
WBC #/AREA URNS HPF: 30 /HPF (ref 0–5)
YEAST URNS QL MICRO: ABNORMAL

## 2023-11-30 PROCEDURE — 93005 ELECTROCARDIOGRAM TRACING: CPT | Mod: HCNC

## 2023-11-30 PROCEDURE — 36415 COLL VENOUS BLD VENIPUNCTURE: CPT | Mod: HCNC

## 2023-11-30 PROCEDURE — 81000 URINALYSIS NONAUTO W/SCOPE: CPT | Mod: HCNC

## 2023-11-30 PROCEDURE — 87186 SC STD MICRODIL/AGAR DIL: CPT | Mod: HCNC

## 2023-11-30 PROCEDURE — 99285 EMERGENCY DEPT VISIT HI MDM: CPT | Mod: 25,HCNC

## 2023-11-30 PROCEDURE — 87651 STREP A DNA AMP PROBE: CPT | Mod: HCNC

## 2023-11-30 PROCEDURE — U0002 COVID-19 LAB TEST NON-CDC: HCPCS | Mod: HCNC

## 2023-11-30 PROCEDURE — 85025 COMPLETE CBC W/AUTO DIFF WBC: CPT | Mod: HCNC

## 2023-11-30 PROCEDURE — 93010 ELECTROCARDIOGRAM REPORT: CPT | Mod: HCNC,,, | Performed by: INTERNAL MEDICINE

## 2023-11-30 PROCEDURE — 25000003 PHARM REV CODE 250: Mod: HCNC

## 2023-11-30 PROCEDURE — 87086 URINE CULTURE/COLONY COUNT: CPT | Mod: HCNC

## 2023-11-30 PROCEDURE — 93010 EKG 12-LEAD: ICD-10-PCS | Mod: HCNC,,, | Performed by: INTERNAL MEDICINE

## 2023-11-30 PROCEDURE — 84484 ASSAY OF TROPONIN QUANT: CPT | Mod: HCNC

## 2023-11-30 PROCEDURE — 87502 INFLUENZA DNA AMP PROBE: CPT | Mod: HCNC

## 2023-11-30 PROCEDURE — 87634 RSV DNA/RNA AMP PROBE: CPT | Mod: HCNC

## 2023-11-30 PROCEDURE — 87088 URINE BACTERIA CULTURE: CPT | Mod: HCNC

## 2023-11-30 PROCEDURE — 63600175 PHARM REV CODE 636 W HCPCS: Mod: HCNC

## 2023-11-30 PROCEDURE — 87077 CULTURE AEROBIC IDENTIFY: CPT | Mod: HCNC

## 2023-11-30 PROCEDURE — 96372 THER/PROPH/DIAG INJ SC/IM: CPT

## 2023-11-30 PROCEDURE — 83880 ASSAY OF NATRIURETIC PEPTIDE: CPT | Mod: HCNC

## 2023-11-30 PROCEDURE — 80053 COMPREHEN METABOLIC PANEL: CPT | Mod: HCNC

## 2023-11-30 RX ORDER — CEFTRIAXONE 1 G/1
1 INJECTION, POWDER, FOR SOLUTION INTRAMUSCULAR; INTRAVENOUS
Status: COMPLETED | OUTPATIENT
Start: 2023-11-30 | End: 2023-11-30

## 2023-11-30 RX ORDER — CIPROFLOXACIN 500 MG/1
500 TABLET ORAL EVERY 12 HOURS
Qty: 14 TABLET | Refills: 0 | Status: SHIPPED | OUTPATIENT
Start: 2023-11-30 | End: 2023-11-30

## 2023-11-30 RX ORDER — LIDOCAINE HYDROCHLORIDE 10 MG/ML
2.1 INJECTION INFILTRATION; PERINEURAL
Status: COMPLETED | OUTPATIENT
Start: 2023-11-30 | End: 2023-11-30

## 2023-11-30 RX ORDER — POTASSIUM CHLORIDE 20 MEQ/1
40 TABLET, EXTENDED RELEASE ORAL
Status: COMPLETED | OUTPATIENT
Start: 2023-11-30 | End: 2023-11-30

## 2023-11-30 RX ORDER — CEFUROXIME AXETIL 500 MG/1
500 TABLET ORAL 2 TIMES DAILY
Qty: 14 TABLET | Refills: 0 | Status: SHIPPED | OUTPATIENT
Start: 2023-11-30 | End: 2023-12-07

## 2023-11-30 RX ADMIN — POTASSIUM CHLORIDE 40 MEQ: 1500 TABLET, EXTENDED RELEASE ORAL at 01:11

## 2023-11-30 RX ADMIN — LIDOCAINE HYDROCHLORIDE 2.1 ML: 10 INJECTION, SOLUTION INFILTRATION; PERINEURAL at 01:11

## 2023-11-30 RX ADMIN — CEFTRIAXONE SODIUM 1 G: 1 INJECTION, POWDER, FOR SOLUTION INTRAMUSCULAR; INTRAVENOUS at 01:11

## 2023-11-30 NOTE — ED PROVIDER NOTES
Encounter Date: 11/30/2023       History     Chief Complaint   Patient presents with    General Illness     Patient to ER CC of fever, body aches, SOB for the past few weeks, patient noted HTN in triage states she has been taking her BP meds as prescribed      This note is dictated on M*Modal word recognition program.  There are word recognition mistakes and grammatical errors that are occasionally missed on review.     Celine Sinclair is a 73 y.o. female presents to ER today with complaints of fever, body aches, mild cough, shortness of breath, chest pains for the last 3 weeks at least.  Patient reports the body aches and chest pains has concerned her the worse and she has come to ER to be evaluated further for the symptoms.  Patient currently denies shortness of breath.  Patient's blood pressure noted to be elevated in triage she reports she is taking her blood pressure medications as prescribed.  Patient is on Eliquis she has a history of atrial fibrillation.      The history is provided by the patient.     Review of patient's allergies indicates:   Allergen Reactions    Octreotide acetate Nausea And Vomiting     Had symptoms when she took Sandostatin with Codeine    Codeine Itching and Nausea And Vomiting     Pill form only, IV ok.,  Had symptoms when she took Codeine with Sandostatin.  Other reaction(s): Itching    Morphine Nausea And Vomiting     Patient reports reaction only when she takes po form of Morphine.    Opioids - morphine analogues Nausea And Vomiting    Simvastatin Nausea And Vomiting     Past Medical History:   Diagnosis Date    Aortic atherosclerosis     Atrial fibrillation     Benign essential hypertension     Cataract     Senile    Chest pain     CHF (congestive heart failure)     Crohn's colitis     Depression, major, recurrent, moderate     Encounter for blood transfusion     Fibromyalgia     GERD (gastroesophageal reflux disease)     Hypertensive cardiomyopathy     IBS (irritable bowel  syndrome)     Migraine     Opioid abuse, in remission     Orbital cellulitis on left     Osteopenia     Paget disease of bone     Palpitations     Port-A-Cath in place     right chest    Prolonged QT interval     RA (rheumatoid arthritis)     Sedative hypnotic or anxiolytic dependence     in remission     SOB (shortness of breath)     SVT (supraventricular tachycardia)     Unspecified urinary incontinence      Past Surgical History:   Procedure Laterality Date    ABLATION N/A 6/28/2023    Procedure: Ablation;  Surgeon: Johnny Nieves MD;  Location: [facility] CATH;  Service: Cardiology;  Laterality: N/A;  ops # 7    ABLATION, ATRIAL FLUTTER, TYPICAL N/A 6/28/2023    Procedure: Ablation, Atrial Flutter, Typical;  Surgeon: Johnny Nieves MD;  Location: [facility] CATH;  Service: Cardiology;  Laterality: N/A;    CATARACT EXTRACTION W/  INTRAOCULAR LENS IMPLANT Left 02/21/2017    Dr. Christianson    CATARACT EXTRACTION W/  INTRAOCULAR LENS IMPLANT Right 03/07/2017    Dr. Christianson    CHOLECYSTECTOMY      COLON SURGERY      COLONOSCOPY N/A 3/16/2016    Procedure: COLONOSCOPY;  Surgeon: Dale Trejo MD;  Location: [facility] ENDO (4TH FLR);  Service: Endoscopy;  Laterality: N/A;    COLONOSCOPY N/A 10/12/2020    Procedure: COLONOSCOPY;  Surgeon: Cali Urena MD;  Location: [facility] ENDO (4TH FLR);  Service: Endoscopy;  Laterality: N/A;  covid test 10/9-[facility]- completed 10/9/20  ok to hold Coumadin x 5 days per coumadin clinic-see telephone encounterd ated 10/6-MS / Loop recorder  10/6/20- Pt confirmed- ERW@1122  10/9-Pt confirmed earlier arrival time, prep ins. reviewed and emailed to Pt    COLONOSCOPY N/A 8/25/2022    Procedure: COLONOSCOPY;  Surgeon: Lewis Luke MD;  Location: [facility];  Service: Endoscopy;  Laterality: N/A;    Colostomy reversal      HEMORRHOID SURGERY      HYSTERECTOMY      ILEOSTOMY      ILEOSTOMY CLOSURE      LEFT HEART CATHETERIZATION N/A 2/15/2019    Procedure: HEART CATH-LEFT;  Surgeon: Miky  MD Neela;  Location: [facility] CATH LAB;  Service: Cardiology;  Laterality: N/A;    NECK SURGERY      OOPHORECTOMY Bilateral     SBO      SMALL INTESTINE SURGERY      TONSILLECTOMY      TUBAL LIGATION       Family History   Problem Relation Age of Onset    Glaucoma Paternal Grandmother     Other Daughter         Diabetic Retinopathy    Breast cancer Daughter 40    Retinal detachment Grandchild     Heart attack Maternal Grandmother     Colon cancer Maternal Grandmother     Cancer Mother         Unknown type    Emphysema Father 67    Heart disease Father     Lung cancer Sister     Heart attack Sister     Breast cancer Daughter 47    Breast cancer Sister     Amblyopia Neg Hx     Blindness Neg Hx     Strabismus Neg Hx     Macular degeneration Neg Hx     Cataracts Neg Hx      Social History     Tobacco Use    Smoking status: Never    Smokeless tobacco: Never   Substance Use Topics    Alcohol use: Yes     Comment: wine coolers occassionally    Drug use: No     Review of Systems   Constitutional:  Positive for chills and fatigue.   HENT:  Positive for congestion.    Eyes: Negative.    Respiratory:  Positive for cough.    Cardiovascular:  Positive for chest pain.   Gastrointestinal: Negative.    Endocrine: Negative.    Genitourinary: Negative.    Musculoskeletal:  Positive for arthralgias and myalgias.   Skin: Negative.    Allergic/Immunologic: Negative.    Neurological: Negative.    Hematological: Negative.    Psychiatric/Behavioral: Negative.         Physical Exam     Initial Vitals [11/30/23 1057]   BP Pulse Resp Temp SpO2   (!) 212/98 78 18 97.8 °F (36.6 °C) 96 %      MAP       --         Physical Exam    Constitutional: She appears well-developed and well-nourished. She is not diaphoretic. No distress.   HENT:   Patient has bilateral external ear canal cerumen impactions.   Eyes: Pupils are equal, round, and reactive to light. Right eye exhibits no discharge. Left eye exhibits no discharge. No scleral icterus.   Neck:  Neck supple.   Normal range of motion.  Cardiovascular:  Normal rate, regular rhythm and normal heart sounds.     Exam reveals no friction rub.       No murmur heard.  Pulmonary/Chest: Breath sounds normal. No respiratory distress. She has no wheezes. She has no rhonchi. She has no rales. She exhibits no tenderness.   Abdominal: Abdomen is soft. Bowel sounds are normal. She exhibits no distension. There is no abdominal tenderness. There is no rebound.   Musculoskeletal:         General: No tenderness or edema.      Cervical back: Normal range of motion and neck supple.     Neurological: She is alert and oriented to person, place, and time. GCS score is 15. GCS eye subscore is 4. GCS verbal subscore is 5. GCS motor subscore is 6.   Skin: Capillary refill takes less than 2 seconds. No erythema.   Psychiatric: She has a normal mood and affect. Her behavior is normal. Thought content normal.         ED Course   Procedures  Labs Reviewed   CBC W/ AUTO DIFFERENTIAL - Abnormal; Notable for the following components:       Result Value    MCH 26.8 (*)     MCHC 30.8 (*)     All other components within normal limits   COMPREHENSIVE METABOLIC PANEL - Abnormal; Notable for the following components:    Potassium 3.0 (*)     CO2 18 (*)     Total Bilirubin 2.5 (*)     Anion Gap 17 (*)     All other components within normal limits   URINALYSIS, REFLEX TO URINE CULTURE - Abnormal; Notable for the following components:    Appearance, UA Cloudy (*)     Specific Gravity, UA >=1.030 (*)     Protein, UA 1+ (*)     Occult Blood UA 3+ (*)     Nitrite, UA Positive (*)     Leukocytes, UA 1+ (*)     All other components within normal limits    Narrative:     Specimen Source->Urine   URINALYSIS MICROSCOPIC - Abnormal; Notable for the following components:    RBC, UA 15 (*)     WBC, UA 30 (*)     Bacteria Many (*)     Yeast, UA Rare (*)     All other components within normal limits    Narrative:     Specimen Source->Urine   INFLUENZA A & B BY  MOLECULAR   GROUP A STREP, MOLECULAR   CULTURE, URINE   RSV ANTIGEN DETECTION   SARS-COV-2 RNA AMPLIFICATION, QUAL   TROPONIN I   B-TYPE NATRIURETIC PEPTIDE     EKG Readings: (Independently Interpreted)   Initial Reading: No STEMI. Rhythm: Normal Sinus Rhythm. Ectopy: PACs. Conduction: Normal. Axis: Normal. Other Findings: Prolonged QT Interval. Clinical Impression: Normal Sinus Rhythm with PACs       Imaging Results              X-Ray Chest PA And Lateral (Final result)  Result time 11/30/23 11:39:03      Final result by Vanessa Sol MD (11/30/23 11:39:03)                   Impression:      No acute abnormality.      Electronically signed by: Vanessa Sol MD  Date:    11/30/2023  Time:    11:39               Narrative:    EXAMINATION:  XR CHEST PA AND LATERAL    CLINICAL HISTORY:  Cough, unspecified    TECHNIQUE:  PA and lateral views of the chest were performed.    COMPARISON:  08/09/2023    FINDINGS:  The lungs are clear, with normal appearance of pulmonary vasculature.No pleural effusion.No pneumothorax.    The cardiac silhouette is normal in size. The hilar and mediastinal contours are unremarkable.    Bones are intact. Metallic BBs within the subcutaneous tissues.                                       Medications   potassium chloride SA CR tablet 40 mEq (40 mEq Oral Given 11/30/23 1308)   cefTRIAXone injection 1 g (1 g Intramuscular Given 11/30/23 1326)   LIDOcaine HCL 10 mg/ml (1%) injection 2.1 mL (2.1 mLs Intramuscular Given 11/30/23 1326)     Medical Decision Making  Differential diagnosis includes:  COVID-19, influenza, RSV, bacterial upper respiratory infection, pneumonia, bronchitis, viral syndrome, hypertension, ACS, atrial fibrillation    CBC shows no acute anemia.  CMP reveals hypokalemia of 3.0.  Potassium replenished today in ER.  Patient's cardiac workup within normal limits.  Chest x-ray reveals no acute cardiopulmonary abnormality EKG reveals no STEMI, troponin negative, BNP within  normal limits.  Patient was also swab for RSV, COVID-19, strep, influenza which was all negative.  Urinalysis reveals acute urinary tract infection.  Thirty white blood cells, many bacteria, positive nitrite, positive leukocytes.  Will treat urinary tract infection with injection of Rocephin today followed by 1 week of Cipro in the outpatient setting.  Patient instructed to follow-up with her PCP by early next week.  Patient instructed to continue home medications she was already on.  Patient stable at time of discharge in no acute distress.  No life-threatening illnesses were found during ER visit today.  Patient was instructed to follow-up with PCP or other recommended specialist within the next 48-72 hours.  Patient was instructed to return to ER immediately for any worsening or concerning symptoms.  All discharge instructions discussed with patient, and patient agrees to comply with discharge instructions given today.     Amount and/or Complexity of Data Reviewed  Labs: ordered.  Radiology: ordered.    Risk  Prescription drug management.                                      Clinical Impression:  Final diagnoses:  [R05.9] Cough  [R07.89] Chest discomfort  [E87.6] Hypokalemia  [N39.0, R31.9] Urinary tract infection with hematuria, site unspecified (Primary)          ED Disposition Condition    Discharge Stable          ED Prescriptions       Medication Sig Dispense Start Date End Date Auth. Provider    ciprofloxacin HCl (CIPRO) 500 MG tablet Take 1 tablet (500 mg total) by mouth every 12 (twelve) hours. for 7 days 14 tablet 11/30/2023 12/7/2023 Louis Nice NP          Follow-up Information       Follow up With Specialties Details Why Contact Info    Ramon Ramos MD Physical Medicine and Rehabilitation Schedule an appointment as soon as possible for a visit in 1 week  [address] [place] [id number]  [phone]      Neeru Hinkle MD Internal Medicine Schedule an appointment as soon as  possible for a visit in 3 days  [address] [address]  [phone]               Louis Nice, NP  11/30/23 7725

## 2023-11-30 NOTE — PROVIDER PROGRESS NOTES - EMERGENCY DEPT.
Encounter Date: 11/30/2023    ED Physician Progress Notes        Physician Note:   Patient is still on amiodarone.  Will switch patient's Cipro antibiotic to Ceftin antibiotic.

## 2023-12-01 ENCOUNTER — PATIENT OUTREACH (OUTPATIENT)
Dept: EMERGENCY MEDICINE | Facility: HOSPITAL | Age: 73
End: 2023-12-01
Payer: MEDICARE

## 2023-12-01 NOTE — PROGRESS NOTES
Patient agreed to scheduling a post ED 7-day follow up with REBECCA Gonzalez on 12/4/23 at 1 pm.

## 2023-12-01 NOTE — PROGRESS NOTES
ED Navigator reminded the patient of scheduled appointment with NP Felisa Gonzalez on 12/4/23 at 1 pm.  Patient agreed to appointment date and time. Patient declined additional services. Follow up encounter closed.

## 2023-12-02 LAB — BACTERIA UR CULT: ABNORMAL

## 2023-12-04 ENCOUNTER — OFFICE VISIT (OUTPATIENT)
Dept: INTERNAL MEDICINE | Facility: CLINIC | Age: 73
End: 2023-12-04
Payer: MEDICARE

## 2023-12-04 VITALS
HEIGHT: 65 IN | BODY MASS INDEX: 30.08 KG/M2 | RESPIRATION RATE: 20 BRPM | WEIGHT: 180.56 LBS | HEART RATE: 64 BPM | OXYGEN SATURATION: 97 % | DIASTOLIC BLOOD PRESSURE: 90 MMHG | SYSTOLIC BLOOD PRESSURE: 136 MMHG

## 2023-12-04 DIAGNOSIS — N30.00 ACUTE CYSTITIS WITHOUT HEMATURIA: ICD-10-CM

## 2023-12-04 DIAGNOSIS — E87.6 HYPOKALEMIA: Primary | ICD-10-CM

## 2023-12-04 DIAGNOSIS — M79.7 FIBROMYALGIA: ICD-10-CM

## 2023-12-04 PROCEDURE — 99999 PR PBB SHADOW E&M-EST. PATIENT-LVL IV: ICD-10-PCS | Mod: PBBFAC,HCNC,, | Performed by: NURSE PRACTITIONER

## 2023-12-04 PROCEDURE — 1101F PT FALLS ASSESS-DOCD LE1/YR: CPT | Mod: HCNC,CPTII,S$GLB, | Performed by: NURSE PRACTITIONER

## 2023-12-04 PROCEDURE — 3008F BODY MASS INDEX DOCD: CPT | Mod: HCNC,CPTII,S$GLB, | Performed by: NURSE PRACTITIONER

## 2023-12-04 PROCEDURE — 3075F SYST BP GE 130 - 139MM HG: CPT | Mod: HCNC,CPTII,S$GLB, | Performed by: NURSE PRACTITIONER

## 2023-12-04 PROCEDURE — 3288F FALL RISK ASSESSMENT DOCD: CPT | Mod: HCNC,CPTII,S$GLB, | Performed by: NURSE PRACTITIONER

## 2023-12-04 PROCEDURE — 1159F PR MEDICATION LIST DOCUMENTED IN MEDICAL RECORD: ICD-10-PCS | Mod: HCNC,CPTII,S$GLB, | Performed by: NURSE PRACTITIONER

## 2023-12-04 PROCEDURE — 3075F PR MOST RECENT SYSTOLIC BLOOD PRESS GE 130-139MM HG: ICD-10-PCS | Mod: HCNC,CPTII,S$GLB, | Performed by: NURSE PRACTITIONER

## 2023-12-04 PROCEDURE — 1101F PR PT FALLS ASSESS DOC 0-1 FALLS W/OUT INJ PAST YR: ICD-10-PCS | Mod: HCNC,CPTII,S$GLB, | Performed by: NURSE PRACTITIONER

## 2023-12-04 PROCEDURE — 1125F PR PAIN SEVERITY QUANTIFIED, PAIN PRESENT: ICD-10-PCS | Mod: HCNC,CPTII,S$GLB, | Performed by: NURSE PRACTITIONER

## 2023-12-04 PROCEDURE — 4010F ACE/ARB THERAPY RXD/TAKEN: CPT | Mod: HCNC,CPTII,S$GLB, | Performed by: NURSE PRACTITIONER

## 2023-12-04 PROCEDURE — 3080F DIAST BP >= 90 MM HG: CPT | Mod: HCNC,CPTII,S$GLB, | Performed by: NURSE PRACTITIONER

## 2023-12-04 PROCEDURE — 99999 PR PBB SHADOW E&M-EST. PATIENT-LVL IV: CPT | Mod: PBBFAC,HCNC,, | Performed by: NURSE PRACTITIONER

## 2023-12-04 PROCEDURE — 99214 OFFICE O/P EST MOD 30 MIN: CPT | Mod: HCNC,25,S$GLB, | Performed by: NURSE PRACTITIONER

## 2023-12-04 PROCEDURE — 96372 PR INJECTION,THERAP/PROPH/DIAG2ST, IM OR SUBCUT: ICD-10-PCS | Mod: HCNC,S$GLB,, | Performed by: NURSE PRACTITIONER

## 2023-12-04 PROCEDURE — 1159F MED LIST DOCD IN RCRD: CPT | Mod: HCNC,CPTII,S$GLB, | Performed by: NURSE PRACTITIONER

## 2023-12-04 PROCEDURE — 1125F AMNT PAIN NOTED PAIN PRSNT: CPT | Mod: HCNC,CPTII,S$GLB, | Performed by: NURSE PRACTITIONER

## 2023-12-04 PROCEDURE — 96372 THER/PROPH/DIAG INJ SC/IM: CPT | Mod: HCNC,S$GLB,, | Performed by: NURSE PRACTITIONER

## 2023-12-04 PROCEDURE — 4010F PR ACE/ARB THEARPY RXD/TAKEN: ICD-10-PCS | Mod: HCNC,CPTII,S$GLB, | Performed by: NURSE PRACTITIONER

## 2023-12-04 PROCEDURE — 3080F PR MOST RECENT DIASTOLIC BLOOD PRESSURE >= 90 MM HG: ICD-10-PCS | Mod: HCNC,CPTII,S$GLB, | Performed by: NURSE PRACTITIONER

## 2023-12-04 PROCEDURE — 99214 PR OFFICE/OUTPT VISIT, EST, LEVL IV, 30-39 MIN: ICD-10-PCS | Mod: HCNC,25,S$GLB, | Performed by: NURSE PRACTITIONER

## 2023-12-04 PROCEDURE — 3288F PR FALLS RISK ASSESSMENT DOCUMENTED: ICD-10-PCS | Mod: HCNC,CPTII,S$GLB, | Performed by: NURSE PRACTITIONER

## 2023-12-04 PROCEDURE — 3008F PR BODY MASS INDEX (BMI) DOCUMENTED: ICD-10-PCS | Mod: HCNC,CPTII,S$GLB, | Performed by: NURSE PRACTITIONER

## 2023-12-04 RX ORDER — AMLODIPINE BESYLATE 5 MG/1
5 TABLET ORAL DAILY
Status: ON HOLD | COMMUNITY
Start: 2023-11-22 | End: 2024-01-03 | Stop reason: HOSPADM

## 2023-12-04 RX ORDER — DAPAGLIFLOZIN 10 MG/1
10 TABLET, FILM COATED ORAL DAILY
Status: ON HOLD | COMMUNITY
Start: 2023-11-18 | End: 2024-01-03 | Stop reason: HOSPADM

## 2023-12-04 RX ORDER — COVID-19 VACCINE, MRNA 0.04 MG/.418ML
INJECTION, SUSPENSION INTRAMUSCULAR
COMMUNITY
Start: 2023-10-04

## 2023-12-04 RX ORDER — LOSARTAN POTASSIUM 25 MG/1
25 TABLET ORAL DAILY
Status: ON HOLD | COMMUNITY
Start: 2023-10-28 | End: 2024-01-03

## 2023-12-04 RX ORDER — INFLUENZA A VIRUS A/VICTORIA/4897/2022 IVR-238 (H1N1) ANTIGEN (FORMALDEHYDE INACTIVATED), INFLUENZA A VIRUS A/DARWIN/6/2021 IVR-227 (H3N2) ANTIGEN (FORMALDEHYDE INACTIVATED), INFLUENZA B VIRUS B/AUSTRIA/1359417/2021 BVR-26 ANTIGEN (FORMALDEHYDE INACTIVATED), INFLUENZA B VIRUS B/PHUKET/3073/2013 BVR-1B ANTIGEN (FORMALDEHYDE INACTIVATED) 15; 15; 15; 15 UG/.5ML; UG/.5ML; UG/.5ML; UG/.5ML
INJECTION, SUSPENSION INTRAMUSCULAR
Status: ON HOLD | COMMUNITY
Start: 2023-10-04 | End: 2024-01-03 | Stop reason: HOSPADM

## 2023-12-04 RX ORDER — KETOROLAC TROMETHAMINE 30 MG/ML
30 INJECTION, SOLUTION INTRAMUSCULAR; INTRAVENOUS
Status: COMPLETED | OUTPATIENT
Start: 2023-12-04 | End: 2023-12-04

## 2023-12-04 RX ADMIN — KETOROLAC TROMETHAMINE 30 MG: 30 INJECTION, SOLUTION INTRAMUSCULAR; INTRAVENOUS at 01:12

## 2023-12-04 NOTE — PROGRESS NOTES
Subjective:       Patient ID: Celine Sinclair is a 73 y.o. female.    Chief Complaint: Urinary Tract Infection, Generalized Body Aches, Memory Loss, and Cough    HPI: Pt presents to clinic today known to Dr Hinkle with c/o needing ER f/u. She was seen in ER 11/30 for UTI. Reports that she hurts all over. She has pain worse now with infection than it typically is.     >>   Celine Sinclair is a 73 y.o. female presents to ER today with complaints of fever, body aches, mild cough, shortness of breath, chest pains for the last 3 weeks at least.  Patient reports the body aches and chest pains has concerned her the worse and she has come to ER to be evaluated further for the symptoms.  Patient currently denies shortness of breath.  Patient's blood pressure noted to be elevated in triage she reports she is taking her blood pressure medications as prescribed.  Patient is on Eliquis she has a history of atrial fibrillation.     Lbs and imaging reviewed from RT- she was treated for UTIO with rocephin in ER as we;; as given potassium po - sent home with rx cefuroxime      Escherichia coli     CULTURE, URINE     Amikacin <=16 mcg/mL Sensitive     Amox/K Clav'ate <=8/4 mcg/mL Sensitive     Amp/Sulbactam 16/8 mcg/mL Intermediate     Ampicillin >16 mcg/mL Resistant     Cefazolin 4 mcg/mL Sensitive     Cefepime <=2 mcg/mL Sensitive     Ceftriaxone <=1 mcg/mL Sensitive     Ciprofloxacin >2 mcg/mL Resistant     Ertapenem <=0.5 mcg/mL Sensitive     Gentamicin >8 mcg/mL Resistant     Levofloxacin >4 mcg/mL Resistant     Meropenem <=1 mcg/mL Sensitive     Nitrofurantoin <=32 mcg/mL Sensitive     Piperacillin/Tazo <=16 mcg/mL Sensitive     Tobramycin >8 mcg/mL Resistant     Trimeth/Sulfa >2/38 mcg/mL Resistant      Review of Systems   Constitutional:  Negative for appetite change, chills and fever.   Cardiovascular:  Negative for chest pain, palpitations and leg swelling.   Gastrointestinal:  Positive for diarrhea (since starting abx).  Negative for abdominal pain, nausea and vomiting.   Genitourinary:  Positive for dysuria, frequency and urgency.   Musculoskeletal:  Positive for back pain.   Skin:  Negative for rash and wound.   Neurological:  Negative for dizziness, syncope, weakness, light-headedness and headaches.       Objective:      Physical Exam  Vitals and nursing note reviewed.   Constitutional:       Appearance: She is well-developed. She is obese.   HENT:      Head: Normocephalic and atraumatic.      Right Ear: External ear normal.      Left Ear: External ear normal.      Nose: Nose normal.   Eyes:      Conjunctiva/sclera: Conjunctivae normal.      Pupils: Pupils are equal, round, and reactive to light.   Cardiovascular:      Rate and Rhythm: Normal rate and regular rhythm.      Heart sounds: Normal heart sounds. No murmur heard.  Pulmonary:      Effort: Pulmonary effort is normal.      Breath sounds: Normal breath sounds. No wheezing.   Abdominal:      General: Bowel sounds are normal. There is no distension.      Palpations: Abdomen is soft. There is no mass.      Tenderness: There is no abdominal tenderness.   Musculoskeletal:         General: No swelling. Normal range of motion.      Cervical back: Normal range of motion and neck supple.      Comments: -cva tenderness    Skin:     General: Skin is warm and dry.      Capillary Refill: Capillary refill takes less than 2 seconds.   Neurological:      Mental Status: She is alert and oriented to person, place, and time.   Psychiatric:         Behavior: Behavior normal.         Thought Content: Thought content normal.         Judgment: Judgment normal.         Assessment:       1. Hypokalemia    2. Acute cystitis without hematuria    3. Fibromyalgia        Plan:     Problem List Items Addressed This Visit       Fibromyalgia    Hypokalemia - Primary     Other Visit Diagnoses       Acute cystitis without hematuria                She can cont till completed the abx  Toradol today for the  exacerbation of fibro- can consider short coarse if toradol helps. Allergy to morphine so no tramadol

## 2023-12-08 ENCOUNTER — OFFICE VISIT (OUTPATIENT)
Dept: INTERNAL MEDICINE | Facility: CLINIC | Age: 73
End: 2023-12-08
Payer: MEDICARE

## 2023-12-08 ENCOUNTER — LAB VISIT (OUTPATIENT)
Dept: LAB | Facility: HOSPITAL | Age: 73
End: 2023-12-08
Attending: NURSE PRACTITIONER
Payer: MEDICAID

## 2023-12-08 VITALS
SYSTOLIC BLOOD PRESSURE: 134 MMHG | HEIGHT: 65 IN | OXYGEN SATURATION: 96 % | WEIGHT: 180.56 LBS | DIASTOLIC BLOOD PRESSURE: 86 MMHG | HEART RATE: 67 BPM | BODY MASS INDEX: 30.08 KG/M2 | RESPIRATION RATE: 20 BRPM

## 2023-12-08 DIAGNOSIS — J02.9 VIRAL PHARYNGITIS: ICD-10-CM

## 2023-12-08 DIAGNOSIS — M79.10 MYALGIA: ICD-10-CM

## 2023-12-08 DIAGNOSIS — R30.0 DYSURIA: ICD-10-CM

## 2023-12-08 DIAGNOSIS — M25.50 PAIN IN JOINT INVOLVING MULTIPLE SITES: ICD-10-CM

## 2023-12-08 DIAGNOSIS — E87.6 HYPOKALEMIA: ICD-10-CM

## 2023-12-08 DIAGNOSIS — R19.7 DIARRHEA, UNSPECIFIED TYPE: Primary | ICD-10-CM

## 2023-12-08 DIAGNOSIS — R31.9 HEMATURIA, UNSPECIFIED TYPE: ICD-10-CM

## 2023-12-08 DIAGNOSIS — J02.9 SORE THROAT: ICD-10-CM

## 2023-12-08 DIAGNOSIS — R19.7 DIARRHEA, UNSPECIFIED TYPE: ICD-10-CM

## 2023-12-08 DIAGNOSIS — Z79.01 LONG TERM (CURRENT) USE OF ANTICOAGULANTS: ICD-10-CM

## 2023-12-08 DIAGNOSIS — M79.7 FIBROMYALGIA: ICD-10-CM

## 2023-12-08 LAB
ALBUMIN SERPL BCP-MCNC: 3.9 G/DL (ref 3.5–5.2)
ALP SERPL-CCNC: 97 U/L (ref 55–135)
ALT SERPL W/O P-5'-P-CCNC: 10 U/L (ref 10–44)
ANION GAP SERPL CALC-SCNC: 10 MMOL/L (ref 8–16)
AST SERPL-CCNC: 18 U/L (ref 10–40)
BILIRUB SERPL-MCNC: 1.4 MG/DL (ref 0.1–1)
BILIRUB SERPL-MCNC: NEGATIVE MG/DL
BLOOD URINE, POC: ABNORMAL
BUN SERPL-MCNC: 15 MG/DL (ref 8–23)
CALCIUM SERPL-MCNC: 9.3 MG/DL (ref 8.7–10.5)
CHLORIDE SERPL-SCNC: 110 MMOL/L (ref 95–110)
CLARITY, POC UA: ABNORMAL
CO2 SERPL-SCNC: 24 MMOL/L (ref 23–29)
COLOR, POC UA: ABNORMAL
CREAT SERPL-MCNC: 0.8 MG/DL (ref 0.5–1.4)
CTP QC/QA: YES
EST. GFR  (NO RACE VARIABLE): >60 ML/MIN/1.73 M^2
GLUCOSE SERPL-MCNC: 102 MG/DL (ref 70–110)
GLUCOSE UR QL STRIP: ABNORMAL
KETONES UR QL STRIP: NEGATIVE
LEUKOCYTE ESTERASE URINE, POC: NEGATIVE
MAGNESIUM SERPL-MCNC: 1.7 MG/DL (ref 1.6–2.6)
MOLECULAR STREP A: NEGATIVE
NITRITE, POC UA: NEGATIVE
PH, POC UA: 5
POTASSIUM SERPL-SCNC: 3.6 MMOL/L (ref 3.5–5.1)
PROT SERPL-MCNC: 7.5 G/DL (ref 6–8.4)
PROTEIN, POC: NEGATIVE
SODIUM SERPL-SCNC: 144 MMOL/L (ref 136–145)
SPECIFIC GRAVITY, POC UA: 1.01
UROBILINOGEN, POC UA: NORMAL

## 2023-12-08 PROCEDURE — 3079F DIAST BP 80-89 MM HG: CPT | Mod: HCNC,CPTII,S$GLB, | Performed by: NURSE PRACTITIONER

## 2023-12-08 PROCEDURE — 87651 POCT STREP A MOLECULAR: ICD-10-PCS | Mod: QW,HCNC,S$GLB, | Performed by: NURSE PRACTITIONER

## 2023-12-08 PROCEDURE — 83735 ASSAY OF MAGNESIUM: CPT | Mod: HCNC | Performed by: NURSE PRACTITIONER

## 2023-12-08 PROCEDURE — 1101F PT FALLS ASSESS-DOCD LE1/YR: CPT | Mod: HCNC,CPTII,S$GLB, | Performed by: NURSE PRACTITIONER

## 2023-12-08 PROCEDURE — 4010F PR ACE/ARB THEARPY RXD/TAKEN: ICD-10-PCS | Mod: HCNC,CPTII,S$GLB, | Performed by: NURSE PRACTITIONER

## 2023-12-08 PROCEDURE — 3288F FALL RISK ASSESSMENT DOCD: CPT | Mod: HCNC,CPTII,S$GLB, | Performed by: NURSE PRACTITIONER

## 2023-12-08 PROCEDURE — 99214 OFFICE O/P EST MOD 30 MIN: CPT | Mod: HCNC,25,S$GLB, | Performed by: NURSE PRACTITIONER

## 2023-12-08 PROCEDURE — 1125F AMNT PAIN NOTED PAIN PRSNT: CPT | Mod: HCNC,CPTII,S$GLB, | Performed by: NURSE PRACTITIONER

## 2023-12-08 PROCEDURE — 3079F PR MOST RECENT DIASTOLIC BLOOD PRESSURE 80-89 MM HG: ICD-10-PCS | Mod: HCNC,CPTII,S$GLB, | Performed by: NURSE PRACTITIONER

## 2023-12-08 PROCEDURE — 1125F PR PAIN SEVERITY QUANTIFIED, PAIN PRESENT: ICD-10-PCS | Mod: HCNC,CPTII,S$GLB, | Performed by: NURSE PRACTITIONER

## 2023-12-08 PROCEDURE — 87651 STREP A DNA AMP PROBE: CPT | Mod: QW,HCNC,S$GLB, | Performed by: NURSE PRACTITIONER

## 2023-12-08 PROCEDURE — 99214 PR OFFICE/OUTPT VISIT, EST, LEVL IV, 30-39 MIN: ICD-10-PCS | Mod: HCNC,25,S$GLB, | Performed by: NURSE PRACTITIONER

## 2023-12-08 PROCEDURE — 96372 PR INJECTION,THERAP/PROPH/DIAG2ST, IM OR SUBCUT: ICD-10-PCS | Mod: HCNC,S$GLB,, | Performed by: NURSE PRACTITIONER

## 2023-12-08 PROCEDURE — 96372 THER/PROPH/DIAG INJ SC/IM: CPT | Mod: HCNC,S$GLB,, | Performed by: NURSE PRACTITIONER

## 2023-12-08 PROCEDURE — 3008F BODY MASS INDEX DOCD: CPT | Mod: HCNC,CPTII,S$GLB, | Performed by: NURSE PRACTITIONER

## 2023-12-08 PROCEDURE — 81002 URINALYSIS NONAUTO W/O SCOPE: CPT | Mod: HCNC,S$GLB,, | Performed by: NURSE PRACTITIONER

## 2023-12-08 PROCEDURE — 3075F SYST BP GE 130 - 139MM HG: CPT | Mod: HCNC,CPTII,S$GLB, | Performed by: NURSE PRACTITIONER

## 2023-12-08 PROCEDURE — 1159F MED LIST DOCD IN RCRD: CPT | Mod: HCNC,CPTII,S$GLB, | Performed by: NURSE PRACTITIONER

## 2023-12-08 PROCEDURE — 3288F PR FALLS RISK ASSESSMENT DOCUMENTED: ICD-10-PCS | Mod: HCNC,CPTII,S$GLB, | Performed by: NURSE PRACTITIONER

## 2023-12-08 PROCEDURE — 81002 POCT URINE DIPSTICK WITHOUT MICROSCOPE: ICD-10-PCS | Mod: HCNC,S$GLB,, | Performed by: NURSE PRACTITIONER

## 2023-12-08 PROCEDURE — 1101F PR PT FALLS ASSESS DOC 0-1 FALLS W/OUT INJ PAST YR: ICD-10-PCS | Mod: HCNC,CPTII,S$GLB, | Performed by: NURSE PRACTITIONER

## 2023-12-08 PROCEDURE — 99999 PR PBB SHADOW E&M-EST. PATIENT-LVL V: CPT | Mod: PBBFAC,HCNC,, | Performed by: NURSE PRACTITIONER

## 2023-12-08 PROCEDURE — 4010F ACE/ARB THERAPY RXD/TAKEN: CPT | Mod: HCNC,CPTII,S$GLB, | Performed by: NURSE PRACTITIONER

## 2023-12-08 PROCEDURE — 36415 COLL VENOUS BLD VENIPUNCTURE: CPT | Mod: HCNC | Performed by: NURSE PRACTITIONER

## 2023-12-08 PROCEDURE — 1159F PR MEDICATION LIST DOCUMENTED IN MEDICAL RECORD: ICD-10-PCS | Mod: HCNC,CPTII,S$GLB, | Performed by: NURSE PRACTITIONER

## 2023-12-08 PROCEDURE — 3075F PR MOST RECENT SYSTOLIC BLOOD PRESS GE 130-139MM HG: ICD-10-PCS | Mod: HCNC,CPTII,S$GLB, | Performed by: NURSE PRACTITIONER

## 2023-12-08 PROCEDURE — 80053 COMPREHEN METABOLIC PANEL: CPT | Mod: HCNC | Performed by: NURSE PRACTITIONER

## 2023-12-08 PROCEDURE — 3008F PR BODY MASS INDEX (BMI) DOCUMENTED: ICD-10-PCS | Mod: HCNC,CPTII,S$GLB, | Performed by: NURSE PRACTITIONER

## 2023-12-08 PROCEDURE — 99999 PR PBB SHADOW E&M-EST. PATIENT-LVL V: ICD-10-PCS | Mod: PBBFAC,HCNC,, | Performed by: NURSE PRACTITIONER

## 2023-12-08 RX ORDER — PREDNISONE 20 MG/1
20 TABLET ORAL 2 TIMES DAILY
Qty: 6 TABLET | Refills: 0 | Status: SHIPPED | OUTPATIENT
Start: 2023-12-09 | End: 2023-12-12

## 2023-12-08 RX ORDER — METHYLPREDNISOLONE ACETATE 40 MG/ML
40 INJECTION, SUSPENSION INTRA-ARTICULAR; INTRALESIONAL; INTRAMUSCULAR; SOFT TISSUE
Status: COMPLETED | OUTPATIENT
Start: 2023-12-08 | End: 2023-12-08

## 2023-12-08 RX ADMIN — METHYLPREDNISOLONE ACETATE 40 MG: 40 INJECTION, SUSPENSION INTRA-ARTICULAR; INTRALESIONAL; INTRAMUSCULAR; SOFT TISSUE at 03:12

## 2023-12-08 NOTE — PROGRESS NOTES
Subjective:           Patient ID: Celine Sinclair is a 73 y.o. female.    Chief Complaint: Hypokalemia    Celine Sinclair is a 73 y.o. female, know to me from acute visit; seen by fellow provider 12/4/ for hospital f/u     was seen in ER 11/30 for UTI.    presenting to [facility] 12/4 she Reports that she hurts all over. She has pain worse now with infection than it typically is.        ED visit 11/30/23 ;  presents to ER today with complaints of fever, body aches, mild cough, shortness of breath, chest pains for the last 3 weeks at least.  Patient reports the body aches and chest pains has concerned her the worse and she has come to ER to be evaluated further for the symptoms.  Patient currently denies shortness of breath.  Patient's blood pressure noted to be elevated in triage she reports she is taking her blood pressure medications as prescribed.  Patient is on Eliquis she has a history of atrial fibrillation.     Lbs and imaging reviewed from RT- she was treated for UTIO with rocephin in ER as we;; as given potassium po - sent home with rx cefuroxime   On F/U visit she was given IM toradol 12/4   Now here reporting she is not feeling better and started with diarrhea on Tuesday , 4 days ago   This seems to be recurrent complaint  Also noting sore throat, swollen glands and diffuse joint and muscle pain   She has known h/o fibromyalgia. Tylenol recommended due to eliquis and crohn's history.      Strep swab negative       Review of Systems   Constitutional:  Negative for chills, fatigue and fever.   HENT:  Negative for congestion, ear pain, postnasal drip, sinus pressure, sneezing and sore throat.    Eyes:  Negative for discharge.   Respiratory:  Negative for cough, chest tightness and shortness of breath.    Cardiovascular: Negative.    Gastrointestinal:  Positive for diarrhea. Negative for abdominal pain, constipation, nausea and vomiting.   Genitourinary:  Negative for difficulty urinating, flank pain and hematuria.    Musculoskeletal:  Positive for arthralgias and myalgias. Negative for joint swelling.   Skin: Negative.    Neurological:  Negative for dizziness and headaches.   Hematological:  Positive for adenopathy.   Psychiatric/Behavioral:  Negative for behavioral problems and confusion.        Objective:      Physical Exam  Vitals and nursing note reviewed.   Constitutional:       General: She is not in acute distress.     Appearance: She is well-developed. She is obese. She is ill-appearing. She is not toxic-appearing or diaphoretic.   HENT:      Head: Normocephalic and atraumatic.      Right Ear: External ear normal.      Left Ear: External ear normal.      Nose: Nose normal.   Eyes:      Conjunctiva/sclera: Conjunctivae normal.      Pupils: Pupils are equal, round, and reactive to light.   Cardiovascular:      Rate and Rhythm: Normal rate and regular rhythm.      Heart sounds: Normal heart sounds. No murmur heard.  Pulmonary:      Effort: Pulmonary effort is normal.      Breath sounds: Normal breath sounds. No wheezing.   Abdominal:      General: Bowel sounds are normal. There is no distension.      Palpations: Abdomen is soft. There is no mass.      Tenderness: There is no abdominal tenderness.   Musculoskeletal:         General: Tenderness (diffuse tenderness) present. No swelling. Normal range of motion.      Cervical back: Normal range of motion and neck supple. No tenderness.      Comments: -cva tenderness    Lymphadenopathy:      Cervical: Cervical adenopathy (left) present.   Skin:     General: Skin is warm and dry.      Capillary Refill: Capillary refill takes less than 2 seconds.   Neurological:      Mental Status: She is alert and oriented to person, place, and time.   Psychiatric:         Behavior: Behavior normal.         Thought Content: Thought content normal.         Judgment: Judgment normal.         Assessment:       1. Diarrhea, unspecified type    2. Myalgia    3. Fibromyalgia    4. Pain in joint  involving multiple sites    5. Sore throat    6. Dysuria    7. Hypokalemia    8. Hematuria, unspecified type    9. Viral pharyngitis    10. Long term (current) use of anticoagulants        Plan:   1. Diarrhea, unspecified type  Comments:  x 4 days  Orders:  -     Comprehensive Metabolic Panel; Future; Expected date: 12/08/2023  -     Magnesium; Future; Expected date: 12/08/2023  -     CBC Auto Differential; Future; Expected date: 04/06/2024    2. Myalgia  -     methylPREDNISolone acetate injection 40 mg    3. Fibromyalgia  -     methylPREDNISolone acetate injection 40 mg    4. Pain in joint involving multiple sites  -     Comprehensive Metabolic Panel; Future; Expected date: 12/08/2023  -     CBC Auto Differential; Future; Expected date: 04/06/2024  -     methylPREDNISolone acetate injection 40 mg    5. Sore throat  -     Cancel: POCT Rapid Strep A  -     POCT Strep A, Molecular  -     methylPREDNISolone acetate injection 40 mg    6. Dysuria  -     POCT urine dipstick without microscope    7. Hypokalemia  -     Comprehensive Metabolic Panel; Future; Expected date: 12/08/2023  -     Magnesium; Future; Expected date: 12/08/2023    8. Hematuria, unspecified type    9. Viral pharyngitis    10. Long term (current) use of anticoagulants  Overview:  Eliquis for PAF     Assessment & Plan:  Lab Results   Component Value Date    WBC 8.40 11/30/2023    HGB 12.1 11/30/2023    HCT 39.3 11/30/2023    MCV 87 11/30/2023     11/30/2023       + hematuria noted on POC urine- negative leukoctyes  Repeat CBC          Check labs

## 2023-12-08 NOTE — ASSESSMENT & PLAN NOTE
Lab Results   Component Value Date    WBC 8.40 11/30/2023    HGB 12.1 11/30/2023    HCT 39.3 11/30/2023    MCV 87 11/30/2023     11/30/2023       + hematuria noted on POC urine- negative leukoctyes  Repeat CBC

## 2023-12-08 NOTE — PROGRESS NOTES
Normal result; potassium is actually much better,   Magnesium also stable renal fx stable so no dehydration   Hydrated well   Take tylenol prn pain   I will send in short Rxprednisone 20mg bid x 3 days for her to start tomorrow for fibromyalgia flare  F/u with PCP in 1 week if not better

## 2023-12-29 ENCOUNTER — HOSPITAL ENCOUNTER (INPATIENT)
Facility: HOSPITAL | Age: 73
LOS: 5 days | Discharge: HOME | DRG: 309 | End: 2024-01-03
Attending: STUDENT IN AN ORGANIZED HEALTH CARE EDUCATION/TRAINING PROGRAM | Admitting: INTERNAL MEDICINE
Payer: MEDICARE

## 2023-12-29 ENCOUNTER — OFFICE VISIT (OUTPATIENT)
Dept: INTERNAL MEDICINE | Facility: CLINIC | Age: 73
End: 2023-12-29
Payer: MEDICARE

## 2023-12-29 VITALS
RESPIRATION RATE: 18 BRPM | BODY MASS INDEX: 29.9 KG/M2 | WEIGHT: 179.44 LBS | HEIGHT: 65 IN | SYSTOLIC BLOOD PRESSURE: 130 MMHG | DIASTOLIC BLOOD PRESSURE: 108 MMHG | HEART RATE: 139 BPM

## 2023-12-29 DIAGNOSIS — M79.7 FIBROMYALGIA: ICD-10-CM

## 2023-12-29 DIAGNOSIS — D68.61 ANTIPHOSPHOLIPID SYNDROME: ICD-10-CM

## 2023-12-29 DIAGNOSIS — R07.89 ATYPICAL CHEST PAIN: ICD-10-CM

## 2023-12-29 DIAGNOSIS — I42.8 CARDIOMYOPATHY, NONISCHEMIC: ICD-10-CM

## 2023-12-29 DIAGNOSIS — K50.919 CROHN'S DISEASE WITH COMPLICATION, UNSPECIFIED GASTROINTESTINAL TRACT LOCATION: ICD-10-CM

## 2023-12-29 DIAGNOSIS — F33.1 DEPRESSION, MAJOR, RECURRENT, MODERATE: ICD-10-CM

## 2023-12-29 DIAGNOSIS — I49.9 CARDIAC RHYTHM DISORDER OR DISTURBANCE OR CHANGE: ICD-10-CM

## 2023-12-29 DIAGNOSIS — F03.94 DEMENTIA WITH ANXIETY, UNSPECIFIED DEMENTIA SEVERITY, UNSPECIFIED DEMENTIA TYPE: ICD-10-CM

## 2023-12-29 DIAGNOSIS — N30.00 ACUTE CYSTITIS WITHOUT HEMATURIA: Primary | ICD-10-CM

## 2023-12-29 DIAGNOSIS — I48.92 ATRIAL FLUTTER WITH RAPID VENTRICULAR RESPONSE: ICD-10-CM

## 2023-12-29 DIAGNOSIS — I48.92 ATRIAL FIBRILLATION AND FLUTTER: ICD-10-CM

## 2023-12-29 DIAGNOSIS — I48.92 ATRIAL FLUTTER: ICD-10-CM

## 2023-12-29 DIAGNOSIS — R94.31 ABNORMAL EKG: ICD-10-CM

## 2023-12-29 DIAGNOSIS — I48.91 ATRIAL FIBRILLATION STATUS POST CARDIOVERSION: ICD-10-CM

## 2023-12-29 DIAGNOSIS — E83.42 HYPOMAGNESEMIA: ICD-10-CM

## 2023-12-29 DIAGNOSIS — Z79.01 LONG TERM (CURRENT) USE OF ANTICOAGULANTS: ICD-10-CM

## 2023-12-29 DIAGNOSIS — R00.0 TACHYCARDIA: ICD-10-CM

## 2023-12-29 DIAGNOSIS — I48.92 ATRIAL FLUTTER WITH RAPID VENTRICULAR RESPONSE: Primary | ICD-10-CM

## 2023-12-29 DIAGNOSIS — I48.91 ATRIAL FIBRILLATION AND FLUTTER: ICD-10-CM

## 2023-12-29 DIAGNOSIS — I10 ESSENTIAL HYPERTENSION: ICD-10-CM

## 2023-12-29 LAB
ALBUMIN SERPL BCP-MCNC: 3.9 G/DL (ref 3.5–5.2)
ALP SERPL-CCNC: 89 U/L (ref 55–135)
ALT SERPL W/O P-5'-P-CCNC: 9 U/L (ref 10–44)
ANION GAP SERPL CALC-SCNC: 11 MMOL/L (ref 8–16)
AST SERPL-CCNC: 16 U/L (ref 10–40)
BACTERIA #/AREA URNS HPF: ABNORMAL /HPF
BASOPHILS # BLD AUTO: 0.06 K/UL (ref 0–0.2)
BASOPHILS NFR BLD: 0.5 % (ref 0–1.9)
BILIRUB SERPL-MCNC: 1 MG/DL (ref 0.1–1)
BILIRUB UR QL STRIP: NEGATIVE
BNP SERPL-MCNC: 182 PG/ML (ref 0–99)
BUN SERPL-MCNC: 13 MG/DL (ref 8–23)
CALCIUM SERPL-MCNC: 9.2 MG/DL (ref 8.7–10.5)
CHLORIDE SERPL-SCNC: 111 MMOL/L (ref 95–110)
CLARITY UR: ABNORMAL
CO2 SERPL-SCNC: 24 MMOL/L (ref 23–29)
COLOR UR: YELLOW
CREAT SERPL-MCNC: 0.8 MG/DL (ref 0.5–1.4)
D DIMER PPP IA.FEU-MCNC: 0.45 MG/L FEU
DIFFERENTIAL METHOD BLD: ABNORMAL
EOSINOPHIL # BLD AUTO: 0.1 K/UL (ref 0–0.5)
EOSINOPHIL NFR BLD: 1.1 % (ref 0–8)
ERYTHROCYTE [DISTWIDTH] IN BLOOD BY AUTOMATED COUNT: 13.6 % (ref 11.5–14.5)
EST. GFR  (NO RACE VARIABLE): >60 ML/MIN/1.73 M^2
GLUCOSE SERPL-MCNC: 94 MG/DL (ref 70–110)
GLUCOSE UR QL STRIP: ABNORMAL
HCT VFR BLD AUTO: 41.5 % (ref 37–48.5)
HGB BLD-MCNC: 13.4 G/DL (ref 12–16)
HGB UR QL STRIP: ABNORMAL
HYALINE CASTS #/AREA URNS LPF: 0 /LPF
IMM GRANULOCYTES # BLD AUTO: 0.07 K/UL (ref 0–0.04)
IMM GRANULOCYTES NFR BLD AUTO: 0.6 % (ref 0–0.5)
KETONES UR QL STRIP: NEGATIVE
LEUKOCYTE ESTERASE UR QL STRIP: NEGATIVE
LYMPHOCYTES # BLD AUTO: 2.5 K/UL (ref 1–4.8)
LYMPHOCYTES NFR BLD: 20.1 % (ref 18–48)
MAGNESIUM SERPL-MCNC: 1.7 MG/DL (ref 1.6–2.6)
MCH RBC QN AUTO: 27.7 PG (ref 27–31)
MCHC RBC AUTO-ENTMCNC: 32.3 G/DL (ref 32–36)
MCV RBC AUTO: 86 FL (ref 82–98)
MICROSCOPIC COMMENT: ABNORMAL
MONOCYTES # BLD AUTO: 0.8 K/UL (ref 0.3–1)
MONOCYTES NFR BLD: 6.4 % (ref 4–15)
NEUTROPHILS # BLD AUTO: 8.8 K/UL (ref 1.8–7.7)
NEUTROPHILS NFR BLD: 71.3 % (ref 38–73)
NITRITE UR QL STRIP: POSITIVE
NRBC BLD-RTO: 0 /100 WBC
PH UR STRIP: 7 [PH] (ref 5–8)
PLATELET # BLD AUTO: 240 K/UL (ref 150–450)
PMV BLD AUTO: 11 FL (ref 9.2–12.9)
POTASSIUM SERPL-SCNC: 4.3 MMOL/L (ref 3.5–5.1)
PROT SERPL-MCNC: 7.2 G/DL (ref 6–8.4)
PROT UR QL STRIP: NEGATIVE
RBC # BLD AUTO: 4.83 M/UL (ref 4–5.4)
RBC #/AREA URNS HPF: 5 /HPF (ref 0–4)
SODIUM SERPL-SCNC: 146 MMOL/L (ref 136–145)
SP GR UR STRIP: 1.02 (ref 1–1.03)
SQUAMOUS #/AREA URNS HPF: 1 /HPF
TROPONIN I SERPL DL<=0.01 NG/ML-MCNC: 0.01 NG/ML (ref 0–0.03)
TROPONIN I SERPL DL<=0.01 NG/ML-MCNC: <0.006 NG/ML (ref 0–0.03)
TSH SERPL DL<=0.005 MIU/L-ACNC: 1.37 UIU/ML (ref 0.4–4)
URN SPEC COLLECT METH UR: ABNORMAL
UROBILINOGEN UR STRIP-ACNC: NEGATIVE EU/DL
WBC # BLD AUTO: 12.41 K/UL (ref 3.9–12.7)
WBC #/AREA URNS HPF: 10 /HPF (ref 0–5)
YEAST URNS QL MICRO: ABNORMAL

## 2023-12-29 PROCEDURE — 99291 CRITICAL CARE FIRST HOUR: CPT | Mod: HCNC

## 2023-12-29 PROCEDURE — 87086 URINE CULTURE/COLONY COUNT: CPT | Mod: HCNC | Performed by: STUDENT IN AN ORGANIZED HEALTH CARE EDUCATION/TRAINING PROGRAM

## 2023-12-29 PROCEDURE — 1125F PR PAIN SEVERITY QUANTIFIED, PAIN PRESENT: ICD-10-PCS | Mod: HCNC,CPTII,S$GLB, | Performed by: INTERNAL MEDICINE

## 2023-12-29 PROCEDURE — 93005 ELECTROCARDIOGRAM TRACING: CPT | Performed by: INTERNAL MEDICINE

## 2023-12-29 PROCEDURE — 96365 THER/PROPH/DIAG IV INF INIT: CPT | Mod: HCNC

## 2023-12-29 PROCEDURE — 63600175 PHARM REV CODE 636 W HCPCS: Mod: HCNC | Performed by: NURSE PRACTITIONER

## 2023-12-29 PROCEDURE — 85025 COMPLETE CBC W/AUTO DIFF WBC: CPT | Mod: HCNC | Performed by: NURSE PRACTITIONER

## 2023-12-29 PROCEDURE — 96375 TX/PRO/DX INJ NEW DRUG ADDON: CPT | Mod: HCNC

## 2023-12-29 PROCEDURE — 87088 URINE BACTERIA CULTURE: CPT | Mod: HCNC | Performed by: STUDENT IN AN ORGANIZED HEALTH CARE EDUCATION/TRAINING PROGRAM

## 2023-12-29 PROCEDURE — 87077 CULTURE AEROBIC IDENTIFY: CPT | Mod: HCNC | Performed by: STUDENT IN AN ORGANIZED HEALTH CARE EDUCATION/TRAINING PROGRAM

## 2023-12-29 PROCEDURE — 25000003 PHARM REV CODE 250: Mod: HCNC | Performed by: STUDENT IN AN ORGANIZED HEALTH CARE EDUCATION/TRAINING PROGRAM

## 2023-12-29 PROCEDURE — 84443 ASSAY THYROID STIM HORMONE: CPT | Mod: HCNC | Performed by: NURSE PRACTITIONER

## 2023-12-29 PROCEDURE — 25000003 PHARM REV CODE 250: Mod: HCNC | Performed by: NURSE PRACTITIONER

## 2023-12-29 PROCEDURE — 94761 N-INVAS EAR/PLS OXIMETRY MLT: CPT | Mod: HCNC

## 2023-12-29 PROCEDURE — 99214 OFFICE O/P EST MOD 30 MIN: CPT | Mod: PBBFAC,25,HCNC | Performed by: INTERNAL MEDICINE

## 2023-12-29 PROCEDURE — 99900035 HC TECH TIME PER 15 MIN (STAT): Mod: HCNC

## 2023-12-29 PROCEDURE — 93005 ELECTROCARDIOGRAM TRACING: CPT

## 2023-12-29 PROCEDURE — 3075F PR MOST RECENT SYSTOLIC BLOOD PRESS GE 130-139MM HG: ICD-10-PCS | Mod: HCNC,CPTII,S$GLB, | Performed by: INTERNAL MEDICINE

## 2023-12-29 PROCEDURE — 80053 COMPREHEN METABOLIC PANEL: CPT | Mod: HCNC | Performed by: NURSE PRACTITIONER

## 2023-12-29 PROCEDURE — 99999 PR PBB SHADOW E&M-EST. PATIENT-LVL IV: CPT | Mod: PBBFAC,HCNC,, | Performed by: INTERNAL MEDICINE

## 2023-12-29 PROCEDURE — 96366 THER/PROPH/DIAG IV INF ADDON: CPT | Mod: HCNC

## 2023-12-29 PROCEDURE — 36415 COLL VENOUS BLD VENIPUNCTURE: CPT | Mod: HCNC | Performed by: NURSE PRACTITIONER

## 2023-12-29 PROCEDURE — 3080F DIAST BP >= 90 MM HG: CPT | Mod: HCNC,CPTII,S$GLB, | Performed by: INTERNAL MEDICINE

## 2023-12-29 PROCEDURE — 3008F BODY MASS INDEX DOCD: CPT | Mod: HCNC,CPTII,S$GLB, | Performed by: INTERNAL MEDICINE

## 2023-12-29 PROCEDURE — 3080F PR MOST RECENT DIASTOLIC BLOOD PRESSURE >= 90 MM HG: ICD-10-PCS | Mod: HCNC,CPTII,S$GLB, | Performed by: INTERNAL MEDICINE

## 2023-12-29 PROCEDURE — 94760 N-INVAS EAR/PLS OXIMETRY 1: CPT | Mod: HCNC

## 2023-12-29 PROCEDURE — 3075F SYST BP GE 130 - 139MM HG: CPT | Mod: HCNC,CPTII,S$GLB, | Performed by: INTERNAL MEDICINE

## 2023-12-29 PROCEDURE — 81000 URINALYSIS NONAUTO W/SCOPE: CPT | Mod: HCNC | Performed by: NURSE PRACTITIONER

## 2023-12-29 PROCEDURE — 96376 TX/PRO/DX INJ SAME DRUG ADON: CPT | Mod: HCNC

## 2023-12-29 PROCEDURE — 99999 PR PBB SHADOW E&M-EST. PATIENT-LVL IV: ICD-10-PCS | Mod: PBBFAC,HCNC,, | Performed by: INTERNAL MEDICINE

## 2023-12-29 PROCEDURE — 63600175 PHARM REV CODE 636 W HCPCS: Mod: HCNC | Performed by: STUDENT IN AN ORGANIZED HEALTH CARE EDUCATION/TRAINING PROGRAM

## 2023-12-29 PROCEDURE — 83735 ASSAY OF MAGNESIUM: CPT | Mod: HCNC | Performed by: NURSE PRACTITIONER

## 2023-12-29 PROCEDURE — 84484 ASSAY OF TROPONIN QUANT: CPT | Mod: 91,HCNC | Performed by: NURSE PRACTITIONER

## 2023-12-29 PROCEDURE — 99215 OFFICE O/P EST HI 40 MIN: CPT | Mod: HCNC,S$GLB,, | Performed by: INTERNAL MEDICINE

## 2023-12-29 PROCEDURE — 99215 PR OFFICE/OUTPT VISIT, EST, LEVL V, 40-54 MIN: ICD-10-PCS | Mod: HCNC,S$GLB,, | Performed by: INTERNAL MEDICINE

## 2023-12-29 PROCEDURE — 96360 HYDRATION IV INFUSION INIT: CPT | Mod: 59,HCNC

## 2023-12-29 PROCEDURE — 96367 TX/PROPH/DG ADDL SEQ IV INF: CPT | Mod: HCNC

## 2023-12-29 PROCEDURE — 4010F PR ACE/ARB THEARPY RXD/TAKEN: ICD-10-PCS | Mod: HCNC,CPTII,S$GLB, | Performed by: INTERNAL MEDICINE

## 2023-12-29 PROCEDURE — 93010 ELECTROCARDIOGRAM REPORT: CPT | Mod: 76,,, | Performed by: INTERNAL MEDICINE

## 2023-12-29 PROCEDURE — 3008F PR BODY MASS INDEX (BMI) DOCUMENTED: ICD-10-PCS | Mod: HCNC,CPTII,S$GLB, | Performed by: INTERNAL MEDICINE

## 2023-12-29 PROCEDURE — 93010 ELECTROCARDIOGRAM REPORT: CPT | Mod: ,,, | Performed by: INTERNAL MEDICINE

## 2023-12-29 PROCEDURE — 83880 ASSAY OF NATRIURETIC PEPTIDE: CPT | Mod: HCNC | Performed by: NURSE PRACTITIONER

## 2023-12-29 PROCEDURE — 1125F AMNT PAIN NOTED PAIN PRSNT: CPT | Mod: HCNC,CPTII,S$GLB, | Performed by: INTERNAL MEDICINE

## 2023-12-29 PROCEDURE — 87186 SC STD MICRODIL/AGAR DIL: CPT | Mod: HCNC | Performed by: STUDENT IN AN ORGANIZED HEALTH CARE EDUCATION/TRAINING PROGRAM

## 2023-12-29 PROCEDURE — 85379 FIBRIN DEGRADATION QUANT: CPT | Mod: HCNC | Performed by: NURSE PRACTITIONER

## 2023-12-29 PROCEDURE — 20000000 HC ICU ROOM: Mod: HCNC

## 2023-12-29 PROCEDURE — 4010F ACE/ARB THERAPY RXD/TAKEN: CPT | Mod: HCNC,CPTII,S$GLB, | Performed by: INTERNAL MEDICINE

## 2023-12-29 PROCEDURE — 93005 ELECTROCARDIOGRAM TRACING: CPT | Mod: HCNC

## 2023-12-29 RX ORDER — MAGNESIUM SULFATE HEPTAHYDRATE 40 MG/ML
2 INJECTION, SOLUTION INTRAVENOUS ONCE
Status: DISCONTINUED | OUTPATIENT
Start: 2023-12-29 | End: 2023-12-29

## 2023-12-29 RX ORDER — METOPROLOL TARTRATE 1 MG/ML
5 INJECTION, SOLUTION INTRAVENOUS EVERY 5 MIN PRN
Status: COMPLETED | OUTPATIENT
Start: 2023-12-29 | End: 2023-12-29

## 2023-12-29 RX ORDER — SODIUM CHLORIDE 9 MG/ML
1000 INJECTION, SOLUTION INTRAVENOUS
Status: COMPLETED | OUTPATIENT
Start: 2023-12-29 | End: 2023-12-29

## 2023-12-29 RX ORDER — METOPROLOL TARTRATE 25 MG/1
25 TABLET, FILM COATED ORAL ONCE
Status: COMPLETED | OUTPATIENT
Start: 2023-12-29 | End: 2023-12-29

## 2023-12-29 RX ORDER — SODIUM CHLORIDE 9 MG/ML
500 INJECTION, SOLUTION INTRAVENOUS
Status: COMPLETED | OUTPATIENT
Start: 2023-12-29 | End: 2023-12-29

## 2023-12-29 RX ORDER — SODIUM CHLORIDE 0.9 % (FLUSH) 0.9 %
10 SYRINGE (ML) INJECTION
Status: DISCONTINUED | OUTPATIENT
Start: 2023-12-29 | End: 2024-01-03 | Stop reason: HOSPADM

## 2023-12-29 RX ORDER — ASPIRIN 325 MG
325 TABLET ORAL
Status: COMPLETED | OUTPATIENT
Start: 2023-12-29 | End: 2023-12-29

## 2023-12-29 RX ORDER — TALC
6 POWDER (GRAM) TOPICAL NIGHTLY PRN
Status: DISCONTINUED | OUTPATIENT
Start: 2023-12-29 | End: 2024-01-03 | Stop reason: HOSPADM

## 2023-12-29 RX ORDER — DILTIAZEM HCL 1 MG/ML
0-15 INJECTION, SOLUTION INTRAVENOUS CONTINUOUS
Status: DISCONTINUED | OUTPATIENT
Start: 2023-12-29 | End: 2024-01-02

## 2023-12-29 RX ORDER — AMIODARONE HYDROCHLORIDE 200 MG/1
400 TABLET ORAL
Status: COMPLETED | OUTPATIENT
Start: 2023-12-29 | End: 2023-12-29

## 2023-12-29 RX ORDER — DILTIAZEM HCL 1 MG/ML
0-15 INJECTION, SOLUTION INTRAVENOUS
Status: COMPLETED | OUTPATIENT
Start: 2023-12-29 | End: 2023-12-29

## 2023-12-29 RX ORDER — DILTIAZEM HYDROCHLORIDE 5 MG/ML
20 INJECTION INTRAVENOUS
Status: COMPLETED | OUTPATIENT
Start: 2023-12-29 | End: 2023-12-29

## 2023-12-29 RX ORDER — MAGNESIUM SULFATE HEPTAHYDRATE 40 MG/ML
2 INJECTION, SOLUTION INTRAVENOUS ONCE
Status: COMPLETED | OUTPATIENT
Start: 2023-12-29 | End: 2023-12-29

## 2023-12-29 RX ADMIN — DILTIAZEM HYDROCHLORIDE 2.5 MG/HR: 5 INJECTION INTRAVENOUS at 05:12

## 2023-12-29 RX ADMIN — DILTIAZEM HYDROCHLORIDE 20 MG: 5 INJECTION INTRAVENOUS at 05:12

## 2023-12-29 RX ADMIN — SODIUM CHLORIDE 500 ML: 9 INJECTION, SOLUTION INTRAVENOUS at 12:12

## 2023-12-29 RX ADMIN — METOROPROLOL TARTRATE 5 MG: 5 INJECTION, SOLUTION INTRAVENOUS at 02:12

## 2023-12-29 RX ADMIN — AMIODARONE HYDROCHLORIDE 400 MG: 200 TABLET ORAL at 04:12

## 2023-12-29 RX ADMIN — SODIUM CHLORIDE 500 ML: 9 INJECTION, SOLUTION INTRAVENOUS at 04:12

## 2023-12-29 RX ADMIN — METOPROLOL TARTRATE 25 MG: 25 TABLET, FILM COATED ORAL at 02:12

## 2023-12-29 RX ADMIN — APIXABAN 5 MG: 5 TABLET, FILM COATED ORAL at 09:12

## 2023-12-29 RX ADMIN — METOPROLOL TARTRATE 25 MG: 25 TABLET, FILM COATED ORAL at 04:12

## 2023-12-29 RX ADMIN — MAGNESIUM SULFATE HEPTAHYDRATE 2 G: 40 INJECTION, SOLUTION INTRAVENOUS at 02:12

## 2023-12-29 RX ADMIN — AMIODARONE HYDROCHLORIDE 150 MG: 1.5 INJECTION, SOLUTION INTRAVENOUS at 04:12

## 2023-12-29 RX ADMIN — ASPIRIN 325 MG ORAL TABLET 325 MG: 325 PILL ORAL at 11:12

## 2023-12-29 RX ADMIN — SODIUM CHLORIDE 500 ML: 9 INJECTION, SOLUTION INTRAVENOUS at 10:12

## 2023-12-29 RX ADMIN — CEFTRIAXONE SODIUM 1 G: 1 INJECTION, POWDER, FOR SOLUTION INTRAMUSCULAR; INTRAVENOUS at 04:12

## 2023-12-29 NOTE — PROGRESS NOTES
Subjective:       Patient ID: Celine Sinclair is a 73 y.o. female.    Chief Complaint: Follow-up (Palpitations, shortness of breath, diarrhea, body aches)      HPI:  Patient is known to me and presents for routine follow up chronic medical problems. Labs from 12/8/23 by Essence Keating personally reviewed and interpreted today.   Mg 1.7  K 3.6  GFR > 60    She presents today with chest pain, SOB, palpitations. She has been having same symptoms since ER visit 11/30/23 and has been seen multiple times for this. On initial triage today HR 130s. EKG done showing possible a-flutter with RVR; difficult to see P waves. She is SOB and reporting left sided chest pain and b/l back pain in thoracic area radiating to lumbar area. She feels her heart racing and feels lightheaded. Denies syncope.   A-fib/flutter: reports she remains on amiodarone, eliquis and metoprolol for this. She follows with Dr. Nieves with [facility].   Also considering dehydration as etiology given her reported diarrhea (see below) and on diuretics.    HLD: on atorvastatin per [facility].     HTN: on losartan 25mg, amlodipine 5mg and metoprolol (a-fib). She was also started on lasix 20mg and aldactone 12.5mg by [facility].     She has h/o Crohn's disease. She has not followed up with GI regularly. She was finally seen again 8/22/23 and notes by Dr. Urena personally reviewed and discussed today. It was recommended to continue balsalazide and update C-scope. MRI for prior liver lesion also done and showed hepatic steatosis. She still c/o loose stools 3-4x daily. No blood. No fevers. No recent antibx use.      Memory loss: following with neurology. On namenda. This is causing her a lot of distress.    Fibromyalgia and depression: on cymbalta and gabapentin for pain control. Chronic pain issues.     Past Medical History:   Diagnosis Date    Aortic atherosclerosis     Atrial fibrillation     Benign essential hypertension     Cataract     Senile    Chest pain     CHF (congestive  heart failure)     Crohn's colitis     Depression, major, recurrent, moderate     Encounter for blood transfusion     Fibromyalgia     GERD (gastroesophageal reflux disease)     Hypertensive cardiomyopathy     IBS (irritable bowel syndrome)     Migraine     Opioid abuse, in remission     Orbital cellulitis on left     Osteopenia     Paget disease of bone     Palpitations     Port-A-Cath in place     right chest    Prolonged QT interval     RA (rheumatoid arthritis)     Sedative hypnotic or anxiolytic dependence     in remission     SOB (shortness of breath)     SVT (supraventricular tachycardia)     Unspecified urinary incontinence        Family History   Problem Relation Age of Onset    Glaucoma Paternal Grandmother     Other Daughter         Diabetic Retinopathy    Breast cancer Daughter 40    Retinal detachment Grandchild     Heart attack Maternal Grandmother     Colon cancer Maternal Grandmother     Cancer Mother         Unknown type    Emphysema Father 67    Heart disease Father     Lung cancer Sister     Heart attack Sister     Breast cancer Daughter 47    Breast cancer Sister     Amblyopia Neg Hx     Blindness Neg Hx     Strabismus Neg Hx     Macular degeneration Neg Hx     Cataracts Neg Hx        Social History     Socioeconomic History    Marital status:     Number of children: 12   Occupational History    Occupation: Hairdresser     Comment: Retired/disabled   Tobacco Use    Smoking status: Never    Smokeless tobacco: Never   Substance and Sexual Activity    Alcohol use: Yes     Comment: wine coolers occassionally    Drug use: No    Sexual activity: Not Currently     Partners: Female   Social History Narrative    Patient gave birth to 7 children, adopted 2 and has 3 stepchildren with her .     Social Determinants of Health     Financial Resource Strain: Low Risk  (9/29/2023)    Overall Financial Resource Strain (CARDIA)     Difficulty of Paying Living Expenses: Not hard at all   Food  Insecurity: No Food Insecurity (9/29/2023)    Hunger Vital Sign     Worried About Running Out of Food in the Last Year: Never true     Ran Out of Food in the Last Year: Never true   Transportation Needs: No Transportation Needs (9/29/2023)    PRAPARE - Transportation     Lack of Transportation (Medical): No     Lack of Transportation (Non-Medical): No   Physical Activity: Inactive (9/29/2023)    Exercise Vital Sign     Days of Exercise per Week: 0 days     Minutes of Exercise per Session: 0 min   Stress: No Stress Concern Present (9/29/2023)    [employer] [place] [employer] - Occupational Stress Questionnaire     Feeling of Stress : Only a little   Social Connections: Moderately Integrated (9/29/2023)    Social Connection and Isolation Panel [NHANES]     Frequency of Communication with Friends and Family: More than three times a week     Frequency of Social Gatherings with Friends and Family: More than three times a week     Attends Yazidi Services: More than 4 times per year     Active Member of Clubs or Organizations: No     Attends Club or Organization Meetings: Never     Marital Status:    Housing Stability: Low Risk  (9/29/2023)    Housing Stability Vital Sign     Unable to Pay for Housing in the Last Year: No     Number of Places Lived in the Last Year: 1     Unstable Housing in the Last Year: No       Review of Systems   Constitutional:  Positive for fatigue. Negative for activity change, fever and unexpected weight change.   HENT:  Negative for congestion, ear pain, hearing loss, rhinorrhea and sore throat.    Eyes:  Negative for pain, redness and visual disturbance.   Respiratory:  Positive for shortness of breath. Negative for cough and wheezing.    Cardiovascular:  Positive for chest pain and palpitations. Negative for leg swelling.   Gastrointestinal:  Positive for diarrhea. Negative for abdominal pain, constipation, nausea and vomiting.   Genitourinary:  Negative for dysuria,  frequency and urgency.   Musculoskeletal:  Positive for arthralgias, back pain and myalgias. Negative for joint swelling and neck pain.   Skin:  Negative for color change, rash and wound.   Neurological:  Positive for dizziness and light-headedness. Negative for tremors, weakness and headaches.         Objective:      Physical Exam  Vitals reviewed.   Constitutional:       General: She is not in acute distress.     Appearance: She is well-developed. She is ill-appearing.   HENT:      Head: Normocephalic and atraumatic.      Right Ear: External ear normal.      Left Ear: External ear normal.      Nose: Nose normal.   Eyes:      General:         Right eye: No discharge.         Left eye: No discharge.      Conjunctiva/sclera: Conjunctivae normal.   Neck:      Thyroid: No thyromegaly.   Cardiovascular:      Rate and Rhythm: Regular rhythm. Tachycardia present.      Heart sounds: No murmur heard.     No friction rub. No gallop.   Pulmonary:      Breath sounds: No wheezing or rales.   Skin:     General: Skin is warm and dry.   Neurological:      Mental Status: She is alert and oriented to person, place, and time.   Psychiatric:         Behavior: Behavior normal.         Thought Content: Thought content normal.         Assessment:       1. Atrial flutter with rapid ventricular response    2. Dementia with anxiety, unspecified dementia severity, unspecified dementia type    3. Depression, major, recurrent, moderate    4. Essential hypertension    5. Cardiomyopathy, nonischemic    6. Antiphospholipid syndrome    7. Long term (current) use of anticoagulants    8. Crohn's disease with complication, unspecified gastrointestinal tract location    9. Fibromyalgia        Plan:       1. Atrial flutter with rapid ventricular response  Acute  EKG was difficult to tell if P wave present and sinus tachycardia vs a-flutter with RVR (done in clinic, personally reviewed)  She is symptomatic therefore sent to ED for telemetry, repeat EKG,  cardiac labs, CXR  Also considering dehydration given diarrhea reported an on diuretics. Check renal fxn and electrolytes    2. Dementia with anxiety, unspecified dementia severity, unspecified dementia type  Chronic stable  Cont meds per neurology  At baseline today    3. Depression, major, recurrent, moderate  Chronic stable  Cont cymbalta same dose    4. Essential hypertension  Chronic uncontrolled  Elevated today with elevated HR  To ED for cardiac testing and HR control  Adjust meds pending ED/cardiac eval today    5. Cardiomyopathy, nonischemic  Chronic stable  No LE edema  Lungs clear on exam  Tachycardia however needs to be addressed, to ED (see above)    6. Antiphospholipid syndrome  Chronic stable  Cont eliquis    7. Long term (current) use of anticoagulants  Chronic stable  Cont eliquis  Overview:  Eliquis for PAF       8. Crohn's disease with complication, unspecified gastrointestinal tract location  Chronic, at baseline  Continues to report diarrhea  Compliance with meds unknown  Needs close GI follow up outpatient  Consider dehydration today  Overview:  Chronic intermittent diarrhea       9. Fibromyalgia  Chronic  Cont gabapentin, cymbalta (pending renal fxn)  Exercise, sleep hygiene         RTC pending ER eval

## 2023-12-29 NOTE — CONSULTS
[facility] - Emergency Dept  Cardiology  Consult Note    Patient Name: Celine Sinclair  MRN: [id number]  Admission Date: 12/29/2023  Hospital Length of Stay: 0 days  Code Status: Prior   Consulting Provider: Mj Jose NP  Primary Care Physician: Neeru Hinkle MD  Principal Problem:<principal problem not specified>      Inpatient consult to [facility]  Consult performed by: Mj Jose NP  Consult ordered by: Fadumo Elliott NP        Subjective:     Chief Complaint:  Chest pain, shortness of breath, palpitations    HPI:  73-year-old female with past medical history of atrial flutter, PAF, SVT, hypertension, chest pain, shortness of breath, normal coronaries June 2023 presents from PCP clinic with complaint of chest pain, shortness of breath, palpitations.  EKG done there was concerning for atrial flutter with rapid ventricular response, she was sent to emergency department.  Workup currently benign with lab work, however, EKG does reveal atrial flutter with rapid ventricular response.  [facility] asked to evaluate.    Past Medical History:   Diagnosis Date    Aortic atherosclerosis     Atrial fibrillation     Benign essential hypertension     Cataract     Senile    Chest pain     CHF (congestive heart failure)     Crohn's colitis     Depression, major, recurrent, moderate     Encounter for blood transfusion     Fibromyalgia     GERD (gastroesophageal reflux disease)     Hypertensive cardiomyopathy     IBS (irritable bowel syndrome)     Migraine     Opioid abuse, in remission     Orbital cellulitis on left     Osteopenia     Paget disease of bone     Palpitations     Port-A-Cath in place     right chest    Prolonged QT interval     RA (rheumatoid arthritis)     Sedative hypnotic or anxiolytic dependence     in remission     SOB (shortness of breath)     SVT (supraventricular tachycardia)     Unspecified urinary incontinence        Past Surgical History:   Procedure Laterality Date    ABLATION N/A 6/28/2023     Procedure: Ablation;  Surgeon: Johnny Nieves MD;  Location: [facility] CATH;  Service: Cardiology;  Laterality: N/A;  ops # 7    ABLATION, ATRIAL FLUTTER, TYPICAL N/A 6/28/2023    Procedure: Ablation, Atrial Flutter, Typical;  Surgeon: Johnny Nieves MD;  Location: [facility] CATH;  Service: Cardiology;  Laterality: N/A;    CATARACT EXTRACTION W/  INTRAOCULAR LENS IMPLANT Left 02/21/2017    Dr. Christianson    CATARACT EXTRACTION W/  INTRAOCULAR LENS IMPLANT Right 03/07/2017    Dr. Christianson    CHOLECYSTECTOMY      COLON SURGERY      COLONOSCOPY N/A 3/16/2016    Procedure: COLONOSCOPY;  Surgeon: Dale Trejo MD;  Location: [facility] ENDO (4TH FLR);  Service: Endoscopy;  Laterality: N/A;    COLONOSCOPY N/A 10/12/2020    Procedure: COLONOSCOPY;  Surgeon: Cali Urena MD;  Location: [facility] (4TH FLR);  Service: Endoscopy;  Laterality: N/A;  covid test 10/9-[facility]- completed 10/9/20  ok to hold Coumadin x 5 days per coumadin clinic-see telephone encounterd ated 10/6-MS / Loop recorder  10/6/20- Pt confirmed- ERW@1122  10/9-Pt confirmed earlier arrival time, prep ins. reviewed and emailed to Pt    COLONOSCOPY N/A 8/25/2022    Procedure: COLONOSCOPY;  Surgeon: Lewis Luke MD;  Location: [facility];  Service: Endoscopy;  Laterality: N/A;    Colostomy reversal      HEMORRHOID SURGERY      HYSTERECTOMY      ILEOSTOMY      ILEOSTOMY CLOSURE      LEFT HEART CATHETERIZATION N/A 2/15/2019    Procedure: HEART CATH-LEFT;  Surgeon: Miky Keita MD;  Location: [facility] CATH LAB;  Service: Cardiology;  Laterality: N/A;    NECK SURGERY      OOPHORECTOMY Bilateral     SBO      SMALL INTESTINE SURGERY      TONSILLECTOMY      TUBAL LIGATION         Review of patient's allergies indicates:   Allergen Reactions    Octreotide acetate Nausea And Vomiting     Had symptoms when she took Sandostatin with Codeine    Codeine Itching and Nausea And Vomiting     Pill form only, IV ok.,  Had symptoms when she took Codeine with  Sandostatin.  Other reaction(s): Itching    Morphine Nausea And Vomiting     Patient reports reaction only when she takes po form of Morphine.    Opioids - morphine analogues Nausea And Vomiting    Simvastatin Nausea And Vomiting       No current facility-administered medications on file prior to encounter.     Current Outpatient Medications on File Prior to Encounter   Medication Sig    albuterol-ipratropium (DUO-NEB) 2.5 mg-0.5 mg/3 mL nebulizer solution Take 3 mLs by nebulization every 6 (six) hours as needed for Wheezing. Rescue (Patient not taking: Reported on 12/29/2023)    amiodarone (PACERONE) 200 MG Tab Take 0.5 tablets (100 mg total) by mouth once daily.    amLODIPine (NORVASC) 5 MG tablet Take 5 mg by mouth once daily.    apixaban (ELIQUIS) 5 mg Tab Take 1 tablet (5 mg total) by mouth 2 (two) times daily.    atorvastatin (LIPITOR) 40 MG tablet Take 40 mg by mouth every evening.    balsalazide (COLAZAL) 750 mg capsule Take 3 capsules (2,250 mg total) by mouth 3 (three) times daily.    COMIRNATY 2023-24, 12Y UP,,PF, 30 mcg/0.3 mL inection     cyanocobalamin 1,000 mcg/mL injection Inject 1 mL (1,000 mcg total) into the muscle once a week.    DULoxetine (CYMBALTA) 60 MG capsule Take 1 capsule (60 mg total) by mouth once daily.    FARXIGA 10 mg tablet Take 10 mg by mouth once daily.    FLUAD QUAD 2023-24,65Y UP,,PF, 60 mcg (15 mcg x 4)/0.5 mL Syrg     furosemide (LASIX) 20 MG tablet Take 20 mg by mouth Daily.    gabapentin (NEURONTIN) 300 MG capsule Take 1 capsule (300 mg total) by mouth 3 (three) times daily.    INNOSPIRE ESSENCE Manisha use as directed    levalbuterol (XOPENEX HFA) 45 mcg/actuation inhaler Inhale 1-2 puffs into the lungs every 4 (four) hours as needed for Wheezing. Rescue (Patient not taking: Reported on 12/29/2023)    losartan (COZAAR) 25 MG tablet Take 25 mg by mouth once daily.    memantine (NAMENDA) 10 MG Tab Take 10 mg by mouth 2 (two) times daily.    metoprolol succinate (TOPROL-XL) 25 MG  24 hr tablet Take 25 mg by mouth 2 (two) times a day.    nitroGLYCERIN (NITROSTAT) 0.4 MG SL tablet Place 0.4 mg under the tongue every 5 (five) minutes as needed.    pantoprazole (PROTONIX) 40 MG tablet Increase to 40mg twice daily x 2 wks then resume daily thereafter (Patient taking differently: Take 40 mg by mouth once daily.)    potassium chloride SA (K-DUR,KLOR-CON) 20 MEQ tablet Take 2 tablets (40 mEq total) by mouth once daily.    spironolactone (ALDACTONE) 25 MG tablet Take 12.5 mg by mouth once daily.    [DISCONTINUED] nortriptyline (PAMELOR) 10 MG capsule TAKE 1 CAPSULE (10 MG TOTAL) BY MOUTH EVERY EVENING.    [DISCONTINUED] traZODone (DESYREL) 50 MG tablet Take 1 tablet (50 mg total) by mouth nightly as needed.     Family History       Problem Relation (Age of Onset)    Breast cancer Daughter (40), Daughter (47), Sister    Cancer Mother    Colon cancer Maternal Grandmother    Emphysema Father (67)    Glaucoma Paternal Grandmother    Heart attack Maternal Grandmother, Sister    Heart disease Father    Lung cancer Sister    Other Daughter    Retinal detachment Grandchild          Tobacco Use    Smoking status: Never    Smokeless tobacco: Never   Substance and Sexual Activity    Alcohol use: Yes     Comment: wine coolers occassionally    Drug use: No    Sexual activity: Not Currently     Partners: Female     Review of Systems   Constitutional: Negative.   HENT: Negative.     Eyes: Negative.    Cardiovascular:  Positive for chest pain.   Respiratory:  Positive for shortness of breath.    Skin: Negative.    Musculoskeletal: Negative.    Gastrointestinal: Negative.    Genitourinary: Negative.    Neurological: Negative.      Objective:     Vital Signs (Most Recent):  Temp: 98.7 °F (37.1 °C) (12/29/23 1300)  Pulse: (!) 130 (12/29/23 1321)  Resp: 17 (12/29/23 1321)  BP: (!) 144/106 (12/29/23 1300)  SpO2: 96 % (12/29/23 1321) Vital Signs (24h Range):  Temp:  [98.7 °F (37.1 °C)] 98.7 °F (37.1 °C)  Pulse:  [130-139]  130  Resp:  [17-25] 17  SpO2:  [96 %-99 %] 96 %  BP: (130-159)/() 144/106     Weight: 80.3 kg (177 lb)  Body mass index is 29.45 kg/m².    SpO2: 96 %         Intake/Output Summary (Last 24 hours) at 12/29/2023 1330  Last data filed at 12/29/2023 1302  Gross per 24 hour   Intake 59.58 ml   Output --   Net 59.58 ml       Lines/Drains/Airways       Peripheral Intravenous Line  Duration                  Peripheral IV - Single Lumen 12/29/23 1303 22 G Posterior;Right Hand <1 day                    Physical Exam  Vitals reviewed.   Constitutional:       Appearance: Normal appearance.   HENT:      Head: Normocephalic.      Nose: Nose normal.      Mouth/Throat:      Mouth: Mucous membranes are moist.   Eyes:      Conjunctiva/sclera: Conjunctivae normal.   Cardiovascular:      Rate and Rhythm: Tachycardia present.      Pulses: Normal pulses.      Heart sounds: Normal heart sounds.   Pulmonary:      Effort: Pulmonary effort is normal.      Breath sounds: Normal breath sounds.   Abdominal:      General: Abdomen is flat.      Palpations: Abdomen is soft.   Musculoskeletal:         General: Normal range of motion.      Cervical back: Normal range of motion.   Skin:     General: Skin is warm and dry.      Capillary Refill: Capillary refill takes less than 2 seconds.   Neurological:      General: No focal deficit present.      Mental Status: She is alert and oriented to person, place, and time.   Psychiatric:         Mood and Affect: Mood normal.         Behavior: Behavior normal.         Significant Labs: BMP:   Recent Labs   Lab 12/29/23  1205   GLU 94   *   K 4.3   *   CO2 24   BUN 13   CREATININE 0.8   CALCIUM 9.2   MG 1.7   , CMP   Recent Labs   Lab 12/29/23  1205   *   K 4.3   *   CO2 24   GLU 94   BUN 13   CREATININE 0.8   CALCIUM 9.2   PROT 7.2   ALBUMIN 3.9   BILITOT 1.0   ALKPHOS 89   AST 16   ALT 9*   ANIONGAP 11   , CBC   Recent Labs   Lab 12/29/23  1205   WBC 12.41   HGB 13.4   HCT 41.5       , and Troponin   Recent Labs   Lab 12/29/23  1206   TROPONINI <0.006         Assessment and Plan:     There are no hospital problems to display for this patient.      VTE Risk Mitigation (From admission, onward)      None          [facility] 7/23:      Echocardiogram May 2023   Ejection fraction 40-45%   Grade 2 diastolic dysfunction   Left atrial enlargement  No significant valvular pathology    Diagnosis:   Chest pain /NO SIGNIFICANT CAD [facility] 7/23  Shortness of breath   Palpitations  RECURRENCE OF Atrial flutter with rapid ventricular response S/P PV ABLATION 6/23  Hypertension SUBOPTIMAL  SVT     Plan:   Some trouble with IV access, 25 Lopressor now.    IV beta-blocker labs IV established    METOPROLOL 50 BID  LOAD AMIODARONE ,  400 MG PO, THEN 200 BID  CONTINUE ELIQUIS  MAY HAVE TO ADMIT IF HR NOT CONTROLLED TODAY; CONSIDER IV AC SERVIN, NP scribed for Dr Moreno   Cardiology   [facility] - Emergency Dept  I attest that I have personally seen and examined this patient. I have reviewed and discussed the management in detail as outlined above.

## 2023-12-29 NOTE — ED PROVIDER NOTES
Encounter Date: 12/29/2023       History     Chief Complaint   Patient presents with    Chest Pain     Patient to ER CC of CP and SOB for about a week, patient tachicardic while triaging      Celine Sinclair is a 73 y.o. female with PMH of A-fib on eliquis, CMO/CHF, Crohn's colitis, HTN, GERD, Fibromyalgia, SVT sent to the ED by PCP for evaluation of SOB and chest tightness. She presented to her PCP, Dr. JANETTE Hinkle, today for a routine wellness checkup and was noted to be tachycardic (HR 130s), SOB and complaining of chest tightness. Her in office EKG showed possible a-flutter with RVR; difficult to see P waves. She has been feeling this way since her ED visit on 11/30/23 but symptoms worsened the past 4 days. Chest tightness is intermittent and she feels pain mainly on the L side of her chest. Pain is mild, currently 2/10 in severity. She also feels SOB with palpitations. No feelings of syncope. She has a hx of A-fib and currently takes eliquis and metoprolol and notes that she is compliant with medication. She is followed by Dr. Nieves-[facility]> recent cardiac ablation in 06/23'.       The history is provided by the patient.     Review of patient's allergies indicates:   Allergen Reactions    Octreotide acetate Nausea And Vomiting     Had symptoms when she took Sandostatin with Codeine    Codeine Itching and Nausea And Vomiting     Pill form only, IV ok.,  Had symptoms when she took Codeine with Sandostatin.  Other reaction(s): Itching    Morphine Nausea And Vomiting     Patient reports reaction only when she takes po form of Morphine.    Opioids - morphine analogues Nausea And Vomiting    Simvastatin Nausea And Vomiting     Past Medical History:   Diagnosis Date    Aortic atherosclerosis     Atrial fibrillation     Benign essential hypertension     Cataract     Senile    Chest pain     CHF (congestive heart failure)     Crohn's colitis     Depression, major, recurrent, moderate     Encounter for blood transfusion      Fibromyalgia     GERD (gastroesophageal reflux disease)     Hypertensive cardiomyopathy     IBS (irritable bowel syndrome)     Migraine     Opioid abuse, in remission     Orbital cellulitis on left     Osteopenia     Paget disease of bone     Palpitations     Port-A-Cath in place     right chest    Prolonged QT interval     RA (rheumatoid arthritis)     Sedative hypnotic or anxiolytic dependence     in remission     SOB (shortness of breath)     SVT (supraventricular tachycardia)     Unspecified urinary incontinence      Past Surgical History:   Procedure Laterality Date    ABLATION N/A 6/28/2023    Procedure: Ablation;  Surgeon: Johnny Nieves MD;  Location: [facility] CATH;  Service: Cardiology;  Laterality: N/A;  ops # 7    ABLATION, ATRIAL FLUTTER, TYPICAL N/A 6/28/2023    Procedure: Ablation, Atrial Flutter, Typical;  Surgeon: Johnny Nieves MD;  Location: [facility] CATH;  Service: Cardiology;  Laterality: N/A;    CATARACT EXTRACTION W/  INTRAOCULAR LENS IMPLANT Left 02/21/2017    Dr. Christianson    CATARACT EXTRACTION W/  INTRAOCULAR LENS IMPLANT Right 03/07/2017    Dr. Christianson    CHOLECYSTECTOMY      COLON SURGERY      COLONOSCOPY N/A 3/16/2016    Procedure: COLONOSCOPY;  Surgeon: Dale Trejo MD;  Location: [facility] (4TH FLR);  Service: Endoscopy;  Laterality: N/A;    COLONOSCOPY N/A 10/12/2020    Procedure: COLONOSCOPY;  Surgeon: Cali Urena MD;  [facility]  Service: Endoscopy;  Laterality: N/A;  covid test 10/9-[facility]- completed 10/9/20  ok to hold Coumadin x 5 days per coumadin clinic-see telephone encounterd ated 10/6-MS / Loop recorder  10/6/20- Pt confirmed- ERW@1122  10/9-Pt confirmed earlier arrival time, prep ins. reviewed and emailed to Pt    COLONOSCOPY N/A 8/25/2022    Procedure: COLONOSCOPY;  Surgeon: Lewis Luke MD;  Location: [facility];  Service: Endoscopy;  Laterality: N/A;    Colostomy reversal      HEMORRHOID SURGERY      HYSTERECTOMY      ILEOSTOMY       ILEOSTOMY CLOSURE      LEFT HEART CATHETERIZATION N/A 2/15/2019    Procedure: HEART CATH-LEFT;  Surgeon: Miky Keita MD;  Location: [facility] CATH LAB;  Service: Cardiology;  Laterality: N/A;    NECK SURGERY      OOPHORECTOMY Bilateral     SBO      SMALL INTESTINE SURGERY      TONSILLECTOMY      TUBAL LIGATION       Family History   Problem Relation Age of Onset    Glaucoma Paternal Grandmother     Other Daughter         Diabetic Retinopathy    Breast cancer Daughter 40    Retinal detachment Grandchild     Heart attack Maternal Grandmother     Colon cancer Maternal Grandmother     Cancer Mother         Unknown type    Emphysema Father 67    Heart disease Father     Lung cancer Sister     Heart attack Sister     Breast cancer Daughter 47    Breast cancer Sister     Amblyopia Neg Hx     Blindness Neg Hx     Strabismus Neg Hx     Macular degeneration Neg Hx     Cataracts Neg Hx      Social History     Tobacco Use    Smoking status: Never    Smokeless tobacco: Never   Substance Use Topics    Alcohol use: Yes     Comment: wine coolers occassionally    Drug use: No     Review of Systems   Constitutional:  Negative for activity change, chills and fever.   HENT:  Negative for congestion, ear discharge, ear pain, postnasal drip, sinus pressure, sinus pain and sore throat.    Respiratory:  Positive for chest tightness and shortness of breath. Negative for cough.    Cardiovascular:  Positive for chest pain and palpitations.   Gastrointestinal: Negative.  Negative for abdominal distention, abdominal pain and nausea.   Genitourinary:  Negative for dysuria, frequency and urgency.   Musculoskeletal:  Negative for back pain.   Skin: Negative.  Negative for rash.   Neurological:  Negative for dizziness, weakness, light-headedness and numbness.   Hematological:  Does not bruise/bleed easily.       Physical Exam     Initial Vitals   BP Pulse Resp Temp SpO2   12/29/23 1147 12/29/23 1147 12/29/23 1147 12/29/23 1300 12/29/23 1147   (!)  159/99 (!) 135 18 98.7 °F (37.1 °C) 98 %      MAP       --                Physical Exam    Nursing note and vitals reviewed.  Constitutional: She appears well-developed and well-nourished.   HENT:   Head: Normocephalic and atraumatic.   Right Ear: Tympanic membrane, external ear and ear canal normal. Tympanic membrane is not erythematous. No middle ear effusion.   Left Ear: Tympanic membrane, external ear and ear canal normal. Tympanic membrane is not erythematous.  No middle ear effusion.   Nose: Nose normal.   Mouth/Throat: Uvula is midline, oropharynx is clear and moist and mucous membranes are normal. Mucous membranes are not pale and not dry.   Eyes: Conjunctivae and EOM are normal. Pupils are equal, round, and reactive to light.   Neck: Neck supple.   Normal range of motion.  Cardiovascular:  Regular rhythm, normal heart sounds and intact distal pulses.   Tachycardia present.         Pulmonary/Chest: Effort normal and breath sounds normal. She has no decreased breath sounds. She has no wheezes. She has no rhonchi. She has no rales.   Abdominal: Abdomen is soft. Bowel sounds are normal. There is no abdominal tenderness.   Musculoskeletal:         General: Normal range of motion.      Cervical back: Normal range of motion and neck supple.     Neurological: She is alert and oriented to person, place, and time. She has normal strength. She displays normal reflexes. No cranial nerve deficit or sensory deficit.   Skin: Skin is warm and dry. Capillary refill takes less than 2 seconds. No rash noted.   Psychiatric: She has a normal mood and affect. Her behavior is normal. Judgment and thought content normal.         ED Course   Critical Care    Date/Time: 12/29/2023 9:11 PM    Performed by: Fadumo Elliott NP  Authorized by: Davey Ibarra DO  Direct patient critical care time: 5 minutes  Additional history critical care time: 5 minutes  Ordering / reviewing critical care time: 5 minutes  Documentation critical care  time: 5 minutes  Consulting other physicians critical care time: 5 minutes  Consult with family critical care time: 5 minutes  Other critical care time: 5 minutes  Total critical care time (exclusive of procedural time) : 35 minutes  Critical care was necessary to treat or prevent imminent or life-threatening deterioration of the following conditions: Atrial flutter with RVR.  Critical care was time spent personally by me on the following activities: discussions with consultants, discussions with primary provider, evaluation of patient's response to treatment, examination of patient, obtaining history from patient or surrogate, ordering and performing treatments and interventions, ordering and review of laboratory studies, ordering and review of radiographic studies, pulse oximetry, re-evaluation of patient's condition and review of old charts.        Labs Reviewed   CBC W/ AUTO DIFFERENTIAL - Abnormal; Notable for the following components:       Result Value    Immature Granulocytes 0.6 (*)     Gran # (ANC) 8.8 (*)     Immature Grans (Abs) 0.07 (*)     All other components within normal limits   COMPREHENSIVE METABOLIC PANEL - Abnormal; Notable for the following components:    Sodium 146 (*)     Chloride 111 (*)     ALT 9 (*)     All other components within normal limits   B-TYPE NATRIURETIC PEPTIDE - Abnormal; Notable for the following components:     (*)     All other components within normal limits   URINALYSIS, REFLEX TO URINE CULTURE - Abnormal; Notable for the following components:    Appearance, UA Cloudy (*)     Glucose, UA 3+ (*)     Occult Blood UA 1+ (*)     Nitrite, UA Positive (*)     All other components within normal limits    Narrative:     Specimen Source->Urine   URINALYSIS MICROSCOPIC - Abnormal; Notable for the following components:    RBC, UA 5 (*)     WBC, UA 10 (*)     Bacteria Many (*)     Yeast, UA Rare (*)     All other components within normal limits    Narrative:     Specimen  Source->Urine   CULTURE, URINE   CULTURE, URINE   TROPONIN I   TROPONIN I   D DIMER, QUANTITATIVE   MAGNESIUM   TSH          Imaging Results              X-Ray Chest AP Portable (Final result)  Result time 12/29/23 12:17:45      Final result by Colt Kerr MD (12/29/23 12:17:45)                   Impression:      No acute abnormality.      Electronically signed by: Colt Kerr MD  Date:    12/29/2023  Time:    12:17               Narrative:    EXAMINATION:  XR CHEST AP PORTABLE    CLINICAL HISTORY:  Chest Pain;    TECHNIQUE:  Single frontal view of the chest was performed.    COMPARISON:  11/30/2023    FINDINGS:  Postoperative changes cervical region.  Loop recorder.The lungs are clear with normal appearance of pulmonary vasculature. No pleural effusion. No evident pneumothorax.    The cardiac silhouette is normal in size. The hilar and mediastinal contours are unremarkable.    Bones are intact.                                       Medications   apixaban tablet 5 mg (has no administration in time range)   aspirin tablet 325 mg (325 mg Oral Given 12/29/23 1154)   0.9%  NaCl infusion ( Intravenous Stopped 12/29/23 1425)   metoprolol tartrate (LOPRESSOR) tablet 25 mg (25 mg Oral Given 12/29/23 1412)   metoprolol injection 5 mg (5 mg Intravenous Given 12/29/23 1436)   magnesium sulfate 2g in water 50mL IVPB (premix) (0 g Intravenous Stopped 12/29/23 1639)   cefTRIAXone (ROCEPHIN) 1 g in dextrose 5 % in water (D5W) 100 mL IVPB (MB+) (0 g Intravenous Stopped 12/29/23 1720)   0.9%  NaCl infusion ( Intravenous Verify Only 12/29/23 1700)   amiodarone in dextrose 150 mg/100 mL (1.5 mg/mL) loading dose 150 mg (0 mg Intravenous Stopped 12/29/23 1640)   metoprolol tartrate (LOPRESSOR) tablet 25 mg (25 mg Oral Given 12/29/23 1652)   amiodarone tablet 400 mg (400 mg Oral Given 12/29/23 1652)   diltiaZEM injection 20 mg (20 mg Intravenous Given 12/29/23 1751)   diltiaZEM 125 mg in D5W 125 mL infusion (2.5 mg/hr  Intravenous New Bag 12/29/23 8418)     Medical Decision Making  Evaluation of a 73-year-old female sent to the ED by PCP for chest tightness, shortness a breath, and tachycardia.  EKG performed in office with likely a flutter with RVR.  Patient presents to the ED with heart rate in the 130s. She does note a hx of diarrhea for the past 2 weeks and feels like she might be dehydrated.  She has not orthostatic with a stable BP. She does appear anxious.     Diff dx includes AFib RVR, dehydration, electrolyte derangement, hypo kalemia, hypomagnesemia, ACS, hyperthyroidism, anemia    Amount and/or Complexity of Data Reviewed  Labs: ordered. Decision-making details documented in ED Course.     Details: Lab workup with no leukocytosis.  CMP is grossly normal.  Negative troponin x2 sets.    UA with positive nitrite, 10 WBCs, culture pending    Radiology: ordered. Decision-making details documented in ED Course.     Details: Clear chest x-ray  ECG/medicine tests: ordered and independent interpretation performed.     Details: A flutter with RVR, rate 131. Normal QRS interval. No STEMI  When compared to EKG of 11/23' patient now in A-flutter    Repeat EKG @ 1803 shows NSR, rate 63. Normal IN interval; prolonged QT interval. No STEMI    Risk  OTC drugs.  Prescription drug management.  Decision regarding hospitalization.  Risk Details: Patient presented to the ED with chest tightness, shortness of breath, tachycardia.  She was found to be in atrial flutter with RVR, rate 130s.  Stable lab workup in the ED. [facility] was consulted> see consult note for recs. She was given 50 mg of Metoprolol in the ED PO (difficulty obtaining IV) with no improvement in HR. IV access obtained with U/S. Hx of diarrheal illness with concern for dehydration, 500 ml IV NACL administered in addition to Lopressor 5 mg IV X 3 with no improvement. HR remains in 120s/130s. IV bolus of Amiodarone in addition to 400 MG PO given without improvement in HR.  Diltiazem gtt started which did improve HR to 60s now with NSR. Transfer to [facility] [place] for Cardiology services but ED saturated. Spoke to Dr. JANETTE Hinkle who agrees to admit to ICU.                                       Clinical Impression:  Final diagnoses:  [R07.89] Atypical chest pain  [N30.00] Acute cystitis without hematuria (Primary)  [I48.92] Atrial flutter with rapid ventricular response          ED Disposition Condition    Admit Stable                Fadumo Elliott NP  12/29/23 9519

## 2023-12-30 LAB
ANION GAP SERPL CALC-SCNC: 9 MMOL/L (ref 8–16)
BASOPHILS # BLD AUTO: 0.05 K/UL (ref 0–0.2)
BASOPHILS NFR BLD: 0.5 % (ref 0–1.9)
BUN SERPL-MCNC: 13 MG/DL (ref 8–23)
CALCIUM SERPL-MCNC: 8.6 MG/DL (ref 8.7–10.5)
CHLORIDE SERPL-SCNC: 111 MMOL/L (ref 95–110)
CO2 SERPL-SCNC: 22 MMOL/L (ref 23–29)
CREAT SERPL-MCNC: 0.7 MG/DL (ref 0.5–1.4)
DIFFERENTIAL METHOD BLD: ABNORMAL
EOSINOPHIL # BLD AUTO: 0.2 K/UL (ref 0–0.5)
EOSINOPHIL NFR BLD: 1.8 % (ref 0–8)
ERYTHROCYTE [DISTWIDTH] IN BLOOD BY AUTOMATED COUNT: 13.7 % (ref 11.5–14.5)
EST. GFR  (NO RACE VARIABLE): >60 ML/MIN/1.73 M^2
GLUCOSE SERPL-MCNC: 112 MG/DL (ref 70–110)
HCT VFR BLD AUTO: 40.5 % (ref 37–48.5)
HGB BLD-MCNC: 12.8 G/DL (ref 12–16)
IMM GRANULOCYTES # BLD AUTO: 0.02 K/UL (ref 0–0.04)
IMM GRANULOCYTES NFR BLD AUTO: 0.2 % (ref 0–0.5)
LYMPHOCYTES # BLD AUTO: 2.2 K/UL (ref 1–4.8)
LYMPHOCYTES NFR BLD: 20.9 % (ref 18–48)
MAGNESIUM SERPL-MCNC: 1.9 MG/DL (ref 1.6–2.6)
MCH RBC QN AUTO: 27.1 PG (ref 27–31)
MCHC RBC AUTO-ENTMCNC: 31.6 G/DL (ref 32–36)
MCV RBC AUTO: 86 FL (ref 82–98)
MONOCYTES # BLD AUTO: 0.7 K/UL (ref 0.3–1)
MONOCYTES NFR BLD: 6.4 % (ref 4–15)
NEUTROPHILS # BLD AUTO: 7.4 K/UL (ref 1.8–7.7)
NEUTROPHILS NFR BLD: 70.2 % (ref 38–73)
NRBC BLD-RTO: 0 /100 WBC
PHOSPHATE SERPL-MCNC: 3.4 MG/DL (ref 2.7–4.5)
PLATELET # BLD AUTO: 217 K/UL (ref 150–450)
PMV BLD AUTO: 10.7 FL (ref 9.2–12.9)
POTASSIUM SERPL-SCNC: 3.5 MMOL/L (ref 3.5–5.1)
RBC # BLD AUTO: 4.73 M/UL (ref 4–5.4)
SODIUM SERPL-SCNC: 142 MMOL/L (ref 136–145)
WBC # BLD AUTO: 10.55 K/UL (ref 3.9–12.7)

## 2023-12-30 PROCEDURE — 36415 COLL VENOUS BLD VENIPUNCTURE: CPT | Mod: HCNC | Performed by: INTERNAL MEDICINE

## 2023-12-30 PROCEDURE — 84100 ASSAY OF PHOSPHORUS: CPT | Mod: HCNC | Performed by: INTERNAL MEDICINE

## 2023-12-30 PROCEDURE — 85025 COMPLETE CBC W/AUTO DIFF WBC: CPT | Mod: HCNC | Performed by: INTERNAL MEDICINE

## 2023-12-30 PROCEDURE — 94761 N-INVAS EAR/PLS OXIMETRY MLT: CPT | Mod: HCNC

## 2023-12-30 PROCEDURE — 99900035 HC TECH TIME PER 15 MIN (STAT): Mod: HCNC

## 2023-12-30 PROCEDURE — 93005 ELECTROCARDIOGRAM TRACING: CPT

## 2023-12-30 PROCEDURE — 99222 1ST HOSP IP/OBS MODERATE 55: CPT | Mod: AI,HCNC,, | Performed by: INTERNAL MEDICINE

## 2023-12-30 PROCEDURE — 80048 BASIC METABOLIC PNL TOTAL CA: CPT | Mod: HCNC | Performed by: INTERNAL MEDICINE

## 2023-12-30 PROCEDURE — 25000003 PHARM REV CODE 250: Mod: HCNC | Performed by: INTERNAL MEDICINE

## 2023-12-30 PROCEDURE — 93010 ELECTROCARDIOGRAM REPORT: CPT | Mod: ,,, | Performed by: INTERNAL MEDICINE

## 2023-12-30 PROCEDURE — 83735 ASSAY OF MAGNESIUM: CPT | Mod: HCNC | Performed by: INTERNAL MEDICINE

## 2023-12-30 PROCEDURE — 93005 ELECTROCARDIOGRAM TRACING: CPT | Performed by: INTERNAL MEDICINE

## 2023-12-30 PROCEDURE — 25000003 PHARM REV CODE 250: Mod: HCNC | Performed by: STUDENT IN AN ORGANIZED HEALTH CARE EDUCATION/TRAINING PROGRAM

## 2023-12-30 PROCEDURE — 20000000 HC ICU ROOM: Mod: HCNC

## 2023-12-30 RX ORDER — AMIODARONE HYDROCHLORIDE 100 MG/1
100 TABLET ORAL DAILY
Status: DISCONTINUED | OUTPATIENT
Start: 2023-12-30 | End: 2023-12-30

## 2023-12-30 RX ORDER — METOPROLOL SUCCINATE 50 MG/1
50 TABLET, EXTENDED RELEASE ORAL 2 TIMES DAILY
Status: DISCONTINUED | OUTPATIENT
Start: 2023-12-30 | End: 2023-12-30

## 2023-12-30 RX ORDER — ACETAMINOPHEN 325 MG/1
650 TABLET ORAL EVERY 8 HOURS PRN
Status: DISCONTINUED | OUTPATIENT
Start: 2023-12-30 | End: 2024-01-03 | Stop reason: HOSPADM

## 2023-12-30 RX ORDER — METOPROLOL SUCCINATE 25 MG/1
25 TABLET, EXTENDED RELEASE ORAL 2 TIMES DAILY
Status: DISCONTINUED | OUTPATIENT
Start: 2023-12-30 | End: 2023-12-30

## 2023-12-30 RX ORDER — AMIODARONE HYDROCHLORIDE 200 MG/1
200 TABLET ORAL DAILY
Status: DISCONTINUED | OUTPATIENT
Start: 2023-12-30 | End: 2023-12-30

## 2023-12-30 RX ORDER — GABAPENTIN 300 MG/1
300 CAPSULE ORAL 3 TIMES DAILY
Status: DISCONTINUED | OUTPATIENT
Start: 2023-12-30 | End: 2024-01-03 | Stop reason: HOSPADM

## 2023-12-30 RX ORDER — AMIODARONE HYDROCHLORIDE 200 MG/1
200 TABLET ORAL 2 TIMES DAILY
Status: DISCONTINUED | OUTPATIENT
Start: 2023-12-30 | End: 2023-12-31

## 2023-12-30 RX ORDER — PANTOPRAZOLE SODIUM 40 MG/1
40 TABLET, DELAYED RELEASE ORAL DAILY
Status: DISCONTINUED | OUTPATIENT
Start: 2023-12-30 | End: 2024-01-03 | Stop reason: HOSPADM

## 2023-12-30 RX ORDER — ATORVASTATIN CALCIUM 20 MG/1
40 TABLET, FILM COATED ORAL NIGHTLY
Status: DISCONTINUED | OUTPATIENT
Start: 2023-12-30 | End: 2024-01-03 | Stop reason: HOSPADM

## 2023-12-30 RX ORDER — DULOXETIN HYDROCHLORIDE 30 MG/1
60 CAPSULE, DELAYED RELEASE ORAL DAILY
Status: DISCONTINUED | OUTPATIENT
Start: 2023-12-30 | End: 2024-01-03 | Stop reason: HOSPADM

## 2023-12-30 RX ORDER — METOPROLOL SUCCINATE 50 MG/1
50 TABLET, EXTENDED RELEASE ORAL 3 TIMES DAILY
Status: DISCONTINUED | OUTPATIENT
Start: 2023-12-30 | End: 2023-12-31

## 2023-12-30 RX ORDER — FUROSEMIDE 20 MG/1
20 TABLET ORAL DAILY
Status: DISCONTINUED | OUTPATIENT
Start: 2023-12-30 | End: 2024-01-03

## 2023-12-30 RX ORDER — SULFASALAZINE 500 MG/1
1000 TABLET ORAL 3 TIMES DAILY
Status: DISCONTINUED | OUTPATIENT
Start: 2023-12-30 | End: 2024-01-03 | Stop reason: HOSPADM

## 2023-12-30 RX ORDER — MEMANTINE HYDROCHLORIDE 5 MG/1
10 TABLET ORAL 2 TIMES DAILY
Status: DISCONTINUED | OUTPATIENT
Start: 2023-12-30 | End: 2024-01-03 | Stop reason: HOSPADM

## 2023-12-30 RX ORDER — MUPIROCIN 20 MG/G
OINTMENT TOPICAL 2 TIMES DAILY
Status: DISCONTINUED | OUTPATIENT
Start: 2023-12-30 | End: 2024-01-03 | Stop reason: HOSPADM

## 2023-12-30 RX ADMIN — AMIODARONE HYDROCHLORIDE 200 MG: 200 TABLET ORAL at 08:12

## 2023-12-30 RX ADMIN — FUROSEMIDE 20 MG: 20 TABLET ORAL at 04:12

## 2023-12-30 RX ADMIN — GABAPENTIN 300 MG: 300 CAPSULE ORAL at 02:12

## 2023-12-30 RX ADMIN — ACETAMINOPHEN 650 MG: 325 TABLET ORAL at 05:12

## 2023-12-30 RX ADMIN — ATORVASTATIN CALCIUM 40 MG: 20 TABLET, FILM COATED ORAL at 08:12

## 2023-12-30 RX ADMIN — MUPIROCIN: 20 OINTMENT TOPICAL at 08:12

## 2023-12-30 RX ADMIN — DULOXETINE HYDROCHLORIDE 60 MG: 30 CAPSULE, DELAYED RELEASE ORAL at 08:12

## 2023-12-30 RX ADMIN — Medication 10 MG/HR: at 05:12

## 2023-12-30 RX ADMIN — GABAPENTIN 300 MG: 300 CAPSULE ORAL at 08:12

## 2023-12-30 RX ADMIN — PANTOPRAZOLE SODIUM 40 MG: 40 TABLET, DELAYED RELEASE ORAL at 08:12

## 2023-12-30 RX ADMIN — SPIRONOLACTONE 12.5 MG: 25 TABLET ORAL at 09:12

## 2023-12-30 RX ADMIN — MEMANTINE HYDROCHLORIDE 10 MG: 5 TABLET ORAL at 08:12

## 2023-12-30 RX ADMIN — SULFASALAZINE 1000 MG: 500 TABLET ORAL at 08:12

## 2023-12-30 RX ADMIN — ACETAMINOPHEN 650 MG: 325 TABLET ORAL at 03:12

## 2023-12-30 RX ADMIN — METOPROLOL SUCCINATE 50 MG: 50 TABLET, EXTENDED RELEASE ORAL at 08:12

## 2023-12-30 RX ADMIN — APIXABAN 5 MG: 5 TABLET, FILM COATED ORAL at 08:12

## 2023-12-30 RX ADMIN — Medication 6 MG: at 12:12

## 2023-12-30 RX ADMIN — SULFASALAZINE 1000 MG: 500 TABLET ORAL at 03:12

## 2023-12-30 RX ADMIN — Medication 6 MG: at 08:12

## 2023-12-30 RX ADMIN — SULFASALAZINE 1000 MG: 500 TABLET ORAL at 09:12

## 2023-12-30 RX ADMIN — Medication 11 MG/HR: at 05:12

## 2023-12-30 RX ADMIN — METOPROLOL SUCCINATE 50 MG: 50 TABLET, EXTENDED RELEASE ORAL at 02:12

## 2023-12-30 NOTE — PLAN OF CARE
"Plan of care discussed with patient & voiced understanding. Patient resting comfortably in bed. No reports of pain. Had shortness of breath and palpitations upon arrival to CCU. AAO X 4. Saline lock maintained for IVBP antibiotics & continuous diltiazem drip.Was able to titrate from 15 mg/hr down to 11 mg/hr. Remains in atrial fibrillation. Cardiac diet - feeds self with no difficulty. Purewick placed to avoid having to get out of bed since patient became short of breath with activity. Also states has a "weak bladder". LBM 12/29/2023. No acute distress noted. Fall/Safety maintained, bed low, locked, side rails up x 2, call bell within reach. All needs met at this time. Pt instructed to call with any new needs. Pt with slip resistant socks on; remains free of fall or injury.  "

## 2023-12-30 NOTE — EICU
Intervention Initiated From:  [facility] / [facility]    Lindsey intervened regarding:  Rounding (Video assessment)    Comments: Video assessment completed.  Pt lying in bed.  Noted to be AAO.  Responds appropriately.  States she is still having some chest pain.  Noted to be in a-fib with rate 110-120.  Cardizem infusion in progress at 11 mg/hr.  NIBP 125/83.

## 2023-12-30 NOTE — PROVIDER PROGRESS NOTES - EMERGENCY DEPT.
Encounter Date: 12/29/2023    ED Physician Progress Notes        Physician Note:   I have evaluated this EKG (Date: 12/29/23 Time: 1034 ) and found the following:    Rate: 125  Rhythm:  Accelerated junctional rhythm.  Axis: LAD  Signs of ischemia: None    Conclusion: Accelerated junctional rhythm.      Spoke with Dr. Hinkle about patient's elevated heart rate, patient is still on diltiazem drip.  She will take patient to ICU, and manage overnight.

## 2023-12-30 NOTE — H&P
[facility] Medicine  History & Physical    Patient Name: Celine Sinclair  MRN: [id number]  Patient Class: IP- Inpatient  Admission Date: 12/29/2023  Attending Physician: Neeru Hinkle MD   Primary Care Provider: Neeru Hinkle MD         Patient information was obtained from patient and ER records.     Subjective:     Principal Problem:Atrial flutter with rapid ventricular response    Chief Complaint:   Chief Complaint   Patient presents with    Chest Pain     Patient to ER CC of CP and SOB for about a week, patient tachicardic while triaging         HPI: Patient admitted to ICU with afib/flutter with RVR. She initially presented to PCP for routine 6month check up. In office was noticed to have tachycardia in 130s. She reported having chest pain, feeling SOB and lightheaded. Clinic EKG not best quality but a flutter RVR suspected and sent to ED. ED work up confirmed a flutter and cards consulted in ED. Did not respond to IV metoprolol, amiodarone bolus and ultimately started on diltiazem gtt. Her cardiologist is with [facility] at [place] and transfer initiated as she was not responding to treatment but [place] was unable to take her last night due to lack of beds. She briefly converted to NSR per report but then returned to afib/flutter with RVR. She has been on dilt gtt throughout the night and HR better controlled to low 100s. She reports chest pains and SOB improved.    Resuming PO meds. Plan to increase PO amiodarone to 200mg daily and increase PO metoprolol to 50mg BID and wean dilt gtt today. If unable to control may still require transfer for cardiology as we do not have cardiology coverage this weekend. For now, focusing on rate control. Eliquis resumed.     UA did show + nitrite, 10 WBC. However, she is asymptomatic. No antibx started at this time.     BNP slightly elevated. CXR clear. No oxygen requirement. Resumed home PO Lasix and aldactone. H/o CHF but no signs of true volume  overload on exam today.     Past Medical History:   Diagnosis Date    Anticoagulant long-term use     Aortic atherosclerosis     Atrial fibrillation     Benign essential hypertension     Cataract     Senile    Chest pain     CHF (congestive heart failure)     Crohn's colitis     Depression, major, recurrent, moderate     Encounter for blood transfusion     Fibromyalgia     GERD (gastroesophageal reflux disease)     Hypertensive cardiomyopathy     IBS (irritable bowel syndrome)     Migraine     Opioid abuse, in remission     Orbital cellulitis on left     Osteopenia     Paget disease of bone     Palpitations     Port-A-Cath in place     right chest    Prolonged QT interval     RA (rheumatoid arthritis)     Sedative hypnotic or anxiolytic dependence     in remission     SOB (shortness of breath)     SVT (supraventricular tachycardia)     Unspecified urinary incontinence        Past Surgical History:   Procedure Laterality Date    ABLATION N/A 06/28/2023    Procedure: Ablation;  Surgeon: Johnny Nieves MD;  Location: [facility] CATH;  Service: Cardiology;  Laterality: N/A;  ops # 7    ABLATION, ATRIAL FLUTTER, TYPICAL N/A 06/28/2023    Procedure: Ablation, Atrial Flutter, Typical;  Surgeon: Johnny Nieves MD;  Location: [facility] CATH;  Service: Cardiology;  Laterality: N/A;    CATARACT EXTRACTION W/  INTRAOCULAR LENS IMPLANT Left 02/21/2017    Dr. Christianson    CATARACT EXTRACTION W/  INTRAOCULAR LENS IMPLANT Right 03/07/2017    Dr. Christianson    CHOLECYSTECTOMY      COLON SURGERY      COLONOSCOPY N/A 03/16/2016    Procedure: COLONOSCOPY;  Surgeon: Dale Trejo MD;  Location: [facility] ENDO (4TH FLR);  Service: Endoscopy;  Laterality: N/A;    COLONOSCOPY N/A 10/12/2020    Procedure: COLONOSCOPY;  Surgeon: Cali Urena MD;  Location: [facility] ENDO (4TH FLR);  Service: Endoscopy;  Laterality: N/A;  covid test 10/9-[facility] [facility]- completed 10/9/20  ok to hold Coumadin x 5 days per coumadin clinic-see telephone encounterd  ated 10/6-MS / Loop recorder  10/6/20- Pt confirmed- ERW@1122  10/9-Pt confirmed earlier arrival time, prep ins. reviewed and emailed to Pt    COLONOSCOPY N/A 08/25/2022    Procedure: COLONOSCOPY;  Surgeon: Lewis Luke MD;  Location: [facility];  Service: Endoscopy;  Laterality: N/A;    Colostomy reversal      EYE SURGERY      HEMORRHOID SURGERY      HYSTERECTOMY      ILEOSTOMY      ILEOSTOMY CLOSURE      LEFT HEART CATHETERIZATION N/A 02/15/2019    Procedure: HEART CATH-LEFT;  Surgeon: Miky Keita MD;  Location: [facility] CATH LAB;  Service: Cardiology;  Laterality: N/A;    NECK SURGERY      OOPHORECTOMY Bilateral     SBO      SMALL INTESTINE SURGERY      TONSILLECTOMY      TUBAL LIGATION         Review of patient's allergies indicates:   Allergen Reactions    Octreotide acetate Nausea And Vomiting     Had symptoms when she took Sandostatin with Codeine    Codeine Itching and Nausea And Vomiting     Pill form only, IV ok.,  Had symptoms when she took Codeine with Sandostatin.  Other reaction(s): Itching    Morphine Nausea And Vomiting     Patient reports reaction only when she takes po form of Morphine.    Iodine Nausea And Vomiting     States only when eats too much seafood    Opioids - morphine analogues Nausea And Vomiting    Simvastatin Nausea And Vomiting       No current facility-administered medications on file prior to encounter.     Current Outpatient Medications on File Prior to Encounter   Medication Sig    amiodarone (PACERONE) 200 MG Tab Take 0.5 tablets (100 mg total) by mouth once daily.    amLODIPine (NORVASC) 5 MG tablet Take 5 mg by mouth once daily.    apixaban (ELIQUIS) 5 mg Tab Take 1 tablet (5 mg total) by mouth 2 (two) times daily.    atorvastatin (LIPITOR) 40 MG tablet Take 40 mg by mouth every evening.    balsalazide (COLAZAL) 750 mg capsule Take 3 capsules (2,250 mg total) by mouth 3 (three) times daily.    DULoxetine (CYMBALTA) 60 MG capsule Take 1 capsule (60 mg total) by mouth once  daily.    FARXIGA 10 mg tablet Take 10 mg by mouth once daily.    gabapentin (NEURONTIN) 300 MG capsule Take 1 capsule (300 mg total) by mouth 3 (three) times daily.    losartan (COZAAR) 25 MG tablet Take 25 mg by mouth once daily.    metoprolol succinate (TOPROL-XL) 25 MG 24 hr tablet Take 25 mg by mouth 2 (two) times a day.    potassium chloride SA (K-DUR,KLOR-CON) 20 MEQ tablet Take 2 tablets (40 mEq total) by mouth once daily.    spironolactone (ALDACTONE) 25 MG tablet Take 12.5 mg by mouth once daily.    albuterol-ipratropium (DUO-NEB) 2.5 mg-0.5 mg/3 mL nebulizer solution Take 3 mLs by nebulization every 6 (six) hours as needed for Wheezing. Rescue (Patient not taking: Reported on 12/29/2023)    COMIRNATY 2023-24, 12Y UP,,PF, 30 mcg/0.3 mL inection     cyanocobalamin 1,000 mcg/mL injection Inject 1 mL (1,000 mcg total) into the muscle once a week.    FLUAD QUAD 2023-24,65Y UP,,PF, 60 mcg (15 mcg x 4)/0.5 mL Syrg     furosemide (LASIX) 20 MG tablet Take 20 mg by mouth Daily.    INNOSPIRE ESSENCE Manisha use as directed    levalbuterol (XOPENEX HFA) 45 mcg/actuation inhaler Inhale 1-2 puffs into the lungs every 4 (four) hours as needed for Wheezing. Rescue (Patient not taking: Reported on 12/29/2023)    memantine (NAMENDA) 10 MG Tab Take 10 mg by mouth 2 (two) times daily.    nitroGLYCERIN (NITROSTAT) 0.4 MG SL tablet Place 0.4 mg under the tongue every 5 (five) minutes as needed.    pantoprazole (PROTONIX) 40 MG tablet Increase to 40mg twice daily x 2 wks then resume daily thereafter (Patient taking differently: Take 40 mg by mouth once daily.)    [DISCONTINUED] nortriptyline (PAMELOR) 10 MG capsule TAKE 1 CAPSULE (10 MG TOTAL) BY MOUTH EVERY EVENING.    [DISCONTINUED] traZODone (DESYREL) 50 MG tablet Take 1 tablet (50 mg total) by mouth nightly as needed.     Family History       Problem Relation (Age of Onset)    Breast cancer Daughter (40), Daughter (47), Sister    Cancer Mother    Colon cancer Maternal  Grandmother    Emphysema Father (67)    Glaucoma Paternal Grandmother    Heart attack Maternal Grandmother, Sister    Heart disease Father    Lung cancer Sister    Other Daughter    Retinal detachment Grandchild          Tobacco Use    Smoking status: Never    Smokeless tobacco: Never   Substance and Sexual Activity    Alcohol use: Yes     Comment: wine coolers occassionally    Drug use: No    Sexual activity: Not Currently     Partners: Female     Review of Systems   Constitutional:  Negative for activity change, fatigue, fever and unexpected weight change.   HENT:  Negative for congestion, ear pain, hearing loss, rhinorrhea and sore throat.    Eyes:  Negative for redness and visual disturbance.   Respiratory:  Positive for shortness of breath (improved since admit). Negative for cough and wheezing.    Cardiovascular:  Positive for chest pain (improved since admit). Negative for palpitations and leg swelling.   Gastrointestinal:  Positive for diarrhea (chronic, Crohn's disease). Negative for abdominal pain, constipation, nausea and vomiting.   Genitourinary:  Negative for dysuria, frequency and urgency.   Musculoskeletal:  Positive for arthralgias (chronic, fibromyalgia) and myalgias. Negative for back pain, joint swelling and neck pain.   Skin:  Negative for color change, rash and wound.   Neurological:  Negative for dizziness, tremors, light-headedness and headaches.     Objective:     Vital Signs (Most Recent):  Temp: 97.7 °F (36.5 °C) (12/30/23 0703)  Pulse: 100 (12/30/23 0703)  Resp: 18 (12/30/23 0703)  BP: 114/71 (12/30/23 0703)  SpO2: 98 % (12/30/23 0703) Vital Signs (24h Range):  Temp:  [97.7 °F (36.5 °C)-98.7 °F (37.1 °C)] 97.7 °F (36.5 °C)  Pulse:  [] 100  Resp:  [13-28] 18  SpO2:  [92 %-100 %] 98 %  BP: ()/() 114/71     Weight: 81.4 kg (179 lb 7.3 oz)  Body mass index is 29.86 kg/m².     Physical Exam  Vitals and nursing note reviewed.   Constitutional:       General: She is not in  "acute distress.     Appearance: She is well-developed.   HENT:      Head: Normocephalic and atraumatic.      Right Ear: External ear normal.      Left Ear: External ear normal.      Nose: Nose normal.   Eyes:      General:         Right eye: No discharge.         Left eye: No discharge.      Conjunctiva/sclera: Conjunctivae normal.   Neck:      Thyroid: No thyromegaly.   Cardiovascular:      Rate and Rhythm: Regular rhythm. Tachycardia present.      Heart sounds: No murmur heard.  Pulmonary:      Effort: Pulmonary effort is normal. No respiratory distress.      Breath sounds: No rales.   Abdominal:      General: There is no distension.      Palpations: Abdomen is soft.      Tenderness: There is no abdominal tenderness.   Skin:     General: Skin is warm and dry.   Neurological:      Mental Status: She is alert and oriented to person, place, and time.   Psychiatric:         Behavior: Behavior normal.         Thought Content: Thought content normal.                Significant Labs: All pertinent labs within the past 24 hours have been reviewed.  Blood Culture: No results for input(s): "LABBLOO" in the last 48 hours.  CBC:   Recent Labs   Lab 12/29/23  1205 12/30/23  0621   WBC 12.41 10.55   HGB 13.4 12.8   HCT 41.5 40.5    217     CMP:   Recent Labs   Lab 12/29/23  1205 12/30/23  0621   * 142   K 4.3 3.5   * 111*   CO2 24 22*   GLU 94 112*   BUN 13 13   CREATININE 0.8 0.7   CALCIUM 9.2 8.6*   PROT 7.2  --    ALBUMIN 3.9  --    BILITOT 1.0  --    ALKPHOS 89  --    AST 16  --    ALT 9*  --    ANIONGAP 11 9     Cardiac Markers:   Recent Labs   Lab 12/29/23  1205   *     Magnesium:   Recent Labs   Lab 12/29/23  1205 12/30/23  0621   MG 1.7 1.9     Troponin:   Recent Labs   Lab 12/29/23  1206 12/29/23  1456   TROPONINI <0.006 0.006     TSH:   Recent Labs   Lab 12/29/23  1206   TSH 1.367     Urine Culture: No results for input(s): "LABURIN" in the last 48 hours.  Urine Studies:   Recent Labs   Lab " "12/29/23  1432   COLORU Yellow   APPEARANCEUA Cloudy*   PHUR 7.0   SPECGRAV 1.020   PROTEINUA Negative   GLUCUA 3+*   KETONESU Negative   BILIRUBINUA Negative   OCCULTUA 1+*   NITRITE Positive*   UROBILINOGEN Negative   LEUKOCYTESUR Negative   RBCUA 5*   WBCUA 10*   BACTERIA Many*   SQUAMEPITHEL 1   HYALINECASTS 0       Significant Imaging: I have reviewed all pertinent imaging results/findings within the past 24 hours.    CXR 12/29/23: "FINDINGS:   Postoperative changes cervical region.  Loop recorder.The lungs are clear with normal appearance of pulmonary vasculature. No pleural effusion. No evident pneumothorax.     The cardiac silhouette is normal in size. The hilar and mediastinal contours are unremarkable.     Bones are intact. "  Assessment/Plan:     * Atrial flutter with rapid ventricular response  A flutter with RVR on admit  ER treatments including IV metoprolol, amio bolus did not result in conversion or rate control.  Started on dilt gtt  Briefly conversed to NSR but now remains in afib/flutter  Rate controlled to low 100s  Increase amio to 200mg PO daily (100mg outpatient)  Increase metoprolol to 50mg PO BID (25mg BID outpatient)  Wean dilt gtt as tolerated  Cont home eliquis      Dementia with anxiety, unspecified dementia severity, unspecified dementia type  Follows with outside neurology  Resume home namenda  At baseline      Depression, major, recurrent, moderate  Patient has persistent depression which is mild and is currently controlled. Will Continue anti-depressant medications. We will not consult psychiatry at this time. Patient does not display psychosis at this time. Continue to monitor closely and adjust plan of care as needed.    Cont home cymbalta      Crohn's disease  Sulfasalazine while inpatient  Resume home meds at discharge  GI follow up at discharge as still reporting chronic diarrhea sx; will monitor while inpatient      Cardiomyopathy, nonischemic  No signs of acute exacerbation at " this time  CXR clear, no hypoxia, no LE edema  Resume home lasix and aldactone for now  Holding losartan while on dilt gtt; will wean gtt and resume home PO meds as BP tolerates  Strict I/O  Low Na diet  Heart rate control      Fibromyalgia  Cont home cymbalta and gabapentin        VTE Risk Mitigation (From admission, onward)           Ordered     apixaban tablet 5 mg  2 times daily         12/30/23 0819     IP VTE HIGH RISK PATIENT  Once         12/29/23 2254     Place sequential compression device  Until discontinued         12/29/23 2254                  Critical care time spent on the evaluation and treatment of severe organ dysfunction, review of pertinent labs and imaging studies, discussions with consulting providers and discussions with patient/family: 35 minutes.                  Neeru Hinkle MD  Department of Hospital Medicine  [facility] - Intensive Care

## 2023-12-30 NOTE — ASSESSMENT & PLAN NOTE
No signs of acute exacerbation at this time  CXR clear, no hypoxia, no LE edema  Resume home lasix and aldactone for now  Holding losartan while on dilt gtt; will wean gtt and resume home PO meds as BP tolerates  Strict I/O  Low Na diet  Heart rate control

## 2023-12-30 NOTE — EICU
73 year old female admitted with atrial flutter with RVR on Cardizem infusion at 15 mg/hour.  Past h/o atrial fibrillation on apixiban,HTN,CHF,crohn's colitis,GERD,fibromyalgia,SVT    Camera assessment revealed patient to be alert and awake   ,/79,CJX131%,RR20    Data  Sodium 146  Chloride 111  Magnesium 1.7    UA cloudy,nitrite positive,occult blood positive,glucose 3+  Chest xray  no acute changes  EKG Ectopic atrial tachycardia 136 per minute,LVH,  ECHO 6/2023 limited study with no pericardial effusion  8/2019 ascending aorta is very mildly dialated,moderate LA enlargement,LVH,EF 50%    Assessment and plan:  1.Atrial flutter with RVR-on metoprolol and amiodarone at home-on Cardizem infusion,apixiban,correct electrolytes as needed,cardiology recommendations  2.HTN-maintain strict control  3.CHF-on lasix,aldactone,losartan,metoprolol at home,cardiology recommendations  4.Crohn's disease-on balsalazide.  5.?UTI-await cultures,treat only if symptoms of urosepsis.    DVT prophylaxis-on apixiban.

## 2023-12-30 NOTE — ASSESSMENT & PLAN NOTE
Sulfasalazine while inpatient  Resume home meds at discharge  GI follow up at discharge as still reporting chronic diarrhea sx; will monitor while inpatient

## 2023-12-30 NOTE — SUBJECTIVE & OBJECTIVE
Past Medical History:   Diagnosis Date    Anticoagulant long-term use     Aortic atherosclerosis     Atrial fibrillation     Benign essential hypertension     Cataract     Senile    Chest pain     CHF (congestive heart failure)     Crohn's colitis     Depression, major, recurrent, moderate     Encounter for blood transfusion     Fibromyalgia     GERD (gastroesophageal reflux disease)     Hypertensive cardiomyopathy     IBS (irritable bowel syndrome)     Migraine     Opioid abuse, in remission     Orbital cellulitis on left     Osteopenia     Paget disease of bone     Palpitations     Port-A-Cath in place     right chest    Prolonged QT interval     RA (rheumatoid arthritis)     Sedative hypnotic or anxiolytic dependence     in remission     SOB (shortness of breath)     SVT (supraventricular tachycardia)     Unspecified urinary incontinence        Past Surgical History:   Procedure Laterality Date    ABLATION N/A 06/28/2023    Procedure: Ablation;  Surgeon: Johnny Nieves MD;  Location: [facility] CATH;  Service: Cardiology;  Laterality: N/A;  ops # 7    ABLATION, ATRIAL FLUTTER, TYPICAL N/A 06/28/2023    Procedure: Ablation, Atrial Flutter, Typical;  Surgeon: Johnny Nieves MD;  Location: [facility] CATH;  Service: Cardiology;  Laterality: N/A;    CATARACT EXTRACTION W/  INTRAOCULAR LENS IMPLANT Left 02/21/2017    Dr. Christianson    CATARACT EXTRACTION W/  INTRAOCULAR LENS IMPLANT Right 03/07/2017    Dr. Christianson    CHOLECYSTECTOMY      COLON SURGERY      COLONOSCOPY N/A 03/16/2016    Procedure: COLONOSCOPY;  Surgeon: Dale Trejo MD;  [facility]  Service: Endoscopy;  Laterality: N/A;    COLONOSCOPY N/A 10/12/2020    Procedure: COLONOSCOPY;  Surgeon: Cali Urena MD;  Location: [facility] ENDO (4TH FLR);  Service: Endoscopy;  Laterality: N/A;  covid test 10/9-[facility] [facility]- completed 10/9/20  ok to hold Coumadin x 5 days per coumadin clinic-see telephone encounterd ated 10/6-MS / Loop  recorder  10/6/20- Pt confirmed- ERW@1122  10/9-Pt confirmed earlier arrival time, prep ins. reviewed and emailed to Pt    COLONOSCOPY N/A 08/25/2022    Procedure: COLONOSCOPY;  Surgeon: Lewis Luke MD;  Location: [facility];  Service: Endoscopy;  Laterality: N/A;    Colostomy reversal      EYE SURGERY      HEMORRHOID SURGERY      HYSTERECTOMY      ILEOSTOMY      ILEOSTOMY CLOSURE      LEFT HEART CATHETERIZATION N/A 02/15/2019    Procedure: HEART CATH-LEFT;  Surgeon: Miky Keita MD;  Location: [facility] CATH LAB;  Service: Cardiology;  Laterality: N/A;    NECK SURGERY      OOPHORECTOMY Bilateral     SBO      SMALL INTESTINE SURGERY      TONSILLECTOMY      TUBAL LIGATION         Review of patient's allergies indicates:   Allergen Reactions    Octreotide acetate Nausea And Vomiting     Had symptoms when she took Sandostatin with Codeine    Codeine Itching and Nausea And Vomiting     Pill form only, IV ok.,  Had symptoms when she took Codeine with Sandostatin.  Other reaction(s): Itching    Morphine Nausea And Vomiting     Patient reports reaction only when she takes po form of Morphine.    Iodine Nausea And Vomiting     States only when eats too much seafood    Opioids - morphine analogues Nausea And Vomiting    Simvastatin Nausea And Vomiting       No current facility-administered medications on file prior to encounter.     Current Outpatient Medications on File Prior to Encounter   Medication Sig    amiodarone (PACERONE) 200 MG Tab Take 0.5 tablets (100 mg total) by mouth once daily.    amLODIPine (NORVASC) 5 MG tablet Take 5 mg by mouth once daily.    apixaban (ELIQUIS) 5 mg Tab Take 1 tablet (5 mg total) by mouth 2 (two) times daily.    atorvastatin (LIPITOR) 40 MG tablet Take 40 mg by mouth every evening.    balsalazide (COLAZAL) 750 mg capsule Take 3 capsules (2,250 mg total) by mouth 3 (three) times daily.    DULoxetine (CYMBALTA) 60 MG capsule Take 1 capsule (60 mg total) by mouth once daily.    FARXIGA 10 mg  tablet Take 10 mg by mouth once daily.    gabapentin (NEURONTIN) 300 MG capsule Take 1 capsule (300 mg total) by mouth 3 (three) times daily.    losartan (COZAAR) 25 MG tablet Take 25 mg by mouth once daily.    metoprolol succinate (TOPROL-XL) 25 MG 24 hr tablet Take 25 mg by mouth 2 (two) times a day.    potassium chloride SA (K-DUR,KLOR-CON) 20 MEQ tablet Take 2 tablets (40 mEq total) by mouth once daily.    spironolactone (ALDACTONE) 25 MG tablet Take 12.5 mg by mouth once daily.    albuterol-ipratropium (DUO-NEB) 2.5 mg-0.5 mg/3 mL nebulizer solution Take 3 mLs by nebulization every 6 (six) hours as needed for Wheezing. Rescue (Patient not taking: Reported on 12/29/2023)    COMIRNATY 2023-24, 12Y UP,,PF, 30 mcg/0.3 mL inection     cyanocobalamin 1,000 mcg/mL injection Inject 1 mL (1,000 mcg total) into the muscle once a week.    FLUAD QUAD 2023-24,65Y UP,,PF, 60 mcg (15 mcg x 4)/0.5 mL Syrg     furosemide (LASIX) 20 MG tablet Take 20 mg by mouth Daily.    INNOSPIRE ESSENCE Manisha use as directed    levalbuterol (XOPENEX HFA) 45 mcg/actuation inhaler Inhale 1-2 puffs into the lungs every 4 (four) hours as needed for Wheezing. Rescue (Patient not taking: Reported on 12/29/2023)    memantine (NAMENDA) 10 MG Tab Take 10 mg by mouth 2 (two) times daily.    nitroGLYCERIN (NITROSTAT) 0.4 MG SL tablet Place 0.4 mg under the tongue every 5 (five) minutes as needed.    pantoprazole (PROTONIX) 40 MG tablet Increase to 40mg twice daily x 2 wks then resume daily thereafter (Patient taking differently: Take 40 mg by mouth once daily.)    [DISCONTINUED] nortriptyline (PAMELOR) 10 MG capsule TAKE 1 CAPSULE (10 MG TOTAL) BY MOUTH EVERY EVENING.    [DISCONTINUED] traZODone (DESYREL) 50 MG tablet Take 1 tablet (50 mg total) by mouth nightly as needed.     Family History       Problem Relation (Age of Onset)    Breast cancer Daughter (40), Daughter (47), Sister    Cancer Mother    Colon cancer Maternal Grandmother    Emphysema Father  (67)    Glaucoma Paternal Grandmother    Heart attack Maternal Grandmother, Sister    Heart disease Father    Lung cancer Sister    Other Daughter    Retinal detachment Grandchild          Tobacco Use    Smoking status: Never    Smokeless tobacco: Never   Substance and Sexual Activity    Alcohol use: Yes     Comment: wine coolers occassionally    Drug use: No    Sexual activity: Not Currently     Partners: Female     Review of Systems   Constitutional:  Negative for activity change, fatigue, fever and unexpected weight change.   HENT:  Negative for congestion, ear pain, hearing loss, rhinorrhea and sore throat.    Eyes:  Negative for redness and visual disturbance.   Respiratory:  Positive for shortness of breath (improved since admit). Negative for cough and wheezing.    Cardiovascular:  Positive for chest pain (improved since admit). Negative for palpitations and leg swelling.   Gastrointestinal:  Positive for diarrhea (chronic, Crohn's disease). Negative for abdominal pain, constipation, nausea and vomiting.   Genitourinary:  Negative for dysuria, frequency and urgency.   Musculoskeletal:  Positive for arthralgias (chronic, fibromyalgia) and myalgias. Negative for back pain, joint swelling and neck pain.   Skin:  Negative for color change, rash and wound.   Neurological:  Negative for dizziness, tremors, light-headedness and headaches.     Objective:     Vital Signs (Most Recent):  Temp: 97.7 °F (36.5 °C) (12/30/23 0703)  Pulse: 100 (12/30/23 0703)  Resp: 18 (12/30/23 0703)  BP: 114/71 (12/30/23 0703)  SpO2: 98 % (12/30/23 0703) Vital Signs (24h Range):  Temp:  [97.7 °F (36.5 °C)-98.7 °F (37.1 °C)] 97.7 °F (36.5 °C)  Pulse:  [] 100  Resp:  [13-28] 18  SpO2:  [92 %-100 %] 98 %  BP: ()/() 114/71     Weight: 81.4 kg (179 lb 7.3 oz)  Body mass index is 29.86 kg/m².     Physical Exam  Vitals and nursing note reviewed.   Constitutional:       General: She is not in acute distress.     Appearance: She  "is well-developed.   HENT:      Head: Normocephalic and atraumatic.      Right Ear: External ear normal.      Left Ear: External ear normal.      Nose: Nose normal.   Eyes:      General:         Right eye: No discharge.         Left eye: No discharge.      Conjunctiva/sclera: Conjunctivae normal.   Neck:      Thyroid: No thyromegaly.   Cardiovascular:      Rate and Rhythm: Regular rhythm. Tachycardia present.      Heart sounds: No murmur heard.  Pulmonary:      Effort: Pulmonary effort is normal. No respiratory distress.      Breath sounds: No rales.   Abdominal:      General: There is no distension.      Palpations: Abdomen is soft.      Tenderness: There is no abdominal tenderness.   Skin:     General: Skin is warm and dry.   Neurological:      Mental Status: She is alert and oriented to person, place, and time.   Psychiatric:         Behavior: Behavior normal.         Thought Content: Thought content normal.                Significant Labs: All pertinent labs within the past 24 hours have been reviewed.  Blood Culture: No results for input(s): "LABBLOO" in the last 48 hours.  CBC:   Recent Labs   Lab 12/29/23  1205 12/30/23  0621   WBC 12.41 10.55   HGB 13.4 12.8   HCT 41.5 40.5    217     CMP:   Recent Labs   Lab 12/29/23  1205 12/30/23  0621   * 142   K 4.3 3.5   * 111*   CO2 24 22*   GLU 94 112*   BUN 13 13   CREATININE 0.8 0.7   CALCIUM 9.2 8.6*   PROT 7.2  --    ALBUMIN 3.9  --    BILITOT 1.0  --    ALKPHOS 89  --    AST 16  --    ALT 9*  --    ANIONGAP 11 9     Cardiac Markers:   Recent Labs   Lab 12/29/23  1205   *     Magnesium:   Recent Labs   Lab 12/29/23  1205 12/30/23  0621   MG 1.7 1.9     Troponin:   Recent Labs   Lab 12/29/23  1206 12/29/23  1456   TROPONINI <0.006 0.006     TSH:   Recent Labs   Lab 12/29/23  1206   TSH 1.367     Urine Culture: No results for input(s): "LABURIN" in the last 48 hours.  Urine Studies:   Recent Labs   Lab 12/29/23  1432   COLORU Yellow " "  APPEARANCEUA Cloudy*   PHUR 7.0   SPECGRAV 1.020   PROTEINUA Negative   GLUCUA 3+*   KETONESU Negative   BILIRUBINUA Negative   OCCULTUA 1+*   NITRITE Positive*   UROBILINOGEN Negative   LEUKOCYTESUR Negative   RBCUA 5*   WBCUA 10*   BACTERIA Many*   SQUAMEPITHEL 1   HYALINECASTS 0       Significant Imaging: I have reviewed all pertinent imaging results/findings within the past 24 hours.    CXR 12/29/23: "FINDINGS:   Postoperative changes cervical region.  Loop recorder.The lungs are clear with normal appearance of pulmonary vasculature. No pleural effusion. No evident pneumothorax.     The cardiac silhouette is normal in size. The hilar and mediastinal contours are unremarkable.     Bones are intact. "  " Attending Attestation (For Attendings USE Only)...

## 2023-12-30 NOTE — HPI
Patient admitted to ICU with afib/flutter with RVR. She initially presented to PCP for routine 6month check up. In office was noticed to have tachycardia in 130s. She reported having chest pain, feeling SOB and lightheaded. Clinic EKG not best quality but a flutter RVR suspected and sent to ED. ED work up confirmed a flutter and cards consulted in ED. Did not respond to IV metoprolol, amiodarone bolus and ultimately started on diltiazem gtt. Her cardiologist is with [facility] at [place] and transfer initiated as she was not responding to treatment but [place] [facility] was unable to take her last night due to lack of beds. She briefly converted to NSR per report but then returned to afib/flutter with RVR. She has been on dilt gtt throughout the night and HR better controlled to low 100s. She reports chest pains and SOB improved.    Resuming PO meds. Plan to increase PO amiodarone to 200mg daily and increase PO metoprolol to 50mg BID and wean dilt gtt today. If unable to control may still require transfer for cardiology as we do not have cardiology coverage this weekend. For now, focusing on rate control. Eliquis resumed.     UA did show + nitrite, 10 WBC. However, she is asymptomatic. No antibx started at this time.     BNP slightly elevated. CXR clear. No oxygen requirement. Resumed home PO Lasix and aldactone. H/o CHF but no signs of true volume overload on exam today.

## 2023-12-30 NOTE — PLAN OF CARE
Problem: Fall Injury Risk  Goal: Absence of Fall and Fall-Related Injury  Outcome: Ongoing, Progressing     Problem: Adult Inpatient Plan of Care  Goal: Plan of Care Review  Outcome: Ongoing, Progressing  Goal: Patient-Specific Goal (Individualized)  Outcome: Ongoing, Progressing  Goal: Absence of Hospital-Acquired Illness or Injury  Outcome: Ongoing, Progressing  Goal: Optimal Comfort and Wellbeing  Outcome: Ongoing, Progressing  Goal: Readiness for Transition of Care  Outcome: Ongoing, Progressing     Problem: Dysrhythmia  Goal: Normalized Cardiac Rhythm  Outcome: Ongoing, Progressing

## 2023-12-30 NOTE — ASSESSMENT & PLAN NOTE
A flutter with RVR on admit  ER treatments including IV metoprolol, amio bolus did not result in conversion or rate control.  Started on dilt gtt  Briefly conversed to NSR but now remains in afib/flutter  Rate controlled to low 100s  Increase amio to 200mg PO daily (100mg outpatient)  Increase metoprolol to 50mg PO BID (25mg BID outpatient)  Wean dilt gtt as tolerated  Cont home eliquis

## 2023-12-30 NOTE — ASSESSMENT & PLAN NOTE
Patient has persistent depression which is mild and is currently controlled. Will Continue anti-depressant medications. We will not consult psychiatry at this time. Patient does not display psychosis at this time. Continue to monitor closely and adjust plan of care as needed.    Cont home cymbalta

## 2023-12-30 NOTE — ED NOTES
Pt transferred to floor via wheelchair with infusion ongoing. NADN, vitals stable besides being tachycardic. Pt accompanied by RN and tech. Pt belongings brought in the room with her. Report given to PRUDENCIO Brown.

## 2023-12-31 PROBLEM — R82.71 ASYMPTOMATIC BACTERIURIA: Status: ACTIVE | Noted: 2023-12-31

## 2023-12-31 LAB
ANION GAP SERPL CALC-SCNC: 11 MMOL/L (ref 8–16)
BACTERIA UR CULT: ABNORMAL
BASOPHILS # BLD AUTO: 0.06 K/UL (ref 0–0.2)
BASOPHILS NFR BLD: 0.6 % (ref 0–1.9)
BUN SERPL-MCNC: 16 MG/DL (ref 8–23)
CALCIUM SERPL-MCNC: 9.5 MG/DL (ref 8.7–10.5)
CHLORIDE SERPL-SCNC: 107 MMOL/L (ref 95–110)
CO2 SERPL-SCNC: 22 MMOL/L (ref 23–29)
CREAT SERPL-MCNC: 0.7 MG/DL (ref 0.5–1.4)
DIFFERENTIAL METHOD BLD: NORMAL
EOSINOPHIL # BLD AUTO: 0.2 K/UL (ref 0–0.5)
EOSINOPHIL NFR BLD: 1.7 % (ref 0–8)
ERYTHROCYTE [DISTWIDTH] IN BLOOD BY AUTOMATED COUNT: 13.5 % (ref 11.5–14.5)
EST. GFR  (NO RACE VARIABLE): >60 ML/MIN/1.73 M^2
GLUCOSE SERPL-MCNC: 110 MG/DL (ref 70–110)
HCT VFR BLD AUTO: 39.6 % (ref 37–48.5)
HGB BLD-MCNC: 12.9 G/DL (ref 12–16)
IMM GRANULOCYTES # BLD AUTO: 0.03 K/UL (ref 0–0.04)
IMM GRANULOCYTES NFR BLD AUTO: 0.3 % (ref 0–0.5)
LYMPHOCYTES # BLD AUTO: 2.5 K/UL (ref 1–4.8)
LYMPHOCYTES NFR BLD: 23.4 % (ref 18–48)
MAGNESIUM SERPL-MCNC: 1.7 MG/DL (ref 1.6–2.6)
MCH RBC QN AUTO: 27.3 PG (ref 27–31)
MCHC RBC AUTO-ENTMCNC: 32.6 G/DL (ref 32–36)
MCV RBC AUTO: 84 FL (ref 82–98)
MONOCYTES # BLD AUTO: 0.7 K/UL (ref 0.3–1)
MONOCYTES NFR BLD: 6.4 % (ref 4–15)
NEUTROPHILS # BLD AUTO: 7.1 K/UL (ref 1.8–7.7)
NEUTROPHILS NFR BLD: 67.6 % (ref 38–73)
NRBC BLD-RTO: 0 /100 WBC
PLATELET # BLD AUTO: 247 K/UL (ref 150–450)
PMV BLD AUTO: 10.3 FL (ref 9.2–12.9)
POTASSIUM SERPL-SCNC: 3.9 MMOL/L (ref 3.5–5.1)
RBC # BLD AUTO: 4.73 M/UL (ref 4–5.4)
SODIUM SERPL-SCNC: 140 MMOL/L (ref 136–145)
WBC # BLD AUTO: 10.46 K/UL (ref 3.9–12.7)

## 2023-12-31 PROCEDURE — 25000003 PHARM REV CODE 250: Mod: HCNC | Performed by: INTERNAL MEDICINE

## 2023-12-31 PROCEDURE — 20000000 HC ICU ROOM: Mod: HCNC

## 2023-12-31 PROCEDURE — 83735 ASSAY OF MAGNESIUM: CPT | Mod: HCNC | Performed by: INTERNAL MEDICINE

## 2023-12-31 PROCEDURE — 63600175 PHARM REV CODE 636 W HCPCS: Mod: HCNC | Performed by: INTERNAL MEDICINE

## 2023-12-31 PROCEDURE — 36415 COLL VENOUS BLD VENIPUNCTURE: CPT | Mod: HCNC | Performed by: INTERNAL MEDICINE

## 2023-12-31 PROCEDURE — 85025 COMPLETE CBC W/AUTO DIFF WBC: CPT | Mod: HCNC | Performed by: INTERNAL MEDICINE

## 2023-12-31 PROCEDURE — 94761 N-INVAS EAR/PLS OXIMETRY MLT: CPT | Mod: HCNC

## 2023-12-31 PROCEDURE — 99233 SBSQ HOSP IP/OBS HIGH 50: CPT | Mod: HCNC,,, | Performed by: INTERNAL MEDICINE

## 2023-12-31 PROCEDURE — 80048 BASIC METABOLIC PNL TOTAL CA: CPT | Mod: HCNC | Performed by: INTERNAL MEDICINE

## 2023-12-31 PROCEDURE — 25000003 PHARM REV CODE 250: Mod: HCNC | Performed by: STUDENT IN AN ORGANIZED HEALTH CARE EDUCATION/TRAINING PROGRAM

## 2023-12-31 RX ORDER — POTASSIUM CHLORIDE 20 MEQ/1
40 TABLET, EXTENDED RELEASE ORAL ONCE
Status: COMPLETED | OUTPATIENT
Start: 2023-12-31 | End: 2023-12-31

## 2023-12-31 RX ORDER — AMIODARONE HYDROCHLORIDE 200 MG/1
400 TABLET ORAL 2 TIMES DAILY
Status: DISCONTINUED | OUTPATIENT
Start: 2023-12-31 | End: 2024-01-02

## 2023-12-31 RX ORDER — DIGOXIN 0.25 MG/ML
500 INJECTION INTRAMUSCULAR; INTRAVENOUS ONCE
Status: COMPLETED | OUTPATIENT
Start: 2023-12-31 | End: 2023-12-31

## 2023-12-31 RX ORDER — DIGOXIN 0.25 MG/ML
250 INJECTION INTRAMUSCULAR; INTRAVENOUS EVERY 6 HOURS
Status: COMPLETED | OUTPATIENT
Start: 2023-12-31 | End: 2023-12-31

## 2023-12-31 RX ORDER — MAGNESIUM SULFATE HEPTAHYDRATE 40 MG/ML
2 INJECTION, SOLUTION INTRAVENOUS ONCE
Status: COMPLETED | OUTPATIENT
Start: 2023-12-31 | End: 2023-12-31

## 2023-12-31 RX ORDER — DIGOXIN 125 MCG
0.12 TABLET ORAL DAILY
Status: DISCONTINUED | OUTPATIENT
Start: 2024-01-01 | End: 2024-01-02

## 2023-12-31 RX ORDER — METOPROLOL SUCCINATE 50 MG/1
100 TABLET, EXTENDED RELEASE ORAL 2 TIMES DAILY
Status: DISCONTINUED | OUTPATIENT
Start: 2023-12-31 | End: 2024-01-02

## 2023-12-31 RX ADMIN — SULFASALAZINE 1000 MG: 500 TABLET ORAL at 08:12

## 2023-12-31 RX ADMIN — DIGOXIN 250 MCG: 0.25 INJECTION INTRAMUSCULAR; INTRAVENOUS at 05:12

## 2023-12-31 RX ADMIN — POTASSIUM CHLORIDE 40 MEQ: 1500 TABLET, EXTENDED RELEASE ORAL at 08:12

## 2023-12-31 RX ADMIN — METOPROLOL SUCCINATE 50 MG: 50 TABLET, EXTENDED RELEASE ORAL at 08:12

## 2023-12-31 RX ADMIN — MUPIROCIN: 20 OINTMENT TOPICAL at 08:12

## 2023-12-31 RX ADMIN — MUPIROCIN: 20 OINTMENT TOPICAL at 09:12

## 2023-12-31 RX ADMIN — AMIODARONE HYDROCHLORIDE 200 MG: 200 TABLET ORAL at 08:12

## 2023-12-31 RX ADMIN — PANTOPRAZOLE SODIUM 40 MG: 40 TABLET, DELAYED RELEASE ORAL at 08:12

## 2023-12-31 RX ADMIN — SULFASALAZINE 1000 MG: 500 TABLET ORAL at 09:12

## 2023-12-31 RX ADMIN — ACETAMINOPHEN 650 MG: 325 TABLET ORAL at 01:12

## 2023-12-31 RX ADMIN — APIXABAN 5 MG: 5 TABLET, FILM COATED ORAL at 08:12

## 2023-12-31 RX ADMIN — DIGOXIN 250 MCG: 0.25 INJECTION INTRAMUSCULAR; INTRAVENOUS at 11:12

## 2023-12-31 RX ADMIN — ATORVASTATIN CALCIUM 40 MG: 20 TABLET, FILM COATED ORAL at 09:12

## 2023-12-31 RX ADMIN — AMIODARONE HYDROCHLORIDE 400 MG: 200 TABLET ORAL at 09:12

## 2023-12-31 RX ADMIN — MEMANTINE HYDROCHLORIDE 10 MG: 5 TABLET ORAL at 09:12

## 2023-12-31 RX ADMIN — DULOXETINE HYDROCHLORIDE 60 MG: 30 CAPSULE, DELAYED RELEASE ORAL at 08:12

## 2023-12-31 RX ADMIN — Medication 6 MG: at 09:12

## 2023-12-31 RX ADMIN — GABAPENTIN 300 MG: 300 CAPSULE ORAL at 09:12

## 2023-12-31 RX ADMIN — DIGOXIN 500 MCG: 0.25 INJECTION INTRAMUSCULAR; INTRAVENOUS at 08:12

## 2023-12-31 RX ADMIN — MEMANTINE HYDROCHLORIDE 10 MG: 5 TABLET ORAL at 08:12

## 2023-12-31 RX ADMIN — MAGNESIUM SULFATE HEPTAHYDRATE 2 G: 40 INJECTION, SOLUTION INTRAVENOUS at 09:12

## 2023-12-31 RX ADMIN — GABAPENTIN 300 MG: 300 CAPSULE ORAL at 08:12

## 2023-12-31 RX ADMIN — Medication 12 MG/HR: at 08:12

## 2023-12-31 RX ADMIN — SULFASALAZINE 1000 MG: 500 TABLET ORAL at 03:12

## 2023-12-31 RX ADMIN — GABAPENTIN 300 MG: 300 CAPSULE ORAL at 03:12

## 2023-12-31 RX ADMIN — FUROSEMIDE 20 MG: 20 TABLET ORAL at 04:12

## 2023-12-31 RX ADMIN — APIXABAN 5 MG: 5 TABLET, FILM COATED ORAL at 09:12

## 2023-12-31 RX ADMIN — SPIRONOLACTONE 12.5 MG: 25 TABLET ORAL at 09:12

## 2023-12-31 RX ADMIN — METOPROLOL SUCCINATE 100 MG: 50 TABLET, EXTENDED RELEASE ORAL at 09:12

## 2023-12-31 NOTE — EICU
Intervention Initiated From:  COR / EICU    Lindsey intervened regarding:  Rounding (Video assessment)    Nurse Notified:  No    Doctor Notified:  No    Comments: Video rounding completed. Cardizem infusing as per MAR. . VSS. Awake and alert, resp even and unlabored. FAmily at bedside. NAD.

## 2023-12-31 NOTE — PLAN OF CARE
[facility] - Intensive Care  Initial Discharge Assessment       Primary Care Provider: Neeru Hinkle MD    Admission Diagnosis: Tachycardia [R00.0]  Atypical chest pain [R07.89]  Atrial flutter with rapid ventricular response [I48.92]  Acute cystitis without hematuria [N30.00]  Atrial fibrillation and flutter [I48.91, I48.92]    Admission Date: 12/29/2023  Expected Discharge Date:     Transition of Care Barriers: None    Payor: [employer] MEDICARE / Plan: [employer] HMO PPO SPECIAL NEEDS / Product Type: Medicare Advantage /     Extended Emergency Contact Information  Primary Emergency Contact: Juan Sinclair  Mobile Phone: [phone]  Relation: Spouse    Discharge Plan A: Home  Discharge Plan B: Home      [id number] [place] - [address]  [address]  [place] [address]  Phone: [phone] Fax: [phone]    [id number] - [place] - [address]  [address]  [place] [address]  Phone: [phone] Fax: [phone]      Initial Assessment (most recent)       Adult Discharge Assessment - 12/31/23 0832          Discharge Assessment    Assessment Type Discharge Planning Assessment     Confirmed/corrected address, phone number and insurance Yes     Confirmed Demographics Correct on Facesheet     Source of Information patient     When was your last doctors appointment? 12/29/23     Communicated ORALIA with patient/caregiver Date not available/Unable to determine     Reason For Admission Atrial flutter with rapid ventricular response     People in Home spouse     Do you expect to return to your current living situation? Yes     Do you have help at home or someone to help you manage your care at home? Yes     Who are your caregiver(s) and their phone number(s)? Juan (spouse) [phone]     Prior to hospitilization cognitive status: Alert/Oriented     Current cognitive status: Alert/Oriented     Walking or Climbing Stairs Difficulty yes     Walking or Climbing Stairs ambulation  difficulty, requires equipment     Mobility Management cane     Dressing/Bathing Difficulty no     Home Layout Able to live on 1st floor     Equipment Currently Used at Home cane, straight     Readmission within 30 days? No     Patient currently being followed by outpatient case management? No     Do you currently have service(s) that help you manage your care at home? No     Do you take prescription medications? Yes     Do you have prescription coverage? Yes     Do you have any problems affording any of your prescribed medications? No     Is the patient taking medications as prescribed? yes     Who is going to help you get home at discharge? Spouse     How do you get to doctors appointments? family or friend will provide     Are you on dialysis? No     Do you take coumadin? No     Discharge Plan A Home     Discharge Plan B Home     DME Needed Upon Discharge  none     Discharge Plan discussed with: Patient     Transition of Care Barriers None                   Discharge assessment completed with patient.  Patient does not have any  needs at this time.  SW will remain available for any  needs or concerns.

## 2023-12-31 NOTE — PLAN OF CARE
Problem: Fall Injury Risk  Goal: Absence of Fall and Fall-Related Injury  Outcome: Ongoing, Progressing     Problem: Dysrhythmia  Goal: Normalized Cardiac Rhythm  Outcome: Ongoing, Not Progressing     Problem: Adult Inpatient Plan of Care  Goal: Absence of Hospital-Acquired Illness or Injury  Outcome: Ongoing, Progressing     Problem: Adult Inpatient Plan of Care  Goal: Optimal Comfort and Wellbeing  Outcome: Ongoing, Progressing     Plan of care discussed with patient & voiced understanding. Patient resting comfortably in bed. Had a headache initially at start of shift. Improved with gabapentin. VSS - attempted to wean diltiazem without success. Remains in afib with HR in 120s. Currently infusing at 7.5 mg/hr. Saline lock maintained with good blood return. Repeats self at times, but oriented. No shortness of breath, chest pain, nausea. Saline lock maintained for IVBP antibiotics, IV fluids. Pure wick maintained due to self-reported incontinence and elevated heart rate. Fall precautions maintained. Standby assistance when out of bed. Pt with slip resistant socks on; remains free of fall or injury.

## 2023-12-31 NOTE — HOSPITAL COURSE
Patient remains a-fib RVR. Meds have been adjusted to amiodarone 200mg BID, metoprolol 50mg TID. HR briefly responded yesterday into the 70s but then resumed RVR with -130s sustained overnight despite dilt gtt. BP is lower end of normal with meds. Remains on Eliquis. She feels fatigued today, intermittent headaches. Remains on RA and no signs of CHF decompensation as of yet. Lytes normal.     Will ask for cardiology input today and see if can help facilitate transfer to [place] with her cardiologist.     1/1: Meds adjusted yesterday. HR better controlled but still needing dilt gtt despite adjustments. Will make NPO after MN for possible DCCV in the AM. [facility] will eval in the morning. Remains stable and no signs of CHF decompensation.     1/2: Pt remains in afib with RVR today. Plan for cardioversion today.  Patient with Ecoli UTI.  Started IV rocephin.  Possible d/c within the next 24 hours.      1/3 Successful cardioversion.  EF of 35-40%.  Plan to discharge today and f/u outpatient cardiology.

## 2023-12-31 NOTE — PROGRESS NOTES
[facility] Medicine  Progress Note    Patient Name: Celine Sinclair  MRN: [id number]  Patient Class: IP- Inpatient   Admission Date: 12/29/2023  Length of Stay: 2 days  Attending Physician: Neeru Hinkle MD  Primary Care Provider: Neeru Hinkle MD        Subjective:     Principal Problem:Atrial flutter with rapid ventricular response        HPI:  Patient admitted to ICU with afib/flutter with RVR. She initially presented to PCP for routine 6month check up. In office was noticed to have tachycardia in 130s. She reported having chest pain, feeling SOB and lightheaded. Clinic EKG not best quality but a flutter RVR suspected and sent to ED. ED work up confirmed a flutter and cards consulted in ED. Did not respond to IV metoprolol, amiodarone bolus and ultimately started on diltiazem gtt. Her cardiologist is with [facility] at [facility] and transfer initiated as she was not responding to treatment but [facility] was unable to take her last night due to lack of beds. She briefly converted to NSR per report but then returned to afib/flutter with RVR. She has been on dilt gtt throughout the night and HR better controlled to low 100s. She reports chest pains and SOB improved.    Resuming PO meds. Plan to increase PO amiodarone to 200mg daily and increase PO metoprolol to 50mg BID and wean dilt gtt today. If unable to control may still require transfer for cardiology as we do not have cardiology coverage this weekend. For now, focusing on rate control. Eliquis resumed.     UA did show + nitrite, 10 WBC. However, she is asymptomatic. No antibx started at this time.     BNP slightly elevated. CXR clear. No oxygen requirement. Resumed home PO Lasix and aldactone. H/o CHF but no signs of true volume overload on exam today.     Overview/Hospital Course:  Patient remains a-fib RVR. Meds have been adjusted to amiodarone 200mg BID, metoprolol 50mg TID. HR briefly responded yesterday into the 70s but then resumed RVR  with -130s sustained overnight despite dilt gtt. BP is lower end of normal with meds. Remains on Eliquis. She feels fatigued today, intermittent headaches. Remains on RA and no signs of CHF decompensation as of yet. Lytes normal.     Will ask for cardiology input today and see if can help facilitate transfer to [place] with her cardiologist.     Past Medical History:   Diagnosis Date    Anticoagulant long-term use     Aortic atherosclerosis     Atrial fibrillation     Benign essential hypertension     Cataract     Senile    Chest pain     CHF (congestive heart failure)     Crohn's colitis     Depression, major, recurrent, moderate     Encounter for blood transfusion     Fibromyalgia     GERD (gastroesophageal reflux disease)     Hypertensive cardiomyopathy     IBS (irritable bowel syndrome)     Migraine     Opioid abuse, in remission     Orbital cellulitis on left     Osteopenia     Paget disease of bone     Palpitations     Port-A-Cath in place     right chest    Prolonged QT interval     RA (rheumatoid arthritis)     Sedative hypnotic or anxiolytic dependence     in remission     SOB (shortness of breath)     SVT (supraventricular tachycardia)     Unspecified urinary incontinence        Past Surgical History:   Procedure Laterality Date    ABLATION N/A 06/28/2023    Procedure: Ablation;  Surgeon: Johnny Nieves MD;  Location: [facility] CATH;  Service: Cardiology;  Laterality: N/A;  ops # 7    ABLATION, ATRIAL FLUTTER, TYPICAL N/A 06/28/2023    Procedure: Ablation, Atrial Flutter, Typical;  Surgeon: Johnny Nieves MD;  Location: [facility] CATH;  Service: Cardiology;  Laterality: N/A;    CATARACT EXTRACTION W/  INTRAOCULAR LENS IMPLANT Left 02/21/2017    Dr. Christianson    CATARACT EXTRACTION W/  INTRAOCULAR LENS IMPLANT Right 03/07/2017    Dr. Christianson    CHOLECYSTECTOMY      COLON SURGERY      COLONOSCOPY N/A 03/16/2016    Procedure: COLONOSCOPY;  Surgeon: Dale Trejo MD;  Location: [facility] ([facility]);   Service: Endoscopy;  Laterality: N/A;    COLONOSCOPY N/A 10/12/2020    Procedure: COLONOSCOPY;  Surgeon: Cali Urena MD;  Location: [facility] (4TH FLR);  Service: Endoscopy;  Laterality: N/A;  covid test 10/9-[facility]- completed 10/9/20  ok to hold Coumadin x 5 days per coumadin clinic-see telephone encounterd ated 10/6-MS / Loop recorder  10/6/20- Pt confirmed- ERW@1122  10/9-Pt confirmed earlier arrival time, prep ins. reviewed and emailed to Pt    COLONOSCOPY N/A 08/25/2022    Procedure: COLONOSCOPY;  Surgeon: Lewis Luke MD;  Location: [facility];  Service: Endoscopy;  Laterality: N/A;    Colostomy reversal      EYE SURGERY      HEMORRHOID SURGERY      HYSTERECTOMY      ILEOSTOMY      ILEOSTOMY CLOSURE      LEFT HEART CATHETERIZATION N/A 02/15/2019    Procedure: HEART CATH-LEFT;  Surgeon: Miky Keita MD;  Location: [facility] CATH LAB;  Service: Cardiology;  Laterality: N/A;    NECK SURGERY      OOPHORECTOMY Bilateral     SBO      SMALL INTESTINE SURGERY      TONSILLECTOMY      TUBAL LIGATION         Review of patient's allergies indicates:   Allergen Reactions    Octreotide acetate Nausea And Vomiting     Had symptoms when she took Sandostatin with Codeine    Codeine Itching and Nausea And Vomiting     Pill form only, IV ok.,  Had symptoms when she took Codeine with Sandostatin.  Other reaction(s): Itching    Morphine Nausea And Vomiting     Patient reports reaction only when she takes po form of Morphine.    Iodine Nausea And Vomiting     States only when eats too much seafood    Opioids - morphine analogues Nausea And Vomiting    Simvastatin Nausea And Vomiting       No current facility-administered medications on file prior to encounter.     Current Outpatient Medications on File Prior to Encounter   Medication Sig    amiodarone (PACERONE) 200 MG Tab Take 0.5 tablets (100 mg total) by mouth once daily.    amLODIPine (NORVASC) 5 MG tablet Take 5 mg by mouth once daily.    apixaban  (ELIQUIS) 5 mg Tab Take 1 tablet (5 mg total) by mouth 2 (two) times daily.    atorvastatin (LIPITOR) 40 MG tablet Take 40 mg by mouth every evening.    balsalazide (COLAZAL) 750 mg capsule Take 3 capsules (2,250 mg total) by mouth 3 (three) times daily.    DULoxetine (CYMBALTA) 60 MG capsule Take 1 capsule (60 mg total) by mouth once daily.    FARXIGA 10 mg tablet Take 10 mg by mouth once daily.    gabapentin (NEURONTIN) 300 MG capsule Take 1 capsule (300 mg total) by mouth 3 (three) times daily.    losartan (COZAAR) 25 MG tablet Take 25 mg by mouth once daily.    metoprolol succinate (TOPROL-XL) 25 MG 24 hr tablet Take 25 mg by mouth 2 (two) times a day.    potassium chloride SA (K-DUR,KLOR-CON) 20 MEQ tablet Take 2 tablets (40 mEq total) by mouth once daily.    spironolactone (ALDACTONE) 25 MG tablet Take 12.5 mg by mouth once daily.    albuterol-ipratropium (DUO-NEB) 2.5 mg-0.5 mg/3 mL nebulizer solution Take 3 mLs by nebulization every 6 (six) hours as needed for Wheezing. Rescue (Patient not taking: Reported on 12/29/2023)    COMIRNATY 2023-24, 12Y UP,,PF, 30 mcg/0.3 mL inection     cyanocobalamin 1,000 mcg/mL injection Inject 1 mL (1,000 mcg total) into the muscle once a week.    FLUAD QUAD 2023-24,65Y UP,,PF, 60 mcg (15 mcg x 4)/0.5 mL Syrg     furosemide (LASIX) 20 MG tablet Take 20 mg by mouth Daily.    INNOSPIRE ESSENCE Manisha use as directed    levalbuterol (XOPENEX HFA) 45 mcg/actuation inhaler Inhale 1-2 puffs into the lungs every 4 (four) hours as needed for Wheezing. Rescue (Patient not taking: Reported on 12/29/2023)    memantine (NAMENDA) 10 MG Tab Take 10 mg by mouth 2 (two) times daily.    nitroGLYCERIN (NITROSTAT) 0.4 MG SL tablet Place 0.4 mg under the tongue every 5 (five) minutes as needed.    pantoprazole (PROTONIX) 40 MG tablet Increase to 40mg twice daily x 2 wks then resume daily thereafter (Patient taking differently: Take 40 mg by mouth once daily.)    [DISCONTINUED] nortriptyline  (PAMELOR) 10 MG capsule TAKE 1 CAPSULE (10 MG TOTAL) BY MOUTH EVERY EVENING.    [DISCONTINUED] traZODone (DESYREL) 50 MG tablet Take 1 tablet (50 mg total) by mouth nightly as needed.     Family History       Problem Relation (Age of Onset)    Breast cancer Daughter (40), Daughter (47), Sister    Cancer Mother    Colon cancer Maternal Grandmother    Emphysema Father (67)    Glaucoma Paternal Grandmother    Heart attack Maternal Grandmother, Sister    Heart disease Father    Lung cancer Sister    Other Daughter    Retinal detachment Grandchild          Tobacco Use    Smoking status: Never    Smokeless tobacco: Never   Substance and Sexual Activity    Alcohol use: Yes     Comment: wine coolers occassionally    Drug use: No    Sexual activity: Not Currently     Partners: Female     Review of Systems   Constitutional:  Negative for activity change, fatigue, fever and unexpected weight change.   HENT:  Negative for congestion, ear pain, hearing loss, rhinorrhea and sore throat.    Eyes:  Negative for redness and visual disturbance.   Respiratory:  Positive for shortness of breath (improved since admit). Negative for cough and wheezing.    Cardiovascular:  Positive for chest pain (improved since admit). Negative for palpitations and leg swelling.   Gastrointestinal:  Positive for diarrhea (chronic, Crohn's disease). Negative for abdominal pain, constipation, nausea and vomiting.   Genitourinary:  Negative for dysuria, frequency and urgency.   Musculoskeletal:  Positive for arthralgias (chronic, fibromyalgia) and myalgias. Negative for back pain, joint swelling and neck pain.   Skin:  Negative for color change, rash and wound.   Neurological:  Negative for dizziness, tremors, light-headedness and headaches.     Objective:     Vital Signs (Most Recent):  Temp: 97.7 °F (36.5 °C) (12/31/23 0730)  Pulse: 97 (12/31/23 0728)  Resp: 20 (12/31/23 0728)  BP: (!) 82/51 (12/31/23 0703)  SpO2: 98 % (12/31/23 0728) Vital Signs (24h  "Range):  Temp:  [97.7 °F (36.5 °C)-98 °F (36.7 °C)] 97.7 °F (36.5 °C)  Pulse:  [] 97  Resp:  [16-31] 20  SpO2:  [97 %-100 %] 98 %  BP: ()/(51-99) 82/51     Weight: 81.4 kg (179 lb 7.3 oz)  Body mass index is 29.86 kg/m².     Physical Exam  Vitals and nursing note reviewed.   Constitutional:       General: She is not in acute distress.     Appearance: She is well-developed.   HENT:      Head: Normocephalic and atraumatic.      Right Ear: External ear normal.      Left Ear: External ear normal.      Nose: Nose normal.   Eyes:      General:         Right eye: No discharge.         Left eye: No discharge.      Conjunctiva/sclera: Conjunctivae normal.   Neck:      Thyroid: No thyromegaly.   Cardiovascular:      Rate and Rhythm: Tachycardia present. Rhythm irregular.      Heart sounds: No murmur heard.  Pulmonary:      Effort: Pulmonary effort is normal. No respiratory distress.      Breath sounds: No rales.   Abdominal:      General: There is no distension.      Palpations: Abdomen is soft.      Tenderness: There is no abdominal tenderness.   Musculoskeletal:      Right lower leg: No edema.      Left lower leg: No edema.   Skin:     General: Skin is warm and dry.   Neurological:      Mental Status: She is alert and oriented to person, place, and time.   Psychiatric:         Behavior: Behavior normal.         Thought Content: Thought content normal.                Significant Labs: All pertinent labs within the past 24 hours have been reviewed.  Blood Culture: No results for input(s): "LABBLOO" in the last 48 hours.  CBC:   Recent Labs   Lab 12/29/23  1205 12/30/23  0621 12/31/23  0438   WBC 12.41 10.55 10.46   HGB 13.4 12.8 12.9   HCT 41.5 40.5 39.6    217 247       CMP:   Recent Labs   Lab 12/29/23  1205 12/30/23  0621 12/31/23  0438   * 142 140   K 4.3 3.5 3.9   * 111* 107   CO2 24 22* 22*   GLU 94 112* 110   BUN 13 13 16   CREATININE 0.8 0.7 0.7   CALCIUM 9.2 8.6* 9.5   PROT 7.2  --   -- " "   ALBUMIN 3.9  --   --    BILITOT 1.0  --   --    ALKPHOS 89  --   --    AST 16  --   --    ALT 9*  --   --    ANIONGAP 11 9 11       Cardiac Markers:   Recent Labs   Lab 12/29/23  1205   *       Magnesium:   Recent Labs   Lab 12/29/23  1205 12/30/23  0621 12/31/23  0438   MG 1.7 1.9 1.7       Troponin:   Recent Labs   Lab 12/29/23  1206 12/29/23  1456   TROPONINI <0.006 0.006       TSH:   Recent Labs   Lab 12/29/23  1206   TSH 1.367       Urine Culture:   Recent Labs   Lab 12/29/23  1432   LABURIN GRAM NEGATIVE TIFFANIE  >100,000 cfu/ml  Identification and susceptibility pending  *     Urine Studies:   Recent Labs   Lab 12/29/23  1432   COLORU Yellow   APPEARANCEUA Cloudy*   PHUR 7.0   SPECGRAV 1.020   PROTEINUA Negative   GLUCUA 3+*   KETONESU Negative   BILIRUBINUA Negative   OCCULTUA 1+*   NITRITE Positive*   UROBILINOGEN Negative   LEUKOCYTESUR Negative   RBCUA 5*   WBCUA 10*   BACTERIA Many*   SQUAMEPITHEL 1   HYALINECASTS 0         Significant Imaging: I have reviewed all pertinent imaging results/findings within the past 24 hours.    CXR 12/29/23: "FINDINGS:   Postoperative changes cervical region.  Loop recorder.The lungs are clear with normal appearance of pulmonary vasculature. No pleural effusion. No evident pneumothorax.     The cardiac silhouette is normal in size. The hilar and mediastinal contours are unremarkable.     Bones are intact. "    Assessment/Plan:      * Atrial flutter with rapid ventricular response  A flutter with RVR on admit  ER treatments including IV metoprolol, amio bolus did not result in conversion or rate control.  Started on dilt gtt  Briefly conversed to NSR but now remains in afib/flutter  Rate controlled to low 100s  Increase amio to 200mg PO BID (100mg outpatient)  Increase metoprolol to 50mg PO BID (25mg BID outpatient)  Wean dilt gtt as tolerated  Cont home eliquis      12/31: remains a-fib RVR despite med adjustment: amiodarone 200mg PO BID and metoprolol 50mg " TID.  Remains on dilt gtt  BP lower end of normal with increasing meds for HR control. If drops will need to hold aldactone today.  Cont eliquis  Cardiology asked for input today. Suspect needs transfer and will see if can facilitate transfer to [place] with her cardiologist    Addendum: Discussed with cardiology. Recs implemented: increase amio to 400mg BID, start dig 0.5mcg now, 0.25 mcg Q6h x2 doses then 0.125mcg starting tomorrow. Continue eliquis. If remains afib will keep NPO tomorrow night for possible cardioversion Tuesday.    Asymptomatic bacteriuria  UA noted but she has no UTI sx reported  Have held antibx at this point      Dementia with anxiety, unspecified dementia severity, unspecified dementia type  Follows with outside neurology  Resume home namenda  At baseline      Depression, major, recurrent, moderate  Patient has persistent depression which is mild and is currently controlled. Will Continue anti-depressant medications. We will not consult psychiatry at this time. Patient does not display psychosis at this time. Continue to monitor closely and adjust plan of care as needed.    Cont home cymbalta      Crohn's disease  Sulfasalazine while inpatient  Resume home meds at discharge  GI follow up at discharge as still reporting chronic diarrhea sx; will monitor while inpatient      Cardiomyopathy, nonischemic  No signs of acute exacerbation at this time  CXR clear, no hypoxia, no LE edema  Resume home lasix and aldactone for now-hold if BP drops  Holding losartan while on dilt gtt; will wean gtt and resume home PO meds as BP tolerates  Strict I/O  Low Na diet  Heart rate control      Fibromyalgia  Cont home cymbalta and gabapentin        VTE Risk Mitigation (From admission, onward)           Ordered     apixaban tablet 5 mg  2 times daily         12/30/23 0819     IP VTE HIGH RISK PATIENT  Once         12/29/23 2254     Place sequential compression device  Until discontinued         12/29/23 2254                     Discharge Planning   ORALIA:      Code Status: Full Code   Is the patient medically ready for discharge?:     Reason for patient still in hospital (select all that apply): Patient unstable, Treatment, and Consult recommendations               Critical care time spent on the evaluation and treatment of severe organ dysfunction, review of pertinent labs and imaging studies, discussions with consulting providers and discussions with patient/family: 33 minutes.      Neeru Hinkle MD  Department of Hospital Medicine   [place] [facility]

## 2023-12-31 NOTE — ASSESSMENT & PLAN NOTE
No signs of acute exacerbation at this time  CXR clear, no hypoxia, no LE edema  Resume home lasix and aldactone for now-hold if BP drops  Holding losartan while on dilt gtt; will wean gtt and resume home PO meds as BP tolerates  Strict I/O  Low Na diet  Heart rate control

## 2023-12-31 NOTE — NURSING
Up to chair for hygiene care. Able to ambulate in room to recliner with stand-by assistance only. Gait steady. No complaints of chest pain or shortness of breath. Respirations even and unlabored. Patient talkative during hygiene care - performed most by self. Bed linens changes.

## 2023-12-31 NOTE — ASSESSMENT & PLAN NOTE
A flutter with RVR on admit  ER treatments including IV metoprolol, amio bolus did not result in conversion or rate control.  Started on dilt gtt  Briefly conversed to NSR but now remains in afib/flutter  Rate controlled to low 100s  Increase amio to 200mg PO BID (100mg outpatient)  Increase metoprolol to 50mg PO BID (25mg BID outpatient)  Wean dilt gtt as tolerated  Cont home eliquis      12/31: remains a-fib RVR despite med adjustment: amiodarone 200mg PO BID and metoprolol 50mg TID.  Remains on dilt gtt  BP lower end of normal with increasing meds for HR control. If drops will need to hold aldactone today.  Cont [facility]  Cardiology asked for input today. Suspect needs transfer and will see if can facilitate transfer to [place] with her cardiologist

## 2023-12-31 NOTE — EICU
Intervention Initiated From:  COR / DARWINU    Lindsey intervened regarding:  Rounding (Video assessment)    Comments:  Video rounding completed.  Pt awake & taking, states feeling better.  Infusing Diltiazem at 12.5 mg/hr.  B/P 82/51, HR irregular at 100 bpm.  RR 17, Pox 98%.

## 2023-12-31 NOTE — SUBJECTIVE & OBJECTIVE
Past Medical History:   Diagnosis Date    Anticoagulant long-term use     Aortic atherosclerosis     Atrial fibrillation     Benign essential hypertension     Cataract     Senile    Chest pain     CHF (congestive heart failure)     Crohn's colitis     Depression, major, recurrent, moderate     Encounter for blood transfusion     Fibromyalgia     GERD (gastroesophageal reflux disease)     Hypertensive cardiomyopathy     IBS (irritable bowel syndrome)     Migraine     Opioid abuse, in remission     Orbital cellulitis on left     Osteopenia     Paget disease of bone     Palpitations     Port-A-Cath in place     right chest    Prolonged QT interval     RA (rheumatoid arthritis)     Sedative hypnotic or anxiolytic dependence     in remission     SOB (shortness of breath)     SVT (supraventricular tachycardia)     Unspecified urinary incontinence        Past Surgical History:   Procedure Laterality Date    ABLATION N/A 06/28/2023    Procedure: Ablation;  Surgeon: Johnny Nieevs MD;  Location: [facility] CATH;  Service: Cardiology;  Laterality: N/A;  ops # 7    ABLATION, ATRIAL FLUTTER, TYPICAL N/A 06/28/2023    Procedure: Ablation, Atrial Flutter, Typical;  Surgeon: Johnny Nieves MD;  Location: [facility] CATH;  Service: Cardiology;  Laterality: N/A;    CATARACT EXTRACTION W/  INTRAOCULAR LENS IMPLANT Left 02/21/2017    Dr. Christianson    CATARACT EXTRACTION W/  INTRAOCULAR LENS IMPLANT Right 03/07/2017    Dr. Christianson    CHOLECYSTECTOMY      COLON SURGERY      COLONOSCOPY N/A 03/16/2016    Procedure: COLONOSCOPY;  Surgeon: Dale Trejo MD;  [facility]  Service: Endoscopy;  Laterality: N/A;    COLONOSCOPY N/A 10/12/2020    Procedure: COLONOSCOPY;  Surgeon: Cali Urena MD;  Location: [facility] ENDO (4TH FLR);  Service: Endoscopy;  Laterality: N/A;  covid test 10/9-[facility]- completed 10/9/20  ok to hold Coumadin x 5 days per coumadin clinic-see telephone encounterd ated 10/6-MS / Loop  recorder  10/6/20- Pt confirmed- ERW@1122  10/9-Pt confirmed earlier arrival time, prep ins. reviewed and emailed to Pt    COLONOSCOPY N/A 08/25/2022    Procedure: COLONOSCOPY;  Surgeon: Lewis Luke MD;  Location: [facility];  Service: Endoscopy;  Laterality: N/A;    Colostomy reversal      EYE SURGERY      HEMORRHOID SURGERY      HYSTERECTOMY      ILEOSTOMY      ILEOSTOMY CLOSURE      LEFT HEART CATHETERIZATION N/A 02/15/2019    Procedure: HEART CATH-LEFT;  Surgeon: Miky Keita MD;  Location: [facility] CATH LAB;  Service: Cardiology;  Laterality: N/A;    NECK SURGERY      OOPHORECTOMY Bilateral     SBO      SMALL INTESTINE SURGERY      TONSILLECTOMY      TUBAL LIGATION         Review of patient's allergies indicates:   Allergen Reactions    Octreotide acetate Nausea And Vomiting     Had symptoms when she took Sandostatin with Codeine    Codeine Itching and Nausea And Vomiting     Pill form only, IV ok.,  Had symptoms when she took Codeine with Sandostatin.  Other reaction(s): Itching    Morphine Nausea And Vomiting     Patient reports reaction only when she takes po form of Morphine.    Iodine Nausea And Vomiting     States only when eats too much seafood    Opioids - morphine analogues Nausea And Vomiting    Simvastatin Nausea And Vomiting       No current facility-administered medications on file prior to encounter.     Current Outpatient Medications on File Prior to Encounter   Medication Sig    amiodarone (PACERONE) 200 MG Tab Take 0.5 tablets (100 mg total) by mouth once daily.    amLODIPine (NORVASC) 5 MG tablet Take 5 mg by mouth once daily.    apixaban (ELIQUIS) 5 mg Tab Take 1 tablet (5 mg total) by mouth 2 (two) times daily.    atorvastatin (LIPITOR) 40 MG tablet Take 40 mg by mouth every evening.    balsalazide (COLAZAL) 750 mg capsule Take 3 capsules (2,250 mg total) by mouth 3 (three) times daily.    DULoxetine (CYMBALTA) 60 MG capsule Take 1 capsule (60 mg total) by mouth once daily.    FARXIGA 10 mg  tablet Take 10 mg by mouth once daily.    gabapentin (NEURONTIN) 300 MG capsule Take 1 capsule (300 mg total) by mouth 3 (three) times daily.    losartan (COZAAR) 25 MG tablet Take 25 mg by mouth once daily.    metoprolol succinate (TOPROL-XL) 25 MG 24 hr tablet Take 25 mg by mouth 2 (two) times a day.    potassium chloride SA (K-DUR,KLOR-CON) 20 MEQ tablet Take 2 tablets (40 mEq total) by mouth once daily.    spironolactone (ALDACTONE) 25 MG tablet Take 12.5 mg by mouth once daily.    albuterol-ipratropium (DUO-NEB) 2.5 mg-0.5 mg/3 mL nebulizer solution Take 3 mLs by nebulization every 6 (six) hours as needed for Wheezing. Rescue (Patient not taking: Reported on 12/29/2023)    COMIRNATY 2023-24, 12Y UP,,PF, 30 mcg/0.3 mL inection     cyanocobalamin 1,000 mcg/mL injection Inject 1 mL (1,000 mcg total) into the muscle once a week.    FLUAD QUAD 2023-24,65Y UP,,PF, 60 mcg (15 mcg x 4)/0.5 mL Syrg     furosemide (LASIX) 20 MG tablet Take 20 mg by mouth Daily.    INNOSPIRE ESSENCE Manisha use as directed    levalbuterol (XOPENEX HFA) 45 mcg/actuation inhaler Inhale 1-2 puffs into the lungs every 4 (four) hours as needed for Wheezing. Rescue (Patient not taking: Reported on 12/29/2023)    memantine (NAMENDA) 10 MG Tab Take 10 mg by mouth 2 (two) times daily.    nitroGLYCERIN (NITROSTAT) 0.4 MG SL tablet Place 0.4 mg under the tongue every 5 (five) minutes as needed.    pantoprazole (PROTONIX) 40 MG tablet Increase to 40mg twice daily x 2 wks then resume daily thereafter (Patient taking differently: Take 40 mg by mouth once daily.)    [DISCONTINUED] nortriptyline (PAMELOR) 10 MG capsule TAKE 1 CAPSULE (10 MG TOTAL) BY MOUTH EVERY EVENING.    [DISCONTINUED] traZODone (DESYREL) 50 MG tablet Take 1 tablet (50 mg total) by mouth nightly as needed.     Family History       Problem Relation (Age of Onset)    Breast cancer Daughter (40), Daughter (47), Sister    Cancer Mother    Colon cancer Maternal Grandmother    Emphysema Father  (67)    Glaucoma Paternal Grandmother    Heart attack Maternal Grandmother, Sister    Heart disease Father    Lung cancer Sister    Other Daughter    Retinal detachment Grandchild          Tobacco Use    Smoking status: Never    Smokeless tobacco: Never   Substance and Sexual Activity    Alcohol use: Yes     Comment: wine coolers occassionally    Drug use: No    Sexual activity: Not Currently     Partners: Female     Review of Systems   Constitutional:  Negative for activity change, fatigue, fever and unexpected weight change.   HENT:  Negative for congestion, ear pain, hearing loss, rhinorrhea and sore throat.    Eyes:  Negative for redness and visual disturbance.   Respiratory:  Positive for shortness of breath (improved since admit). Negative for cough and wheezing.    Cardiovascular:  Positive for chest pain (improved since admit). Negative for palpitations and leg swelling.   Gastrointestinal:  Positive for diarrhea (chronic, Crohn's disease). Negative for abdominal pain, constipation, nausea and vomiting.   Genitourinary:  Negative for dysuria, frequency and urgency.   Musculoskeletal:  Positive for arthralgias (chronic, fibromyalgia) and myalgias. Negative for back pain, joint swelling and neck pain.   Skin:  Negative for color change, rash and wound.   Neurological:  Negative for dizziness, tremors, light-headedness and headaches.     Objective:     Vital Signs (Most Recent):  Temp: 97.7 °F (36.5 °C) (12/31/23 0730)  Pulse: 97 (12/31/23 0728)  Resp: 20 (12/31/23 0728)  BP: (!) 82/51 (12/31/23 0703)  SpO2: 98 % (12/31/23 0728) Vital Signs (24h Range):  Temp:  [97.7 °F (36.5 °C)-98 °F (36.7 °C)] 97.7 °F (36.5 °C)  Pulse:  [] 97  Resp:  [16-31] 20  SpO2:  [97 %-100 %] 98 %  BP: ()/(51-99) 82/51     Weight: 81.4 kg (179 lb 7.3 oz)  Body mass index is 29.86 kg/m².     Physical Exam  Vitals and nursing note reviewed.   Constitutional:       General: She is not in acute distress.     Appearance: She is  "well-developed.   HENT:      Head: Normocephalic and atraumatic.      Right Ear: External ear normal.      Left Ear: External ear normal.      Nose: Nose normal.   Eyes:      General:         Right eye: No discharge.         Left eye: No discharge.      Conjunctiva/sclera: Conjunctivae normal.   Neck:      Thyroid: No thyromegaly.   Cardiovascular:      Rate and Rhythm: Tachycardia present. Rhythm irregular.      Heart sounds: No murmur heard.  Pulmonary:      Effort: Pulmonary effort is normal. No respiratory distress.      Breath sounds: No rales.   Abdominal:      General: There is no distension.      Palpations: Abdomen is soft.      Tenderness: There is no abdominal tenderness.   Musculoskeletal:      Right lower leg: No edema.      Left lower leg: No edema.   Skin:     General: Skin is warm and dry.   Neurological:      Mental Status: She is alert and oriented to person, place, and time.   Psychiatric:         Behavior: Behavior normal.         Thought Content: Thought content normal.                Significant Labs: All pertinent labs within the past 24 hours have been reviewed.  Blood Culture: No results for input(s): "LABBLOO" in the last 48 hours.  CBC:   Recent Labs   Lab 12/29/23  1205 12/30/23  0621 12/31/23  0438   WBC 12.41 10.55 10.46   HGB 13.4 12.8 12.9   HCT 41.5 40.5 39.6    217 247       CMP:   Recent Labs   Lab 12/29/23  1205 12/30/23  0621 12/31/23  0438   * 142 140   K 4.3 3.5 3.9   * 111* 107   CO2 24 22* 22*   GLU 94 112* 110   BUN 13 13 16   CREATININE 0.8 0.7 0.7   CALCIUM 9.2 8.6* 9.5   PROT 7.2  --   --    ALBUMIN 3.9  --   --    BILITOT 1.0  --   --    ALKPHOS 89  --   --    AST 16  --   --    ALT 9*  --   --    ANIONGAP 11 9 11       Cardiac Markers:   Recent Labs   Lab 12/29/23  1205   *       Magnesium:   Recent Labs   Lab 12/29/23  1205 12/30/23  0621 12/31/23  0438   MG 1.7 1.9 1.7       Troponin:   Recent Labs   Lab 12/29/23  1206 12/29/23  1456 " "  TROPONINI <0.006 0.006       TSH:   Recent Labs   Lab 12/29/23  1206   TSH 1.367       Urine Culture:   Recent Labs   Lab 12/29/23  1432   LABURIN GRAM NEGATIVE TIFFANIE  >100,000 cfu/ml  Identification and susceptibility pending  *     Urine Studies:   Recent Labs   Lab 12/29/23  1432   COLORU Yellow   APPEARANCEUA Cloudy*   PHUR 7.0   SPECGRAV 1.020   PROTEINUA Negative   GLUCUA 3+*   KETONESU Negative   BILIRUBINUA Negative   OCCULTUA 1+*   NITRITE Positive*   UROBILINOGEN Negative   LEUKOCYTESUR Negative   RBCUA 5*   WBCUA 10*   BACTERIA Many*   SQUAMEPITHEL 1   HYALINECASTS 0         Significant Imaging: I have reviewed all pertinent imaging results/findings within the past 24 hours.    CXR 12/29/23: "FINDINGS:   Postoperative changes cervical region.  Loop recorder.The lungs are clear with normal appearance of pulmonary vasculature. No pleural effusion. No evident pneumothorax.     The cardiac silhouette is normal in size. The hilar and mediastinal contours are unremarkable.     Bones are intact. "  "

## 2024-01-01 LAB
ANION GAP SERPL CALC-SCNC: 9 MMOL/L (ref 8–16)
BASOPHILS # BLD AUTO: 0.05 K/UL (ref 0–0.2)
BASOPHILS NFR BLD: 0.5 % (ref 0–1.9)
BUN SERPL-MCNC: 19 MG/DL (ref 8–23)
CALCIUM SERPL-MCNC: 9.7 MG/DL (ref 8.7–10.5)
CHLORIDE SERPL-SCNC: 109 MMOL/L (ref 95–110)
CO2 SERPL-SCNC: 23 MMOL/L (ref 23–29)
CREAT SERPL-MCNC: 0.8 MG/DL (ref 0.5–1.4)
DIFFERENTIAL METHOD BLD: ABNORMAL
EOSINOPHIL # BLD AUTO: 0.1 K/UL (ref 0–0.5)
EOSINOPHIL NFR BLD: 1.1 % (ref 0–8)
ERYTHROCYTE [DISTWIDTH] IN BLOOD BY AUTOMATED COUNT: 13.5 % (ref 11.5–14.5)
EST. GFR  (NO RACE VARIABLE): >60 ML/MIN/1.73 M^2
GLUCOSE SERPL-MCNC: 106 MG/DL (ref 70–110)
HCT VFR BLD AUTO: 41.4 % (ref 37–48.5)
HGB BLD-MCNC: 13.1 G/DL (ref 12–16)
IMM GRANULOCYTES # BLD AUTO: 0.03 K/UL (ref 0–0.04)
IMM GRANULOCYTES NFR BLD AUTO: 0.3 % (ref 0–0.5)
LYMPHOCYTES # BLD AUTO: 2.4 K/UL (ref 1–4.8)
LYMPHOCYTES NFR BLD: 23.2 % (ref 18–48)
MAGNESIUM SERPL-MCNC: 2 MG/DL (ref 1.6–2.6)
MCH RBC QN AUTO: 27 PG (ref 27–31)
MCHC RBC AUTO-ENTMCNC: 31.6 G/DL (ref 32–36)
MCV RBC AUTO: 85 FL (ref 82–98)
MONOCYTES # BLD AUTO: 0.7 K/UL (ref 0.3–1)
MONOCYTES NFR BLD: 6.5 % (ref 4–15)
NEUTROPHILS # BLD AUTO: 7.1 K/UL (ref 1.8–7.7)
NEUTROPHILS NFR BLD: 68.4 % (ref 38–73)
NRBC BLD-RTO: 0 /100 WBC
PHOSPHATE SERPL-MCNC: 4.2 MG/DL (ref 2.7–4.5)
PLATELET # BLD AUTO: 240 K/UL (ref 150–450)
PMV BLD AUTO: 10.4 FL (ref 9.2–12.9)
POTASSIUM SERPL-SCNC: 4.2 MMOL/L (ref 3.5–5.1)
RBC # BLD AUTO: 4.86 M/UL (ref 4–5.4)
SODIUM SERPL-SCNC: 141 MMOL/L (ref 136–145)
TROPONIN I SERPL DL<=0.01 NG/ML-MCNC: <0.006 NG/ML (ref 0–0.03)
TROPONIN I SERPL DL<=0.01 NG/ML-MCNC: <0.006 NG/ML (ref 0–0.03)
WBC # BLD AUTO: 10.3 K/UL (ref 3.9–12.7)

## 2024-01-01 PROCEDURE — 84100 ASSAY OF PHOSPHORUS: CPT | Mod: HCNC | Performed by: INTERNAL MEDICINE

## 2024-01-01 PROCEDURE — 80048 BASIC METABOLIC PNL TOTAL CA: CPT | Mod: HCNC | Performed by: INTERNAL MEDICINE

## 2024-01-01 PROCEDURE — 20000000 HC ICU ROOM

## 2024-01-01 PROCEDURE — 99900035 HC TECH TIME PER 15 MIN (STAT): Mod: HCNC

## 2024-01-01 PROCEDURE — 36415 COLL VENOUS BLD VENIPUNCTURE: CPT | Mod: HCNC | Performed by: INTERNAL MEDICINE

## 2024-01-01 PROCEDURE — 25000003 PHARM REV CODE 250: Mod: HCNC | Performed by: INTERNAL MEDICINE

## 2024-01-01 PROCEDURE — 84484 ASSAY OF TROPONIN QUANT: CPT | Performed by: INTERNAL MEDICINE

## 2024-01-01 PROCEDURE — 25000003 PHARM REV CODE 250: Mod: HCNC | Performed by: STUDENT IN AN ORGANIZED HEALTH CARE EDUCATION/TRAINING PROGRAM

## 2024-01-01 PROCEDURE — 85025 COMPLETE CBC W/AUTO DIFF WBC: CPT | Mod: HCNC | Performed by: INTERNAL MEDICINE

## 2024-01-01 PROCEDURE — 63600175 PHARM REV CODE 636 W HCPCS: Mod: HCNC | Performed by: INTERNAL MEDICINE

## 2024-01-01 PROCEDURE — 94761 N-INVAS EAR/PLS OXIMETRY MLT: CPT | Mod: HCNC

## 2024-01-01 PROCEDURE — 93005 ELECTROCARDIOGRAM TRACING: CPT

## 2024-01-01 PROCEDURE — 99232 SBSQ HOSP IP/OBS MODERATE 35: CPT | Mod: HCNC,,, | Performed by: INTERNAL MEDICINE

## 2024-01-01 PROCEDURE — 83735 ASSAY OF MAGNESIUM: CPT | Mod: HCNC | Performed by: INTERNAL MEDICINE

## 2024-01-01 PROCEDURE — 93005 ELECTROCARDIOGRAM TRACING: CPT | Performed by: INTERNAL MEDICINE

## 2024-01-01 RX ORDER — ONDANSETRON HYDROCHLORIDE 2 MG/ML
4 INJECTION, SOLUTION INTRAVENOUS EVERY 8 HOURS PRN
Status: DISCONTINUED | OUTPATIENT
Start: 2024-01-01 | End: 2024-01-03 | Stop reason: HOSPADM

## 2024-01-01 RX ADMIN — GABAPENTIN 300 MG: 300 CAPSULE ORAL at 03:01

## 2024-01-01 RX ADMIN — MUPIROCIN: 20 OINTMENT TOPICAL at 09:01

## 2024-01-01 RX ADMIN — FUROSEMIDE 20 MG: 20 TABLET ORAL at 05:01

## 2024-01-01 RX ADMIN — MUPIROCIN: 20 OINTMENT TOPICAL at 08:01

## 2024-01-01 RX ADMIN — SPIRONOLACTONE 12.5 MG: 25 TABLET ORAL at 08:01

## 2024-01-01 RX ADMIN — GABAPENTIN 300 MG: 300 CAPSULE ORAL at 08:01

## 2024-01-01 RX ADMIN — DULOXETINE HYDROCHLORIDE 60 MG: 30 CAPSULE, DELAYED RELEASE ORAL at 08:01

## 2024-01-01 RX ADMIN — AMIODARONE HYDROCHLORIDE 400 MG: 200 TABLET ORAL at 09:01

## 2024-01-01 RX ADMIN — DIGOXIN 0.12 MG: 125 TABLET ORAL at 08:01

## 2024-01-01 RX ADMIN — SULFASALAZINE 1000 MG: 500 TABLET ORAL at 08:01

## 2024-01-01 RX ADMIN — SULFASALAZINE 1000 MG: 500 TABLET ORAL at 09:01

## 2024-01-01 RX ADMIN — METOPROLOL SUCCINATE 100 MG: 50 TABLET, EXTENDED RELEASE ORAL at 09:01

## 2024-01-01 RX ADMIN — SULFASALAZINE 1000 MG: 500 TABLET ORAL at 03:01

## 2024-01-01 RX ADMIN — Medication 5 MG/HR: at 04:01

## 2024-01-01 RX ADMIN — ONDANSETRON 4 MG: 2 INJECTION INTRAMUSCULAR; INTRAVENOUS at 09:01

## 2024-01-01 RX ADMIN — AMIODARONE HYDROCHLORIDE 400 MG: 200 TABLET ORAL at 08:01

## 2024-01-01 RX ADMIN — ACETAMINOPHEN 650 MG: 325 TABLET ORAL at 05:01

## 2024-01-01 RX ADMIN — APIXABAN 5 MG: 5 TABLET, FILM COATED ORAL at 09:01

## 2024-01-01 RX ADMIN — MEMANTINE HYDROCHLORIDE 10 MG: 5 TABLET ORAL at 08:01

## 2024-01-01 RX ADMIN — METOPROLOL SUCCINATE 100 MG: 50 TABLET, EXTENDED RELEASE ORAL at 08:01

## 2024-01-01 RX ADMIN — PANTOPRAZOLE SODIUM 40 MG: 40 TABLET, DELAYED RELEASE ORAL at 08:01

## 2024-01-01 RX ADMIN — APIXABAN 5 MG: 5 TABLET, FILM COATED ORAL at 08:01

## 2024-01-01 RX ADMIN — GABAPENTIN 300 MG: 300 CAPSULE ORAL at 09:01

## 2024-01-01 RX ADMIN — ATORVASTATIN CALCIUM 40 MG: 20 TABLET, FILM COATED ORAL at 09:01

## 2024-01-01 RX ADMIN — ACETAMINOPHEN 650 MG: 325 TABLET ORAL at 04:01

## 2024-01-01 RX ADMIN — MEMANTINE HYDROCHLORIDE 10 MG: 5 TABLET ORAL at 09:01

## 2024-01-01 NOTE — PLAN OF CARE
Problem: Dysrhythmia  Goal: Normalized Cardiac Rhythm  Outcome: Ongoing, Not Progressing     Problem: Adult Inpatient Plan of Care  Goal: Absence of Hospital-Acquired Illness or Injury  Outcome: Ongoing, Progressing     Plan of care discussed with patient & voiced understanding. Patient resting comfortably in bed. Slept most of the night after 10pm. VSS. Remains in a fib. Was able to wean diltiazem for short period of time. Currently infusing at 5 mg/hr. Saline lock with good blood return. Patient medicated with acetaminophen for headache. No acute distress noted. Fall/Safety precautions maintained, bed low, locked, side rails up x 2, call bell within reach. Pt instructed to call with any new needs. Pt with slip resistant socks on; remains free of fall or injury.

## 2024-01-01 NOTE — EICU
Intervention Initiated From:  [facility] / EICU    Lindsey intervened regarding:  Rounding (Video assessment)    Comments:  Video rounding completed.  Pt resting quiet, no distress noted.  Infusing Diltiazem at 7.5 mg/hr.  Pt in A-fib w/ rate 121, B/P 110/64, RR 24, POx 100%.

## 2024-01-01 NOTE — PLAN OF CARE
"Patient resting with some complaints of "pounding" in chest with some relief stated as HR and Cardizem titrated down. HR as high as 120's, gtt as high as 12.5mg/hr then titrated down to 3mg/hr at this time with HR mid 80's. Some nausea and dry heaving noted, stated relief with PRN Zofran. VS stable. A/Ox4 with some intermittent confusion regarding short term memory.  at bedside, no questions or concerns at this time. Plan for patient to be NPO at midnight for possible cardiac intervention tomorrow. No acute changes noted. Plan of care reviewed and followed.      Problem: Fall Injury Risk  Goal: Absence of Fall and Fall-Related Injury  Outcome: Ongoing, Progressing     Problem: Adult Inpatient Plan of Care  Goal: Plan of Care Review  Outcome: Ongoing, Progressing  Goal: Optimal Comfort and Wellbeing  Outcome: Ongoing, Progressing  Goal: Readiness for Transition of Care  Outcome: Ongoing, Progressing     Problem: Dysrhythmia  Goal: Normalized Cardiac Rhythm  Outcome: Ongoing, Progressing     "

## 2024-01-01 NOTE — PROGRESS NOTES
[facility] Medicine  Progress Note    Patient Name: Celine Sinclair  MRN: [id number]  Patient Class: IP- Inpatient   Admission Date: 12/29/2023  Length of Stay: 3 days  Attending Physician: Neeru Hinkle MD  Primary Care Provider: Neeru Hinkle MD        Subjective:     Principal Problem:Atrial flutter with rapid ventricular response        HPI:  Patient admitted to ICU with afib/flutter with RVR. She initially presented to PCP for routine 6month check up. In office was noticed to have tachycardia in 130s. She reported having chest pain, feeling SOB and lightheaded. Clinic EKG not best quality but a flutter RVR suspected and sent to ED. ED work up confirmed a flutter and cards consulted in ED. Did not respond to IV metoprolol, amiodarone bolus and ultimately started on diltiazem gtt. Her cardiologist is with [facility] at [facility] and transfer initiated as she was not responding to treatment but [facility] was unable to take her last night due to lack of beds. She briefly converted to NSR per report but then returned to afib/flutter with RVR. She has been on dilt gtt throughout the night and HR better controlled to low 100s. She reports chest pains and SOB improved.    Resuming PO meds. Plan to increase PO amiodarone to 200mg daily and increase PO metoprolol to 50mg BID and wean dilt gtt today. If unable to control may still require transfer for cardiology as we do not have cardiology coverage this weekend. For now, focusing on rate control. Eliquis resumed.     UA did show + nitrite, 10 WBC. However, she is asymptomatic. No antibx started at this time.     BNP slightly elevated. CXR clear. No oxygen requirement. Resumed home PO Lasix and aldactone. H/o CHF but no signs of true volume overload on exam today.     Overview/Hospital Course:  Patient remains a-fib RVR. Meds have been adjusted to amiodarone 200mg BID, metoprolol 50mg TID. HR briefly responded yesterday into the 70s but then resumed RVR  PAS-RR    Per DHS regulation, CTS team completed and submitted PAS-RR to [place] [employer]ging Direct Connect via the Senior [employer] Line. CTS team advised SNF and they are aware a PAS-RR has been submitted.     CTS team reviewed with pt or health care agent that they may be contacted for a follow up appointment within 10 days of hospital discharge if SNF stay is <30 days. Contact information for [employer] was also provided.     Pt or health care agent verbalized understanding.     PAS-RR # [id number]    Patient Has been accepted at [facility] (P)[phone] (F) [phone]   with -130s sustained overnight despite dilt gtt. BP is lower end of normal with meds. Remains on Eliquis. She feels fatigued today, intermittent headaches. Remains on RA and no signs of CHF decompensation as of yet. Lytes normal.     Will ask for cardiology input today and see if can help facilitate transfer to [facility] with her cardiologist.     1/1: Meds adjusted yesterday. HR better controlled but still needing dilt gtt despite adjustments. Will make NPO after MN for possible DCCV in the AM. [facility] will eval in the morning. Remains stable and no signs of CHF decompensation.     Past Medical History:   Diagnosis Date    Anticoagulant long-term use     Aortic atherosclerosis     Atrial fibrillation     Benign essential hypertension     Cataract     Senile    Chest pain     CHF (congestive heart failure)     Crohn's colitis     Depression, major, recurrent, moderate     Encounter for blood transfusion     Fibromyalgia     GERD (gastroesophageal reflux disease)     Hypertensive cardiomyopathy     IBS (irritable bowel syndrome)     Migraine     Opioid abuse, in remission     Orbital cellulitis on left     Osteopenia     Paget disease of bone     Palpitations     Port-A-Cath in place     right chest    Prolonged QT interval     RA (rheumatoid arthritis)     Sedative hypnotic or anxiolytic dependence     in remission     SOB (shortness of breath)     SVT (supraventricular tachycardia)     Unspecified urinary incontinence        Past Surgical History:   Procedure Laterality Date    ABLATION N/A 06/28/2023    Procedure: Ablation;  Surgeon: Johnny Nieves MD;  [facility];  Service: Cardiology;  Laterality: N/A;  ops # 7    ABLATION, ATRIAL FLUTTER, TYPICAL N/A 06/28/2023    Procedure: Ablation, Atrial Flutter, Typical;  Surgeon: Johnny Nieves MD;  [facility];  Service: Cardiology;  Laterality: N/A;    CATARACT EXTRACTION W/  INTRAOCULAR LENS IMPLANT Left 02/21/2017    Dr. Christianson    CATARACT  EXTRACTION W/  INTRAOCULAR LENS IMPLANT Right 03/07/2017    Dr. Christianson    CHOLECYSTECTOMY      COLON SURGERY      COLONOSCOPY N/A 03/16/2016    Procedure: COLONOSCOPY;  Surgeon: Dale Trejo MD;  Location: [facility] ENDO (4TH FLR);  Service: Endoscopy;  Laterality: N/A;    COLONOSCOPY N/A 10/12/2020    Procedure: COLONOSCOPY;  Surgeon: Cali Urena MD;  Location: [facility] ENDO (4TH FLR);  Service: Endoscopy;  Laterality: N/A;  covid test 10/9-[facility]- completed 10/9/20  ok to hold Coumadin x 5 days per coumadin clinic-see telephone encounterd ated 10/6-MS / Loop recorder  10/6/20- Pt confirmed- ERW@1122  10/9-Pt confirmed earlier arrival time, prep ins. reviewed and emailed to Pt    COLONOSCOPY N/A 08/25/2022    Procedure: COLONOSCOPY;  Surgeon: Lewis Luke MD;  Location: [facility];  Service: Endoscopy;  Laterality: N/A;    Colostomy reversal      EYE SURGERY      HEMORRHOID SURGERY      HYSTERECTOMY      ILEOSTOMY      ILEOSTOMY CLOSURE      LEFT HEART CATHETERIZATION N/A 02/15/2019    Procedure: HEART CATH-LEFT;  Surgeon: Miky Keita MD;  Location: [facility] CATH LAB;  Service: Cardiology;  Laterality: N/A;    NECK SURGERY      OOPHORECTOMY Bilateral     SBO      SMALL INTESTINE SURGERY      TONSILLECTOMY      TUBAL LIGATION         Review of patient's allergies indicates:   Allergen Reactions    Octreotide acetate Nausea And Vomiting     Had symptoms when she took Sandostatin with Codeine    Codeine Itching and Nausea And Vomiting     Pill form only, IV ok.,  Had symptoms when she took Codeine with Sandostatin.  Other reaction(s): Itching    Morphine Nausea And Vomiting     Patient reports reaction only when she takes po form of Morphine.    Iodine Nausea And Vomiting     States only when eats too much seafood    Opioids - morphine analogues Nausea And Vomiting    Simvastatin Nausea And Vomiting       No current facility-administered medications on file prior to encounter.     Current Outpatient  Medications on File Prior to Encounter   Medication Sig    amiodarone (PACERONE) 200 MG Tab Take 0.5 tablets (100 mg total) by mouth once daily.    amLODIPine (NORVASC) 5 MG tablet Take 5 mg by mouth once daily.    apixaban (ELIQUIS) 5 mg Tab Take 1 tablet (5 mg total) by mouth 2 (two) times daily.    atorvastatin (LIPITOR) 40 MG tablet Take 40 mg by mouth every evening.    balsalazide (COLAZAL) 750 mg capsule Take 3 capsules (2,250 mg total) by mouth 3 (three) times daily.    DULoxetine (CYMBALTA) 60 MG capsule Take 1 capsule (60 mg total) by mouth once daily.    FARXIGA 10 mg tablet Take 10 mg by mouth once daily.    gabapentin (NEURONTIN) 300 MG capsule Take 1 capsule (300 mg total) by mouth 3 (three) times daily.    losartan (COZAAR) 25 MG tablet Take 25 mg by mouth once daily.    metoprolol succinate (TOPROL-XL) 25 MG 24 hr tablet Take 25 mg by mouth 2 (two) times a day.    potassium chloride SA (K-DUR,KLOR-CON) 20 MEQ tablet Take 2 tablets (40 mEq total) by mouth once daily.    spironolactone (ALDACTONE) 25 MG tablet Take 12.5 mg by mouth once daily.    albuterol-ipratropium (DUO-NEB) 2.5 mg-0.5 mg/3 mL nebulizer solution Take 3 mLs by nebulization every 6 (six) hours as needed for Wheezing. Rescue (Patient not taking: Reported on 12/29/2023)    COMIRNATY 2023-24, 12Y UP,,PF, 30 mcg/0.3 mL inection     cyanocobalamin 1,000 mcg/mL injection Inject 1 mL (1,000 mcg total) into the muscle once a week.    FLUAD QUAD 2023-24,65Y UP,,PF, 60 mcg (15 mcg x 4)/0.5 mL Syrg     furosemide (LASIX) 20 MG tablet Take 20 mg by mouth Daily.    INNOSPIRE ESSENCE Manisha use as directed    levalbuterol (XOPENEX HFA) 45 mcg/actuation inhaler Inhale 1-2 puffs into the lungs every 4 (four) hours as needed for Wheezing. Rescue (Patient not taking: Reported on 12/29/2023)    memantine (NAMENDA) 10 MG Tab Take 10 mg by mouth 2 (two) times daily.    nitroGLYCERIN (NITROSTAT) 0.4 MG SL tablet Place 0.4 mg under the tongue every 5 (five)  minutes as needed.    pantoprazole (PROTONIX) 40 MG tablet Increase to 40mg twice daily x 2 wks then resume daily thereafter (Patient taking differently: Take 40 mg by mouth once daily.)    [DISCONTINUED] nortriptyline (PAMELOR) 10 MG capsule TAKE 1 CAPSULE (10 MG TOTAL) BY MOUTH EVERY EVENING.    [DISCONTINUED] traZODone (DESYREL) 50 MG tablet Take 1 tablet (50 mg total) by mouth nightly as needed.     Family History       Problem Relation (Age of Onset)    Breast cancer Daughter (40), Daughter (47), Sister    Cancer Mother    Colon cancer Maternal Grandmother    Emphysema Father (67)    Glaucoma Paternal Grandmother    Heart attack Maternal Grandmother, Sister    Heart disease Father    Lung cancer Sister    Other Daughter    Retinal detachment Grandchild          Tobacco Use    Smoking status: Never    Smokeless tobacco: Never   Substance and Sexual Activity    Alcohol use: Yes     Comment: wine coolers occassionally    Drug use: No    Sexual activity: Not Currently     Partners: Female     Review of Systems   Constitutional:  Negative for activity change, fatigue, fever and unexpected weight change.   HENT:  Negative for congestion, ear pain, hearing loss, rhinorrhea and sore throat.    Eyes:  Negative for redness and visual disturbance.   Respiratory:  Positive for shortness of breath (improved since admit). Negative for cough and wheezing.    Cardiovascular:  Negative for chest pain, palpitations and leg swelling.   Gastrointestinal:  Positive for diarrhea (reported by patient, chronic, Crohn's diseased). Negative for abdominal pain, constipation, nausea and vomiting.   Genitourinary:  Negative for dysuria, frequency and urgency.   Musculoskeletal:  Positive for arthralgias (chronic, fibromyalgia) and myalgias. Negative for back pain, joint swelling and neck pain.   Skin:  Negative for color change, rash and wound.   Neurological:  Negative for dizziness, tremors, light-headedness and headaches.     Objective:  "    Vital Signs (Most Recent):  Temp: 97.5 °F (36.4 °C) (01/01/24 0710)  Pulse: 104 (01/01/24 0801)  Resp: 19 (01/01/24 0801)  BP: 96/71 (01/01/24 0801)  SpO2: 98 % (01/01/24 0801) Vital Signs (24h Range):  Temp:  [97.2 °F (36.2 °C)-98.5 °F (36.9 °C)] 97.5 °F (36.4 °C)  Pulse:  [] 104  Resp:  [11-26] 19  SpO2:  [96 %-99 %] 98 %  BP: ()/(56-96) 96/71     Weight: 81.4 kg (179 lb 7.3 oz)  Body mass index is 29.86 kg/m².     Physical Exam  Vitals and nursing note reviewed.   Constitutional:       General: She is not in acute distress.     Appearance: She is well-developed.   HENT:      Head: Normocephalic and atraumatic.      Right Ear: External ear normal.      Left Ear: External ear normal.      Nose: Nose normal.   Eyes:      General:         Right eye: No discharge.         Left eye: No discharge.      Conjunctiva/sclera: Conjunctivae normal.   Neck:      Thyroid: No thyromegaly.   Cardiovascular:      Rate and Rhythm: Tachycardia present. Rhythm irregular.      Heart sounds: No murmur heard.  Pulmonary:      Effort: Pulmonary effort is normal. No respiratory distress.      Breath sounds: No rales.   Abdominal:      General: There is no distension.      Palpations: Abdomen is soft.      Tenderness: There is no abdominal tenderness.   Musculoskeletal:      Right lower leg: No edema.      Left lower leg: No edema.   Skin:     General: Skin is warm and dry.   Neurological:      Mental Status: She is alert and oriented to person, place, and time.   Psychiatric:         Behavior: Behavior normal.         Thought Content: Thought content normal.                Significant Labs: All pertinent labs within the past 24 hours have been reviewed.  Blood Culture: No results for input(s): "LABBLOO" in the last 48 hours.  CBC:   Recent Labs   Lab 12/31/23  0438 01/01/24  0438   WBC 10.46 10.30   HGB 12.9 13.1   HCT 39.6 41.4    240       CMP:   Recent Labs   Lab 12/31/23  0438 01/01/24  0438    141   K 3.9 " "4.2    109   CO2 22* 23    106   BUN 16 19   CREATININE 0.7 0.8   CALCIUM 9.5 9.7   ANIONGAP 11 9       Cardiac Markers:   No results for input(s): "CKMB", "MYOGLOBIN", "BNP", "TROPISTAT" in the last 48 hours.    Magnesium:   Recent Labs   Lab 12/31/23  0438 01/01/24  0438   MG 1.7 2.0       Troponin:   No results for input(s): "TROPONINI", "TROPONINIHS" in the last 48 hours.    TSH:   Recent Labs   Lab 12/29/23  1206   TSH 1.367       Urine Culture: No results for input(s): "LABURIN" in the last 48 hours.    Urine Studies:   No results for input(s): "COLORU", "APPEARANCEUA", "PHUR", "SPECGRAV", "PROTEINUA", "GLUCUA", "KETONESU", "BILIRUBINUA", "OCCULTUA", "NITRITE", "UROBILINOGEN", "LEUKOCYTESUR", "RBCUA", "WBCUA", "BACTERIA", "SQUAMEPITHEL", "HYALINECASTS" in the last 48 hours.    Invalid input(s): "WRIGHTSUR"      Significant Imaging: I have reviewed all pertinent imaging results/findings within the past 24 hours.    CXR 12/29/23: "FINDINGS:   Postoperative changes cervical region.  Loop recorder.The lungs are clear with normal appearance of pulmonary vasculature. No pleural effusion. No evident pneumothorax.     The cardiac silhouette is normal in size. The hilar and mediastinal contours are unremarkable.     Bones are intact. "    Assessment/Plan:      * Atrial flutter with rapid ventricular response  A flutter with RVR on admit  ER treatments including IV metoprolol, amio bolus did not result in conversion or rate control.  Started on dilt gtt  Briefly conversed to NSR but now remains in afib/flutter  Rate controlled to low 100s  Increase amio to 200mg PO BID (100mg outpatient)  Increase metoprolol to 50mg PO BID (25mg BID outpatient)  Wean dilt gtt as tolerated  Cont home eliquis      12/31: remains a-fib RVR despite med adjustment: amiodarone 200mg PO BID and metoprolol 50mg TID.  Remains on dilt gtt  BP lower end of normal with increasing meds for HR control. If drops will need to hold " aldactone today.  Cont eliquis  Cardiology asked for input today. Suspect needs transfer and will see if can facilitate transfer to [place] with her cardiologist    1/1: meds adjusted yeterday: amiodarone 400mg BID, dig load, metoprolol increased. Able to wean dilt gtt some but remains with tachycardia and on dilt gtt this morning. NPO after MN for possible DCCV in AM. Cont eliquis.     Asymptomatic bacteriuria  UA and culture E. coli noted but she has no UTI sx reported  Have held antibx at this point      Dementia with anxiety, unspecified dementia severity, unspecified dementia type  Follows with outside neurology  Resume home namenda  At baseline      Depression, major, recurrent, moderate  Patient has persistent depression which is mild and is currently controlled. Will Continue anti-depressant medications. We will not consult psychiatry at this time. Patient does not display psychosis at this time. Continue to monitor closely and adjust plan of care as needed.    Cont home cymbalta      Crohn's disease  Sulfasalazine while inpatient  Resume home meds at discharge  GI follow up at discharge as still reporting chronic diarrhea sx; will monitor while inpatient    1/1: no significant diarrhea since admit. Cont meds. Will follow      Cardiomyopathy, nonischemic  No signs of acute exacerbation at this time  CXR clear, no hypoxia, no LE edema  Resume home lasix and aldactone for now-hold if BP drops  Holding losartan while on dilt gtt; will wean gtt and resume home PO meds as BP tolerates  Strict I/O  Low Na diet  Heart rate control needed      Fibromyalgia  Cont home cymbalta and gabapentin        VTE Risk Mitigation (From admission, onward)           Ordered     apixaban tablet 5 mg  2 times daily         12/30/23 0819     IP VTE HIGH RISK PATIENT  Once         12/29/23 2254     Place sequential compression device  Until discontinued         12/29/23 2254                    Discharge Planning   ORALIA:      Code  Status: Full Code   Is the patient medically ready for discharge?:     Reason for patient still in hospital (select all that apply): Treatment  Discharge Plan A: Home            Critical care time spent on the evaluation and treatment of severe organ dysfunction, review of pertinent labs and imaging studies, discussions with consulting providers and discussions with patient/family: 24 minutes.      Neeru Hinkle MD  Department of Hospital Medicine   [facility] - Intensive Care

## 2024-01-01 NOTE — NURSING
"Patient with complaints of "pounding feeling" in chest with 's while at rest. Cardizem gtt increased to 7.5mg/hr, will continue to monitor.    "

## 2024-01-01 NOTE — SUBJECTIVE & OBJECTIVE
Past Medical History:   Diagnosis Date    Anticoagulant long-term use     Aortic atherosclerosis     Atrial fibrillation     Benign essential hypertension     Cataract     Senile    Chest pain     CHF (congestive heart failure)     Crohn's colitis     Depression, major, recurrent, moderate     Encounter for blood transfusion     Fibromyalgia     GERD (gastroesophageal reflux disease)     Hypertensive cardiomyopathy     IBS (irritable bowel syndrome)     Migraine     Opioid abuse, in remission     Orbital cellulitis on left     Osteopenia     Paget disease of bone     Palpitations     Port-A-Cath in place     right chest    Prolonged QT interval     RA (rheumatoid arthritis)     Sedative hypnotic or anxiolytic dependence     in remission     SOB (shortness of breath)     SVT (supraventricular tachycardia)     Unspecified urinary incontinence        Past Surgical History:   Procedure Laterality Date    ABLATION N/A 06/28/2023    Procedure: Ablation;  Surgeon: Johnny Nieves MD;  Location: [facility] CATH;  Service: Cardiology;  Laterality: N/A;  ops # 7    ABLATION, ATRIAL FLUTTER, TYPICAL N/A 06/28/2023    Procedure: Ablation, Atrial Flutter, Typical;  Surgeon: Johnny Nieves MD;  Location: [facility] CATH;  Service: Cardiology;  Laterality: N/A;    CATARACT EXTRACTION W/  INTRAOCULAR LENS IMPLANT Left 02/21/2017    Dr. Christianson    CATARACT EXTRACTION W/  INTRAOCULAR LENS IMPLANT Right 03/07/2017    Dr. Christianson    CHOLECYSTECTOMY      COLON SURGERY      COLONOSCOPY N/A 03/16/2016    Procedure: COLONOSCOPY;  Surgeon: Dale Trejo MD;  [facility]  Service: Endoscopy;  Laterality: N/A;    COLONOSCOPY N/A 10/12/2020    Procedure: COLONOSCOPY;  Surgeon: Cali Urena MD;  Location: [facility] ENDO (4TH FLR);  Service: Endoscopy;  Laterality: N/A;  covid test 10/9-[place] [facility]- completed 10/9/20  ok to hold Coumadin x 5 days per coumadin clinic-see telephone encounterd ated 10/6-MS / Loop  recorder  10/6/20- Pt confirmed- ERW@1122  10/9-Pt confirmed earlier arrival time, prep ins. reviewed and emailed to Pt    COLONOSCOPY N/A 08/25/2022    Procedure: COLONOSCOPY;  Surgeon: Lewis Luke MD;  Location: [facility];  Service: Endoscopy;  Laterality: N/A;    Colostomy reversal      EYE SURGERY      HEMORRHOID SURGERY      HYSTERECTOMY      ILEOSTOMY      ILEOSTOMY CLOSURE      LEFT HEART CATHETERIZATION N/A 02/15/2019    Procedure: HEART CATH-LEFT;  Surgeon: Miky Keita MD;  Location: [facility] CATH LAB;  Service: Cardiology;  Laterality: N/A;    NECK SURGERY      OOPHORECTOMY Bilateral     SBO      SMALL INTESTINE SURGERY      TONSILLECTOMY      TUBAL LIGATION         Review of patient's allergies indicates:   Allergen Reactions    Octreotide acetate Nausea And Vomiting     Had symptoms when she took Sandostatin with Codeine    Codeine Itching and Nausea And Vomiting     Pill form only, IV ok.,  Had symptoms when she took Codeine with Sandostatin.  Other reaction(s): Itching    Morphine Nausea And Vomiting     Patient reports reaction only when she takes po form of Morphine.    Iodine Nausea And Vomiting     States only when eats too much seafood    Opioids - morphine analogues Nausea And Vomiting    Simvastatin Nausea And Vomiting       No current facility-administered medications on file prior to encounter.     Current Outpatient Medications on File Prior to Encounter   Medication Sig    amiodarone (PACERONE) 200 MG Tab Take 0.5 tablets (100 mg total) by mouth once daily.    amLODIPine (NORVASC) 5 MG tablet Take 5 mg by mouth once daily.    apixaban (ELIQUIS) 5 mg Tab Take 1 tablet (5 mg total) by mouth 2 (two) times daily.    atorvastatin (LIPITOR) 40 MG tablet Take 40 mg by mouth every evening.    balsalazide (COLAZAL) 750 mg capsule Take 3 capsules (2,250 mg total) by mouth 3 (three) times daily.    DULoxetine (CYMBALTA) 60 MG capsule Take 1 capsule (60 mg total) by mouth once daily.    FARXIGA 10 mg  tablet Take 10 mg by mouth once daily.    gabapentin (NEURONTIN) 300 MG capsule Take 1 capsule (300 mg total) by mouth 3 (three) times daily.    losartan (COZAAR) 25 MG tablet Take 25 mg by mouth once daily.    metoprolol succinate (TOPROL-XL) 25 MG 24 hr tablet Take 25 mg by mouth 2 (two) times a day.    potassium chloride SA (K-DUR,KLOR-CON) 20 MEQ tablet Take 2 tablets (40 mEq total) by mouth once daily.    spironolactone (ALDACTONE) 25 MG tablet Take 12.5 mg by mouth once daily.    albuterol-ipratropium (DUO-NEB) 2.5 mg-0.5 mg/3 mL nebulizer solution Take 3 mLs by nebulization every 6 (six) hours as needed for Wheezing. Rescue (Patient not taking: Reported on 12/29/2023)    COMIRNATY 2023-24, 12Y UP,,PF, 30 mcg/0.3 mL inection     cyanocobalamin 1,000 mcg/mL injection Inject 1 mL (1,000 mcg total) into the muscle once a week.    FLUAD QUAD 2023-24,65Y UP,,PF, 60 mcg (15 mcg x 4)/0.5 mL Syrg     furosemide (LASIX) 20 MG tablet Take 20 mg by mouth Daily.    INNOSPIRE ESSENCE Manisha use as directed    levalbuterol (XOPENEX HFA) 45 mcg/actuation inhaler Inhale 1-2 puffs into the lungs every 4 (four) hours as needed for Wheezing. Rescue (Patient not taking: Reported on 12/29/2023)    memantine (NAMENDA) 10 MG Tab Take 10 mg by mouth 2 (two) times daily.    nitroGLYCERIN (NITROSTAT) 0.4 MG SL tablet Place 0.4 mg under the tongue every 5 (five) minutes as needed.    pantoprazole (PROTONIX) 40 MG tablet Increase to 40mg twice daily x 2 wks then resume daily thereafter (Patient taking differently: Take 40 mg by mouth once daily.)    [DISCONTINUED] nortriptyline (PAMELOR) 10 MG capsule TAKE 1 CAPSULE (10 MG TOTAL) BY MOUTH EVERY EVENING.    [DISCONTINUED] traZODone (DESYREL) 50 MG tablet Take 1 tablet (50 mg total) by mouth nightly as needed.     Family History       Problem Relation (Age of Onset)    Breast cancer Daughter (40), Daughter (47), Sister    Cancer Mother    Colon cancer Maternal Grandmother    Emphysema Father  (67)    Glaucoma Paternal Grandmother    Heart attack Maternal Grandmother, Sister    Heart disease Father    Lung cancer Sister    Other Daughter    Retinal detachment Grandchild          Tobacco Use    Smoking status: Never    Smokeless tobacco: Never   Substance and Sexual Activity    Alcohol use: Yes     Comment: wine coolers occassionally    Drug use: No    Sexual activity: Not Currently     Partners: Female     Review of Systems   Constitutional:  Negative for activity change, fatigue, fever and unexpected weight change.   HENT:  Negative for congestion, ear pain, hearing loss, rhinorrhea and sore throat.    Eyes:  Negative for redness and visual disturbance.   Respiratory:  Positive for shortness of breath (improved since admit). Negative for cough and wheezing.    Cardiovascular:  Negative for chest pain, palpitations and leg swelling.   Gastrointestinal:  Positive for diarrhea (reported by patient, chronic, Crohn's diseased). Negative for abdominal pain, constipation, nausea and vomiting.   Genitourinary:  Negative for dysuria, frequency and urgency.   Musculoskeletal:  Positive for arthralgias (chronic, fibromyalgia) and myalgias. Negative for back pain, joint swelling and neck pain.   Skin:  Negative for color change, rash and wound.   Neurological:  Negative for dizziness, tremors, light-headedness and headaches.     Objective:     Vital Signs (Most Recent):  Temp: 97.5 °F (36.4 °C) (01/01/24 0710)  Pulse: 104 (01/01/24 0801)  Resp: 19 (01/01/24 0801)  BP: 96/71 (01/01/24 0801)  SpO2: 98 % (01/01/24 0801) Vital Signs (24h Range):  Temp:  [97.2 °F (36.2 °C)-98.5 °F (36.9 °C)] 97.5 °F (36.4 °C)  Pulse:  [] 104  Resp:  [11-26] 19  SpO2:  [96 %-99 %] 98 %  BP: ()/(56-96) 96/71     Weight: 81.4 kg (179 lb 7.3 oz)  Body mass index is 29.86 kg/m².     Physical Exam  Vitals and nursing note reviewed.   Constitutional:       General: She is not in acute distress.     Appearance: She is well-developed.  "  HENT:      Head: Normocephalic and atraumatic.      Right Ear: External ear normal.      Left Ear: External ear normal.      Nose: Nose normal.   Eyes:      General:         Right eye: No discharge.         Left eye: No discharge.      Conjunctiva/sclera: Conjunctivae normal.   Neck:      Thyroid: No thyromegaly.   Cardiovascular:      Rate and Rhythm: Tachycardia present. Rhythm irregular.      Heart sounds: No murmur heard.  Pulmonary:      Effort: Pulmonary effort is normal. No respiratory distress.      Breath sounds: No rales.   Abdominal:      General: There is no distension.      Palpations: Abdomen is soft.      Tenderness: There is no abdominal tenderness.   Musculoskeletal:      Right lower leg: No edema.      Left lower leg: No edema.   Skin:     General: Skin is warm and dry.   Neurological:      Mental Status: She is alert and oriented to person, place, and time.   Psychiatric:         Behavior: Behavior normal.         Thought Content: Thought content normal.                Significant Labs: All pertinent labs within the past 24 hours have been reviewed.  Blood Culture: No results for input(s): "LABBLOO" in the last 48 hours.  CBC:   Recent Labs   Lab 12/31/23 0438 01/01/24  0438   WBC 10.46 10.30   HGB 12.9 13.1   HCT 39.6 41.4    240       CMP:   Recent Labs   Lab 12/31/23 0438 01/01/24  0438    141   K 3.9 4.2    109   CO2 22* 23    106   BUN 16 19   CREATININE 0.7 0.8   CALCIUM 9.5 9.7   ANIONGAP 11 9       Cardiac Markers:   No results for input(s): "CKMB", "MYOGLOBIN", "BNP", "TROPISTAT" in the last 48 hours.    Magnesium:   Recent Labs   Lab 12/31/23 0438 01/01/24  0438   MG 1.7 2.0       Troponin:   No results for input(s): "TROPONINI", "TROPONINIHS" in the last 48 hours.    TSH:   Recent Labs   Lab 12/29/23  1206   TSH 1.367       Urine Culture: No results for input(s): "LABURIN" in the last 48 hours.    Urine Studies:   No results for input(s): "COLORU", " ""APPEARANCEUA", "PHUR", "SPECGRAV", "PROTEINUA", "GLUCUA", "KETONESU", "BILIRUBINUA", "OCCULTUA", "NITRITE", "UROBILINOGEN", "LEUKOCYTESUR", "RBCUA", "WBCUA", "BACTERIA", "SQUAMEPITHEL", "HYALINECASTS" in the last 48 hours.    Invalid input(s): "WRIGHTSUR"      Significant Imaging: I have reviewed all pertinent imaging results/findings within the past 24 hours.    CXR 12/29/23: "FINDINGS:   Postoperative changes cervical region.  Loop recorder.The lungs are clear with normal appearance of pulmonary vasculature. No pleural effusion. No evident pneumothorax.     The cardiac silhouette is normal in size. The hilar and mediastinal contours are unremarkable.     Bones are intact. "  "

## 2024-01-01 NOTE — NURSING
"Patient still with complaints of "pounding" in chest and -120's. Cardizem gtt titrated to 10mg/hr, will continue to monitor.   "

## 2024-01-01 NOTE — ASSESSMENT & PLAN NOTE
A flutter with RVR on admit  ER treatments including IV metoprolol, amio bolus did not result in conversion or rate control.  Started on dilt gtt  Briefly conversed to NSR but now remains in afib/flutter  Rate controlled to low 100s  Increase amio to 200mg PO BID (100mg outpatient)  Increase metoprolol to 50mg PO BID (25mg BID outpatient)  Wean dilt gtt as tolerated  Cont [place] eliquis      12/31: remains a-fib RVR despite med adjustment: amiodarone 200mg PO BID and metoprolol 50mg TID.  Remains on dilt gtt  BP lower end of normal with increasing meds for HR control. If drops will need to hold aldactone today.  [facility]  Cardiology asked for input today. Suspect needs transfer and will see if can facilitate transfer to [place] with her cardiologist    1/1: meds adjusted yeterday: amiodarone 400mg BID, dig load, metoprolol increased. Able to wean dilt gtt some but remains with tachycardia and on dilt gtt this morning. NPO after MN for possible DCCV in AM. Cont eliquis.

## 2024-01-01 NOTE — ASSESSMENT & PLAN NOTE
No signs of acute exacerbation at this time  CXR clear, no hypoxia, no LE edema  Resume home lasix and aldactone for now-hold if BP drops  Holding losartan while on dilt gtt; will wean gtt and resume home PO meds as BP tolerates  Strict I/O  Low Na diet  Heart rate control needed

## 2024-01-01 NOTE — ASSESSMENT & PLAN NOTE
Sulfasalazine while inpatient  Resume home meds at discharge  GI follow up at discharge as still reporting chronic diarrhea sx; will monitor while inpatient    1/1: no significant diarrhea since admit. Cont meds. Will follow

## 2024-01-02 ENCOUNTER — ANESTHESIA (OUTPATIENT)
Dept: SURGERY | Facility: HOSPITAL | Age: 74
DRG: 309 | End: 2024-01-02
Payer: MEDICARE

## 2024-01-02 ENCOUNTER — ANESTHESIA EVENT (OUTPATIENT)
Dept: SURGERY | Facility: HOSPITAL | Age: 74
DRG: 309 | End: 2024-01-02
Payer: MEDICARE

## 2024-01-02 LAB
ALBUMIN SERPL BCP-MCNC: 3.6 G/DL (ref 3.5–5.2)
ALP SERPL-CCNC: 76 U/L (ref 55–135)
ALT SERPL W/O P-5'-P-CCNC: 11 U/L (ref 10–44)
ANION GAP SERPL CALC-SCNC: 10 MMOL/L (ref 8–16)
AST SERPL-CCNC: 17 U/L (ref 10–40)
BASOPHILS # BLD AUTO: 0.07 K/UL (ref 0–0.2)
BASOPHILS NFR BLD: 0.7 % (ref 0–1.9)
BILIRUB SERPL-MCNC: 1.6 MG/DL (ref 0.1–1)
BSA FOR ECHO PROCEDURE: 1.93 M2
BUN SERPL-MCNC: 20 MG/DL (ref 8–23)
CALCIUM SERPL-MCNC: 9.4 MG/DL (ref 8.7–10.5)
CHLORIDE SERPL-SCNC: 107 MMOL/L (ref 95–110)
CO2 SERPL-SCNC: 20 MMOL/L (ref 23–29)
CREAT SERPL-MCNC: 0.9 MG/DL (ref 0.5–1.4)
DIFFERENTIAL METHOD BLD: ABNORMAL
DIGOXIN SERPL-MCNC: 1.6 NG/ML (ref 0.8–2)
EOSINOPHIL # BLD AUTO: 0.1 K/UL (ref 0–0.5)
EOSINOPHIL NFR BLD: 0.9 % (ref 0–8)
ERYTHROCYTE [DISTWIDTH] IN BLOOD BY AUTOMATED COUNT: 13.4 % (ref 11.5–14.5)
EST. GFR  (NO RACE VARIABLE): >60 ML/MIN/1.73 M^2
GLUCOSE SERPL-MCNC: 89 MG/DL (ref 70–110)
HCT VFR BLD AUTO: 43.7 % (ref 37–48.5)
HGB BLD-MCNC: 13.9 G/DL (ref 12–16)
IMM GRANULOCYTES # BLD AUTO: 0.03 K/UL (ref 0–0.04)
IMM GRANULOCYTES NFR BLD AUTO: 0.3 % (ref 0–0.5)
LYMPHOCYTES # BLD AUTO: 2.3 K/UL (ref 1–4.8)
LYMPHOCYTES NFR BLD: 23.3 % (ref 18–48)
MAGNESIUM SERPL-MCNC: 1.7 MG/DL (ref 1.6–2.6)
MCH RBC QN AUTO: 27.3 PG (ref 27–31)
MCHC RBC AUTO-ENTMCNC: 31.8 G/DL (ref 32–36)
MCV RBC AUTO: 86 FL (ref 82–98)
MONOCYTES # BLD AUTO: 0.7 K/UL (ref 0.3–1)
MONOCYTES NFR BLD: 7.6 % (ref 4–15)
NEUTROPHILS # BLD AUTO: 6.5 K/UL (ref 1.8–7.7)
NEUTROPHILS NFR BLD: 67.2 % (ref 38–73)
NRBC BLD-RTO: 0 /100 WBC
PHOSPHATE SERPL-MCNC: 3.9 MG/DL (ref 2.7–4.5)
PLATELET # BLD AUTO: 241 K/UL (ref 150–450)
PMV BLD AUTO: 10.6 FL (ref 9.2–12.9)
POTASSIUM SERPL-SCNC: 3.5 MMOL/L (ref 3.5–5.1)
PROT SERPL-MCNC: 7.1 G/DL (ref 6–8.4)
RBC # BLD AUTO: 5.1 M/UL (ref 4–5.4)
SODIUM SERPL-SCNC: 137 MMOL/L (ref 136–145)
WBC # BLD AUTO: 9.71 K/UL (ref 3.9–12.7)

## 2024-01-02 PROCEDURE — 25000003 PHARM REV CODE 250: Performed by: INTERNAL MEDICINE

## 2024-01-02 PROCEDURE — 94761 N-INVAS EAR/PLS OXIMETRY MLT: CPT

## 2024-01-02 PROCEDURE — 63600175 PHARM REV CODE 636 W HCPCS: Performed by: INTERNAL MEDICINE

## 2024-01-02 PROCEDURE — 94760 N-INVAS EAR/PLS OXIMETRY 1: CPT

## 2024-01-02 PROCEDURE — 25000003 PHARM REV CODE 250: Performed by: NURSE ANESTHETIST, CERTIFIED REGISTERED

## 2024-01-02 PROCEDURE — 83735 ASSAY OF MAGNESIUM: CPT | Performed by: INTERNAL MEDICINE

## 2024-01-02 PROCEDURE — 99233 SBSQ HOSP IP/OBS HIGH 50: CPT | Mod: ,,, | Performed by: PHYSICIAN ASSISTANT

## 2024-01-02 PROCEDURE — 92960 CARDIOVERSION ELECTRIC EXT: CPT | Performed by: INTERNAL MEDICINE

## 2024-01-02 PROCEDURE — 80053 COMPREHEN METABOLIC PANEL: CPT | Performed by: INTERNAL MEDICINE

## 2024-01-02 PROCEDURE — 93005 ELECTROCARDIOGRAM TRACING: CPT

## 2024-01-02 PROCEDURE — 36000706: Performed by: INTERNAL MEDICINE

## 2024-01-02 PROCEDURE — 37000008 HC ANESTHESIA 1ST 15 MINUTES: Performed by: INTERNAL MEDICINE

## 2024-01-02 PROCEDURE — 36415 COLL VENOUS BLD VENIPUNCTURE: CPT | Performed by: INTERNAL MEDICINE

## 2024-01-02 PROCEDURE — 25000003 PHARM REV CODE 250: Performed by: STUDENT IN AN ORGANIZED HEALTH CARE EDUCATION/TRAINING PROGRAM

## 2024-01-02 PROCEDURE — D9220AH HC ANESTHESIA PROFESSIONAL FEE: Mod: QZ,P3,HCNC | Performed by: NURSE ANESTHETIST, CERTIFIED REGISTERED

## 2024-01-02 PROCEDURE — 63600175 PHARM REV CODE 636 W HCPCS: Performed by: PHYSICIAN ASSISTANT

## 2024-01-02 PROCEDURE — 63600175 PHARM REV CODE 636 W HCPCS: Performed by: NURSE ANESTHETIST, CERTIFIED REGISTERED

## 2024-01-02 PROCEDURE — 25000003 PHARM REV CODE 250: Performed by: PHYSICIAN ASSISTANT

## 2024-01-02 PROCEDURE — 84100 ASSAY OF PHOSPHORUS: CPT | Performed by: INTERNAL MEDICINE

## 2024-01-02 PROCEDURE — 5A2204Z RESTORATION OF CARDIAC RHYTHM, SINGLE: ICD-10-PCS | Performed by: INTERNAL MEDICINE

## 2024-01-02 PROCEDURE — 80162 ASSAY OF DIGOXIN TOTAL: CPT | Performed by: INTERNAL MEDICINE

## 2024-01-02 PROCEDURE — 37000009 HC ANESTHESIA EA ADD 15 MINS: Performed by: INTERNAL MEDICINE

## 2024-01-02 PROCEDURE — 71000033 HC RECOVERY, INTIAL HOUR: Performed by: INTERNAL MEDICINE

## 2024-01-02 PROCEDURE — 85025 COMPLETE CBC W/AUTO DIFF WBC: CPT | Performed by: INTERNAL MEDICINE

## 2024-01-02 PROCEDURE — 20000000 HC ICU ROOM

## 2024-01-02 PROCEDURE — 36000707: Performed by: INTERNAL MEDICINE

## 2024-01-02 PROCEDURE — 01922 ANES N-INVAS IMG/RADJ THER: CPT | Mod: QZ,P3,HCNC | Performed by: NURSE ANESTHETIST, CERTIFIED REGISTERED

## 2024-01-02 RX ORDER — ETOMIDATE 2 MG/ML
INJECTION INTRAVENOUS
Status: DISCONTINUED | OUTPATIENT
Start: 2024-01-02 | End: 2024-01-02

## 2024-01-02 RX ORDER — PROPOFOL 10 MG/ML
VIAL (ML) INTRAVENOUS
Status: DISCONTINUED | OUTPATIENT
Start: 2024-01-02 | End: 2024-01-02

## 2024-01-02 RX ORDER — AMIODARONE HYDROCHLORIDE 200 MG/1
200 TABLET ORAL DAILY
Status: DISCONTINUED | OUTPATIENT
Start: 2024-01-03 | End: 2024-01-03 | Stop reason: HOSPADM

## 2024-01-02 RX ORDER — POTASSIUM CHLORIDE 7.45 MG/ML
40 INJECTION INTRAVENOUS ONCE
Status: DISCONTINUED | OUTPATIENT
Start: 2024-01-02 | End: 2024-01-02

## 2024-01-02 RX ORDER — METOPROLOL SUCCINATE 50 MG/1
50 TABLET, EXTENDED RELEASE ORAL 2 TIMES DAILY
Status: DISCONTINUED | OUTPATIENT
Start: 2024-01-02 | End: 2024-01-03 | Stop reason: HOSPADM

## 2024-01-02 RX ORDER — SODIUM CHLORIDE 9 MG/ML
INJECTION, SOLUTION INTRAVENOUS
Status: DISCONTINUED | OUTPATIENT
Start: 2024-01-02 | End: 2024-01-03 | Stop reason: HOSPADM

## 2024-01-02 RX ORDER — LIDOCAINE HYDROCHLORIDE 20 MG/ML
INJECTION, SOLUTION EPIDURAL; INFILTRATION; INTRACAUDAL; PERINEURAL
Status: DISCONTINUED | OUTPATIENT
Start: 2024-01-02 | End: 2024-01-02

## 2024-01-02 RX ORDER — POTASSIUM CHLORIDE 7.45 MG/ML
10 INJECTION INTRAVENOUS
Status: COMPLETED | OUTPATIENT
Start: 2024-01-02 | End: 2024-01-02

## 2024-01-02 RX ADMIN — GABAPENTIN 300 MG: 300 CAPSULE ORAL at 08:01

## 2024-01-02 RX ADMIN — MEMANTINE HYDROCHLORIDE 10 MG: 5 TABLET ORAL at 08:01

## 2024-01-02 RX ADMIN — SULFASALAZINE 1000 MG: 500 TABLET ORAL at 08:01

## 2024-01-02 RX ADMIN — POTASSIUM CHLORIDE 10 MEQ: 7.46 INJECTION, SOLUTION INTRAVENOUS at 09:01

## 2024-01-02 RX ADMIN — DULOXETINE HYDROCHLORIDE 60 MG: 30 CAPSULE, DELAYED RELEASE ORAL at 08:01

## 2024-01-02 RX ADMIN — LIDOCAINE HYDROCHLORIDE 60 MG: 20 INJECTION, SOLUTION EPIDURAL; INFILTRATION; INTRACAUDAL; PERINEURAL at 11:01

## 2024-01-02 RX ADMIN — FUROSEMIDE 20 MG: 20 TABLET ORAL at 04:01

## 2024-01-02 RX ADMIN — POTASSIUM CHLORIDE 10 MEQ: 7.46 INJECTION, SOLUTION INTRAVENOUS at 08:01

## 2024-01-02 RX ADMIN — SODIUM CHLORIDE: 9 INJECTION, SOLUTION INTRAVENOUS at 07:01

## 2024-01-02 RX ADMIN — ETOMIDATE 2 MG: 2 INJECTION INTRAVENOUS at 11:01

## 2024-01-02 RX ADMIN — PROPOFOL 10 MG: 10 INJECTION, EMULSION INTRAVENOUS at 11:01

## 2024-01-02 RX ADMIN — SPIRONOLACTONE 12.5 MG: 25 TABLET ORAL at 08:01

## 2024-01-02 RX ADMIN — Medication 6 MG: at 08:01

## 2024-01-02 RX ADMIN — SODIUM CHLORIDE, SODIUM LACTATE, POTASSIUM CHLORIDE, AND CALCIUM CHLORIDE: .6; .31; .03; .02 INJECTION, SOLUTION INTRAVENOUS at 11:01

## 2024-01-02 RX ADMIN — CEFTRIAXONE SODIUM 1 G: 1 INJECTION, POWDER, FOR SOLUTION INTRAMUSCULAR; INTRAVENOUS at 12:01

## 2024-01-02 RX ADMIN — MUPIROCIN: 20 OINTMENT TOPICAL at 08:01

## 2024-01-02 RX ADMIN — ETOMIDATE 4 MG: 2 INJECTION INTRAVENOUS at 11:01

## 2024-01-02 RX ADMIN — PANTOPRAZOLE SODIUM 40 MG: 40 TABLET, DELAYED RELEASE ORAL at 08:01

## 2024-01-02 RX ADMIN — METOPROLOL SUCCINATE 100 MG: 50 TABLET, EXTENDED RELEASE ORAL at 08:01

## 2024-01-02 RX ADMIN — ATORVASTATIN CALCIUM 40 MG: 20 TABLET, FILM COATED ORAL at 08:01

## 2024-01-02 RX ADMIN — GABAPENTIN 300 MG: 300 CAPSULE ORAL at 04:01

## 2024-01-02 RX ADMIN — AMIODARONE HYDROCHLORIDE 400 MG: 200 TABLET ORAL at 08:01

## 2024-01-02 RX ADMIN — PROPOFOL 20 MG: 10 INJECTION, EMULSION INTRAVENOUS at 11:01

## 2024-01-02 RX ADMIN — APIXABAN 5 MG: 5 TABLET, FILM COATED ORAL at 08:01

## 2024-01-02 RX ADMIN — POTASSIUM CHLORIDE 10 MEQ: 7.46 INJECTION, SOLUTION INTRAVENOUS at 07:01

## 2024-01-02 RX ADMIN — SULFASALAZINE 1000 MG: 500 TABLET ORAL at 04:01

## 2024-01-02 RX ADMIN — POTASSIUM CHLORIDE 10 MEQ: 7.46 INJECTION, SOLUTION INTRAVENOUS at 11:01

## 2024-01-02 RX ADMIN — DIGOXIN 0.12 MG: 125 TABLET ORAL at 08:01

## 2024-01-02 RX ADMIN — METOPROLOL SUCCINATE 50 MG: 50 TABLET, EXTENDED RELEASE ORAL at 08:01

## 2024-01-02 NOTE — ANESTHESIA POSTPROCEDURE EVALUATION
Anesthesia Post Evaluation    Patient: Celine Sinclair    Procedure(s) Performed: Procedure(s) (LRB):  ECHOCARDIOGRAM,TRANSESOPHAGEAL (N/A)  CARDIOVERSION (N/A)    Final Anesthesia Type: general      Patient location during evaluation: PACU  Patient participation: Yes- Able to Participate  Level of consciousness: awake and alert, oriented and awake  Post-procedure vital signs: reviewed and stable  Pain management: adequate  Airway patency: patent    PONV status at discharge: No PONV  Anesthetic complications: no      Cardiovascular status: blood pressure returned to baseline  Respiratory status: unassisted, spontaneous ventilation and room air  Hydration status: euvolemic  Follow-up not needed.  Comments: [facility]              Vitals Value Taken Time   /68 01/02/24 1216   Temp 36.2 °C (97.1 °F) 01/02/24 1145   Pulse 65 01/02/24 1231   Resp 25 01/02/24 1231   SpO2 98 % 01/02/24 1231   Vitals shown include unvalidated device data.      Event Time   Out of Recovery 01/02/2024 11:39:26         Pain/Yoana Score: Pain Rating Prior to Med Admin: 5 (1/1/2024  5:06 PM)  Pain Rating Post Med Admin: 3 (1/1/2024  5:58 PM)  Yoana Score: 10 (1/2/2024 11:39 AM)

## 2024-01-02 NOTE — EICU
Intervention Initiated From:  COR / JUANA Portillo intervened regarding:  Rounding (Video assessment)

## 2024-01-02 NOTE — ASSESSMENT & PLAN NOTE
A flutter with RVR on admit  ER treatments including IV metoprolol, amio bolus did not result in conversion or rate control.  Started on dilt gtt  Briefly conversed to NSR but now remains in afib/flutter  Rate controlled to low 100s  Increase amio to 200mg PO BID (100mg outpatient)  Increase metoprolol to 50mg PO BID (25mg BID outpatient)  Wean dilt gtt as tolerated  Cont home eliquis      12/31: remains a-fib RVR despite med adjustment: amiodarone 200mg PO BID and metoprolol 50mg TID.  Remains on dilt gtt  BP lower end of normal with increasing meds for HR control. If drops will need to hold aldactone today.  [facility]  Cardiology asked for input today. Suspect needs transfer and will see if can facilitate transfer to [place] with her cardiologist    1/1: meds adjusted yeterday: amiodarone 400mg BID, dig load, metoprolol increased. Able to wean dilt gtt some but remains with tachycardia and on dilt gtt this morning. NPO after MN for possible DCCV in AM. Cont eliquis.     1/2 Off diltiazem gtt.  Plan for cardioversion today.

## 2024-01-02 NOTE — SUBJECTIVE & OBJECTIVE
Past Medical History:   Diagnosis Date    Anticoagulant long-term use     Aortic atherosclerosis     Atrial fibrillation     Benign essential hypertension     Cataract     Senile    Chest pain     CHF (congestive heart failure)     Crohn's colitis     Depression, major, recurrent, moderate     Encounter for blood transfusion     Fibromyalgia     GERD (gastroesophageal reflux disease)     Hypertensive cardiomyopathy     IBS (irritable bowel syndrome)     Migraine     Opioid abuse, in remission     Orbital cellulitis on left     Osteopenia     Paget disease of bone     Palpitations     Port-A-Cath in place     right chest    Prolonged QT interval     RA (rheumatoid arthritis)     Sedative hypnotic or anxiolytic dependence     in remission     SOB (shortness of breath)     SVT (supraventricular tachycardia)     Unspecified urinary incontinence        Past Surgical History:   Procedure Laterality Date    ABLATION N/A 06/28/2023    Procedure: Ablation;  Surgeon: Johnny Nieves MD;  Location: [facility] CATH;  Service: Cardiology;  Laterality: N/A;  ops # 7    ABLATION, ATRIAL FLUTTER, TYPICAL N/A 06/28/2023    Procedure: Ablation, Atrial Flutter, Typical;  Surgeon: Johnny Nieves MD;  Location: [facility] CATH;  Service: Cardiology;  Laterality: N/A;    CATARACT EXTRACTION W/  INTRAOCULAR LENS IMPLANT Left 02/21/2017    Dr. Christianson    CATARACT EXTRACTION W/  INTRAOCULAR LENS IMPLANT Right 03/07/2017    Dr. Christianson    CHOLECYSTECTOMY      COLON SURGERY      COLONOSCOPY N/A 03/16/2016    Procedure: COLONOSCOPY;  Surgeon: Dale Trejo MD;  [facility]  Service: Endoscopy;  Laterality: N/A;    COLONOSCOPY N/A 10/12/2020    Procedure: COLONOSCOPY;  Surgeon: Cali Urena MD;  Location: [facility] ENDO (4TH FLR);  Service: Endoscopy;  Laterality: N/A;  covid test 10/9-[facility]- completed 10/9/20  ok to hold Coumadin x 5 days per coumadin clinic-see telephone encounterd ated 10/6-MS / Loop  recorder  10/6/20- Pt confirmed- ERW@1122  10/9-Pt confirmed earlier arrival time, prep ins. reviewed and emailed to Pt    COLONOSCOPY N/A 08/25/2022    Procedure: COLONOSCOPY;  Surgeon: Lewis Luke MD;  Location: [facility];  Service: Endoscopy;  Laterality: N/A;    Colostomy reversal      EYE SURGERY      HEMORRHOID SURGERY      HYSTERECTOMY      ILEOSTOMY      ILEOSTOMY CLOSURE      LEFT HEART CATHETERIZATION N/A 02/15/2019    Procedure: HEART CATH-LEFT;  Surgeon: Miky Keita MD;  Location: [facility] CATH LAB;  Service: Cardiology;  Laterality: N/A;    NECK SURGERY      OOPHORECTOMY Bilateral     SBO      SMALL INTESTINE SURGERY      TONSILLECTOMY      TUBAL LIGATION         Review of patient's allergies indicates:   Allergen Reactions    Octreotide acetate Nausea And Vomiting     Had symptoms when she took Sandostatin with Codeine    Codeine Itching and Nausea And Vomiting     Pill form only, IV ok.,  Had symptoms when she took Codeine with Sandostatin.  Other reaction(s): Itching    Morphine Nausea And Vomiting     Patient reports reaction only when she takes po form of Morphine.    Iodine Nausea And Vomiting     States only when eats too much seafood    Opioids - morphine analogues Nausea And Vomiting    Simvastatin Nausea And Vomiting       No current facility-administered medications on file prior to encounter.     Current Outpatient Medications on File Prior to Encounter   Medication Sig    amiodarone (PACERONE) 200 MG Tab Take 0.5 tablets (100 mg total) by mouth once daily.    amLODIPine (NORVASC) 5 MG tablet Take 5 mg by mouth once daily.    apixaban (ELIQUIS) 5 mg Tab Take 1 tablet (5 mg total) by mouth 2 (two) times daily.    atorvastatin (LIPITOR) 40 MG tablet Take 40 mg by mouth every evening.    balsalazide (COLAZAL) 750 mg capsule Take 3 capsules (2,250 mg total) by mouth 3 (three) times daily.    DULoxetine (CYMBALTA) 60 MG capsule Take 1 capsule (60 mg total) by mouth once daily.    FARXIGA 10 mg  tablet Take 10 mg by mouth once daily.    gabapentin (NEURONTIN) 300 MG capsule Take 1 capsule (300 mg total) by mouth 3 (three) times daily.    losartan (COZAAR) 25 MG tablet Take 25 mg by mouth once daily.    metoprolol succinate (TOPROL-XL) 25 MG 24 hr tablet Take 25 mg by mouth 2 (two) times a day.    potassium chloride SA (K-DUR,KLOR-CON) 20 MEQ tablet Take 2 tablets (40 mEq total) by mouth once daily.    spironolactone (ALDACTONE) 25 MG tablet Take 12.5 mg by mouth once daily.    albuterol-ipratropium (DUO-NEB) 2.5 mg-0.5 mg/3 mL nebulizer solution Take 3 mLs by nebulization every 6 (six) hours as needed for Wheezing. Rescue (Patient not taking: Reported on 12/29/2023)    COMIRNATY 2023-24, 12Y UP,,PF, 30 mcg/0.3 mL inection     cyanocobalamin 1,000 mcg/mL injection Inject 1 mL (1,000 mcg total) into the muscle once a week.    FLUAD QUAD 2023-24,65Y UP,,PF, 60 mcg (15 mcg x 4)/0.5 mL Syrg     furosemide (LASIX) 20 MG tablet Take 20 mg by mouth Daily.    INNOSPIRE ESSENCE Manisha use as directed    levalbuterol (XOPENEX HFA) 45 mcg/actuation inhaler Inhale 1-2 puffs into the lungs every 4 (four) hours as needed for Wheezing. Rescue (Patient not taking: Reported on 12/29/2023)    memantine (NAMENDA) 10 MG Tab Take 10 mg by mouth 2 (two) times daily.    nitroGLYCERIN (NITROSTAT) 0.4 MG SL tablet Place 0.4 mg under the tongue every 5 (five) minutes as needed.    pantoprazole (PROTONIX) 40 MG tablet Increase to 40mg twice daily x 2 wks then resume daily thereafter (Patient taking differently: Take 40 mg by mouth once daily.)    [DISCONTINUED] nortriptyline (PAMELOR) 10 MG capsule TAKE 1 CAPSULE (10 MG TOTAL) BY MOUTH EVERY EVENING.    [DISCONTINUED] traZODone (DESYREL) 50 MG tablet Take 1 tablet (50 mg total) by mouth nightly as needed.     Family History       Problem Relation (Age of Onset)    Breast cancer Daughter (40), Daughter (47), Sister    Cancer Mother    Colon cancer Maternal Grandmother    Emphysema Father  (67)    Glaucoma Paternal Grandmother    Heart attack Maternal Grandmother, Sister    Heart disease Father    Lung cancer Sister    Other Daughter    Retinal detachment Grandchild          Tobacco Use    Smoking status: Never    Smokeless tobacco: Never   Substance and Sexual Activity    Alcohol use: Yes     Comment: wine coolers occassionally    Drug use: No    Sexual activity: Not Currently     Partners: Female     Review of Systems   Constitutional:  Negative for activity change, fatigue, fever and unexpected weight change.   HENT:  Negative for congestion, ear pain, hearing loss, rhinorrhea and sore throat.    Eyes:  Negative for redness and visual disturbance.   Respiratory:  Positive for shortness of breath (improved since admit). Negative for cough and wheezing.    Cardiovascular:  Positive for chest pain (tightness). Negative for palpitations and leg swelling.   Gastrointestinal:  Negative for abdominal pain, constipation, diarrhea (reported by patient, chronic, Crohn's diseased), nausea and vomiting.   Genitourinary:  Negative for dysuria, frequency and urgency.   Musculoskeletal:  Positive for arthralgias (chronic, fibromyalgia) and myalgias. Negative for back pain, joint swelling and neck pain.   Skin:  Negative for color change, rash and wound.   Neurological:  Negative for dizziness, tremors, light-headedness and headaches.     Objective:     Vital Signs (Most Recent):  Temp: 97.1 °F (36.2 °C) (01/02/24 1145)  Pulse: 62 (01/02/24 1145)  Resp: 18 (01/02/24 1145)  BP: 114/76 (01/02/24 1145)  SpO2: 95 % (01/02/24 1145) Vital Signs (24h Range):  Temp:  [97 °F (36.1 °C)-97.7 °F (36.5 °C)] 97.1 °F (36.2 °C)  Pulse:  [] 62  Resp:  [14-28] 18  SpO2:  [94 %-100 %] 95 %  BP: ()/(63-87) 114/76     Weight: 81.4 kg (179 lb 7.3 oz)  Body mass index is 29.86 kg/m².     Physical Exam  Vitals and nursing note reviewed.   Constitutional:       General: She is not in acute distress.     Appearance: She is  "well-developed.   HENT:      Head: Normocephalic and atraumatic.      Right Ear: External ear normal.      Left Ear: External ear normal.      Nose: Nose normal.   Eyes:      General:         Right eye: No discharge.         Left eye: No discharge.      Conjunctiva/sclera: Conjunctivae normal.   Neck:      Thyroid: No thyromegaly.   Cardiovascular:      Rate and Rhythm: Tachycardia present. Rhythm irregular.      Heart sounds: No murmur heard.  Pulmonary:      Effort: Pulmonary effort is normal. No respiratory distress.      Breath sounds: No rales.   Abdominal:      General: There is no distension.      Palpations: Abdomen is soft.      Tenderness: There is no abdominal tenderness.   Musculoskeletal:      Right lower leg: No edema.      Left lower leg: No edema.   Skin:     General: Skin is warm and dry.   Neurological:      Mental Status: She is alert and oriented to person, place, and time.   Psychiatric:         Behavior: Behavior normal.         Thought Content: Thought content normal.                Significant Labs: All pertinent labs within the past 24 hours have been reviewed.  Blood Culture: No results for input(s): "LABBLOO" in the last 48 hours.  CBC:   Recent Labs   Lab 01/01/24  0438 01/02/24  0429   WBC 10.30 9.71   HGB 13.1 13.9   HCT 41.4 43.7    241       CMP:   Recent Labs   Lab 01/01/24  0438 01/02/24  0429    137   K 4.2 3.5    107   CO2 23 20*    89   BUN 19 20   CREATININE 0.8 0.9   CALCIUM 9.7 9.4   PROT  --  7.1   ALBUMIN  --  3.6   BILITOT  --  1.6*   ALKPHOS  --  76   AST  --  17   ALT  --  11   ANIONGAP 9 10       Cardiac Markers:   No results for input(s): "CKMB", "MYOGLOBIN", "BNP", "TROPISTAT" in the last 48 hours.    Magnesium:   Recent Labs   Lab 01/01/24  0438 01/02/24  0429   MG 2.0 1.7       Troponin:   Recent Labs   Lab 01/01/24  1030 01/01/24  2310   TROPONINI <0.006 <0.006       TSH:   Recent Labs   Lab 12/29/23  1206   TSH 1.367       Urine Culture: " "No results for input(s): "LABURIN" in the last 48 hours.    Urine Studies:   No results for input(s): "COLORU", "APPEARANCEUA", "PHUR", "SPECGRAV", "PROTEINUA", "GLUCUA", "KETONESU", "BILIRUBINUA", "OCCULTUA", "NITRITE", "UROBILINOGEN", "LEUKOCYTESUR", "RBCUA", "WBCUA", "BACTERIA", "SQUAMEPITHEL", "HYALINECASTS" in the last 48 hours.    Invalid input(s): "WRIGHTSUR"      Significant Imaging: I have reviewed all pertinent imaging results/findings within the past 24 hours.    CXR 12/29/23: "FINDINGS:   Postoperative changes cervical region.  Loop recorder.The lungs are clear with normal appearance of pulmonary vasculature. No pleural effusion. No evident pneumothorax.     The cardiac silhouette is normal in size. The hilar and mediastinal contours are unremarkable.     Bones are intact. "  "

## 2024-01-02 NOTE — OP NOTE
Procedure: BIRGIT  Dx:  AT  : Steve Moreno  Anesthesia: CRNA  See details in [facility]    Procedure: BIRGIT probe inserted via bite guard without any difficulty. Standard views were obtained at usual levels. Probe was removed without any complications. Well tolerated.  Findings are as follows:   LVEF 40%  LA appendage fluttering. No thrombus.  No MR,AI , TR or PI.  Minimal Aortic atherosclerosis.    CARDIOVERSION  PT CARDIOVERTED WITH SYNCH 120 J successfully to SB.    Plan:  ICU for obs  Hold Dig and reduce Metoprolol, and amio. Otherwise same.  Resume diet when awake    I attest that I have personally seen and examined this patient. I have reviewed and discussed the management in detail as outlined above.

## 2024-01-02 NOTE — PLAN OF CARE
Plan of care discussed with patient & voiced understanding. Patient slept most of night. Converted to sinus rhythm. Stopped diltiazem as ordered. HR at 124. Monomorphic PVCs. No shortness of breath, chest pain, nausea. Saline lock maintained.Pure wick maintained. Fall precautions maintained. Pt instructed to call with any new needs. Pt with slip resistant socks on; remains free of fall or injury.

## 2024-01-02 NOTE — TRANSFER OF CARE
"Anesthesia Transfer of Care Note    Patient: Celine Sinclair    Procedure(s) Performed: Procedure(s) (LRB):  ECHOCARDIOGRAM,TRANSESOPHAGEAL (N/A)  CARDIOVERSION (N/A)    Patient location: PACU    Anesthesia Type: general    Transport from OR: Transported from OR on 6-10 L/min O2 by face mask with adequate spontaneous ventilation    Post pain: adequate analgesia    Post assessment: no apparent anesthetic complications and tolerated procedure well    Post vital signs: stable    Level of consciousness: sedated    Nausea/Vomiting: no nausea/vomiting    Complications: none    Transfer of care protocol was followed      Last vitals: Visit Vitals  /72   Pulse (!) 127   Temp 36.5 °C (97.7 °F) (Skin)   Resp 20   Ht 5' 5" (1.651 m)   Wt 81.4 kg (179 lb 7.3 oz)   SpO2 100%   Breastfeeding No   BMI 29.86 kg/m²     "

## 2024-01-02 NOTE — PLAN OF CARE
Patient post successful cardioversion. No complaints at this time. HR sinus rhythm in the 60's. Patient reports absence of pounding in chest. VS stable. A/Ox4. IV potassium and ABX administered as ordered. Significant other at bedside, no complaints or concerns at this time. No acute changes noted. Plan of care reviewed and followed.      Problem: Fall Injury Risk  Goal: Absence of Fall and Fall-Related Injury  Outcome: Ongoing, Progressing     Problem: Adult Inpatient Plan of Care  Goal: Plan of Care Review  Outcome: Ongoing, Progressing  Flowsheets (Taken 1/2/2024 1351)  Plan of Care Reviewed With:   patient   spouse  Goal: Optimal Comfort and Wellbeing  Outcome: Ongoing, Progressing  Goal: Readiness for Transition of Care  Outcome: Ongoing, Progressing     Problem: Dysrhythmia  Goal: Normalized Cardiac Rhythm  Outcome: Ongoing, Progressing

## 2024-01-02 NOTE — ASSESSMENT & PLAN NOTE
UA and culture E. coli noted but she has no UTI sx reported  Have held antibx at this point    1/2: ecoli UTI.  Started IV rocephin.  Possible trigger of afib with RVR

## 2024-01-02 NOTE — PROGRESS NOTES
[facility] - Emergency Dept  Cardiology  Progress Note    Patient Name: Celine Sinclair  MRN: [id number]  Admission Date: 12/29/2023  Hospital Length of Stay: 4 days  Code Status: Full Code   Consulting Provider: Michelle Acosta NP  Primary Care Physician: Neeru Hinkle MD  Principal Problem:Atrial flutter with rapid ventricular response      Consults  Subjective:     Chief Complaint:  Chest pain, shortness of breath, palpitations    HPI:  73-year-old female with past medical history of atrial flutter, PAF, SVT, hypertension, chest pain, shortness of breath, normal coronaries June 2023 presents from PCP clinic with complaint of chest pain, shortness of breath, palpitations.  EKG done there was concerning for atrial flutter with rapid ventricular response, she was sent to emergency department.  Workup currently benign with lab work, however, EKG does reveal atrial flutter with rapid ventricular response.  Cis asked to evaluate.    Patient was loaded on IV Amiodarone and PO increased. She remains on Metoprolol, Eliquis, digoxin and diltiazem drip. She remains in atrial tachycardia at times. Plans for BIRGIT/DCCV today.     Past Medical History:   Diagnosis Date    Anticoagulant long-term use     Aortic atherosclerosis     Atrial fibrillation     Benign essential hypertension     Cataract     Senile    Chest pain     CHF (congestive heart failure)     Crohn's colitis     Depression, major, recurrent, moderate     Encounter for blood transfusion     Fibromyalgia     GERD (gastroesophageal reflux disease)     Hypertensive cardiomyopathy     IBS (irritable bowel syndrome)     Migraine     Opioid abuse, in remission     Orbital cellulitis on left     Osteopenia     Paget disease of bone     Palpitations     Port-A-Cath in place     right chest    Prolonged QT interval     RA (rheumatoid arthritis)     Sedative hypnotic or anxiolytic dependence     in remission     SOB (shortness of breath)     SVT (supraventricular tachycardia)      Unspecified urinary incontinence        Past Surgical History:   Procedure Laterality Date    ABLATION N/A 06/28/2023    Procedure: Ablation;  Surgeon: Johnny Nieves MD;  Location: [facility] CATH;  Service: Cardiology;  Laterality: N/A;  ops # 7    ABLATION, ATRIAL FLUTTER, TYPICAL N/A 06/28/2023    Procedure: Ablation, Atrial Flutter, Typical;  Surgeon: Johnny Nieves MD;  Location: [facility] CATH;  Service: Cardiology;  Laterality: N/A;    CATARACT EXTRACTION W/  INTRAOCULAR LENS IMPLANT Left 02/21/2017    Dr. Christianson    CATARACT EXTRACTION W/  INTRAOCULAR LENS IMPLANT Right 03/07/2017    Dr. Christianson    CHOLECYSTECTOMY      COLON SURGERY      COLONOSCOPY N/A 03/16/2016    Procedure: COLONOSCOPY;  Surgeon: Dale Trejo MD;  Location: [facility] ENDO (4TH FLR);  Service: Endoscopy;  Laterality: N/A;    COLONOSCOPY N/A 10/12/2020    Procedure: COLONOSCOPY;  Surgeon: Cali Urena MD;  Location: [facility] (4TH FLR);  Service: Endoscopy;  Laterality: N/A;  covid test 10/9-[facility]- completed 10/9/20  ok to hold Coumadin x 5 days per coumadin clinic-see telephone encounterd ated 10/6-MS / Loop recorder  10/6/20- Pt confirmed- ERW@1122  10/9-Pt confirmed earlier arrival time, prep ins. reviewed and emailed to Pt    COLONOSCOPY N/A 08/25/2022    Procedure: COLONOSCOPY;  Surgeon: Lewis Luke MD;  Location: [facility] ENDO;  Service: Endoscopy;  Laterality: N/A;    Colostomy reversal      EYE SURGERY      HEMORRHOID SURGERY      HYSTERECTOMY      ILEOSTOMY      ILEOSTOMY CLOSURE      LEFT HEART CATHETERIZATION N/A 02/15/2019    Procedure: HEART CATH-LEFT;  Surgeon: Miky Keita MD;  Location: [facility] [place] CATH LAB;  Service: Cardiology;  Laterality: N/A;    NECK SURGERY      OOPHORECTOMY Bilateral     SBO      SMALL INTESTINE SURGERY      TONSILLECTOMY      TUBAL LIGATION         Review of patient's allergies indicates:   Allergen Reactions    Octreotide acetate Nausea And Vomiting     Had symptoms when she took  Sandostatin with Codeine    Codeine Itching and Nausea And Vomiting     Pill form only, IV ok.,  Had symptoms when she took Codeine with Sandostatin.  Other reaction(s): Itching    Morphine Nausea And Vomiting     Patient reports reaction only when she takes po form of Morphine.    Iodine Nausea And Vomiting     States only when eats too much seafood    Opioids - morphine analogues Nausea And Vomiting    Simvastatin Nausea And Vomiting       No current facility-administered medications on file prior to encounter.     Current Outpatient Medications on File Prior to Encounter   Medication Sig    amiodarone (PACERONE) 200 MG Tab Take 0.5 tablets (100 mg total) by mouth once daily.    amLODIPine (NORVASC) 5 MG tablet Take 5 mg by mouth once daily.    apixaban (ELIQUIS) 5 mg Tab Take 1 tablet (5 mg total) by mouth 2 (two) times daily.    atorvastatin (LIPITOR) 40 MG tablet Take 40 mg by mouth every evening.    balsalazide (COLAZAL) 750 mg capsule Take 3 capsules (2,250 mg total) by mouth 3 (three) times daily.    DULoxetine (CYMBALTA) 60 MG capsule Take 1 capsule (60 mg total) by mouth once daily.    FARXIGA 10 mg tablet Take 10 mg by mouth once daily.    gabapentin (NEURONTIN) 300 MG capsule Take 1 capsule (300 mg total) by mouth 3 (three) times daily.    losartan (COZAAR) 25 MG tablet Take 25 mg by mouth once daily.    metoprolol succinate (TOPROL-XL) 25 MG 24 hr tablet Take 25 mg by mouth 2 (two) times a day.    potassium chloride SA (K-DUR,KLOR-CON) 20 MEQ tablet Take 2 tablets (40 mEq total) by mouth once daily.    spironolactone (ALDACTONE) 25 MG tablet Take 12.5 mg by mouth once daily.    albuterol-ipratropium (DUO-NEB) 2.5 mg-0.5 mg/3 mL nebulizer solution Take 3 mLs by nebulization every 6 (six) hours as needed for Wheezing. Rescue (Patient not taking: Reported on 12/29/2023)    COMIRNATY 2023-24, 12Y UP,,PF, 30 mcg/0.3 mL inection     cyanocobalamin 1,000 mcg/mL injection Inject 1 mL (1,000 mcg total) into the  muscle once a week.    FLUAD QUAD 2023-24,65Y UP,,PF, 60 mcg (15 mcg x 4)/0.5 mL Syrg     furosemide (LASIX) 20 MG tablet Take 20 mg by mouth Daily.    INNOSPIRE ESSENCE Manisha use as directed    levalbuterol (XOPENEX HFA) 45 mcg/actuation inhaler Inhale 1-2 puffs into the lungs every 4 (four) hours as needed for Wheezing. Rescue (Patient not taking: Reported on 12/29/2023)    memantine (NAMENDA) 10 MG Tab Take 10 mg by mouth 2 (two) times daily.    nitroGLYCERIN (NITROSTAT) 0.4 MG SL tablet Place 0.4 mg under the tongue every 5 (five) minutes as needed.    pantoprazole (PROTONIX) 40 MG tablet Increase to 40mg twice daily x 2 wks then resume daily thereafter (Patient taking differently: Take 40 mg by mouth once daily.)    [DISCONTINUED] nortriptyline (PAMELOR) 10 MG capsule TAKE 1 CAPSULE (10 MG TOTAL) BY MOUTH EVERY EVENING.    [DISCONTINUED] traZODone (DESYREL) 50 MG tablet Take 1 tablet (50 mg total) by mouth nightly as needed.     Family History       Problem Relation (Age of Onset)    Breast cancer Daughter (40), Daughter (47), Sister    Cancer Mother    Colon cancer Maternal Grandmother    Emphysema Father (67)    Glaucoma Paternal Grandmother    Heart attack Maternal Grandmother, Sister    Heart disease Father    Lung cancer Sister    Other Daughter    Retinal detachment Grandchild          Tobacco Use    Smoking status: Never    Smokeless tobacco: Never   Substance and Sexual Activity    Alcohol use: Yes     Comment: wine coolers occassionally    Drug use: No    Sexual activity: Not Currently     Partners: Female     Review of Systems   Constitutional: Negative.   HENT: Negative.     Eyes: Negative.    Cardiovascular:  Positive for chest pain.   Respiratory:  Positive for shortness of breath.    Skin: Negative.    Musculoskeletal: Negative.    Gastrointestinal: Negative.    Genitourinary: Negative.    Neurological: Negative.      Objective:     Vital Signs (Most Recent):  Temp: 97.7 °F (36.5 °C) (01/02/24  0715)  Pulse: (!) 125 (01/02/24 0800)  Resp: 17 (01/02/24 0800)  BP: 108/81 (01/02/24 0800)  SpO2: 97 % (01/02/24 0800) Vital Signs (24h Range):  Temp:  [96.9 °F (36.1 °C)-97.7 °F (36.5 °C)] 97.7 °F (36.5 °C)  Pulse:  [] 125  Resp:  [14-28] 17  SpO2:  [94 %-99 %] 97 %  BP: ()/() 108/81     Weight: 81.4 kg (179 lb 7.3 oz)  Body mass index is 29.86 kg/m².    SpO2: 97 %         Intake/Output Summary (Last 24 hours) at 1/2/2024 0805  Last data filed at 1/2/2024 0600  Gross per 24 hour   Intake 1086.64 ml   Output 600 ml   Net 486.64 ml         Lines/Drains/Airways       Drain  Duration             Female External Urinary Catheter w/ Suction 12/29/23 2300 3 days              Peripheral Intravenous Line  Duration                  Peripheral IV - Single Lumen 12/29/23 1404 20 G;2 in Right Antecubital 3 days                    Physical Exam  Vitals reviewed.   Constitutional:       Appearance: Normal appearance.   HENT:      Head: Normocephalic.      Nose: Nose normal.      Mouth/Throat:      Mouth: Mucous membranes are moist.   Eyes:      Conjunctiva/sclera: Conjunctivae normal.   Cardiovascular:      Rate and Rhythm: Tachycardia present.      Pulses: Normal pulses.      Heart sounds: Normal heart sounds.   Pulmonary:      Effort: Pulmonary effort is normal.      Breath sounds: Normal breath sounds.   Abdominal:      General: Abdomen is flat.      Palpations: Abdomen is soft.   Musculoskeletal:         General: Normal range of motion.      Cervical back: Normal range of motion.   Skin:     General: Skin is warm and dry.      Capillary Refill: Capillary refill takes less than 2 seconds.   Neurological:      General: No focal deficit present.      Mental Status: She is alert and oriented to person, place, and time.   Psychiatric:         Mood and Affect: Mood normal.         Behavior: Behavior normal.         Significant Labs: BMP:   Recent Labs   Lab 01/01/24  0438 01/02/24  0429    89    137    K 4.2 3.5    107   CO2 23 20*   BUN 19 20   CREATININE 0.8 0.9   CALCIUM 9.7 9.4   MG 2.0 1.7     , CMP   Recent Labs   Lab 01/01/24  0438 01/02/24  0429    137   K 4.2 3.5    107   CO2 23 20*    89   BUN 19 20   CREATININE 0.8 0.9   CALCIUM 9.7 9.4   PROT  --  7.1   ALBUMIN  --  3.6   BILITOT  --  1.6*   ALKPHOS  --  76   AST  --  17   ALT  --  11   ANIONGAP 9 10     , CBC   Recent Labs   Lab 01/01/24  0438 01/02/24  0429   WBC 10.30 9.71   HGB 13.1 13.9   HCT 41.4 43.7    241     , and Troponin   Recent Labs   Lab 01/01/24  1030 01/01/24  2310   TROPONINI <0.006 <0.006           Assessment and Plan:     Active Diagnoses:    Diagnosis Date Noted POA    PRINCIPAL PROBLEM:  Atrial flutter with rapid ventricular response [I48.92] 06/28/2023 Yes    Asymptomatic bacteriuria [R82.71] 12/31/2023 Yes    Dementia with anxiety, unspecified dementia severity, unspecified dementia type [F03.94] 10/02/2023 Yes    Depression, major, recurrent, moderate [F33.1]  Yes    Crohn's disease [K50.90] 10/12/2020 Yes    Fibromyalgia [M79.7] 06/18/2019 Yes    Cardiomyopathy, nonischemic [I42.8] 06/18/2019 Yes      Problems Resolved During this Admission:         VTE Risk Mitigation (From admission, onward)           Ordered     apixaban tablet 5 mg  2 times daily         12/30/23 0819     IP VTE HIGH RISK PATIENT  Once         12/29/23 2254     Place sequential compression device  Until discontinued         12/29/23 2254                  [facility] 7/23:      Echocardiogram May 2023   Ejection fraction 40-45%   Grade 2 diastolic dysfunction   Left atrial enlargement  No significant valvular pathology    Diagnosis:  Atrial tach with RVR despite amio, metoprolol, dig and diltiazem  Chest pain /NO SIGNIFICANT CAD [facility] 7/23  Shortness of breath   Palpitations  RECURRENCE OF Atrial flutter with rapid ventricular response S/P PV ABLATION 6/23  Hypertension SUBOPTIMAL  SVT     Plan: K supplement  Continue amiodarone,  eliquis, digoxin and metoprolol  BIRGIT/DCCV today (discussed in detail with pt)  Further recommendations to follow      Michelle Acosta NP scribed for Dr Moreno   Cardiology    [facility] - Emergency Dept  I attest that I have personally seen and examined this patient. I have reviewed and discussed the management in detail as outlined above.   I attest that I have personally seen and examined this patient. I have reviewed and discussed the management in detail as outlined above.

## 2024-01-02 NOTE — EICU
Intervention Initiated From:  COR / JUANA Portillo intervened regarding:  Rounding (Video assessment)      Comments:  Video rounding completed.  Pt awake w/o c/o.  No distress noted.  Chart reviewed.

## 2024-01-02 NOTE — PROGRESS NOTES
[facility]  Progress Note    Patient Name: Celine Sinclair  MRN: [id number]  Patient Class: IP- Inpatient   Admission Date: 12/29/2023  Length of Stay: 4 days  Attending Physician: Neeru Hinkle MD  Primary Care Provider: Neeru Hinkle MD        Subjective:     Principal Problem:Atrial flutter with rapid ventricular response        HPI:  Patient admitted to ICU with afib/flutter with RVR. She initially presented to PCP for routine 6month check up. In office was noticed to have tachycardia in 130s. She reported having chest pain, feeling SOB and lightheaded. Clinic EKG not best quality but a flutter RVR suspected and sent to ED. ED work up confirmed a flutter and cards consulted in ED. Did not respond to IV metoprolol, amiodarone bolus and ultimately started on diltiazem gtt. Her cardiologist is with [facility] at [facility] and transfer initiated as she was not responding to treatment but [facility] was unable to take her last night due to lack of beds. She briefly converted to NSR per report but then returned to afib/flutter with RVR. She has been on dilt gtt throughout the night and HR better controlled to low 100s. She reports chest pains and SOB improved.    Resuming PO meds. Plan to increase PO amiodarone to 200mg daily and increase PO metoprolol to 50mg BID and wean dilt gtt today. If unable to control may still require transfer for cardiology as we do not have cardiology coverage this weekend. For now, focusing on rate control. Eliquis resumed.     UA did show + nitrite, 10 WBC. However, she is asymptomatic. No antibx started at this time.     BNP slightly elevated. CXR clear. No oxygen requirement. Resumed home PO Lasix and aldactone. H/o CHF but no signs of true volume overload on exam today.     Overview/Hospital Course:  Patient remains a-fib RVR. Meds have been adjusted to amiodarone 200mg BID, metoprolol 50mg TID. HR briefly responded yesterday into the 70s but then resumed RVR  with -130s sustained overnight despite dilt gtt. BP is lower end of normal with meds. Remains on Eliquis. She feels fatigued today, intermittent headaches. Remains on RA and no signs of CHF decompensation as of yet. Lytes normal.     Will ask for cardiology input today and see if can help facilitate transfer to [place] with her cardiologist.     1/1: Meds adjusted yesterday. HR better controlled but still needing dilt gtt despite adjustments. Will make NPO after MN for possible DCCV in the AM. [facility] will eval in the morning. Remains stable and no signs of CHF decompensation.     1/2: Pt remains in afib with RVR today. Plan for cardioversion today.  Patient with Ecoli UTI.  Started IV rocephin.  Possible d/c within the next 24 hours.      Past Medical History:   Diagnosis Date    Anticoagulant long-term use     Aortic atherosclerosis     Atrial fibrillation     Benign essential hypertension     Cataract     Senile    Chest pain     CHF (congestive heart failure)     Crohn's colitis     Depression, major, recurrent, moderate     Encounter for blood transfusion     Fibromyalgia     GERD (gastroesophageal reflux disease)     Hypertensive cardiomyopathy     IBS (irritable bowel syndrome)     Migraine     Opioid abuse, in remission     Orbital cellulitis on left     Osteopenia     Paget disease of bone     Palpitations     Port-A-Cath in place     right chest    Prolonged QT interval     RA (rheumatoid arthritis)     Sedative hypnotic or anxiolytic dependence     in remission     SOB (shortness of breath)     SVT (supraventricular tachycardia)     Unspecified urinary incontinence        Past Surgical History:   Procedure Laterality Date    ABLATION N/A 06/28/2023    Procedure: Ablation;  Surgeon: Johnny Nieves MD;  Location: [facility] CATH;  Service: Cardiology;  Laterality: N/A;  ops # 7    ABLATION, ATRIAL FLUTTER, TYPICAL N/A 06/28/2023    Procedure: Ablation, Atrial Flutter, Typical;  Surgeon: Johnny Nieves  MD;  Location: [facility] CATH;  Service: Cardiology;  Laterality: N/A;    CATARACT EXTRACTION W/  INTRAOCULAR LENS IMPLANT Left 02/21/2017    Dr. Christianson    CATARACT EXTRACTION W/  INTRAOCULAR LENS IMPLANT Right 03/07/2017    Dr. Christianson    CHOLECYSTECTOMY      COLON SURGERY      COLONOSCOPY N/A 03/16/2016    Procedure: COLONOSCOPY;  Surgeon: Dale Trejo MD;  Location: [facility] (4TH FLR);  Service: Endoscopy;  Laterality: N/A;    COLONOSCOPY N/A 10/12/2020    Procedure: COLONOSCOPY;  Surgeon: Cali Urena MD;  Location: [facility] (4TH FLR);  Service: Endoscopy;  Laterality: N/A;  covid test 10/9-[place] [facility]- completed 10/9/20  ok to hold Coumadin x 5 days per coumadin clinic-see telephone encounterd ated 10/6-MS / Loop recorder  10/6/20- Pt confirmed- ERW@1122  10/9-Pt confirmed earlier arrival time, prep ins. reviewed and emailed to Pt    COLONOSCOPY N/A 08/25/2022    Procedure: COLONOSCOPY;  Surgeon: Lewis Luke MD;  Location: [facility];  Service: Endoscopy;  Laterality: N/A;    Colostomy reversal      EYE SURGERY      HEMORRHOID SURGERY      HYSTERECTOMY      ILEOSTOMY      ILEOSTOMY CLOSURE      LEFT HEART CATHETERIZATION N/A 02/15/2019    Procedure: HEART CATH-LEFT;  Surgeon: Miky Keita MD;  Location: [facility] CATH LAB;  Service: Cardiology;  Laterality: N/A;    NECK SURGERY      OOPHORECTOMY Bilateral     SBO      SMALL INTESTINE SURGERY      TONSILLECTOMY      TUBAL LIGATION         Review of patient's allergies indicates:   Allergen Reactions    Octreotide acetate Nausea And Vomiting     Had symptoms when she took Sandostatin with Codeine    Codeine Itching and Nausea And Vomiting     Pill form only, IV ok.,  Had symptoms when she took Codeine with Sandostatin.  Other reaction(s): Itching    Morphine Nausea And Vomiting     Patient reports reaction only when she takes po form of Morphine.    Iodine Nausea And Vomiting     States only when eats too much seafood    Opioids - morphine  analogues Nausea And Vomiting    Simvastatin Nausea And Vomiting       No current facility-administered medications on file prior to encounter.     Current Outpatient Medications on File Prior to Encounter   Medication Sig    amiodarone (PACERONE) 200 MG Tab Take 0.5 tablets (100 mg total) by mouth once daily.    amLODIPine (NORVASC) 5 MG tablet Take 5 mg by mouth once daily.    apixaban (ELIQUIS) 5 mg Tab Take 1 tablet (5 mg total) by mouth 2 (two) times daily.    atorvastatin (LIPITOR) 40 MG tablet Take 40 mg by mouth every evening.    balsalazide (COLAZAL) 750 mg capsule Take 3 capsules (2,250 mg total) by mouth 3 (three) times daily.    DULoxetine (CYMBALTA) 60 MG capsule Take 1 capsule (60 mg total) by mouth once daily.    FARXIGA 10 mg tablet Take 10 mg by mouth once daily.    gabapentin (NEURONTIN) 300 MG capsule Take 1 capsule (300 mg total) by mouth 3 (three) times daily.    losartan (COZAAR) 25 MG tablet Take 25 mg by mouth once daily.    metoprolol succinate (TOPROL-XL) 25 MG 24 hr tablet Take 25 mg by mouth 2 (two) times a day.    potassium chloride SA (K-DUR,KLOR-CON) 20 MEQ tablet Take 2 tablets (40 mEq total) by mouth once daily.    spironolactone (ALDACTONE) 25 MG tablet Take 12.5 mg by mouth once daily.    albuterol-ipratropium (DUO-NEB) 2.5 mg-0.5 mg/3 mL nebulizer solution Take 3 mLs by nebulization every 6 (six) hours as needed for Wheezing. Rescue (Patient not taking: Reported on 12/29/2023)    COMIRNATY 2023-24, 12Y UP,,PF, 30 mcg/0.3 mL inection     cyanocobalamin 1,000 mcg/mL injection Inject 1 mL (1,000 mcg total) into the muscle once a week.    FLUAD QUAD 2023-24,65Y UP,,PF, 60 mcg (15 mcg x 4)/0.5 mL Syrg     furosemide (LASIX) 20 MG tablet Take 20 mg by mouth Daily.    INNOSPIRE ESSENCE Manisha use as directed    levalbuterol (XOPENEX HFA) 45 mcg/actuation inhaler Inhale 1-2 puffs into the lungs every 4 (four) hours as needed for Wheezing. Rescue (Patient not taking: Reported on 12/29/2023)     memantine (NAMENDA) 10 MG Tab Take 10 mg by mouth 2 (two) times daily.    nitroGLYCERIN (NITROSTAT) 0.4 MG SL tablet Place 0.4 mg under the tongue every 5 (five) minutes as needed.    pantoprazole (PROTONIX) 40 MG tablet Increase to 40mg twice daily x 2 wks then resume daily thereafter (Patient taking differently: Take 40 mg by mouth once daily.)    [DISCONTINUED] nortriptyline (PAMELOR) 10 MG capsule TAKE 1 CAPSULE (10 MG TOTAL) BY MOUTH EVERY EVENING.    [DISCONTINUED] traZODone (DESYREL) 50 MG tablet Take 1 tablet (50 mg total) by mouth nightly as needed.     Family History       Problem Relation (Age of Onset)    Breast cancer Daughter (40), Daughter (47), Sister    Cancer Mother    Colon cancer Maternal Grandmother    Emphysema Father (67)    Glaucoma Paternal Grandmother    Heart attack Maternal Grandmother, Sister    Heart disease Father    Lung cancer Sister    Other Daughter    Retinal detachment Grandchild          Tobacco Use    Smoking status: Never    Smokeless tobacco: Never   Substance and Sexual Activity    Alcohol use: Yes     Comment: wine coolers occassionally    Drug use: No    Sexual activity: Not Currently     Partners: Female     Review of Systems   Constitutional:  Negative for activity change, fatigue, fever and unexpected weight change.   HENT:  Negative for congestion, ear pain, hearing loss, rhinorrhea and sore throat.    Eyes:  Negative for redness and visual disturbance.   Respiratory:  Positive for shortness of breath (improved since admit). Negative for cough and wheezing.    Cardiovascular:  Positive for chest pain (tightness). Negative for palpitations and leg swelling.   Gastrointestinal:  Negative for abdominal pain, constipation, diarrhea (reported by patient, chronic, Crohn's diseased), nausea and vomiting.   Genitourinary:  Negative for dysuria, frequency and urgency.   Musculoskeletal:  Positive for arthralgias (chronic, fibromyalgia) and myalgias. Negative for back pain,  "joint swelling and neck pain.   Skin:  Negative for color change, rash and wound.   Neurological:  Negative for dizziness, tremors, light-headedness and headaches.     Objective:     Vital Signs (Most Recent):  Temp: 97.1 °F (36.2 °C) (01/02/24 1145)  Pulse: 62 (01/02/24 1145)  Resp: 18 (01/02/24 1145)  BP: 114/76 (01/02/24 1145)  SpO2: 95 % (01/02/24 1145) Vital Signs (24h Range):  Temp:  [97 °F (36.1 °C)-97.7 °F (36.5 °C)] 97.1 °F (36.2 °C)  Pulse:  [] 62  Resp:  [14-28] 18  SpO2:  [94 %-100 %] 95 %  BP: ()/(63-87) 114/76     Weight: 81.4 kg (179 lb 7.3 oz)  Body mass index is 29.86 kg/m².     Physical Exam  Vitals and nursing note reviewed.   Constitutional:       General: She is not in acute distress.     Appearance: She is well-developed.   HENT:      Head: Normocephalic and atraumatic.      Right Ear: External ear normal.      Left Ear: External ear normal.      Nose: Nose normal.   Eyes:      General:         Right eye: No discharge.         Left eye: No discharge.      Conjunctiva/sclera: Conjunctivae normal.   Neck:      Thyroid: No thyromegaly.   Cardiovascular:      Rate and Rhythm: Tachycardia present. Rhythm irregular.      Heart sounds: No murmur heard.  Pulmonary:      Effort: Pulmonary effort is normal. No respiratory distress.      Breath sounds: No rales.   Abdominal:      General: There is no distension.      Palpations: Abdomen is soft.      Tenderness: There is no abdominal tenderness.   Musculoskeletal:      Right lower leg: No edema.      Left lower leg: No edema.   Skin:     General: Skin is warm and dry.   Neurological:      Mental Status: She is alert and oriented to person, place, and time.   Psychiatric:         Behavior: Behavior normal.         Thought Content: Thought content normal.                Significant Labs: All pertinent labs within the past 24 hours have been reviewed.  Blood Culture: No results for input(s): "LABBLOO" in the last 48 hours.  CBC:   Recent Labs " "  Lab 01/01/24  0438 01/02/24  0429   WBC 10.30 9.71   HGB 13.1 13.9   HCT 41.4 43.7    241       CMP:   Recent Labs   Lab 01/01/24  0438 01/02/24  0429    137   K 4.2 3.5    107   CO2 23 20*    89   BUN 19 20   CREATININE 0.8 0.9   CALCIUM 9.7 9.4   PROT  --  7.1   ALBUMIN  --  3.6   BILITOT  --  1.6*   ALKPHOS  --  76   AST  --  17   ALT  --  11   ANIONGAP 9 10       Cardiac Markers:   No results for input(s): "CKMB", "MYOGLOBIN", "BNP", "TROPISTAT" in the last 48 hours.    Magnesium:   Recent Labs   Lab 01/01/24 0438 01/02/24 0429   MG 2.0 1.7       Troponin:   Recent Labs   Lab 01/01/24  1030 01/01/24  2310   TROPONINI <0.006 <0.006       TSH:   Recent Labs   Lab 12/29/23  1206   TSH 1.367       Urine Culture: No results for input(s): "LABURIN" in the last 48 hours.    Urine Studies:   No results for input(s): "COLORU", "APPEARANCEUA", "PHUR", "SPECGRAV", "PROTEINUA", "GLUCUA", "KETONESU", "BILIRUBINUA", "OCCULTUA", "NITRITE", "UROBILINOGEN", "LEUKOCYTESUR", "RBCUA", "WBCUA", "BACTERIA", "SQUAMEPITHEL", "HYALINECASTS" in the last 48 hours.    Invalid input(s): "WRIGHTSUR"      Significant Imaging: I have reviewed all pertinent imaging results/findings within the past 24 hours.    CXR 12/29/23: "FINDINGS:   Postoperative changes cervical region.  Loop recorder.The lungs are clear with normal appearance of pulmonary vasculature. No pleural effusion. No evident pneumothorax.     The cardiac silhouette is normal in size. The hilar and mediastinal contours are unremarkable.     Bones are intact. "    Assessment/Plan:      * Atrial flutter with rapid ventricular response  A flutter with RVR on admit  ER treatments including IV metoprolol, amio bolus did not result in conversion or rate control.  Started on dilt gtt  Briefly conversed to NSR but now remains in afib/flutter  Rate controlled to low 100s  Increase amio to 200mg PO BID (100mg outpatient)  Increase metoprolol to 50mg PO BID (25mg " BID outpatient)  Wean dilt gtt as tolerated  Cont home eliquis      12/31: remains a-fib RVR despite med adjustment: amiodarone 200mg PO BID and metoprolol 50mg TID.  Remains on dilt gtt  BP lower end of normal with increasing meds for HR control. If drops will need to hold aldactone today.  Cont eliquis  Cardiology asked for input today. Suspect needs transfer and will see if can facilitate transfer to [place] with her cardiologist    1/1: meds adjusted yeterday: amiodarone 400mg BID, dig load, metoprolol increased. Able to wean dilt gtt some but remains with tachycardia and on dilt gtt this morning. NPO after MN for possible DCCV in AM. Cont eliquis.     1/2 Off diltiazem gtt.  Plan for cardioversion today.      Asymptomatic bacteriuria  UA and culture E. coli noted but she has no UTI sx reported  Have held antibx at this point    1/2: ecoli UTI.  Started IV rocephin.  Possible trigger of afib with RVR       Dementia with anxiety, unspecified dementia severity, unspecified dementia type  Follows with outside neurology  Resume home namenda  At baseline      Depression, major, recurrent, moderate  Patient has persistent depression which is mild and is currently controlled. Will Continue anti-depressant medications. We will not consult psychiatry at this time. Patient does not display psychosis at this time. Continue to monitor closely and adjust plan of care as needed.    Cont home cymbalta      Crohn's disease  Sulfasalazine while inpatient  Resume home meds at discharge  GI follow up at discharge as still reporting chronic diarrhea sx; will monitor while inpatient    1/1: no significant diarrhea since admit. Cont meds. Will follow      Cardiomyopathy, nonischemic  No signs of acute exacerbation at this time  CXR clear, no hypoxia, no LE edema  Resume home lasix and aldactone for now-hold if BP drops  Holding losartan while on dilt gtt; will wean gtt and resume home PO meds as BP tolerates  Strict I/O  Low Na  diet  Heart rate control needed      Fibromyalgia  Cont home cymbalta and gabapentin        VTE Risk Mitigation (From admission, onward)           Ordered     apixaban tablet 5 mg  2 times daily         12/30/23 0819     IP VTE HIGH RISK PATIENT  Once         12/29/23 2254     Place sequential compression device  Until discontinued         12/29/23 2254                    Discharge Planning   ORALIA:      Code Status: Full Code   Is the patient medically ready for discharge?:     Reason for patient still in hospital (select all that apply): Patient trending condition  Discharge Plan A: Home            Critical care time spent on the evaluation and treatment of severe organ dysfunction, review of pertinent labs and imaging studies, discussions with consulting providers and discussions with patient/family: 35 minutes.      Essence Cardoso PA-C  Department of Hospital Medicine   [facility] - Intensive Care

## 2024-01-02 NOTE — NURSING
Received phone call from Dr. Moreno for update. New orders noted to hold diltiazem for heart rate less than 130, schedule BIRGIT cardioversion for morning, dig level in am and NPO after midnight.

## 2024-01-02 NOTE — ANESTHESIA PREPROCEDURE EVALUATION
01/02/2024  Celine Sinclair is a 73 y.o., female.      Pre-op Assessment    I have reviewed the Patient Summary Reports.     I have reviewed the Nursing Notes. I have reviewed the NPO Status.   I have reviewed the Medications.     Review of Systems  Anesthesia Hx:  No problems with previous Anesthesia   History of prior surgery of interest to airway management or planning: cervical fusion.         Denies Family Hx of Anesthesia complications.    Denies Personal Hx of Anesthesia complications.                    Social:  Non-Smoker, Social Alcohol Use       Cardiovascular:  Exercise tolerance: good   Hypertension, well controlled   CAD (no significant CAD present on last angiogram 3/2022)    Dysrhythmias atrial fibrillation Angina CHF     IQBAL  ECG has been reviewed. 6/2023 EKG SR ST deviation and moderate T wave abnormality  5/2023 ECHO  EF 40-45%  PASP   Cardiovascular Symptoms:       Palpitation - occasional premature beats     Cardiomyopathy, Non-Ischemic Cardiomyopathy                  Hypertension, Essential Hypertension , Pt in Pre-HTN range, systolic 130 - 139 or diastolic 85 - 89, Well Controlled on Rx , Recent typical clinic B/P of 136/76   Disorder of Cardiac Rhythm, Atrial Fibrillation, Paroxysmal Atrial Fibrillation, Atrial Flutter, Paroxysmal Supraventricular Tachycardia (PSVT), hx of PSVT, rapid ventricular response     Pulmonary:    Denies COPD.   Shortness of breath                  Hepatic/GI:     GERD, well controlled Liver Disease,  Liver hemangioma  Hx colostomy/reversal due to bowel being knicked during hysterectomy- late 40's early 50's years of age   Bowel Conditions: (Crohn's disease)  Irritable Bowel Syndrome        Musculoskeletal:  Arthritis   Paget's disease, RA       Spine Disorders: lumbar and cervical Disc disease and Degenerative disease           Neurological:    Neuromuscular  Disease, (fibromyalgia)  Headaches           Chronic Pain Syndrome                         Endocrine:        Obesity / BMI > 30  Psych:  Psychiatric History  depression                Physical Exam  General: Well nourished, Cooperative, Alert and Oriented    Airway:  Mallampati: III   Mouth Opening: Normal  TM Distance: Normal  Tongue: Normal  Neck ROM: Normal ROM    Dental:  Intact, Dentures    Chest/Lungs:  Clear to auscultation, Normal Respiratory Rate        Anesthesia Plan  Type of Anesthesia, risks & benefits discussed:    Anesthesia Type: Gen Natural Airway  Intra-op Monitoring Plan: Standard ASA Monitors  Post Op Pain Control Plan: multimodal analgesia  Induction:  IV  Informed Consent: Informed consent signed with the Patient and all parties understand the risks and agree with anesthesia plan.  All questions answered. Patient consented to blood products? Yes  ASA Score: 3  Day of Surgery Review of History & Physical: H&P Update referred to the surgeon/provider.I have interviewed and examined the patient. I have reviewed the patient's H&P dated: 1/2/24. There are no significant changes.   Anesthesia Plan Notes: Currently in atrial flutter     Ready For Surgery From Anesthesia Perspective.     .

## 2024-01-03 VITALS
OXYGEN SATURATION: 98 % | WEIGHT: 179.44 LBS | DIASTOLIC BLOOD PRESSURE: 88 MMHG | HEIGHT: 65 IN | TEMPERATURE: 99 F | SYSTOLIC BLOOD PRESSURE: 147 MMHG | BODY MASS INDEX: 29.9 KG/M2 | RESPIRATION RATE: 16 BRPM | HEART RATE: 56 BPM

## 2024-01-03 PROBLEM — I50.22 CHRONIC SYSTOLIC HEART FAILURE: Status: ACTIVE | Noted: 2019-06-18

## 2024-01-03 LAB
ANION GAP SERPL CALC-SCNC: 12 MMOL/L (ref 8–16)
BUN SERPL-MCNC: 27 MG/DL (ref 8–23)
CALCIUM SERPL-MCNC: 9.4 MG/DL (ref 8.7–10.5)
CHLORIDE SERPL-SCNC: 106 MMOL/L (ref 95–110)
CO2 SERPL-SCNC: 20 MMOL/L (ref 23–29)
CREAT SERPL-MCNC: 1 MG/DL (ref 0.5–1.4)
EST. GFR  (NO RACE VARIABLE): 59 ML/MIN/1.73 M^2
GLUCOSE SERPL-MCNC: 107 MG/DL (ref 70–110)
MAGNESIUM SERPL-MCNC: 1.6 MG/DL (ref 1.6–2.6)
POTASSIUM SERPL-SCNC: 3.5 MMOL/L (ref 3.5–5.1)
POTASSIUM SERPL-SCNC: 3.7 MMOL/L (ref 3.5–5.1)
SODIUM SERPL-SCNC: 138 MMOL/L (ref 136–145)
TROPONIN I SERPL DL<=0.01 NG/ML-MCNC: <0.006 NG/ML (ref 0–0.03)

## 2024-01-03 PROCEDURE — 84132 ASSAY OF SERUM POTASSIUM: CPT | Performed by: INTERNAL MEDICINE

## 2024-01-03 PROCEDURE — 93005 ELECTROCARDIOGRAM TRACING: CPT

## 2024-01-03 PROCEDURE — 25000003 PHARM REV CODE 250: Performed by: INTERNAL MEDICINE

## 2024-01-03 PROCEDURE — 25000003 PHARM REV CODE 250: Performed by: PHYSICIAN ASSISTANT

## 2024-01-03 PROCEDURE — 99900031 HC PATIENT EDUCATION (STAT)

## 2024-01-03 PROCEDURE — 63600175 PHARM REV CODE 636 W HCPCS: Performed by: INTERNAL MEDICINE

## 2024-01-03 PROCEDURE — 36415 COLL VENOUS BLD VENIPUNCTURE: CPT | Performed by: INTERNAL MEDICINE

## 2024-01-03 PROCEDURE — 99239 HOSP IP/OBS DSCHRG MGMT >30: CPT | Mod: ,,, | Performed by: PHYSICIAN ASSISTANT

## 2024-01-03 PROCEDURE — 36415 COLL VENOUS BLD VENIPUNCTURE: CPT | Mod: XB | Performed by: PHYSICIAN ASSISTANT

## 2024-01-03 PROCEDURE — 83735 ASSAY OF MAGNESIUM: CPT | Performed by: INTERNAL MEDICINE

## 2024-01-03 PROCEDURE — 99900035 HC TECH TIME PER 15 MIN (STAT)

## 2024-01-03 PROCEDURE — 1111F DSCHRG MED/CURRENT MED MERGE: CPT | Mod: CPTII,,, | Performed by: PHYSICIAN ASSISTANT

## 2024-01-03 PROCEDURE — 80048 BASIC METABOLIC PNL TOTAL CA: CPT | Performed by: PHYSICIAN ASSISTANT

## 2024-01-03 PROCEDURE — 94761 N-INVAS EAR/PLS OXIMETRY MLT: CPT

## 2024-01-03 PROCEDURE — 25000242 PHARM REV CODE 250 ALT 637 W/ HCPCS: Performed by: PHYSICIAN ASSISTANT

## 2024-01-03 PROCEDURE — 84484 ASSAY OF TROPONIN QUANT: CPT | Performed by: INTERNAL MEDICINE

## 2024-01-03 RX ORDER — VALSARTAN 40 MG/1
40 TABLET ORAL 2 TIMES DAILY
Status: DISCONTINUED | OUTPATIENT
Start: 2024-01-03 | End: 2024-01-03

## 2024-01-03 RX ORDER — MAGNESIUM SULFATE 1 G/100ML
1 INJECTION INTRAVENOUS ONCE
Status: COMPLETED | OUTPATIENT
Start: 2024-01-03 | End: 2024-01-03

## 2024-01-03 RX ORDER — LANOLIN ALCOHOL/MO/W.PET/CERES
400 CREAM (GRAM) TOPICAL 2 TIMES DAILY
Status: DISCONTINUED | OUTPATIENT
Start: 2024-01-03 | End: 2024-01-03 | Stop reason: HOSPADM

## 2024-01-03 RX ORDER — METOPROLOL SUCCINATE 50 MG/1
50 TABLET, EXTENDED RELEASE ORAL 2 TIMES DAILY
Qty: 60 TABLET | Refills: 0 | Status: SHIPPED | OUTPATIENT
Start: 2024-01-03 | End: 2025-01-02

## 2024-01-03 RX ORDER — VALSARTAN 40 MG/1
40 TABLET ORAL DAILY
Status: DISCONTINUED | OUTPATIENT
Start: 2024-01-03 | End: 2024-01-03

## 2024-01-03 RX ORDER — FUROSEMIDE 20 MG/1
20 TABLET ORAL DAILY PRN
Status: DISCONTINUED | OUTPATIENT
Start: 2024-01-03 | End: 2024-01-03 | Stop reason: HOSPADM

## 2024-01-03 RX ORDER — AMIODARONE HYDROCHLORIDE 200 MG/1
200 TABLET ORAL 2 TIMES DAILY
Qty: 60 TABLET | Refills: 0 | Status: SHIPPED | OUTPATIENT
Start: 2024-01-03 | End: 2025-01-02

## 2024-01-03 RX ORDER — SPIRONOLACTONE 25 MG/1
25 TABLET ORAL DAILY
Status: DISCONTINUED | OUTPATIENT
Start: 2024-01-03 | End: 2024-01-03

## 2024-01-03 RX ORDER — POTASSIUM CHLORIDE 20 MEQ/1
40 TABLET, EXTENDED RELEASE ORAL ONCE
Status: COMPLETED | OUTPATIENT
Start: 2024-01-03 | End: 2024-01-03

## 2024-01-03 RX ORDER — LOSARTAN POTASSIUM 25 MG/1
12.5 TABLET ORAL DAILY
Start: 2024-01-03

## 2024-01-03 RX ORDER — AMOXICILLIN AND CLAVULANATE POTASSIUM 875; 125 MG/1; MG/1
1 TABLET, FILM COATED ORAL 2 TIMES DAILY
Qty: 14 TABLET | Refills: 0 | Status: SHIPPED | OUTPATIENT
Start: 2024-01-03 | End: 2024-01-10

## 2024-01-03 RX ORDER — LACTOBACILLUS RHAMNOSUS GG 10B CELL
1 CAPSULE ORAL DAILY
Qty: 30 CAPSULE | Refills: 0 | Status: SHIPPED | OUTPATIENT
Start: 2024-01-03 | End: 2024-02-02

## 2024-01-03 RX ORDER — MAGNESIUM SULFATE HEPTAHYDRATE 40 MG/ML
2 INJECTION, SOLUTION INTRAVENOUS ONCE
Status: COMPLETED | OUTPATIENT
Start: 2024-01-03 | End: 2024-01-03

## 2024-01-03 RX ADMIN — DULOXETINE HYDROCHLORIDE 60 MG: 30 CAPSULE, DELAYED RELEASE ORAL at 08:01

## 2024-01-03 RX ADMIN — MAGNESIUM SULFATE HEPTAHYDRATE 2 G: 40 INJECTION, SOLUTION INTRAVENOUS at 08:01

## 2024-01-03 RX ADMIN — PANTOPRAZOLE SODIUM 40 MG: 40 TABLET, DELAYED RELEASE ORAL at 08:01

## 2024-01-03 RX ADMIN — GABAPENTIN 300 MG: 300 CAPSULE ORAL at 08:01

## 2024-01-03 RX ADMIN — EMPAGLIFLOZIN 10 MG: 10 TABLET, FILM COATED ORAL at 01:01

## 2024-01-03 RX ADMIN — MUPIROCIN: 20 OINTMENT TOPICAL at 08:01

## 2024-01-03 RX ADMIN — SULFASALAZINE 1000 MG: 500 TABLET ORAL at 08:01

## 2024-01-03 RX ADMIN — AMIODARONE HYDROCHLORIDE 200 MG: 200 TABLET ORAL at 08:01

## 2024-01-03 RX ADMIN — GABAPENTIN 300 MG: 300 CAPSULE ORAL at 03:01

## 2024-01-03 RX ADMIN — LOSARTAN POTASSIUM 12.5 MG: 25 TABLET, FILM COATED ORAL at 01:01

## 2024-01-03 RX ADMIN — SPIRONOLACTONE 12.5 MG: 25 TABLET ORAL at 08:01

## 2024-01-03 RX ADMIN — METOPROLOL SUCCINATE 50 MG: 50 TABLET, EXTENDED RELEASE ORAL at 08:01

## 2024-01-03 RX ADMIN — MEMANTINE HYDROCHLORIDE 10 MG: 5 TABLET ORAL at 08:01

## 2024-01-03 RX ADMIN — MAGNESIUM SULFATE IN DEXTROSE 1 G: 10 INJECTION, SOLUTION INTRAVENOUS at 07:01

## 2024-01-03 RX ADMIN — APIXABAN 5 MG: 5 TABLET, FILM COATED ORAL at 08:01

## 2024-01-03 RX ADMIN — POTASSIUM CHLORIDE 40 MEQ: 1500 TABLET, EXTENDED RELEASE ORAL at 08:01

## 2024-01-03 NOTE — DISCHARGE SUMMARY
[facility] Medicine  Discharge Summary      Patient Name: Celine Sinclair  MRN: [id number]  PAULA: [id number]  Patient Class: IP- Inpatient  Admission Date: 12/29/2023  Hospital Length of Stay: 5 days  Discharge Date and Time:  01/03/2024 11:48 AM  Attending Physician: Neeru Hinkle MD   Discharging Provider: Essence Cardoso PA-C  Primary Care Provider: Neeru Hinkle MD    Primary Care Team: Networked reference to record PCT     HPI:   Patient admitted to ICU with afib/flutter with RVR. She initially presented to PCP for routine 6month check up. In office was noticed to have tachycardia in 130s. She reported having chest pain, feeling SOB and lightheaded. Clinic EKG not best quality but a flutter RVR suspected and sent to ED. ED work up confirmed a flutter and cards consulted in ED. Did not respond to IV metoprolol, amiodarone bolus and ultimately started on diltiazem gtt. Her cardiologist is with [facility] at [place] and transfer initiated as she was not responding to treatment but [facility] was unable to take her last night due to lack of beds. She briefly converted to NSR per report but then returned to afib/flutter with RVR. She has been on dilt gtt throughout the night and HR better controlled to low 100s. She reports chest pains and SOB improved.    Resuming PO meds. Plan to increase PO amiodarone to 200mg daily and increase PO metoprolol to 50mg BID and wean dilt gtt today. If unable to control may still require transfer for cardiology as we do not have cardiology coverage this weekend. For now, focusing on rate control. Eliquis resumed.     UA did show + nitrite, 10 WBC. However, she is asymptomatic. No antibx started at this time.     BNP slightly elevated. CXR clear. No oxygen requirement. Resumed home PO Lasix and aldactone. H/o CHF but no signs of true volume overload on exam today.     Procedure(s) (LRB):  ECHOCARDIOGRAM,TRANSESOPHAGEAL (N/A)  CARDIOVERSION (N/A)      Hospital  Course:   Patient remains a-fib RVR. Meds have been adjusted to amiodarone 200mg BID, metoprolol 50mg TID. HR briefly responded yesterday into the 70s but then resumed RVR with -130s sustained overnight despite dilt gtt. BP is lower end of normal with meds. Remains on Eliquis. She feels fatigued today, intermittent headaches. Remains on RA and no signs of CHF decompensation as of yet. Lytes normal.     Will ask for cardiology input today and see if can help facilitate transfer to [place] with her cardiologist.     1/1: Meds adjusted yesterday. HR better controlled but still needing dilt gtt despite adjustments. Will make NPO after MN for possible DCCV in the AM. [facility] will eval in the morning. Remains stable and no signs of CHF decompensation.     1/2: Pt remains in afib with RVR today. Plan for cardioversion today.  Patient with Ecoli UTI.  Started IV rocephin.  Possible d/c within the next 24 hours.      1/3 Successful cardioversion.  EF of 35-40%.  Plan to discharge today and f/u outpatient cardiology.       Goals of Care Treatment Preferences:  Code Status: Full Code      Consults:   Consults (From admission, onward)          Status Ordering Provider     Inpatient consult to [facility]iology[facility]  Once        Provider:  Steve Moreno MD    Completed TIFFANIE REDD            Neuro  Dementia with anxiety, unspecified dementia severity, unspecified dementia type  Follows with outside neurology  Resume home namenda  At baseline      Cardiac/Vascular  * Atrial flutter with rapid ventricular response  A flutter with RVR on admit  ER treatments including IV metoprolol, amio bolus did not result in conversion or rate control.  Started on dilt gtt  Briefly conversed to NSR but now remains in afib/flutter  Rate controlled to low 100s  Increase amio to 200mg PO BID (100mg outpatient)  Increase metoprolol to 50mg PO BID (25mg BID outpatient)  Wean dilt gtt as tolerated  Cont home eliquis      12/31: remains a-fib RVR  despite med adjustment: amiodarone 200mg PO BID and metoprolol 50mg TID.  Remains on dilt gtt  BP lower end of normal with increasing meds for HR control. If drops will need to hold aldactone today.  Cont eliquis  Cardiology asked for input today. Suspect needs transfer and will see if can facilitate transfer to [place] with her cardiologist    1/1: meds adjusted yeterday: amiodarone 400mg BID, dig load, metoprolol increased. Able to wean dilt gtt some but remains with tachycardia and on dilt gtt this morning. NPO after MN for possible DCCV in AM. Cont eliquis.     1/2 Off diltiazem gtt.  Plan for cardioversion today.      1/3 Successful cardioversion and now NSR.  Will discharge with amiodarone     Chronic systolic heart failure  No signs of acute exacerbation at this time  CXR clear, no hypoxia, no LE edema  Echo with EF 35-40%    Continue BB, spironolactone, lasix, arb and jardiance.  F/u outpatient cardiology             Renal/  Asymptomatic bacteriuria  UA and culture E. coli noted but she has no UTI sx reported  Have held antibx at this point    1/2: ecoli UTI.  Started IV rocephin.  Possible trigger of afib with RVR   1/3: will prescribe course of Augmentin       GI  Crohn's disease  Sulfasalazine while inpatient  Resume home meds at discharge  GI follow up at discharge as still reporting chronic diarrhea sx; will monitor while inpatient    1/1: no significant diarrhea since admit. Cont meds. Will follow      Orthopedic  Fibromyalgia  Cont home cymbalta and gabapentin      Other  Depression, major, recurrent, moderate  Patient has persistent depression which is mild and is currently controlled. Will Continue anti-depressant medications. We will not consult psychiatry at this time. Patient does not display psychosis at this time. Continue to monitor closely and adjust plan of care as needed.    Cont home cymbalta        Final Active Diagnoses:    Diagnosis Date Noted POA    PRINCIPAL PROBLEM:  Atrial  flutter with rapid ventricular response [I48.92] 06/28/2023 Yes    Asymptomatic bacteriuria [R82.71] 12/31/2023 Yes    Dementia with anxiety, unspecified dementia severity, unspecified dementia type [F03.94] 10/02/2023 Yes    Depression, major, recurrent, moderate [F33.1]  Yes    Crohn's disease [K50.90] 10/12/2020 Yes    Fibromyalgia [M79.7] 06/18/2019 Yes    Chronic systolic heart failure [I50.22] 06/18/2019 Yes      Problems Resolved During this Admission:       Discharged Condition: good    Disposition: Home or Self Care    Follow Up:   Follow-up Information       Neeru Hinkle MD Follow up in 1 week(s).    Specialty: Internal Medicine  Contact information:  [address] [place] [id number]  [phone]                           Patient Instructions:      BASIC METABOLIC PANEL   Standing Status: Future Standing Exp. Date: 01/10/24     Magnesium   Standing Status: Future Standing Exp. Date: 01/10/24       Significant Diagnostic Studies: see A&P     Pending Diagnostic Studies:       None           Medications:  Reconciled Home Medications:      Medication List        START taking these medications      amoxicillin-clavulanate 875-125mg 875-125 mg per tablet  Commonly known as: AUGMENTIN  Take 1 tablet by mouth 2 (two) times daily. for 7 days     CULTURELLE 10 billion cell capsule  Generic drug: Lactobacillus rhamnosus GG  Take 1 capsule by mouth once daily.     empagliflozin 10 mg tablet  Commonly known as: Jardiance  Take 1 tablet (10 mg total) by mouth once daily.            CHANGE how you take these medications      amiodarone 200 MG Tab  Commonly known as: PACERONE  Take 1 tablet (200 mg total) by mouth 2 (two) times daily.  What changed:   how much to take  when to take this     losartan 25 MG tablet  Commonly known as: COZAAR  Take 0.5 tablets (12.5 mg total) by mouth once daily.  What changed: how much to take     metoprolol succinate 50 MG 24 hr tablet  Commonly known as: TOPROL-XL  Take 1 tablet (50 mg  total) by mouth 2 (two) times daily.  What changed:   medication strength  how much to take  when to take this     pantoprazole 40 MG tablet  Commonly known as: PROTONIX  Increase to 40mg twice daily x 2 wks then resume daily thereafter  What changed:   how much to take  how to take this  when to take this  additional instructions            CONTINUE taking these medications      atorvastatin 40 MG tablet  Commonly known as: LIPITOR  Take 40 mg by mouth every evening.     balsalazide 750 mg capsule  Commonly known as: COLAZAL  Take 3 capsules (2,250 mg total) by mouth 3 (three) times daily.     COMIRNATY 2023-24 (12Y UP)(PF) 30 mcg/0.3 mL inection  Generic drug: COVID vac23-24(12up)(raxt)(PF)     cyanocobalamin 1,000 mcg/mL injection  Inject 1 mL (1,000 mcg total) into the muscle once a week.     DULoxetine 60 MG capsule  Commonly known as: CYMBALTA  Take 1 capsule (60 mg total) by mouth once daily.     ELIQUIS 5 mg Tab  Generic drug: apixaban  Take 1 tablet (5 mg total) by mouth 2 (two) times daily.     furosemide 20 MG tablet  Commonly known as: LASIX  Take 20 mg by mouth Daily.     gabapentin 300 MG capsule  Commonly known as: NEURONTIN  Take 1 capsule (300 mg total) by mouth 3 (three) times daily.     memantine 10 MG Tab  Commonly known as: NAMENDA  Take 10 mg by mouth 2 (two) times daily.     nitroGLYCERIN 0.4 MG SL tablet  Commonly known as: NITROSTAT  Place 0.4 mg under the tongue every 5 (five) minutes as needed.     potassium chloride SA 20 MEQ tablet  Commonly known as: K-DUR,KLOR-CON  Take 2 tablets (40 mEq total) by mouth once daily.     spironolactone 25 MG tablet  Commonly known as: ALDACTONE  Take 12.5 mg by mouth once daily.            STOP taking these medications      albuterol-ipratropium 2.5 mg-0.5 mg/3 mL nebulizer solution  Commonly known as: DUO-NEB     amLODIPine 5 MG tablet  Commonly known as: NORVASC     FARXIGA 10 mg tablet  Generic drug: dapagliflozin propanediol     FLUAD QUAD  2023-24(65Y UP)(PF) 60 mcg (15 mcg x 4)/0.5 mL Syrg  Generic drug: flu vac 2023 65up-[id number](PF)     INNOSPIRE ESSENCE Manisha  Generic drug: nebulizer and compressor     levalbuterol 45 mcg/actuation inhaler  Commonly known as: XOPENEX HFA              Indwelling Lines/Drains at time of discharge:   Lines/Drains/Airways       Drain  Duration             Female External Urinary Catheter w/ Suction 12/29/23 2300 4 days                    Time spent on the discharge of patient: 35 minutes    Critical care time spent on the evaluation and treatment of severe organ dysfunction, review of pertinent labs and imaging studies, discussions with consulting providers and discussions with patient/family: 35minutes.     Essence Cardoso PA-C  Department of Hospital Medicine  [place] - Intensive Care

## 2024-01-03 NOTE — PLAN OF CARE
Patient free from falls. Slept well. No complaints at this time.       Problem: Fall Injury Risk  Goal: Absence of Fall and Fall-Related Injury  Outcome: Ongoing, Progressing     Problem: Adult Inpatient Plan of Care  Goal: Plan of Care Review  Outcome: Ongoing, Progressing  Goal: Patient-Specific Goal (Individualized)  Outcome: Ongoing, Progressing  Goal: Absence of Hospital-Acquired Illness or Injury  Outcome: Ongoing, Progressing  Goal: Optimal Comfort and Wellbeing  Outcome: Ongoing, Progressing  Goal: Readiness for Transition of Care  Outcome: Ongoing, Progressing     Problem: Dysrhythmia  Goal: Normalized Cardiac Rhythm  Outcome: Ongoing, Progressing

## 2024-01-03 NOTE — ASSESSMENT & PLAN NOTE
No signs of acute exacerbation at this time  CXR clear, no hypoxia, no LE edema  Echo with EF 35-40%    Continue BB, spironolactone, lasix, arb and jardiance.  F/u outpatient cardiology

## 2024-01-03 NOTE — PROGRESS NOTES
[facility] Medicine  Progress Note    Patient Name: Celine Sinclair  MRN: [id number]  Patient Class: IP- Inpatient   Admission Date: 12/29/2023  Length of Stay: 5 days  Attending Physician: Neeru Hinkle MD  Primary Care Provider: Neeru Hinkle MD        Subjective:     Principal Problem:Atrial flutter with rapid ventricular response        HPI:  Patient admitted to ICU with afib/flutter with RVR. She initially presented to PCP for routine 6month check up. In office was noticed to have tachycardia in 130s. She reported having chest pain, feeling SOB and lightheaded. Clinic EKG not best quality but a flutter RVR suspected and sent to ED. ED work up confirmed a flutter and cards consulted in ED. Did not respond to IV metoprolol, amiodarone bolus and ultimately started on diltiazem gtt. Her cardiologist is with [facility] at [place] and transfer initiated as she was not responding to treatment but [place] was unable to take her last night due to lack of beds. She briefly converted to NSR per report but then returned to afib/flutter with RVR. She has been on dilt gtt throughout the night and HR better controlled to low 100s. She reports chest pains and SOB improved.    Resuming PO meds. Plan to increase PO amiodarone to 200mg daily and increase PO metoprolol to 50mg BID and wean dilt gtt today. If unable to control may still require transfer for cardiology as we do not have cardiology coverage this weekend. For now, focusing on rate control. Eliquis resumed.     UA did show + nitrite, 10 WBC. However, she is asymptomatic. No antibx started at this time.     BNP slightly elevated. CXR clear. No oxygen requirement. Resumed home PO Lasix and aldactone. H/o CHF but no signs of true volume overload on exam today.     Overview/Hospital Course:  Patient remains a-fib RVR. Meds have been adjusted to amiodarone 200mg BID, metoprolol 50mg TID. HR briefly responded yesterday into the 70s but then resumed RVR  with -130s sustained overnight despite dilt gtt. BP is lower end of normal with meds. Remains on Eliquis. She feels fatigued today, intermittent headaches. Remains on RA and no signs of CHF decompensation as of yet. Lytes normal.     Will ask for cardiology input today and see if can help facilitate transfer to [place] with her cardiologist.     1/1: Meds adjusted yesterday. HR better controlled but still needing dilt gtt despite adjustments. Will make NPO after MN for possible DCCV in the AM. [facility] will eval in the morning. Remains stable and no signs of CHF decompensation.     1/2: Pt remains in afib with RVR today. Plan for cardioversion today.  Patient with Ecoli UTI.  Started IV rocephin.  Possible d/c within the next 24 hours.      1/3 Successful cardioversion.  EF of 35-40%.  Plan to discharge today and f/u outpatient cardiology.      Past Medical History:   Diagnosis Date    Anticoagulant long-term use     Aortic atherosclerosis     Atrial fibrillation     Benign essential hypertension     Cataract     Senile    Chest pain     CHF (congestive heart failure)     Crohn's colitis     Depression, major, recurrent, moderate     Encounter for blood transfusion     Fibromyalgia     GERD (gastroesophageal reflux disease)     Hypertensive cardiomyopathy     IBS (irritable bowel syndrome)     Migraine     Opioid abuse, in remission     Orbital cellulitis on left     Osteopenia     Paget disease of bone     Palpitations     Port-A-Cath in place     right chest    Prolonged QT interval     RA (rheumatoid arthritis)     Sedative hypnotic or anxiolytic dependence     in remission     SOB (shortness of breath)     SVT (supraventricular tachycardia)     Unspecified urinary incontinence        Past Surgical History:   Procedure Laterality Date    ABLATION N/A 06/28/2023    Procedure: Ablation;  Surgeon: Johnny Nieves MD;  Location: [facility] CATH;  Service: Cardiology;  Laterality: N/A;  ops # 7    ABLATION, ATRIAL  FLUTTER, TYPICAL N/A 06/28/2023    Procedure: Ablation, Atrial Flutter, Typical;  Surgeon: Johnny Nieves MD;  Location: [facility] CATH;  Service: Cardiology;  Laterality: N/A;    CATARACT EXTRACTION W/  INTRAOCULAR LENS IMPLANT Left 02/21/2017    Dr. Christianson    CATARACT EXTRACTION W/  INTRAOCULAR LENS IMPLANT Right 03/07/2017    Dr. Christianson    CHOLECYSTECTOMY      COLON SURGERY      COLONOSCOPY N/A 03/16/2016    Procedure: COLONOSCOPY;  Surgeon: Dale Trejo MD;  Location: [facility] (4TH FLR);  Service: Endoscopy;  Laterality: N/A;    COLONOSCOPY N/A 10/12/2020    Procedure: COLONOSCOPY;  Surgeon: Cali Urena MD;  Location: [facility] (4TH FLR);  Service: Endoscopy;  Laterality: N/A;  covid test 10/9-[place] [facility]- completed 10/9/20  ok to hold Coumadin x 5 days per coumadin clinic-see telephone encounterd ated 10/6-MS / Loop recorder  10/6/20- Pt confirmed- ERW@1122  10/9-Pt confirmed earlier arrival time, prep ins. reviewed and emailed to Pt    COLONOSCOPY N/A 08/25/2022    Procedure: COLONOSCOPY;  Surgeon: Lewis Luke MD;  Location: [facility];  Service: Endoscopy;  Laterality: N/A;    Colostomy reversal      EYE SURGERY      HEMORRHOID SURGERY      HYSTERECTOMY      ILEOSTOMY      ILEOSTOMY CLOSURE      LEFT HEART CATHETERIZATION N/A 02/15/2019    Procedure: HEART CATH-LEFT;  Surgeon: Miky Keita MD;  Location: [facility] CATH LAB;  Service: Cardiology;  Laterality: N/A;    NECK SURGERY      OOPHORECTOMY Bilateral     SBO      SMALL INTESTINE SURGERY      TONSILLECTOMY      TUBAL LIGATION         Review of patient's allergies indicates:   Allergen Reactions    Octreotide acetate Nausea And Vomiting     Had symptoms when she took Sandostatin with Codeine    Codeine Itching and Nausea And Vomiting     Pill form only, IV ok.,  Had symptoms when she took Codeine with Sandostatin.  Other reaction(s): Itching    Morphine Nausea And Vomiting     Patient reports reaction only when she takes po form of  Morphine.    Iodine Nausea And Vomiting     States only when eats too much seafood    Opioids - morphine analogues Nausea And Vomiting    Simvastatin Nausea And Vomiting       No current facility-administered medications on file prior to encounter.     Current Outpatient Medications on File Prior to Encounter   Medication Sig    amiodarone (PACERONE) 200 MG Tab Take 0.5 tablets (100 mg total) by mouth once daily.    amLODIPine (NORVASC) 5 MG tablet Take 5 mg by mouth once daily.    apixaban (ELIQUIS) 5 mg Tab Take 1 tablet (5 mg total) by mouth 2 (two) times daily.    atorvastatin (LIPITOR) 40 MG tablet Take 40 mg by mouth every evening.    balsalazide (COLAZAL) 750 mg capsule Take 3 capsules (2,250 mg total) by mouth 3 (three) times daily.    DULoxetine (CYMBALTA) 60 MG capsule Take 1 capsule (60 mg total) by mouth once daily.    FARXIGA 10 mg tablet Take 10 mg by mouth once daily.    gabapentin (NEURONTIN) 300 MG capsule Take 1 capsule (300 mg total) by mouth 3 (three) times daily.    losartan (COZAAR) 25 MG tablet Take 25 mg by mouth once daily.    metoprolol succinate (TOPROL-XL) 25 MG 24 hr tablet Take 25 mg by mouth 2 (two) times a day.    potassium chloride SA (K-DUR,KLOR-CON) 20 MEQ tablet Take 2 tablets (40 mEq total) by mouth once daily.    spironolactone (ALDACTONE) 25 MG tablet Take 12.5 mg by mouth once daily.    albuterol-ipratropium (DUO-NEB) 2.5 mg-0.5 mg/3 mL nebulizer solution Take 3 mLs by nebulization every 6 (six) hours as needed for Wheezing. Rescue (Patient not taking: Reported on 12/29/2023)    COMIRNATY 2023-24, 12Y UP,,PF, 30 mcg/0.3 mL inection     cyanocobalamin 1,000 mcg/mL injection Inject 1 mL (1,000 mcg total) into the muscle once a week.    FLUAD QUAD 2023-24,65Y UP,,PF, 60 mcg (15 mcg x 4)/0.5 mL Syrg     furosemide (LASIX) 20 MG tablet Take 20 mg by mouth Daily.    INNOSPIRE ESSENCE Manisha use as directed    levalbuterol (XOPENEX HFA) 45 mcg/actuation inhaler Inhale 1-2 puffs into  the lungs every 4 (four) hours as needed for Wheezing. Rescue (Patient not taking: Reported on 12/29/2023)    memantine (NAMENDA) 10 MG Tab Take 10 mg by mouth 2 (two) times daily.    nitroGLYCERIN (NITROSTAT) 0.4 MG SL tablet Place 0.4 mg under the tongue every 5 (five) minutes as needed.    pantoprazole (PROTONIX) 40 MG tablet Increase to 40mg twice daily x 2 wks then resume daily thereafter (Patient taking differently: Take 40 mg by mouth once daily.)    [DISCONTINUED] nortriptyline (PAMELOR) 10 MG capsule TAKE 1 CAPSULE (10 MG TOTAL) BY MOUTH EVERY EVENING.    [DISCONTINUED] traZODone (DESYREL) 50 MG tablet Take 1 tablet (50 mg total) by mouth nightly as needed.     Family History       Problem Relation (Age of Onset)    Breast cancer Daughter (40), Daughter (47), Sister    Cancer Mother    Colon cancer Maternal Grandmother    Emphysema Father (67)    Glaucoma Paternal Grandmother    Heart attack Maternal Grandmother, Sister    Heart disease Father    Lung cancer Sister    Other Daughter    Retinal detachment Grandchild          Tobacco Use    Smoking status: Never    Smokeless tobacco: Never   Substance and Sexual Activity    Alcohol use: Yes     Comment: wine coolers occassionally    Drug use: No    Sexual activity: Not Currently     Partners: Female     Review of Systems   Constitutional:  Negative for activity change, chills, fatigue, fever and unexpected weight change.   HENT:  Negative for congestion, ear discharge, ear pain, hearing loss, rhinorrhea and sore throat.    Eyes:  Negative for pain, discharge, redness and visual disturbance.   Respiratory:  Positive for shortness of breath (some SOB). Negative for cough and wheezing.    Cardiovascular:  Negative for chest pain, palpitations and leg swelling.   Gastrointestinal:  Negative for abdominal pain, constipation, diarrhea (reported by patient, chronic, Crohn's diseased), nausea and vomiting.   Endocrine: Negative for cold intolerance and heat  intolerance.   Genitourinary:  Negative for difficulty urinating, dysuria, frequency and urgency.   Musculoskeletal:  Positive for arthralgias (chronic, fibromyalgia). Negative for back pain, joint swelling, myalgias and neck pain.   Skin:  Negative for color change, rash and wound.   Neurological:  Negative for dizziness, tremors, light-headedness and headaches.   Psychiatric/Behavioral:  Negative for agitation and confusion.      Objective:     Vital Signs (Most Recent):  Temp: 98.9 °F (37.2 °C) (01/03/24 1115)  Pulse: (!) 58 (01/03/24 1100)  Resp: 18 (01/03/24 1000)  BP: 131/77 (01/03/24 1100)  SpO2: 98 % (01/03/24 1100) Vital Signs (24h Range):  Temp:  [97.1 °F (36.2 °C)-98.9 °F (37.2 °C)] 98.9 °F (37.2 °C)  Pulse:  [58-67] 58  Resp:  [14-37] 18  SpO2:  [94 %-100 %] 98 %  BP: (104-144)/(66-88) 131/77     Weight: 81.4 kg (179 lb 7.3 oz)  Body mass index is 29.86 kg/m².     Physical Exam  Vitals and nursing note reviewed.   Constitutional:       General: She is not in acute distress.     Appearance: She is well-developed.   HENT:      Head: Normocephalic and atraumatic.      Right Ear: External ear normal.      Left Ear: External ear normal.      Nose: Nose normal.   Eyes:      General:         Right eye: No discharge.         Left eye: No discharge.      Conjunctiva/sclera: Conjunctivae normal.   Neck:      Thyroid: No thyromegaly.   Cardiovascular:      Rate and Rhythm: Normal rate and regular rhythm.      Heart sounds: No murmur heard.  Pulmonary:      Effort: Pulmonary effort is normal. No respiratory distress.      Breath sounds: No rales.   Abdominal:      General: There is no distension.      Palpations: Abdomen is soft.      Tenderness: There is no abdominal tenderness.   Musculoskeletal:      Right lower leg: No edema.      Left lower leg: No edema.   Skin:     General: Skin is warm and dry.   Neurological:      Mental Status: She is alert and oriented to person, place, and time.   Psychiatric:          "Behavior: Behavior normal.         Thought Content: Thought content normal.                Significant Labs: All pertinent labs within the past 24 hours have been reviewed.  Blood Culture: No results for input(s): "LABBLOO" in the last 48 hours.  CBC:   Recent Labs   Lab 01/02/24  0429   WBC 9.71   HGB 13.9   HCT 43.7          CMP:   Recent Labs   Lab 01/02/24  0429 01/03/24  0112 01/03/24  0927     --  138   K 3.5 3.7 3.5     --  106   CO2 20*  --  20*   GLU 89  --  107   BUN 20  --  27*   CREATININE 0.9  --  1.0   CALCIUM 9.4  --  9.4   PROT 7.1  --   --    ALBUMIN 3.6  --   --    BILITOT 1.6*  --   --    ALKPHOS 76  --   --    AST 17  --   --    ALT 11  --   --    ANIONGAP 10  --  12       Cardiac Markers:   No results for input(s): "CKMB", "MYOGLOBIN", "BNP", "TROPISTAT" in the last 48 hours.    Magnesium:   Recent Labs   Lab 01/02/24  0429 01/03/24  0112   MG 1.7 1.6       Troponin:   Recent Labs   Lab 01/01/24  2310 01/03/24  0112   TROPONINI <0.006 <0.006       TSH:   Recent Labs   Lab 12/29/23  1206   TSH 1.367       Urine Culture: No results for input(s): "LABURIN" in the last 48 hours.    Urine Studies:   No results for input(s): "COLORU", "APPEARANCEUA", "PHUR", "SPECGRAV", "PROTEINUA", "GLUCUA", "KETONESU", "BILIRUBINUA", "OCCULTUA", "NITRITE", "UROBILINOGEN", "LEUKOCYTESUR", "RBCUA", "WBCUA", "BACTERIA", "SQUAMEPITHEL", "HYALINECASTS" in the last 48 hours.    Invalid input(s): "WRIGHTSUR"      Significant Imaging: I have reviewed all pertinent imaging results/findings within the past 24 hours.    CXR 12/29/23: "FINDINGS:   Postoperative changes cervical region.  Loop recorder.The lungs are clear with normal appearance of pulmonary vasculature. No pleural effusion. No evident pneumothorax.     The cardiac silhouette is normal in size. The hilar and mediastinal contours are unremarkable.     Bones are intact. "    Assessment/Plan:      * Atrial flutter with rapid ventricular response  A " flutter with RVR on admit  ER treatments including IV metoprolol, amio bolus did not result in conversion or rate control.  Started on dilt gtt  Briefly conversed to NSR but now remains in afib/flutter  Rate controlled to low 100s  Increase amio to 200mg PO BID (100mg outpatient)  Increase metoprolol to 50mg PO BID (25mg BID outpatient)  Wean dilt gtt as tolerated  Cont home eliquis      12/31: remains a-fib RVR despite med adjustment: amiodarone 200mg PO BID and metoprolol 50mg TID.  Remains on dilt gtt  BP lower end of normal with increasing meds for HR control. If drops will need to hold aldactone today.  [facility]  Cardiology asked for input today. Suspect needs transfer and will see if can facilitate transfer to [place] with her cardiologist    1/1: meds adjusted yeterday: amiodarone 400mg BID, dig load, metoprolol increased. Able to wean dilt gtt some but remains with tachycardia and on dilt gtt this morning. NPO after MN for possible DCCV in AM. Cont eliquis.     1/2 Off diltiazem gtt.  Plan for cardioversion today.      1/3 Successful cardioversion and now NSR.  Will discharge with amiodarone     Asymptomatic bacteriuria  UA and culture E. coli noted but she has no UTI sx reported  Have held antibx at this point    1/2: ecoli UTI.  Started IV rocephin.  Possible trigger of afib with RVR   1/3: will prescribe course of Augmentin       Dementia with anxiety, unspecified dementia severity, unspecified dementia type  Follows with outside neurology  Resume home namenda  At baseline      Depression, major, recurrent, moderate  Patient has persistent depression which is mild and is currently controlled. Will Continue anti-depressant medications. We will not consult psychiatry at this time. Patient does not display psychosis at this time. Continue to monitor closely and adjust plan of care as needed.    Cont home cymbalta      Crohn's disease  Sulfasalazine while inpatient  Resume home meds at discharge  GI  follow up at discharge as still reporting chronic diarrhea sx; will monitor while inpatient    1/1: no significant diarrhea since admit. Cont meds. Will follow      Chronic systolic heart failure  No signs of acute exacerbation at this time  CXR clear, no hypoxia, no LE edema  Echo with EF 35-40%    Continue BB, spironolactone, lasix, arb and jardiance.  F/u outpatient cardiology             Fibromyalgia  Cont home cymbalta and gabapentin        VTE Risk Mitigation (From admission, onward)           Ordered     apixaban tablet 5 mg  2 times daily         12/30/23 0819     IP VTE HIGH RISK PATIENT  Once         12/29/23 2254     Place sequential compression device  Until discontinued         12/29/23 2254                    Discharge Planning   ORALIA:      Code Status: Full Code   Is the patient medically ready for discharge?:     Reason for patient still in hospital (select all that apply): Other (specify) discharge today   Discharge Plan A: Home            Critical care time spent on the evaluation and treatment of severe organ dysfunction, review of pertinent labs and imaging studies, discussions with consulting providers and discussions with patient/family: 35minutes.      Essence Cardoso PA-C  Department of Hospital Medicine   [place] - Intensive Care

## 2024-01-03 NOTE — ASSESSMENT & PLAN NOTE
A flutter with RVR on admit  ER treatments including IV metoprolol, amio bolus did not result in conversion or rate control.  Started on dilt gtt  Briefly conversed to NSR but now remains in afib/flutter  Rate controlled to low 100s  Increase amio to 200mg PO BID (100mg outpatient)  Increase metoprolol to 50mg PO BID (25mg BID outpatient)  Wean dilt gtt as tolerated  Cont home eliquis      12/31: remains a-fib RVR despite med adjustment: amiodarone 200mg PO BID and metoprolol 50mg TID.  Remains on dilt gtt  BP lower end of normal with increasing meds for HR control. If drops will need to hold aldactone today.  [facility]  Cardiology asked for input today. Suspect needs transfer and will see if can facilitate transfer to [place] with her cardiologist    1/1: meds adjusted yeterday: amiodarone 400mg BID, dig load, metoprolol increased. Able to wean dilt gtt some but remains with tachycardia and on dilt gtt this morning. NPO after MN for possible DCCV in AM. Cont eliquis.     1/2 Off diltiazem gtt.  Plan for cardioversion today.      1/3 Successful cardioversion and now NSR.  Will discharge with amiodarone

## 2024-01-03 NOTE — ASSESSMENT & PLAN NOTE
A flutter with RVR on admit  ER treatments including IV metoprolol, amio bolus did not result in conversion or rate control.  Started on dilt gtt  Briefly conversed to NSR but now remains in afib/flutter  Rate controlled to low 100s  Increase amio to 200mg PO BID (100mg outpatient)  Increase metoprolol to 50mg PO BID (25mg BID outpatient)  Wean dilt gtt as tolerated  Cont home eliquis      12/31: remains a-fib RVR despite med adjustment: amiodarone 200mg PO BID and metoprolol 50mg TID.  Remains on dilt gtt  BP lower end of normal with increasing meds for HR control. If drops will need to hold aldactone today.  [facility]  Cardiology asked for input today. Suspect needs transfer and will see if can facilitate transfer to [place] with her cardiologist    1/1: meds adjusted yeterday: amiodarone 400mg BID, dig load, metoprolol increased. Able to wean dilt gtt some but remains with tachycardia and on dilt gtt this morning. NPO after MN for possible DCCV in AM. Cont eliquis.     1/2 Off diltiazem gtt.  Plan for cardioversion today.      1/3 Successful cardioversion and now NSR.  Will discharge with amiodarone

## 2024-01-03 NOTE — NURSING
Patient awake, alert, oriented. Room air. Telemetry removed. Peripheral IV removed. Catheter tip intact. Spouse at bedside. Patient denies chest pain. Patient denies shortness of breath. VSS. Patient voiding without difficulty. Patient ambulating independently around room. AVS given and explained to patient and spouse. Patient and spouse verbalized understanding.

## 2024-01-03 NOTE — PROGRESS NOTES
[facility] - Emergency Dept  Cardiology  Progress Note    Patient Name: Celine Sinclair  MRN: [id number]  Admission Date: 12/29/2023  Hospital Length of Stay: 5 days  Code Status: Full Code   Consulting Provider: Michelle Acosta NP  Primary Care Physician: Neeru Hinkle MD  Principal Problem:Atrial flutter with rapid ventricular response      Consults  Subjective:     Chief Complaint:  Chest pain, shortness of breath, palpitations    HPI:  73-year-old female with past medical history of atrial flutter, PAF, SVT, hypertension, chest pain, shortness of breath, normal coronaries June 2023 presents from PCP clinic with complaint of chest pain, shortness of breath, palpitations.  EKG done there was concerning for atrial flutter with rapid ventricular response, she was sent to emergency department.  Workup currently benign with lab work, however, EKG does reveal atrial flutter with rapid ventricular response.  Cis asked to evaluate.    Patient was loaded on IV Amiodarone and PO increased. She remained on Metoprolol, Eliquis, digoxin and diltiazem drip. She remains in atrial tachycardia at times.    Patient underwent BIRGIT/DCCV on 7/2/2024. LVEF 40%, no thrombus. DCCV with 1 shock at 120J - sinus bradycardia. Dig was placed on hold and metoprolol and amio reduced. Over night, reported patient with episode of a 13 beat run of V-tach. At time of event, patient complained of chest heaviness. Labs revealed - K 3.7, Mag 1.6 and troponin normal. EKG showed NSR with incomplete LBBB with ST and T wave abnormality.     Past Medical History:   Diagnosis Date    Anticoagulant long-term use     Aortic atherosclerosis     Atrial fibrillation     Benign essential hypertension     Cataract     Senile    Chest pain     CHF (congestive heart failure)     Crohn's colitis     Depression, major, recurrent, moderate     Encounter for blood transfusion     Fibromyalgia     GERD (gastroesophageal reflux disease)     Hypertensive cardiomyopathy     IBS  (irritable bowel syndrome)     Migraine     Opioid abuse, in remission     Orbital cellulitis on left     Osteopenia     Paget disease of bone     Palpitations     Port-A-Cath in place     right chest    Prolonged QT interval     RA (rheumatoid arthritis)     Sedative hypnotic or anxiolytic dependence     in remission     SOB (shortness of breath)     SVT (supraventricular tachycardia)     Unspecified urinary incontinence        Past Surgical History:   Procedure Laterality Date    ABLATION N/A 06/28/2023    Procedure: Ablation;  Surgeon: Johnny Nieves MD;  Location: [facility] CATH;  Service: Cardiology;  Laterality: N/A;  ops # 7    ABLATION, ATRIAL FLUTTER, TYPICAL N/A 06/28/2023    Procedure: Ablation, Atrial Flutter, Typical;  Surgeon: Johnny Nieves MD;  Location: [facility] CATH;  Service: Cardiology;  Laterality: N/A;    CATARACT EXTRACTION W/  INTRAOCULAR LENS IMPLANT Left 02/21/2017    Dr. Christianson    CATARACT EXTRACTION W/  INTRAOCULAR LENS IMPLANT Right 03/07/2017    Dr. Christianson    CHOLECYSTECTOMY      COLON SURGERY      COLONOSCOPY N/A 03/16/2016    Procedure: COLONOSCOPY;  Surgeon: Dale Trejo MD;  Location: [facility] ENDO (4TH FLR);  Service: Endoscopy;  Laterality: N/A;    COLONOSCOPY N/A 10/12/2020    Procedure: COLONOSCOPY;  Surgeon: Cali Urena MD;  Location: [facility] ENDO ([facility] FLR);  Service: Endoscopy;  Laterality: N/A;  covid test 10/9-[place] [facility]- completed 10/9/20  ok to hold Coumadin x 5 days per coumadin clinic-see telephone encounterd ated 10/6-MS / Loop recorder  10/6/20- Pt confirmed- ERW@1122  10/9-Pt confirmed earlier arrival time, prep ins. reviewed and emailed to Pt    COLONOSCOPY N/A 08/25/2022    Procedure: COLONOSCOPY;  Surgeon: Lewis Luke MD;  Location: [facility] ENDO;  Service: Endoscopy;  Laterality: N/A;    Colostomy reversal      EYE SURGERY      HEMORRHOID SURGERY      HYSTERECTOMY      ILEOSTOMY      ILEOSTOMY CLOSURE      LEFT HEART CATHETERIZATION N/A 02/15/2019     Procedure: HEART CATH-LEFT;  Surgeon: Miky Keita MD;  Location: [facility] CATH LAB;  Service: Cardiology;  Laterality: N/A;    NECK SURGERY      OOPHORECTOMY Bilateral     SBO      SMALL INTESTINE SURGERY      TONSILLECTOMY      TUBAL LIGATION         Review of patient's allergies indicates:   Allergen Reactions    Octreotide acetate Nausea And Vomiting     Had symptoms when she took Sandostatin with Codeine    Codeine Itching and Nausea And Vomiting     Pill form only, IV ok.,  Had symptoms when she took Codeine with Sandostatin.  Other reaction(s): Itching    Morphine Nausea And Vomiting     Patient reports reaction only when she takes po form of Morphine.    Iodine Nausea And Vomiting     States only when eats too much seafood    Opioids - morphine analogues Nausea And Vomiting    Simvastatin Nausea And Vomiting       No current facility-administered medications on file prior to encounter.     Current Outpatient Medications on File Prior to Encounter   Medication Sig    amiodarone (PACERONE) 200 MG Tab Take 0.5 tablets (100 mg total) by mouth once daily.    amLODIPine (NORVASC) 5 MG tablet Take 5 mg by mouth once daily.    apixaban (ELIQUIS) 5 mg Tab Take 1 tablet (5 mg total) by mouth 2 (two) times daily.    atorvastatin (LIPITOR) 40 MG tablet Take 40 mg by mouth every evening.    balsalazide (COLAZAL) 750 mg capsule Take 3 capsules (2,250 mg total) by mouth 3 (three) times daily.    DULoxetine (CYMBALTA) 60 MG capsule Take 1 capsule (60 mg total) by mouth once daily.    FARXIGA 10 mg tablet Take 10 mg by mouth once daily.    gabapentin (NEURONTIN) 300 MG capsule Take 1 capsule (300 mg total) by mouth 3 (three) times daily.    losartan (COZAAR) 25 MG tablet Take 25 mg by mouth once daily.    metoprolol succinate (TOPROL-XL) 25 MG 24 hr tablet Take 25 mg by mouth 2 (two) times a day.    potassium chloride SA (K-DUR,KLOR-CON) 20 MEQ tablet Take 2 tablets (40 mEq total) by mouth once daily.    spironolactone  (ALDACTONE) 25 MG tablet Take 12.5 mg by mouth once daily.    albuterol-ipratropium (DUO-NEB) 2.5 mg-0.5 mg/3 mL nebulizer solution Take 3 mLs by nebulization every 6 (six) hours as needed for Wheezing. Rescue (Patient not taking: Reported on 12/29/2023)    COMIRNATY 2023-24, 12Y UP,,PF, 30 mcg/0.3 mL inection     cyanocobalamin 1,000 mcg/mL injection Inject 1 mL (1,000 mcg total) into the muscle once a week.    FLUAD QUAD 2023-24,65Y UP,,PF, 60 mcg (15 mcg x 4)/0.5 mL Syrg     furosemide (LASIX) 20 MG tablet Take 20 mg by mouth Daily.    INNOSPIRE ESSENCE Manisha use as directed    levalbuterol (XOPENEX HFA) 45 mcg/actuation inhaler Inhale 1-2 puffs into the lungs every 4 (four) hours as needed for Wheezing. Rescue (Patient not taking: Reported on 12/29/2023)    memantine (NAMENDA) 10 MG Tab Take 10 mg by mouth 2 (two) times daily.    nitroGLYCERIN (NITROSTAT) 0.4 MG SL tablet Place 0.4 mg under the tongue every 5 (five) minutes as needed.    pantoprazole (PROTONIX) 40 MG tablet Increase to 40mg twice daily x 2 wks then resume daily thereafter (Patient taking differently: Take 40 mg by mouth once daily.)    [DISCONTINUED] nortriptyline (PAMELOR) 10 MG capsule TAKE 1 CAPSULE (10 MG TOTAL) BY MOUTH EVERY EVENING.    [DISCONTINUED] traZODone (DESYREL) 50 MG tablet Take 1 tablet (50 mg total) by mouth nightly as needed.     Family History       Problem Relation (Age of Onset)    Breast cancer Daughter (40), Daughter (47), Sister    Cancer Mother    Colon cancer Maternal Grandmother    Emphysema Father (67)    Glaucoma Paternal Grandmother    Heart attack Maternal Grandmother, Sister    Heart disease Father    Lung cancer Sister    Other Daughter    Retinal detachment Grandchild          Tobacco Use    Smoking status: Never    Smokeless tobacco: Never   Substance and Sexual Activity    Alcohol use: Yes     Comment: wine coolers occassionally    Drug use: No    Sexual activity: Not Currently     Partners: Female     Review  of Systems   Constitutional: Negative.   HENT: Negative.     Eyes: Negative.    Cardiovascular:  Positive for chest pain.   Respiratory:  Positive for shortness of breath.    Skin: Negative.    Musculoskeletal: Negative.    Gastrointestinal: Negative.    Genitourinary: Negative.    Neurological: Negative.      Objective:     Vital Signs (Most Recent):  Temp: 98.3 °F (36.8 °C) (01/03/24 0730)  Pulse: 61 (01/03/24 0730)  Resp: 20 (01/03/24 0730)  BP: 126/79 (01/03/24 0730)  SpO2: 98 % (01/03/24 0743) Vital Signs (24h Range):  Temp:  [97.1 °F (36.2 °C)-98.9 °F (37.2 °C)] 98.3 °F (36.8 °C)  Pulse:  [] 61  Resp:  [14-37] 20  SpO2:  [94 %-100 %] 98 %  BP: (103-135)/(66-88) 126/79     Weight: 81.4 kg (179 lb 7.3 oz)  Body mass index is 29.86 kg/m².    SpO2: 98 %         Intake/Output Summary (Last 24 hours) at 1/3/2024 0757  Last data filed at 1/2/2024 1124  Gross per 24 hour   Intake 100 ml   Output --   Net 100 ml         Lines/Drains/Airways       Drain  Duration             Female External Urinary Catheter w/ Suction 12/29/23 2300 4 days              Peripheral Intravenous Line  Duration                  Peripheral IV - Single Lumen 12/29/23 1404 20 G;2 in Right Antecubital 4 days                    Physical Exam  Vitals reviewed.   Constitutional:       Appearance: Normal appearance.   HENT:      Head: Normocephalic.      Nose: Nose normal.      Mouth/Throat:      Mouth: Mucous membranes are moist.   Eyes:      Conjunctiva/sclera: Conjunctivae normal.   Cardiovascular:      Rate and Rhythm: Tachycardia present.      Pulses: Normal pulses.      Heart sounds: Normal heart sounds.   Pulmonary:      Effort: Pulmonary effort is normal.      Breath sounds: Normal breath sounds.   Abdominal:      General: Abdomen is flat.      Palpations: Abdomen is soft.   Musculoskeletal:         General: Normal range of motion.      Cervical back: Normal range of motion.   Skin:     General: Skin is warm and dry.      Capillary  Refill: Capillary refill takes less than 2 seconds.   Neurological:      General: No focal deficit present.      Mental Status: She is alert and oriented to person, place, and time.   Psychiatric:         Mood and Affect: Mood normal.         Behavior: Behavior normal.         Significant Labs: BMP:   Recent Labs   Lab 01/02/24 0429 01/03/24  0112   GLU 89  --      --    K 3.5 3.7     --    CO2 20*  --    BUN 20  --    CREATININE 0.9  --    CALCIUM 9.4  --    MG 1.7 1.6     , CMP   Recent Labs   Lab 01/02/24 0429 01/03/24  0112     --    K 3.5 3.7     --    CO2 20*  --    GLU 89  --    BUN 20  --    CREATININE 0.9  --    CALCIUM 9.4  --    PROT 7.1  --    ALBUMIN 3.6  --    BILITOT 1.6*  --    ALKPHOS 76  --    AST 17  --    ALT 11  --    ANIONGAP 10  --      , CBC   Recent Labs   Lab 01/02/24 0429   WBC 9.71   HGB 13.9   HCT 43.7        , and Troponin   Recent Labs   Lab 01/01/24  1030 01/01/24  2310 01/03/24  0112   TROPONINI <0.006 <0.006 <0.006           Assessment and Plan:     Active Diagnoses:    Diagnosis Date Noted POA    PRINCIPAL PROBLEM:  Atrial flutter with rapid ventricular response [I48.92] 06/28/2023 Yes    Asymptomatic bacteriuria [R82.71] 12/31/2023 Yes    Dementia with anxiety, unspecified dementia severity, unspecified dementia type [F03.94] 10/02/2023 Yes    Depression, major, recurrent, moderate [F33.1]  Yes    Crohn's disease [K50.90] 10/12/2020 Yes    Fibromyalgia [M79.7] 06/18/2019 Yes    Cardiomyopathy, nonischemic [I42.8] 06/18/2019 Yes      Problems Resolved During this Admission:         VTE Risk Mitigation (From admission, onward)           Ordered     apixaban tablet 5 mg  2 times daily         12/30/23 0819     IP VTE HIGH RISK PATIENT  Once         12/29/23 2254     Place sequential compression device  Until discontinued         12/29/23 2254                  [facility] 7/23:      Echocardiogram May 2023   Ejection fraction 40-45%   Grade 2 diastolic  dysfunction   Left atrial enlargement  No significant valvular pathology    Diagnosis:  Atrial tach with RVR despite amio, metoprolol, dig and diltiazem, now back in SR s/p CV yesterday with one brief run of SVT with aberrancy last night.  Chest pain /NO SIGNIFICANT CAD [facility] 7/23  Shortness of breath   Palpitations  RECURRENCE OF Atrial flutter with rapid ventricular response S/P PV ABLATION 6/23  Hypertension SUBOPTIMAL  SVT   Status post BIRGIT/DCCV - LVEF 40%, no thrombus, shocked x's 1 - sinus bradycardia  V-Tach - 13 beat run overnight with associated chest pressure - troponin normal, K-3.7, mag-1.6    Plan: K and Mag supplement  Discontinued digoxin  Continue Metoprolol 50 bid and Amio 200 bid  Resume empagliflozin  Increase Spironolactone  Change Losartan to Valsartan 40mg po daily        Michelle Acosta NP scribed for Dr Moreno   Cardiology   [facility] - Emergency Dept    I attest that I have personally seen and examined this patient. I have reviewed and discussed the management in detail as outlined above.

## 2024-01-03 NOTE — SUBJECTIVE & OBJECTIVE
Past Medical History:   Diagnosis Date    Anticoagulant long-term use     Aortic atherosclerosis     Atrial fibrillation     Benign essential hypertension     Cataract     Senile    Chest pain     CHF (congestive heart failure)     Crohn's colitis     Depression, major, recurrent, moderate     Encounter for blood transfusion     Fibromyalgia     GERD (gastroesophageal reflux disease)     Hypertensive cardiomyopathy     IBS (irritable bowel syndrome)     Migraine     Opioid abuse, in remission     Orbital cellulitis on left     Osteopenia     Paget disease of bone     Palpitations     Port-A-Cath in place     right chest    Prolonged QT interval     RA (rheumatoid arthritis)     Sedative hypnotic or anxiolytic dependence     in remission     SOB (shortness of breath)     SVT (supraventricular tachycardia)     Unspecified urinary incontinence        Past Surgical History:   Procedure Laterality Date    ABLATION N/A 06/28/2023    Procedure: Ablation;  Surgeon: Johnny Nieves MD;  Location: [facility] CATH;  Service: Cardiology;  Laterality: N/A;  ops # 7    ABLATION, ATRIAL FLUTTER, TYPICAL N/A 06/28/2023    Procedure: Ablation, Atrial Flutter, Typical;  Surgeon: Johnny Nieves MD;  Location: [facility] CATH;  Service: Cardiology;  Laterality: N/A;    CATARACT EXTRACTION W/  INTRAOCULAR LENS IMPLANT Left 02/21/2017    Dr. Christianson    CATARACT EXTRACTION W/  INTRAOCULAR LENS IMPLANT Right 03/07/2017    Dr. Christianson    CHOLECYSTECTOMY      COLON SURGERY      COLONOSCOPY N/A 03/16/2016    Procedure: COLONOSCOPY;  Surgeon: Dale Trejo MD;  [facility]  Service: Endoscopy;  Laterality: N/A;    COLONOSCOPY N/A 10/12/2020    Procedure: COLONOSCOPY;  Surgeon: Cali Urena MD;  Location: [facility] ENDO (4TH FLR);  Service: Endoscopy;  Laterality: N/A;  covid test 10/9-[place] [facility]- completed 10/9/20  ok to hold Coumadin x 5 days per coumadin clinic-see telephone encounterd ated 10/6-MS / Loop  recorder  10/6/20- Pt confirmed- ERW@1122  10/9-Pt confirmed earlier arrival time, prep ins. reviewed and emailed to Pt    COLONOSCOPY N/A 08/25/2022    Procedure: COLONOSCOPY;  Surgeon: Lewis Luke MD;  Location: [facility];  Service: Endoscopy;  Laterality: N/A;    Colostomy reversal      EYE SURGERY      HEMORRHOID SURGERY      HYSTERECTOMY      ILEOSTOMY      ILEOSTOMY CLOSURE      LEFT HEART CATHETERIZATION N/A 02/15/2019    Procedure: HEART CATH-LEFT;  Surgeon: Miky Keita MD;  Location: [facility] CATH LAB;  Service: Cardiology;  Laterality: N/A;    NECK SURGERY      OOPHORECTOMY Bilateral     SBO      SMALL INTESTINE SURGERY      TONSILLECTOMY      TUBAL LIGATION         Review of patient's allergies indicates:   Allergen Reactions    Octreotide acetate Nausea And Vomiting     Had symptoms when she took Sandostatin with Codeine    Codeine Itching and Nausea And Vomiting     Pill form only, IV ok.,  Had symptoms when she took Codeine with Sandostatin.  Other reaction(s): Itching    Morphine Nausea And Vomiting     Patient reports reaction only when she takes po form of Morphine.    Iodine Nausea And Vomiting     States only when eats too much seafood    Opioids - morphine analogues Nausea And Vomiting    Simvastatin Nausea And Vomiting       No current facility-administered medications on file prior to encounter.     Current Outpatient Medications on File Prior to Encounter   Medication Sig    amiodarone (PACERONE) 200 MG Tab Take 0.5 tablets (100 mg total) by mouth once daily.    amLODIPine (NORVASC) 5 MG tablet Take 5 mg by mouth once daily.    apixaban (ELIQUIS) 5 mg Tab Take 1 tablet (5 mg total) by mouth 2 (two) times daily.    atorvastatin (LIPITOR) 40 MG tablet Take 40 mg by mouth every evening.    balsalazide (COLAZAL) 750 mg capsule Take 3 capsules (2,250 mg total) by mouth 3 (three) times daily.    DULoxetine (CYMBALTA) 60 MG capsule Take 1 capsule (60 mg total) by mouth once daily.    FARXIGA 10 mg  tablet Take 10 mg by mouth once daily.    gabapentin (NEURONTIN) 300 MG capsule Take 1 capsule (300 mg total) by mouth 3 (three) times daily.    losartan (COZAAR) 25 MG tablet Take 25 mg by mouth once daily.    metoprolol succinate (TOPROL-XL) 25 MG 24 hr tablet Take 25 mg by mouth 2 (two) times a day.    potassium chloride SA (K-DUR,KLOR-CON) 20 MEQ tablet Take 2 tablets (40 mEq total) by mouth once daily.    spironolactone (ALDACTONE) 25 MG tablet Take 12.5 mg by mouth once daily.    albuterol-ipratropium (DUO-NEB) 2.5 mg-0.5 mg/3 mL nebulizer solution Take 3 mLs by nebulization every 6 (six) hours as needed for Wheezing. Rescue (Patient not taking: Reported on 12/29/2023)    COMIRNATY 2023-24, 12Y UP,,PF, 30 mcg/0.3 mL inection     cyanocobalamin 1,000 mcg/mL injection Inject 1 mL (1,000 mcg total) into the muscle once a week.    FLUAD QUAD 2023-24,65Y UP,,PF, 60 mcg (15 mcg x 4)/0.5 mL Syrg     furosemide (LASIX) 20 MG tablet Take 20 mg by mouth Daily.    INNOSPIRE ESSENCE Manisha use as directed    levalbuterol (XOPENEX HFA) 45 mcg/actuation inhaler Inhale 1-2 puffs into the lungs every 4 (four) hours as needed for Wheezing. Rescue (Patient not taking: Reported on 12/29/2023)    memantine (NAMENDA) 10 MG Tab Take 10 mg by mouth 2 (two) times daily.    nitroGLYCERIN (NITROSTAT) 0.4 MG SL tablet Place 0.4 mg under the tongue every 5 (five) minutes as needed.    pantoprazole (PROTONIX) 40 MG tablet Increase to 40mg twice daily x 2 wks then resume daily thereafter (Patient taking differently: Take 40 mg by mouth once daily.)    [DISCONTINUED] nortriptyline (PAMELOR) 10 MG capsule TAKE 1 CAPSULE (10 MG TOTAL) BY MOUTH EVERY EVENING.    [DISCONTINUED] traZODone (DESYREL) 50 MG tablet Take 1 tablet (50 mg total) by mouth nightly as needed.     Family History       Problem Relation (Age of Onset)    Breast cancer Daughter (40), Daughter (47), Sister    Cancer Mother    Colon cancer Maternal Grandmother    Emphysema Father  (67)    Glaucoma Paternal Grandmother    Heart attack Maternal Grandmother, Sister    Heart disease Father    Lung cancer Sister    Other Daughter    Retinal detachment Grandchild          Tobacco Use    Smoking status: Never    Smokeless tobacco: Never   Substance and Sexual Activity    Alcohol use: Yes     Comment: wine coolers occassionally    Drug use: No    Sexual activity: Not Currently     Partners: Female     Review of Systems   Constitutional:  Negative for activity change, chills, fatigue, fever and unexpected weight change.   HENT:  Negative for congestion, ear discharge, ear pain, hearing loss, rhinorrhea and sore throat.    Eyes:  Negative for pain, discharge, redness and visual disturbance.   Respiratory:  Positive for shortness of breath (some SOB). Negative for cough and wheezing.    Cardiovascular:  Negative for chest pain, palpitations and leg swelling.   Gastrointestinal:  Negative for abdominal pain, constipation, diarrhea (reported by patient, chronic, Crohn's diseased), nausea and vomiting.   Endocrine: Negative for cold intolerance and heat intolerance.   Genitourinary:  Negative for difficulty urinating, dysuria, frequency and urgency.   Musculoskeletal:  Positive for arthralgias (chronic, fibromyalgia). Negative for back pain, joint swelling, myalgias and neck pain.   Skin:  Negative for color change, rash and wound.   Neurological:  Negative for dizziness, tremors, light-headedness and headaches.   Psychiatric/Behavioral:  Negative for agitation and confusion.      Objective:     Vital Signs (Most Recent):  Temp: 98.9 °F (37.2 °C) (01/03/24 1115)  Pulse: (!) 58 (01/03/24 1100)  Resp: 18 (01/03/24 1000)  BP: 131/77 (01/03/24 1100)  SpO2: 98 % (01/03/24 1100) Vital Signs (24h Range):  Temp:  [97.1 °F (36.2 °C)-98.9 °F (37.2 °C)] 98.9 °F (37.2 °C)  Pulse:  [58-67] 58  Resp:  [14-37] 18  SpO2:  [94 %-100 %] 98 %  BP: (104-144)/(66-88) 131/77     Weight: 81.4 kg (179 lb 7.3 oz)  Body mass index  "is 29.86 kg/m².     Physical Exam  Vitals and nursing note reviewed.   Constitutional:       General: She is not in acute distress.     Appearance: She is well-developed.   HENT:      Head: Normocephalic and atraumatic.      Right Ear: External ear normal.      Left Ear: External ear normal.      Nose: Nose normal.   Eyes:      General:         Right eye: No discharge.         Left eye: No discharge.      Conjunctiva/sclera: Conjunctivae normal.   Neck:      Thyroid: No thyromegaly.   Cardiovascular:      Rate and Rhythm: Normal rate and regular rhythm.      Heart sounds: No murmur heard.  Pulmonary:      Effort: Pulmonary effort is normal. No respiratory distress.      Breath sounds: No rales.   Abdominal:      General: There is no distension.      Palpations: Abdomen is soft.      Tenderness: There is no abdominal tenderness.   Musculoskeletal:      Right lower leg: No edema.      Left lower leg: No edema.   Skin:     General: Skin is warm and dry.   Neurological:      Mental Status: She is alert and oriented to person, place, and time.   Psychiatric:         Behavior: Behavior normal.         Thought Content: Thought content normal.                Significant Labs: All pertinent labs within the past 24 hours have been reviewed.  Blood Culture: No results for input(s): "LABBLOO" in the last 48 hours.  CBC:   Recent Labs   Lab 01/02/24 0429   WBC 9.71   HGB 13.9   HCT 43.7          CMP:   Recent Labs   Lab 01/02/24  0429 01/03/24  0112 01/03/24  0927     --  138   K 3.5 3.7 3.5     --  106   CO2 20*  --  20*   GLU 89  --  107   BUN 20  --  27*   CREATININE 0.9  --  1.0   CALCIUM 9.4  --  9.4   PROT 7.1  --   --    ALBUMIN 3.6  --   --    BILITOT 1.6*  --   --    ALKPHOS 76  --   --    AST 17  --   --    ALT 11  --   --    ANIONGAP 10  --  12       Cardiac Markers:   No results for input(s): "CKMB", "MYOGLOBIN", "BNP", "TROPISTAT" in the last 48 hours.    Magnesium:   Recent Labs   Lab " "01/02/24  0429 01/03/24  0112   MG 1.7 1.6       Troponin:   Recent Labs   Lab 01/01/24  2310 01/03/24  0112   TROPONINI <0.006 <0.006       TSH:   Recent Labs   Lab 12/29/23  1206   TSH 1.367       Urine Culture: No results for input(s): "LABURIN" in the last 48 hours.    Urine Studies:   No results for input(s): "COLORU", "APPEARANCEUA", "PHUR", "SPECGRAV", "PROTEINUA", "GLUCUA", "KETONESU", "BILIRUBINUA", "OCCULTUA", "NITRITE", "UROBILINOGEN", "LEUKOCYTESUR", "RBCUA", "WBCUA", "BACTERIA", "SQUAMEPITHEL", "HYALINECASTS" in the last 48 hours.    Invalid input(s): "WRIGHTSUR"      Significant Imaging: I have reviewed all pertinent imaging results/findings within the past 24 hours.    CXR 12/29/23: "FINDINGS:   Postoperative changes cervical region.  Loop recorder.The lungs are clear with normal appearance of pulmonary vasculature. No pleural effusion. No evident pneumothorax.     The cardiac silhouette is normal in size. The hilar and mediastinal contours are unremarkable.     Bones are intact. "  "

## 2024-01-03 NOTE — NURSING
Patient with 13 beat run of V-tach at 0043. Patient complaining of chest heaviness at time of event. MD notified. New orders noted.

## 2024-01-03 NOTE — PHYSICIAN QUERY
5PT Name: Celine Sinclair  MR #: [id number]    DOCUMENTATION CLARIFICATION     CDS/: JOVANA Adkins, RN, CCDS               Contact information: [email or web address]  This form is a permanent document in the medical record.     Query Date: January 3, 2024    By submitting this query, we are merely seeking further clarification of documentation. Please utilize your independent clinical judgment when addressing the question(s) below.    The Medical Record contains the following:    Indicators Supporting Clinical Findings Location in Medical Record   X Atrial Flutter past medical history of atrial flutter, PAF, SVT    PRINCIPAL PROBLEM:  Atrial flutter with rapid ventricular response     admitted to ICU with afib/flutter with RVR     Atrial flutter with rapid ventricular response  A flutter with RVR on admit  ER treatments including IV metoprolol, amio bolus did not result in conversion or rate control.  Started on dilt gtt  Briefly conversed to NSR but now remains in afib/flutter  Rate controlled to low 100s  Increase amio to 200mg PO BID (100mg outpatient)  Increase metoprolol to 50mg PO BID (25mg BID outpatient)  Wean dilt gtt as tolerated  Cont home eliquis   Cards: Dr. Moreno 1/3        DCS: Dr. Cardoso 1/3   X EKG Results Vent. Rate : 136 BPM     Atrial Rate : 136 BPM      P-R Int : 128 ms          QRS Dur : 098 ms       QT Int : 330 ms       P-R-T Axes : 233 -10 144 degrees      QTc Int : 496 ms     Unusual P axis, possible ectopic atrial tachycardia   LVH with repolarization abnormality     Vent. Rate : 090 BPM     Atrial Rate : 125 BPM      P-R Int : 000 ms          QRS Dur : 098 ms       QT Int : 288 ms       P-R-T Axes : 000 -06 200 degrees      QTc Int : 352 ms     Normal sinus rhythm with with frequent Atrial premature complexes   Abnormal R wave progression in the precordial leads   Nonspecific ST and T wave abnormality     Vent. Rate : 128 BPM     Atrial Rate : 128 BPM      P-R Int : 182 ms           QRS Dur : 098 ms       QT Int : 306 ms       P-R-T Axes : 000 -43 134 degrees      QTc Int : 446 ms     Atrial tachycardia with 2:1 AV Block. with Premature ventricular complexes   Left axis deviation   Marked ST abnormality, possible lateral subendocardial injury    EK/29              EK/30                    EK/2   X Medication loaded on IV Amiodarone and PO increased. She remained on Metoprolol, Eliquis, digoxin and diltiazem drip. She remains in atrial tachycardia at times.     Did not respond to IV metoprolol, amiodarone bolus and ultimately started on diltiazem gtt.     briefly converted to NSR per report but then returned to afib/flutter with RVR. She has been on dilt gtt throughout the night and HR better controlled to low 100s     increase PO amiodarone to 200mg daily and increase PO metoprolol to 50mg BID and wean dilt gtt    Cards: Dr. Moreno 1/3      DCS: Dr. Cardoso 1/3   X Treatment 1/3 Successful cardioversion and now NSR.  Will discharge with amiodarone    DCS: Dr. Cardoso 1/3   X Other underwent BIRGIT/DCCV on 2024. LVEF 40%, no thrombus. DCCV with 1 shock at 120J - sinus bradycardia. Dig was placed on hold and metoprolol and amio reduced     episode of a 13 beat run of V-tach    Cards: Dr. Moreno 1/3     The clinical guidelines noted are only a system guideline. It does not replace the provider's clinical judgment.    Provider, please further specify the atrial flutter.    [   ] Typical (Type I) - Diagnosis based on morphological criteria on EKG when the cavo-tricuspid isthmus region is involved   [   ] Atypical (Type II) - Diagnosis based on morphological criteria on EKG (not meeting typical atrial flutter criteria because the cavo-tricuspid isthmus region is not involved)   [   x] Other cardiac diagnosis (please specify):atrial tachycardia with RVR         Please document in your progress notes daily for the duration of treatment until resolved, and include in your discharge  summary.    Reference:    GERDA Tanner MD, [facility], [facility], HENRRY Sherman MD, [facility], [facility], BENEDICT Hurt MD, CHRISTA, [facility], & JACE Polk MD. (2019, May 27). Overview of atrial flutter (SEAMUS Gleason MD, Ed.). Retrieved October 22, 2020, from https://www.[employer]Your [employer] Manager/contents/vvavwmot-tv-iihgoz-flutter?search=atrial%20flutter&source=search_result&selectedTitle=1~150&usage_type=default&display_rank=1    Form No. 72853

## 2024-01-03 NOTE — PLAN OF CARE
[facility] - Intensive Care  Discharge Final Note    Primary Care Provider: Neeru Hinkle MD    Expected Discharge Date: 1/3/2024    Final Discharge Note (most recent)       Final Note - 01/03/24 1218          Final Note    Assessment Type Final Discharge Note (P)      Anticipated Discharge Disposition Home or Self Care (P)      Hospital Resources/Appts/Education Provided Appointments scheduled and added to AVS (P)         Post-Acute Status    Discharge Delays None known at this time (P)                      Important Message from Medicare  Important Message from Medicare regarding Discharge Appeal Rights: Given to patient/caregiver, Explained to patient/caregiver, Signed/date by patient/caregiver     Date IMM was signed: 01/02/24  Time IMM was signed: 1503    Contact Info       Essence Keating NP   Specialty: Internal Medicine    [address] [place] [id number]   Phone: [phone]       Next Steps: Go on 1/9/2024    Instructions: at 2:20 PM    Steve Moreno MD   Specialty: Cardiology    [address] [place] [id number]   Phone: [phone]       Next Steps: Go on 1/5/2024    Instructions: at 1:30 PM          Patient will discharge home today. No post acute needs. Follow up appointments scheduled above.

## 2024-01-03 NOTE — PLAN OF CARE
Follow-up Information       Essence Keating NP. Go on 1/9/2024.    Specialty: Internal Medicine  Why: at 2:20 PM  Contact information:  [address] [place] [address]  [place] [address]  [phone]               Steve Moreno MD. Go on 1/5/2024.    Specialty: Cardiology  Why: at 1:30 PM  Contact information:  [address] [place] [address] [address] [place] [address]  [phone]                              All clinical faxed to Dr. Moreno's office per request.

## 2024-01-03 NOTE — ASSESSMENT & PLAN NOTE
UA and culture E. coli noted but she has no UTI sx reported  Have held antibx at this point    1/2: ecoli UTI.  Started IV rocephin.  Possible trigger of afib with RVR   1/3: will prescribe course of Augmentin

## 2024-01-04 ENCOUNTER — PATIENT OUTREACH (OUTPATIENT)
Dept: ADMINISTRATIVE | Facility: CLINIC | Age: 74
End: 2024-01-04
Payer: MEDICARE

## 2024-01-04 ENCOUNTER — NURSE TRIAGE (OUTPATIENT)
Dept: ADMINISTRATIVE | Facility: CLINIC | Age: 74
End: 2024-01-04
Payer: MEDICARE

## 2024-01-04 RX ORDER — ASPIRIN 81 MG/1
81 TABLET ORAL DAILY
COMMUNITY

## 2024-01-04 NOTE — PROGRESS NOTES
C3 nurse spoke with Celine Sinclair  for a TCC post hospital discharge follow up call. The patient has a scheduled [facility] appointment with Essence Keating NP on 1/9/2024 @ 2:20 PM.

## 2024-01-04 NOTE — TELEPHONE ENCOUNTER
Pt discharged on yesterday. States that she is currently feeling heart flutters on left side, dizziness and weakness with walking. Pt states that she unable to walk for a far distance without dizziness. Advised to go to ED now and to call 911 if immediate transportation isn't available. VU. Encounter routed to provider.      Reason for Disposition   Dizziness, lightheadedness, or weakness    Additional Information   Negative: Passed out (i.e., lost consciousness, collapsed and was not responding)   Negative: Shock suspected (e.g., cold/pale/clammy skin, too weak to stand, low BP, rapid pulse)   Negative: Difficult to awaken or acting confused (e.g., disoriented, slurred speech)   Negative: Visible sweat on face or sweat dripping down face   Negative: Unable to walk, or can only walk with assistance (e.g., requires support)   Negative: Received SHOCK from implantable cardiac defibrillator and has persisting symptoms (i.e., palpitations, lightheadedness)   Negative: Dizziness, lightheadedness, or weakness and heart beating very rapidly (e.g., > 140 / minute)   Negative: Dizziness, lightheadedness, or weakness and heart beating very slowly (e.g., < 50 / minute)   Negative: Sounds like a life-threatening emergency to the triager   Negative: Difficulty breathing    Protocols used: Heart Rate and Heartbeat Dnrzlsnae-K-OH

## 2024-02-01 ENCOUNTER — OFFICE VISIT (OUTPATIENT)
Dept: INTERNAL MEDICINE | Facility: CLINIC | Age: 74
End: 2024-02-01
Payer: MEDICARE

## 2024-02-01 VITALS
HEART RATE: 60 BPM | HEIGHT: 65 IN | BODY MASS INDEX: 28.98 KG/M2 | OXYGEN SATURATION: 100 % | DIASTOLIC BLOOD PRESSURE: 70 MMHG | RESPIRATION RATE: 20 BRPM | SYSTOLIC BLOOD PRESSURE: 132 MMHG | WEIGHT: 173.94 LBS

## 2024-02-01 DIAGNOSIS — I70.0 AORTIC ATHEROSCLEROSIS: ICD-10-CM

## 2024-02-01 DIAGNOSIS — G89.29 CHRONIC CHEST PAIN: ICD-10-CM

## 2024-02-01 DIAGNOSIS — I48.0 PAF (PAROXYSMAL ATRIAL FIBRILLATION): ICD-10-CM

## 2024-02-01 DIAGNOSIS — K50.819 CROHN'S DISEASE OF SMALL AND LARGE INTESTINES WITH COMPLICATION: ICD-10-CM

## 2024-02-01 DIAGNOSIS — M79.7 FIBROMYALGIA: ICD-10-CM

## 2024-02-01 DIAGNOSIS — I10 ESSENTIAL HYPERTENSION: ICD-10-CM

## 2024-02-01 DIAGNOSIS — Z79.01 LONG TERM (CURRENT) USE OF ANTICOAGULANTS: ICD-10-CM

## 2024-02-01 DIAGNOSIS — R07.9 CHRONIC CHEST PAIN: ICD-10-CM

## 2024-02-01 DIAGNOSIS — Z00.00 ENCOUNTER FOR PREVENTIVE HEALTH EXAMINATION: Primary | ICD-10-CM

## 2024-02-01 DIAGNOSIS — M51.36 DDD (DEGENERATIVE DISC DISEASE), LUMBAR: ICD-10-CM

## 2024-02-01 DIAGNOSIS — Z79.899 LONG TERM CURRENT USE OF AMIODARONE: ICD-10-CM

## 2024-02-01 PROBLEM — I48.92 ATRIAL FLUTTER WITH RAPID VENTRICULAR RESPONSE: Status: RESOLVED | Noted: 2023-06-28 | Resolved: 2024-02-01

## 2024-02-01 PROBLEM — H10.13 ALLERGIC CONJUNCTIVITIS OF BOTH EYES: Status: RESOLVED | Noted: 2019-01-09 | Resolved: 2024-02-01

## 2024-02-01 PROBLEM — E83.42 HYPOMAGNESEMIA: Status: RESOLVED | Noted: 2022-03-17 | Resolved: 2024-02-01

## 2024-02-01 PROBLEM — T80.818A: Status: RESOLVED | Noted: 2023-02-17 | Resolved: 2024-02-01

## 2024-02-01 PROBLEM — E87.6 HYPOKALEMIA: Status: RESOLVED | Noted: 2022-03-17 | Resolved: 2024-02-01

## 2024-02-01 PROBLEM — R82.71 ASYMPTOMATIC BACTERIURIA: Status: RESOLVED | Noted: 2023-12-31 | Resolved: 2024-02-01

## 2024-02-01 PROBLEM — R07.81 RIB PAIN ON LEFT SIDE: Status: RESOLVED | Noted: 2023-02-17 | Resolved: 2024-02-01

## 2024-02-01 PROBLEM — I96: Status: RESOLVED | Noted: 2023-02-17 | Resolved: 2024-02-01

## 2024-02-01 PROCEDURE — 3075F SYST BP GE 130 - 139MM HG: CPT | Mod: CPTII,S$GLB,, | Performed by: NURSE PRACTITIONER

## 2024-02-01 PROCEDURE — 99999 PR PBB SHADOW E&M-EST. PATIENT-LVL IV: CPT | Mod: PBBFAC,,, | Performed by: NURSE PRACTITIONER

## 2024-02-01 PROCEDURE — 1170F FXNL STATUS ASSESSED: CPT | Mod: CPTII,S$GLB,, | Performed by: NURSE PRACTITIONER

## 2024-02-01 PROCEDURE — 3288F FALL RISK ASSESSMENT DOCD: CPT | Mod: CPTII,S$GLB,, | Performed by: NURSE PRACTITIONER

## 2024-02-01 PROCEDURE — 90471 IMMUNIZATION ADMIN: CPT | Mod: S$GLB,,, | Performed by: NURSE PRACTITIONER

## 2024-02-01 PROCEDURE — 1101F PT FALLS ASSESS-DOCD LE1/YR: CPT | Mod: CPTII,S$GLB,, | Performed by: NURSE PRACTITIONER

## 2024-02-01 PROCEDURE — 4010F ACE/ARB THERAPY RXD/TAKEN: CPT | Mod: CPTII,S$GLB,, | Performed by: NURSE PRACTITIONER

## 2024-02-01 PROCEDURE — G0439 PPPS, SUBSEQ VISIT: HCPCS | Mod: S$GLB,,, | Performed by: NURSE PRACTITIONER

## 2024-02-01 PROCEDURE — 3078F DIAST BP <80 MM HG: CPT | Mod: CPTII,S$GLB,, | Performed by: NURSE PRACTITIONER

## 2024-02-01 PROCEDURE — 1159F MED LIST DOCD IN RCRD: CPT | Mod: CPTII,S$GLB,, | Performed by: NURSE PRACTITIONER

## 2024-02-01 PROCEDURE — 90714 TD VACC NO PRESV 7 YRS+ IM: CPT | Mod: S$GLB,,, | Performed by: NURSE PRACTITIONER

## 2024-02-01 NOTE — ASSESSMENT & PLAN NOTE
Today notes recent chest pain relieved with NTG  Chronic intermittent  Following with cardiology - wearing cardiac monitor   Small vessel vs  ? Myocardial bridging ( listed in chart but not documented per cardiology )  vs Rule out non- cardiac etiology ; GERD, muscular , fibromyagia

## 2024-02-01 NOTE — Clinical Note
Mrs. Sinclair  was seen today for AMW. Health maintenance reviewed.  Overdue health maintenance shows due for Td, Shingrix and RSV vaccines; Td provided today ; given Rx for shingrix and RSV to get at pharmacy. Risks and benefits reviewed  LA  reviewed; Patient is not using or prescribed any Opioids Exam stable. Having work up with cardiology. Cardiac monitor placed  ACP documents reviewed and provided.    Thank You,  Essence SEVILLA

## 2024-02-01 NOTE — ASSESSMENT & PLAN NOTE
Lab Results   Component Value Date    WBC 9.71 01/02/2024    HGB 13.9 01/02/2024    HCT 43.7 01/02/2024    MCV 86 01/02/2024     01/02/2024     No active bleeding   Stable , monitor

## 2024-02-01 NOTE — ASSESSMENT & PLAN NOTE
She continues to have some chest pain ; notes releived with NTG last pm   Currently wearing cardiac monitor device   F/u with Cardiology

## 2024-02-01 NOTE — PROGRESS NOTES
"  Celine Sinclair presented for a  Medicare AWV and comprehensive Health Risk Assessment today. The following components were reviewed and updated:    Medical history  Family History  Social history  Allergies and Current Medications  Health Risk Assessment  Health Maintenance  Care Team         ** See Completed Assessments for Annual Wellness Visit within the encounter summary.**         The following assessments were completed:  Living Situation  CAGE  Depression Screening  Timed Get Up and Go  Whisper Test  Cognitive Function Screening  Nutrition Screening  ADL Screening  PAQ Screening        Vitals:    02/01/24 0831   BP: 132/70   Pulse: 60   Resp: 20   SpO2: 100%   Weight: 78.9 kg (173 lb 15.1 oz)   Height: 5' 5" (1.651 m)     Body mass index is 28.95 kg/m².  Physical Exam  Vitals reviewed.   Constitutional:       General: She is not in acute distress.     Appearance: She is well-developed. She is not ill-appearing, toxic-appearing or diaphoretic.   HENT:      Head: Normocephalic and atraumatic.      Right Ear: Tympanic membrane and external ear normal.      Left Ear: Tympanic membrane and external ear normal.      Nose: Nose normal.   Eyes:      General:         Right eye: No discharge.         Left eye: No discharge.      Conjunctiva/sclera: Conjunctivae normal.   Neck:      Thyroid: No thyromegaly.   Cardiovascular:      Rate and Rhythm: Normal rate and regular rhythm.      Pulses: Normal pulses.      Heart sounds: Normal heart sounds.      Comments: RRR  Pulmonary:      Effort: Pulmonary effort is normal. No respiratory distress.      Breath sounds: No wheezing.   Abdominal:      General: There is no distension.      Palpations: There is no mass.   Musculoskeletal:         General: No swelling, deformity or signs of injury.      Right lower leg: No edema.      Left lower leg: No edema.   Skin:     General: Skin is warm and dry.   Neurological:      Mental Status: She is alert and oriented to person, place, and " time.      Cranial Nerves: No cranial nerve deficit.      Sensory: No sensory deficit.   Psychiatric:         Behavior: Behavior normal.         Thought Content: Thought content normal.               Diagnoses and health risks identified today and associated recommendations/orders:    1. Encounter for preventive health examination  Mrs. Sinclair  was seen today for AMW. Health maintenance reviewed.   Overdue health maintenance shows due for Td, Shingrix and RSV vaccines; Td provided today ; given Rx for shingrix and RSV to get at pharmacy. Risks and benefits reviewed   LA  reviewed; Patient is not using or prescribed any Opioids  Exam stable. Having work up with cardiology. Cardiac monitor placed   ACP documents reviewed and provided.        2. Aortic atherosclerosis  Overview:  Minimal Aortic atherosclerosis. Documented per Dr. Moreno- ZACK   Aspirin   Statin     Assessment & Plan:  Stable with Rx       3. PAF (paroxysmal atrial fibrillation)  Overview:  A-fib/flutter: reports she remains on amiodarone, eliquis and metoprolol for this. She follows with Dr. Nieves with [facility].   status post ablation June 2023,    admssion to 12/29/23 to ICU for Afib with RVR- required IV diltiazem and amiodarone and BB adjusted   now better after DCCV 1/5/24 , back on Amio   Eliquis     Assessment & Plan:  Stable with Rx   continue eliquis for CVA ppx       4. Essential hypertension  Overview:  Noted since 2017   left heart catheterization July 2023  showed no obstructive CAD, chronic chest pain.   She was seen recently per [facility]  for cardiac risk assessment prior to colonoscopy. She continue to endorse chest pain, considering microvascular ischemia given recent normal left heart cath. She was placed on amlodipine. She returns today for blood pressure reassessment.Blood pressure is controlled in the office. She denies any symptomatic hypotension.       Assessment & Plan:  She continues to have some chest pain ; notes releived with NTG last  "pm   Currently wearing cardiac monitor device   F/u with Cardiology       5. Long term current use of amiodarone  Overview:      Amiodarone for afib  Lab Results   Component Value Date    ALT 11 01/02/2024    AST 17 01/02/2024    ALKPHOS 76 01/02/2024    BILITOT 1.6 (H) 01/02/2024           Assessment & Plan:  Stable   Monitor       6. Long term (current) use of anticoagulants  Overview:  Eliquis for PAF     Assessment & Plan:  Lab Results   Component Value Date    WBC 9.71 01/02/2024    HGB 13.9 01/02/2024    HCT 43.7 01/02/2024    MCV 86 01/02/2024     01/02/2024     No active bleeding   Stable , monitor       7. Crohn's disease of small and large intestines with complication  Overview:  Noted since 2012 per chart review;   Medical history includes Crohn's disease. She has had multiple small bowel obstructions that required surgery with resections and colostomies in 2001, 2002 and 2003. The colostomies were all reversed. She has been on MTX and Remicade but now only uses symptomatic treatment for constipation or diarrhea.      c-scope 2016: "Impression:           - Patent surgical anastomosis, characterized by                         healthy appearing mucosa.                        - The entire examined colon is normal on direct and                         retroflexion views.                        - No specimens collected."     Assessment & Plan:  Stable   Monitor       8. Chronic chest pain  Overview:  Chronic recurrent chest pain   Follows with cardiology   left heart catheterization July 2023  showed no obstructive CAD, chronic chest pain.   Nuclear PET 1.Stress EKG is non-diagnostic. 2.The heart rate recovery is normal. 1.This is a normal perfusion study, no perfusion defects noted. 2.This scan is suggestive of low to moderate risk for future cardiovascular events. 3.The left ventricular cavity is noted to be normal on the stress studies. The stress left ventricular ejection fraction was calculated to " be 61% and left ventricular global function is normal. The rest left ventricular cavity is noted to be normal. The rest left ventricular ejection fraction was calculated to be 56% and rest left ventricular global function is normal. 4.When compared to the resting ejection fraction (56%), the stress ejection fraction (61%) has increased. 5.Transient ischemic dilatation is present and has been described as a marker for high risk coronary artery disease. It is most likely due to sub-endocardial ischemia, it has also been described in microvascular disease as well as cardiac deconditioning. 6.The study quality is good. 2/25/2022 9:00:00 AM Justin Hayden MD   Myocardial Perfusion Imaging 1.Stress EKG is non-diagnostic. 2.The heart rate recovery is normal. 1.This is a normal perfusion study, no perfusion defects noted. 2.This scan is suggestive of low to moderate risk for future cardiovascular events. 3.The left ventricular cavity is noted to be normal on the stress study. The left ventricular ejection fraction was calculated to be 54% and left ventricular global function is normal. 4.The study quality is good.     Low risk for CAD  Ecotrin   Eliquis  Beta blocker   Atorvastatin     Assessment & Plan:  Today notes recent chest pain relieved with NTG  Chronic intermittent  Following with cardiology - wearing cardiac monitor   Small vessel vs  ? Myocardial bridging ( listed in chart but not documented per cardiology )  vs Rule out non- cardiac etiology ; GERD, muscular , fibromyagia       9. Fibromyalgia  Overview:  Documented since 2013 9/8/23 PCP ;c /o diffuse body aches. Reports 10/10 pain today. She has known h/o fibromyalgia. She has also had recurrent chest pain for last few months. I have seen her twice for this over the last 1 month. Cardiac work up has been unremarkable. Tylenol recommended due to eliquis and crohn's history. Today her pain is not improving. Pain remains constant. Per cardiology   She feels  pain from chest radiating to her spine and lower back. Worse with taking a deep breath.    to endorse chest pain that is constant, reproducible with movement and palpation, across neck and shoulders, down her back, throwing her balance off and is having to use a cane. She is looking/moving stiffly in the exam room. She states she has fibromyalgia and thinks this may the cause.   Cymbalta     Assessment & Plan:  Stable with Rx         Other orders  -     (In Office Administered) Td Vaccine - Preservative Free         Provided Ada with a 5-10 year written screening schedule and personal prevention plan. Recommendations were developed using the USPSTF age appropriate recommendations. Education, counseling, and referrals were provided as needed. After Visit Summary printed and given to patient which includes a list of additional screenings\tests needed.    Follow up in about 1 year (around 2/1/2025).    Essence Keating, NP    I offered to discuss advanced care planning, including how to pick a person who would make decisions for you if you were unable to make them for yourself, called a health care power of , and what kind of decisions you might make such as use of life sustaining treatments such as ventilators and tube feeding when faced with a life limiting illness recorded on a living will that they will need to know. (How you want to be cared for as you near the end of your natural life)     X Patient is interested in learning more about how to make advanced directives.  I provided them paperwork and offered to discuss this with them.

## 2024-02-01 NOTE — PATIENT INSTRUCTIONS
Counseling and Referral of Other Preventative  (Italic type indicates deductible and co-insurance are waived)    Patient Name: Celine Sinclair  Today's Date: 2/1/2024    Health Maintenance       Date Due Completion Date    TETANUS VACCINE Never done ---    Shingles Vaccine (1 of 2) Never done ---    RSV Vaccine (Age 60+ and Pregnant patients) (1 - 1-dose 60+ series) Never done ---    Colorectal Cancer Screening 08/25/2024 8/25/2022    Mammogram 10/31/2024 10/31/2023    DEXA Scan 10/31/2026 10/31/2023    Lipid Panel 07/21/2028 7/21/2023        No orders of the defined types were placed in this encounter.    The following information is provided to all patients.  This information is to help you find resources for any of the problems found today that may be affecting your health:                  Living healthy guide: www.[facility].[place].gov      Understanding Diabetes: www.diabetes.org      Eating healthy: www.cdc.gov/healthyweight      [facility] home safety checklist: www.cdc.gov/steadi/patient.html      Agency on Aging: www.goea.[place].[facility]      Alcoholics anonymous (AA): www.aa.org      Physical Activity: www.tobin.nih.gov/uu8ukjg      Tobacco use: www.quitwithusla.org

## 2024-02-27 ENCOUNTER — PATIENT OUTREACH (OUTPATIENT)
Dept: ADMINISTRATIVE | Facility: HOSPITAL | Age: 74
End: 2024-02-27
Payer: MEDICARE

## 2024-03-18 DIAGNOSIS — M79.7 FIBROMYALGIA: ICD-10-CM

## 2024-03-18 DIAGNOSIS — F33.1 DEPRESSION, MAJOR, RECURRENT, MODERATE: ICD-10-CM

## 2024-03-19 RX ORDER — DULOXETIN HYDROCHLORIDE 60 MG/1
60 CAPSULE, DELAYED RELEASE ORAL
Qty: 90 CAPSULE | Refills: 3 | Status: SHIPPED | OUTPATIENT
Start: 2024-03-19

## 2024-03-19 NOTE — TELEPHONE ENCOUNTER
Refill Routing Note   Medication(s) are not appropriate for processing by [facility] for the following reason(s):        Non-participating provider; Unable to route refill request to listed PCP. Routing to last [facility] prescriber ( Dr. MACIEL ) for review.    OR action(s):  Route               Appointments  past 12m or future 3m with PCP    Date Provider   Last Visit   Visit date not found Giuliana Robison NP   Next Visit   Visit date not found Giuliana Robison NP   ED visits in past 90 days: 0        Note composed:10:24 AM 03/19/2024

## 2024-03-21 DIAGNOSIS — Z76.0 MEDICATION REFILL: ICD-10-CM

## 2024-03-21 NOTE — TELEPHONE ENCOUNTER
Refill Routing Note   Medication(s) are not appropriate for processing by [facility] for the following reason(s):        Responsible provider unclear    [facility] action(s):  Defer               Appointments  past 12m or future 3m with PCP    Date Provider   Last Visit   12/29/2023 Neeru Hinkle MD   Next Visit   Visit date not found Neeru Hinkle MD   ED visits in past 90 days: 0        Note composed:10:12 AM 03/21/2024

## 2024-03-22 RX ORDER — POTASSIUM CHLORIDE 20 MEQ/1
TABLET, EXTENDED RELEASE ORAL
Qty: 180 TABLET | Refills: 3 | Status: SHIPPED | OUTPATIENT
Start: 2024-03-22

## 2024-04-24 NOTE — PROGRESS NOTES
INR increasing. Pt took extremely high dose last week I think out of confusion with her prednisone dose. Continue to hold coumadin. Have patient eat greens today. Repeat INR 11/6 with other appts at [facility]. Advise to go to lab prior to other appt     
Consent: The patient's consent was obtained including but not limited to risks of crusting, scabbing, blistering, scarring, darker or lighter pigmentary change, recurrence, incomplete removal and infection.
Medical Necessity Clause: This procedure was medically necessary because the lesions that were treated were:
Detail Level: Simple
Post-Care Instructions: I reviewed with the patient in detail post-care instructions. Patient is to wear sunprotection, and avoid picking at any of the treated lesions. Pt may apply Vaseline to crusted or scabbing areas
Anesthesia Type: 1% lidocaine with epinephrine
Include Z78.9 (Other Specified Conditions Influencing Health Status) As An Associated Diagnosis?: No
Medical Necessity Information: It is in your best interest to select a reason for this procedure from the list below. All of these items fulfill various CMS LCD requirements except the new and changing color options.

## 2024-09-06 NOTE — PROGRESS NOTES
INR at goal. Medications and chart reviewed. No changes noted to necessitate adjustment of warfarin or follow-up plan. See calendar.    INR was due 1/15 with cardiology appt. Pt missed all appts. R/s INR ASAP      
Yes

## 2024-09-27 DIAGNOSIS — M94.0 COSTOCHONDRITIS: ICD-10-CM

## 2024-09-28 NOTE — TELEPHONE ENCOUNTER
Refill Routing Note   Medication(s) are not appropriate for processing by [facility] for the following reason(s):      Medication outside of protocol    [facility] action(s):  Route Care Due:  None identified            Appointments  past 12m or future 3m with PCP    Date Provider   Last Visit   Visit date not found Neeru Hinkle MD   Next Visit   Visit date not found Neeru Hinkle MD   ED visits in past 90 days: 0        Note composed:9:58 AM 09/28/2024

## 2024-10-04 RX ORDER — GABAPENTIN 300 MG/1
300 CAPSULE ORAL 3 TIMES DAILY
Qty: 90 CAPSULE | Refills: 11 | Status: SHIPPED | OUTPATIENT
Start: 2024-10-04

## 2025-01-01 DIAGNOSIS — Z76.0 MEDICATION REFILL: ICD-10-CM

## 2025-01-02 NOTE — TELEPHONE ENCOUNTER
Refill Routing Note   Medication(s) are not appropriate for processing by [facility] for the following reason(s):        Required labs outdated    [facility] action(s):  Defer               Appointments  past 12m or future 3m with PCP    Date Provider   Last Visit   12/29/2023 Neeru Hinkle MD   Next Visit   Visit date not found Neeru Hinkle MD   ED visits in past 90 days: 0        Note composed:4:36 AM 01/02/2025

## 2025-01-10 RX ORDER — POTASSIUM CHLORIDE 20 MEQ/1
40 TABLET, EXTENDED RELEASE ORAL
Qty: 180 TABLET | Refills: 0 | Status: SHIPPED | OUTPATIENT
Start: 2025-01-10

## 2025-01-28 ENCOUNTER — TELEPHONE (OUTPATIENT)
Dept: CARDIOLOGY | Facility: HOSPITAL | Age: 75
End: 2025-01-28
Payer: MEDICARE

## 2025-01-28 NOTE — TELEPHONE ENCOUNTER
----- Message from Tricia sent at 1/24/2025  4:54 PM CST -----  Regarding: Loop recorder  Juan [phone] pt  says she went to have a MRI done today and they need to know if the loop recorder is MRI compatible?    Thanks

## 2025-01-28 NOTE — TELEPHONE ENCOUNTER
Returned the call to Leila Murphy pt's Neurologist's office on this afternoon and advised that pt has not had ILR checked since 8/24/2021 and that pt has a [employer] Reveal Linq ILR that was implanted on 9/8/2018.  She asked that I please fax that report to their office so as they can get pt scheduled for a MRI.  Let her know I will fax it to her on tomorrow morning @ [phone] upon receipt of medical release.  Understanding was verbalized.

## (undated) DEVICE — BLADE SURG BVL ANG COAX 2.4MM

## (undated) DEVICE — SOL BETADINE 5%

## (undated) DEVICE — KIT GREY EYE

## (undated) DEVICE — Device

## (undated) DEVICE — SYR ANGIO INJ CT/V200 200

## (undated) DEVICE — CARTRIDGE LENS D

## (undated) DEVICE — GOWN SURGICAL X-LARGE

## (undated) DEVICE — CATH INFINITI 4F JL4 .042X100

## (undated) DEVICE — TRAY CORONARY CUSTOM BAPTIST

## (undated) DEVICE — MANIFOLD PERCEPTOR MP 3P RH ON

## (undated) DEVICE — SOL WATER STRL IRR 1000ML

## (undated) DEVICE — OMNIPAQUE 350MG 150ML VIAL

## (undated) DEVICE — BAG DRAINAGE W/SPIKE

## (undated) DEVICE — INTRODUCER CATH 4F 11CM

## (undated) DEVICE — TIP PHACO 45 DEGREE KELMAN

## (undated) DEVICE — ELECTRODE RESUSCITATION 1 STEP

## (undated) DEVICE — GLOVE PROTEXIS PI CLASSIC 9.0

## (undated) DEVICE — SLEEVE ULTRA INFUSION

## (undated) DEVICE — CATH INFINITI JUDKINS JR4

## (undated) DEVICE — STRIP MG FML-GLO .06 - ORDER F

## (undated) DEVICE — GUIDEWIRE 145CM .035

## (undated) DEVICE — GOWN SMART IMP BREATHABLE XXLG

## (undated) DEVICE — KNIFE ANGLE 1MM

## (undated) DEVICE — NDL PERC ENTRY BSDN 18-7.0

## (undated) DEVICE — GLOVE PROTEXIS PI CLASSIC 8.5

## (undated) DEVICE — COVER SANP CLR 36X54

## (undated) DEVICE — SOLUTION BSS PLUS

## (undated) DEVICE — GLOVE PROTEXIS PI CLASSIC 6.5

## (undated) DEVICE — DRAPE STERI 32 X 50

## (undated) DEVICE — HYDRODISSECTOR NUCLEUS 27GX7/8

## (undated) DEVICE — GLOVE PROTEXIS PI CLASSIC 7.0

## (undated) DEVICE — CATH INFINITI 4F PIG 110CM 6SH